# Patient Record
Sex: FEMALE | Race: WHITE | NOT HISPANIC OR LATINO | Employment: OTHER | URBAN - METROPOLITAN AREA
[De-identification: names, ages, dates, MRNs, and addresses within clinical notes are randomized per-mention and may not be internally consistent; named-entity substitution may affect disease eponyms.]

---

## 2017-03-02 RX ORDER — HYDROCHLOROTHIAZIDE 25 MG/1
25 TABLET ORAL DAILY
Qty: 90 TABLET | Refills: 1 | Status: SHIPPED | OUTPATIENT
Start: 2017-03-02

## 2023-05-26 ENCOUNTER — APPOINTMENT (OUTPATIENT)
Dept: GENERAL RADIOLOGY | Facility: HOSPITAL | Age: 88
End: 2023-05-26
Payer: MEDICARE

## 2023-05-26 ENCOUNTER — HOSPITAL ENCOUNTER (EMERGENCY)
Facility: HOSPITAL | Age: 88
Discharge: HOME | End: 2023-05-26
Attending: EMERGENCY MEDICINE
Payer: MEDICARE

## 2023-05-26 VITALS
HEIGHT: 69 IN | WEIGHT: 127 LBS | DIASTOLIC BLOOD PRESSURE: 61 MMHG | TEMPERATURE: 98.9 F | SYSTOLIC BLOOD PRESSURE: 118 MMHG | OXYGEN SATURATION: 95 % | BODY MASS INDEX: 18.81 KG/M2 | HEART RATE: 65 BPM | RESPIRATION RATE: 18 BRPM

## 2023-05-26 DIAGNOSIS — N39.0 URINARY TRACT INFECTION WITHOUT HEMATURIA, SITE UNSPECIFIED: ICD-10-CM
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
## 2023-05-26 DIAGNOSIS — E86.0 MILD DEHYDRATION: ICD-10-CM

## 2023-05-26 LAB
ALBUMIN SERPL-MCNC: 3.3 G/DL (ref 3.5–5.2)
ALBUMIN/GLOB SERPL: 1.1 G/DL
ALP SERPL-CCNC: 54 U/L (ref 39–117)
ALT SERPL W P-5'-P-CCNC: 17 U/L (ref 1–33)
ANION GAP SERPL CALCULATED.3IONS-SCNC: 11 MMOL/L (ref 5–15)
AST SERPL-CCNC: 29 U/L (ref 1–32)
BACTERIA UR QL AUTO: ABNORMAL /HPF
BASOPHILS # BLD AUTO: 0.05 10*3/MM3 (ref 0–0.2)
BASOPHILS NFR BLD AUTO: 0.5 % (ref 0–1.5)
BILIRUB SERPL-MCNC: 0.3 MG/DL (ref 0–1.2)
BILIRUB UR QL STRIP: NEGATIVE
BUN SERPL-MCNC: 23 MG/DL (ref 8–23)
BUN/CREAT SERPL: 15.3 (ref 7–25)
CALCIUM SPEC-SCNC: 8.9 MG/DL (ref 8.6–10.5)
CHLORIDE SERPL-SCNC: 97 MMOL/L (ref 98–107)
CLARITY UR: ABNORMAL
CO2 SERPL-SCNC: 25 MMOL/L (ref 22–29)
COLOR UR: YELLOW
CREAT SERPL-MCNC: 1.5 MG/DL (ref 0.57–1)
DEPRECATED RDW RBC AUTO: 57.8 FL (ref 37–54)
EGFRCR SERPLBLD CKD-EPI 2021: 33.6 ML/MIN/1.73
EOSINOPHIL # BLD AUTO: 0.25 10*3/MM3 (ref 0–0.4)
EOSINOPHIL NFR BLD AUTO: 2.6 % (ref 0.3–6.2)
ERYTHROCYTE [DISTWIDTH] IN BLOOD BY AUTOMATED COUNT: 16.8 % (ref 12.3–15.4)
GLOBULIN UR ELPH-MCNC: 3.1 GM/DL
GLUCOSE SERPL-MCNC: 86 MG/DL (ref 65–99)
GLUCOSE UR STRIP-MCNC: NEGATIVE MG/DL
HCT VFR BLD AUTO: 33.3 % (ref 34–46.6)
HGB BLD-MCNC: 9.8 G/DL (ref 12–15.9)
HGB UR QL STRIP.AUTO: NEGATIVE
HOLD SPECIMEN: NORMAL
HOLD SPECIMEN: NORMAL
HYALINE CASTS UR QL AUTO: ABNORMAL /LPF
IMM GRANULOCYTES # BLD AUTO: 0.06 10*3/MM3 (ref 0–0.05)
IMM GRANULOCYTES NFR BLD AUTO: 0.6 % (ref 0–0.5)
KETONES UR QL STRIP: NEGATIVE
LEUKOCYTE ESTERASE UR QL STRIP.AUTO: ABNORMAL
LYMPHOCYTES # BLD AUTO: 1.17 10*3/MM3 (ref 0.7–3.1)
LYMPHOCYTES NFR BLD AUTO: 12 % (ref 19.6–45.3)
MAGNESIUM SERPL-MCNC: 2.3 MG/DL (ref 1.6–2.4)
MCH RBC QN AUTO: 27.5 PG (ref 26.6–33)
MCHC RBC AUTO-ENTMCNC: 29.4 G/DL (ref 31.5–35.7)
MCV RBC AUTO: 93.3 FL (ref 79–97)
MONOCYTES # BLD AUTO: 0.53 10*3/MM3 (ref 0.1–0.9)
MONOCYTES NFR BLD AUTO: 5.5 % (ref 5–12)
NEUTROPHILS NFR BLD AUTO: 7.66 10*3/MM3 (ref 1.7–7)
NEUTROPHILS NFR BLD AUTO: 78.8 % (ref 42.7–76)
NITRITE UR QL STRIP: NEGATIVE
NRBC BLD AUTO-RTO: 0 /100 WBC (ref 0–0.2)
PH UR STRIP.AUTO: 7 [PH] (ref 5–8)
PLATELET # BLD AUTO: 227 10*3/MM3 (ref 140–450)
PMV BLD AUTO: 10.8 FL (ref 6–12)
POTASSIUM SERPL-SCNC: 4.7 MMOL/L (ref 3.5–5.2)
PROT SERPL-MCNC: 6.4 G/DL (ref 6–8.5)
PROT UR QL STRIP: NEGATIVE
QT INTERVAL: 428 MS
QTC INTERVAL: 458 MS
RBC # BLD AUTO: 3.57 10*6/MM3 (ref 3.77–5.28)
RBC # UR STRIP: ABNORMAL /HPF
REF LAB TEST METHOD: ABNORMAL
SODIUM SERPL-SCNC: 133 MMOL/L (ref 136–145)
SP GR UR STRIP: 1.02 (ref 1–1.03)
SQUAMOUS #/AREA URNS HPF: ABNORMAL /HPF
TROPONIN T SERPL HS-MCNC: 17 NG/L
UROBILINOGEN UR QL STRIP: ABNORMAL
WBC # UR STRIP: ABNORMAL /HPF
WBC NRBC COR # BLD: 9.72 10*3/MM3 (ref 3.4–10.8)
WHOLE BLOOD HOLD COAG: NORMAL
WHOLE BLOOD HOLD SPECIMEN: NORMAL

## 2023-05-26 PROCEDURE — 87077 CULTURE AEROBIC IDENTIFY: CPT | Performed by: PHYSICIAN ASSISTANT

## 2023-05-26 PROCEDURE — 93005 ELECTROCARDIOGRAM TRACING: CPT | Performed by: EMERGENCY MEDICINE

## 2023-05-26 PROCEDURE — 85025 COMPLETE CBC W/AUTO DIFF WBC: CPT | Performed by: EMERGENCY MEDICINE

## 2023-05-26 PROCEDURE — 36415 COLL VENOUS BLD VENIPUNCTURE: CPT

## 2023-05-26 PROCEDURE — 25010000002 CEFTRIAXONE PER 250 MG: Performed by: PHYSICIAN ASSISTANT

## 2023-05-26 PROCEDURE — 81001 URINALYSIS AUTO W/SCOPE: CPT | Performed by: PHYSICIAN ASSISTANT

## 2023-05-26 PROCEDURE — 80053 COMPREHEN METABOLIC PANEL: CPT | Performed by: EMERGENCY MEDICINE

## 2023-05-26 PROCEDURE — 99284 EMERGENCY DEPT VISIT MOD MDM: CPT

## 2023-05-26 PROCEDURE — 87186 SC STD MICRODIL/AGAR DIL: CPT | Performed by: PHYSICIAN ASSISTANT

## 2023-05-26 PROCEDURE — 83735 ASSAY OF MAGNESIUM: CPT | Performed by: EMERGENCY MEDICINE

## 2023-05-26 PROCEDURE — 71045 X-RAY EXAM CHEST 1 VIEW: CPT

## 2023-05-26 PROCEDURE — 96365 THER/PROPH/DIAG IV INF INIT: CPT

## 2023-05-26 PROCEDURE — P9612 CATHETERIZE FOR URINE SPEC: HCPCS

## 2023-05-26 PROCEDURE — 87086 URINE CULTURE/COLONY COUNT: CPT | Performed by: PHYSICIAN ASSISTANT

## 2023-05-26 PROCEDURE — 84484 ASSAY OF TROPONIN QUANT: CPT | Performed by: EMERGENCY MEDICINE

## 2023-05-26 RX ORDER — SODIUM CHLORIDE 0.9 % (FLUSH) 0.9 %
10 SYRINGE (ML) INJECTION AS NEEDED
Status: DISCONTINUED | OUTPATIENT
Start: 2023-05-26 | End: 2023-05-26 | Stop reason: HOSPADM

## 2023-05-26 RX ORDER — CEFDINIR 300 MG/1
300 CAPSULE ORAL DAILY
Qty: 10 CAPSULE | Refills: 0 | Status: SHIPPED | OUTPATIENT
Start: 2023-05-26

## 2023-05-26 RX ADMIN — SODIUM CHLORIDE 1000 ML: 9 INJECTION, SOLUTION INTRAVENOUS at 12:36

## 2023-05-26 NOTE — ED PROVIDER NOTES
Subjective   History of Present Illness  Ms. Hodges is an 87 yr old female with a history of hypertension, depression, dysautonomia, GERD, who presents from [facility] for evaluation after a stumble and fall today.  Patient states that she was using her walker and it got hung on a metal threshold, causing her to fall.  She denies any injury as result of the fall.  She states that she has only been there for about a week.  She has no current complaints and wants to be returned to the facility.  She denies any chest pain or shortness of breath.  No abdominal pain.  No recent nausea, vomiting or diarrhea.  No urinary symptoms.  She is not on any blood thinners.        Review of Systems   Constitutional: Negative for chills and fever.   HENT: Negative for sore throat.    Respiratory: Negative for cough and shortness of breath.    Cardiovascular: Negative for chest pain.   Gastrointestinal: Negative for abdominal pain, diarrhea, nausea and vomiting.   Genitourinary: Negative for dysuria.   Musculoskeletal: Negative for back pain and neck pain.   Skin: Negative for wound.   Neurological: Negative for weakness, light-headedness and headaches.   Hematological: Negative.    Psychiatric/Behavioral: Negative.        Past Medical History:   Diagnosis Date   • Constipation    • Depression    • Dysautonomia orthostatic hypotension syndrome (CMS/HCC)    • Generalized osteoarthritis    • GERD (gastroesophageal reflux disease)     s/p Nissen   • Hypertension    • Insomnia    • Osteoarthrosis, shoulder region    • Skin ulcer of scalp    • Thrombophlebitis of arm    • Tremor    • Vitamin B12 deficiency        Allergies   Allergen Reactions   • Gabapentin    • Sulfa Antibiotics        Past Surgical History:   Procedure Laterality Date   • DILATATION AND CURETTAGE     • HERNIA REPAIR     • HIP SURGERY     • SHOULDER SURGERY      Right       Family History   Problem Relation Age of Onset   • Cancer Mother         Pancreatic    • Stroke Brother    • Anorexia nervosa Daughter        Social History     Socioeconomic History   • Marital status:    Tobacco Use   • Smoking status: Never   Substance and Sexual Activity   • Alcohol use: No     Comment: stopped drinking alcohol   • Drug use: No           Objective   Physical Exam  Constitutional:       General: She is not in acute distress.     Appearance: Normal appearance.   HENT:      Head: Normocephalic and atraumatic.      Right Ear: External ear normal.      Left Ear: External ear normal.      Nose: Nose normal.      Mouth/Throat:      Mouth: Mucous membranes are moist.   Eyes:      General: No scleral icterus.     Conjunctiva/sclera: Conjunctivae normal.      Pupils: Pupils are equal, round, and reactive to light.   Cardiovascular:      Rate and Rhythm: Normal rate and regular rhythm.      Pulses: Normal pulses.      Heart sounds: Normal heart sounds.   Pulmonary:      Effort: Pulmonary effort is normal.      Breath sounds: Normal breath sounds.   Chest:      Chest wall: No tenderness.   Abdominal:      General: Bowel sounds are normal.      Tenderness: There is no abdominal tenderness.   Musculoskeletal:         General: No swelling or tenderness.      Cervical back: Normal range of motion and neck supple. No tenderness.      Right lower leg: No edema.      Left lower leg: No edema.   Skin:     General: Skin is warm and dry.   Neurological:      General: No focal deficit present.      Mental Status: She is alert and oriented to person, place, and time.      Comments: Patient is alert, well-oriented and remarkably sharp for her age.   Psychiatric:         Mood and Affect: Mood normal.         Procedures           ED Course      In summary, 87-year-old female presents for evaluation after a stumble and fall at [facility] this morning.  The patient states that her walker got caught on a metal threshold, resulting in her fall.  She denies any injury secondary to the fall and  states that she is ready to be discharged.  The patient's son is present and states that he would like her checked out to make sure she does not have any other issues.    Basic labs and urinalysis were obtained.    Urinalysis shows too numerous to count white cells with 4+ bacteria.  No epithelial cells seen.  White count is 9.72.  She has some chronic anemia with an H&H of 9.8/33.3.  Creatinine is bumped at 1.50 (normally runs around 1.0).  No concerning electrolyte disorder.  High-sensitivity troponin is 17.    EKG shows a paced rhythm with a rate of 69.    Chest x-ray shows no active disease.    Patient was given a liter of fluids and Rocephin 1 g IV.  She will be discharged back to her facility on cefdinir and encouraged to increase fluids.                                             MDM    Final diagnoses:   Fall on same level from slipping, tripping or stumbling, initial encounter   Urinary tract infection without hematuria, site unspecified   Mild dehydration       ED Disposition  ED Disposition     ED Disposition   Discharge    Condition   Stable    Comment   --             Katarina Oviedo MD  [address] [address]  [phone]      call for follow up    [facility] Emergency Department  [address] [address] [phone]    If symptoms worsen         Medication List      New Prescriptions    cefdinir 300 MG capsule  Commonly known as: OMNICEF  Take 1 capsule by mouth Daily.           Where to Get Your Medications      These medications were sent to [facility] [place]  [address] , [place] [address]    Hours: 7:00 AM-5:30 PM M-F, 8:00 AM-4:30 PM Sat-Sun Phone: [phone]   · cefdinir 300 MG capsule          Patrice Hernandez, PA  05/26/23 5902

## 2023-05-26 NOTE — DISCHARGE INSTRUCTIONS
Rest.  Plenty of fluids.  Omnicef as prescribed.  Follow-up with your PCP for urine recheck.  Return to the emergency department if worse.

## 2023-05-28 LAB — BACTERIA SPEC AEROBE CULT: ABNORMAL

## 2023-06-29 PROBLEM — J18.9 PNEUMONIA DUE TO INFECTIOUS ORGANISM: Status: ACTIVE | Noted: 2023-06-29

## 2023-06-29 PROBLEM — J96.01 ACUTE RESPIRATORY FAILURE WITH HYPOXIA: Status: ACTIVE | Noted: 2023-06-29

## 2023-06-29 PROBLEM — N39.0 UTI (URINARY TRACT INFECTION): Status: ACTIVE | Noted: 2023-06-29

## 2023-07-03 PROBLEM — J96.01 ACUTE RESPIRATORY FAILURE WITH HYPOXIA: Status: RESOLVED | Noted: 2023-06-29 | Resolved: 2023-07-03

## 2023-07-03 PROBLEM — N39.0 UTI (URINARY TRACT INFECTION): Status: RESOLVED | Noted: 2023-06-29 | Resolved: 2023-07-03

## 2023-07-03 PROBLEM — J18.9 PNEUMONIA DUE TO INFECTIOUS ORGANISM: Status: RESOLVED | Noted: 2023-06-29 | Resolved: 2023-07-03

## 2023-08-20 ENCOUNTER — HOSPITAL ENCOUNTER (INPATIENT)
Facility: HOSPITAL | Age: 88
LOS: 5 days | Discharge: HOME | DRG: 177 | End: 2023-08-25
Attending: EMERGENCY MEDICINE | Admitting: INTERNAL MEDICINE
Payer: MEDICARE

## 2023-08-20 ENCOUNTER — APPOINTMENT (OUTPATIENT)
Dept: GENERAL RADIOLOGY | Facility: HOSPITAL | Age: 88
DRG: 177 | End: 2023-08-20
Payer: MEDICARE

## 2023-08-20 DIAGNOSIS — I50.9 CONGESTIVE HEART FAILURE, UNSPECIFIED HF CHRONICITY, UNSPECIFIED HEART FAILURE TYPE: ICD-10-CM

## 2023-08-20 DIAGNOSIS — R13.13 PHARYNGEAL DYSPHAGIA: ICD-10-CM

## 2023-08-20 DIAGNOSIS — J18.9 PNEUMONIA OF LEFT LUNG DUE TO INFECTIOUS ORGANISM, UNSPECIFIED PART OF LUNG: Primary | ICD-10-CM

## 2023-08-20 DIAGNOSIS — J96.01 ACUTE RESPIRATORY FAILURE WITH HYPOXIA: ICD-10-CM

## 2023-08-20 DIAGNOSIS — J41.0 SIMPLE CHRONIC BRONCHITIS: ICD-10-CM

## 2023-08-20 LAB
ALBUMIN SERPL-MCNC: 3.4 G/DL (ref 3.5–5.2)
ALBUMIN/GLOB SERPL: 1.1 G/DL
ALP SERPL-CCNC: 47 U/L (ref 39–117)
ALT SERPL W P-5'-P-CCNC: 14 U/L (ref 1–33)
ANION GAP SERPL CALCULATED.3IONS-SCNC: 8 MMOL/L (ref 5–15)
ARTERIAL PATENCY WRIST A: ABNORMAL
AST SERPL-CCNC: 19 U/L (ref 1–32)
ATMOSPHERIC PRESS: ABNORMAL MM[HG]
B PARAPERT DNA SPEC QL NAA+PROBE: NOT DETECTED
B PERT DNA SPEC QL NAA+PROBE: NOT DETECTED
BACTERIA UR QL AUTO: ABNORMAL /HPF
BASE EXCESS BLDA CALC-SCNC: 0.6 MMOL/L (ref 0–2)
BASOPHILS # BLD AUTO: 0.02 10*3/MM3 (ref 0–0.2)
BASOPHILS NFR BLD AUTO: 0.2 % (ref 0–1.5)
BDY SITE: ABNORMAL
BILIRUB SERPL-MCNC: 0.2 MG/DL (ref 0–1.2)
BILIRUB UR QL STRIP: NEGATIVE
BODY TEMPERATURE: 37 C
BUN SERPL-MCNC: 26 MG/DL (ref 8–23)
BUN/CREAT SERPL: 18.1 (ref 7–25)
C PNEUM DNA NPH QL NAA+NON-PROBE: NOT DETECTED
CALCIUM SPEC-SCNC: 8.5 MG/DL (ref 8.6–10.5)
CHLORIDE SERPL-SCNC: 99 MMOL/L (ref 98–107)
CLARITY UR: ABNORMAL
CO2 BLDA-SCNC: 26.7 MMOL/L (ref 22–33)
CO2 SERPL-SCNC: 25 MMOL/L (ref 22–29)
COHGB MFR BLD: ABNORMAL %
COLOR UR: YELLOW
CREAT SERPL-MCNC: 1.44 MG/DL (ref 0.57–1)
CRP SERPL-MCNC: 0.59 MG/DL (ref 0–0.5)
D DIMER PPP FEU-MCNC: 1.07 MCGFEU/ML (ref 0–0.87)
D-LACTATE SERPL-SCNC: 1.1 MMOL/L (ref 0.5–2)
DEPRECATED RDW RBC AUTO: 56.9 FL (ref 37–54)
EGFRCR SERPLBLD CKD-EPI 2021: 35.3 ML/MIN/1.73
EOSINOPHIL # BLD AUTO: 0.06 10*3/MM3 (ref 0–0.4)
EOSINOPHIL NFR BLD AUTO: 0.5 % (ref 0.3–6.2)
EPAP: 0
ERYTHROCYTE [DISTWIDTH] IN BLOOD BY AUTOMATED COUNT: 17 % (ref 12.3–15.4)
FLUAV SUBTYP SPEC NAA+PROBE: NOT DETECTED
FLUBV RNA ISLT QL NAA+PROBE: NOT DETECTED
GLOBULIN UR ELPH-MCNC: 3 GM/DL
GLUCOSE SERPL-MCNC: 133 MG/DL (ref 65–99)
GLUCOSE UR STRIP-MCNC: NEGATIVE MG/DL
HADV DNA SPEC NAA+PROBE: NOT DETECTED
HCO3 BLDA-SCNC: 25.4 MMOL/L (ref 20–26)
HCOV 229E RNA SPEC QL NAA+PROBE: NOT DETECTED
HCOV HKU1 RNA SPEC QL NAA+PROBE: NOT DETECTED
HCOV NL63 RNA SPEC QL NAA+PROBE: NOT DETECTED
HCOV OC43 RNA SPEC QL NAA+PROBE: NOT DETECTED
HCT VFR BLD AUTO: 32.5 % (ref 34–46.6)
HCT VFR BLD CALC: 29.5 % (ref 38–51)
HGB BLD-MCNC: 9.8 G/DL (ref 12–15.9)
HGB BLDA-MCNC: 9.6 G/DL (ref 14–18)
HGB UR QL STRIP.AUTO: NEGATIVE
HMPV RNA NPH QL NAA+NON-PROBE: NOT DETECTED
HOLD SPECIMEN: NORMAL
HPIV1 RNA ISLT QL NAA+PROBE: NOT DETECTED
HPIV2 RNA SPEC QL NAA+PROBE: NOT DETECTED
HPIV3 RNA NPH QL NAA+PROBE: NOT DETECTED
HPIV4 P GENE NPH QL NAA+PROBE: NOT DETECTED
HYALINE CASTS UR QL AUTO: ABNORMAL /LPF
IMM GRANULOCYTES # BLD AUTO: 0.04 10*3/MM3 (ref 0–0.05)
IMM GRANULOCYTES NFR BLD AUTO: 0.4 % (ref 0–0.5)
INHALED O2 CONCENTRATION: 58 %
IPAP: 0
KETONES UR QL STRIP: NEGATIVE
LEUKOCYTE ESTERASE UR QL STRIP.AUTO: ABNORMAL
LYMPHOCYTES # BLD AUTO: 0.78 10*3/MM3 (ref 0.7–3.1)
LYMPHOCYTES NFR BLD AUTO: 6.9 % (ref 19.6–45.3)
M PNEUMO IGG SER IA-ACNC: NOT DETECTED
MCH RBC QN AUTO: 27.5 PG (ref 26.6–33)
MCHC RBC AUTO-ENTMCNC: 30.2 G/DL (ref 31.5–35.7)
MCV RBC AUTO: 91 FL (ref 79–97)
METHGB BLD QL: 0.6 % (ref 0–1.5)
MODALITY: ABNORMAL
MONOCYTES # BLD AUTO: 0.29 10*3/MM3 (ref 0.1–0.9)
MONOCYTES NFR BLD AUTO: 2.6 % (ref 5–12)
MRSA DNA SPEC QL NAA+PROBE: NEGATIVE
NEUTROPHILS NFR BLD AUTO: 10.11 10*3/MM3 (ref 1.7–7)
NEUTROPHILS NFR BLD AUTO: 89.4 % (ref 42.7–76)
NITRITE UR QL STRIP: POSITIVE
NRBC BLD AUTO-RTO: 0 /100 WBC (ref 0–0.2)
NT-PROBNP SERPL-MCNC: 205.7 PG/ML (ref 0–1800)
OXYHGB MFR BLDV: 88.9 % (ref 94–99)
PAW @ PEAK INSP FLOW SETTING VENT: 0 CMH2O
PCO2 BLDA: 41.1 MM HG (ref 35–45)
PCO2 TEMP ADJ BLD: 41.1 MM HG (ref 35–45)
PH BLDA: 7.4 PH UNITS (ref 7.35–7.45)
PH UR STRIP.AUTO: 7 [PH] (ref 5–8)
PH, TEMP CORRECTED: 7.4 PH UNITS
PLATELET # BLD AUTO: 264 10*3/MM3 (ref 140–450)
PMV BLD AUTO: 9.9 FL (ref 6–12)
PO2 BLDA: 65 MM HG (ref 83–108)
PO2 TEMP ADJ BLD: 65 MM HG (ref 83–108)
POTASSIUM SERPL-SCNC: 4.8 MMOL/L (ref 3.5–5.2)
PROCALCITONIN SERPL-MCNC: 0.04 NG/ML (ref 0–0.25)
PROT SERPL-MCNC: 6.4 G/DL (ref 6–8.5)
PROT UR QL STRIP: ABNORMAL
QT INTERVAL: 426 MS
QTC INTERVAL: 482 MS
RBC # BLD AUTO: 3.57 10*6/MM3 (ref 3.77–5.28)
RBC # UR STRIP: ABNORMAL /HPF
REF LAB TEST METHOD: ABNORMAL
RHINOVIRUS RNA SPEC NAA+PROBE: NOT DETECTED
RSV RNA NPH QL NAA+NON-PROBE: NOT DETECTED
SARS-COV-2 RNA NPH QL NAA+NON-PROBE: NOT DETECTED
SODIUM SERPL-SCNC: 132 MMOL/L (ref 136–145)
SP GR UR STRIP: 1.02 (ref 1–1.03)
SQUAMOUS #/AREA URNS HPF: ABNORMAL /HPF
TOTAL RATE: 0 BREATHS/MINUTE
TROPONIN T SERPL HS-MCNC: 15 NG/L
UROBILINOGEN UR QL STRIP: ABNORMAL
VENTILATOR MODE: ABNORMAL
WBC # UR STRIP: ABNORMAL /HPF
WBC NRBC COR # BLD: 11.3 10*3/MM3 (ref 3.4–10.8)
WHOLE BLOOD HOLD COAG: NORMAL
WHOLE BLOOD HOLD SPECIMEN: NORMAL

## 2023-08-20 PROCEDURE — 36415 COLL VENOUS BLD VENIPUNCTURE: CPT

## 2023-08-20 PROCEDURE — 84484 ASSAY OF TROPONIN QUANT: CPT | Performed by: EMERGENCY MEDICINE

## 2023-08-20 PROCEDURE — 80053 COMPREHEN METABOLIC PANEL: CPT | Performed by: EMERGENCY MEDICINE

## 2023-08-20 PROCEDURE — 81001 URINALYSIS AUTO W/SCOPE: CPT | Performed by: INTERNAL MEDICINE

## 2023-08-20 PROCEDURE — 84145 PROCALCITONIN (PCT): CPT | Performed by: NURSE PRACTITIONER

## 2023-08-20 PROCEDURE — 93005 ELECTROCARDIOGRAM TRACING: CPT | Performed by: EMERGENCY MEDICINE

## 2023-08-20 PROCEDURE — 99223 1ST HOSP IP/OBS HIGH 75: CPT | Performed by: INTERNAL MEDICINE

## 2023-08-20 PROCEDURE — 25010000002 PIPERACILLIN SOD-TAZOBACTAM PER 1 G: Performed by: NURSE PRACTITIONER

## 2023-08-20 PROCEDURE — 87186 SC STD MICRODIL/AGAR DIL: CPT | Performed by: INTERNAL MEDICINE

## 2023-08-20 PROCEDURE — 71045 X-RAY EXAM CHEST 1 VIEW: CPT

## 2023-08-20 PROCEDURE — 83880 ASSAY OF NATRIURETIC PEPTIDE: CPT | Performed by: EMERGENCY MEDICINE

## 2023-08-20 PROCEDURE — 85379 FIBRIN DEGRADATION QUANT: CPT | Performed by: INTERNAL MEDICINE

## 2023-08-20 PROCEDURE — 85025 COMPLETE CBC W/AUTO DIFF WBC: CPT | Performed by: EMERGENCY MEDICINE

## 2023-08-20 PROCEDURE — 87077 CULTURE AEROBIC IDENTIFY: CPT | Performed by: INTERNAL MEDICINE

## 2023-08-20 PROCEDURE — 82784 ASSAY IGA/IGD/IGG/IGM EACH: CPT | Performed by: INTERNAL MEDICINE

## 2023-08-20 PROCEDURE — 87086 URINE CULTURE/COLONY COUNT: CPT | Performed by: INTERNAL MEDICINE

## 2023-08-20 PROCEDURE — 86140 C-REACTIVE PROTEIN: CPT | Performed by: INTERNAL MEDICINE

## 2023-08-20 PROCEDURE — 87088 URINE BACTERIA CULTURE: CPT | Performed by: INTERNAL MEDICINE

## 2023-08-20 PROCEDURE — 87449 NOS EACH ORGANISM AG IA: CPT | Performed by: INTERNAL MEDICINE

## 2023-08-20 PROCEDURE — 99285 EMERGENCY DEPT VISIT HI MDM: CPT

## 2023-08-20 PROCEDURE — 83605 ASSAY OF LACTIC ACID: CPT | Performed by: NURSE PRACTITIONER

## 2023-08-20 PROCEDURE — 82805 BLOOD GASES W/O2 SATURATION: CPT

## 2023-08-20 PROCEDURE — 25010000002 METHYLPREDNISOLONE PER 40 MG: Performed by: INTERNAL MEDICINE

## 2023-08-20 PROCEDURE — 82375 ASSAY CARBOXYHB QUANT: CPT

## 2023-08-20 PROCEDURE — 83050 HGB METHEMOGLOBIN QUAN: CPT

## 2023-08-20 PROCEDURE — 87641 MR-STAPH DNA AMP PROBE: CPT | Performed by: INTERNAL MEDICINE

## 2023-08-20 PROCEDURE — 36600 WITHDRAWAL OF ARTERIAL BLOOD: CPT

## 2023-08-20 PROCEDURE — 0202U NFCT DS 22 TRGT SARS-COV-2: CPT | Performed by: NURSE PRACTITIONER

## 2023-08-20 PROCEDURE — 87040 BLOOD CULTURE FOR BACTERIA: CPT | Performed by: NURSE PRACTITIONER

## 2023-08-20 RX ORDER — CHOLECALCIFEROL (VITAMIN D3) 125 MCG
5 CAPSULE ORAL NIGHTLY PRN
Status: DISCONTINUED | OUTPATIENT
Start: 2023-08-20 | End: 2023-08-25 | Stop reason: HOSPADM

## 2023-08-20 RX ORDER — METHYLPREDNISOLONE SODIUM SUCCINATE 40 MG/ML
40 INJECTION, POWDER, LYOPHILIZED, FOR SOLUTION INTRAMUSCULAR; INTRAVENOUS EVERY 24 HOURS
Status: DISCONTINUED | OUTPATIENT
Start: 2023-08-20 | End: 2023-08-23

## 2023-08-20 RX ORDER — LEVETIRACETAM 500 MG/1
1000 TABLET, EXTENDED RELEASE ORAL NIGHTLY
Status: DISCONTINUED | OUTPATIENT
Start: 2023-08-20 | End: 2023-08-25 | Stop reason: HOSPADM

## 2023-08-20 RX ORDER — SODIUM CHLORIDE 0.9 % (FLUSH) 0.9 %
10 SYRINGE (ML) INJECTION EVERY 12 HOURS SCHEDULED
Status: DISCONTINUED | OUTPATIENT
Start: 2023-08-20 | End: 2023-08-25 | Stop reason: HOSPADM

## 2023-08-20 RX ORDER — METOPROLOL SUCCINATE 50 MG/1
50 TABLET, EXTENDED RELEASE ORAL DAILY
COMMUNITY
End: 2023-08-25 | Stop reason: HOSPADM

## 2023-08-20 RX ORDER — SODIUM CHLORIDE 0.9 % (FLUSH) 0.9 %
10 SYRINGE (ML) INJECTION AS NEEDED
Status: DISCONTINUED | OUTPATIENT
Start: 2023-08-20 | End: 2023-08-25 | Stop reason: HOSPADM

## 2023-08-20 RX ORDER — GUAIFENESIN/DEXTROMETHORPHAN 100-10MG/5
10 SYRUP ORAL EVERY 6 HOURS SCHEDULED
Status: DISCONTINUED | OUTPATIENT
Start: 2023-08-20 | End: 2023-08-25 | Stop reason: HOSPADM

## 2023-08-20 RX ORDER — BUSPIRONE HYDROCHLORIDE 5 MG/1
5 TABLET ORAL EVERY 12 HOURS SCHEDULED
Status: DISCONTINUED | OUTPATIENT
Start: 2023-08-20 | End: 2023-08-25 | Stop reason: HOSPADM

## 2023-08-20 RX ORDER — ATORVASTATIN CALCIUM 40 MG/1
40 TABLET, FILM COATED ORAL NIGHTLY
Status: DISCONTINUED | OUTPATIENT
Start: 2023-08-20 | End: 2023-08-25 | Stop reason: HOSPADM

## 2023-08-20 RX ORDER — POLYETHYLENE GLYCOL 3350 17 G/17G
17 POWDER, FOR SOLUTION ORAL DAILY PRN
Status: DISCONTINUED | OUTPATIENT
Start: 2023-08-20 | End: 2023-08-25 | Stop reason: HOSPADM

## 2023-08-20 RX ORDER — AMIODARONE HYDROCHLORIDE 200 MG/1
200 TABLET ORAL DAILY
Status: DISCONTINUED | OUTPATIENT
Start: 2023-08-21 | End: 2023-08-25 | Stop reason: HOSPADM

## 2023-08-20 RX ORDER — LEVOFLOXACIN 500 MG/1
500 TABLET, FILM COATED ORAL EVERY 24 HOURS
Status: DISCONTINUED | OUTPATIENT
Start: 2023-08-20 | End: 2023-08-25 | Stop reason: HOSPADM

## 2023-08-20 RX ORDER — SODIUM CHLORIDE 9 MG/ML
75 INJECTION, SOLUTION INTRAVENOUS CONTINUOUS
Status: DISCONTINUED | OUTPATIENT
Start: 2023-08-20 | End: 2023-08-21

## 2023-08-20 RX ORDER — MELATONIN
1000 DAILY
Status: DISCONTINUED | OUTPATIENT
Start: 2023-08-21 | End: 2023-08-25 | Stop reason: HOSPADM

## 2023-08-20 RX ORDER — PANTOPRAZOLE SODIUM 40 MG/1
40 TABLET, DELAYED RELEASE ORAL
Status: DISCONTINUED | OUTPATIENT
Start: 2023-08-21 | End: 2023-08-25 | Stop reason: HOSPADM

## 2023-08-20 RX ORDER — FLUTICASONE PROPIONATE 50 MCG
2 SPRAY, SUSPENSION (ML) NASAL DAILY PRN
Status: DISCONTINUED | OUTPATIENT
Start: 2023-08-20 | End: 2023-08-25 | Stop reason: HOSPADM

## 2023-08-20 RX ORDER — SPIRONOLACTONE 25 MG/1
25 TABLET ORAL DAILY
COMMUNITY
End: 2023-08-25 | Stop reason: HOSPADM

## 2023-08-20 RX ORDER — LOSARTAN POTASSIUM 100 MG/1
100 TABLET ORAL DAILY
COMMUNITY
End: 2023-08-25 | Stop reason: HOSPADM

## 2023-08-20 RX ORDER — TRAZODONE HYDROCHLORIDE 100 MG/1
100 TABLET ORAL NIGHTLY
Status: DISCONTINUED | OUTPATIENT
Start: 2023-08-20 | End: 2023-08-25 | Stop reason: HOSPADM

## 2023-08-20 RX ORDER — SODIUM CHLORIDE 9 MG/ML
40 INJECTION, SOLUTION INTRAVENOUS AS NEEDED
Status: DISCONTINUED | OUTPATIENT
Start: 2023-08-20 | End: 2023-08-25 | Stop reason: HOSPADM

## 2023-08-20 RX ORDER — CYANOCOBALAMIN 1000 UG/ML
1000 INJECTION, SOLUTION INTRAMUSCULAR; SUBCUTANEOUS
Status: DISCONTINUED | OUTPATIENT
Start: 2023-08-28 | End: 2023-08-25 | Stop reason: HOSPADM

## 2023-08-20 RX ORDER — BISACODYL 10 MG
10 SUPPOSITORY, RECTAL RECTAL DAILY PRN
Status: DISCONTINUED | OUTPATIENT
Start: 2023-08-20 | End: 2023-08-25 | Stop reason: HOSPADM

## 2023-08-20 RX ORDER — ALBUTEROL SULFATE 2.5 MG/3ML
2.5 SOLUTION RESPIRATORY (INHALATION) EVERY 4 HOURS PRN
Status: DISCONTINUED | OUTPATIENT
Start: 2023-08-20 | End: 2023-08-25 | Stop reason: HOSPADM

## 2023-08-20 RX ORDER — VENLAFAXINE HYDROCHLORIDE 75 MG/1
75 CAPSULE, EXTENDED RELEASE ORAL DAILY
Status: DISCONTINUED | OUTPATIENT
Start: 2023-08-21 | End: 2023-08-25 | Stop reason: HOSPADM

## 2023-08-20 RX ORDER — SENNOSIDES A AND B 8.6 MG/1
2 TABLET, FILM COATED ORAL 2 TIMES DAILY
Status: DISCONTINUED | OUTPATIENT
Start: 2023-08-20 | End: 2023-08-25 | Stop reason: HOSPADM

## 2023-08-20 RX ORDER — ONDANSETRON 2 MG/ML
4 INJECTION INTRAMUSCULAR; INTRAVENOUS EVERY 6 HOURS PRN
Status: DISCONTINUED | OUTPATIENT
Start: 2023-08-20 | End: 2023-08-25 | Stop reason: HOSPADM

## 2023-08-20 RX ORDER — AMOXICILLIN 250 MG
2 CAPSULE ORAL 2 TIMES DAILY
Status: DISCONTINUED | OUTPATIENT
Start: 2023-08-20 | End: 2023-08-25 | Stop reason: HOSPADM

## 2023-08-20 RX ORDER — BISACODYL 5 MG/1
5 TABLET, DELAYED RELEASE ORAL DAILY PRN
Status: DISCONTINUED | OUTPATIENT
Start: 2023-08-20 | End: 2023-08-25 | Stop reason: HOSPADM

## 2023-08-20 RX ORDER — AMLODIPINE BESYLATE 5 MG/1
5 TABLET ORAL DAILY
COMMUNITY
End: 2023-08-25 | Stop reason: HOSPADM

## 2023-08-20 RX ADMIN — ATORVASTATIN CALCIUM 40 MG: 40 TABLET, FILM COATED ORAL at 20:17

## 2023-08-20 RX ADMIN — APIXABAN 2.5 MG: 2.5 TABLET, FILM COATED ORAL at 20:17

## 2023-08-20 RX ADMIN — SENNOSIDES AND DOCUSATE SODIUM 2 TABLET: 50; 8.6 TABLET ORAL at 20:17

## 2023-08-20 RX ADMIN — LEVOFLOXACIN 500 MG: 500 TABLET, FILM COATED ORAL at 17:35

## 2023-08-20 RX ADMIN — METHYLPREDNISOLONE SODIUM SUCCINATE 40 MG: 40 INJECTION, POWDER, FOR SOLUTION INTRAMUSCULAR; INTRAVENOUS at 17:36

## 2023-08-20 RX ADMIN — Medication 10 ML: at 20:18

## 2023-08-20 RX ADMIN — Medication 5 MG: at 20:17

## 2023-08-20 RX ADMIN — SENNOSIDES 2 TABLET: 8.6 TABLET, FILM COATED ORAL at 20:17

## 2023-08-20 RX ADMIN — TRAZODONE HYDROCHLORIDE 100 MG: 100 TABLET ORAL at 20:17

## 2023-08-20 RX ADMIN — LEVETIRACETAM 1000 MG: 500 TABLET, EXTENDED RELEASE ORAL at 21:21

## 2023-08-20 RX ADMIN — BUSPIRONE HYDROCHLORIDE 5 MG: 5 TABLET ORAL at 21:21

## 2023-08-20 RX ADMIN — SODIUM CHLORIDE 75 ML/HR: 9 INJECTION, SOLUTION INTRAVENOUS at 16:31

## 2023-08-20 RX ADMIN — GUAIFENESIN AND DEXTROMETHORPHAN 10 ML: 100; 10 SYRUP ORAL at 17:36

## 2023-08-20 RX ADMIN — PIPERACILLIN SODIUM AND TAZOBACTAM SODIUM 3.38 G: 3; .375 INJECTION, SOLUTION INTRAVENOUS at 13:04

## 2023-08-20 RX ADMIN — DOXYCYCLINE 100 MG: 100 INJECTION, POWDER, LYOPHILIZED, FOR SOLUTION INTRAVENOUS at 13:40

## 2023-08-20 NOTE — H&P
[facility] Medicine Services  HISTORY AND PHYSICAL    Patient Name: Bibiana Hodges  : 1935  MRN: [id number]  Primary Care Physician: Katarina Oviedo MD  Date of admission: 2023      Subjective   Subjective     Chief Complaint:  I could not sleep    HPI:  Bibiana Hodges is a 87 y.o. female with medical history of essential hypertension, autonomic dysfunction, atrial fibrillation on amiodarone and Eliquis, chronic debility and multiple hospitalizations manage in the last 2 months with recurrent pneumonia, most recently from 2023 till 7/10/2023 when she was treated for both community-acquired pneumonia and UTI and was discharged to SNF.  Patient reported to me that she did not get much better since last discharge and has been having intermittent shortness of breath.  She is telling me that over the past 4 nights, she could not sleep because nursing staff were not giving her the sleeping medications and she could not take it anymore without sleeping so she told them to call EMT.  She does report some shortness of breath with minimal activity.  Denies any fever or chills.  Having intermittent cough that is dry.  Denies any chest pain.  She is not using oxygen at her assisted in facility    In ER, she was found to be hypoxic satting 83% on room air, quickly improved to 99% on 2 L nasal cannula.  Blood work-up was remarkable for sodium of 132, creatinine 1.4 CRP of 0.59, D-dimer of 1.0 and white cell count of 11.3.  Chest x-ray with worsening left upper lobe infiltrates.  She received IV Zosyn and Doxy in ER and will be admitted to hospital service for further evaluation      Review of Systems   General : no fevers, chills  CVS: No chest pain, palpitations  Respiratory:++ Dry cough,++ dyspnea  GI: No N/V/D, abd pain  10 point review of systems is negative except for what is mentioned in HPI    Personal History     Past Medical History:   Diagnosis Date    Constipation      Depression     Dysautonomia orthostatic hypotension syndrome     Generalized osteoarthritis     GERD (gastroesophageal reflux disease)     s/p Nissen    Hypertension     Insomnia     Osteoarthrosis, shoulder region     Skin ulcer of scalp     Thrombophlebitis of arm     Tremor     Vitamin B12 deficiency              Past Surgical History:   Procedure Laterality Date    DILATATION AND CURETTAGE      HERNIA REPAIR      HIP SURGERY      SHOULDER SURGERY      Right       Family History: family history includes Anorexia nervosa in her daughter; Cancer in her mother; Stroke in her brother.     Social History:  reports that she has never smoked. She has never used smokeless tobacco. She reports that she does not drink alcohol and does not use drugs.  Social History     Social History Narrative    Not on file       Medications:  Available home medication information reviewed.  (Not in a hospital admission)      Allergies   Allergen Reactions    Gabapentin     Sulfa Antibiotics        Objective   Objective     Vital Signs:   Temp:  [98.1 øF (36.7 øC)] 98.1 øF (36.7 øC)  Heart Rate:  [59-75] 60  Resp:  [18] 18  BP: ()/() 138/117       Physical Exam   General: Chronically ill looking and debilitated  Head: Atraumatic and normocephalic  Eyes: No Icterus. No pallor  Ears:  Ears appear intact with no abnormalities noted  Throat: No oral lesions, no thrush  Neck: Supple, trachea midline  Lungs: Left upper lobe coarse crackles.  Diminished air entry both lung fields.  Scattered wheezing.    Heart:  Normal S1 and S2, no murmur, no gallop, No JVD, no lower extremity swelling  Abdomen:  Soft, no tenderness, no organomegaly, normal bowel sounds, no organomegaly  Extremities: pulses equal bilaterally  Skin: No bleeding, bruising or rash, normal skin turgor and elasticity  Neurologic: Cranial nerves appear intact with no evidence of facial asymmetry, normal motor and sensory functions in all 4 extremities  Psych: Alert and  oriented x 3, normal mood    Result Review:  I have personally reviewed the results from the time of this admission to 8/20/2023 15:09 EDT and agree with these findings:  [x]  Laboratory list / accordion  [x]  Microbiology  [x]  Radiology  []  EKG/Telemetry   []  Cardiology/Vascular   []  Pathology  [x]  Old records    LAB RESULTS:      Lab 08/20/23  1106 08/20/23  1059   WBC  --  11.30*   HEMOGLOBIN  --  9.8*   HEMATOCRIT  --  32.5*   PLATELETS  --  264   NEUTROS ABS  --  10.11*   IMMATURE GRANS (ABS)  --  0.04   LYMPHS ABS  --  0.78   MONOS ABS  --  0.29   EOS ABS  --  0.06   MCV  --  91.0   CRP 0.59*  --    PROCALCITONIN 0.04  --    LACTATE 1.1  --    D DIMER QUANT  --  1.07*         Lab 08/20/23  1106   SODIUM 132*   POTASSIUM 4.8   CHLORIDE 99   CO2 25.0   ANION GAP 8.0   BUN 26*   CREATININE 1.44*   EGFR 35.3*   GLUCOSE 133*   CALCIUM 8.5*         Lab 08/20/23  1106   TOTAL PROTEIN 6.4   ALBUMIN 3.4*   GLOBULIN 3.0   ALT (SGPT) 14   AST (SGOT) 19   BILIRUBIN 0.2   ALK PHOS 47         Lab 08/20/23  1106   PROBNP 205.7   HSTROP T 15*                 Lab 08/20/23  1127   PH, ARTERIAL 7.400   PCO2, ARTERIAL 41.1   PO2 ART 65.0*   FIO2 58   HCO3 ART 25.4   BASE EXCESS ART 0.6     UA          5/26/2023    11:18 6/29/2023    07:45   Urinalysis   Squamous Epithelial Cells, UA None Seen  0-2    Specific Gravity, UA 1.016  1.015    Ketones, UA Negative  Negative    Blood, UA Negative  Trace    Leukocytes, UA Moderate (2+)  Large (3+)    Nitrite, UA Negative  Positive    RBC, UA None Seen  0-2    WBC, UA Too Numerous to Count  Too Numerous to Count    Bacteria, UA 4+  3+        Microbiology Results (last 10 days)       Procedure Component Value - Date/Time    COVID PRE-OP / PRE-PROCEDURE SCREENING ORDER (NO ISOLATION) - Swab, Nasopharynx [[id number]]  (Normal) Collected: 08/20/23 1107    Lab Status: Final result Specimen: Swab from Nasopharynx Updated: 08/20/23 1208    Narrative:      The following orders were created for  panel order COVID PRE-OP / PRE-PROCEDURE SCREENING ORDER (NO ISOLATION) - Swab, Nasopharynx.  Procedure                               Abnormality         Status                     ---------                               -----------         ------                     Respiratory Panel PCR w/...[[id number]]  Normal              Final result                 Please view results for these tests on the individual orders.    Respiratory Panel PCR w/COVID-19(SARS-CoV-2) ERICK/MARTA/ELPIDIO/PAD/COR/MAD/ALMA In-House, NP Swab in UTM/VTM, 3-4 HR TAT - Swab, Nasopharynx [[id number]]  (Normal) Collected: 08/20/23 1107    Lab Status: Final result Specimen: Swab from Nasopharynx Updated: 08/20/23 1208     ADENOVIRUS, PCR Not Detected     Coronavirus 229E Not Detected     Coronavirus HKU1 Not Detected     Coronavirus NL63 Not Detected     Coronavirus OC43 Not Detected     COVID19 Not Detected     Human Metapneumovirus Not Detected     Human Rhinovirus/Enterovirus Not Detected     Influenza A PCR Not Detected     Influenza B PCR Not Detected     Parainfluenza Virus 1 Not Detected     Parainfluenza Virus 2 Not Detected     Parainfluenza Virus 3 Not Detected     Parainfluenza Virus 4 Not Detected     RSV, PCR Not Detected     Bordetella pertussis pcr Not Detected     Bordetella parapertussis PCR Not Detected     Chlamydophila pneumoniae PCR Not Detected     Mycoplasma pneumo by PCR Not Detected    Narrative:      In the setting of a positive respiratory panel with a viral infection PLUS a negative procalcitonin without other underlying concern for bacterial infection, consider observing off antibiotics or discontinuation of antibiotics and continue supportive care. If the respiratory panel is positive for atypical bacterial infection (Bordetella pertussis, Chlamydophila pneumoniae, or Mycoplasma pneumoniae), consider antibiotic de-escalation to target atypical bacterial infection.            XR Chest 1 View    Result Date: 8/20/2023  XR CHEST 1  VW Date of Exam: 8/20/2023 10:18 AM CDT Indication: SOA triage protocol Comparison: 7/5/2023 Findings: Cardiomediastinal silhouette is unremarkable. There is irregular left mid to upper lung airspace disease suggestive of pneumonia. There is moderate interstitial prominence throughout, similar to prior examination. No significant effusion nor pneumothorax. No acute osseous abnormality identified.     Impression: Impression: Left lung airspace disease suggestive of pneumonia. Electronically Signed: Chance Henao MD  8/20/2023 10:31 AM CDT  Workstation ID: [id number]         Assessment & Plan   Assessment & Plan     Active Hospital Problems    Diagnosis  POA    **Pneumonia [J18.9]  Yes     Summary:  Bibiana Hodges is a 87 y.o. female with medical history of essential hypertension, autonomic dysfunction, atrial fibrillation on amiodarone and Eliquis, chronic debility and multiple hospitalizations manage in the last 2 months with recurrent pneumonia, most recently from 6/29/2023 till 7/10/2023 when she was treated for both community-acquired pneumonia and UTI and was discharged to SNF.  She presented to the hospital today with insomnia x4 nights.  Found to be hypoxic    Assessment and plan:  Acute hypoxia  Recurrent community-acquired pneumonia, suspect secondary to atypical organisms  Possible COPD  Not sure what to make of her x-ray finding in ER.  She definitely has accentuated bronchoalveolar markings in the left upper lobe compared to most recent x-ray from 7/5/2023 but not solid enough to make me think she has no pneumonia.  I strongly suspect that she has underlying chronic lung disease, possibly COPD with a component of lung fibrosis based on clinical exam and review of CT scan findings.  She never had PFT or follow-up with pulmonary service  Other than hypoxia, her symptomatology and blood work-up will argue against bacterial pneumonia with negative CRP, negative procalcitonin and mild elevated white cell count  especially with the lack of impressive respiratory symptoms or fever  I will give her the benefit of doubt and continue antibiotic therapy with p.o. Levaquin for now pending pneumonia work-up: Sputum culture, MRSA swab, Legionella antigen, strep antigen etc.  She will need to have formal evaluation by pulmonary service and PFT as outpatient.  Referral placed in epic  Speech evaluation to rule out microaspiration  She would likely need to go back to her assisted facility with home O2  Check immunoglobulin panel given her recurrent pneumonia  DuoNebs, incentive spirometry, Mucinex  Obtain echo with bubble study to rule out intracardiac shunting that might be contributing to her hypoxia  Of note, D-dimer is mildly elevated at 1.0 which is appropriate for her age.  I do not think she has PE particularly she is taking Eliquis at home    A-fib  Continue amiodarone and Eliquis    RACH  Creatinine is slightly elevated at 1.4.  Her baseline is around 1  Gentle IV fluids and monitor kidney function    Essential hypertension  Autonomic dysfunction with recurrent presyncopal episodes  From last discharge, her antiplatelet medications were held  Continue to hold on hypertensive medications particularly with her borderline blood pressure    Dyslipidemia  Continue statins    Chronic debility  PT and OT consultation    Insomnia  Her main presenting complaint  Continue trazodone  Add melatonin at bedtime    DVT prophylaxis: On Eliquis      CODE STATUS:  Full code  Code Status and Medical Interventions:   Ordered at: 08/20/23 1445     Level Of Support Discussed With:    Patient     Code Status (Patient has no pulse and is not breathing):    CPR (Attempt to Resuscitate)     Medical Interventions (Patient has pulse or is breathing):    Full Support       Expected Discharge   Expected Discharge Date: 8/22/2023; Expected Discharge Time:      Sania Vera MD  08/20/23

## 2023-08-20 NOTE — PLAN OF CARE
Goal Outcome Evaluation:  Plan of Care Reviewed With: patient        Progress: improving  Outcome Evaluation: Pt on room air satting low 90s. Paced on monitor. MRSA and urine sent to lab. Pt alert and oriented x4 this evening. No complaints. Pt does have a productive cough. Fluids at 75 ml/hr. VSS. Will continue poc.

## 2023-08-20 NOTE — PROGRESS NOTES
Antimicrobial Stewardship Optimization Recommendation    Discussed with [employer] antibiotic coverage for PNA. Patient has had a recent hospital stay. With patient specific factors would like to treat current Pneumonia with MRSA coverage.    After chart review patient has no documented history of MRSA. Recent MRSA nare in June of 2023 is negative. Will start patient on empiric therapy with Zosyn and Doxycycline for now. Will adjust antibiotics accordingly to culture results.    Thanks  Gareth Martinez, PharmD, BCPS  8/20/2023  12:49 EDT

## 2023-08-20 NOTE — CASE MANAGEMENT/SOCIAL WORK
Discharge Planning Assessment  [facility]     Patient Name: Bibiana Hodges  MRN: [id number]  Today's Date: 8/20/2023    Admit Date: 8/20/2023    Plan: IDP   Discharge Needs Assessment       Row Name 08/20/23 1506       Living Environment    People in Home alone;other (see comments)  [facility]    Current Living Arrangements assisted living facility    Potentially Unsafe Housing Conditions unable to assess    Primary Care Provided by other (see comments)  staff    Provides Primary Care For no one, unable/limited ability to care for self    Family Caregiver if Needed child(alana), adult    Family Caregiver Names Danyelle Keyes--daughter    Quality of Family Relationships unable to assess       Resource/Environmental Concerns    Transportation Concerns none       Transition Planning    Patient/Family Anticipates Transition to home    Transportation Anticipated other (see comments)  may need assistance with transportation       Discharge Needs Assessment    Readmission Within the Last 30 Days unable to assess    Equipment Currently Used at Home wheelchair;wheelchair, motorized;walker, rolling;rollator;nebulizer                   Discharge Plan       Row Name 08/20/23 1512       Plan    Plan IDP    Plan Comments MSW met with pt. at bedside. Pt. reports she resides a [facility] in [facility]. Pt.'s PCP is Katarina Oviedo. Pt.'s insurance is [employer]. Pt. reports that she is dependent at baseline and staff assists with all ADL's/IADL's. Pt. reports she has a walker, rollator, w/c, nebulizer, and electric w/c. Pt. denies O2, and HH currently. Pt. may need assistance with transportation when she is medically ready. Pt. has an advanced directive and ACP documentation on file. CM will continue to follow pt. throughout her stay.    Final Discharge Disposition Code 30 - still a patient                  Continued Care and Services - Admitted Since 8/20/2023    Coordination has not  been started for this encounter.       Selected Continued Care - Prior Encounters Includes continued care and service providers with selected services from prior encounters from 5/22/2023 to 8/20/2023      Discharged on 7/10/2023 Admission date: 6/29/2023 - Discharge disposition: Skilled Nursing Facility (DC - External)      Destination       Service Provider Selected Services Address Phone Fax Patient Preferred    [facility] [address] [id number] [phone] --              Home Medical Care       Service Provider Selected Services Address Phone Fax Patient Preferred    [facility] [address], [address], [place] [address] [phone] --       Internal Comment last updated by Terrie Sanderson, RN 7/3/2023 1205    7/3: I spoke with Nadira/[phone], for PT/OT                                     Expected Discharge Date and Time       Expected Discharge Date Expected Discharge Time    Aug 22, 2023            Demographic Summary       Row Name 08/20/23 1503       General Information    Admission Type inpatient    Arrived From home;other (see comments)  [facility]    Referral Source admission list;emergency department    Reason for Consult discharge planning    Preferred Language English                   Functional Status       Row Name 08/20/23 1504       Functional Status, IADL    Medications assistive equipment and person    Meal Preparation completely dependent    Housekeeping completely dependent    Laundry completely dependent    Shopping completely dependent    IADL Comments [facility] staff assists with all IADL's       Mental Status Summary    Recent Changes in Mental Status/Cognitive Functioning unable to assess       Employment/    Employment Status retired                   Psychosocial    No documentation.                  Abuse/Neglect    No documentation.                   Legal    No documentation.                  Substance Abuse    No documentation.                  Patient Forms    No documentation.                     FRANCHESCA Joseph

## 2023-08-20 NOTE — ED PROVIDER NOTES
Subjective   History of Present Illness  Patient presents to the ER for cough and difficulty breathing for last several days.  Oxygen saturation in the mid 80% range on room air.  Patient denies being on any home oxygen.  She states that [facility].  Admitted to the hospital for pneumonia several months ago.      Review of Systems    Past Medical History:   Diagnosis Date    Constipation     Depression     Dysautonomia orthostatic hypotension syndrome     Generalized osteoarthritis     GERD (gastroesophageal reflux disease)     s/p Nissen    Hypertension     Insomnia     Osteoarthrosis, shoulder region     Skin ulcer of scalp     Thrombophlebitis of arm     Tremor     Vitamin B12 deficiency        Allergies   Allergen Reactions    Gabapentin     Sulfa Antibiotics        Past Surgical History:   Procedure Laterality Date    DILATATION AND CURETTAGE      HERNIA REPAIR      HIP SURGERY      SHOULDER SURGERY      Right       Family History   Problem Relation Age of Onset    Cancer Mother         Pancreatic    Stroke Brother     Anorexia nervosa Daughter        Social History     Socioeconomic History    Marital status:    Tobacco Use    Smoking status: Never    Smokeless tobacco: Never   Vaping Use    Vaping Use: Never used   Substance and Sexual Activity    Alcohol use: No     Comment: stopped drinking alcohol    Drug use: No    Sexual activity: Defer           Objective   Physical Exam  Constitutional:       Appearance: She is well-developed. She is ill-appearing.      Comments: Frail.  Elderly.   HENT:      Head: Normocephalic and atraumatic.      Right Ear: External ear normal.      Left Ear: External ear normal.      Nose: Nose normal.   Eyes:      Conjunctiva/sclera: Conjunctivae normal.      Pupils: Pupils are equal, round, and reactive to light.   Cardiovascular:      Rate and Rhythm: Normal rate and regular rhythm.      Heart sounds: Normal heart sounds.   Pulmonary:      Effort: Pulmonary effort  is normal.      Breath sounds: Decreased breath sounds and wheezing present.   Abdominal:      General: Bowel sounds are normal.      Palpations: Abdomen is soft.   Musculoskeletal:         General: Normal range of motion.      Cervical back: Normal range of motion and neck supple.   Skin:     General: Skin is warm and dry.   Neurological:      Mental Status: She is alert and oriented to person, place, and time.   Psychiatric:         Behavior: Behavior normal.         Thought Content: Thought content normal.         Judgment: Judgment normal.       Procedures           ED Course  ED Course as of 08/20/23 1307   Sun Aug 20, 2023   1106 I discussed her care with nurse practitioner Lauro Escobar.  I have examined Mrs. Hodges and spoken with her.  At the time of my exam saturations were 99% on 4 L.  I have turned her down to 2 L and it looks like she staying around 97 with that.  Her nurse advises me that initially sats were in the 80s on nasal cannula oxygen, I suspect she has cleared some mucus and breathing is better for the time being.  She tells me she feels like she does when she has pneumonia.  She is noted to have rattly wet cough [DT]      ED Course User Index  [DT] Chance Mcrae MD                XR Chest 1 View   Final Result   Impression:   Left lung airspace disease suggestive of pneumonia.         Electronically Signed: Chance Henao MD     8/20/2023 10:31 AM CDT     Workstation ID: [id number]                                     Medical Decision Making  Problems Addressed:  Acute respiratory failure with hypoxia: complicated acute illness or injury  Pneumonia of left lung due to infectious organism, unspecified part of lung: complicated acute illness or injury    Amount and/or Complexity of Data Reviewed  External Data Reviewed: labs, radiology and ECG.  Labs: ordered.  Radiology: ordered.  ECG/medicine tests: ordered.    Risk  Prescription drug management.  Decision regarding hospitalization.        Final  diagnoses:   Pneumonia of left lung due to infectious organism, unspecified part of lung   Acute respiratory failure with hypoxia       ED Disposition  ED Disposition       ED Disposition   Decision to Admit    Condition   --    Comment   --               No follow-up provider specified.       Medication List      No changes were made to your prescriptions during this visit.            Lauro Escobar, APRN  08/20/23 8735

## 2023-08-21 ENCOUNTER — APPOINTMENT (OUTPATIENT)
Dept: GENERAL RADIOLOGY | Facility: HOSPITAL | Age: 88
DRG: 177 | End: 2023-08-21
Payer: MEDICARE

## 2023-08-21 ENCOUNTER — APPOINTMENT (OUTPATIENT)
Dept: CARDIOLOGY | Facility: HOSPITAL | Age: 88
DRG: 177 | End: 2023-08-21
Payer: MEDICARE

## 2023-08-21 LAB
ALBUMIN SERPL-MCNC: 3.4 G/DL (ref 3.5–5.2)
ALBUMIN/GLOB SERPL: 1.6 G/DL
ALP SERPL-CCNC: 43 U/L (ref 39–117)
ALT SERPL W P-5'-P-CCNC: 67 U/L (ref 1–33)
ANION GAP SERPL CALCULATED.3IONS-SCNC: 9 MMOL/L (ref 5–15)
ASCENDING AORTA: 3.7 CM
AST SERPL-CCNC: 67 U/L (ref 1–32)
BASOPHILS # BLD AUTO: 0.02 10*3/MM3 (ref 0–0.2)
BASOPHILS NFR BLD AUTO: 0.1 % (ref 0–1.5)
BH CV ECHO MEAS - AI P1/2T: 356.6 MSEC
BH CV ECHO MEAS - AO MAX PG: 5.8 MMHG
BH CV ECHO MEAS - AO MEAN PG: 3 MMHG
BH CV ECHO MEAS - AO ROOT DIAM: 3 CM
BH CV ECHO MEAS - AO V2 MAX: 120 CM/SEC
BH CV ECHO MEAS - AO V2 VTI: 28.1 CM
BH CV ECHO MEAS - AVA(I,D): 2.29 CM2
BH CV ECHO MEAS - EDV(CUBED): 91.1 ML
BH CV ECHO MEAS - EDV(MOD-SP2): 76.7 ML
BH CV ECHO MEAS - EDV(MOD-SP4): 66.9 ML
BH CV ECHO MEAS - EF(MOD-BP): 47.9 %
BH CV ECHO MEAS - EF(MOD-SP2): 46.5 %
BH CV ECHO MEAS - EF(MOD-SP4): 49.6 %
BH CV ECHO MEAS - ESV(CUBED): 22 ML
BH CV ECHO MEAS - ESV(MOD-SP2): 41 ML
BH CV ECHO MEAS - ESV(MOD-SP4): 33.7 ML
BH CV ECHO MEAS - FS: 37.8 %
BH CV ECHO MEAS - IVS/LVPW: 1 CM
BH CV ECHO MEAS - IVSD: 0.9 CM
BH CV ECHO MEAS - LA DIMENSION: 3.7 CM
BH CV ECHO MEAS - LAT PEAK E' VEL: 8.2 CM/SEC
BH CV ECHO MEAS - LV MASS(C)D: 132.8 GRAMS
BH CV ECHO MEAS - LV MAX PG: 3 MMHG
BH CV ECHO MEAS - LV MEAN PG: 2 MMHG
BH CV ECHO MEAS - LV V1 MAX: 87 CM/SEC
BH CV ECHO MEAS - LV V1 VTI: 20.5 CM
BH CV ECHO MEAS - LVIDD: 4.5 CM
BH CV ECHO MEAS - LVIDS: 2.8 CM
BH CV ECHO MEAS - LVOT AREA: 3.1 CM2
BH CV ECHO MEAS - LVOT DIAM: 2 CM
BH CV ECHO MEAS - LVPWD: 0.9 CM
BH CV ECHO MEAS - MED PEAK E' VEL: 6.6 CM/SEC
BH CV ECHO MEAS - MV A MAX VEL: 76 CM/SEC
BH CV ECHO MEAS - MV DEC SLOPE: 469 CM/SEC2
BH CV ECHO MEAS - MV DEC TIME: 0.23 MSEC
BH CV ECHO MEAS - MV E MAX VEL: 77.6 CM/SEC
BH CV ECHO MEAS - MV E/A: 1.02
BH CV ECHO MEAS - MV MAX PG: 2.8 MMHG
BH CV ECHO MEAS - MV MEAN PG: 2 MMHG
BH CV ECHO MEAS - MV P1/2T: 54.1 MSEC
BH CV ECHO MEAS - MV V2 VTI: 25.7 CM
BH CV ECHO MEAS - MVA(P1/2T): 4.1 CM2
BH CV ECHO MEAS - MVA(VTI): 2.5 CM2
BH CV ECHO MEAS - PA ACC TIME: 0.11 SEC
BH CV ECHO MEAS - PI END-D VEL: 101 CM/SEC
BH CV ECHO MEAS - RAP SYSTOLE: 8 MMHG
BH CV ECHO MEAS - RVSP: 33 MMHG
BH CV ECHO MEAS - SV(LVOT): 64.4 ML
BH CV ECHO MEAS - SV(MOD-SP2): 35.7 ML
BH CV ECHO MEAS - SV(MOD-SP4): 33.2 ML
BH CV ECHO MEAS - TAPSE (>1.6): 2.11 CM
BH CV ECHO MEAS - TR MAX PG: 24.7 MMHG
BH CV ECHO MEAS - TR MAX VEL: 248.3 CM/SEC
BH CV ECHO MEASUREMENTS AVERAGE E/E' RATIO: 10.49
BH CV ECHO SHUNT ASSESSMENT PERFORMED (HIDDEN SCRIPTING): 1
BH CV VAS BP RIGHT ARM: NORMAL MMHG
BH CV XLRA - RV BASE: 3.2 CM
BH CV XLRA - RV LENGTH: 7.5 CM
BH CV XLRA - RV MID: 2.3 CM
BH CV XLRA - TDI S': 10.7 CM/SEC
BILIRUB SERPL-MCNC: 0.3 MG/DL (ref 0–1.2)
BUN SERPL-MCNC: 23 MG/DL (ref 8–23)
BUN/CREAT SERPL: 16 (ref 7–25)
CALCIUM SPEC-SCNC: 8.3 MG/DL (ref 8.6–10.5)
CHLORIDE SERPL-SCNC: 100 MMOL/L (ref 98–107)
CO2 SERPL-SCNC: 23 MMOL/L (ref 22–29)
CREAT SERPL-MCNC: 1.44 MG/DL (ref 0.57–1)
CRP SERPL-MCNC: 10.48 MG/DL (ref 0–0.5)
DEPRECATED RDW RBC AUTO: 55.2 FL (ref 37–54)
EGFRCR SERPLBLD CKD-EPI 2021: 35.3 ML/MIN/1.73
EOSINOPHIL # BLD AUTO: 0 10*3/MM3 (ref 0–0.4)
EOSINOPHIL NFR BLD AUTO: 0 % (ref 0.3–6.2)
ERYTHROCYTE [DISTWIDTH] IN BLOOD BY AUTOMATED COUNT: 17 % (ref 12.3–15.4)
GLOBULIN UR ELPH-MCNC: 2.1 GM/DL
GLUCOSE SERPL-MCNC: 115 MG/DL (ref 65–99)
HCT VFR BLD AUTO: 26.6 % (ref 34–46.6)
HGB BLD-MCNC: 8.3 G/DL (ref 12–15.9)
IGA1 MFR SER: 224 MG/DL (ref 70–400)
IGG1 SER-MCNC: 749 MG/DL (ref 700–1600)
IGM SERPL-MCNC: 51 MG/DL (ref 40–230)
IMM GRANULOCYTES # BLD AUTO: 0.16 10*3/MM3 (ref 0–0.05)
IMM GRANULOCYTES NFR BLD AUTO: 0.8 % (ref 0–0.5)
L PNEUMO1 AG UR QL IA: NEGATIVE
LEFT ATRIUM VOLUME INDEX: 27.2 ML/M2
LYMPHOCYTES # BLD AUTO: 0.9 10*3/MM3 (ref 0.7–3.1)
LYMPHOCYTES NFR BLD AUTO: 4.4 % (ref 19.6–45.3)
MAGNESIUM SERPL-MCNC: 2.1 MG/DL (ref 1.6–2.4)
MCH RBC QN AUTO: 27.6 PG (ref 26.6–33)
MCHC RBC AUTO-ENTMCNC: 31.2 G/DL (ref 31.5–35.7)
MCV RBC AUTO: 88.4 FL (ref 79–97)
MONOCYTES # BLD AUTO: 0.25 10*3/MM3 (ref 0.1–0.9)
MONOCYTES NFR BLD AUTO: 1.2 % (ref 5–12)
NEUTROPHILS NFR BLD AUTO: 19.05 10*3/MM3 (ref 1.7–7)
NEUTROPHILS NFR BLD AUTO: 93.5 % (ref 42.7–76)
NRBC BLD AUTO-RTO: 0 /100 WBC (ref 0–0.2)
PHOSPHATE SERPL-MCNC: 3.9 MG/DL (ref 2.5–4.5)
PLATELET # BLD AUTO: 244 10*3/MM3 (ref 140–450)
PMV BLD AUTO: 10 FL (ref 6–12)
POTASSIUM SERPL-SCNC: 4.7 MMOL/L (ref 3.5–5.2)
PROCALCITONIN SERPL-MCNC: 1.59 NG/ML (ref 0–0.25)
PROT SERPL-MCNC: 5.5 G/DL (ref 6–8.5)
RBC # BLD AUTO: 3.01 10*6/MM3 (ref 3.77–5.28)
S PNEUM AG SPEC QL LA: NEGATIVE
SODIUM SERPL-SCNC: 132 MMOL/L (ref 136–145)
WBC NRBC COR # BLD: 20.38 10*3/MM3 (ref 3.4–10.8)

## 2023-08-21 PROCEDURE — 80053 COMPREHEN METABOLIC PANEL: CPT | Performed by: INTERNAL MEDICINE

## 2023-08-21 PROCEDURE — 93306 TTE W/DOPPLER COMPLETE: CPT | Performed by: INTERNAL MEDICINE

## 2023-08-21 PROCEDURE — 84100 ASSAY OF PHOSPHORUS: CPT | Performed by: INTERNAL MEDICINE

## 2023-08-21 PROCEDURE — 93306 TTE W/DOPPLER COMPLETE: CPT

## 2023-08-21 PROCEDURE — 83735 ASSAY OF MAGNESIUM: CPT | Performed by: INTERNAL MEDICINE

## 2023-08-21 PROCEDURE — 97161 PT EVAL LOW COMPLEX 20 MIN: CPT

## 2023-08-21 PROCEDURE — 97166 OT EVAL MOD COMPLEX 45 MIN: CPT

## 2023-08-21 PROCEDURE — 25010000002 METHYLPREDNISOLONE PER 40 MG: Performed by: INTERNAL MEDICINE

## 2023-08-21 PROCEDURE — 99232 SBSQ HOSP IP/OBS MODERATE 35: CPT | Performed by: INTERNAL MEDICINE

## 2023-08-21 PROCEDURE — 92611 MOTION FLUOROSCOPY/SWALLOW: CPT

## 2023-08-21 PROCEDURE — 74230 X-RAY XM SWLNG FUNCJ C+: CPT

## 2023-08-21 PROCEDURE — 97535 SELF CARE MNGMENT TRAINING: CPT

## 2023-08-21 PROCEDURE — 86140 C-REACTIVE PROTEIN: CPT | Performed by: INTERNAL MEDICINE

## 2023-08-21 PROCEDURE — 84145 PROCALCITONIN (PCT): CPT | Performed by: INTERNAL MEDICINE

## 2023-08-21 PROCEDURE — 85025 COMPLETE CBC W/AUTO DIFF WBC: CPT | Performed by: INTERNAL MEDICINE

## 2023-08-21 RX ADMIN — SENNOSIDES AND DOCUSATE SODIUM 2 TABLET: 50; 8.6 TABLET ORAL at 08:17

## 2023-08-21 RX ADMIN — APIXABAN 2.5 MG: 2.5 TABLET, FILM COATED ORAL at 21:32

## 2023-08-21 RX ADMIN — BUSPIRONE HYDROCHLORIDE 5 MG: 5 TABLET ORAL at 21:32

## 2023-08-21 RX ADMIN — APIXABAN 2.5 MG: 2.5 TABLET, FILM COATED ORAL at 08:17

## 2023-08-21 RX ADMIN — Medication 10 ML: at 08:22

## 2023-08-21 RX ADMIN — BARIUM SULFATE 100 ML: 0.81 POWDER, FOR SUSPENSION ORAL at 12:22

## 2023-08-21 RX ADMIN — PANTOPRAZOLE SODIUM 40 MG: 40 TABLET, DELAYED RELEASE ORAL at 05:42

## 2023-08-21 RX ADMIN — GUAIFENESIN AND DEXTROMETHORPHAN 10 ML: 100; 10 SYRUP ORAL at 00:00

## 2023-08-21 RX ADMIN — VENLAFAXINE HYDROCHLORIDE 75 MG: 75 CAPSULE, EXTENDED RELEASE ORAL at 08:17

## 2023-08-21 RX ADMIN — BUSPIRONE HYDROCHLORIDE 5 MG: 5 TABLET ORAL at 08:19

## 2023-08-21 RX ADMIN — BARIUM SULFATE 50 ML: 400 SUSPENSION ORAL at 12:22

## 2023-08-21 RX ADMIN — GUAIFENESIN AND DEXTROMETHORPHAN 10 ML: 100; 10 SYRUP ORAL at 17:22

## 2023-08-21 RX ADMIN — METHYLPREDNISOLONE SODIUM SUCCINATE 40 MG: 40 INJECTION, POWDER, FOR SOLUTION INTRAMUSCULAR; INTRAVENOUS at 17:22

## 2023-08-21 RX ADMIN — SODIUM CHLORIDE 75 ML/HR: 9 INJECTION, SOLUTION INTRAVENOUS at 05:41

## 2023-08-21 RX ADMIN — LEVOFLOXACIN 500 MG: 500 TABLET, FILM COATED ORAL at 17:22

## 2023-08-21 RX ADMIN — Medication 1000 UNITS: at 08:17

## 2023-08-21 RX ADMIN — SENNOSIDES AND DOCUSATE SODIUM 2 TABLET: 50; 8.6 TABLET ORAL at 21:32

## 2023-08-21 RX ADMIN — BARIUM SULFATE 20 ML: 400 PASTE ORAL at 12:22

## 2023-08-21 RX ADMIN — GUAIFENESIN AND DEXTROMETHORPHAN 10 ML: 100; 10 SYRUP ORAL at 05:42

## 2023-08-21 RX ADMIN — LEVETIRACETAM 1000 MG: 500 TABLET, EXTENDED RELEASE ORAL at 21:32

## 2023-08-21 RX ADMIN — AMIODARONE HYDROCHLORIDE 200 MG: 200 TABLET ORAL at 08:17

## 2023-08-21 RX ADMIN — SENNOSIDES 1 TABLET: 8.6 TABLET, FILM COATED ORAL at 08:17

## 2023-08-21 RX ADMIN — ATORVASTATIN CALCIUM 40 MG: 40 TABLET, FILM COATED ORAL at 21:32

## 2023-08-21 RX ADMIN — GUAIFENESIN AND DEXTROMETHORPHAN 10 ML: 100; 10 SYRUP ORAL at 23:56

## 2023-08-21 RX ADMIN — SENNOSIDES 2 TABLET: 8.6 TABLET, FILM COATED ORAL at 21:32

## 2023-08-21 NOTE — THERAPY EVALUATION
Patient Name: Bibiana Hodges  : 1935    MRN: [id number]                              Today's Date: 2023       Admit Date: 2023    Visit Dx:     ICD-10-CM ICD-9-CM   1. Pneumonia of left lung due to infectious organism, unspecified part of lung  J18.9 486   2. Acute respiratory failure with hypoxia  J96.01 518.81   3. Simple chronic bronchitis  J41.0 491.0     Patient Active Problem List   Diagnosis    Dysautonomia orthostatic hypotension syndrome    Hypertension    Flu vaccine need    Streptococcus pneumoniae vaccination indicated    Pneumonia     Past Medical History:   Diagnosis Date    Constipation     Depression     Dysautonomia orthostatic hypotension syndrome     Generalized osteoarthritis     GERD (gastroesophageal reflux disease)     s/p Nissen    Hypertension     Insomnia     Osteoarthrosis, shoulder region     Skin ulcer of scalp     Thrombophlebitis of arm     Tremor     Vitamin B12 deficiency      Past Surgical History:   Procedure Laterality Date    DILATATION AND CURETTAGE      HERNIA REPAIR      HIP SURGERY      SHOULDER SURGERY      Right      General Information       Row Name 23 0934          Physical Therapy Time and Intention    Document Type evaluation  -NS     Mode of Treatment physical therapy  -NS       Row Name 23 0934          General Information    Patient Profile Reviewed yes  -NS     Prior Level of Function min assist:;all household mobility;gait;transfer;bed mobility;mod assist:;ADL's;dependent:;w/c or scooter  Pt ambulates with assistance and a rollator, pushed in a wheelchair for longer distances.  -NS     Existing Precautions/Restrictions fall;oxygen therapy device and L/min  hx of vertigo  -NS     Barriers to Rehab previous functional deficit  -NS       Row Name 23 0934          Living Environment    People in Home facility resident  [facility]  -NS       Row Name 23 0934          Home Main Entrance    Number of Stairs, Main Entrance none  -NS        Row Name 08/21/23 0934          Stairs Within Home, Primary    Number of Stairs, Within Home, Primary none  -NS       Row Name 08/21/23 0934          Cognition    Orientation Status (Cognition) oriented x 3  -NS       Row Name 08/21/23 0934          Safety Issues, Functional Mobility    Safety Issues Affecting Function (Mobility) safety precaution awareness;safety precautions follow-through/compliance;sequencing abilities;insight into deficits/self-awareness  -NS     Impairments Affecting Function (Mobility) balance;coordination;endurance/activity tolerance;motor planning;strength;shortness of breath;postural/trunk control  -NS               User Key  (r) = Recorded By, (t) = Taken By, (c) = Cosigned By      Initials Name Provider Type    Shelley Gómez PT Physical Therapist                   Mobility       Row Name 08/21/23 0934          Transfers    Comment, (Transfers) Cues for hand placement  -NS       Row Name 08/21/23 0934          Sit-Stand Transfer    Sit-Stand [place] (Transfers) minimum assist (75% patient effort);verbal cues  -NS     Assistive Device (Sit-Stand Transfers) walker, front-wheeled  -NS       Row Name 08/21/23 0934          Gait/Stairs (Locomotion)    [place] Level (Gait) minimum assist (75% patient effort);verbal cues  -NS     Assistive Device (Gait) walker, front-wheeled  -NS     Distance in Feet (Gait) 25  -NS     Deviations/Abnormal Patterns (Gait) perri decreased;gait speed decreased;stride length decreased;base of support, narrow  -NS     Bilateral Gait Deviations forward flexed posture;heel strike decreased  -NS     Comment, (Gait/Stairs) Patient ambulated at slow pace, demonstrated flexed posture and requiring cues for safe placement of RW. Chair follow for safety but pt did not require to sit. Distance limited by weakness, fatigue, and dizziness. BP 99/73 following. RN notified.  -NS               User Key  (r) = Recorded By, (t) = Taken By, (c) = Cosigned By       Initials Name Provider Type    Shelley Gómez PT Physical Therapist                   Obj/Interventions       Row Name 08/21/23 0934          Range of Motion Comprehensive    General Range of Motion bilateral lower extremity ROM WFL  -NS       Row Name 08/21/23 0934          Strength Comprehensive (MMT)    General Manual Muscle Testing (MMT) Assessment lower extremity strength deficits identified  -NS     Comment, General Manual Muscle Testing (MMT) Assessment BLEs: grossly 4/5  -NS       Row Name 08/21/23 0934          Balance    Balance Assessment sitting static balance;sitting dynamic balance;standing static balance;standing dynamic balance  -NS     Static Sitting Balance contact guard  -NS     Dynamic Sitting Balance contact guard  -NS     Position, Sitting Balance unsupported;sitting in chair  -NS     Static Standing Balance contact guard  -NS     Dynamic Standing Balance minimal assist  -NS     Position/Device Used, Standing Balance supported;walker, front-wheeled  -NS     Comment, Balance decreased trunk control during ADLs in chair  -NS       Row Name 08/21/23 0934          Sensory Assessment (Somatosensory)    Sensory Assessment (Somatosensory) LE sensation intact  -NS               User Key  (r) = Recorded By, (t) = Taken By, (c) = Cosigned By      Initials Name Provider Type    Shelley Gómez, GAIL Physical Therapist                   Goals/Plan       Row Name 08/21/23 0934          Bed Mobility Goal 1 (PT)    Activity/Assistive Device (Bed Mobility Goal 1, PT) sit to supine/supine to sit  -NS     [place] Level/Cues Needed (Bed Mobility Goal 1, PT) independent  -NS     Time Frame (Bed Mobility Goal 1, PT) long term goal (LTG);2 weeks  -NS       Row Name 08/21/23 0934          Transfer Goal 1 (PT)    Activity/Assistive Device (Transfer Goal 1, PT) sit-to-stand/stand-to-sit;bed-to-chair/chair-to-bed  -NS     [place] Level/Cues Needed (Transfer Goal 1, PT) standby assist  -NS     Time Frame  (Transfer Goal 1, PT) long term goal (LTG);2 weeks  -NS       [facility] Name 08/21/23 0934          Gait Training Goal 1 (PT)    Activity/Assistive Device (Gait Training Goal 1, PT) gait (walking locomotion);assistive device use  -NS     [place] Level (Gait Training Goal 1, PT) standby assist  -NS     Distance (Gait Training Goal 1, PT) 100  -NS     Time Frame (Gait Training Goal 1, PT) long term goal (LTG);2 weeks  -NS       [facility] Name 08/21/23 0934          Therapy Assessment/Plan (PT)    Planned Therapy Interventions (PT) balance training;bed mobility training;gait training;home exercise program;neuromuscular re-education;patient/family education;strengthening;transfer training  -NS               User Key  (r) = Recorded By, (t) = Taken By, (c) = Cosigned By      Initials Name Provider Type    Shelley Gómez, PT Physical Therapist                   Clinical Impression       Row Name 08/21/23 0934          Pain    Pretreatment Pain Rating 0/10 - no pain  -NS     Posttreatment Pain Rating 0/10 - no pain  -NS       Row Name 08/21/23 0934          Plan of Care Review    Plan of Care Reviewed With patient  -NS     Progress no change  -NS     Outcome Evaluation Patient presents with weakness, decreased trunk control, balance impairments, and decreased endurance affecting functional mobility. Skilled IP PT services warranted to support return to independence. Recommend SNF at d/c.  -NS       [facility] Name 08/21/23 0934          Therapy Assessment/Plan (PT)    Patient/Family Therapy Goals Statement (PT) to get stronger  -NS     Rehab Potential (PT) good, to achieve stated therapy goals  -NS     Criteria for Skilled Interventions Met (PT) yes;meets criteria;skilled treatment is necessary  -NS     Therapy Frequency (PT) daily  -NS       [facility] Name 08/21/23 0934          Vital Signs    Pre Systolic BP Rehab 126  -NS     Pre Treatment Diastolic BP 75  -NS     Post Systolic BP Rehab 99  -NS     Post Treatment Diastolic BP 73  -NS      Pretreatment Heart Rate (beats/min) 62  -NS     Posttreatment Heart Rate (beats/min) 61  -NS     Pre SpO2 (%) 97  -NS     O2 Delivery Pre Treatment nasal cannula  -NS     Post SpO2 (%) 99  -NS     O2 Delivery Post Treatment nasal cannula  -NS     Pre Patient Position Sitting  -NS     Intra Patient Position Standing  -NS     Post Patient Position Sitting  -NS       Row Name 08/21/23 0934          Positioning and Restraints    Pre-Treatment Position sitting in chair/recliner  -NS     Post Treatment Position chair  -NS     In Chair notified nsg;reclined;call light within reach;encouraged to call for assist;exit alarm on;heels elevated;legs elevated;waffle cushion;with nsg  -NS               User Key  (r) = Recorded By, (t) = Taken By, (c) = Cosigned By      Initials Name Provider Type    Shelley Gómez PT Physical Therapist                   Outcome Measures       Row Name 08/21/23 0934          How much help from another person do you currently need...    Turning from your back to your side while in flat bed without using bedrails? 3  -NS     Moving from lying on back to sitting on the side of a flat bed without bedrails? 3  -NS     Moving to and from a bed to a chair (including a wheelchair)? 3  -NS     Standing up from a chair using your arms (e.g., wheelchair, bedside chair)? 3  -NS     Climbing 3-5 steps with a railing? 2  -NS     To walk in hospital room? 3  -NS     AM-PAC 6 Clicks Score (PT) 17  -NS     Highest level of mobility 5 --> Static standing  -NS       Row Name 08/21/23 1057 08/21/23 0934       Functional Assessment    Outcome Measure Options AM-PAC 6 Clicks Daily Activity (OT)  - AM-PAC 6 Clicks Basic Mobility (PT)  -NS              User Key  (r) = Recorded By, (t) = Taken By, (c) = Cosigned By      Initials Name Provider Type    Shelley Gómez PT Physical Therapist    Terrie Irizarry OT Occupational Therapist                                 Physical Therapy Education       Title: PT OT  SLP Therapies (In Progress)       Topic: Physical Therapy (In Progress)       Point: Mobility training (Done)       Learning Progress Summary             Patient Acceptance, E, VU,NR by NS at 8/21/2023 1057                         Point: Home exercise program (Not Started)       Learner Progress:  Not documented in this visit.              Point: Body mechanics (Done)       Learning Progress Summary             Patient Acceptance, E, VU,NR by NS at 8/21/2023 1057                         Point: Precautions (Done)       Learning Progress Summary             Patient Acceptance, E, VU,NR by NS at 8/21/2023 1057                                         User Key       Initials Effective Dates Name Provider Type Discipline    NS 06/16/21 -  Shelley Gutiérrez, PT Physical Therapist PT                  PT Recommendation and Plan  Planned Therapy Interventions (PT): balance training, bed mobility training, gait training, home exercise program, neuromuscular re-education, patient/family education, strengthening, transfer training  Plan of Care Reviewed With: patient  Progress: no change  Outcome Evaluation: Patient presents with weakness, decreased trunk control, balance impairments, and decreased endurance affecting functional mobility. Skilled IP PT services warranted to support return to independence. Recommend SNF at d/c.     Time Calculation:   PT Evaluation Complexity  History, PT Evaluation Complexity: 3 or more personal factors and/or comorbidities  Examination of Body Systems (PT Eval Complexity): total of 4 or more elements  Clinical Presentation (PT Evaluation Complexity): stable  Clinical Decision Making (PT Evaluation Complexity): low complexity  Overall Complexity (PT Evaluation Complexity): low complexity     PT Charges       Row Name 08/21/23 0934             Time Calculation    Start Time 0934  -NS      PT Received On 08/21/23  -NS      PT Goal Re-Cert Due Date 08/31/23  -NS         Untimed Charges    PT  Eval/Re-eval Minutes 47  -NS         Total Minutes    Untimed Charges Total Minutes 47  -NS       Total Minutes 47  -NS                User Key  (r) = Recorded By, (t) = Taken By, (c) = Cosigned By      Initials Name Provider Type    Shelley Gómez, PT Physical Therapist                  Therapy Charges for Today       Code Description Service Date Service Provider Modifiers Qty    [id number] HC PT EVAL LOW COMPLEXITY 4 8/21/2023 Shelley Gutiérrez, PT GP 1            PT G-Codes  Outcome Measure Options: AM-PAC 6 Clicks Daily Activity (OT)  AM-PAC 6 Clicks Score (PT): 17  AM-PAC 6 Clicks Score (OT): 15  PT Discharge Summary  Anticipated Discharge Disposition (PT): skilled nursing facility    Shelley Gutiérrez PT  8/21/2023

## 2023-08-21 NOTE — PLAN OF CARE
Goal Outcome Evaluation:  Plan of Care Reviewed With: patient        Progress: no change  Outcome Evaluation: VSS.  87 year old white female admitted on 8/20/2023 for pneumonia.  Patient has not demonstrated any SOA.  She has an IV in place and patent infusing without issue.  Have administered scheduled medication as indicated.  Patient has rested comfortable.  Will continue to monitor patient throughout the rest of this shift.

## 2023-08-21 NOTE — PROGRESS NOTES
[facility] Medicine Services  PROGRESS NOTE    Patient Name: Bibiana Hodges  : 1935  MRN: [id number]    Date of Admission: 2023  Primary Care Physician: Katarina Oviedo MD    Subjective   Subjective     CC:  Follow-up for pneumonia    HPI:  I have seen and evaluated the patient this morning.  Feeling much better today. Hypoxia proved and currently satting 95% on room air.  Slept well overnight    ROS:  General : no fevers, chills  CVS: No chest pain, palpitations  Respiratory: No cough, dyspnea  GI: No N/V/D, abd pain  10 point review of systems is negative except for what is mentioned in HPI    Objective   Objective     Vital Signs:   Temp:  [96 øF (35.6 øC)-98.1 øF (36.7 øC)] 96.3 øF (35.7 øC)  Heart Rate:  [59-75] 60  Resp:  [16-20] 16  BP: ()/() 103/61  Flow (L/min):  [2] 2     Physical Exam:  General: Chronically ill looking and debilitated  Head: Atraumatic and normocephalic  Eyes: No Icterus. No pallor  Ears:  Ears appear intact with no abnormalities noted  Throat: No oral lesions, no thrush  Neck: Supple, trachea midline  Lungs: Left upper lobe coarse crackles.  Diminished air entry both lung fields.  Scattered wheezing.    Heart:  Normal S1 and S2, no murmur, no gallop, No JVD, no lower extremity swelling  Abdomen:  Soft, no tenderness, no organomegaly, normal bowel sounds, no organomegaly  Extremities: pulses equal bilaterally  Skin: No bleeding, bruising or rash, normal skin turgor and elasticity  Neurologic: Cranial nerves appear intact with no evidence of facial asymmetry, normal motor and sensory functions in all 4 extremities  Psych: Alert and oriented x 3, normal mood    Results Reviewed:  LAB RESULTS:      Lab 23  1106 23  1059   WBC  --  11.30*   HEMOGLOBIN  --  9.8*   HEMATOCRIT  --  32.5*   PLATELETS  --  264   NEUTROS ABS  --  10.11*   IMMATURE GRANS (ABS)  --  0.04   LYMPHS ABS  --  0.78   MONOS ABS  --  0.29   EOS ABS  --  0.06    MCV  --  91.0   CRP 0.59*  --    PROCALCITONIN 0.04  --    LACTATE 1.1  --    D DIMER QUANT  --  1.07*         Lab 08/20/23  1106   SODIUM 132*   POTASSIUM 4.8   CHLORIDE 99   CO2 25.0   ANION GAP 8.0   BUN 26*   CREATININE 1.44*   EGFR 35.3*   GLUCOSE 133*   CALCIUM 8.5*         Lab 08/20/23  1106   TOTAL PROTEIN 6.4   ALBUMIN 3.4*   GLOBULIN 3.0   ALT (SGPT) 14   AST (SGOT) 19   BILIRUBIN 0.2   ALK PHOS 47         Lab 08/20/23  1106   PROBNP 205.7   HSTROP T 15*                 Lab 08/20/23  1127   PH, ARTERIAL 7.400   PCO2, ARTERIAL 41.1   PO2 ART 65.0*   FIO2 58   HCO3 ART 25.4   BASE EXCESS ART 0.6     Brief Urine Lab Results  (Last result in the past 365 days)        Color   Clarity   Blood   Leuk Est   Nitrite   Protein   CREAT   Urine HCG        08/20/23 1814 Yellow   Cloudy   Negative   Large (3+)   Positive   Trace                   Microbiology Results Abnormal       Procedure Component Value - Date/Time    MRSA Screen, PCR (Inpatient) - Swab, Nares [[id number]]  (Normal) Collected: 08/20/23 1632    Lab Status: Final result Specimen: Swab from Nares Updated: 08/20/23 1859     MRSA PCR Negative    Narrative:      The negative predictive value of this diagnostic test is high and should only be used to consider de-escalating anti-MRSA therapy. A positive result may indicate colonization with MRSA and must be correlated clinically.  MRSA Negative    COVID PRE-OP / PRE-PROCEDURE SCREENING ORDER (NO ISOLATION) - Swab, Nasopharynx [[id number]]  (Normal) Collected: 08/20/23 1107    Lab Status: Final result Specimen: Swab from Nasopharynx Updated: 08/20/23 1208    Narrative:      The following orders were created for panel order COVID PRE-OP / PRE-PROCEDURE SCREENING ORDER (NO ISOLATION) - Swab, Nasopharynx.  Procedure                               Abnormality         Status                     ---------                               -----------         ------                     Respiratory Panel PCR  w/...[[id number]]  Normal              Final result                 Please view results for these tests on the individual orders.    Respiratory Panel PCR w/COVID-19(SARS-CoV-2) ERICK/MARTA/ELPIDIO/PAD/COR/MAD/ALMA In-House, NP Swab in UTM/VTM, 3-4 HR TAT - Swab, Nasopharynx [[id number]]  (Normal) Collected: 08/20/23 1107    Lab Status: Final result Specimen: Swab from Nasopharynx Updated: 08/20/23 1208     ADENOVIRUS, PCR Not Detected     Coronavirus 229E Not Detected     Coronavirus HKU1 Not Detected     Coronavirus NL63 Not Detected     Coronavirus OC43 Not Detected     COVID19 Not Detected     Human Metapneumovirus Not Detected     Human Rhinovirus/Enterovirus Not Detected     Influenza A PCR Not Detected     Influenza B PCR Not Detected     Parainfluenza Virus 1 Not Detected     Parainfluenza Virus 2 Not Detected     Parainfluenza Virus 3 Not Detected     Parainfluenza Virus 4 Not Detected     RSV, PCR Not Detected     Bordetella pertussis pcr Not Detected     Bordetella parapertussis PCR Not Detected     Chlamydophila pneumoniae PCR Not Detected     Mycoplasma pneumo by PCR Not Detected    Narrative:      In the setting of a positive respiratory panel with a viral infection PLUS a negative procalcitonin without other underlying concern for bacterial infection, consider observing off antibiotics or discontinuation of antibiotics and continue supportive care. If the respiratory panel is positive for atypical bacterial infection (Bordetella pertussis, Chlamydophila pneumoniae, or Mycoplasma pneumoniae), consider antibiotic de-escalation to target atypical bacterial infection.            XR Chest 1 View    Result Date: 8/20/2023  XR CHEST 1 VW Date of Exam: 8/20/2023 10:18 AM CDT Indication: SOA triage protocol Comparison: 7/5/2023 Findings: Cardiomediastinal silhouette is unremarkable. There is irregular left mid to upper lung airspace disease suggestive of pneumonia. There is moderate interstitial prominence throughout,  similar to prior examination. No significant effusion nor pneumothorax. No acute osseous abnormality identified.     Impression: Impression: Left lung airspace disease suggestive of pneumonia. Electronically Signed: Chance Henao MD  8/20/2023 10:31 AM CDT  Workstation ID: [id number]         Current medications:  Scheduled Meds:amiodarone, 200 mg, Oral, Daily  apixaban, 2.5 mg, Oral, BID  atorvastatin, 40 mg, Oral, Nightly  busPIRone, 5 mg, Oral, Q12H  cholecalciferol, 1,000 Units, Oral, Daily  [START ON 8/28/2023] cyanocobalamin, 1,000 mcg, Intramuscular, Q28 Days  guaiFENesin-dextromethorphan, 10 mL, Oral, Q6H  levETIRAcetam XR, 1,000 mg, Oral, Nightly  levoFLOXacin, 500 mg, Oral, Q24H  methylPREDNISolone sodium succinate, 40 mg, Intravenous, Q24H  pantoprazole, 40 mg, Oral, Q AM  senna, 2 tablet, Oral, BID  senna-docusate sodium, 2 tablet, Oral, BID  sodium chloride, 10 mL, Intravenous, Q12H  traZODone, 100 mg, Oral, Nightly  venlafaxine XR, 75 mg, Oral, Daily      Continuous Infusions:sodium chloride, 75 mL/hr, Last Rate: 75 mL/hr (08/21/23 0541)      PRN Meds:.  albuterol    senna-docusate sodium **AND** polyethylene glycol **AND** bisacodyl **AND** bisacodyl    Calcium Replacement - Follow Nurse / BPA Driven Protocol    fluticasone    Magnesium Standard Dose Replacement - Follow Nurse / BPA Driven Protocol    melatonin    ondansetron    Phosphorus Replacement - Follow Nurse / BPA Driven Protocol    Potassium Replacement - Follow Nurse / BPA Driven Protocol    sodium chloride    sodium chloride    sodium chloride    Assessment & Plan   Assessment & Plan     Active Hospital Problems    Diagnosis  POA    **Pneumonia [J18.9]  Yes      Resolved Hospital Problems   No resolved problems to display.        Brief Hospital Course to date:  Bibiana Hodges is a 87 y.o. female with medical history of essential hypertension, autonomic dysfunction, atrial fibrillation on amiodarone and Eliquis, chronic debility and multiple  hospitalizations manage in the last 2 months with recurrent pneumonia, most recently from 6/29/2023 till 7/10/2023 when she was treated for both community-acquired pneumonia and UTI and was discharged to SNF.  She presented to the hospital today with insomnia x4 nights.  Found to be hypoxic     Assessment and plan:  Acute hypoxia  Recurrent community-acquired pneumonia, suspect secondary to atypical organisms  Possible COPD  Chest x-ray concerning for worsening chronic left upper lobe infiltrates which is concerning for developing pneumonia  Continue Levaquin and follow sputum cultures  Speech eval to rule out aspiration as a precipitating factor of her recurrent pneumonia  Off oxygen supplementation oxygen saturation is 95% room air  She will need to have formal evaluation by pulmonary service and PFT as outpatient.  Referral placed in epic  She would likely need to go back to her assisted facility with home O2  Follow immunoglobulin panel given her recurrent pneumonia  DuoNebs, incentive spirometry, Mucinex  Follow echo with bubble study to rule out intracardiac shunting that might be contributing to her hypoxia  Of note, D-dimer is mildly elevated at 1.0 which is appropriate for her age.  I do not think she has PE particularly she is taking Eliquis at home     A-fib  Continue amiodarone and Eliquis     RACH  Creatinine is slightly elevated at 1.4.  Her baseline is around 1  Gentle IV fluids and monitor kidney function     Essential hypertension  Autonomic dysfunction with recurrent presyncopal episodes  From last discharge, her antiplatelet medications were held  Continue to hold on hypertensive medications particularly with her borderline blood pressure     Dyslipidemia  Continue statins     Chronic debility  PT and OT consultation     Insomnia  Her main presenting complaint  Continue trazodone  Add melatonin at bedtime    Expected Discharge Location and Transportation: Back to assisted in facility  Expected  Discharge   Expected Discharge Date: 8/22/2023; Expected Discharge Time:      DVT prophylaxis:  Medical DVT prophylaxis orders are present.          CODE STATUS:   Code Status and Medical Interventions:   Ordered at: 08/20/23 7361     Level Of Support Discussed With:    Patient     Code Status (Patient has no pulse and is not breathing):    CPR (Attempt to Resuscitate)     Medical Interventions (Patient has pulse or is breathing):    Full Support       Sania Vera MD  08/21/23  Copied text in this note has been reviewed and is accurate as of 08/21/23.

## 2023-08-21 NOTE — PLAN OF CARE
Goal Outcome Evaluation:  Plan of Care Reviewed With: patient        Progress: no change  Outcome Evaluation: Patient presents with weakness, decreased trunk control, balance impairments, and decreased endurance affecting functional mobility. Skilled IP PT services warranted to support return to independence. Recommend SNF at d/c.      Anticipated Discharge Disposition (PT): skilled nursing facility

## 2023-08-21 NOTE — PLAN OF CARE
Goal Outcome Evaluation:  Plan of Care Reviewed With: patient           Outcome Evaluation: Pt presents w/ generalized weakness, fear of falling, decreased functional endurance, balance and postural control deficits limiting her ADL independence. Pt would benefit from continued skilled IPOT services to address current functional deficits. Rec SNF at d/c.      Anticipated Discharge Disposition (OT): skilled nursing facility

## 2023-08-21 NOTE — MBS/VFSS/FEES
Acute Care - Speech Language Pathology   Swallow Initial Evaluation  Alicia  Modified Barium Swallow Study (MBS)     Patient Name: Bibiana Hodges  : 1935  MRN: [id number]  Today's Date: 2023               Admit Date: 2023    Visit Dx:     ICD-10-CM ICD-9-CM   1. Pneumonia of left lung due to infectious organism, unspecified part of lung  J18.9 486   2. Acute respiratory failure with hypoxia  J96.01 518.81   3. Simple chronic bronchitis  J41.0 491.0     Patient Active Problem List   Diagnosis    Dysautonomia orthostatic hypotension syndrome    Hypertension    Flu vaccine need    Streptococcus pneumoniae vaccination indicated    Pneumonia     Past Medical History:   Diagnosis Date    Constipation     Depression     Dysautonomia orthostatic hypotension syndrome     Generalized osteoarthritis     GERD (gastroesophageal reflux disease)     s/p Nissen    Hypertension     Insomnia     Osteoarthrosis, shoulder region     Skin ulcer of scalp     Thrombophlebitis of arm     Tremor     Vitamin B12 deficiency      Past Surgical History:   Procedure Laterality Date    DILATATION AND CURETTAGE      HERNIA REPAIR      HIP SURGERY      SHOULDER SURGERY      Right       SLP Recommendation and Plan  SLP Swallowing Diagnosis: moderate, pharyngeal dysphagia (23 1145)  SLP Diet Recommendation: regular textures, nectar thick liquids, water between meals after oral care, with supervision (23 114)  Recommended Precautions and Strategies: general aspiration precautions (23 114)  SLP Rec. for Method of Medication Administration: meds whole, with thick liquids, with puree (23 114)     Monitor for Signs of Aspiration: notify SLP if any concerns (23 114)     Swallow Criteria for Skilled Therapeutic Interventions Met: demonstrates skilled criteria (23 1145)  Anticipated Discharge Disposition (SLP): home with OP services (23 1145)  Rehab Potential/Prognosis, Swallowing:  re-evaluate goals as necessary (08/21/23 1145)  Therapy Frequency (Swallow): 5 days per week (08/21/23 1145)  Predicted Duration Therapy Intervention (Days): until discharge (08/21/23 1145)  Oral Care Recommendations: Oral Care BID/PRN, Toothbrush (08/21/23 1145)              Oral Care Recommendations: Oral Care BID/PRN, Toothbrush (08/21/23 1145)    Plan of Care Reviewed With: patient      SWALLOW EVALUATION (last 72 hours)       SLP Adult Swallow Evaluation       Row Name 08/21/23 1145       Rehab Evaluation    Document Type evaluation  -SM    Subjective Information no complaints  -SM    Patient Observations alert;cooperative  -SM       General Information    Patient Profile Reviewed yes  -SM    Pertinent History Of Current Problem Recurrent PNAs. MD ?s microaspiration. SLP deferring to SNEHA, RN notified and discussed wit pt.  -SM    Current Method of Nutrition regular textures;thin liquids  -SM    Plans/Goals Discussed with patient;agreed upon  -SM       Pain Scale: Numbers Pre/Post-Treatment    Pretreatment Pain Rating 0/10 - no pain  -SM    Posttreatment Pain Rating 0/10 - no pain  -SM       MBS/VFSS    Utensils Used spoon;cup;straw  -SM    Consistencies Trialed thin liquids;nectar/syrup-thick liquids;pureed;regular textures  -SM       MBS/VFSS Interpretation    Oral Prep Phase WFL  -SM    Oral Transit Phase WFL  -SM    Oral Residue WFL  -SM       Initiation of Pharyngeal Swallow    Initiation of Pharyngeal Swallow bolus in pyriform sinuses  -SM    Pharyngeal Phase impaired pharyngeal phase of swallowing  -SM    Penetration During the Swallow thin liquids;secondary to delayed swallow initiation or mistiming;secondary to reduced laryngeal elevation;secondary to reduced vestibular closure  -SM    Aspiration After the Swallow thin liquids;secondary to residue;in laryngeal vestibule;other (see comments)  -SM    Depth of Penetration other (see comments)  -SM    Response to Penetration No  -SM    Pharyngeal Residue  all consistencies tested;valleculae;pyriform sinuses;secondary to reduced base of tongue retraction;secondary to reduced posterior pharyngeal wall stripping;secondary to reduced laryngeal elevation;secondary to reduced hyolaryngeal excursion  -    Attempted Compensatory Maneuvers other (see comments)  -    Response to Attempted Compensatory Maneuvers did not prevent penetration  -    Pharyngeal Phase, Comment Consistent penetration of thin liquids into the laryngeal vestibule of thin liquids during the swallow. Initially did not aspirate though could not clear any vesibular residue. Over time, gravity slowly brought residual material deeper and falling below VFs (aspiration). Could also not clear subglottic residue when cued. Compensatory strategies were attempted yet all unsuccessful at preventing penetration. Aspiration was silent x when attempted effortful swallow maneuver to prevent aspiration - this actually worsened swallow sequence and airway closure, increasing aspiration (and subsequent cough response though could not clear). GOC/POC discussion with pt following study. Appears hx dysphagia and thickened liquids off and on, which pt reported related to past tracheal surgeries and intubation (details unclear as nothing noted in chart). Despite pt not wishing to return to thickened liquids, she also more so wishes to prevent recurrent PNAs. Willing to trial modified diet to see if improves medical status. Will continue to discus GOC/POC in tx in case pt opts to move to a comfort diet (thin liquids).  -       SLP Evaluation Clinical Impression    SLP Swallowing Diagnosis moderate;pharyngeal dysphagia  -    Functional Impact risk of aspiration/pneumonia  -    Rehab Potential/Prognosis, Swallowing re-evaluate goals as necessary  -    Swallow Criteria for Skilled Therapeutic Interventions Met demonstrates skilled criteria  -       Recommendations    Therapy Frequency (Swallow) 5 days per week  -     Predicted Duration Therapy Intervention (Days) until discharge  -    SLP Diet Recommendation regular textures;nectar thick liquids;water between meals after oral care, with supervision  -    Recommended Precautions and Strategies general aspiration precautions  -    Oral Care Recommendations Oral Care BID/PRN;Toothbrush  -SM    SLP Rec. for Method of Medication Administration meds whole;with thick liquids;with puree  -    Monitor for Signs of Aspiration notify SLP if any concerns  -SM    Anticipated Discharge Disposition (SLP) home with OP services  -              User Key  (r) = Recorded By, (t) = Taken By, (c) = Cosigned By      Initials Name Effective Dates    Skyla Gomez, MS CCC-SLP 02/03/23 -                     EDUCATION  The patient has been educated in the following areas:   Dysphagia (Swallowing Impairment) Oral Care/Hydration Modified Diet Instruction.        SLP GOALS       Row Name 08/21/23 1145       (LTG) Patient will demonstrate progress toward functional swallow for    Diet Texture (Demonstrate progress toward functional swallow) regular textures  -SM    Liquid viscosity (Demonstrate progress toward functional swallow) thin liquids  -SM    [place] (Demonstrate progress towards functional swallow) independently (over 90% accuracy)  -SM    Time Frame (Demonstrate progress toward functional swallow) by discharge  -SM       (STG) Patient will tolerate trials of    Consistencies Trialed (Tolerate trials) regular textures;nectar/ mildly thick liquids;thin liquids  -SM    Desired Outcome (Tolerate trials) with use of compensatory strategies (see comments)  oral care before thin H2O between meals.  -SM    [place] (Tolerate trials) independently (over 90% accuracy)  -SM    Time Frame (Tolerate trials) by discharge  -SM       (STG) Lingual Strengthening Goal 1 (SLP)    Activity (Lingual Strengthening Goal 1, SLP) increase tongue back strength  -SM    Increase Tongue Back  Strength lingual resistance exercises  -SM    [place]/Accuracy (Lingual Strengthening Goal 1, SLP) independently (over 90% accuracy)  -SM    Time Frame (Lingual Strengthening Goal 1, SLP) short term goal (STG)  -SM       (STG) Pharyngeal Strengthening Exercise Goal 1 (SLP)    Activity (Pharyngeal Strengthening Goal 1, SLP) increase timing;increase superior movement of the hyolaryngeal complex;increase anterior movement of the hyolaryngeal complex;increase closure at entrance to airway/closure of airway at glottis;increase squeeze/positive pressure generation  -SM    Increase Timing prepping - 3 second prep or suck swallow or 3-step swallow  -SM    Increase Superior Movement of the Hyolaryngeal Complex Mendelsohn  -SM    Increase Anterior Movement of the Hyolaryngeal Complex EMST;chin tuck against resistance (CTAR)  -SM    Increase Closure at Entrance to Airway/Closure of Airway at Glottis super-supraglottic swallow  -SM    Increase Squeeze/Positive Pressure Generation hard effortful swallow  -SM    [place]/Accuracy (Pharyngeal Strengthening Goal 1, SLP) independently (over 90% accuracy)  -SM    Time Frame (Pharyngeal Strengthening Goal 1, SLP) short term goal (STG)  -SM              User Key  (r) = Recorded By, (t) = Taken By, (c) = Cosigned By      Initials Name Provider Type    Skyla Gomez MS CCC-SLP Speech and Language Pathologist                       Time Calculation:    Time Calculation- SLP       Row Name 08/21/23 1426             Time Calculation- SLP    SLP Start Time 1145  -SM      SLP Received On 08/21/23  -SM         Untimed Charges    17644-[employer] Motion Fluoro Eval Swallow Minutes 74  -SM         Total Minutes    Untimed Charges Total Minutes 74  -SM       Total Minutes 74  -SM                User Key  (r) = Recorded By, (t) = Taken By, (c) = Cosigned By      Initials Name Provider Type    Skyla Gomez MS CCC-SLP Speech and Language Pathologist                    Therapy  Charges for Today       Code Description Service Date Service Provider Modifiers Qty    [id number] HC ST MOTION FLUORO EVAL SWALLOW 5 8/21/2023 Skyla Austin, MS CCC-SLP GN 1                 Skyla Austin, MS CCC-SLP  8/21/2023

## 2023-08-21 NOTE — THERAPY EVALUATION
Patient Name: Bibiana Hodges  : 1935    MRN: [id number]                              Today's Date: 2023       Admit Date: 2023    Visit Dx:     ICD-10-CM ICD-9-CM   1. Pneumonia of left lung due to infectious organism, unspecified part of lung  J18.9 486   2. Acute respiratory failure with hypoxia  J96.01 518.81   3. Simple chronic bronchitis  J41.0 491.0     Patient Active Problem List   Diagnosis    Dysautonomia orthostatic hypotension syndrome    Hypertension    Flu vaccine need    Streptococcus pneumoniae vaccination indicated    Pneumonia     Past Medical History:   Diagnosis Date    Constipation     Depression     Dysautonomia orthostatic hypotension syndrome     Generalized osteoarthritis     GERD (gastroesophageal reflux disease)     s/p Nissen    Hypertension     Insomnia     Osteoarthrosis, shoulder region     Skin ulcer of scalp     Thrombophlebitis of arm     Tremor     Vitamin B12 deficiency      Past Surgical History:   Procedure Laterality Date    DILATATION AND CURETTAGE      HERNIA REPAIR      HIP SURGERY      SHOULDER SURGERY      Right      General Information       Row Name 23 1050          OT Time and Intention    Document Type evaluation  -     Mode of Treatment occupational therapy  -       Row Name 23 1050          General Information    Patient Profile Reviewed yes  -MC     Prior Level of Function min assist:;bed mobility;transfer;all household mobility;mod assist:;ADL's;dependent:;w/c or scooter  Pt uses rollator for shorter distances, w/c for longer distances. Pt w/ extreme fear of bathrooms d/t multiple falls, therefore, she reports she uses a purwick and does not enter bathrooms at baseline.  -     Existing Precautions/Restrictions fall;oxygen therapy device and L/min  hx of vertigo  -     Barriers to Rehab medically complex;previous functional deficit  -       Row Name 23 1058          Living Environment    People in Home facility  resident  [facility]  -       Row Name 08/21/23 1050          Home Main Entrance    Number of Stairs, Main Entrance none  -       Row Name 08/21/23 1050          Stairs Within Home, Primary    Number of Stairs, Within Home, Primary none  -       Row Name 08/21/23 1050          Cognition    Orientation Status (Cognition) oriented x 3  -       Row Name 08/21/23 1050          Safety Issues, Functional Mobility    Safety Issues Affecting Function (Mobility) awareness of need for assistance;insight into deficits/self-awareness;safety precaution awareness;safety precautions follow-through/compliance;sequencing abilities  -     Impairments Affecting Function (Mobility) balance;coordination;endurance/activity tolerance;motor planning;strength;shortness of breath;postural/trunk control  -               User Key  (r) = Recorded By, (t) = Taken By, (c) = Cosigned By      Initials Name Provider Type     Terrie Tenorio OT Occupational Therapist                     Mobility/ADL's       Row Name 08/21/23 1052          Bed Mobility    Bed Mobility supine-sit  -     Supine-Sit [place] (Bed Mobility) contact guard;verbal cues  -     Assistive Device (Bed Mobility) bed rails;head of bed elevated  -       Row Name 08/21/23 1052          Transfers    Transfers sit-stand transfer;stand-sit transfer;bed-chair transfer  -       Row Name 08/21/23 1052          Bed-Chair Transfer    Bed-Chair [place] (Transfers) minimum assist (75% patient effort);verbal cues  -     Assistive Device (Bed-Chair Transfers) walker, front-wheeled  -       Row Name 08/21/23 1052          Sit-Stand Transfer    Sit-Stand [place] (Transfers) minimum assist (75% patient effort);verbal cues  -     Assistive Device (Sit-Stand Transfers) walker, front-wheeled  -       Row Name 08/21/23 1052          Stand-Sit Transfer    Stand-Sit [place] (Transfers) minimum assist (75% patient effort);verbal cues  -     Assistive  Device (Stand-Sit Transfers) walker, front-wheeled  -       Row Name 08/21/23 1052          Functional Mobility    Functional Mobility- Comment Deferred to PT  -[facility] Name 08/21/23 1052          Activities of Daily Living    BADL Assessment/Intervention lower body dressing;upper body dressing;feeding  -[facility] Name 08/21/23 1052          Lower Body Dressing Assessment/Training    [place] Level (Lower Body Dressing) don;socks;moderate assist (50% patient effort);verbal cues  -     Position (Lower Body Dressing) supported sitting  -     Comment, (Lower Body Dressing) Pt w/ noted difficulty and LOB when attempting to don/doff socks sitting [facility]. Pt would benefit from trial of AE next session for increased independence and safety w/ LB ADLs.  -[facility] Name 08/21/23 1052          Upper Body Dressing Assessment/Training    [place] Level (Upper Body Dressing) don;other (see comments);minimum assist (75% patient effort);verbal cues  hospital gown  -     Position (Upper Body Dressing) edge of bed sitting  -[facility] Name 08/21/23 1052          Self-Feeding Assessment/Training    [place] Level (Feeding) scoop food and bring to mouth;set up  -     Position (Self-Feeding) supported sitting  -               User Key  (r) = Recorded By, (t) = Taken By, (c) = Cosigned By      Initials Name Provider Type    Terrie Irizarry OT Occupational Therapist                   Obj/Interventions       [facility] Name 08/21/23 1053          Sensory Assessment (Somatosensory)    Sensory Assessment (Somatosensory) UE sensation intact  -[facility] Name 08/21/23 1053          Vision Assessment/Intervention    Visual Impairment/Limitations WFL  -[facility] Name 08/21/23 1053          Range of Motion Comprehensive    General Range of Motion bilateral upper extremity ROM WFL  -[facility] Name 08/21/23 1053          Strength Comprehensive (MMT)    General Manual Muscle Testing (MMT) Assessment upper  extremity strength deficits identified  -     Comment, General Manual Muscle Testing (MMT) Assessment BUE grossly 4/5  -       Row Name 08/21/23 1053          Balance    Balance Assessment sitting static balance;sitting dynamic balance;sit to stand dynamic balance;standing static balance;standing dynamic balance  -     Static Sitting Balance contact guard  -     Dynamic Sitting Balance moderate assist;verbal cues  -     Position, Sitting Balance unsupported;sitting in chair;sitting edge of bed  -     Sit to Stand Dynamic Balance minimal assist;verbal cues  -     Static Standing Balance contact guard;verbal cues  -     Dynamic Standing Balance minimal assist;verbal cues  -     Position/Device Used, Standing Balance supported;walker, front-wheeled  -     Balance Interventions sitting;sit to stand;occupation based/functional task  -               User Key  (r) = Recorded By, (t) = Taken By, (c) = Cosigned By      Initials Name Provider Type     Terrie Tenorio OT Occupational Therapist                   Goals/Plan       Row Name 08/21/23 1056          Transfer Goal 1 (OT)    Activity/Assistive Device (Transfer Goal 1, OT) bed-to-chair/chair-to-bed;commode, bedside without drop arms;walker, rolling  -     [place] Level/Cues Needed (Transfer Goal 1, OT) standby assist  -     Time Frame (Transfer Goal 1, OT) long term goal (LTG);10 days  -     Progress/Outcome (Transfer Goal 1, OT) goal ongoing  -       Row Name 08/21/23 1056          Dressing Goal 1 (OT)    Activity/Device (Dressing Goal 1, OT) lower body dressing  don/doff socks w/ AAD  -     [place]/Cues Needed (Dressing Goal 1, OT) minimum assist (75% or more patient effort);verbal cues required  -     Time Frame (Dressing Goal 1, OT) long term goal (LTG);10 days  -     Progress/Outcome (Dressing Goal 1, OT) goal ongoing  -       Row Name 08/21/23 1056          Toileting Goal 1 (OT)    Activity/Device (Toileting  Goal 1, OT) adjust/manage clothing;perform perineal hygiene;commode, bedside without drop arms  -     [place] Level/Cues Needed (Toileting Goal 1, OT) minimum assist (75% or more patient effort);verbal cues required  -     Time Frame (Toileting Goal 1, OT) long term goal (LTG);10 days  -     Progress/Outcome (Toileting Goal 1, OT) goal ongoing  -MC       Row Name 08/21/23 1056          Therapy Assessment/Plan (OT)    Planned Therapy Interventions (OT) activity tolerance training;adaptive equipment training;BADL retraining;functional balance retraining;IADL retraining;occupation/activity based interventions;patient/caregiver education/training;ROM/therapeutic exercise;strengthening exercise;transfer/mobility retraining  -               User Key  (r) = Recorded By, (t) = Taken By, (c) = Cosigned By      Initials Name Provider Type     Terrie Tenorio, OT Occupational Therapist                   Clinical Impression       Row Name 08/21/23 1053          Pain Assessment    Pretreatment Pain Rating 0/10 - no pain  -     Posttreatment Pain Rating 0/10 - no pain  -MC       Row Name 08/21/23 1053          Plan of Care Review    Plan of Care Reviewed With patient  -     Outcome Evaluation Pt presents w/ generalized weakness, fear of falling, decreased functional endurance, balance and postural control deficits limiting her ADL independence. Pt would benefit from continued skilled IPOT services to address current functional deficits. Rec SNF at d/c.  -[facility] Name 08/21/23 1051          Therapy Assessment/Plan (OT)    Rehab Potential (OT) good, to achieve stated therapy goals  -     Criteria for Skilled Therapeutic Interventions Met (OT) yes;skilled treatment is necessary  -     Therapy Frequency (OT) daily  -[facility] Name 08/21/23 1058          Therapy Plan Review/Discharge Plan (OT)    Anticipated Discharge Disposition (OT) skilled nursing facility  -[facility] Name 08/21/23 6822           Vital Signs    Pre Systolic BP Rehab 126  -MC     Pre Treatment Diastolic BP 75  -MC     Post Systolic BP Rehab 99  -MC     Post Treatment Diastolic BP 73  -MC     Pre SpO2 (%) 97  -MC     O2 Delivery Pre Treatment room air  -     O2 Delivery Intra Treatment nasal cannula  -MC     Post SpO2 (%) 97  -MC     O2 Delivery Post Treatment nasal cannula  -MC     Pre Patient Position Supine  -     Intra Patient Position Standing  -     Post Patient Position Sitting  -       Row Name 08/21/23 1054          Positioning and Restraints    Pre-Treatment Position in bed  -     Post Treatment Position chair  -     In Chair sitting;with PT  -               User Key  (r) = Recorded By, (t) = Taken By, (c) = Cosigned By      Initials Name Provider Type    Terrie Irizarry OT Occupational Therapist                   Outcome Measures       Row Name 08/21/23 1057          How much help from another is currently needed...    Putting on and taking off regular lower body clothing? 2  -MC     Bathing (including washing, rinsing, and drying) 2  -MC     Toileting (which includes using toilet bed pan or urinal) 2  -MC     Putting on and taking off regular upper body clothing 3  -MC     Taking care of personal grooming (such as brushing teeth) 3  -MC     Eating meals 3  -MC     AM-PAC 6 Clicks Score (OT) 15  -       Row Name 08/21/23 1057          Functional Assessment    Outcome Measure Options AM-PAC 6 Clicks Daily Activity (OT)  -               User Key  (r) = Recorded By, (t) = Taken By, (c) = Cosigned By      Initials Name Provider Type    Terrie Irizarry OT Occupational Therapist                    Occupational Therapy Education       Title: PT OT SLP Therapies (In Progress)       Topic: Occupational Therapy (In Progress)       Point: ADL training (In Progress)       Description:   Instruct learner(s) on proper safety adaptation and remediation techniques during self care or transfers.   Instruct in proper  use of assistive devices.                  Learning Progress Summary             Patient Acceptance, E, NR by  at 8/21/2023 1057                         Point: Home exercise program (Not Started)       Description:   Instruct learner(s) on appropriate technique for monitoring, assisting and/or progressing therapeutic exercises/activities.                  Learner Progress:  Not documented in this visit.              Point: Precautions (In Progress)       Description:   Instruct learner(s) on prescribed precautions during self-care and functional transfers.                  Learning Progress Summary             Patient Acceptance, E, NR by  at 8/21/2023 1057                         Point: Body mechanics (In Progress)       Description:   Instruct learner(s) on proper positioning and spine alignment during self-care, functional mobility activities and/or exercises.                  Learning Progress Summary             Patient Acceptance, E, NR by  at 8/21/2023 1057                                         User Key       Initials Effective Dates Name Provider Type Discipline     10/14/22 -  Terrie Tenorio OT Occupational Therapist OT                  OT Recommendation and Plan  Planned Therapy Interventions (OT): activity tolerance training, adaptive equipment training, BADL retraining, functional balance retraining, IADL retraining, occupation/activity based interventions, patient/caregiver education/training, ROM/therapeutic exercise, strengthening exercise, transfer/mobility retraining  Therapy Frequency (OT): daily  Plan of Care Review  Plan of Care Reviewed With: patient  Outcome Evaluation: Pt presents w/ generalized weakness, fear of falling, decreased functional endurance, balance and postural control deficits limiting her ADL independence. Pt would benefit from continued skilled IPOT services to address current functional deficits. Rec SNF at d/c.     Time Calculation:   Evaluation Complexity  (OT)  Review Occupational Profile/Medical/Therapy History Complexity: expanded/moderate complexity  Assessment, Occupational Performance/Identification of Deficit Complexity: 3-5 performance deficits  Clinical Decision Making Complexity (OT): detailed assessment/moderate complexity  Overall Complexity of Evaluation (OT): moderate complexity     Time Calculation- OT       Row Name 08/21/23 1057             Time Calculation- OT    OT Start Time 0918  -MC      OT Received On 08/21/23  -      OT Goal Re-Cert Due Date 08/31/23  -         Timed Charges    19789 - OT Therapeutic Activity Minutes 6  -MC      09155 - OT Self Care/Mgmt Minutes 8  -MC         Untimed Charges    OT Eval/Re-eval Minutes 31  -MC         Total Minutes    Timed Charges Total Minutes 14  -MC      Untimed Charges Total Minutes 31  -MC       Total Minutes 45  -MC                User Key  (r) = Recorded By, (t) = Taken By, (c) = Cosigned By      Initials Name Provider Type     Terrie Tenorio, OT Occupational Therapist                  Therapy Charges for Today       Code Description Service Date Service Provider Modifiers Qty    [id number]  OT SELF CARE/MGMT/TRAIN EA 15 MIN 8/21/2023 Terrie Tenorio, OT GO 1    [id number]  OT EVAL MOD COMPLEXITY 3 8/21/2023 Terrie Tenorio, OT GO 1                 Terrie Tenorio, OT  8/21/2023

## 2023-08-21 NOTE — PLAN OF CARE
Goal Outcome Evaluation:  Plan of Care Reviewed With: patient              SLP evaluation with MBS completed. Pharyngeal dysphagia with aspiration, mainly as not able to sense or clear thin liquids, when cued to cough or clear throat, that penetrates into the laryngeal vestibule during the swallow (which can often occur on a functional basis). Unfortunately, over time it leads to aspiration as deepens with gravity into airway. No compensations were successful at preventing penetration. Pt reports hx dysphagia (that resolved?) with thickened liquids. Hesitant to return to thickened liquids though her greater goal is to prevent PNAs. For now - regular solids, nectar-thick liquids, ok thin H2O between meals following completion of oral care. Will continue to address dysphagia and revisit ? Modified diet if begins greatly impacting her QOL. Please see note for further details and recommendations.

## 2023-08-21 NOTE — PLAN OF CARE
Goal Outcome Evaluation:  Plan of Care Reviewed With: patient        Progress: improving  Outcome Evaluation: Pt on room air. Paced on monitor. MBS today. Pt up in chair some today. No complaints. VSS. Will continue poc.

## 2023-08-21 NOTE — CASE MANAGEMENT/SOCIAL WORK
Continued Stay Note  [facility]     Patient Name: Bibiana Hodges  MRN: [id number]  Today's Date: 8/21/2023    Admit Date: 8/20/2023    Plan: Back to [facility]   Discharge Plan       Row Name 08/21/23 1701       Plan    Plan Back to [facility]    Patient/Family in Agreement with Plan yes    Plan Comments Spoke with patient at bedside. We discussed PT and OT recommendation about SNF. She is agreeable but wants CM to talk to daughter POA first. CM called POA but no answer. Will call tomorrow. Patient wants referrals to the [facility]. CM will follow.    Final Discharge Disposition Code 03 - skilled nursing facility (SNF)                   Discharge Codes    No documentation.                 Expected Discharge Date and Time       Expected Discharge Date Expected Discharge Time    Aug 22, 2023               Jess Perdue RN

## 2023-08-22 ENCOUNTER — APPOINTMENT (OUTPATIENT)
Dept: GENERAL RADIOLOGY | Facility: HOSPITAL | Age: 88
DRG: 177 | End: 2023-08-22
Payer: MEDICARE

## 2023-08-22 LAB
ALBUMIN SERPL-MCNC: 3.1 G/DL (ref 3.5–5.2)
ALBUMIN/GLOB SERPL: 1.1 G/DL
ALP SERPL-CCNC: 44 U/L (ref 39–117)
ALT SERPL W P-5'-P-CCNC: 114 U/L (ref 1–33)
ANION GAP SERPL CALCULATED.3IONS-SCNC: 10 MMOL/L (ref 5–15)
ANION GAP SERPL CALCULATED.3IONS-SCNC: 12 MMOL/L (ref 5–15)
AST SERPL-CCNC: 88 U/L (ref 1–32)
BASOPHILS # BLD AUTO: 0.01 10*3/MM3 (ref 0–0.2)
BASOPHILS NFR BLD AUTO: 0.1 % (ref 0–1.5)
BILIRUB SERPL-MCNC: 0.2 MG/DL (ref 0–1.2)
BUN SERPL-MCNC: 26 MG/DL (ref 8–23)
BUN SERPL-MCNC: 27 MG/DL (ref 8–23)
BUN/CREAT SERPL: 21.8 (ref 7–25)
BUN/CREAT SERPL: 22.7 (ref 7–25)
CALCIUM SPEC-SCNC: 8.6 MG/DL (ref 8.6–10.5)
CALCIUM SPEC-SCNC: 8.8 MG/DL (ref 8.6–10.5)
CHLORIDE SERPL-SCNC: 102 MMOL/L (ref 98–107)
CHLORIDE SERPL-SCNC: 102 MMOL/L (ref 98–107)
CO2 SERPL-SCNC: 20 MMOL/L (ref 22–29)
CO2 SERPL-SCNC: 22 MMOL/L (ref 22–29)
CREAT SERPL-MCNC: 1.19 MG/DL (ref 0.57–1)
CREAT SERPL-MCNC: 1.19 MG/DL (ref 0.57–1)
CRP SERPL-MCNC: 5.46 MG/DL (ref 0–0.5)
DEPRECATED RDW RBC AUTO: 54.6 FL (ref 37–54)
EGFRCR SERPLBLD CKD-EPI 2021: 44.3 ML/MIN/1.73
EGFRCR SERPLBLD CKD-EPI 2021: 44.3 ML/MIN/1.73
EOSINOPHIL # BLD AUTO: 0 10*3/MM3 (ref 0–0.4)
EOSINOPHIL NFR BLD AUTO: 0 % (ref 0.3–6.2)
ERYTHROCYTE [DISTWIDTH] IN BLOOD BY AUTOMATED COUNT: 17.1 % (ref 12.3–15.4)
GLOBULIN UR ELPH-MCNC: 2.9 GM/DL
GLUCOSE SERPL-MCNC: 101 MG/DL (ref 65–99)
GLUCOSE SERPL-MCNC: 103 MG/DL (ref 65–99)
HCT VFR BLD AUTO: 26.9 % (ref 34–46.6)
HGB BLD-MCNC: 8.5 G/DL (ref 12–15.9)
IMM GRANULOCYTES # BLD AUTO: 0.1 10*3/MM3 (ref 0–0.05)
IMM GRANULOCYTES NFR BLD AUTO: 0.6 % (ref 0–0.5)
LYMPHOCYTES # BLD AUTO: 0.82 10*3/MM3 (ref 0.7–3.1)
LYMPHOCYTES NFR BLD AUTO: 4.7 % (ref 19.6–45.3)
MAGNESIUM SERPL-MCNC: 1.9 MG/DL (ref 1.6–2.4)
MCH RBC QN AUTO: 27.6 PG (ref 26.6–33)
MCHC RBC AUTO-ENTMCNC: 31.6 G/DL (ref 31.5–35.7)
MCV RBC AUTO: 87.3 FL (ref 79–97)
MONOCYTES # BLD AUTO: 0.48 10*3/MM3 (ref 0.1–0.9)
MONOCYTES NFR BLD AUTO: 2.8 % (ref 5–12)
NEUTROPHILS NFR BLD AUTO: 15.99 10*3/MM3 (ref 1.7–7)
NEUTROPHILS NFR BLD AUTO: 91.8 % (ref 42.7–76)
NRBC BLD AUTO-RTO: 0 /100 WBC (ref 0–0.2)
PHOSPHATE SERPL-MCNC: 3.4 MG/DL (ref 2.5–4.5)
PLATELET # BLD AUTO: 241 10*3/MM3 (ref 140–450)
PMV BLD AUTO: 10.2 FL (ref 6–12)
POTASSIUM SERPL-SCNC: 4.7 MMOL/L (ref 3.5–5.2)
POTASSIUM SERPL-SCNC: 4.7 MMOL/L (ref 3.5–5.2)
PROT SERPL-MCNC: 6 G/DL (ref 6–8.5)
RBC # BLD AUTO: 3.08 10*6/MM3 (ref 3.77–5.28)
SODIUM SERPL-SCNC: 134 MMOL/L (ref 136–145)
SODIUM SERPL-SCNC: 134 MMOL/L (ref 136–145)
WBC NRBC COR # BLD: 17.4 10*3/MM3 (ref 3.4–10.8)

## 2023-08-22 PROCEDURE — 83735 ASSAY OF MAGNESIUM: CPT | Performed by: INTERNAL MEDICINE

## 2023-08-22 PROCEDURE — 71045 X-RAY EXAM CHEST 1 VIEW: CPT

## 2023-08-22 PROCEDURE — 92526 ORAL FUNCTION THERAPY: CPT

## 2023-08-22 PROCEDURE — 25010000002 METHYLPREDNISOLONE PER 40 MG: Performed by: INTERNAL MEDICINE

## 2023-08-22 PROCEDURE — 85025 COMPLETE CBC W/AUTO DIFF WBC: CPT | Performed by: INTERNAL MEDICINE

## 2023-08-22 PROCEDURE — 80053 COMPREHEN METABOLIC PANEL: CPT | Performed by: INTERNAL MEDICINE

## 2023-08-22 PROCEDURE — 86140 C-REACTIVE PROTEIN: CPT | Performed by: INTERNAL MEDICINE

## 2023-08-22 PROCEDURE — 84100 ASSAY OF PHOSPHORUS: CPT | Performed by: INTERNAL MEDICINE

## 2023-08-22 RX ORDER — LISINOPRIL 2.5 MG/1
2.5 TABLET ORAL
Status: DISCONTINUED | OUTPATIENT
Start: 2023-08-22 | End: 2023-08-25 | Stop reason: HOSPADM

## 2023-08-22 RX ADMIN — BUSPIRONE HYDROCHLORIDE 5 MG: 5 TABLET ORAL at 09:27

## 2023-08-22 RX ADMIN — BUSPIRONE HYDROCHLORIDE 5 MG: 5 TABLET ORAL at 21:50

## 2023-08-22 RX ADMIN — ATORVASTATIN CALCIUM 40 MG: 40 TABLET, FILM COATED ORAL at 21:47

## 2023-08-22 RX ADMIN — Medication 1000 UNITS: at 09:27

## 2023-08-22 RX ADMIN — SENNOSIDES AND DOCUSATE SODIUM 2 TABLET: 50; 8.6 TABLET ORAL at 09:27

## 2023-08-22 RX ADMIN — PANTOPRAZOLE SODIUM 40 MG: 40 TABLET, DELAYED RELEASE ORAL at 05:14

## 2023-08-22 RX ADMIN — AMIODARONE HYDROCHLORIDE 200 MG: 200 TABLET ORAL at 09:26

## 2023-08-22 RX ADMIN — VENLAFAXINE HYDROCHLORIDE 75 MG: 75 CAPSULE, EXTENDED RELEASE ORAL at 09:27

## 2023-08-22 RX ADMIN — Medication 10 ML: at 09:27

## 2023-08-22 RX ADMIN — LISINOPRIL 2.5 MG: 2.5 TABLET ORAL at 17:15

## 2023-08-22 RX ADMIN — METOPROLOL TARTRATE 25 MG: 25 TABLET, FILM COATED ORAL at 21:47

## 2023-08-22 RX ADMIN — Medication 10 ML: at 21:47

## 2023-08-22 RX ADMIN — SENNOSIDES 2 TABLET: 8.6 TABLET, FILM COATED ORAL at 21:47

## 2023-08-22 RX ADMIN — GUAIFENESIN AND DEXTROMETHORPHAN 10 ML: 100; 10 SYRUP ORAL at 13:16

## 2023-08-22 RX ADMIN — APIXABAN 2.5 MG: 2.5 TABLET, FILM COATED ORAL at 09:27

## 2023-08-22 RX ADMIN — LEVETIRACETAM 1000 MG: 500 TABLET, EXTENDED RELEASE ORAL at 21:50

## 2023-08-22 RX ADMIN — GUAIFENESIN AND DEXTROMETHORPHAN 10 ML: 100; 10 SYRUP ORAL at 06:29

## 2023-08-22 RX ADMIN — SENNOSIDES AND DOCUSATE SODIUM 2 TABLET: 50; 8.6 TABLET ORAL at 21:47

## 2023-08-22 RX ADMIN — APIXABAN 2.5 MG: 2.5 TABLET, FILM COATED ORAL at 21:47

## 2023-08-22 RX ADMIN — METHYLPREDNISOLONE SODIUM SUCCINATE 40 MG: 40 INJECTION, POWDER, FOR SOLUTION INTRAMUSCULAR; INTRAVENOUS at 17:16

## 2023-08-22 RX ADMIN — SENNOSIDES 2 TABLET: 8.6 TABLET, FILM COATED ORAL at 09:27

## 2023-08-22 RX ADMIN — TRAZODONE HYDROCHLORIDE 100 MG: 100 TABLET ORAL at 00:04

## 2023-08-22 RX ADMIN — GUAIFENESIN AND DEXTROMETHORPHAN 10 ML: 100; 10 SYRUP ORAL at 17:16

## 2023-08-22 RX ADMIN — LEVOFLOXACIN 500 MG: 500 TABLET, FILM COATED ORAL at 17:15

## 2023-08-22 NOTE — PROGRESS NOTES
[facility] Medicine Services  PROGRESS NOTE    Patient Name: Bibiana Hodges  : 1935  MRN: [id number]    Date of Admission: 2023  Primary Care Physician: Katarina Oviedo MD    Subjective   Subjective     CC:  Follow-up for pneumonia    HPI:  I have seen and evaluated the patient this morning.  Continues to feel well.  Currently on room air satting 96%.  Still feeling very weak and does not have much energy but able to ambulate with PT with minimal assistance.    ROS:  General : no fevers, chills  CVS: No chest pain, palpitations  Respiratory: No cough, dyspnea  GI: No N/V/D, abd pain  10 point review of systems is negative except for what is mentioned in HPI    Objective   Objective     Vital Signs:   Temp:  [96.3 øF (35.7 øC)-98 øF (36.7 øC)] 97.4 øF (36.3 øC)  Heart Rate:  [59-94] 59  Resp:  [18-20] 18  BP: (118-130)/(66-90) 121/75  Flow (L/min):  [2] 2     Physical Exam:  General: Chronically ill looking and debilitated  Head: Atraumatic and normocephalic  Eyes: No Icterus. No pallor  Ears:  Ears appear intact with no abnormalities noted  Throat: No oral lesions, no thrush  Neck: Supple, trachea midline  Lungs: Left upper lobe coarse crackles.  Diminished air entry both lung fields.  Scattered wheezing.    Heart:  Normal S1 and S2, no murmur, no gallop, No JVD, no lower extremity swelling  Abdomen:  Soft, no tenderness, no organomegaly, normal bowel sounds, no organomegaly  Extremities: pulses equal bilaterally  Skin: No bleeding, bruising or rash, normal skin turgor and elasticity  Neurologic: Cranial nerves appear intact with no evidence of facial asymmetry, normal motor and sensory functions in all 4 extremities  Psych: Alert and oriented x 3, normal mood    Results Reviewed:  LAB RESULTS:      Lab 23  0832 23  0613 23  1106 23  1059   WBC 17.40* 20.38*  --  11.30*   HEMOGLOBIN 8.5* 8.3*  --  9.8*   HEMATOCRIT 26.9* 26.6*  --  32.5*   PLATELETS  241 244  --  264   NEUTROS ABS 15.99* 19.05*  --  10.11*   IMMATURE GRANS (ABS) 0.10* 0.16*  --  0.04   LYMPHS ABS 0.82 0.90  --  0.78   MONOS ABS 0.48 0.25  --  0.29   EOS ABS 0.00 0.00  --  0.06   MCV 87.3 88.4  --  91.0   CRP 5.46* 10.48* 0.59*  --    PROCALCITONIN  --  1.59* 0.04  --    LACTATE  --   --  1.1  --    D DIMER QUANT  --   --   --  1.07*         Lab 08/22/23  0832 08/21/23  0613 08/20/23  1106   SODIUM 134*  134* 132* 132*   POTASSIUM 4.7  4.7 4.7 4.8   CHLORIDE 102  102 100 99   CO2 20.0*  22.0 23.0 25.0   ANION GAP 12.0  10.0 9.0 8.0   BUN 26*  27* 23 26*   CREATININE 1.19*  1.19* 1.44* 1.44*   EGFR 44.3*  44.3* 35.3* 35.3*   GLUCOSE 101*  103* 115* 133*   CALCIUM 8.6  8.8 8.3* 8.5*   MAGNESIUM 1.9 2.1  --    PHOSPHORUS 3.4 3.9  --          Lab 08/22/23  0832 08/21/23  0613 08/20/23  1106   TOTAL PROTEIN 6.0 5.5* 6.4   ALBUMIN 3.1* 3.4* 3.4*   GLOBULIN 2.9 2.1 3.0   ALT (SGPT) 114* 67* 14   AST (SGOT) 88* 67* 19   BILIRUBIN 0.2 0.3 0.2   ALK PHOS 44 43 47         Lab 08/20/23  1106   PROBNP 205.7   HSTROP T 15*                 Lab 08/20/23  1127   PH, ARTERIAL 7.400   PCO2, ARTERIAL 41.1   PO2 ART 65.0*   FIO2 58   HCO3 ART 25.4   BASE EXCESS ART 0.6     Brief Urine Lab Results  (Last result in the past 365 days)        Color   Clarity   Blood   Leuk Est   Nitrite   Protein   CREAT   Urine HCG        08/20/23 1814 Yellow   Cloudy   Negative   Large (3+)   Positive   Trace                   Microbiology Results Abnormal       Procedure Component Value - Date/Time    Blood Culture - Blood, Arm, Left [[id number]]  (Normal) Collected: 08/20/23 1106    Lab Status: Preliminary result Specimen: Blood from Arm, Left Updated: 08/22/23 1131     Blood Culture No growth at 2 days    Blood Culture - Blood, Arm, Right [[id number]]  (Normal) Collected: 08/20/23 1106    Lab Status: Preliminary result Specimen: Blood from Arm, Right Updated: 08/22/23 1131     Blood Culture No growth at 2 days    S. Pneumo  Ag Urine or CSF - Urine, Urine, Clean Catch [[id number]]  (Normal) Collected: 08/20/23 1814    Lab Status: Final result Specimen: Urine, Clean Catch Updated: 08/21/23 0941     Strep Pneumo Ag Negative    Legionella Antigen, Urine - Urine, Urine, Clean Catch [[id number]]  (Normal) Collected: 08/20/23 1814    Lab Status: Final result Specimen: Urine, Clean Catch Updated: 08/21/23 0941     LEGIONELLA ANTIGEN, URINE Negative    MRSA Screen, PCR (Inpatient) - Swab, Nares [[id number]]  (Normal) Collected: 08/20/23 1632    Lab Status: Final result Specimen: Swab from Nares Updated: 08/20/23 1859     MRSA PCR Negative    Narrative:      The negative predictive value of this diagnostic test is high and should only be used to consider de-escalating anti-MRSA therapy. A positive result may indicate colonization with MRSA and must be correlated clinically.  MRSA Negative    COVID PRE-OP / PRE-PROCEDURE SCREENING ORDER (NO ISOLATION) - Swab, Nasopharynx [[id number]]  (Normal) Collected: 08/20/23 1107    Lab Status: Final result Specimen: Swab from Nasopharynx Updated: 08/20/23 1208    Narrative:      The following orders were created for panel order COVID PRE-OP / PRE-PROCEDURE SCREENING ORDER (NO ISOLATION) - Swab, Nasopharynx.  Procedure                               Abnormality         Status                     ---------                               -----------         ------                     Respiratory Panel PCR w/...[[id number]]  Normal              Final result                 Please view results for these tests on the individual orders.    Respiratory Panel PCR w/COVID-19(SARS-CoV-2) ERICK/MARTA/ELPIDIO/PAD/COR/MAD/ALMA In-House, NP Swab in UTM/VTM, 3-4 HR TAT - Swab, Nasopharynx [[id number]]  (Normal) Collected: 08/20/23 1107    Lab Status: Final result Specimen: Swab from Nasopharynx Updated: 08/20/23 1208     ADENOVIRUS, PCR Not Detected     Coronavirus 229E Not Detected     Coronavirus HKU1 Not Detected     Coronavirus NL63  Not Detected     Coronavirus OC43 Not Detected     COVID19 Not Detected     Human Metapneumovirus Not Detected     Human Rhinovirus/Enterovirus Not Detected     Influenza A PCR Not Detected     Influenza B PCR Not Detected     Parainfluenza Virus 1 Not Detected     Parainfluenza Virus 2 Not Detected     Parainfluenza Virus 3 Not Detected     Parainfluenza Virus 4 Not Detected     RSV, PCR Not Detected     Bordetella pertussis pcr Not Detected     Bordetella parapertussis PCR Not Detected     Chlamydophila pneumoniae PCR Not Detected     Mycoplasma pneumo by PCR Not Detected    Narrative:      In the setting of a positive respiratory panel with a viral infection PLUS a negative procalcitonin without other underlying concern for bacterial infection, consider observing off antibiotics or discontinuation of antibiotics and continue supportive care. If the respiratory panel is positive for atypical bacterial infection (Bordetella pertussis, Chlamydophila pneumoniae, or Mycoplasma pneumoniae), consider antibiotic de-escalation to target atypical bacterial infection.            Adult Transthoracic Echo Complete W/ Cont if Necessary Per Protocol    Result Date: 8/21/2023    Left ventricular systolic function is mildly decreased. Calculated left ventricular EF = 47.9% Normal left ventricular cavity size and wall thickness noted. There is left ventricular global hypokinesis noted. Left ventricular diastolic function is consistent with (grade I) impaired relaxation.   : Normal right ventricular cavity size and systolic function noted. Electronic lead present in the ventricle.   Normal left atrial size and volume noted. Saline test results are negative.   There is moderate calcification of the aortic valve. The aortic valve was poorly visualized but appears trileaflet. Moderate aortic valve regurgitation is present. No aortic valve stenosis is present.   Mild mitral annular calcification is present. Mild mitral valve  regurgitation is present. No significant mitral valve stenosis is present.   The tricuspid valve is grossly normal in structure. Mild tricuspid valve regurgitation is present. Estimated right ventricular systolic pressure from tricuspid regurgitation is normal, 33 mmHg. No tricuspid valve stenosis is present.   Mild dilation of the ascending aorta is present. Ascending aorta = 3.7 cm   There is a small (<1cm) pericardial effusion adjacent to the right atrium and right ventricle.     FL Video Swallow With Speech Single Contrast    Result Date: 8/21/2023  FL VIDEO SWALLOW W SPEECH SINGLE-CONTRAST Date of Exam: 8/21/2023 11:50 AM EDT Indication: dysphagia Comparison: None available. Technique:   The speech pathologist administered food and/or liquid mixed with barium to the patient with cine/video imaging.  Imaging assistance was provided to the speech pathologist and an image was saved. Fluoroscopic Time: 1 minute and 36 seconds Number of Images: 15 associated fluoroscopic loops were saved Findings: Please see speech therapy report for full details and recommendations.     Impression: Impression: Fluoroscopy provided for a modified barium swallow. Please see speech therapy report for full details and recommendations. Electronically Signed: Jacob Saldana MD  8/21/2023 4:45 PM EDT  Workstation ID: [id number]    XR Chest 1 View    Result Date: 8/22/2023  XR CHEST 1 VW Date of Exam: 8/22/2023 8:00 AM EDT Indication: f/u Comparison: 8/20/2023. Findings: Left chest wall ICD projects in place. Aeration in the left upper lobe is improved with decreased airspace disease. No new focal opacity is present elsewhere. There is no effusion or pneumothorax.     Impression: Impression: Left chest wall ICD projects in place. Aeration in the left upper lobe is improved with decreased airspace disease. No new focal opacity is present elsewhere. There is no effusion or pneumothorax. Electronically Signed: Jacob Saldana MD  8/22/2023 9:15  BLAINE EDT  Workstation ID: [id number]     Results for orders placed during the hospital encounter of 08/20/23    Adult Transthoracic Echo Complete W/ Cont if Necessary Per Protocol    Interpretation Summary    Left ventricular systolic function is mildly decreased. Calculated left ventricular EF = 47.9% Normal left ventricular cavity size and wall thickness noted. There is left ventricular global hypokinesis noted. Left ventricular diastolic function is consistent with (grade I) impaired relaxation.    : Normal right ventricular cavity size and systolic function noted. Electronic lead present in the ventricle.    Normal left atrial size and volume noted. Saline test results are negative.    There is moderate calcification of the aortic valve. The aortic valve was poorly visualized but appears trileaflet. Moderate aortic valve regurgitation is present. No aortic valve stenosis is present.    Mild mitral annular calcification is present. Mild mitral valve regurgitation is present. No significant mitral valve stenosis is present.    The tricuspid valve is grossly normal in structure. Mild tricuspid valve regurgitation is present. Estimated right ventricular systolic pressure from tricuspid regurgitation is normal, 33 mmHg. No tricuspid valve stenosis is present.    Mild dilation of the ascending aorta is present. Ascending aorta = 3.7 cm    There is a small (<1cm) pericardial effusion adjacent to the right atrium and right ventricle.    Current medications:  Scheduled Meds:amiodarone, 200 mg, Oral, Daily  apixaban, 2.5 mg, Oral, BID  atorvastatin, 40 mg, Oral, Nightly  busPIRone, 5 mg, Oral, Q12H  cholecalciferol, 1,000 Units, Oral, Daily  [START ON 8/28/2023] cyanocobalamin, 1,000 mcg, Intramuscular, Q28 Days  guaiFENesin-dextromethorphan, 10 mL, Oral, Q6H  levETIRAcetam XR, 1,000 mg, Oral, Nightly  levoFLOXacin, 500 mg, Oral, Q24H  lisinopril, 2.5 mg, Oral, Q24H  methylPREDNISolone sodium succinate, 40 mg, Intravenous,  Q24H  metoprolol tartrate, 25 mg, Oral, Q12H  pantoprazole, 40 mg, Oral, Q AM  senna, 2 tablet, Oral, BID  senna-docusate sodium, 2 tablet, Oral, BID  sodium chloride, 10 mL, Intravenous, Q12H  traZODone, 100 mg, Oral, Nightly  venlafaxine XR, 75 mg, Oral, Daily      Continuous Infusions:     PRN Meds:.  albuterol    senna-docusate sodium **AND** polyethylene glycol **AND** bisacodyl **AND** bisacodyl    Calcium Replacement - Follow Nurse / BPA Driven Protocol    fluticasone    Magnesium Low Dose Replacement - Follow Nurse / BPA Driven Protocol    melatonin    ondansetron    Phosphorus Replacement - Follow Nurse / BPA Driven Protocol    Potassium Replacement - Follow Nurse / BPA Driven Protocol    sodium chloride    sodium chloride    sodium chloride    Assessment & Plan   Assessment & Plan     Active Hospital Problems    Diagnosis  POA    **Pneumonia [J18.9]  Yes      Resolved Hospital Problems   No resolved problems to display.        Brief Hospital Course to date:  Bibiana Hodges is a 87 y.o. female with medical history of essential hypertension, autonomic dysfunction, atrial fibrillation on amiodarone and Eliquis, chronic debility and multiple hospitalizations manage in the last 2 months with recurrent pneumonia, most recently from 6/29/2023 till 7/10/2023 when she was treated for both community-acquired pneumonia and UTI and was discharged to SNF.  She presented to the hospital today with insomnia x4 nights.  Found to be hypoxic     Assessment and plan:  Acute hypoxia, improved  Recurrent community-acquired pneumonia  Aspiration pneumonia, POA, improved   Chest x-ray concerning for worsening chronic left upper lobe infiltrates which is concerning for developing pneumonia  Continue Levaquin and follow sputum cultures  Speech evaluated the patient recommended nectar thick liquid   Weaned off oxygen, currently oxygen saturation is 95% room air  She will need to have formal evaluation by pulmonary service and PFT  as outpatient.  Referral placed in epic  She would likely need to go back to her assisted facility with home O2  Follow immunoglobulin panel given her recurrent pneumonia  DuoNebs, incentive spirometry, Mucinex  Echocardiogram with bubble study performed --> negative intracardiac shunting, systolic function slightly depressed at 47%, grade 1 diastolic dysfunction   of note, D-dimer is mildly elevated at 1.0 which is appropriate for her age.  I do not think she has PE particularly she is taking Eliquis at home     A-fib  Continue amiodarone and Eliquis     RACH  Creatinine is slightly elevated at 1.4.  Her baseline is around 1  Gentle IV fluids and monitor kidney function     Essential hypertension  Autonomic dysfunction with recurrent presyncopal episodes  From last discharge, her antihypertensive medications were held  Continue to hold on hypertensive medications particularly with her borderline blood pressure     Systolic and diastolic dysfunction, compensated  Echocardiogram showed systolic function of 47% and grade 1 diastolic dysfunction  Patient appears compensated  Given her history of orthostasis, I will start her on low-dose metoprolol 25 mg twice daily and lisinopril 2.5 mg  Needs follow-up with cardiologist as outpatient    Dyslipidemia  Continue statins     Chronic debility  PT and OT consultation     Insomnia  Her main presenting complaint  Continue trazodone  Add melatonin at bedtime    Expected Discharge Location and Transportation: Back to assisted in facility  Expected Discharge   Expected Discharge Date: 8/23/2023; Expected Discharge Time:      DVT prophylaxis:  Medical DVT prophylaxis orders are present.     AM-PAC 6 Clicks Score (PT): 17 (08/21/23 3907)    CODE STATUS:   Code Status and Medical Interventions:   Ordered at: 08/20/23 3149     Level Of Support Discussed With:    Patient     Code Status (Patient has no pulse and is not breathing):    CPR (Attempt to Resuscitate)     Medical  Interventions (Patient has pulse or is breathing):    Full Support       Sania Vera MD  08/22/23  Copied text in this note has been reviewed and is accurate as of 08/22/23.

## 2023-08-22 NOTE — CASE MANAGEMENT/SOCIAL WORK
Continued Stay Note  [facility]     Patient Name: Bibiana Hodges  MRN: [id number]  Today's Date: 8/22/2023    Admit Date: 8/20/2023    Plan: [facility]   Discharge Plan       Row Name 08/22/23 0931       Plan    Plan [facility]    Patient/Family in Agreement with Plan yes    Plan Comments Spoke with Danyelle HUANG by phone. Danyelle is in agreement with Patient going [place] [facility] [place]. Called Carla at [facility] and made the referral. CM will continue to follow.    Final Discharge Disposition Code 03 - skilled nursing facility (SNF)                   Discharge Codes    No documentation.                 Expected Discharge Date and Time       Expected Discharge Date Expected Discharge Time    Aug 22, 2023               Jose Maradiaga RN

## 2023-08-22 NOTE — PLAN OF CARE
Goal Outcome Evaluation:  Plan of Care Reviewed With: patient        Progress: improving          SLP treatment completed. Will continue to address dysphagia. Please see note for further details and recommendations.

## 2023-08-22 NOTE — PLAN OF CARE
Goal Outcome Evaluation:  Plan of Care Reviewed With: patient        Progress: no change  Outcome Evaluation: Patient on room air while awake, placed on 2L while sleeping. VSS,  A Paced on tele. Patient without complaints this shift.

## 2023-08-22 NOTE — THERAPY TREATMENT NOTE
Acute Care - Speech Language Pathology   Swallow Progress Note [facility]     Patient Name: Bibiana Hodges  : 1935  MRN: [id number]  Today's Date: 2023               Admit Date: 2023    Visit Dx:     ICD-10-CM ICD-9-CM   1. Pneumonia of left lung due to infectious organism, unspecified part of lung  J18.9 486   2. Acute respiratory failure with hypoxia  J96.01 518.81   3. Simple chronic bronchitis  J41.0 491.0     Patient Active Problem List   Diagnosis    Dysautonomia orthostatic hypotension syndrome    Hypertension    Flu vaccine need    Streptococcus pneumoniae vaccination indicated    Pneumonia     Past Medical History:   Diagnosis Date    Constipation     Depression     Dysautonomia orthostatic hypotension syndrome     Generalized osteoarthritis     GERD (gastroesophageal reflux disease)     s/p Nissen    Hypertension     Insomnia     Osteoarthrosis, shoulder region     Skin ulcer of scalp     Thrombophlebitis of arm     Tremor     Vitamin B12 deficiency      Past Surgical History:   Procedure Laterality Date    DILATATION AND CURETTAGE      HERNIA REPAIR      HIP SURGERY      SHOULDER SURGERY      Right       SLP Recommendation and Plan  SLP Swallowing Diagnosis: moderate, pharyngeal dysphagia (23 1400)  SLP Diet Recommendation: regular textures, nectar thick liquids, water between meals after oral care, with supervision (23 1400)  Recommended Precautions and Strategies: general aspiration precautions (23 1400)  SLP Rec. for Method of Medication Administration: meds whole, with thick liquids, with puree (23 1400)     Monitor for Signs of Aspiration: notify SLP if any concerns (23 1400)     Swallow Criteria for Skilled Therapeutic Interventions Met: demonstrates skilled criteria (23 1400)  Anticipated Discharge Disposition (SLP): skilled nursing facility, anticipate therapy at next level of care (23 1400)  Rehab Potential/Prognosis, Swallowing:  re-evaluate goals as necessary (08/22/23 1400)  Therapy Frequency (Swallow): 5 days per week (08/22/23 1400)  Predicted Duration Therapy Intervention (Days): until discharge (08/22/23 1400)  Oral Care Recommendations: Oral Care BID/PRN, Toothbrush (08/22/23 1400)        Daily Summary of Progress (SLP): progress toward functional goals is good (08/22/23 1400)               Treatment Assessment (SLP): continued, improved (08/22/23 1400)     Plan for Continued Treatment (SLP): continue treatment per plan of care (08/22/23 1400)       Oral Care Recommendations: Oral Care BID/PRN, Toothbrush (08/22/23 1400)    Plan of Care Reviewed With: patient  Progress: improving      SWALLOW EVALUATION (last 72 hours)       SLP Adult Swallow Evaluation       Row Name 08/22/23 1400 08/21/23 7433                Rehab Evaluation    Document Type therapy note (daily note)  -AV evaluation  -SM       Subjective Information no complaints  -AV no complaints  -SM       Patient Observations alert;cooperative  -AV alert;cooperative  -SM       Patient/Family/Caregiver Comments/Observations none  -AV --       Patient Effort good  -AV --          General Information    Patient Profile Reviewed -- yes  -SM       Pertinent History Of Current Problem -- Recurrent PNAs. MD ?s microaspiration. SLP deferring to MBS, RN notified and discussed wit pt.  -       Current Method of Nutrition -- regular textures;thin liquids  -SM       Plans/Goals Discussed with -- patient;agreed upon  -SM          Pain    Additional Documentation Pain Scale: FACES Pre/Post-Treatment (Group)  -AV --          Pain Scale: Numbers Pre/Post-Treatment    Pretreatment Pain Rating -- 0/10 - no pain  -SM       Posttreatment Pain Rating -- 0/10 - no pain  -SM          Pain Scale: FACES Pre/Post-Treatment    Pain: FACES Scale, Pretreatment 0-->no hurt  -AV --       Posttreatment Pain Rating 0-->no hurt  -AV --          MBS/VFSS    Utensils Used -- spoon;cup;straw  -SM        Consistencies Trialed -- thin liquids;nectar/syrup-thick liquids;pureed;regular textures  -SM          MBS/VFSS Interpretation    Oral Prep Phase -- WFL  -SM       Oral Transit Phase -- WFL  -SM       Oral Residue -- WFL  -SM          Initiation of Pharyngeal Swallow    Initiation of Pharyngeal Swallow -- bolus in pyriform sinuses  -SM       Pharyngeal Phase -- impaired pharyngeal phase of swallowing  -SM       Penetration During the Swallow -- thin liquids;secondary to delayed swallow initiation or mistiming;secondary to reduced laryngeal elevation;secondary to reduced vestibular closure  -SM       Aspiration After the Swallow -- thin liquids;secondary to residue;in laryngeal vestibule;other (see comments)  -SM       Depth of Penetration -- other (see comments)  -SM       Response to Penetration -- No  -SM       Pharyngeal Residue -- all consistencies tested;valleculae;pyriform sinuses;secondary to reduced base of tongue retraction;secondary to reduced posterior pharyngeal wall stripping;secondary to reduced laryngeal elevation;secondary to reduced hyolaryngeal excursion  -SM       Attempted Compensatory Maneuvers -- other (see comments)  -SM       Response to Attempted Compensatory Maneuvers -- did not prevent penetration  -SM       Pharyngeal Phase, Comment -- Consistent penetration of thin liquids into the laryngeal vestibule of thin liquids during the swallow. Initially did not aspirate though could not clear any vesibular residue. Over time, gravity slowly brought residual material deeper and falling below VFs (aspiration). Could also not clear subglottic residue when cued. Compensatory strategies were attempted yet all unsuccessful at preventing penetration. Aspiration was silent x when attempted effortful swallow maneuver to prevent aspiration - this actually worsened swallow sequence and airway closure, increasing aspiration (and subsequent cough response though could not clear). GOC/POC discussion with pt  following study. Appears hx dysphagia and thickened liquids off and on, which pt reported related to past tracheal surgeries and intubation (details unclear as nothing noted in chart). Despite pt not wishing to return to thickened liquids, she also more so wishes to prevent recurrent PNAs. Willing to trial modified diet to see if improves medical status. Will continue to discus GOC/POC in tx in case pt opts to move to a comfort diet (thin liquids).  -          SLP Evaluation Clinical Impression    SLP Swallowing Diagnosis moderate;pharyngeal dysphagia  -AV moderate;pharyngeal dysphagia  -SM       Functional Impact risk of aspiration/pneumonia  -AV risk of aspiration/pneumonia  -SM       Rehab Potential/Prognosis, Swallowing re-evaluate goals as necessary  -AV re-evaluate goals as necessary  -       Swallow Criteria for Skilled Therapeutic Interventions Met demonstrates skilled criteria  -AV demonstrates skilled criteria  -          SLP Treatment Clinical Impressions    Treatment Assessment (SLP) continued;improved  -AV --       Daily Summary of Progress (SLP) progress toward functional goals is good  -AV --       Plan for Continued Treatment (SLP) continue treatment per plan of care  -AV --       Care Plan Review care plan/treatment goals reviewed  -AV --          Recommendations    Therapy Frequency (Swallow) 5 days per week  -AV 5 days per week  -       Predicted Duration Therapy Intervention (Days) until discharge  -AV until discharge  -       SLP Diet Recommendation regular textures;nectar thick liquids;water between meals after oral care, with supervision  -AV regular textures;nectar thick liquids;water between meals after oral care, with supervision  -       Recommended Precautions and Strategies general aspiration precautions  -AV general aspiration precautions  -SM       Oral Care Recommendations Oral Care BID/PRN;Toothbrush  -AV Oral Care BID/PRN;Toothbrush  -       SLP Rec. for Method of  Medication Administration meds whole;with thick liquids;with puree  -AV meds whole;with thick liquids;with puree  -SM       Monitor for Signs of Aspiration notify SLP if any concerns  -AV notify SLP if any concerns  -SM       Anticipated Discharge Disposition (SLP) skilled nursing facility;anticipate therapy at next level of care  -AV home with OP services  -SM                 User Key  (r) = Recorded By, (t) = Taken By, (c) = Cosigned By      Initials Name Effective Dates    Skyla Gomez, MS CCC-SLP 02/03/23 -     AV Shanelle Gonsales MS CCC-SLP 06/16/21 -                     EDUCATION  The patient has been educated in the following areas:   Dysphagia (Swallowing Impairment) Oral Care/Hydration.        SLP GOALS       Row Name 08/22/23 1400 08/21/23 1145          (LTG) Patient will demonstrate progress toward functional swallow for    Diet Texture (Demonstrate progress toward functional swallow) regular textures  -AV regular textures  -SM     Liquid viscosity (Demonstrate progress toward functional swallow) thin liquids  -AV thin liquids  -SM     Parmer (Demonstrate progress towards functional swallow) independently (over 90% accuracy)  -AV independently (over 90% accuracy)  -SM     Time Frame (Demonstrate progress toward functional swallow) by discharge  -AV by discharge  -SM     Progress/Outcomes (Demonstrate progress toward functional swallow) continuing progress toward goal  -AV --        (STG) Patient will tolerate trials of    Consistencies Trialed (Tolerate trials) regular textures;nectar/ mildly thick liquids;thin liquids  -AV regular textures;nectar/ mildly thick liquids;thin liquids  -SM     Desired Outcome (Tolerate trials) with use of compensatory strategies (see comments)  -AV with use of compensatory strategies (see comments)  oral care before thin H2O between meals.  -SM     Parmer (Tolerate trials) independently (over 90% accuracy)  -AV independently (over 90%  accuracy)  -SM     Time Frame (Tolerate trials) by discharge  -AV by discharge  -SM     Progress/Outcomes (Tolerate trials) continuing progress toward goal  -AV --        (STG) Lingual Strengthening Goal 1 (SLP)    Activity (Lingual Strengthening Goal 1, SLP) increase tongue back strength  -AV increase tongue back strength  -SM     Increase Tongue Back Strength lingual resistance exercises  -AV lingual resistance exercises  -SM     Seymour/Accuracy (Lingual Strengthening Goal 1, SLP) independently (over 90% accuracy)  -AV independently (over 90% accuracy)  -SM     Time Frame (Lingual Strengthening Goal 1, SLP) short term goal (STG)  -AV short term goal (STG)  -SM     Progress/Outcomes (Lingual Strengthening Goal 1, SLP) continuing progress toward goal  -AV --        (STG) Pharyngeal Strengthening Exercise Goal 1 (SLP)    Activity (Pharyngeal Strengthening Goal 1, SLP) increase timing;increase superior movement of the hyolaryngeal complex;increase anterior movement of the hyolaryngeal complex;increase closure at entrance to airway/closure of airway at glottis;increase squeeze/positive pressure generation  -AV increase timing;increase superior movement of the hyolaryngeal complex;increase anterior movement of the hyolaryngeal complex;increase closure at entrance to airway/closure of airway at glottis;increase squeeze/positive pressure generation  -SM     Increase Timing prepping - 3 second prep or suck swallow or 3-step swallow  -AV prepping - 3 second prep or suck swallow or 3-step swallow  -SM     Increase Superior Movement of the Hyolaryngeal Complex Mendelsohn  -AV Mendelsohn  -SM     Increase Anterior Movement of the Hyolaryngeal Complex EMST;chin tuck against resistance (CTAR)  -AV EMST;chin tuck against resistance (CTAR)  -SM     Increase Closure at Entrance to Airway/Closure of Airway at Glottis super-supraglottic swallow  -AV super-supraglottic swallow  -SM     Increase Squeeze/Positive Pressure  Generation hard effortful swallow  -AV hard effortful swallow  -SM     Llano/Accuracy (Pharyngeal Strengthening Goal 1, SLP) independently (over 90% accuracy)  -AV independently (over 90% accuracy)  -SM     Time Frame (Pharyngeal Strengthening Goal 1, SLP) short term goal (STG)  -AV short term goal (STG)  -SM     Progress/Outcomes (Pharyngeal Strengthening Goal 1, SLP) continuing progress toward goal  -AV --               User Key  (r) = Recorded By, (t) = Taken By, (c) = Cosigned By      Initials Name Provider Type    Skyla Gomez MS CCC-SLP Speech and Language Pathologist    Shanelle Solorzano MS CCC-SLP Speech and Language Pathologist                       Time Calculation:    Time Calculation- SLP       Row Name 08/22/23 1442             Time Calculation- SLP    SLP Start Time 1400  -AV      SLP Received On 08/22/23  -AV         Untimed Charges    85916-ND Treatment Swallow Minutes 38  -AV         Total Minutes    Untimed Charges Total Minutes 38  -AV       Total Minutes 38  -AV                User Key  (r) = Recorded By, (t) = Taken By, (c) = Cosigned By      Initials Name Provider Type    Shanelle Solorzano, MS CCC-SLP Speech and Language Pathologist                    Therapy Charges for Today       Code Description Service Date Service Provider Modifiers Qty    [id number] HC ST TREATMENT SWALLOW 3 8/22/2023 Shanelle Gonsales MS CCC-SLP GN 1                 Shanelle Maier MS CCC-KATIE  8/22/2023

## 2023-08-23 ENCOUNTER — APPOINTMENT (OUTPATIENT)
Dept: CT IMAGING | Facility: HOSPITAL | Age: 88
DRG: 177 | End: 2023-08-23
Payer: MEDICARE

## 2023-08-23 LAB
ALBUMIN SERPL-MCNC: 3.3 G/DL (ref 3.5–5.2)
ALBUMIN/GLOB SERPL: 1.6 G/DL
ALP SERPL-CCNC: 40 U/L (ref 39–117)
ALT SERPL W P-5'-P-CCNC: 87 U/L (ref 1–33)
ANION GAP SERPL CALCULATED.3IONS-SCNC: 8 MMOL/L (ref 5–15)
AST SERPL-CCNC: 48 U/L (ref 1–32)
BACTERIA SPEC AEROBE CULT: ABNORMAL
BACTERIA SPEC AEROBE CULT: ABNORMAL
BASOPHILS # BLD AUTO: 0 10*3/MM3 (ref 0–0.2)
BASOPHILS NFR BLD AUTO: 0 % (ref 0–1.5)
BILIRUB SERPL-MCNC: <0.2 MG/DL (ref 0–1.2)
BUN SERPL-MCNC: 27 MG/DL (ref 8–23)
BUN/CREAT SERPL: 20.3 (ref 7–25)
CALCIUM SPEC-SCNC: 8.2 MG/DL (ref 8.6–10.5)
CHLORIDE SERPL-SCNC: 100 MMOL/L (ref 98–107)
CO2 SERPL-SCNC: 24 MMOL/L (ref 22–29)
CREAT SERPL-MCNC: 1.33 MG/DL (ref 0.57–1)
CRP SERPL-MCNC: 3.11 MG/DL (ref 0–0.5)
DEPRECATED RDW RBC AUTO: 53.7 FL (ref 37–54)
EGFRCR SERPLBLD CKD-EPI 2021: 38.8 ML/MIN/1.73
EOSINOPHIL # BLD AUTO: 0 10*3/MM3 (ref 0–0.4)
EOSINOPHIL NFR BLD AUTO: 0 % (ref 0.3–6.2)
ERYTHROCYTE [DISTWIDTH] IN BLOOD BY AUTOMATED COUNT: 17 % (ref 12.3–15.4)
GLOBULIN UR ELPH-MCNC: 2.1 GM/DL
GLUCOSE SERPL-MCNC: 97 MG/DL (ref 65–99)
HCT VFR BLD AUTO: 25.5 % (ref 34–46.6)
HGB BLD-MCNC: 8 G/DL (ref 12–15.9)
IMM GRANULOCYTES # BLD AUTO: 0.08 10*3/MM3 (ref 0–0.05)
IMM GRANULOCYTES NFR BLD AUTO: 0.7 % (ref 0–0.5)
LYMPHOCYTES # BLD AUTO: 0.74 10*3/MM3 (ref 0.7–3.1)
LYMPHOCYTES NFR BLD AUTO: 6.1 % (ref 19.6–45.3)
MAGNESIUM SERPL-MCNC: 2.3 MG/DL (ref 1.6–2.4)
MCH RBC QN AUTO: 27.3 PG (ref 26.6–33)
MCHC RBC AUTO-ENTMCNC: 31.4 G/DL (ref 31.5–35.7)
MCV RBC AUTO: 87 FL (ref 79–97)
MONOCYTES # BLD AUTO: 0.25 10*3/MM3 (ref 0.1–0.9)
MONOCYTES NFR BLD AUTO: 2.1 % (ref 5–12)
NEUTROPHILS NFR BLD AUTO: 11.12 10*3/MM3 (ref 1.7–7)
NEUTROPHILS NFR BLD AUTO: 91.1 % (ref 42.7–76)
NRBC BLD AUTO-RTO: 0 /100 WBC (ref 0–0.2)
PHOSPHATE SERPL-MCNC: 3.1 MG/DL (ref 2.5–4.5)
PLATELET # BLD AUTO: 259 10*3/MM3 (ref 140–450)
PMV BLD AUTO: 10 FL (ref 6–12)
POTASSIUM SERPL-SCNC: 4.5 MMOL/L (ref 3.5–5.2)
PROT SERPL-MCNC: 5.4 G/DL (ref 6–8.5)
RBC # BLD AUTO: 2.93 10*6/MM3 (ref 3.77–5.28)
SODIUM SERPL-SCNC: 132 MMOL/L (ref 136–145)
WBC NRBC COR # BLD: 12.19 10*3/MM3 (ref 3.4–10.8)

## 2023-08-23 PROCEDURE — 83735 ASSAY OF MAGNESIUM: CPT | Performed by: INTERNAL MEDICINE

## 2023-08-23 PROCEDURE — 85025 COMPLETE CBC W/AUTO DIFF WBC: CPT | Performed by: INTERNAL MEDICINE

## 2023-08-23 PROCEDURE — 74176 CT ABD & PELVIS W/O CONTRAST: CPT

## 2023-08-23 PROCEDURE — 84100 ASSAY OF PHOSPHORUS: CPT | Performed by: INTERNAL MEDICINE

## 2023-08-23 PROCEDURE — 25010000002 ONDANSETRON PER 1 MG: Performed by: INTERNAL MEDICINE

## 2023-08-23 PROCEDURE — 86140 C-REACTIVE PROTEIN: CPT | Performed by: INTERNAL MEDICINE

## 2023-08-23 PROCEDURE — 25010000002 PROCHLORPERAZINE 10 MG/2ML SOLUTION: Performed by: INTERNAL MEDICINE

## 2023-08-23 PROCEDURE — 80053 COMPREHEN METABOLIC PANEL: CPT | Performed by: INTERNAL MEDICINE

## 2023-08-23 PROCEDURE — 25010000002 DEXAMETHASONE SODIUM PHOSPHATE 100 MG/10ML SOLUTION: Performed by: INTERNAL MEDICINE

## 2023-08-23 PROCEDURE — 92526 ORAL FUNCTION THERAPY: CPT

## 2023-08-23 RX ORDER — PROCHLORPERAZINE EDISYLATE 5 MG/ML
5 INJECTION INTRAMUSCULAR; INTRAVENOUS ONCE
Status: COMPLETED | OUTPATIENT
Start: 2023-08-23 | End: 2023-08-23

## 2023-08-23 RX ORDER — LACTULOSE 10 G/15ML
30 SOLUTION ORAL ONCE
Status: COMPLETED | OUTPATIENT
Start: 2023-08-23 | End: 2023-08-23

## 2023-08-23 RX ORDER — BISACODYL 5 MG/1
20 TABLET, DELAYED RELEASE ORAL ONCE
Status: COMPLETED | OUTPATIENT
Start: 2023-08-23 | End: 2023-08-23

## 2023-08-23 RX ADMIN — METOPROLOL TARTRATE 25 MG: 25 TABLET, FILM COATED ORAL at 08:36

## 2023-08-23 RX ADMIN — SENNOSIDES AND DOCUSATE SODIUM 2 TABLET: 50; 8.6 TABLET ORAL at 21:10

## 2023-08-23 RX ADMIN — LISINOPRIL 2.5 MG: 2.5 TABLET ORAL at 08:36

## 2023-08-23 RX ADMIN — SENNOSIDES AND DOCUSATE SODIUM 2 TABLET: 50; 8.6 TABLET ORAL at 08:36

## 2023-08-23 RX ADMIN — SENNOSIDES 2 TABLET: 8.6 TABLET, FILM COATED ORAL at 08:36

## 2023-08-23 RX ADMIN — SENNOSIDES 2 TABLET: 8.6 TABLET, FILM COATED ORAL at 21:11

## 2023-08-23 RX ADMIN — BISACODYL 20 MG: 5 TABLET, COATED ORAL at 11:18

## 2023-08-23 RX ADMIN — GUAIFENESIN AND DEXTROMETHORPHAN 10 ML: 100; 10 SYRUP ORAL at 00:10

## 2023-08-23 RX ADMIN — GUAIFENESIN AND DEXTROMETHORPHAN 10 ML: 100; 10 SYRUP ORAL at 23:48

## 2023-08-23 RX ADMIN — TRAZODONE HYDROCHLORIDE 100 MG: 100 TABLET ORAL at 00:10

## 2023-08-23 RX ADMIN — PROCHLORPERAZINE EDISYLATE 5 MG: 5 INJECTION INTRAMUSCULAR; INTRAVENOUS at 15:32

## 2023-08-23 RX ADMIN — TRAZODONE HYDROCHLORIDE 100 MG: 100 TABLET ORAL at 21:11

## 2023-08-23 RX ADMIN — BUSPIRONE HYDROCHLORIDE 5 MG: 5 TABLET ORAL at 08:45

## 2023-08-23 RX ADMIN — BUSPIRONE HYDROCHLORIDE 5 MG: 5 TABLET ORAL at 21:11

## 2023-08-23 RX ADMIN — APIXABAN 2.5 MG: 2.5 TABLET, FILM COATED ORAL at 21:11

## 2023-08-23 RX ADMIN — LEVETIRACETAM 1000 MG: 500 TABLET, EXTENDED RELEASE ORAL at 21:10

## 2023-08-23 RX ADMIN — LEVOFLOXACIN 500 MG: 500 TABLET, FILM COATED ORAL at 17:42

## 2023-08-23 RX ADMIN — AMIODARONE HYDROCHLORIDE 200 MG: 200 TABLET ORAL at 08:36

## 2023-08-23 RX ADMIN — ATORVASTATIN CALCIUM 40 MG: 40 TABLET, FILM COATED ORAL at 21:11

## 2023-08-23 RX ADMIN — DEXAMETHASONE SODIUM PHOSPHATE 10 MG: 10 INJECTION, SOLUTION INTRAMUSCULAR; INTRAVENOUS at 16:37

## 2023-08-23 RX ADMIN — APIXABAN 2.5 MG: 2.5 TABLET, FILM COATED ORAL at 08:36

## 2023-08-23 RX ADMIN — PANTOPRAZOLE SODIUM 40 MG: 40 TABLET, DELAYED RELEASE ORAL at 05:23

## 2023-08-23 RX ADMIN — VENLAFAXINE HYDROCHLORIDE 75 MG: 75 CAPSULE, EXTENDED RELEASE ORAL at 08:36

## 2023-08-23 RX ADMIN — Medication 10 ML: at 08:36

## 2023-08-23 RX ADMIN — LACTULOSE 30 G: 20 SOLUTION ORAL at 11:18

## 2023-08-23 RX ADMIN — Medication 1000 UNITS: at 08:36

## 2023-08-23 RX ADMIN — ONDANSETRON 4 MG: 2 INJECTION INTRAMUSCULAR; INTRAVENOUS at 12:01

## 2023-08-23 NOTE — THERAPY TREATMENT NOTE
Acute Care - Speech Language Pathology   Swallow Treatment Note [facility]     Patient Name: Bibiana Hodges  : 1935  MRN: [id number]  Today's Date: 2023               Admit Date: 2023    Visit Dx:     ICD-10-CM ICD-9-CM   1. Pneumonia of left lung due to infectious organism, unspecified part of lung  J18.9 486   2. Acute respiratory failure with hypoxia  J96.01 518.81   3. Simple chronic bronchitis  J41.0 491.0   4. Pharyngeal dysphagia  R13.13 787.23     Patient Active Problem List   Diagnosis    Dysautonomia orthostatic hypotension syndrome    Hypertension    Flu vaccine need    Streptococcus pneumoniae vaccination indicated    Pneumonia     Past Medical History:   Diagnosis Date    Constipation     Depression     Dysautonomia orthostatic hypotension syndrome     Generalized osteoarthritis     GERD (gastroesophageal reflux disease)     s/p Nissen    Hypertension     Insomnia     Osteoarthrosis, shoulder region     Skin ulcer of scalp     Thrombophlebitis of arm     Tremor     Vitamin B12 deficiency      Past Surgical History:   Procedure Laterality Date    DILATATION AND CURETTAGE      HERNIA REPAIR      HIP SURGERY      SHOULDER SURGERY      Right       SLP Recommendation and Plan     SLP Diet Recommendation: regular textures, nectar thick liquids, water between meals after oral care, with supervision, other (see comments) (extra sauce/gray for meat; no ice in thickened drinks) (23 1100)     SLP Rec. for Method of Medication Administration: meds whole, with puree, as tolerated (23 1100)      Anticipated Discharge Disposition (SLP): skilled nursing facility (23 1100)         Demonstrates Need for Referral to Another Service: clinical nutrition services/dietitian (23 1100)     Daily Summary of Progress (SLP): progress toward functional goals as expected (23 1100)           Treatment Assessment (SLP): continued, pharyngeal dysphagia, suspected (23  1100)  Treatment Assessment Comments (SLP): Limited assessment 2' pt nausea. (08/23/23 1100)               Plan of Care Reviewed With: patient  Progress: no change      SWALLOW EVALUATION (last 72 hours)       SLP Adult Swallow Evaluation       Row Name 08/23/23 1100 08/22/23 1400 08/21/23 1145             Rehab Evaluation    Document Type therapy note (daily note)  -AC therapy note (daily note)  -AV evaluation  -      Subjective Information complains of;nausea/vomiting  notified RN  -AC no complaints  -AV no complaints  -SM      Patient Observations alert;cooperative  -AC alert;cooperative  -AV alert;cooperative  -SM      Patient/Family/Caregiver Comments/Observations No family present.  -AC none  -AV --      Patient Effort -- good  -AV --         General Information    Patient Profile Reviewed -- -- yes  -SM      Pertinent History Of Current Problem -- -- Recurrent PNAs. MD ?s microaspiration. SLP deferring to MBS, RN notified and discussed wit pt.  -SM      Current Method of Nutrition -- -- regular textures;thin liquids  -      Plans/Goals Discussed with -- -- patient;agreed upon  -         Pain    Additional Documentation -- Pain Scale: FACES Pre/Post-Treatment (Group)  -AV --         Pain Scale: Numbers Pre/Post-Treatment    Pretreatment Pain Rating -- -- 0/10 - no pain  -SM      Posttreatment Pain Rating -- -- 0/10 - no pain  -SM         Pain Scale: FACES Pre/Post-Treatment    Pain: FACES Scale, Pretreatment 0-->no hurt  -AC 0-->no hurt  -AV --      Posttreatment Pain Rating 0-->no hurt  -AC 0-->no hurt  -AV --         MBS/VFSS    Utensils Used -- -- spoon;cup;straw  -SM      Consistencies Trialed -- -- thin liquids;nectar/syrup-thick liquids;pureed;regular textures  -SM         MBS/VFSS Interpretation    Oral Prep Phase -- -- WFL  -SM      Oral Transit Phase -- -- WFL  -SM      Oral Residue -- -- WFL  -SM         Initiation of Pharyngeal Swallow    Initiation of Pharyngeal Swallow -- -- bolus in  pyriform sinuses  -SM      Pharyngeal Phase -- -- impaired pharyngeal phase of swallowing  -SM      Penetration During the Swallow -- -- thin liquids;secondary to delayed swallow initiation or mistiming;secondary to reduced laryngeal elevation;secondary to reduced vestibular closure  -SM      Aspiration After the Swallow -- -- thin liquids;secondary to residue;in laryngeal vestibule;other (see comments)  -SM      Depth of Penetration -- -- other (see comments)  -SM      Response to Penetration -- -- No  -SM      Pharyngeal Residue -- -- all consistencies tested;valleculae;pyriform sinuses;secondary to reduced base of tongue retraction;secondary to reduced posterior pharyngeal wall stripping;secondary to reduced laryngeal elevation;secondary to reduced hyolaryngeal excursion  -SM      Attempted Compensatory Maneuvers -- -- other (see comments)  -SM      Response to Attempted Compensatory Maneuvers -- -- did not prevent penetration  -SM      Pharyngeal Phase, Comment -- -- Consistent penetration of thin liquids into the laryngeal vestibule of thin liquids during the swallow. Initially did not aspirate though could not clear any vesibular residue. Over time, gravity slowly brought residual material deeper and falling below VFs (aspiration). Could also not clear subglottic residue when cued. Compensatory strategies were attempted yet all unsuccessful at preventing penetration. Aspiration was silent x when attempted effortful swallow maneuver to prevent aspiration - this actually worsened swallow sequence and airway closure, increasing aspiration (and subsequent cough response though could not clear). GOC/POC discussion with pt following study. Appears hx dysphagia and thickened liquids off and on, which pt reported related to past tracheal surgeries and intubation (details unclear as nothing noted in chart). Despite pt not wishing to return to thickened liquids, she also more so wishes to prevent recurrent PNAs. Willing  to trial modified diet to see if improves medical status. Will continue to discus GOC/POC in tx in case pt opts to move to a comfort diet (thin liquids).  -         SLP Evaluation Clinical Impression    SLP Swallowing Diagnosis -- moderate;pharyngeal dysphagia  -AV moderate;pharyngeal dysphagia  -      Functional Impact -- risk of aspiration/pneumonia  -AV risk of aspiration/pneumonia  -      Rehab Potential/Prognosis, Swallowing -- re-evaluate goals as necessary  -AV re-evaluate goals as necessary  -      Swallow Criteria for Skilled Therapeutic Interventions Met -- demonstrates skilled criteria  -AV demonstrates skilled criteria  -         SLP Treatment Clinical Impressions    Treatment Assessment (SLP) continued;pharyngeal dysphagia;suspected  -AC continued;improved  -AV --      Treatment Assessment Comments (SLP) Limited assessment 2' pt nausea.  -AC -- --      Daily Summary of Progress (SLP) progress toward functional goals as expected  -AC progress toward functional goals is good  -AV --      Plan for Continued Treatment (SLP) -- continue treatment per plan of care  -AV --      Care Plan Review -- care plan/treatment goals reviewed  -AV --         Recommendations    Therapy Frequency (Swallow) -- 5 days per week  -AV 5 days per week  -      Predicted Duration Therapy Intervention (Days) -- until discharge  -AV until discharge  -      SLP Diet Recommendation regular textures;nectar thick liquids;water between meals after oral care, with supervision;other (see comments)  extra sauce/gray for meat; no ice in thickened drinks  -AC regular textures;nectar thick liquids;water between meals after oral care, with supervision  -AV regular textures;nectar thick liquids;water between meals after oral care, with supervision  -      Recommended Precautions and Strategies -- general aspiration precautions  -AV general aspiration precautions  -      Oral Care Recommendations -- Oral Care BID/PRN;Toothbrush   -AV Oral Care BID/PRN;Toothbrush  -SM      SLP Rec. for Method of Medication Administration meds whole;with puree;as tolerated  -AC meds whole;with thick liquids;with puree  -AV meds whole;with thick liquids;with puree  -SM      Monitor for Signs of Aspiration -- notify SLP if any concerns  -AV notify SLP if any concerns  -SM      Anticipated Discharge Disposition (SLP) skilled nursing facility  -AC skilled nursing facility;anticipate therapy at next level of care  -AV home with OP services  -SM      Demonstrates Need for Referral to Another Service clinical nutrition services/dietitian  - -- --      Equipment Issued to Patient EMST  150  - -- --                User Key  (r) = Recorded By, (t) = Taken By, (c) = Cosigned By      Initials Name Effective Dates     Skyla Austin, MS CCC-SLP 02/03/23 -     AC Desirae Murray, MS CCC-SLP 02/03/23 -     AV Shanelle Gonsales MS CCC-SLP 06/16/21 -                     EDUCATION  The patient has been educated in the following areas:   Dysphagia (Swallowing Impairment) Modified Diet Instruction.        SLP GOALS       Row Name 08/23/23 1100 08/22/23 1400 08/21/23 1145       (LTG) Patient will demonstrate progress toward functional swallow for    Diet Texture (Demonstrate progress toward functional swallow) regular textures  -AC regular textures  -AV regular textures  -SM    Liquid viscosity (Demonstrate progress toward functional swallow) thin liquids  -AC thin liquids  -AV thin liquids  -SM    [place] (Demonstrate progress towards functional swallow) independently (over 90% accuracy)  -AC independently (over 90% accuracy)  -AV independently (over 90% accuracy)  -SM    Time Frame (Demonstrate progress toward functional swallow) by discharge  -AC by discharge  -AV by discharge  -SM    Progress/Outcomes (Demonstrate progress toward functional swallow) continuing progress toward goal  -AC continuing progress toward goal  -AV --    Comment (Demonstrate  progress toward functional swallow) Noted ice in nectar-thick drink in room. Provided education re: thickened liquids/restrictions. Pt reported feeling that food on trays have been dry and, therefore, difficult for her to swallow. Requested extra sauce/gravy for trays.  -AC -- --       (STG) Patient will tolerate trials of    Consistencies Trialed (Tolerate trials) regular textures;nectar/ mildly thick liquids;thin liquids  -AC regular textures;nectar/ mildly thick liquids;thin liquids  -AV regular textures;nectar/ mildly thick liquids;thin liquids  -SM    Desired Outcome (Tolerate trials) with use of compensatory strategies (see comments)  oral care before thin H2O between meals  -AC with use of compensatory strategies (see comments)  -AV with use of compensatory strategies (see comments)  oral care before thin H2O between meals.  -SM    [place] (Tolerate trials) independently (over 90% accuracy)  -AC independently (over 90% accuracy)  -AV independently (over 90% accuracy)  -SM    Time Frame (Tolerate trials) by discharge  -AC by discharge  -AV by discharge  -SM    Progress/Outcomes (Tolerate trials) continuing progress toward goal  -AC continuing progress toward goal  -AV --    Comment (Tolerate trials) Able to teach back compensations. Trialed nectar via straw. No overt clinical s/sxs aspiration. Declined all further PO trials 2' nausea.  -AC -- --       (STG) Lingual Strengthening Goal 1 (SLP)    Activity (Lingual Strengthening Goal 1, SLP) -- increase tongue back strength  -AV increase tongue back strength  -SM    Increase Tongue Back Strength lingual resistance exercises  -AC lingual resistance exercises  -AV lingual resistance exercises  -SM    [place]/Accuracy (Lingual Strengthening Goal 1, SLP) independently (over 90% accuracy)  -AC independently (over 90% accuracy)  -AV independently (over 90% accuracy)  -SM    Time Frame (Lingual Strengthening Goal 1, SLP) short term goal (STG)  -AC short term  goal (STG)  -AV short term goal (STG)  -SM    Progress/Outcomes (Lingual Strengthening Goal 1, SLP) goal ongoing  -AC continuing progress toward goal  -AV --    Comment (Lingual Strengthening Goal 1, SLP) Deferred 2' nausea  -AC -- --       (STG) Pharyngeal Strengthening Exercise Goal 1 (SLP)    Activity (Pharyngeal Strengthening Goal 1, SLP) -- increase timing;increase superior movement of the hyolaryngeal complex;increase anterior movement of the hyolaryngeal complex;increase closure at entrance to airway/closure of airway at glottis;increase squeeze/positive pressure generation  -AV increase timing;increase superior movement of the hyolaryngeal complex;increase anterior movement of the hyolaryngeal complex;increase closure at entrance to airway/closure of airway at glottis;increase squeeze/positive pressure generation  -SM    Increase Timing prepping - 3 second prep or suck swallow or 3-step swallow  -AC prepping - 3 second prep or suck swallow or 3-step swallow  -AV prepping - 3 second prep or suck swallow or 3-step swallow  -SM    Increase Superior Movement of the Hyolaryngeal Complex Mendelsohn  -AC Mendelsohn  -AV [place]el[place]hn  -SM    Increase Anterior Movement of the Hyolaryngeal Complex EMST;chin tuck against resistance (CTAR)  -AC EMST;chin tuck against resistance (CTAR)  -AV EMST;chin tuck against resistance (CTAR)  -SM    Increase Closure at Entrance to Airway/Closure of Airway at Glottis super-supraglottic swallow  -AC super-supraglottic swallow  -AV super-supraglottic swallow  -SM    Increase Squeeze/Positive Pressure Generation hard effortful swallow  -AC hard effortful swallow  -AV hard effortful swallow  -SM    [place]/Accuracy (Pharyngeal Strengthening Goal 1, SLP) independently (over 90% accuracy)  -AC independently (over 90% accuracy)  -AV independently (over 90% accuracy)  -SM    Time Frame (Pharyngeal Strengthening Goal 1, SLP) short term goal (STG)  -AC short term goal (STG)  -AV short term  goal (STG)  -    Progress/Outcomes (Pharyngeal Strengthening Goal 1, SLP) goal ongoing  -AC continuing progress toward goal  -AV --    Comment (Pharyngeal Strengthening Goal 1, SLP) Deferred 2' nausea. Provided EMST. Address next session.  -AC -- --              User Key  (r) = Recorded By, (t) = Taken By, (c) = Cosigned By      Initials Name Provider Type     Skyla Austin, MS CCC-SLP Speech and Language Pathologist    Desirae Cancino MS CCC-SLP Speech and Language Pathologist    Shanelle Solorzano MS CCC-SLP Speech and Language Pathologist                       Time Calculation:    Time Calculation- SLP       Row Name 08/23/23 1357             Time Calculation- SLP    SLP Start Time 1100  -AC      SLP Received On 08/23/23  -         Untimed Charges    46128-ZA Treatment Swallow Minutes 39  -AC         Total Minutes    Untimed Charges Total Minutes 39  -AC       Total Minutes 39  -AC                User Key  (r) = Recorded By, (t) = Taken By, (c) = Cosigned By      Initials Name Provider Type    AC Desirae Murray MS CCC-SLP Speech and Language Pathologist                    Therapy Charges for Today       Code Description Service Date Service Provider Modifiers Qty    [id number] HC ST TREATMENT SWALLOW 3 8/23/2023 Desirae Murray MS CCC-SLP GN 1                 Desirae Murray MS CCC-SLP  8/23/2023

## 2023-08-23 NOTE — DISCHARGE PLACEMENT REQUEST
"Bibiana Hodges (87 y.o. Female)       Date of Birth   1935    Social Security Number       Address   [address] [place] [address]    Home Phone   [phone]    MRN   [id number]       P[employer]ism   Mandaen    Marital Status                               Admission Date   23    Admission Type   Emergency    Admitting Provider   Sania Vera MD    Attending Provider   Sania Vera MD    Department, Room/Bed   [facility] 6B, N635/1       Discharge Date       Discharge Disposition       Discharge Destination                                 Attending Provider: Sania Vera MD    Allergies: Gabapentin, Sulfa Antibiotics    Isolation: None   Infection: None   Code Status: CPR    Ht: 170.2 cm (67.01\")   Wt: 59 kg (130 lb 1.1 oz)    Admission Cmt: None   Principal Problem: Pneumonia [J18.9]                   Active Insurance as of 2023       Primary Coverage       Payor Plan Insurance Group Employer/Plan Group    HUMANA MEDICARE REPLACEMENT HUMANA MEDICARE REPLACEMENT [id number]       Payor Plan Address Payor Plan Phone Number Payor Plan Fax Number Effective Dates    [address] [address] [phone]  2023 - None Entered    [facility] [id number]         Subscriber Name Subscriber Birth Date Member ID       BIBIANA HODGES 1935 [id number]                     Emergency Contacts        (Rel.) Home Phone Work Phone Mobile Phone    Danyelle Keyes (POA) (Daughter) [phone] -- [phone]    ELODIA HODGES (Son) -- -- [phone]    HODGESNIA SCHNEIDER (Son) [phone] -- [phone]                 History & Physical        Sania Vera MD at 23 [facility]              [facility] Medicine Services  HISTORY AND PHYSICAL    Patient Name: Bibiana Hodges  : 1935  MRN: [id number]  Primary Care Physician: Katarina Oviedo MD  Date of admission: 2023      Subjective   Subjective     Chief " Complaint:  I could not sleep    HPI:  Bibiana Hodges is a 87 y.o. female with medical history of essential hypertension, autonomic dysfunction, atrial fibrillation on amiodarone and Eliquis, chronic debility and multiple hospitalizations manage in the last 2 months with recurrent pneumonia, most recently from 6/29/2023 till 7/10/2023 when she was treated for both community-acquired pneumonia and UTI and was discharged to SNF.  Patient reported to me that she did not get much better since last discharge and has been having intermittent shortness of breath.  She is telling me that over the past 4 nights, she could not sleep because nursing staff were not giving her the sleeping medications and she could not take it anymore without sleeping so she told them to call EMT.  She does report some shortness of breath with minimal activity.  Denies any fever or chills.  Having intermittent cough that is dry.  Denies any chest pain.  She is not using oxygen at her assisted in facility    In ER, she was found to be hypoxic satting 83% on room air, quickly improved to 99% on 2 L nasal cannula.  Blood work-up was remarkable for sodium of 132, creatinine 1.4 CRP of 0.59, D-dimer of 1.0 and white cell count of 11.3.  Chest x-ray with worsening left upper lobe infiltrates.  She received IV Zosyn and Doxy in ER and will be admitted to hospital service for further evaluation      Review of Systems   General : no fevers, chills  CVS: No chest pain, palpitations  Respiratory:++ Dry cough,++ dyspnea  GI: No N/V/D, abd pain  10 point review of systems is negative except for what is mentioned in HPI    Personal History     Past Medical History:   Diagnosis Date    Constipation     Depression     Dysautonomia orthostatic hypotension syndrome     Generalized osteoarthritis     GERD (gastroesophageal reflux disease)     s/p Nissen    Hypertension     Insomnia     Osteoarthrosis, shoulder region     Skin ulcer of scalp     Thrombophlebitis  of arm     Tremor     Vitamin B12 deficiency              Past Surgical History:   Procedure Laterality Date    DILATATION AND CURETTAGE      HERNIA REPAIR      HIP SURGERY      SHOULDER SURGERY      Right       Family History: family history includes Anorexia nervosa in her daughter; Cancer in her mother; Stroke in her brother.     Social History:  reports that she has never smoked. She has never used smokeless tobacco. She reports that she does not drink alcohol and does not use drugs.  Social History     Social History Narrative    Not on file       Medications:  Available home medication information reviewed.  (Not in a hospital admission)      Allergies   Allergen Reactions    Gabapentin     Sulfa Antibiotics        Objective   Objective     Vital Signs:   Temp:  [98.1 øF (36.7 øC)] 98.1 øF (36.7 øC)  Heart Rate:  [59-75] 60  Resp:  [18] 18  BP: ()/() 138/117       Physical Exam   General: Chronically ill looking and debilitated  Head: Atraumatic and normocephalic  Eyes: No Icterus. No pallor  Ears:  Ears appear intact with no abnormalities noted  Throat: No oral lesions, no thrush  Neck: Supple, trachea midline  Lungs: Left upper lobe coarse crackles.  Diminished air entry both lung fields.  Scattered wheezing.    Heart:  Normal S1 and S2, no murmur, no gallop, No JVD, no lower extremity swelling  Abdomen:  Soft, no tenderness, no organomegaly, normal bowel sounds, no organomegaly  Extremities: pulses equal bilaterally  Skin: No bleeding, bruising or rash, normal skin turgor and elasticity  Neurologic: Cranial nerves appear intact with no evidence of facial asymmetry, normal motor and sensory functions in all 4 extremities  Psych: Alert and oriented x 3, normal mood    Result Review:  I have personally reviewed the results from the time of this admission to 8/20/2023 15:09 EDT and agree with these findings:  [x]  Laboratory list / accordion  [x]  Microbiology  [x]  Radiology  []  EKG/Telemetry   []   Cardiology/Vascular   []  Pathology  [x]  Old records    LAB RESULTS:      Lab 08/20/23  1106 08/20/23  1059   WBC  --  11.30*   HEMOGLOBIN  --  9.8*   HEMATOCRIT  --  32.5*   PLATELETS  --  264   NEUTROS ABS  --  10.11*   IMMATURE GRANS (ABS)  --  0.04   LYMPHS ABS  --  0.78   MONOS ABS  --  0.29   EOS ABS  --  0.06   MCV  --  91.0   CRP 0.59*  --    PROCALCITONIN 0.04  --    LACTATE 1.1  --    D DIMER QUANT  --  1.07*         Lab 08/20/23  1106   SODIUM 132*   POTASSIUM 4.8   CHLORIDE 99   CO2 25.0   ANION GAP 8.0   BUN 26*   CREATININE 1.44*   EGFR 35.3*   GLUCOSE 133*   CALCIUM 8.5*         Lab 08/20/23  1106   TOTAL PROTEIN 6.4   ALBUMIN 3.4*   GLOBULIN 3.0   ALT (SGPT) 14   AST (SGOT) 19   BILIRUBIN 0.2   ALK PHOS 47         Lab 08/20/23  1106   PROBNP 205.7   HSTROP T 15*                 Lab 08/20/23  1127   PH, ARTERIAL 7.400   PCO2, ARTERIAL 41.1   PO2 ART 65.0*   FIO2 58   HCO3 ART 25.4   BASE EXCESS ART 0.6     UA          5/26/2023    11:18 6/29/2023    07:45   Urinalysis   Squamous Epithelial Cells, UA None Seen  0-2    Specific Gravity, UA 1.016  1.015    Ketones, UA Negative  Negative    Blood, UA Negative  Trace    Leukocytes, UA Moderate (2+)  Large (3+)    Nitrite, UA Negative  Positive    RBC, UA None Seen  0-2    WBC, UA Too Numerous to Count  Too Numerous to Count    Bacteria, UA 4+  3+        Microbiology Results (last 10 days)       Procedure Component Value - Date/Time    COVID PRE-OP / PRE-PROCEDURE SCREENING ORDER (NO ISOLATION) - Swab, Nasopharynx [[id number]]  (Normal) Collected: 08/20/23 1107    Lab Status: Final result Specimen: Swab from Nasopharynx Updated: 08/20/23 1208    Narrative:      The following orders were created for panel order COVID PRE-OP / PRE-PROCEDURE SCREENING ORDER (NO ISOLATION) - Swab, Nasopharynx.  Procedure                               Abnormality         Status                     ---------                               -----------         ------                      Respiratory Panel PCR w/...[[id number]]  Normal              Final result                 Please view results for these tests on the individual orders.    Respiratory Panel PCR w/COVID-19(SARS-CoV-2) ERICK/MARTA/ELPIDIO/PAD/COR/MAD/ALMA In-House, NP Swab in UTM/VTM, 3-4 HR TAT - Swab, Nasopharynx [[id number]]  (Normal) Collected: 08/20/23 1107    Lab Status: Final result Specimen: Swab from Nasopharynx Updated: 08/20/23 1208     ADENOVIRUS, PCR Not Detected     Coronavirus 229E Not Detected     Coronavirus HKU1 Not Detected     Coronavirus NL63 Not Detected     Coronavirus OC43 Not Detected     COVID19 Not Detected     Human Metapneumovirus Not Detected     Human Rhinovirus/Enterovirus Not Detected     Influenza A PCR Not Detected     Influenza B PCR Not Detected     Parainfluenza Virus 1 Not Detected     Parainfluenza Virus 2 Not Detected     Parainfluenza Virus 3 Not Detected     Parainfluenza Virus 4 Not Detected     RSV, PCR Not Detected     Bordetella pertussis pcr Not Detected     Bordetella parapertussis PCR Not Detected     Chlamydophila pneumoniae PCR Not Detected     Mycoplasma pneumo by PCR Not Detected    Narrative:      In the setting of a positive respiratory panel with a viral infection PLUS a negative procalcitonin without other underlying concern for bacterial infection, consider observing off antibiotics or discontinuation of antibiotics and continue supportive care. If the respiratory panel is positive for atypical bacterial infection (Bordetella pertussis, Chlamydophila pneumoniae, or Mycoplasma pneumoniae), consider antibiotic de-escalation to target atypical bacterial infection.            XR Chest 1 View    Result Date: 8/20/2023  XR CHEST 1 VW Date of Exam: 8/20/2023 10:18 AM CDT Indication: SOA triage protocol Comparison: 7/5/2023 Findings: Cardiomediastinal silhouette is unremarkable. There is irregular left mid to upper lung airspace disease suggestive of pneumonia. There is moderate  interstitial prominence throughout, similar to prior examination. No significant effusion nor pneumothorax. No acute osseous abnormality identified.     Impression: Impression: Left lung airspace disease suggestive of pneumonia. Electronically Signed: Chance Henao MD  8/20/2023 10:31 AM CDT  Workstation ID: [id number]         Assessment & Plan   Assessment & Plan     Active Hospital Problems    Diagnosis  POA    **Pneumonia [J18.9]  Yes     Summary:  Bibiana Hodges is a 87 y.o. female with medical history of essential hypertension, autonomic dysfunction, atrial fibrillation on amiodarone and Eliquis, chronic debility and multiple hospitalizations manage in the last 2 months with recurrent pneumonia, most recently from 6/29/2023 till 7/10/2023 when she was treated for both community-acquired pneumonia and UTI and was discharged to SNF.  She presented to the hospital today with insomnia x4 nights.  Found to be hypoxic    Assessment and plan:  Acute hypoxia  Recurrent community-acquired pneumonia, suspect secondary to atypical organisms  Possible COPD  Not sure what to make of her x-ray finding in ER.  She definitely has accentuated bronchoalveolar markings in the left upper lobe compared to most recent x-ray from 7/5/2023 but not solid enough to make me think she has no pneumonia.  I strongly suspect that she has underlying chronic lung disease, possibly COPD with a component of lung fibrosis based on clinical exam and review of CT scan findings.  She never had PFT or follow-up with pulmonary service  Other than hypoxia, her symptomatology and blood work-up will argue against bacterial pneumonia with negative CRP, negative procalcitonin and mild elevated white cell count especially with the lack of impressive respiratory symptoms or fever  I will give her the benefit of doubt and continue antibiotic therapy with p.o. Levaquin for now pending pneumonia work-up: Sputum culture, MRSA swab, Legionella antigen, strep  antigen etc.  She will need to have formal evaluation by pulmonary service and PFT as outpatient.  Referral placed in epic  Speech evaluation to rule out microaspiration  She would likely need to go back to her assisted facility with home O2  Check immunoglobulin panel given her recurrent pneumonia  DuoNebs, incentive spirometry, Mucinex  Obtain echo with bubble study to rule out intracardiac shunting that might be contributing to her hypoxia  Of note, D-dimer is mildly elevated at 1.0 which is appropriate for her age.  I do not think she has PE particularly she is taking Eliquis at home    A-fib  Continue amiodarone and Eliquis    RACH  Creatinine is slightly elevated at 1.4.  Her baseline is around 1  Gentle IV fluids and monitor kidney function    Essential hypertension  Autonomic dysfunction with recurrent presyncopal episodes  From last discharge, her antiplatelet medications were held  Continue to hold on hypertensive medications particularly with her borderline blood pressure    Dyslipidemia  Continue statins    Chronic debility  PT and OT consultation    Insomnia  Her main presenting complaint  Continue trazodone  Add melatonin at bedtime    DVT prophylaxis: On Eliquis      CODE STATUS:  Full code  Code Status and Medical Interventions:   Ordered at: 08/20/23 1445     Level Of Support Discussed With:    Patient     Code Status (Patient has no pulse and is not breathing):    CPR (Attempt to Resuscitate)     Medical Interventions (Patient has pulse or is breathing):    Full Support       Expected Discharge   Expected Discharge Date: 8/22/2023; Expected Discharge Time:      Sania Vera MD  08/20/23     Electronically signed by Sania Vera MD at 08/20/23 1517       Physician Progress Notes (last 24 hours)  Notes from 08/22/23 0936 through 08/23/23 0936   No notes of this type exist for this encounter.          Physical Therapy Notes (last 72 hours)        Shelley Gutiérrez, PT at 08/21/23 0990   Version 1 of          Goal Outcome Evaluation:  Plan of Care Reviewed With: patient        Progress: no change  Outcome Evaluation: Patient presents with weakness, decreased trunk control, balance impairments, and decreased endurance affecting functional mobility. Skilled IP PT services warranted to support return to independence. Recommend SNF at d/c.      Anticipated Discharge Disposition (PT): skilled nursing facility    Electronically signed by Shelley Gutiérrez, PT at 23 1057       Shelley Gutiérrez, PT at 23 0934  Version 1 of          Patient Name: Bibiana Hodges  : 1935    MRN: [id number]                              Today's Date: 2023       Admit Date: 2023    Visit Dx:     ICD-10-CM ICD-9-CM   1. Pneumonia of left lung due to infectious organism, unspecified part of lung  J18.9 486   2. Acute respiratory failure with hypoxia  J96.01 518.81   3. Simple chronic bronchitis  J41.0 491.0     Patient Active Problem List   Diagnosis    Dysautonomia orthostatic hypotension syndrome    Hypertension    Flu vaccine need    Streptococcus pneumoniae vaccination indicated    Pneumonia     Past Medical History:   Diagnosis Date    Constipation     Depression     Dysautonomia orthostatic hypotension syndrome     Generalized osteoarthritis     GERD (gastroesophageal reflux disease)     s/p Nissen    Hypertension     Insomnia     Osteoarthrosis, shoulder region     Skin ulcer of scalp     Thrombophlebitis of arm     Tremor     Vitamin B12 deficiency      Past Surgical History:   Procedure Laterality Date    DILATATION AND CURETTAGE      HERNIA REPAIR      HIP SURGERY      SHOULDER SURGERY      Right      General Information       Row Name 23 0934          Physical Therapy Time and Intention    Document Type evaluation  -NS     Mode of Treatment physical therapy  -NS       Row Name 23 0934          General Information    Patient Profile Reviewed yes  -NS     Prior Level of Function min  assist:;all household mobility;gait;transfer;bed mobility;mod assist:;ADL's;dependent:;w/c or scooter  Pt ambulates with assistance and a rollator, pushed in a wheelchair for longer distances.  -NS     Existing Precautions/Restrictions fall;oxygen therapy device and L/min  hx of vertigo  -NS     Barriers to Rehab previous functional deficit  -NS       Row Name 08/21/23 0934          Living Environment    People in Home facility resident  MYCHAL  -NS       Row Name 08/21/23 0934          Home Main Entrance    Number of Stairs, Main Entrance none  -NS       Row Name 08/21/23 0934          Stairs Within Home, Primary    Number of Stairs, Within Home, Primary none  -NS       Row Name 08/21/23 0934          Cognition    Orientation Status (Cognition) oriented x 3  -NS       Row Name 08/21/23 0934          Safety Issues, Functional Mobility    Safety Issues Affecting Function (Mobility) safety precaution awareness;safety precautions follow-through/compliance;sequencing abilities;insight into deficits/self-awareness  -NS     Impairments Affecting Function (Mobility) balance;coordination;endurance/activity tolerance;motor planning;strength;shortness of breath;postural/trunk control  -NS               User Key  (r) = Recorded By, (t) = Taken By, (c) = Cosigned By      Initials Name Provider Type    NS Shelley Gutiérrez PT Physical Therapist                   Mobility       Row Name 08/21/23 0934          Transfers    Comment, (Transfers) Cues for hand placement  -NS       Row Name 08/21/23 0934          Sit-Stand Transfer    Sit-Stand [place] (Transfers) minimum assist (75% patient effort);verbal cues  -NS     Assistive Device (Sit-Stand Transfers) walker, front-wheeled  -NS       Row Name 08/21/23 0934          Gait/Stairs (Locomotion)    [place] Level (Gait) minimum assist (75% patient effort);verbal cues  -NS     Assistive Device (Gait) walker, front-wheeled  -NS     Distance in Feet (Gait) 25  -NS      Deviations/Abnormal Patterns (Gait) perri decreased;gait speed decreased;stride length decreased;base of support, narrow  -NS     Bilateral Gait Deviations forward flexed posture;heel strike decreased  -NS     Comment, (Gait/Stairs) Patient ambulated at slow pace, demonstrated flexed posture and requiring cues for safe placement of RW. Chair follow for safety but pt did not require to sit. Distance limited by weakness, fatigue, and dizziness. BP 99/73 following. RN notified.  -NS               User Key  (r) = Recorded By, (t) = Taken By, (c) = Cosigned By      Initials Name Provider Type    Shelley Gómez PT Physical Therapist                   Obj/Interventions       Row Name 08/21/23 0934          Range of Motion Comprehensive    General Range of Motion bilateral lower extremity ROM WFL  -NS       Row Name 08/21/23 0934          Strength Comprehensive (MMT)    General Manual Muscle Testing (MMT) Assessment lower extremity strength deficits identified  -NS     Comment, General Manual Muscle Testing (MMT) Assessment BLEs: grossly 4/5  -NS       Row Name 08/21/23 0934          Balance    Balance Assessment sitting static balance;sitting dynamic balance;standing static balance;standing dynamic balance  -NS     Static Sitting Balance contact guard  -NS     Dynamic Sitting Balance contact guard  -NS     Position, Sitting Balance unsupported;sitting in chair  -NS     Static Standing Balance contact guard  -NS     Dynamic Standing Balance minimal assist  -NS     Position/Device Used, Standing Balance supported;walker, front-wheeled  -NS     Comment, Balance decreased trunk control during ADLs in chair  -NS       Row Name 08/21/23 0934          Sensory Assessment (Somatosensory)    Sensory Assessment (Somatosensory) LE sensation intact  -NS               User Key  (r) = Recorded By, (t) = Taken By, (c) = Cosigned By      Initials Name Provider Type    Shelley Gómez, GAIL Physical Therapist                    Goals/Plan       Row Name 08/21/23 0934          Bed Mobility Goal 1 (PT)    Activity/Assistive Device (Bed Mobility Goal 1, PT) sit to supine/supine to sit  -NS     [place] Level/Cues Needed (Bed Mobility Goal 1, PT) independent  -NS     Time Frame (Bed Mobility Goal 1, PT) long term goal (LTG);2 weeks  -NS       Row Name 08/21/23 0934          Transfer Goal 1 (PT)    Activity/Assistive Device (Transfer Goal 1, PT) sit-to-stand/stand-to-sit;bed-to-chair/chair-to-bed  -NS     [place] Level/Cues Needed (Transfer Goal 1, PT) standby assist  -NS     Time Frame (Transfer Goal 1, PT) long term goal (LTG);2 weeks  -NS       [facility] Name 08/21/23 0934          Gait Training Goal 1 (PT)    Activity/Assistive Device (Gait Training Goal 1, PT) gait (walking locomotion);assistive device use  -NS     [place] Level (Gait Training Goal 1, PT) standby assist  -NS     Distance (Gait Training Goal 1, PT) 100  -NS     Time Frame (Gait Training Goal 1, PT) long term goal (LTG);2 weeks  -NS       [facility] Name 08/21/23 0934          Therapy Assessment/Plan (PT)    Planned Therapy Interventions (PT) balance training;bed mobility training;gait training;home exercise program;neuromuscular re-education;patient/family education;strengthening;transfer training  -NS               User Key  (r) = Recorded By, (t) = Taken By, (c) = Cosigned By      Initials Name Provider Type    Shelley Gómez PT Physical Therapist                   Clinical Impression       Row Name 08/21/23 0934          Pain    Pretreatment Pain Rating 0/10 - no pain  -NS     Posttreatment Pain Rating 0/10 - no pain  -NS       Row Name 08/21/23 0934          Plan of Care Review    Plan of Care Reviewed With patient  -NS     Progress no change  -NS     Outcome Evaluation Patient presents with weakness, decreased trunk control, balance impairments, and decreased endurance affecting functional mobility. Skilled IP PT services warranted to support return to  independence. Recommend SNF at d/c.  -NS       Row Name 08/21/23 0934          Therapy Assessment/Plan (PT)    Patient/Family Therapy Goals Statement (PT) to get stronger  -NS     Rehab Potential (PT) good, to achieve stated therapy goals  -NS     Criteria for Skilled Interventions Met (PT) yes;meets criteria;skilled treatment is necessary  -NS     Therapy Frequency (PT) daily  -NS       Row Name 08/21/23 0934          Vital Signs    Pre Systolic BP Rehab 126  -NS     Pre Treatment Diastolic BP 75  -NS     Post Systolic BP Rehab 99  -NS     Post Treatment Diastolic BP 73  -NS     Pretreatment Heart Rate (beats/min) 62  -NS     Posttreatment Heart Rate (beats/min) 61  -NS     Pre SpO2 (%) 97  -NS     O2 Delivery Pre Treatment nasal cannula  -NS     Post SpO2 (%) 99  -NS     O2 Delivery Post Treatment nasal cannula  -NS     Pre Patient Position Sitting  -NS     Intra Patient Position Standing  -NS     Post Patient Position Sitting  -NS       Row Name 08/21/23 0934          Positioning and Restraints    Pre-Treatment Position sitting in chair/recliner  -NS     Post Treatment Position chair  -NS     In Chair notified nsg;reclined;call light within reach;encouraged to call for assist;exit alarm on;heels elevated;legs elevated;waffle cushion;with nsg  -NS               User Key  (r) = Recorded By, (t) = Taken By, (c) = Cosigned By      Initials Name Provider Type    Shelley Gómez, PT Physical Therapist                   Outcome Measures       Row Name 08/21/23 0934          How much help from another person do you currently need...    Turning from your back to your side while in flat bed without using bedrails? 3  -NS     Moving from lying on back to sitting on the side of a flat bed without bedrails? 3  -NS     Moving to and from a bed to a chair (including a wheelchair)? 3  -NS     Standing up from a chair using your arms (e.g., wheelchair, bedside chair)? 3  -NS     Climbing 3-5 steps with a railing? 2  -NS     To  walk in hospital room? 3  -NS     AM-PAC 6 Clicks Score (PT) 17  -NS     Highest level of mobility 5 --> Static standing  -NS       Row Name 08/21/23 1057 08/21/23 0934       Functional Assessment    Outcome Measure Options AM-PAC 6 Clicks Daily Activity (OT)  - AM-PAC 6 Clicks Basic Mobility (PT)  -NS              User Key  (r) = Recorded By, (t) = Taken By, (c) = Cosigned By      Initials Name Provider Type    NS Shelley Gutiérrez, GAIL Physical Therapist    Terrie Irizarry OT Occupational Therapist                                 Physical Therapy Education       Title: PT OT SLP Therapies (In Progress)       Topic: Physical Therapy (In Progress)       Point: Mobility training (Done)       Learning Progress Summary             Patient Acceptance, E, VU,NR by NS at 8/21/2023 1057                         Point: Home exercise program (Not Started)       Learner Progress:  Not documented in this visit.              Point: Body mechanics (Done)       Learning Progress Summary             Patient Acceptance, E, VU,NR by NS at 8/21/2023 1057                         Point: Precautions (Done)       Learning Progress Summary             Patient Acceptance, E, VU,NR by NS at 8/21/2023 1057                                         User Key       Initials Effective Dates Name Provider Type Discipline    NS 06/16/21 -  Shelley Gutiérrez, PT Physical Therapist PT                  PT Recommendation and Plan  Planned Therapy Interventions (PT): balance training, bed mobility training, gait training, home exercise program, neuromuscular re-education, patient/family education, strengthening, transfer training  Plan of Care Reviewed With: patient  Progress: no change  Outcome Evaluation: Patient presents with weakness, decreased trunk control, balance impairments, and decreased endurance affecting functional mobility. Skilled IP PT services warranted to support return to independence. Recommend SNF at d/c.     Time Calculation:   PT  Evaluation Complexity  History, PT Evaluation Complexity: 3 or more personal factors and/or comorbidities  Examination of Body Systems (PT Eval Complexity): total of 4 or more elements  Clinical Presentation (PT Evaluation Complexity): stable  Clinical Decision Making (PT Evaluation Complexity): low complexity  Overall Complexity (PT Evaluation Complexity): low complexity     PT Charges       Row Name 23 0934             Time Calculation    Start Time 0934  -NS      PT Received On 23  -NS      PT Goal Re-Cert Due Date 23  -NS         Untimed Charges    PT Eval/Re-eval Minutes 47  -NS         Total Minutes    Untimed Charges Total Minutes 47  -NS       Total Minutes 47  -NS                User Key  (r) = Recorded By, (t) = Taken By, (c) = Cosigned By      Initials Name Provider Type    Shelley Gómez PT Physical Therapist                  Therapy Charges for Today       Code Description Service Date Service Provider Modifiers Qty    27966724633  PT EVAL LOW COMPLEXITY 4 2023 Shelley Gutiérrez, PT GP 1            PT G-Codes  Outcome Measure Options: AM-PAC 6 Clicks Daily Activity (OT)  AM-PAC 6 Clicks Score (PT): 17  AM-PAC 6 Clicks Score (OT): 15  PT Discharge Summary  Anticipated Discharge Disposition (PT): skilled nursing facility    Shelley Gutiérrez PT  2023      Electronically signed by Shelley Gutiérrez PT at 23 1058       Occupational Therapy Notes (last 48 hours)  Notes from 23 0936 through 23 0936   No notes exist for this encounter.          Speech Language Pathology Notes (last 48 hours)        Shanelle Gonsales, MS CCC-SLP at 23 1443          Acute Care - Speech Language Pathology   Swallow Progress Note  Alicia     Patient Name: Bibiana Hodges  : 1935  MRN: [id number]  Today's Date: 2023               Admit Date: 2023    Visit Dx:     ICD-10-CM ICD-9-CM   1. Pneumonia of left lung due to infectious organism, unspecified part  of lung  J18.9 486   2. Acute respiratory failure with hypoxia  J96.01 518.81   3. Simple chronic bronchitis  J41.0 491.0     Patient Active Problem List   Diagnosis    Dysautonomia orthostatic hypotension syndrome    Hypertension    Flu vaccine need    Streptococcus pneumoniae vaccination indicated    Pneumonia     Past Medical History:   Diagnosis Date    Constipation     Depression     Dysautonomia orthostatic hypotension syndrome     Generalized osteoarthritis     GERD (gastroesophageal reflux disease)     s/p Nissen    Hypertension     Insomnia     Osteoarthrosis, shoulder region     Skin ulcer of scalp     Thrombophlebitis of arm     Tremor     Vitamin B12 deficiency      Past Surgical History:   Procedure Laterality Date    DILATATION AND CURETTAGE      HERNIA REPAIR      HIP SURGERY      SHOULDER SURGERY      Right       SLP Recommendation and Plan  SLP Swallowing Diagnosis: moderate, pharyngeal dysphagia (08/22/23 1400)  SLP Diet Recommendation: regular textures, nectar thick liquids, water between meals after oral care, with supervision (08/22/23 1400)  Recommended Precautions and Strategies: general aspiration precautions (08/22/23 1400)  SLP Rec. for Method of Medication Administration: meds whole, with thick liquids, with puree (08/22/23 1400)     Monitor for Signs of Aspiration: notify SLP if any concerns (08/22/23 1400)     Swallow Criteria for Skilled Therapeutic Interventions Met: demonstrates skilled criteria (08/22/23 1400)  Anticipated Discharge Disposition (SLP): skilled nursing facility, anticipate therapy at next level of care (08/22/23 1400)  Rehab Potential/Prognosis, Swallowing: re-evaluate goals as necessary (08/22/23 1400)  Therapy Frequency (Swallow): 5 days per week (08/22/23 1400)  Predicted Duration Therapy Intervention (Days): until discharge (08/22/23 1400)  Oral Care Recommendations: Oral Care BID/PRN, Toothbrush (08/22/23 1400)        Daily Summary of Progress (SLP): progress  toward functional goals is good (08/22/23 1400)               Treatment Assessment (SLP): continued, improved (08/22/23 1400)     Plan for Continued Treatment (SLP): continue treatment per plan of care (08/22/23 1400)       Oral Care Recommendations: Oral Care BID/PRN, Toothbrush (08/22/23 1400)    Plan of Care Reviewed With: patient  Progress: improving      SWALLOW EVALUATION (last 72 hours)       SLP Adult Swallow Evaluation       Row Name 08/22/23 1400 08/21/23 114                Rehab Evaluation    Document Type therapy note (daily note)  -AV evaluation  -SM       Subjective Information no complaints  -AV no complaints  -SM       Patient Observations alert;cooperative  -AV alert;cooperative  -SM       Patient/Family/Caregiver Comments/Observations none  -AV --       Patient Effort good  -AV --          General Information    Patient Profile Reviewed -- yes  -SM       Pertinent History Of Current Problem -- Recurrent PNAs. MD ?s microaspiration. SLP deferring to MBS, RN notified and discussed wit pt.  -       Current Method of Nutrition -- regular textures;thin liquids  -       Plans/Goals Discussed with -- patient;agreed upon  -          Pain    Additional Documentation Pain Scale: FACES Pre/Post-Treatment (Group)  -AV --          Pain Scale: Numbers Pre/Post-Treatment    Pretreatment Pain Rating -- 0/10 - no pain  -SM       Posttreatment Pain Rating -- 0/10 - no pain  -SM          Pain Scale: FACES Pre/Post-Treatment    Pain: FACES Scale, Pretreatment 0-->no hurt  -AV --       Posttreatment Pain Rating 0-->no hurt  -AV --          MBS/VFSS    Utensils Used -- spoon;cup;straw  -       Consistencies Trialed -- thin liquids;nectar/syrup-thick liquids;pureed;regular textures  -SM          MBS/VFSS Interpretation    Oral Prep Phase -- WFL  -SM       Oral Transit Phase -- WFL  -SM       Oral Residue -- WFL  -SM          Initiation of Pharyngeal Swallow    Initiation of Pharyngeal Swallow -- bolus in  pyriform sinuses  -SM       Pharyngeal Phase -- impaired pharyngeal phase of swallowing  -SM       Penetration During the Swallow -- thin liquids;secondary to delayed swallow initiation or mistiming;secondary to reduced laryngeal elevation;secondary to reduced vestibular closure  -SM       Aspiration After the Swallow -- thin liquids;secondary to residue;in laryngeal vestibule;other (see comments)  -SM       Depth of Penetration -- other (see comments)  -SM       Response to Penetration -- No  -SM       Pharyngeal Residue -- all consistencies tested;valleculae;pyriform sinuses;secondary to reduced base of tongue retraction;secondary to reduced posterior pharyngeal wall stripping;secondary to reduced laryngeal elevation;secondary to reduced hyolaryngeal excursion  -SM       Attempted Compensatory Maneuvers -- other (see comments)  -SM       Response to Attempted Compensatory Maneuvers -- did not prevent penetration  -SM       Pharyngeal Phase, Comment -- Consistent penetration of thin liquids into the laryngeal vestibule of thin liquids during the swallow. Initially did not aspirate though could not clear any vesibular residue. Over time, gravity slowly brought residual material deeper and falling below VFs (aspiration). Could also not clear subglottic residue when cued. Compensatory strategies were attempted yet all unsuccessful at preventing penetration. Aspiration was silent x when attempted effortful swallow maneuver to prevent aspiration - this actually worsened swallow sequence and airway closure, increasing aspiration (and subsequent cough response though could not clear). GOC/POC discussion with pt following study. Appears hx dysphagia and thickened liquids off and on, which pt reported related to past tracheal surgeries and intubation (details unclear as nothing noted in chart). Despite pt not wishing to return to thickened liquids, she also more so wishes to prevent recurrent PNAs. Willing to trial modified  diet to see if improves medical status. Will continue to discus GOC/POC in tx in case pt opts to move to a comfort diet (thin liquids).  -SM          SLP Evaluation Clinical Impression    SLP Swallowing Diagnosis moderate;pharyngeal dysphagia  -AV moderate;pharyngeal dysphagia  -SM       Functional Impact risk of aspiration/pneumonia  -AV risk of aspiration/pneumonia  -SM       Rehab Potential/Prognosis, Swallowing re-evaluate goals as necessary  -AV re-evaluate goals as necessary  -SM       Swallow Criteria for Skilled Therapeutic Interventions Met demonstrates skilled criteria  -AV demonstrates skilled criteria  -SM          SLP Treatment Clinical Impressions    Treatment Assessment (SLP) continued;improved  -AV --       Daily Summary of Progress (SLP) progress toward functional goals is good  -AV --       Plan for Continued Treatment (SLP) continue treatment per plan of care  -AV --       Care Plan Review care plan/treatment goals reviewed  -AV --          Recommendations    Therapy Frequency (Swallow) 5 days per week  -AV 5 days per week  -SM       Predicted Duration Therapy Intervention (Days) until discharge  -AV until discharge  -       SLP Diet Recommendation regular textures;nectar thick liquids;water between meals after oral care, with supervision  -AV regular textures;nectar thick liquids;water between meals after oral care, with supervision  -       Recommended Precautions and Strategies general aspiration precautions  -AV general aspiration precautions  -SM       Oral Care Recommendations Oral Care BID/PRN;Toothbrush  -AV Oral Care BID/PRN;Toothbrush  -       SLP Rec. for Method of Medication Administration meds whole;with thick liquids;with puree  -AV meds whole;with thick liquids;with puree  -SM       Monitor for Signs of Aspiration notify SLP if any concerns  -AV notify SLP if any concerns  -       Anticipated Discharge Disposition (SLP) skilled nursing facility;anticipate therapy at next  level of care  -AV home with OP services  -SM                 User Key  (r) = Recorded By, (t) = Taken By, (c) = Cosigned By      Initials Name Effective Dates    Skyla Gomez, MS CCC-SLP 02/03/23 -     AV Shanelle Gonsales, MS CCC-SLP 06/16/21 -                     EDUCATION  The patient has been educated in the following areas:   Dysphagia (Swallowing Impairment) Oral Care/Hydration.        SLP GOALS       Row Name 08/22/23 1400 08/21/23 1145          (LTG) Patient will demonstrate progress toward functional swallow for    Diet Texture (Demonstrate progress toward functional swallow) regular textures  -AV regular textures  -SM     Liquid viscosity (Demonstrate progress toward functional swallow) thin liquids  -AV thin liquids  -SM     [place] (Demonstrate progress towards functional swallow) independently (over 90% accuracy)  -AV independently (over 90% accuracy)  -SM     Time Frame (Demonstrate progress toward functional swallow) by discharge  -AV by discharge  -SM     Progress/Outcomes (Demonstrate progress toward functional swallow) continuing progress toward goal  -AV --        (STG) Patient will tolerate trials of    Consistencies Trialed (Tolerate trials) regular textures;nectar/ mildly thick liquids;thin liquids  -AV regular textures;nectar/ mildly thick liquids;thin liquids  -SM     Desired Outcome (Tolerate trials) with use of compensatory strategies (see comments)  -AV with use of compensatory strategies (see comments)  oral care before thin H2O between meals.  -SM     [place] (Tolerate trials) independently (over 90% accuracy)  -AV independently (over 90% accuracy)  -SM     Time Frame (Tolerate trials) by discharge  -AV by discharge  -SM     Progress/Outcomes (Tolerate trials) continuing progress toward goal  -AV --        (STG) Lingual Strengthening Goal 1 (SLP)    Activity (Lingual Strengthening Goal 1, SLP) increase tongue back strength  -AV increase tongue back strength   -SM     Increase Tongue Back Strength lingual resistance exercises  -AV lingual resistance exercises  -SM     [place]/Accuracy (Lingual Strengthening Goal 1, SLP) independently (over 90% accuracy)  -AV independently (over 90% accuracy)  -SM     Time Frame (Lingual Strengthening Goal 1, SLP) short term goal (STG)  -AV short term goal (STG)  -SM     Progress/Outcomes (Lingual Strengthening Goal 1, SLP) continuing progress toward goal  -AV --        (STG) Pharyngeal Strengthening Exercise Goal 1 (SLP)    Activity (Pharyngeal Strengthening Goal 1, SLP) increase timing;increase superior movement of the hyolaryngeal complex;increase anterior movement of the hyolaryngeal complex;increase closure at entrance to airway/closure of airway at glottis;increase squeeze/positive pressure generation  -AV increase timing;increase superior movement of the hyolaryngeal complex;increase anterior movement of the hyolaryngeal complex;increase closure at entrance to airway/closure of airway at glottis;increase squeeze/positive pressure generation  -SM     Increase Timing prepping - 3 second prep or suck swallow or 3-step swallow  -AV prepping - 3 second prep or suck swallow or 3-step swallow  -SM     Increase Superior Movement of the Hyolaryngeal Complex Mendelsohn  -AV Mendelsohn  -SM     Increase Anterior Movement of the Hyolaryngeal Complex EMST;chin tuck against resistance (CTAR)  -AV EMST;chin tuck against resistance (CTAR)  -SM     Increase Closure at Entrance to Airway/Closure of Airway at Glottis super-supraglottic swallow  -AV super-supraglottic swallow  -SM     Increase Squeeze/Positive Pressure Generation hard effortful swallow  -AV hard effortful swallow  -SM     [place]/Accuracy (Pharyngeal Strengthening Goal 1, SLP) independently (over 90% accuracy)  -AV independently (over 90% accuracy)  -SM     Time Frame (Pharyngeal Strengthening Goal 1, SLP) short term goal (STG)  -AV short term goal (STG)  -SM      Progress/Outcomes (Pharyngeal Strengthening Goal 1, SLP) continuing progress toward goal  -AV --               User Key  (r) = Recorded By, (t) = Taken By, (c) = Cosigned By      Initials Name Provider Type    Skyla Gomez MS CCC-SLP Speech and Language Pathologist    Shanelle Solorzano MS CCC-SLP Speech and Language Pathologist                       Time Calculation:    Time Calculation- SLP       Row Name 23 1442             Time Calculation- SLP    SLP Start Time 1400  -AV      SLP Received On 23  -AV         Untimed Charges    30672-KM Treatment Swallow Minutes 38  -AV         Total Minutes    Untimed Charges Total Minutes 38  -AV       Total Minutes 38  -AV                User Key  (r) = Recorded By, (t) = Taken By, (c) = Cosigned By      Initials Name Provider Type    Shanelle Solorzano MS CCC-SLP Speech and Language Pathologist                    Therapy Charges for Today       Code Description Service Date Service Provider Modifiers Qty    44280738024 HC ST TREATMENT SWALLOW 3 2023 Shanelle Gonsales MS CCC-SLP GN 1                 Shanelle Maier MS CCC-SLP  2023    Electronically signed by Shanelle Gonsales MS CCC-SLP at 23 1443       Shanelle Gonsales MS CCC-SLP at 23 1441          Goal Outcome Evaluation:  Plan of Care Reviewed With: patient        Progress: improving          SLP treatment completed. Will continue to address dysphagia. Please see note for further details and recommendations.      Electronically signed by Shanelle Gonsales MS CCC-SLP at 23 1441       Skyla Austin MS CCC-SLP at 23 1427          Acute Care - Speech Language Pathology   Swallow Initial Evaluation  Alicia  Modified Barium Swallow Study (MBS)     Patient Name: Bibiana Hodges  : 1935  MRN: [id number]  Today's Date: 2023               Admit Date: 2023    Visit Dx:      ICD-10-CM ICD-9-CM   1. Pneumonia of left lung due to infectious organism, unspecified part of lung  J18.9 486   2. Acute respiratory failure with hypoxia  J96.01 518.81   3. Simple chronic bronchitis  J41.0 491.0     Patient Active Problem List   Diagnosis    Dysautonomia orthostatic hypotension syndrome    Hypertension    Flu vaccine need    Streptococcus pneumoniae vaccination indicated    Pneumonia     Past Medical History:   Diagnosis Date    Constipation     Depression     Dysautonomia orthostatic hypotension syndrome     Generalized osteoarthritis     GERD (gastroesophageal reflux disease)     s/p Nissen    Hypertension     Insomnia     Osteoarthrosis, shoulder region     Skin ulcer of scalp     Thrombophlebitis of arm     Tremor     Vitamin B12 deficiency      Past Surgical History:   Procedure Laterality Date    DILATATION AND CURETTAGE      HERNIA REPAIR      HIP SURGERY      SHOULDER SURGERY      Right       SLP Recommendation and Plan  SLP Swallowing Diagnosis: moderate, pharyngeal dysphagia (08/21/23 1145)  SLP Diet Recommendation: regular textures, nectar thick liquids, water between meals after oral care, with supervision (08/21/23 1145)  Recommended Precautions and Strategies: general aspiration precautions (08/21/23 1145)  SLP Rec. for Method of Medication Administration: meds whole, with thick liquids, with puree (08/21/23 1145)     Monitor for Signs of Aspiration: notify SLP if any concerns (08/21/23 1145)     Swallow Criteria for Skilled Therapeutic Interventions Met: demonstrates skilled criteria (08/21/23 1145)  Anticipated Discharge Disposition (SLP): home with OP services (08/21/23 1145)  Rehab Potential/Prognosis, Swallowing: re-evaluate goals as necessary (08/21/23 1145)  Therapy Frequency (Swallow): 5 days per week (08/21/23 1145)  Predicted Duration Therapy Intervention (Days): until discharge (08/21/23 1145)  Oral Care Recommendations: Oral Care BID/PRN, Toothbrush (08/21/23 1145)               Oral Care Recommendations: Oral Care BID/PRN, Toothbrush (08/21/23 1148)    Plan of Care Reviewed With: patient      SWALLOW EVALUATION (last 72 hours)       SLP Adult Swallow Evaluation       Row Name 08/21/23 1146       Rehab Evaluation    Document Type evaluation  -SM    Subjective Information no complaints  -SM    Patient Observations alert;cooperative  -SM       General Information    Patient Profile Reviewed yes  -SM    Pertinent History Of Current Problem Recurrent PNAs. MD ?s microaspiration. SLP deferring to MBS, RN notified and discussed wit pt.  -SM    Current Method of Nutrition regular textures;thin liquids  -SM    Plans/Goals Discussed with patient;agreed upon  -SM       Pain Scale: Numbers Pre/Post-Treatment    Pretreatment Pain Rating 0/10 - no pain  -SM    Posttreatment Pain Rating 0/10 - no pain  -SM       MBS/VFSS    Utensils Used spoon;cup;straw  -SM    Consistencies Trialed thin liquids;nectar/syrup-thick liquids;pureed;regular textures  -SM       MBS/VFSS Interpretation    Oral Prep Phase WFL  -SM    Oral Transit Phase WFL  -SM    Oral Residue WFL  -SM       Initiation of Pharyngeal Swallow    Initiation of Pharyngeal Swallow bolus in pyriform sinuses  -SM    Pharyngeal Phase impaired pharyngeal phase of swallowing  -SM    Penetration During the Swallow thin liquids;secondary to delayed swallow initiation or mistiming;secondary to reduced laryngeal elevation;secondary to reduced vestibular closure  -SM    Aspiration After the Swallow thin liquids;secondary to residue;in laryngeal vestibule;other (see comments)  -SM    Depth of Penetration other (see comments)  -SM    Response to Penetration No  -SM    Pharyngeal Residue all consistencies tested;valleculae;pyriform sinuses;secondary to reduced base of tongue retraction;secondary to reduced posterior pharyngeal wall stripping;secondary to reduced laryngeal elevation;secondary to reduced hyolaryngeal excursion  -SM    Attempted  Compensatory Maneuvers other (see comments)  -    Response to Attempted Compensatory Maneuvers did not prevent penetration  -    Pharyngeal Phase, Comment Consistent penetration of thin liquids into the laryngeal vestibule of thin liquids during the swallow. Initially did not aspirate though could not clear any vesibular residue. Over time, gravity slowly brought residual material deeper and falling below VFs (aspiration). Could also not clear subglottic residue when cued. Compensatory strategies were attempted yet all unsuccessful at preventing penetration. Aspiration was silent x when attempted effortful swallow maneuver to prevent aspiration - this actually worsened swallow sequence and airway closure, increasing aspiration (and subsequent cough response though could not clear). GOC/POC discussion with pt following study. Appears hx dysphagia and thickened liquids off and on, which pt reported related to past tracheal surgeries and intubation (details unclear as nothing noted in chart). Despite pt not wishing to return to thickened liquids, she also more so wishes to prevent recurrent PNAs. Willing to trial modified diet to see if improves medical status. Will continue to discus GOC/POC in tx in case pt opts to move to a comfort diet (thin liquids).  -       SLP Evaluation Clinical Impression    SLP Swallowing Diagnosis moderate;pharyngeal dysphagia  -    Functional Impact risk of aspiration/pneumonia  -    Rehab Potential/Prognosis, Swallowing re-evaluate goals as necessary  -    Swallow Criteria for Skilled Therapeutic Interventions Met demonstrates skilled criteria  -       Recommendations    Therapy Frequency (Swallow) 5 days per week  -    Predicted Duration Therapy Intervention (Days) until discharge  -    SLP Diet Recommendation regular textures;nectar thick liquids;water between meals after oral care, with supervision  -    Recommended Precautions and Strategies general aspiration  precautions  -SM    Oral Care Recommendations Oral Care BID/PRN;Toothbrush  -SM    SLP Rec. for Method of Medication Administration meds whole;with thick liquids;with puree  -SM    Monitor for Signs of Aspiration notify SLP if any concerns  -SM    Anticipated Discharge Disposition (SLP) home with OP services  -SM              User Key  (r) = Recorded By, (t) = Taken By, (c) = Cosigned By      Initials Name Effective Dates    Skyla Gomez, MS CCC-SLP 02/03/23 -                     EDUCATION  The patient has been educated in the following areas:   Dysphagia (Swallowing Impairment) Oral Care/Hydration Modified Diet Instruction.        SLP GOALS       Row Name 08/21/23 1145       (LTG) Patient will demonstrate progress toward functional swallow for    Diet Texture (Demonstrate progress toward functional swallow) regular textures  -SM    Liquid viscosity (Demonstrate progress toward functional swallow) thin liquids  -SM    [place] (Demonstrate progress towards functional swallow) independently (over 90% accuracy)  -SM    Time Frame (Demonstrate progress toward functional swallow) by discharge  -SM       (STG) Patient will tolerate trials of    Consistencies Trialed (Tolerate trials) regular textures;nectar/ mildly thick liquids;thin liquids  -SM    Desired Outcome (Tolerate trials) with use of compensatory strategies (see comments)  oral care before thin H2O between meals.  -SM    [place] (Tolerate trials) independently (over 90% accuracy)  -SM    Time Frame (Tolerate trials) by discharge  -SM       (STG) Lingual Strengthening Goal 1 (SLP)    Activity (Lingual Strengthening Goal 1, SLP) increase tongue back strength  -SM    Increase Tongue Back Strength lingual resistance exercises  -SM    [place]/Accuracy (Lingual Strengthening Goal 1, SLP) independently (over 90% accuracy)  -SM    Time Frame (Lingual Strengthening Goal 1, SLP) short term goal (STG)  -SM       (STG) Pharyngeal Strengthening  Exercise Goal 1 (SLP)    Activity (Pharyngeal Strengthening Goal 1, SLP) increase timing;increase superior movement of the hyolaryngeal complex;increase anterior movement of the hyolaryngeal complex;increase closure at entrance to airway/closure of airway at glottis;increase squeeze/positive pressure generation  -SM    Increase Timing prepping - 3 second prep or suck swallow or 3-step swallow  -SM    Increase Superior Movement of the Hyolaryngeal Complex Mendelsohn  -SM    Increase Anterior Movement of the Hyolaryngeal Complex EMST;chin tuck against resistance (CTAR)  -SM    Increase Closure at Entrance to Airway/Closure of Airway at Glottis super-supraglottic swallow  -SM    Increase Squeeze/Positive Pressure Generation hard effortful swallow  -SM    [place]/Accuracy (Pharyngeal Strengthening Goal 1, SLP) independently (over 90% accuracy)  -SM    Time Frame (Pharyngeal Strengthening Goal 1, SLP) short term goal (STG)  -SM              User Key  (r) = Recorded By, (t) = Taken By, (c) = Cosigned By      Initials Name Provider Type    Skyla Gomez MS CCC-SLP Speech and Language Pathologist                       Time Calculation:    Time Calculation- SLP       Row Name 08/21/23 1426             Time Calculation- SLP    SLP Start Time 1145  -SM      SLP Received On 08/21/23  -SM         Untimed Charges    15132-NR Motion Fluoro Eval Swallow Minutes 74  -SM         Total Minutes    Untimed Charges Total Minutes 74  -SM       Total Minutes 74  -SM                User Key  (r) = Recorded By, (t) = Taken By, (c) = Cosigned By      Initials Name Provider Type    Skyla Gomez MS CCC-SLP Speech and Language Pathologist                    Therapy Charges for Today       Code Description Service Date Service Provider Modifiers Qty    [id number]  ST MOTION FLUORO EVAL SWALLOW 5 8/21/2023 Skyla Austin MS CCC-SLP GN 1                 Skyla Austin MS  CCC-SLP  8/21/2023    Electronically signed by Skyla Austin MS CCC-SLP at 08/21/23 1429       Skyla Austin, MS CCC-SLP at 08/21/23 141          Goal Outcome Evaluation:  Plan of Care Reviewed With: patient              SLP evaluation with MBS completed. Pharyngeal dysphagia with aspiration, mainly as not able to sense or clear thin liquids, when cued to cough or clear throat, that penetrates into the laryngeal vestibule during the swallow (which can often occur on a functional basis). Unfortunately, over time it leads to aspiration as deepens with gravity into airway. No compensations were successful at preventing penetration. Pt reports hx dysphagia (that resolved?) with thickened liquids. Hesitant to return to thickened liquids though her greater goal is to prevent PNAs. For now - regular solids, nectar-thick liquids, ok thin H2O between meals following completion of oral care. Will continue to address dysphagia and revisit ? Modified diet if begins greatly impacting her QOL. Please see note for further details and recommendations.           Electronically signed by Skyla Austin, MS CCC-SLP at 08/21/23 1792

## 2023-08-23 NOTE — PLAN OF CARE
Goal Outcome Evaluation:  Plan of Care Reviewed With: patient        Progress: no change  Outcome Evaluation: VSS. Pt remains on RA and bowel regimen administered. Multiple large bowel movements noted and MD notified. Pt experienced extreme nausea during shift despite PRN Zofran. MD notified and Compazine given per orders. Continue POC.

## 2023-08-23 NOTE — PLAN OF CARE
Goal Outcome Evaluation:  Plan of Care Reviewed With: patient        Progress: improving  Outcome Evaluation: VSS. Pt comfortable on room air to 2L throughout shift. A-paced, no pain noted. Continue POC.

## 2023-08-23 NOTE — PLAN OF CARE
Goal Outcome Evaluation:           Progress: no change  Outcome Evaluation: VSS this shift. Remains on RA. No acute events overnight.

## 2023-08-23 NOTE — PLAN OF CARE
Goal Outcome Evaluation:  Plan of Care Reviewed With: patient        Progress: no change          SLP treatment completed. Will continue to address dysphagia in tx. Please see note for further details and recommendations.

## 2023-08-23 NOTE — CASE MANAGEMENT/SOCIAL WORK
Continued Stay Note  [facility]     Patient Name: Bibiana Hodges  MRN: [id number]  Today's Date: 8/23/2023    Admit Date: 8/20/2023    Plan: [facility]   Discharge Plan       Row Name 08/23/23 0923       Plan    Plan [facility]    Patient/Family in Agreement with Plan yes    Plan Comments Patient insurance approval for [facility]  started today. CM sent over email to the pre-crit team at [facility]. CM will follow.    Final Discharge Disposition Code 03 - skilled nursing facility (SNF)                   Discharge Codes    No documentation.                 Expected Discharge Date and Time       Expected Discharge Date Expected Discharge Time    Aug 23, 2023               Jess Perdue RN

## 2023-08-23 NOTE — PROGRESS NOTES
[facility] Medicine Services  PROGRESS NOTE    Patient Name: Bibiana Hodges  : 1935  MRN: [id number]    Date of Admission: 2023  Primary Care Physician: Katarina Oviedo MD    Subjective   Subjective     CC:  Follow-up for pneumonia    HPI:  I have seen and evaluated the patient this morning.  Still feeling weak and tired.  Constipated.  Cough is improving.  No shortness of breath.  Oxygen saturation 95% room air     ROS:  General : no fevers, chills  CVS: No chest pain, palpitations  Respiratory: No cough, dyspnea  GI: No N/V/D, abd pain  10 point review of systems is negative except for what is mentioned in HPI    Objective   Objective     Vital Signs:   Temp:  [97.2 øF (36.2 øC)-97.7 øF (36.5 øC)] 97.2 øF (36.2 øC)  Heart Rate:  [59-64] 59  Resp:  [16-18] 18  BP: (124-139)/(66-76) 125/75     Physical Exam:  General: Chronically ill looking and debilitated  Head: Atraumatic and normocephalic  Eyes: No Icterus. No pallor  Ears:  Ears appear intact with no abnormalities noted  Throat: No oral lesions, no thrush  Neck: Supple, trachea midline  Lungs: Left upper lobe coarse crackles.  Diminished air entry both lung fields.  Scattered wheezing.    Heart:  Normal S1 and S2, no murmur, no gallop, No JVD, no lower extremity swelling  Abdomen:  Soft, no tenderness, no organomegaly, normal bowel sounds, no organomegaly  Extremities: pulses equal bilaterally  Skin: No bleeding, bruising or rash, normal skin turgor and elasticity  Neurologic: Cranial nerves appear intact with no evidence of facial asymmetry, normal motor and sensory functions in all 4 extremities  Psych: Alert and oriented x 3, normal mood    Results Reviewed:  LAB RESULTS:      Lab 23  0520 23  0832 23  0613 23  1106 23  1059   WBC 12.19* 17.40* 20.38*  --  11.30*   HEMOGLOBIN 8.0* 8.5* 8.3*  --  9.8*   HEMATOCRIT 25.5* 26.9* 26.6*  --  32.5*   PLATELETS 259 241 244  --  264   NEUTROS ABS  11.12* 15.99* 19.05*  --  10.11*   IMMATURE GRANS (ABS) 0.08* 0.10* 0.16*  --  0.04   LYMPHS ABS 0.74 0.82 0.90  --  0.78   MONOS ABS 0.25 0.48 0.25  --  0.29   EOS ABS 0.00 0.00 0.00  --  0.06   MCV 87.0 87.3 88.4  --  91.0   CRP 3.11* 5.46* 10.48* 0.59*  --    PROCALCITONIN  --   --  1.59* 0.04  --    LACTATE  --   --   --  1.1  --    D DIMER QUANT  --   --   --   --  1.07*           Lab 08/23/23  0520 08/22/23  0832 08/21/23  0613 08/20/23  1106   SODIUM 132* 134*  134* 132* 132*   POTASSIUM 4.5 4.7  4.7 4.7 4.8   CHLORIDE 100 102  102 100 99   CO2 24.0 20.0*  22.0 23.0 25.0   ANION GAP 8.0 12.0  10.0 9.0 8.0   BUN 27* 26*  27* 23 26*   CREATININE 1.33* 1.19*  1.19* 1.44* 1.44*   EGFR 38.8* 44.3*  44.3* 35.3* 35.3*   GLUCOSE 97 101*  103* 115* 133*   CALCIUM 8.2* 8.6  8.8 8.3* 8.5*   MAGNESIUM 2.3 1.9 2.1  --    PHOSPHORUS 3.1 3.4 3.9  --            Lab 08/23/23  0520 08/22/23  0832 08/21/23  0613 08/20/23  1106   TOTAL PROTEIN 5.4* 6.0 5.5* 6.4   ALBUMIN 3.3* 3.1* 3.4* 3.4*   GLOBULIN 2.1 2.9 2.1 3.0   ALT (SGPT) 87* 114* 67* 14   AST (SGOT) 48* 88* 67* 19   BILIRUBIN <0.2 0.2 0.3 0.2   ALK PHOS 40 44 43 47           Lab 08/20/23  1106   PROBNP 205.7   HSTROP T 15*                   Lab 08/20/23  1127   PH, ARTERIAL 7.400   PCO2, ARTERIAL 41.1   PO2 ART 65.0*   FIO2 58   HCO3 ART 25.4   BASE EXCESS ART 0.6       Brief Urine Lab Results  (Last result in the past 365 days)        Color   Clarity   Blood   Leuk Est   Nitrite   Protein   CREAT   Urine HCG        08/20/23 1814 Yellow   Cloudy   Negative   Large (3+)   Positive   Trace                   Microbiology Results Abnormal       Procedure Component Value - Date/Time    Blood Culture - Blood, Arm, Left [[id number]]  (Normal) Collected: 08/20/23 1106    Lab Status: Preliminary result Specimen: Blood from Arm, Left Updated: 08/23/23 1130     Blood Culture No growth at 3 days    Blood Culture - Blood, Arm, Right [[id number]]  (Normal) Collected: 08/20/23  1106    Lab Status: Preliminary result Specimen: Blood from Arm, Right Updated: 08/23/23 1130     Blood Culture No growth at 3 days    S. Pneumo Ag Urine or CSF - Urine, Urine, Clean Catch [[id number]]  (Normal) Collected: 08/20/23 1814    Lab Status: Final result Specimen: Urine, Clean Catch Updated: 08/21/23 0941     Strep Pneumo Ag Negative    Legionella Antigen, Urine - Urine, Urine, Clean Catch [[id number]]  (Normal) Collected: 08/20/23 1814    Lab Status: Final result Specimen: Urine, Clean Catch Updated: 08/21/23 0941     LEGIONELLA ANTIGEN, URINE Negative    MRSA Screen, PCR (Inpatient) - Swab, Nares [[id number]]  (Normal) Collected: 08/20/23 1632    Lab Status: Final result Specimen: Swab from Nares Updated: 08/20/23 1859     MRSA PCR Negative    Narrative:      The negative predictive value of this diagnostic test is high and should only be used to consider de-escalating anti-MRSA therapy. A positive result may indicate colonization with MRSA and must be correlated clinically.  MRSA Negative    COVID PRE-OP / PRE-PROCEDURE SCREENING ORDER (NO ISOLATION) - Swab, Nasopharynx [[id number]]  (Normal) Collected: 08/20/23 1107    Lab Status: Final result Specimen: Swab from Nasopharynx Updated: 08/20/23 1208    Narrative:      The following orders were created for panel order COVID PRE-OP / PRE-PROCEDURE SCREENING ORDER (NO ISOLATION) - Swab, Nasopharynx.  Procedure                               Abnormality         Status                     ---------                               -----------         ------                     Respiratory Panel PCR w/...[[id number]]  Normal              Final result                 Please view results for these tests on the individual orders.    Respiratory Panel PCR w/COVID-19(SARS-CoV-2) ERICK/MARTA/ELPIDIO/PAD/COR/MAD/ALMA In-House, NP Swab in UTM/VTM, 3-4 HR TAT - Swab, Nasopharynx [[id number]]  (Normal) Collected: 08/20/23 1107    Lab Status: Final result Specimen: Swab from Nasopharynx  Updated: 08/20/23 1208     ADENOVIRUS, PCR Not Detected     Coronavirus 229E Not Detected     Coronavirus HKU1 Not Detected     Coronavirus NL63 Not Detected     Coronavirus OC43 Not Detected     COVID19 Not Detected     Human Metapneumovirus Not Detected     Human Rhinovirus/Enterovirus Not Detected     Influenza A PCR Not Detected     Influenza B PCR Not Detected     Parainfluenza Virus 1 Not Detected     Parainfluenza Virus 2 Not Detected     Parainfluenza Virus 3 Not Detected     Parainfluenza Virus 4 Not Detected     RSV, PCR Not Detected     Bordetella pertussis pcr Not Detected     Bordetella parapertussis PCR Not Detected     Chlamydophila pneumoniae PCR Not Detected     Mycoplasma pneumo by PCR Not Detected    Narrative:      In the setting of a positive respiratory panel with a viral infection PLUS a negative procalcitonin without other underlying concern for bacterial infection, consider observing off antibiotics or discontinuation of antibiotics and continue supportive care. If the respiratory panel is positive for atypical bacterial infection (Bordetella pertussis, Chlamydophila pneumoniae, or Mycoplasma pneumoniae), consider antibiotic de-escalation to target atypical bacterial infection.            CT Abdomen Pelvis Without Contrast    Result Date: 8/23/2023  CT ABDOMEN PELVIS WO CONTRAST Date of Exam: 8/23/2023 8:19 AM CDT Indication: r/o nephrolithioasis. Pain Comparison: None available. Technique: Axial CT images were obtained of the abdomen and pelvis without the administration of contrast. Reconstructed coronal and sagittal images were also obtained. Automated exposure control and iterative construction methods were used. Findings: LUNG BASES: Bibasilar airspace disease with trace effusions. LIVER:  Unremarkable parenchyma without focal lesion. BILIARY/GALLBLADDER:  Unremarkable SPLEEN:  Unremarkable PANCREAS:  Unremarkable ADRENAL: Few benign left adrenal calcifications. KIDNEYS: There is  bilateral renal cortical atrophy with few simple cysts. There is a calcification within the left central kidney, likely vascular. No calculus identified. GASTROINTESTINAL/MESENTERY: There is scattered colonic diverticulosis. No evidence of acute diverticulitis. There is a moderate to large fecal load. Correlate for signs of constipation. AORTA/IVC:  Normal caliber. RETROPERITONEUM/LYMPH NODES:  Unremarkable REPRODUCTIVE:  Unremarkable BLADDER:  Unremarkable OSSEUS STRUCTURES: Likely chronic mild superior plate compression deformity of L5 reducing vertebral body height by 20 to 30%.     Impression: Impression: 1.Moderate to large fecal load. Correlate for constipation. 2.Renal cortical atrophy without definite renal calculus identified. 3.Minimal bibasilar airspace disease, likely atelectasis with trace effusions. 4.Other incidental nonemergent findings as detailed above. Electronically Signed: Chance Henao MD  8/23/2023 8:36 AM CDT  Workstation ID: [id number]    FL Video Swallow With Speech Single Contrast    Result Date: 8/21/2023  FL VIDEO SWALLOW W SPEECH SINGLE-CONTRAST Date of Exam: 8/21/2023 11:50 AM EDT Indication: dysphagia Comparison: None available. Technique:   The speech pathologist administered food and/or liquid mixed with barium to the patient with cine/video imaging.  Imaging assistance was provided to the speech pathologist and an image was saved. Fluoroscopic Time: 1 minute and 36 seconds Number of Images: 15 associated fluoroscopic loops were saved Findings: Please see speech therapy report for full details and recommendations.     Impression: Impression: Fluoroscopy provided for a modified barium swallow. Please see speech therapy report for full details and recommendations. Electronically Signed: Jacob Saldana MD  8/21/2023 4:45 PM EDT  Workstation ID: [id number]    XR Chest 1 View    Result Date: 8/22/2023  XR CHEST 1 VW Date of Exam: 8/22/2023 8:00 AM EDT Indication: f/u Comparison: 8/20/2023.  Findings: Left chest wall ICD projects in place. Aeration in the left upper lobe is improved with decreased airspace disease. No new focal opacity is present elsewhere. There is no effusion or pneumothorax.     Impression: Impression: Left chest wall ICD projects in place. Aeration in the left upper lobe is improved with decreased airspace disease. No new focal opacity is present elsewhere. There is no effusion or pneumothorax. Electronically Signed: Jacob Saldana MD  8/22/2023 9:15 AM EDT  Workstation ID: [id number]     Results for orders placed during the hospital encounter of 08/20/23    Adult Transthoracic Echo Complete W/ Cont if Necessary Per Protocol    Interpretation Summary    Left ventricular systolic function is mildly decreased. Calculated left ventricular EF = 47.9% Normal left ventricular cavity size and wall thickness noted. There is left ventricular global hypokinesis noted. Left ventricular diastolic function is consistent with (grade I) impaired relaxation.    : Normal right ventricular cavity size and systolic function noted. Electronic lead present in the ventricle.    Normal left atrial size and volume noted. Saline test results are negative.    There is moderate calcification of the aortic valve. The aortic valve was poorly visualized but appears trileaflet. Moderate aortic valve regurgitation is present. No aortic valve stenosis is present.    Mild mitral annular calcification is present. Mild mitral valve regurgitation is present. No significant mitral valve stenosis is present.    The tricuspid valve is grossly normal in structure. Mild tricuspid valve regurgitation is present. Estimated right ventricular systolic pressure from tricuspid regurgitation is normal, 33 mmHg. No tricuspid valve stenosis is present.    Mild dilation of the ascending aorta is present. Ascending aorta = 3.7 cm    There is a small (<1cm) pericardial effusion adjacent to the right atrium and right ventricle.    Current  medications:  Scheduled Meds:amiodarone, 200 mg, Oral, Daily  apixaban, 2.5 mg, Oral, BID  atorvastatin, 40 mg, Oral, Nightly  busPIRone, 5 mg, Oral, Q12H  cholecalciferol, 1,000 Units, Oral, Daily  [START ON 8/28/2023] cyanocobalamin, 1,000 mcg, Intramuscular, Q28 Days  guaiFENesin-dextromethorphan, 10 mL, Oral, Q6H  levETIRAcetam XR, 1,000 mg, Oral, Nightly  levoFLOXacin, 500 mg, Oral, Q24H  lisinopril, 2.5 mg, Oral, Q24H  methylPREDNISolone sodium succinate, 40 mg, Intravenous, Q24H  metoprolol tartrate, 25 mg, Oral, Q12H  pantoprazole, 40 mg, Oral, Q AM  senna, 2 tablet, Oral, BID  senna-docusate sodium, 2 tablet, Oral, BID  sodium chloride, 10 mL, Intravenous, Q12H  traZODone, 100 mg, Oral, Nightly  venlafaxine XR, 75 mg, Oral, Daily      Continuous Infusions:     PRN Meds:.  albuterol    senna-docusate sodium **AND** polyethylene glycol **AND** bisacodyl **AND** bisacodyl    Calcium Replacement - Follow Nurse / BPA Driven Protocol    fluticasone    Magnesium Low Dose Replacement - Follow Nurse / BPA Driven Protocol    melatonin    ondansetron    Phosphorus Replacement - Follow Nurse / BPA Driven Protocol    Potassium Replacement - Follow Nurse / BPA Driven Protocol    sodium chloride    sodium chloride    sodium chloride    Assessment & Plan   Assessment & Plan     Active Hospital Problems    Diagnosis  POA    **Pneumonia [J18.9]  Yes      Resolved Hospital Problems   No resolved problems to display.        Brief Hospital Course to date:  Bibiana Hodges is a 87 y.o. female with medical history of essential hypertension, autonomic dysfunction, atrial fibrillation on amiodarone and Eliquis, chronic debility and multiple hospitalizations manage in the last 2 months with recurrent pneumonia, most recently from 6/29/2023 till 7/10/2023 when she was treated for both community-acquired pneumonia and UTI and was discharged to SNF.  She presented to the hospital today with insomnia x4 nights.  Found to be hypoxic      Assessment and plan:  Acute hypoxia, improved  Recurrent community-acquired pneumonia  Aspiration pneumonia, POA, improved   Chest x-ray concerning for worsening chronic left upper lobe infiltrates which is concerning for developing pneumonia  Continue Levaquin.  Sputum cultures negative to date.  Patient improving with current antibiotic regimen, will continue  Speech evaluated the patient recommended nectar thick liquid   Weaned off oxygen, currently oxygen saturation is 95% room air  She will need to have formal evaluation by pulmonary service and PFT as outpatient.  Referral placed in epic  Follow immunoglobulin panel given her recurrent pneumonia  DuoNebs, incentive spirometry, Mucinex  Echocardiogram with bubble study performed --> negative intracardiac shunting, systolic function slightly depressed at 47%, grade 1 diastolic dysfunction   of note, D-dimer is mildly elevated at 1.0 which is appropriate for her age.  I do not think she has PE particularly she is taking Eliquis at home     Acute urinary tract infection with Citrobacter and E. coli, POA  Both bacteria are sensitive to Levaquin, continue for total of 7 to 10 days    Severe constipation  Fecal impaction  CT scan abdomen with no evidence of nephrolithiasis but did show severe constipation with fecal impaction  Bowel regimen and enema    A-fib  Continue amiodarone and Eliquis     RACH  Creatinine is slightly elevated at 1.4.  Her baseline is around 1  Gentle IV fluids and monitor kidney function     Essential hypertension  Autonomic dysfunction with recurrent presyncopal episodes  From last discharge, her antihypertensive medications were held  Continue to hold on hypertensive medications particularly with her borderline blood pressure     Systolic and diastolic dysfunction, compensated  Echocardiogram showed systolic function of 47% and grade 1 diastolic dysfunction  Patient appears compensated  Given her history of orthostasis, I started her on  low-dose metoprolol 25 mg twice daily and lisinopril 2.5 mg  Needs follow-up with cardiologist as outpatient    Dyslipidemia  Continue statins     Chronic debility  PT and OT consultation     Insomnia  Her main presenting complaint  Continue trazodone  Add melatonin at bedtime    Expected Discharge Location and Transportation: Back to assisted in facility  Expected Discharge   Expected Discharge Date: 8/23/2023; Expected Discharge Time:      DVT prophylaxis:  Medical DVT prophylaxis orders are present.     AM-PAC 6 Clicks Score (PT): 17 (08/21/23 0967)    CODE STATUS:   Code Status and Medical Interventions:   Ordered at: 08/20/23 1445     Level Of Support Discussed With:    Patient     Code Status (Patient has no pulse and is not breathing):    CPR (Attempt to Resuscitate)     Medical Interventions (Patient has pulse or is breathing):    Full Support       Sania Vera MD  08/23/23  Copied text in this note has been reviewed and is accurate as of 08/23/23.

## 2023-08-24 PROBLEM — E43 SEVERE MALNUTRITION: Status: ACTIVE | Noted: 2023-08-24

## 2023-08-24 LAB
ALBUMIN SERPL-MCNC: 3.1 G/DL (ref 3.5–5.2)
ALBUMIN/GLOB SERPL: 1.3 G/DL
ALP SERPL-CCNC: 38 U/L (ref 39–117)
ALT SERPL W P-5'-P-CCNC: 60 U/L (ref 1–33)
ANION GAP SERPL CALCULATED.3IONS-SCNC: 10 MMOL/L (ref 5–15)
AST SERPL-CCNC: 27 U/L (ref 1–32)
BASOPHILS # BLD AUTO: 0.02 10*3/MM3 (ref 0–0.2)
BASOPHILS NFR BLD AUTO: 0.1 % (ref 0–1.5)
BILIRUB SERPL-MCNC: 0.2 MG/DL (ref 0–1.2)
BUN SERPL-MCNC: 34 MG/DL (ref 8–23)
BUN/CREAT SERPL: 22.1 (ref 7–25)
CALCIUM SPEC-SCNC: 8.3 MG/DL (ref 8.6–10.5)
CHLORIDE SERPL-SCNC: 102 MMOL/L (ref 98–107)
CO2 SERPL-SCNC: 23 MMOL/L (ref 22–29)
CREAT SERPL-MCNC: 1.54 MG/DL (ref 0.57–1)
DEPRECATED RDW RBC AUTO: 57.1 FL (ref 37–54)
EGFRCR SERPLBLD CKD-EPI 2021: 32.5 ML/MIN/1.73
EOSINOPHIL # BLD AUTO: 0 10*3/MM3 (ref 0–0.4)
EOSINOPHIL NFR BLD AUTO: 0 % (ref 0.3–6.2)
ERYTHROCYTE [DISTWIDTH] IN BLOOD BY AUTOMATED COUNT: 17.2 % (ref 12.3–15.4)
GLOBULIN UR ELPH-MCNC: 2.4 GM/DL
GLUCOSE SERPL-MCNC: 95 MG/DL (ref 65–99)
HCT VFR BLD AUTO: 27.5 % (ref 34–46.6)
HGB BLD-MCNC: 8.4 G/DL (ref 12–15.9)
IMM GRANULOCYTES # BLD AUTO: 0.11 10*3/MM3 (ref 0–0.05)
IMM GRANULOCYTES NFR BLD AUTO: 0.7 % (ref 0–0.5)
LYMPHOCYTES # BLD AUTO: 0.6 10*3/MM3 (ref 0.7–3.1)
LYMPHOCYTES NFR BLD AUTO: 3.5 % (ref 19.6–45.3)
MAGNESIUM SERPL-MCNC: 2.5 MG/DL (ref 1.6–2.4)
MCH RBC QN AUTO: 27.5 PG (ref 26.6–33)
MCHC RBC AUTO-ENTMCNC: 30.5 G/DL (ref 31.5–35.7)
MCV RBC AUTO: 90.2 FL (ref 79–97)
MONOCYTES # BLD AUTO: 0.4 10*3/MM3 (ref 0.1–0.9)
MONOCYTES NFR BLD AUTO: 2.4 % (ref 5–12)
NEUTROPHILS NFR BLD AUTO: 15.78 10*3/MM3 (ref 1.7–7)
NEUTROPHILS NFR BLD AUTO: 93.3 % (ref 42.7–76)
NRBC BLD AUTO-RTO: 0 /100 WBC (ref 0–0.2)
PHOSPHATE SERPL-MCNC: 3.7 MG/DL (ref 2.5–4.5)
PLATELET # BLD AUTO: 257 10*3/MM3 (ref 140–450)
PMV BLD AUTO: 10.4 FL (ref 6–12)
POTASSIUM SERPL-SCNC: 4.4 MMOL/L (ref 3.5–5.2)
PROT SERPL-MCNC: 5.5 G/DL (ref 6–8.5)
RBC # BLD AUTO: 3.05 10*6/MM3 (ref 3.77–5.28)
SODIUM SERPL-SCNC: 135 MMOL/L (ref 136–145)
WBC NRBC COR # BLD: 16.91 10*3/MM3 (ref 3.4–10.8)

## 2023-08-24 PROCEDURE — 25010000002 ONDANSETRON PER 1 MG: Performed by: INTERNAL MEDICINE

## 2023-08-24 PROCEDURE — 97116 GAIT TRAINING THERAPY: CPT

## 2023-08-24 PROCEDURE — 84100 ASSAY OF PHOSPHORUS: CPT | Performed by: INTERNAL MEDICINE

## 2023-08-24 PROCEDURE — 85025 COMPLETE CBC W/AUTO DIFF WBC: CPT | Performed by: INTERNAL MEDICINE

## 2023-08-24 PROCEDURE — 97535 SELF CARE MNGMENT TRAINING: CPT

## 2023-08-24 PROCEDURE — 83735 ASSAY OF MAGNESIUM: CPT | Performed by: INTERNAL MEDICINE

## 2023-08-24 PROCEDURE — 80053 COMPREHEN METABOLIC PANEL: CPT | Performed by: INTERNAL MEDICINE

## 2023-08-24 RX ORDER — SODIUM CHLORIDE 9 MG/ML
75 INJECTION, SOLUTION INTRAVENOUS CONTINUOUS
Status: DISCONTINUED | OUTPATIENT
Start: 2023-08-24 | End: 2023-08-25 | Stop reason: HOSPADM

## 2023-08-24 RX ADMIN — Medication 1000 UNITS: at 08:25

## 2023-08-24 RX ADMIN — GUAIFENESIN AND DEXTROMETHORPHAN 10 ML: 100; 10 SYRUP ORAL at 05:36

## 2023-08-24 RX ADMIN — VENLAFAXINE HYDROCHLORIDE 75 MG: 75 CAPSULE, EXTENDED RELEASE ORAL at 08:25

## 2023-08-24 RX ADMIN — BUSPIRONE HYDROCHLORIDE 5 MG: 5 TABLET ORAL at 08:25

## 2023-08-24 RX ADMIN — LISINOPRIL 2.5 MG: 2.5 TABLET ORAL at 08:25

## 2023-08-24 RX ADMIN — Medication 10 ML: at 21:39

## 2023-08-24 RX ADMIN — LEVETIRACETAM 1000 MG: 500 TABLET, EXTENDED RELEASE ORAL at 21:38

## 2023-08-24 RX ADMIN — ATORVASTATIN CALCIUM 40 MG: 40 TABLET, FILM COATED ORAL at 21:39

## 2023-08-24 RX ADMIN — METOPROLOL TARTRATE 25 MG: 25 TABLET, FILM COATED ORAL at 08:25

## 2023-08-24 RX ADMIN — GUAIFENESIN AND DEXTROMETHORPHAN 10 ML: 100; 10 SYRUP ORAL at 14:24

## 2023-08-24 RX ADMIN — PANTOPRAZOLE SODIUM 40 MG: 40 TABLET, DELAYED RELEASE ORAL at 05:36

## 2023-08-24 RX ADMIN — METOPROLOL TARTRATE 25 MG: 25 TABLET, FILM COATED ORAL at 21:39

## 2023-08-24 RX ADMIN — APIXABAN 2.5 MG: 2.5 TABLET, FILM COATED ORAL at 21:39

## 2023-08-24 RX ADMIN — TRAZODONE HYDROCHLORIDE 100 MG: 100 TABLET ORAL at 23:15

## 2023-08-24 RX ADMIN — BUSPIRONE HYDROCHLORIDE 5 MG: 5 TABLET ORAL at 21:39

## 2023-08-24 RX ADMIN — SODIUM CHLORIDE 500 ML: 9 INJECTION, SOLUTION INTRAVENOUS at 14:12

## 2023-08-24 RX ADMIN — AMIODARONE HYDROCHLORIDE 200 MG: 200 TABLET ORAL at 08:25

## 2023-08-24 RX ADMIN — GUAIFENESIN AND DEXTROMETHORPHAN 10 ML: 100; 10 SYRUP ORAL at 17:50

## 2023-08-24 RX ADMIN — ONDANSETRON 4 MG: 2 INJECTION INTRAMUSCULAR; INTRAVENOUS at 18:43

## 2023-08-24 RX ADMIN — LEVOFLOXACIN 500 MG: 500 TABLET, FILM COATED ORAL at 17:50

## 2023-08-24 RX ADMIN — APIXABAN 2.5 MG: 2.5 TABLET, FILM COATED ORAL at 08:25

## 2023-08-24 NOTE — CASE MANAGEMENT/SOCIAL WORK
Continued Stay Note  [facility]     Patient Name: Bibiana Hodges  MRN: [id number]  Today's Date: 8/24/2023    Admit Date: 8/20/2023    Plan: [facility]    Discharge Plan       Row Name 08/24/23 1634       Plan    Plan [facility]     Patient/Family in Agreement with Plan yes    Plan Comments Patient has been approved to go to [facility] . Son to transport tomorrow. Pharmacy changed to [facility]. CM called son and spoke with patient at bedside. Both are updated on plan. CM will follow.    Final Discharge Disposition Code 03 - skilled nursing facility (SNF)                   Discharge Codes    No documentation.                 Expected Discharge Date and Time       Expected Discharge Date Expected Discharge Time    Aug 23, 2023               Jess Perdue RN

## 2023-08-24 NOTE — PLAN OF CARE
Goal Outcome Evaluation:  Plan of Care Reviewed With: patient        Progress: declining  Outcome Evaluation: Patient continues to be limited by weakness, poor activity tolerance, and dizziness affecting functional mobility. Mobility limited by orthostatic hypotension today, with BP dropping as low as 82/55. Continue to recommend SNF at d/c.      Anticipated Discharge Disposition (PT): skilled nursing facility

## 2023-08-24 NOTE — PLAN OF CARE
Goal Outcome Evaluation:  Plan of Care Reviewed With: patient        Progress: improving  Outcome Evaluation: Pt demonstrated improved functional mobility using the RW this session and improved LB dressing. No AE warranted at this time. Cont POC.      Anticipated Discharge Disposition (OT): skilled nursing facility

## 2023-08-24 NOTE — SIGNIFICANT NOTE
Nurse notified that patient was lowered to floor while up with PT. Patient complaining of back pain, declined pain medication. Provider, charge nurse, director notified.

## 2023-08-24 NOTE — THERAPY TREATMENT NOTE
Patient Name: Bibiana Hodges  : 1935    MRN: [id number]                              Today's Date: 2023       Admit Date: 2023    Visit Dx:     ICD-10-CM ICD-9-CM   1. Pneumonia of left lung due to infectious organism, unspecified part of lung  J18.9 486   2. Acute respiratory failure with hypoxia  J96.01 518.81   3. Simple chronic bronchitis  J41.0 491.0   4. Pharyngeal dysphagia  R13.13 787.23     Patient Active Problem List   Diagnosis    Dysautonomia orthostatic hypotension syndrome    Hypertension    Flu vaccine need    Streptococcus pneumoniae vaccination indicated    Pneumonia     Past Medical History:   Diagnosis Date    Constipation     Depression     Dysautonomia orthostatic hypotension syndrome     Generalized osteoarthritis     GERD (gastroesophageal reflux disease)     s/p Nissen    Hypertension     Insomnia     Osteoarthrosis, shoulder region     Skin ulcer of scalp     Thrombophlebitis of arm     Tremor     Vitamin B12 deficiency      Past Surgical History:   Procedure Laterality Date    DILATATION AND CURETTAGE      HERNIA REPAIR      HIP SURGERY      SHOULDER SURGERY      Right      General Information       Row Name 23 0927          OT Time and Intention    Document Type therapy note (daily note)  -AN     Mode of Treatment occupational therapy  -AN       Row Name 23 0927          General Information    Patient Profile Reviewed yes  -AN     Existing Precautions/Restrictions fall;oxygen therapy device and L/min  -AN     Barriers to Rehab medically complex;previous functional deficit  -AN       Row Name 2327          Cognition    Orientation Status (Cognition) oriented x 3  -AN       Row Name 2327          Safety Issues, Functional Mobility    Safety Issues Affecting Function (Mobility) safety precaution awareness;safety precautions follow-through/compliance  -AN     Impairments Affecting Function (Mobility) balance;coordination;endurance/activity  tolerance;strength;shortness of breath  -AN               User Key  (r) = Recorded By, (t) = Taken By, (c) = Cosigned By      Initials Name Provider Type    AN Talisha Perry OT Occupational Therapist                     Mobility/ADL's       Row Name 08/24/23 0928          Bed Mobility    Bed Mobility supine-sit  -AN     Supine-Sit [place] (Bed Mobility) contact guard  -AN     Bed Mobility, Safety Issues decreased use of arms for pushing/pulling;decreased use of legs for bridging/pushing  -AN     Assistive Device (Bed Mobility) head of bed elevated  -AN     Comment, (Bed Mobility) cues for sequencing of steps  -AN       Row Name 08/24/23 0928          Transfers    Transfers sit-stand transfer;stand-sit transfer  -AN     Comment, (Transfers) cues for hand placement and transfer technique using the RW  -AN       Row Name 08/24/23 0928          Sit-Stand Transfer    Sit-Stand [place] (Transfers) contact guard  -AN     Assistive Device (Sit-Stand Transfers) walker, front-wheeled  -AN       Row Name 08/24/23 0928          Stand-Sit Transfer    Stand-Sit [place] (Transfers) contact guard  -AN     Assistive Device (Stand-Sit Transfers) walker, front-wheeled  -AN       Row Name 08/24/23 0928          Functional Mobility    Functional Mobility- Ind. Level contact guard assist  -AN     Functional Mobility- Device walker, front-wheeled  -AN     Functional Mobility-Distance (Feet) --  <household distance  -AN     Functional Mobility- Comment pt ambulated short distance within the room using a RW with CGA for balance and safety  -AN       Row Name 08/24/23 0928          Activities of Daily Living    BADL Assessment/Intervention lower body dressing;grooming  -AN       Row Name 08/24/23 0928          Lower Body Dressing Assessment/Training    [place] Level (Lower Body Dressing) don;doff;socks;supervision  -AN     Position (Lower Body Dressing) supported sitting  -AN     Comment, (Lower Body Dressing) pt  demonstrated improved LB dressing this session, no AE warranted at this time  -AN       Row Name 08/24/23 0928          Grooming Assessment/Training    [place] Level (Grooming) hair care, combing/brushing;wash face, hands;minimum assist (75% patient effort)    Prolonged hair brushing and washing performed due to excessive knots and tangles. -AN     Position (Grooming) supported sitting  -AN               User Key  (r) = Recorded By, (t) = Taken By, (c) = Cosigned By      Initials Name Provider Type    Talisha Alvarado OT Occupational Therapist                   Obj/Interventions       Row Name 08/24/23 0930          Balance    Balance Assessment sitting static balance;sitting dynamic balance;sit to stand dynamic balance;standing static balance;standing dynamic balance  -AN     Static Sitting Balance standby assist  -AN     Dynamic Sitting Balance contact guard  -AN     Position, Sitting Balance sitting edge of bed  -AN     Sit to Stand Dynamic Balance verbal cues;contact guard  -AN     Static Standing Balance contact guard  -AN     Dynamic Standing Balance contact guard  -AN     Position/Device Used, Standing Balance walker, front-wheeled  -AN     Balance Interventions standing;sit to stand;supported;static;dynamic;minimal challenge;occupation based/functional task  -AN               User Key  (r) = Recorded By, (t) = Taken By, (c) = Cosigned By      Initials Name Provider Type    Talisha Alvarado OT Occupational Therapist                   Goals/Plan    No documentation.                  Clinical Impression       Row Name 08/24/23 0931          Pain Assessment    Pretreatment Pain Rating 0/10 - no pain  -AN     Posttreatment Pain Rating 0/10 - no pain  -AN     Pre/Posttreatment Pain Comment asymptomatic  -AN     Pain Intervention(s) Repositioned;Ambulation/increased activity  -AN       Row Name 08/24/23 0931          Plan of Care Review    Plan of Care Reviewed With patient  -AN     Progress  improving  -AN     Outcome Evaluation Pt demonstrated improved functional mobility using the RW this session and improved LB dressing. No AE warranted at this time. Cont POC.  -AN       Row Name 08/24/23 0931          Therapy Plan Review/Discharge Plan (OT)    Anticipated Discharge Disposition (OT) skilled nursing facility  -AN       Row Name 08/24/23 0931          Vital Signs    O2 Delivery Pre Treatment room air  -AN     O2 Delivery Intra Treatment room air  -AN     O2 Delivery Post Treatment room air  -AN     Pre Patient Position Supine  -AN     Intra Patient Position Standing  -AN     Post Patient Position Sitting  -AN       Row Name 08/24/23 0931          Positioning and Restraints    Pre-Treatment Position in bed  -AN     Post Treatment Position chair  -AN     In Chair notified nsg;reclined;encouraged to call for assist;call light within reach;exit alarm on;waffle cushion;legs elevated  -AN               User Key  (r) = Recorded By, (t) = Taken By, (c) = Cosigned By      Initials Name Provider Type    Talisha Alvarado OT Occupational Therapist                   Outcome Measures       Row Name 08/24/23 0932          How much help from another is currently needed...    Putting on and taking off regular lower body clothing? 3  -AN     Bathing (including washing, rinsing, and drying) 2  -AN     Toileting (which includes using toilet bed pan or urinal) 2  -AN     Putting on and taking off regular upper body clothing 3  -AN     Taking care of personal grooming (such as brushing teeth) 3  -AN     Eating meals 3  -AN     AM-PAC 6 Clicks Score (OT) 16  -AN       [facility] Name 08/24/23 0932          Functional Assessment    Outcome Measure Options AM-PAC 6 Clicks Daily Activity (OT)  -AN               User Key  (r) = Recorded By, (t) = Taken By, (c) = Cosigned By      Initials Name Provider Type    Talisha Alvarado OT Occupational Therapist                    Occupational Therapy Education       Title: PT OT SLP  Therapies (In Progress)       Topic: Occupational Therapy (In Progress)       Point: ADL training (Done)       Description:   Instruct learner(s) on proper safety adaptation and remediation techniques during self care or transfers.   Instruct in proper use of assistive devices.                  Learning Progress Summary             Patient Acceptance, E, VU by  at 8/24/2023 0932    Acceptance, E, NR by  at 8/21/2023 1057                         Point: Home exercise program (Not Started)       Description:   Instruct learner(s) on appropriate technique for monitoring, assisting and/or progressing therapeutic exercises/activities.                  Learner Progress:  Not documented in this visit.              Point: Precautions (Done)       Description:   Instruct learner(s) on prescribed precautions during self-care and functional transfers.                  Learning Progress Summary             Patient Acceptance, E, VU by  at 8/24/2023 0932    Acceptance, E, NR by  at 8/21/2023 1057                         Point: Body mechanics (Done)       Description:   Instruct learner(s) on proper positioning and spine alignment during self-care, functional mobility activities and/or exercises.                  Learning Progress Summary             Patient Acceptance, E, VU by  at 8/24/2023 0932    Acceptance, E, NR by  at 8/21/2023 1057                                         User Key       Initials Effective Dates Name Provider Type Discipline     10/14/22 -  Terrie Tenorio OT Occupational Therapist OT     09/21/21 -  Talisha Perry OT Occupational Therapist OT                  OT Recommendation and Plan     Plan of Care Review  Plan of Care Reviewed With: patient  Progress: improving  Outcome Evaluation: Pt demonstrated improved functional mobility using the RW this session and improved LB dressing. No AE warranted at this time. Cont POC.     Time Calculation:         Time Calculation- OT       Row  Name 08/24/23 0932             Time Calculation- OT    OT Start Time 0815  -AN      OT Received On 08/24/23  -AN         Timed Charges    81685 - OT Self Care/Mgmt Minutes 24  -AN         Total Minutes    Timed Charges Total Minutes 24  -AN       Total Minutes 24  -AN                User Key  (r) = Recorded By, (t) = Taken By, (c) = Cosigned By      Initials Name Provider Type    AN Talisha Perry OT Occupational Therapist                  Therapy Charges for Today       Code Description Service Date Service Provider Modifiers Qty    39926328124 HC OT SELF CARE/MGMT/TRAIN EA 15 MIN 8/24/2023 Talisha Perry OT GO 2                 Talisha Perry OT  8/24/2023

## 2023-08-24 NOTE — CONSULTS
Clinical Nutrition   Nutrition Support Assessment  Reason for Visit: Consult      Patient Name: Bibiana Hodges  YOB: 1935  MRN: [id number]  Date of Encounter: 08/24/23 11:24 EDT  Admission date: 8/20/2023    Comments:  Pt meets criteria for severe malnutrition in the context of chronic illness indicated by severe muscle wasting and subcutaneous fat loss, po intake </=75% X >/=1 month. Pending measured wt to verify any wt changes.    Ordered Boost+ BID; encourage po intake. RD obtained food preferences and added to diet orders.     Nutrition Assessment   Admission Diagnosis:  Pneumonia [J18.9]      Problem List:    Pneumonia        PMH:   She  has a past medical history of Constipation, Depression, Dysautonomia orthostatic hypotension syndrome, Generalized osteoarthritis, GERD (gastroesophageal reflux disease), Hypertension, Insomnia, Osteoarthrosis, shoulder region, Skin ulcer of scalp, Thrombophlebitis of arm, Tremor, and Vitamin B12 deficiency.    PSH:  She  has a past surgical history that includes Hernia repair; Dilation and curettage of uterus; Hip surgery; and Shoulder surgery.    Applicable Nutrition Concerns:   Skin:  Oral:  GI:    Applicable Interval History:     8/23-SLP Diet Recommendation: regular textures, nectar thick liquids, water between meals after oral care, with supervision, (extra sauce/gray for meat; no ice in thickened drinks)     Reported/Observed/Food/Nutrition Related History:     RD rec'd consult for reported poor po intake and preferences for dysphagia. Pt reports diminished appetite x 1 yr-pt states she used to cook meals but over the past year has been living w/dtr who does not cook, pt reports eating mainly frozen meals. Pt reports eating <50% of usual intake. Pt does report moving to SNF ~3 months ago though states she eats cereal for breakfast and 2 ice creams daily 2/2 distaste for food served. RD reviewed preferences w/pt and ordered ONS. NKFA.  "    Anthropometrics     Flowsheet Rows      Flowsheet Row First Filed Value   Admission Height 170.2 cm (67\") Documented at 08/20/2023 1051   Admission Weight 59 kg (130 lb) Documented at 08/20/2023 1051              Height: Height: 170.2 cm (67.01\")  Last Filed Weight: Weight: 59 kg (130 lb 1.1 oz) (08/21/23 1056)  Method: Weight Method: Stated  BMI: BMI (Calculated): 20.4  BMI classification: Normal: 18.5-24.9kg/m2    UBW: 130lbs   Weight change:   requested measured wt as able.    Weight       Weight (kg) Weight (lbs) Weight Method Visit Report   10/21/2016 61.236 kg  135 lb   --    5/26/2023 57.607 kg  127 lb  Stated     6/29/2023 57.607 kg  127 lb  Stated     8/20/2023 58.968 kg  130 lb  Stated     8/21/2023 59 kg  130 lb 1.1 oz        Nutrition Focused Physical Exam     Date:    8/24    Patient meets criteria for malnutrition diagnosis, see MSA note.    Current Nutrition Prescription   PO: Diet: Regular/House Diet; Feeding Assistance - Nursing; Texture: Regular Texture (IDDSI 7); Fluid Consistency: [place] Thick  Oral Nutrition Supplement:   Intake: 35% x 7 meals      Nutrition Diagnosis   Date:  8/24            Updated:    Problem Malnutrition chronic severe   Etiology Energy intake <energy needs 2/2 chronic debility, advanced age   Signs/Symptoms Severe muscle wasting and subcutaneous fat loss, </=75% x >/=1 month   Status:     Date:    8/24   Updated:     Problem Swallowing difficulty   Etiology dysphagia   Signs/Symptoms Regular texture/nectar thick    Status:     Goal:   General: Nutrition to support treatment  PO: Increase intake  EN/PN: N/A    Nutrition Intervention      Follow treatment progress, Care plan reviewed, Interview for preferences, Encourage intake, Supplement provided    Pt likes: yogurt, cottage cheese with peaches, baked potatoes (reg or sweet) w/extra butter and sour cream, grapes, pineapples, ice cream  Pt dislikes: oranges, eggs    Boost+ junito BID  Pt needs extra sauce/gravy " w/meals.    Monitoring/Evaluation:   Per protocol, PO intake, Supplement intake, Swallow function      Judy Hilton RD  Time Spent: 35

## 2023-08-24 NOTE — PROGRESS NOTES
[facility]  PROGRESS NOTE    Patient Name: Bibiana Hodges  : 1935  MRN: [id number]    Date of Admission: 2023  Primary Care Physician: Katarina Oviedo MD    Subjective   Subjective     CC:  Follow-up for pneumonia    HPI:  I have seen and evaluated the patient this morning.  Feeling that she is slowly improving.  Got very weak this morning when she stood up and became orthostatic.  Had large bowel movement yesterday    ROS:  General : no fevers, chills  CVS: No chest pain, palpitations  Respiratory: No cough, dyspnea  GI: No N/V/D, abd pain  10 point review of systems is negative except for what is mentioned in HPI    Objective   Objective     Vital Signs:   Temp:  [96.3 øF (35.7 øC)-97.7 øF (36.5 øC)] 96.8 øF (36 øC)  Heart Rate:  [59-62] 60  Resp:  [16-18] 16  BP: ()/(54-79) 117/63     Physical Exam:  General: Chronically ill looking and debilitated  Head: Atraumatic and normocephalic  Eyes: No Icterus. No pallor  Ears:  Ears appear intact with no abnormalities noted  Throat: No oral lesions, no thrush  Neck: Supple, trachea midline  Lungs: Left upper lobe coarse crackles.  Diminished air entry both lung fields.  Scattered wheezing.    Heart:  Normal S1 and S2, no murmur, no gallop, No JVD, no lower extremity swelling  Abdomen:  Soft, no tenderness, no organomegaly, normal bowel sounds, no organomegaly  Extremities: pulses equal bilaterally  Skin: No bleeding, bruising or rash, normal skin turgor and elasticity  Neurologic: Cranial nerves appear intact with no evidence of facial asymmetry, normal motor and sensory functions in all 4 extremities  Psych: Alert and oriented x 3, normal mood    Results Reviewed:  LAB RESULTS:      Lab 23  0444 23  0520 23  0832 23  0613 23  1106 23  1059   WBC 16.91* 12.19* 17.40* 20.38*  --  11.30*   HEMOGLOBIN 8.4* 8.0* 8.5* 8.3*  --  9.8*   HEMATOCRIT 27.5* 25.5* 26.9* 26.6*  --   32.5*   PLATELETS 257 259 241 244  --  264   NEUTROS ABS 15.78* 11.12* 15.99* 19.05*  --  10.11*   IMMATURE GRANS (ABS) 0.11* 0.08* 0.10* 0.16*  --  0.04   LYMPHS ABS 0.60* 0.74 0.82 0.90  --  0.78   MONOS ABS 0.40 0.25 0.48 0.25  --  0.29   EOS ABS 0.00 0.00 0.00 0.00  --  0.06   MCV 90.2 87.0 87.3 88.4  --  91.0   CRP  --  3.11* 5.46* 10.48* 0.59*  --    PROCALCITONIN  --   --   --  1.59* 0.04  --    LACTATE  --   --   --   --  1.1  --    D DIMER QUANT  --   --   --   --   --  1.07*           Lab 08/23/23  0520 08/22/23  0832 08/21/23  0613 08/20/23  1106   SODIUM 132* 134*  134* 132* 132*   POTASSIUM 4.5 4.7  4.7 4.7 4.8   CHLORIDE 100 102  102 100 99   CO2 24.0 20.0*  22.0 23.0 25.0   ANION GAP 8.0 12.0  10.0 9.0 8.0   BUN 27* 26*  27* 23 26*   CREATININE 1.33* 1.19*  1.19* 1.44* 1.44*   EGFR 38.8* 44.3*  44.3* 35.3* 35.3*   GLUCOSE 97 101*  103* 115* 133*   CALCIUM 8.2* 8.6  8.8 8.3* 8.5*   MAGNESIUM 2.3 1.9 2.1  --    PHOSPHORUS 3.1 3.4 3.9  --            Lab 08/23/23  0520 08/22/23  0832 08/21/23  0613 08/20/23  1106   TOTAL PROTEIN 5.4* 6.0 5.5* 6.4   ALBUMIN 3.3* 3.1* 3.4* 3.4*   GLOBULIN 2.1 2.9 2.1 3.0   ALT (SGPT) 87* 114* 67* 14   AST (SGOT) 48* 88* 67* 19   BILIRUBIN <0.2 0.2 0.3 0.2   ALK PHOS 40 44 43 47           Lab 08/20/23  1106   PROBNP 205.7   HSTROP T 15*                   Lab 08/20/23  1127   PH, ARTERIAL 7.400   PCO2, ARTERIAL 41.1   PO2 ART 65.0*   FIO2 58   HCO3 ART 25.4   BASE EXCESS ART 0.6       Brief Urine Lab Results  (Last result in the past 365 days)        Color   Clarity   Blood   Leuk Est   Nitrite   Protein   CREAT   Urine HCG        08/20/23 1814 Yellow   Cloudy   Negative   Large (3+)   Positive   Trace                   Microbiology Results Abnormal       Procedure Component Value - Date/Time    Blood Culture - Blood, Arm, Left [[id number]]  (Normal) Collected: 08/20/23 1106    Lab Status: Preliminary result Specimen: Blood from Arm, Left Updated: 08/23/23 1130      Blood Culture No growth at 3 days    Blood Culture - Blood, Arm, Right [[id number]]  (Normal) Collected: 08/20/23 1106    Lab Status: Preliminary result Specimen: Blood from Arm, Right Updated: 08/23/23 1130     Blood Culture No growth at 3 days    S. Pneumo Ag Urine or CSF - Urine, Urine, Clean Catch [[id number]]  (Normal) Collected: 08/20/23 1814    Lab Status: Final result Specimen: Urine, Clean Catch Updated: 08/21/23 0941     Strep Pneumo Ag Negative    Legionella Antigen, Urine - Urine, Urine, Clean Catch [[id number]]  (Normal) Collected: 08/20/23 1814    Lab Status: Final result Specimen: Urine, Clean Catch Updated: 08/21/23 0941     LEGIONELLA ANTIGEN, URINE Negative    MRSA Screen, PCR (Inpatient) - Swab, Nares [[id number]]  (Normal) Collected: 08/20/23 1632    Lab Status: Final result Specimen: Swab from Nares Updated: 08/20/23 1859     MRSA PCR Negative    Narrative:      The negative predictive value of this diagnostic test is high and should only be used to consider de-escalating anti-MRSA therapy. A positive result may indicate colonization with MRSA and must be correlated clinically.  MRSA Negative    COVID PRE-OP / PRE-PROCEDURE SCREENING ORDER (NO ISOLATION) - Swab, Nasopharynx [[id number]]  (Normal) Collected: 08/20/23 1107    Lab Status: Final result Specimen: Swab from Nasopharynx Updated: 08/20/23 1208    Narrative:      The following orders were created for panel order COVID PRE-OP / PRE-PROCEDURE SCREENING ORDER (NO ISOLATION) - Swab, Nasopharynx.  Procedure                               Abnormality         Status                     ---------                               -----------         ------                     Respiratory Panel PCR w/...[[id number]]  Normal              Final result                 Please view results for these tests on the individual orders.    Respiratory Panel PCR w/COVID-19(SARS-CoV-2) ERICK/MARTA/ELPIDIO/PAD/COR/MAD/ALMA In-House, NP Swab in UTM/VTM, 3-4 HR TAT - Swab,  Nasopharynx [[id number]]  (Normal) Collected: 08/20/23 1107    Lab Status: Final result Specimen: Swab from Nasopharynx Updated: 08/20/23 1208     ADENOVIRUS, PCR Not Detected     Coronavirus 229E Not Detected     Coronavirus HKU1 Not Detected     Coronavirus NL63 Not Detected     Coronavirus OC43 Not Detected     COVID19 Not Detected     Human Metapneumovirus Not Detected     Human Rhinovirus/Enterovirus Not Detected     Influenza A PCR Not Detected     Influenza B PCR Not Detected     Parainfluenza Virus 1 Not Detected     Parainfluenza Virus 2 Not Detected     Parainfluenza Virus 3 Not Detected     Parainfluenza Virus 4 Not Detected     RSV, PCR Not Detected     Bordetella pertussis pcr Not Detected     Bordetella parapertussis PCR Not Detected     Chlamydophila pneumoniae PCR Not Detected     Mycoplasma pneumo by PCR Not Detected    Narrative:      In the setting of a positive respiratory panel with a viral infection PLUS a negative procalcitonin without other underlying concern for bacterial infection, consider observing off antibiotics or discontinuation of antibiotics and continue supportive care. If the respiratory panel is positive for atypical bacterial infection (Bordetella pertussis, Chlamydophila pneumoniae, or Mycoplasma pneumoniae), consider antibiotic de-escalation to target atypical bacterial infection.            CT Abdomen Pelvis Without Contrast    Result Date: 8/23/2023  CT ABDOMEN PELVIS WO CONTRAST Date of Exam: 8/23/2023 8:19 AM CDT Indication: r/o nephrolithioasis. Pain Comparison: None available. Technique: Axial CT images were obtained of the abdomen and pelvis without the administration of contrast. Reconstructed coronal and sagittal images were also obtained. Automated exposure control and iterative construction methods were used. Findings: LUNG BASES: Bibasilar airspace disease with trace effusions. LIVER:  Unremarkable parenchyma without focal lesion. BILIARY/GALLBLADDER:  Unremarkable  SPLEEN:  Unremarkable PANCREAS:  Unremarkable ADRENAL: Few benign left adrenal calcifications. KIDNEYS: There is bilateral renal cortical atrophy with few simple cysts. There is a calcification within the left central kidney, likely vascular. No calculus identified. GASTROINTESTINAL/MESENTERY: There is scattered colonic diverticulosis. No evidence of acute diverticulitis. There is a moderate to large fecal load. Correlate for signs of constipation. AORTA/IVC:  Normal caliber. RETROPERITONEUM/LYMPH NODES:  Unremarkable REPRODUCTIVE:  Unremarkable BLADDER:  Unremarkable OSSEUS STRUCTURES: Likely chronic mild superior plate compression deformity of L5 reducing vertebral body height by 20 to 30%.     Impression: Impression: 1.Moderate to large fecal load. Correlate for constipation. 2.Renal cortical atrophy without definite renal calculus identified. 3.Minimal bibasilar airspace disease, likely atelectasis with trace effusions. 4.Other incidental nonemergent findings as detailed above. Electronically Signed: Chance Henao MD  8/23/2023 8:36 AM CDT  Workstation ID: [id number]    XR Chest 1 View    Result Date: 8/22/2023  XR CHEST 1 VW Date of Exam: 8/22/2023 8:00 AM EDT Indication: f/u Comparison: 8/20/2023. Findings: Left chest wall ICD projects in place. Aeration in the left upper lobe is improved with decreased airspace disease. No new focal opacity is present elsewhere. There is no effusion or pneumothorax.     Impression: Impression: Left chest wall ICD projects in place. Aeration in the left upper lobe is improved with decreased airspace disease. No new focal opacity is present elsewhere. There is no effusion or pneumothorax. Electronically Signed: Jacob Saldana MD  8/22/2023 9:15 AM EDT  Workstation ID: [id number]     Results for orders placed during the hospital encounter of 08/20/23    Adult Transthoracic Echo Complete W/ Cont if Necessary Per Protocol    Interpretation Summary    Left ventricular systolic function  is mildly decreased. Calculated left ventricular EF = 47.9% Normal left ventricular cavity size and wall thickness noted. There is left ventricular global hypokinesis noted. Left ventricular diastolic function is consistent with (grade I) impaired relaxation.    : Normal right ventricular cavity size and systolic function noted. Electronic lead present in the ventricle.    Normal left atrial size and volume noted. Saline test results are negative.    There is moderate calcification of the aortic valve. The aortic valve was poorly visualized but appears trileaflet. Moderate aortic valve regurgitation is present. No aortic valve stenosis is present.    Mild mitral annular calcification is present. Mild mitral valve regurgitation is present. No significant mitral valve stenosis is present.    The tricuspid valve is grossly normal in structure. Mild tricuspid valve regurgitation is present. Estimated right ventricular systolic pressure from tricuspid regurgitation is normal, 33 mmHg. No tricuspid valve stenosis is present.    Mild dilation of the ascending aorta is present. Ascending aorta = 3.7 cm    There is a small (<1cm) pericardial effusion adjacent to the right atrium and right ventricle.    Current medications:  Scheduled Meds:amiodarone, 200 mg, Oral, Daily  apixaban, 2.5 mg, Oral, BID  atorvastatin, 40 mg, Oral, Nightly  busPIRone, 5 mg, Oral, Q12H  cholecalciferol, 1,000 Units, Oral, Daily  [START ON 8/28/2023] cyanocobalamin, 1,000 mcg, Intramuscular, Q28 Days  guaiFENesin-dextromethorphan, 10 mL, Oral, Q6H  levETIRAcetam XR, 1,000 mg, Oral, Nightly  levoFLOXacin, 500 mg, Oral, Q24H  lisinopril, 2.5 mg, Oral, Q24H  metoprolol tartrate, 25 mg, Oral, Q12H  pantoprazole, 40 mg, Oral, Q AM  senna, 2 tablet, Oral, BID  senna-docusate sodium, 2 tablet, Oral, BID  sodium chloride, 10 mL, Intravenous, Q12H  traZODone, 100 mg, Oral, Nightly  venlafaxine XR, 75 mg, Oral, Daily      Continuous Infusions:     PRN Meds:.   albuterol    senna-docusate sodium **AND** polyethylene glycol **AND** bisacodyl **AND** bisacodyl    Calcium Replacement - Follow Nurse / BPA Driven Protocol    fluticasone    Magnesium Low Dose Replacement - Follow Nurse / BPA Driven Protocol    melatonin    ondansetron    Phosphorus Replacement - Follow Nurse / BPA Driven Protocol    Potassium Replacement - Follow Nurse / BPA Driven Protocol    sodium chloride    sodium chloride    sodium chloride    Assessment & Plan   Assessment & Plan     Active Hospital Problems    Diagnosis  POA    **Pneumonia [J18.9]  Yes      Resolved Hospital Problems   No resolved problems to display.        Brief Hospital Course to date:  Bibiana Hodges is a 87 y.o. female with medical history of essential hypertension, autonomic dysfunction, atrial fibrillation on amiodarone and Eliquis, chronic debility and multiple hospitalizations manage in the last 2 months with recurrent pneumonia, most recently from 6/29/2023 till 7/10/2023 when she was treated for both community-acquired pneumonia and UTI and was discharged to SNF.  She presented to the hospital today with insomnia x4 nights.  Found to be hypoxic     Assessment and plan:  Acute hypoxia, improved  Recurrent community-acquired pneumonia  Aspiration pneumonia, POA, improved   Chest x-ray concerning for worsening chronic left upper lobe infiltrates which is concerning for developing pneumonia  Continue Levaquin.  Sputum cultures negative to date.  Patient improving with current antibiotic regimen, will continue  Speech evaluated the patient recommended nectar thick liquid   Weaned off oxygen, currently oxygen saturation is 95% room air  She will need to have formal evaluation by pulmonary service and PFT as outpatient.  Referral placed in epic  Follow immunoglobulin panel given her recurrent pneumonia  DuoNebs, incentive spirometry, Mucinex  Echocardiogram with bubble study performed --> negative intracardiac shunting, systolic  function slightly depressed at 47%, grade 1 diastolic dysfunction   of note, D-dimer is mildly elevated at 1.0 which is appropriate for her age.  I do not think she has PE particularly she is taking Eliquis at home     Acute urinary tract infection with Citrobacter and E. coli, POA  Both bacteria are sensitive to Levaquin, continue for total of 7 to 10 days    Severe constipation with fecal impaction, resolved  CT scan abdomen with no evidence of nephrolithiasis but did show severe constipation with fecal impaction  Resolved after aggressive bowel regimen    A-fib  Continue amiodarone and Eliquis     RACH  Creatinine is slightly elevated at 1.4.  Her baseline is around 1  Gentle IV fluids and monitor kidney function     Essential hypertension  Autonomic dysfunction with recurrent presyncopal episodes  From last discharge, her antihypertensive medications were held     Systolic and diastolic dysfunction, compensated  Echocardiogram showed systolic function of 47% and grade 1 diastolic dysfunction  Patient appears compensated  Given her history of orthostasis, I started her on low-dose metoprolol 25 mg twice daily and lisinopril 2.5 mg  Needs follow-up with cardiologist as outpatient    Dyslipidemia  Continue statins     Chronic debility  PT and OT consultation     Insomnia  Her main presenting complaint  Continue trazodone  Add melatonin at bedtime    Expected Discharge Location and Transportation: Back to assisted in facility  Expected Discharge   Expected Discharge Date: 8/23/2023; Expected Discharge Time:      DVT prophylaxis:  Medical DVT prophylaxis orders are present.     AM-PAC 6 Clicks Score (PT): 17 (08/21/23 3574)    CODE STATUS:   Code Status and Medical Interventions:   Ordered at: 08/20/23 1132     Level Of Support Discussed With:    Patient     Code Status (Patient has no pulse and is not breathing):    CPR (Attempt to Resuscitate)     Medical Interventions (Patient has pulse or is breathing):    Full  Support       Sania Vera MD  08/24/23  Copied text in this note has been reviewed and is accurate as of 08/24/23.

## 2023-08-24 NOTE — PROGRESS NOTES
Malnutrition Severity Assessment    Patient Name:  Bibiana Hodges  YOB: 1935  MRN: [id number]  Admit Date:  8/20/2023    Patient meets criteria for : Severe Malnutrition    Comments:  Pt meets criteria for severe malnutrition in the context of chronic illness indicated by severe muscle wasting and subcutaneous fat loss, po intake </=75% X >/=1 month. Pending measured wt to verify any wt changes.    Malnutrition Severity Assessment  Malnutrition Type: Chronic Disease - Related Malnutrition  Malnutrition Type (last 8 hours)       Malnutrition Severity Assessment       Row Name 08/24/23 1231       Malnutrition Severity Assessment    Malnutrition Type Chronic Disease - Related Malnutrition      Row Name 08/24/23 1231       Insufficient Energy Intake     Insufficient Energy Intake Findings Severe    Insufficient Energy Intake  <75% of est. energy requirement for > or equal to 1 month      Row Name 08/24/23 1231       Muscle Loss    Loss of Muscle Mass Findings Severe    [place] Region Severe - deep hollowing/scooping, lack of muscle to touch, facial bones well defined    Clavicle Bone Region Severe - protruding prominent bone    Acromion Bone Region Severe - squared shoulders, bones, and acromion process protrusion prominent    Scapular Bone Region Severe - prominent bones, depressions easily visible between ribs, scapula, spine, shoulders    Dorsal Hand Region Severe - prominent depression    Patellar Region Severe - prominent bone, square looking, very little muscle definition    Anterior Thigh Region Severe - line/depression along thigh, obviously thin    Posterior Calf Region Severe - thin with very little definition/firmness      Row Name 08/24/23 1231       Fat Loss    Subcutaneous Fat Loss Findings Severe    Orbital Region  Severe - pronounced hollowness/depression, dark circles, loose saggy skin    Upper Arm Region Severe - mostly skin, very little space between folds, fingers touch      Row  Name 08/24/23 1231       Criteria Met (Must meet criteria for severity in at least 2 of these categories: M Wasting, Fat Loss, Fluid, Secondary Signs, Wt. Status, Intake)    Patient meets criteria for  Severe Malnutrition                    Electronically signed by:  Judy Hilton RD  08/24/23 12:35 EDT

## 2023-08-24 NOTE — THERAPY TREATMENT NOTE
Patient Name: Bibiana Hodges  : 1935    MRN: [id number]                              Today's Date: 2023       Admit Date: 2023    Visit Dx:     ICD-10-CM ICD-9-CM   1. Pneumonia of left lung due to infectious organism, unspecified part of lung  J18.9 486   2. Acute respiratory failure with hypoxia  J96.01 518.81   3. Simple chronic bronchitis  J41.0 491.0   4. Pharyngeal dysphagia  R13.13 787.23     Patient Active Problem List   Diagnosis    Dysautonomia orthostatic hypotension syndrome    Hypertension    Flu vaccine need    Streptococcus pneumoniae vaccination indicated    Pneumonia     Past Medical History:   Diagnosis Date    Constipation     Depression     Dysautonomia orthostatic hypotension syndrome     Generalized osteoarthritis     GERD (gastroesophageal reflux disease)     s/p Nissen    Hypertension     Insomnia     Osteoarthrosis, shoulder region     Skin ulcer of scalp     Thrombophlebitis of arm     Tremor     Vitamin B12 deficiency      Past Surgical History:   Procedure Laterality Date    DILATATION AND CURETTAGE      HERNIA REPAIR      HIP SURGERY      SHOULDER SURGERY      Right      General Information       Row Name 23 1026          Physical Therapy Time and Intention    Document Type therapy note (daily note)  -NS     Mode of Treatment physical therapy  -NS       Row Name 23 1026          General Information    Patient Profile Reviewed yes  -NS     Existing Precautions/Restrictions fall;orthostatic hypotension;other (see comments)  hx of vertigo; monitor BP  -NS       Row Name 23 1026          Cognition    Orientation Status (Cognition) oriented to;person;place  -NS       Row Name 23 1026          Safety Issues, Functional Mobility    Safety Issues Affecting Function (Mobility) insight into deficits/self-awareness;judgment;problem-solving;safety precaution awareness;safety precautions follow-through/compliance;sequencing abilities  -NS     Impairments  Affecting Function (Mobility) balance;coordination;endurance/activity tolerance;cognition;motor planning;strength;postural/trunk control  -NS               User Key  (r) = Recorded By, (t) = Taken By, (c) = Cosigned By      Initials Name Provider Type    Shelley Gómez, GAIL Physical Therapist                   Mobility       Row Name 08/24/23 1026          Bed Mobility    Bed Mobility sit-supine  -NS     Supine-Sit [place] (Bed Mobility) minimum assist (75% patient effort);verbal cues  -NS     Comment, (Bed Mobility) Assist at BLEs  -NS       Row Name 08/24/23 1026          Transfers    Comment, (Transfers) Cues for hand placement.  -NS       Row Name 08/24/23 1026          Bed-Chair Transfer    Bed-Chair [place] (Transfers) minimum assist (75% patient effort);2 person assist  -NS     Assistive Device (Bed-Chair Transfers) other (see comments)  BUE support  -NS       Row Name 08/24/23 1026          Sit-Stand Transfer    Sit-Stand [place] (Transfers) contact guard;verbal cues  -NS     Assistive Device (Sit-Stand Transfers) walker, front-wheeled  -NS       Row Name 08/24/23 1026          Gait/Stairs (Locomotion)    [place] Level (Gait) minimum assist (75% patient effort);verbal cues  -NS     Assistive Device (Gait) walker, front-wheeled  -NS     Distance in Feet (Gait) 10  -NS     Deviations/Abnormal Patterns (Gait) perri decreased;gait speed decreased;stride length decreased;base of support, narrow  -NS     Bilateral Gait Deviations forward flexed posture;heel strike decreased  -NS     Comment, (Gait/Stairs) Patient ambulated at slow pace, demonstrating flexed posture. After appprox. 10ft, pt pushed RW forwards and had an onset of dizziness and required lowering to a seated position. BP taken once returned to chair- 82/55. RN immediately notified. Deferred further ambulation, returned to bed.  -NS               User Key  (r) = Recorded By, (t) = Taken By, (c) = Cosigned By      Initials Name  Provider Type    Shelley Gómez PT Physical Therapist                   Obj/Interventions       Row Name 08/24/23 1026          Balance    Balance Assessment sitting static balance;sitting dynamic balance;standing static balance;standing dynamic balance  -NS     Static Sitting Balance standby assist  -NS     Dynamic Sitting Balance standby assist  -NS     Position, Sitting Balance unsupported;sitting in chair  -NS     Static Standing Balance contact guard  -NS     Dynamic Standing Balance minimal assist  -NS     Position/Device Used, Standing Balance supported;walker, front-wheeled  -NS               User Key  (r) = Recorded By, (t) = Taken By, (c) = Cosigned By      Initials Name Provider Type    Shelley Gómez PT Physical Therapist                   Goals/Plan    No documentation.                  Clinical Impression       Row Name 08/24/23 1026          Pain    Pretreatment Pain Rating 5/10  -NS     Posttreatment Pain Rating 5/10  -NS     Pain Location - Side/Orientation Bilateral  -NS     Pain Location lower  -NS     Pain Location - back  -NS     Pain Intervention(s) Repositioned;Ambulation/increased activity  -NS       Row Name 08/24/23 1026          Plan of Care Review    Plan of Care Reviewed With patient  -NS     Progress declining  -NS     Outcome Evaluation Patient continues to be limited by weakness, poor activity tolerance, and dizziness affecting functional mobility. Mobility limited by orthostatic hypotension today, with BP dropping as low as 82/55. Continue to recommend SNF at d/c.  -NS       Row Name 08/24/23 1026          Vital Signs    Pre Systolic BP Rehab 117  -NS     Pre Treatment Diastolic BP 68  -NS     Intra Systolic BP Rehab 82  -NS     Intra Treatment Diastolic BP 55  -NS     Post Systolic BP Rehab 91  -NS     Post Treatment Diastolic BP 52  -NS     Pretreatment Heart Rate (beats/min) 66  -NS     Intratreatment Heart Rate (beats/min) 81  -NS     Posttreatment Heart Rate (beats/min) 63   -NS     Intra SpO2 (%) 95  -NS     O2 Delivery Intra Treatment room air  -NS     Post SpO2 (%) 97  -NS     O2 Delivery Post Treatment room air  -NS     Pre Patient Position Sitting  -NS     Intra Patient Position Standing  -NS     Post Patient Position Supine  -NS       Row Name 08/24/23 1026          Positioning and Restraints    Pre-Treatment Position sitting in chair/recliner  -NS     Post Treatment Position bed  -NS     In Bed notified nsg;supine;call light within reach;encouraged to call for assist;exit alarm on;side rails up x3;with nsg  -NS               User Key  (r) = Recorded By, (t) = Taken By, (c) = Cosigned By      Initials Name Provider Type    Shelley Gómez, PT Physical Therapist                   Outcome Measures       Row Name 08/24/23 1026 08/24/23 0824       How much help from another person do you currently need...    Turning from your back to your side while in flat bed without using bedrails? 3  -NS 3  -LC    Moving from lying on back to sitting on the side of a flat bed without bedrails? 3  -NS 3  -LC    Moving to and from a bed to a chair (including a wheelchair)? 3  -NS 3  -LC    Standing up from a chair using your arms (e.g., wheelchair, bedside chair)? 3  -NS 3  -LC    Climbing 3-5 steps with a railing? 2  -NS 2  -LC    To walk in hospital room? 3  -NS 2  -LC    AM-PAC 6 Clicks Score (PT) 17  -NS 16  -LC    Highest level of mobility 5 --> Static standing  -NS 5 --> Static standing  -LC      Row Name 08/24/23 1026 08/24/23 0932       Functional Assessment    Outcome Measure Options AM-PAC 6 Clicks Basic Mobility (PT)  -NS AM-PAC 6 Clicks Daily Activity (OT)  -AN              User Key  (r) = Recorded By, (t) = Taken By, (c) = Cosigned By      Initials Name Provider Type    Marge Lynn, RN Registered Nurse    Shelley Gómez, PT Physical Therapist    Talisha Alvarado OT Occupational Therapist                                 Physical Therapy Education       Title: PT OT SLP  Therapies (In Progress)       Topic: Physical Therapy (In Progress)       Point: Mobility training (In Progress)       Learning Progress Summary             Patient Acceptance, E, NR by NS at 8/24/2023 1116    Acceptance, E, VU,NR by NS at 8/21/2023 1057                         Point: Home exercise program (In Progress)       Learning Progress Summary             Patient Acceptance, E, NR by NS at 8/24/2023 1116                         Point: Body mechanics (In Progress)       Learning Progress Summary             Patient Acceptance, E, NR by NS at 8/24/2023 1116    Acceptance, E, VU,NR by NS at 8/21/2023 1057                         Point: Precautions (In Progress)       Learning Progress Summary             Patient Acceptance, E, NR by NS at 8/24/2023 1116    Acceptance, E, VU,NR by NS at 8/21/2023 1057                                         User Key       Initials Effective Dates Name Provider Type Discipline    NS 06/16/21 -  Shelley Gutiérrez, PT Physical Therapist PT                  PT Recommendation and Plan  Planned Therapy Interventions (PT): balance training, bed mobility training, gait training, home exercise program, neuromuscular re-education, patient/family education, strengthening, transfer training  Plan of Care Reviewed With: patient  Progress: declining  Outcome Evaluation: Patient continues to be limited by weakness, poor activity tolerance, and dizziness affecting functional mobility. Mobility limited by orthostatic hypotension today, with BP dropping as low as 82/55. Continue to recommend SNF at d/c.     Time Calculation:   PT Evaluation Complexity  History, PT Evaluation Complexity: 3 or more personal factors and/or comorbidities  Examination of Body Systems (PT Eval Complexity): total of 4 or more elements  Clinical Presentation (PT Evaluation Complexity): stable  Clinical Decision Making (PT Evaluation Complexity): low complexity  Overall Complexity (PT Evaluation Complexity): low  complexity     PT Charges       Row Name 08/24/23 1026             Time Calculation    Start Time 1026  -NS      PT Received On 08/24/23  -NS      PT Goal Re-Cert Due Date 08/31/23  -NS         Timed Charges    36591 - Gait Training Minutes  24  -NS         Total Minutes    Timed Charges Total Minutes 24  -NS       Total Minutes 24  -NS                User Key  (r) = Recorded By, (t) = Taken By, (c) = Cosigned By      Initials Name Provider Type    NS Shelley Gutiérrez, PT Physical Therapist                  Therapy Charges for Today       Code Description Service Date Service Provider Modifiers Qty    [id number] HC GAIT TRAINING EA 15 MIN 8/24/2023 Shelley Gutiérrez, PT GP 2            PT G-Codes  Outcome Measure Options: AM-PAC 6 Clicks Basic Mobility (PT)  AM-PAC 6 Clicks Score (PT): 17  AM-PAC 6 Clicks Score (OT): 16  PT Discharge Summary  Anticipated Discharge Disposition (PT): skilled nursing facility    Shelley Gutiérrez PT  8/24/2023

## 2023-08-24 NOTE — PLAN OF CARE
Goal Outcome Evaluation:                      Patient resting well during the night. Call light within reach. Bed alarm on

## 2023-08-25 ENCOUNTER — READMISSION MANAGEMENT (OUTPATIENT)
Dept: CALL CENTER | Facility: HOSPITAL | Age: 88
End: 2023-08-25
Payer: MEDICARE

## 2023-08-25 VITALS
HEART RATE: 60 BPM | SYSTOLIC BLOOD PRESSURE: 129 MMHG | DIASTOLIC BLOOD PRESSURE: 75 MMHG | TEMPERATURE: 97.4 F | OXYGEN SATURATION: 93 % | WEIGHT: 130.07 LBS | BODY MASS INDEX: 20.42 KG/M2 | RESPIRATION RATE: 16 BRPM | HEIGHT: 67 IN

## 2023-08-25 LAB
ANION GAP SERPL CALCULATED.3IONS-SCNC: 10 MMOL/L (ref 5–15)
BACTERIA SPEC AEROBE CULT: NORMAL
BACTERIA SPEC AEROBE CULT: NORMAL
BASOPHILS # BLD AUTO: 0.01 10*3/MM3 (ref 0–0.2)
BASOPHILS NFR BLD AUTO: 0.1 % (ref 0–1.5)
BUN SERPL-MCNC: 26 MG/DL (ref 8–23)
BUN/CREAT SERPL: 21.3 (ref 7–25)
CALCIUM SPEC-SCNC: 7.8 MG/DL (ref 8.6–10.5)
CHLORIDE SERPL-SCNC: 105 MMOL/L (ref 98–107)
CO2 SERPL-SCNC: 22 MMOL/L (ref 22–29)
CREAT SERPL-MCNC: 1.22 MG/DL (ref 0.57–1)
DEPRECATED RDW RBC AUTO: 58.2 FL (ref 37–54)
EGFRCR SERPLBLD CKD-EPI 2021: 43 ML/MIN/1.73
EOSINOPHIL # BLD AUTO: 0.12 10*3/MM3 (ref 0–0.4)
EOSINOPHIL NFR BLD AUTO: 1.6 % (ref 0.3–6.2)
ERYTHROCYTE [DISTWIDTH] IN BLOOD BY AUTOMATED COUNT: 17 % (ref 12.3–15.4)
GLUCOSE SERPL-MCNC: 68 MG/DL (ref 65–99)
HCT VFR BLD AUTO: 27.6 % (ref 34–46.6)
HGB BLD-MCNC: 8.1 G/DL (ref 12–15.9)
IMM GRANULOCYTES # BLD AUTO: 0.05 10*3/MM3 (ref 0–0.05)
IMM GRANULOCYTES NFR BLD AUTO: 0.7 % (ref 0–0.5)
LYMPHOCYTES # BLD AUTO: 1.57 10*3/MM3 (ref 0.7–3.1)
LYMPHOCYTES NFR BLD AUTO: 20.4 % (ref 19.6–45.3)
MAGNESIUM SERPL-MCNC: 1.8 MG/DL (ref 1.6–2.4)
MCH RBC QN AUTO: 27 PG (ref 26.6–33)
MCHC RBC AUTO-ENTMCNC: 29.3 G/DL (ref 31.5–35.7)
MCV RBC AUTO: 92 FL (ref 79–97)
MONOCYTES # BLD AUTO: 0.47 10*3/MM3 (ref 0.1–0.9)
MONOCYTES NFR BLD AUTO: 6.1 % (ref 5–12)
NEUTROPHILS NFR BLD AUTO: 5.47 10*3/MM3 (ref 1.7–7)
NEUTROPHILS NFR BLD AUTO: 71.1 % (ref 42.7–76)
NRBC BLD AUTO-RTO: 0 /100 WBC (ref 0–0.2)
PLATELET # BLD AUTO: 209 10*3/MM3 (ref 140–450)
PMV BLD AUTO: 9.9 FL (ref 6–12)
POTASSIUM SERPL-SCNC: 4.3 MMOL/L (ref 3.5–5.2)
RBC # BLD AUTO: 3 10*6/MM3 (ref 3.77–5.28)
SODIUM SERPL-SCNC: 137 MMOL/L (ref 136–145)
WBC NRBC COR # BLD: 7.69 10*3/MM3 (ref 3.4–10.8)

## 2023-08-25 PROCEDURE — 85025 COMPLETE CBC W/AUTO DIFF WBC: CPT | Performed by: INTERNAL MEDICINE

## 2023-08-25 PROCEDURE — 80048 BASIC METABOLIC PNL TOTAL CA: CPT | Performed by: INTERNAL MEDICINE

## 2023-08-25 PROCEDURE — 83735 ASSAY OF MAGNESIUM: CPT | Performed by: INTERNAL MEDICINE

## 2023-08-25 RX ORDER — CHOLECALCIFEROL (VITAMIN D3) 125 MCG
5 CAPSULE ORAL NIGHTLY
Start: 2023-08-25

## 2023-08-25 RX ORDER — GUAIFENESIN/DEXTROMETHORPHAN 100-10MG/5
10 SYRUP ORAL EVERY 6 HOURS SCHEDULED
Start: 2023-08-25

## 2023-08-25 RX ORDER — LEVOFLOXACIN 500 MG/1
500 TABLET, FILM COATED ORAL EVERY 24 HOURS
Qty: 3 TABLET | Refills: 0
Start: 2023-08-25 | End: 2023-08-28

## 2023-08-25 RX ADMIN — BUSPIRONE HYDROCHLORIDE 5 MG: 5 TABLET ORAL at 09:40

## 2023-08-25 RX ADMIN — METOPROLOL TARTRATE 25 MG: 25 TABLET, FILM COATED ORAL at 09:40

## 2023-08-25 RX ADMIN — AMIODARONE HYDROCHLORIDE 200 MG: 200 TABLET ORAL at 09:40

## 2023-08-25 RX ADMIN — Medication 10 ML: at 09:47

## 2023-08-25 RX ADMIN — SENNOSIDES AND DOCUSATE SODIUM 2 TABLET: 50; 8.6 TABLET ORAL at 09:40

## 2023-08-25 RX ADMIN — GUAIFENESIN AND DEXTROMETHORPHAN 10 ML: 100; 10 SYRUP ORAL at 09:43

## 2023-08-25 RX ADMIN — PANTOPRAZOLE SODIUM 40 MG: 40 TABLET, DELAYED RELEASE ORAL at 09:40

## 2023-08-25 RX ADMIN — Medication 1000 UNITS: at 09:41

## 2023-08-25 RX ADMIN — APIXABAN 2.5 MG: 2.5 TABLET, FILM COATED ORAL at 09:40

## 2023-08-25 RX ADMIN — VENLAFAXINE HYDROCHLORIDE 75 MG: 75 CAPSULE, EXTENDED RELEASE ORAL at 09:40

## 2023-08-25 RX ADMIN — LISINOPRIL 2.5 MG: 2.5 TABLET ORAL at 09:40

## 2023-08-25 RX ADMIN — SODIUM CHLORIDE 75 ML/HR: 9 INJECTION, SOLUTION INTRAVENOUS at 02:34

## 2023-08-25 NOTE — PLAN OF CARE
Goal Outcome Evaluation:              Outcome Evaluation: Patient on room air. Patient up in chair today, worked with therapy. Patient with symptomatic orthostatic hypotension. Normal saline infusing at 75ml/hr, 500ml bolus infused.  Skin interventions in place (waffle mattress, heel boots, z guard), patient turned. Patient encouraged PO intake, reeducated about fall precautions, call light use. Anticipate discharge to [facility] for rehab when possible.

## 2023-08-25 NOTE — DISCHARGE SUMMARY
[facility] Medicine Services  DISCHARGE SUMMARY    Patient Name: Bibiana Hodges  : 1935  MRN: [id number]    Date of Admission: 2023 10:45 AM  Date of Discharge: 2023  Primary Care Physician: Katarina Oviedo MD    Consults       No orders found from 2023 to 2023.            Hospital Course     Presenting Problem:     Active Hospital Problems    Diagnosis  POA    **Pneumonia [J18.9]  Yes    Severe malnutrition [E43]  Yes      Resolved Hospital Problems   No resolved problems to display.          Hospital Course:  Bibiana Hodges is a 87 y.o. female with medical history of essential hypertension, autonomic dysfunction, atrial fibrillation on amiodarone and Eliquis, chronic debility and multiple hospitalizations manage in the last 2 months with recurrent pneumonia, most recently from 2023 till 7/10/2023 when she was treated for both community-acquired pneumonia and UTI and was discharged to SNF.  She presented to the hospital today with insomnia x4 nights.  Found to be hypoxic with Left PNA, likely aspiration per speech evaluation as she was aspirating and thin liquid.  Also found to have E. coli and Citrobacter urinary tract infection.  Treated with p.o. Levaquin and improved.  Discharged to acute rehab     Acute hypoxia, improved  Recurrent community-acquired pneumonia  Aspiration pneumonia, POA, improved   Chest x-ray concerning for worsening chronic left upper lobe infiltrates which is concerning for pneumonia  Speech evaluated the patient recommended nectar thick liquid  Improved with p.o. Levaquin 500 mg daily.  Has 3 days left in her course to conclude total course of treatment of 7 days  Of oxygen currently satting 95% room air  She will need to have formal evaluation by pulmonary service and PFT as outpatient.  Referral placed in epic  Continue as needed DuoNebs, incentive spirometry, Mucinex  Echocardiogram with bubble study performed --> negative  intracardiac shunting, systolic function slightly depressed at 47%, grade 1 diastolic dysfunction   of note, D-dimer is mildly elevated at 1.0 which is appropriate for her age.  I do not think she has PE particularly she is taking Eliquis at home     Acute urinary tract infection with Citrobacter and E. coli, POA  Both bacteria are sensitive to Levaquin, continue for total of 7 days    Severe constipation with fecal impaction, resolved  CT scan abdomen with no evidence of nephrolithiasis but did show severe constipation with fecal impaction  Resolved after aggressive bowel regimen     A-fib  Continue amiodarone and Eliquis  Continue low-dose metoprolol     RACH  Creatinine baseline is 1.  Was 1.4 on admission.  Improved with IV fluids.  Patient able to tolerate oral intake currently     Essential hypertension, diet controlled  Autonomic dysfunction with recurrent presyncopal episodes  From last discharge, her antihypertensive medications were held  Currently on low-dose metoprolol only for A-fib and for her systolic dysfunction     Systolic and diastolic dysfunction, compensated  Echocardiogram showed systolic function of 47% and grade 1 diastolic dysfunction  Patient appears compensated  Given her history of orthostasis, I started her on low-dose metoprolol 12.5 milligram twice daily  Needs follow-up with cardiologist as outpatient.  Referral placed in [facility]     Dyslipidemia  Continue statins     Chronic debility  PT and OT consultation     Insomnia  Her main presenting complaint  Continue trazodone  Continue melatonin at bedtime         Discharge Follow Up Recommendations for outpatient labs/diagnostics:  PCP in 1 facility physician 1 day  Referred to pulmonary and cardiology service after discharge    Day of Discharge     HPI:   I have seen and evaluated the patient this morning.  Sitting comfortably in bed.  Feeling much better today.  No abdominal pain, nausea or vomiting.  No chest pain.  Oxygen saturation 96%  room air    Review of Systems  General : no fevers, chills  CVS: No chest pain, palpitations  Respiratory: No cough, dyspnea  GI: No N/V/D, abd pain  10 point review of systems is negative except for what is mentioned in HPI    Vital Signs:   Temp:  [96.4 øF (35.8 øC)-98.7 øF (37.1 øC)] 97.4 øF (36.3 øC)  Heart Rate:  [59-60] 60  Resp:  [16-18] 16  BP: (113-136)/(65-75) 129/75      Physical Exam:  General: Chronically ill looking, frail looking and debilitated  Head: Atraumatic and normocephalic  Eyes: No Icterus. No pallor  Ears:  Ears appear intact with no abnormalities noted  Throat: No oral lesions, no thrush  Neck: Supple, trachea midline  Lungs: Left upper lobe coarse crackles.  Diminished air entry both lung fields.  Scattered wheezing.    Heart:  Normal S1 and S2, no murmur, no gallop, No JVD, no lower extremity swelling  Abdomen:  Soft, no tenderness, no organomegaly, normal bowel sounds, no organomegaly  Extremities: pulses equal bilaterally  Skin: No bleeding, bruising or rash, normal skin turgor and elasticity  Neurologic: Cranial nerves appear intact with no evidence of facial asymmetry, normal motor and sensory functions in all 4 extremities  Psych: Alert and oriented x 3, normal mood    Pertinent  and/or Most Recent Results     LAB RESULTS:      Lab 08/25/23  0351 08/24/23  0444 08/23/23  0520 08/22/23  0832 08/21/23  0613 08/20/23  1106 08/20/23  1059   WBC 7.69 16.91* 12.19* 17.40* 20.38*  --  11.30*   HEMOGLOBIN 8.1* 8.4* 8.0* 8.5* 8.3*  --  9.8*   HEMATOCRIT 27.6* 27.5* 25.5* 26.9* 26.6*  --  32.5*   PLATELETS 209 257 259 241 244  --  264   NEUTROS ABS 5.47 15.78* 11.12* 15.99* 19.05*  --  10.11*   IMMATURE GRANS (ABS) 0.05 0.11* 0.08* 0.10* 0.16*  --  0.04   LYMPHS ABS 1.57 0.60* 0.74 0.82 0.90  --  0.78   MONOS ABS 0.47 0.40 0.25 0.48 0.25  --  0.29   EOS ABS 0.12 0.00 0.00 0.00 0.00  --  0.06   MCV 92.0 90.2 87.0 87.3 88.4  --  91.0   CRP  --   --  3.11* 5.46* 10.48* 0.59*  --    PROCALCITONIN   --   --   --   --  1.59* 0.04  --    LACTATE  --   --   --   --   --  1.1  --    D DIMER QUANT  --   --   --   --   --   --  1.07*         Lab 08/25/23  0351 08/24/23 0444 08/23/23  0520 08/22/23  0832 08/21/23  0613   SODIUM 137 135* 132* 134*  134* 132*   POTASSIUM 4.3 4.4 4.5 4.7  4.7 4.7   CHLORIDE 105 102 100 102  102 100   CO2 22.0 23.0 24.0 20.0*  22.0 23.0   ANION GAP 10.0 10.0 8.0 12.0  10.0 9.0   BUN 26* 34* 27* 26*  27* 23   CREATININE 1.22* 1.54* 1.33* 1.19*  1.19* 1.44*   EGFR 43.0* 32.5* 38.8* 44.3*  44.3* 35.3*   GLUCOSE 68 95 97 101*  103* 115*   CALCIUM 7.8* 8.3* 8.2* 8.6  8.8 8.3*   MAGNESIUM 1.8 2.5* 2.3 1.9 2.1   PHOSPHORUS  --  3.7 3.1 3.4 3.9         Lab 08/24/23 0444 08/23/23  0520 08/22/23  0832 08/21/23  0613 08/20/23  1106   TOTAL PROTEIN 5.5* 5.4* 6.0 5.5* 6.4   ALBUMIN 3.1* 3.3* 3.1* 3.4* 3.4*   GLOBULIN 2.4 2.1 2.9 2.1 3.0   ALT (SGPT) 60* 87* 114* 67* 14   AST (SGOT) 27 48* 88* 67* 19   BILIRUBIN 0.2 <0.2 0.2 0.3 0.2   ALK PHOS 38* 40 44 43 47         Lab 08/20/23  1106   PROBNP 205.7   HSTROP T 15*                 Lab 08/20/23  1127   PH, ARTERIAL 7.400   PCO2, ARTERIAL 41.1   PO2 ART 65.0*   FIO2 58   HCO3 ART 25.4   BASE EXCESS ART 0.6     Brief Urine Lab Results  (Last result in the past 365 days)        Color   Clarity   Blood   Leuk Est   Nitrite   Protein   CREAT   Urine HCG        08/20/23 1814 Yellow   Cloudy   Negative   Large (3+)   Positive   Trace                 Microbiology Results (last 10 days)       Procedure Component Value - Date/Time    S. Pneumo Ag Urine or CSF - Urine, Urine, Clean Catch [[id number]]  (Normal) Collected: 08/20/23 1814    Lab Status: Final result Specimen: Urine, Clean Catch Updated: 08/21/23 0941     Strep Pneumo Ag Negative    Legionella Antigen, Urine - Urine, Urine, Clean Catch [[id number]]  (Normal) Collected: 08/20/23 1814    Lab Status: Final result Specimen: Urine, Clean Catch Updated: 08/21/23 0941     LEGIONELLA ANTIGEN, URINE  Negative    Urine Culture - Urine, Urine, Clean Catch [[id number]]  (Abnormal)  (Susceptibility) Collected: 08/20/23 1814    Lab Status: Final result Specimen: Urine, Clean Catch Updated: 08/23/23 0543     Urine Culture >100,000 CFU/mL Escherichia coli      >100,000 CFU/mL Citrobacter freundii    Narrative:      Colonization of the urinary tract without infection is common. Treatment is discouraged unless the patient is symptomatic, pregnant, or undergoing an invasive urologic procedure.    Susceptibility        Escherichia coli      BECK      Ampicillin Susceptible      Ampicillin + Sulbactam Susceptible      Cefazolin Susceptible      Cefepime Susceptible      Ceftazidime Susceptible      Ceftriaxone Susceptible      Gentamicin Susceptible      Levofloxacin Susceptible      Nitrofurantoin Susceptible      Piperacillin + Tazobactam Susceptible      Trimethoprim + Sulfamethoxazole Susceptible                       Susceptibility        Citrobacter freundii      BECK      Cefazolin Resistant      Cefepime Susceptible      Ceftazidime Susceptible      Ceftriaxone Susceptible      Gentamicin Susceptible      Levofloxacin Susceptible      Nitrofurantoin Susceptible      Piperacillin + Tazobactam Susceptible      Trimethoprim + Sulfamethoxazole Susceptible                           MRSA Screen, PCR (Inpatient) - Swab, Nares [[id number]]  (Normal) Collected: 08/20/23 1632    Lab Status: Final result Specimen: Swab from Nares Updated: 08/20/23 1859     MRSA PCR Negative    Narrative:      The negative predictive value of this diagnostic test is high and should only be used to consider de-escalating anti-MRSA therapy. A positive result may indicate colonization with MRSA and must be correlated clinically.  MRSA Negative    COVID PRE-OP / PRE-PROCEDURE SCREENING ORDER (NO ISOLATION) - Swab, Nasopharynx [[id number]]  (Normal) Collected: 08/20/23 1107    Lab Status: Final result Specimen: Swab from Nasopharynx Updated: 08/20/23  1208    Narrative:      The following orders were created for panel order COVID PRE-OP / PRE-PROCEDURE SCREENING ORDER (NO ISOLATION) - Swab, Nasopharynx.  Procedure                               Abnormality         Status                     ---------                               -----------         ------                     Respiratory Panel PCR w/...[[id number]]  Normal              Final result                 Please view results for these tests on the individual orders.    Respiratory Panel PCR w/COVID-19(SARS-CoV-2) [facility]IO/[facility] In-House, NP Swab in UTM/VTM, 3-4 HR TAT - Swab, Nasopharynx [[id number]]  (Normal) Collected: 08/20/23 1107    Lab Status: Final result Specimen: Swab from Nasopharynx Updated: 08/20/23 1208     ADENOVIRUS, PCR Not Detected     Coronavirus 229E Not Detected     Coronavirus HKU1 Not Detected     Coronavirus NL63 Not Detected     Coronavirus OC43 Not Detected     COVID19 Not Detected     Human Metapneumovirus Not Detected     Human Rhinovirus/Enterovirus Not Detected     Influenza A PCR Not Detected     Influenza B PCR Not Detected     Parainfluenza Virus 1 Not Detected     Parainfluenza Virus 2 Not Detected     Parainfluenza Virus 3 Not Detected     Parainfluenza Virus 4 Not Detected     RSV, PCR Not Detected     Bordetella pertussis pcr Not Detected     Bordetella parapertussis PCR Not Detected     Chlamydophila pneumoniae PCR Not Detected     Mycoplasma pneumo by PCR Not Detected    Narrative:      In the setting of a positive respiratory panel with a viral infection PLUS a negative procalcitonin without other underlying concern for bacterial infection, consider observing off antibiotics or discontinuation of antibiotics and continue supportive care. If the respiratory panel is positive for atypical bacterial infection (Bordetella pertussis, Chlamydophila pneumoniae, or Mycoplasma pneumoniae), consider antibiotic de-escalation to target atypical bacterial infection.     Blood Culture - Blood, Arm, Left [[id number]]  (Normal) Collected: 08/20/23 1106    Lab Status: Preliminary result Specimen: Blood from Arm, Left Updated: 08/24/23 1130     Blood Culture No growth at 4 days    Blood Culture - Blood, Arm, Right [[id number]]  (Normal) Collected: 08/20/23 1106    Lab Status: Preliminary result Specimen: Blood from Arm, Right Updated: 08/24/23 1130     Blood Culture No growth at 4 days            Adult Transthoracic Echo Complete W/ Cont if Necessary Per Protocol    Result Date: 8/21/2023    Left ventricular systolic function is mildly decreased. Calculated left ventricular EF = 47.9% Normal left ventricular cavity size and wall thickness noted. There is left ventricular global hypokinesis noted. Left ventricular diastolic function is consistent with (grade I) impaired relaxation.   : Normal right ventricular cavity size and systolic function noted. Electronic lead present in the ventricle.   Normal left atrial size and volume noted. Saline test results are negative.   There is moderate calcification of the aortic valve. The aortic valve was poorly visualized but appears trileaflet. Moderate aortic valve regurgitation is present. No aortic valve stenosis is present.   Mild mitral annular calcification is present. Mild mitral valve regurgitation is present. No significant mitral valve stenosis is present.   The tricuspid valve is grossly normal in structure. Mild tricuspid valve regurgitation is present. Estimated right ventricular systolic pressure from tricuspid regurgitation is normal, 33 mmHg. No tricuspid valve stenosis is present.   Mild dilation of the ascending aorta is present. Ascending aorta = 3.7 cm   There is a small (<1cm) pericardial effusion adjacent to the right atrium and right ventricle.     CT Abdomen Pelvis Without Contrast    Result Date: 8/23/2023  CT ABDOMEN PELVIS WO CONTRAST Date of Exam: 8/23/2023 8:19 AM CDT Indication: r/o nephrolithioasis. Pain Comparison:  None available. Technique: Axial CT images were obtained of the abdomen and pelvis without the administration of contrast. Reconstructed coronal and sagittal images were also obtained. Automated exposure control and iterative construction methods were used. Findings: LUNG BASES: Bibasilar airspace disease with trace effusions. LIVER:  Unremarkable parenchyma without focal lesion. BILIARY/GALLBLADDER:  Unremarkable SPLEEN:  Unremarkable PANCREAS:  Unremarkable ADRENAL: Few benign left adrenal calcifications. KIDNEYS: There is bilateral renal cortical atrophy with few simple cysts. There is a calcification within the left central kidney, likely vascular. No calculus identified. GASTROINTESTINAL/MESENTERY: There is scattered colonic diverticulosis. No evidence of acute diverticulitis. There is a moderate to large fecal load. Correlate for signs of constipation. AORTA/IVC:  Normal caliber. RETROPERITONEUM/LYMPH NODES:  Unremarkable REPRODUCTIVE:  Unremarkable BLADDER:  Unremarkable OSSEUS STRUCTURES: Likely chronic mild superior plate compression deformity of L5 reducing vertebral body height by 20 to 30%.     Impression: 1.Moderate to large fecal load. Correlate for constipation. 2.Renal cortical atrophy without definite renal calculus identified. 3.Minimal bibasilar airspace disease, likely atelectasis with trace effusions. 4.Other incidental nonemergent findings as detailed above. Electronically Signed: Chance Henao MD  8/23/2023 8:36 AM CDT  Workstation ID: [id number]    FL Video Swallow With Speech Single Contrast    Result Date: 8/21/2023  FL VIDEO SWALLOW W SPEECH SINGLE-CONTRAST Date of Exam: 8/21/2023 11:50 AM EDT Indication: dysphagia Comparison: None available. Technique:   The speech pathologist administered food and/or liquid mixed with barium to the patient with cine/video imaging.  Imaging assistance was provided to the speech pathologist and an image was saved. Fluoroscopic Time: 1 minute and 36 seconds  Number of Images: 15 associated fluoroscopic loops were saved Findings: Please see speech therapy report for full details and recommendations.     Impression: Fluoroscopy provided for a modified barium swallow. Please see speech therapy report for full details and recommendations. Electronically Signed: Jacob Saldana MD  8/21/2023 4:45 PM EDT  Workstation ID: [id number]    XR Chest 1 View    Result Date: 8/22/2023  XR CHEST 1 VW Date of Exam: 8/22/2023 8:00 AM EDT Indication: f/u Comparison: 8/20/2023. Findings: Left chest wall ICD projects in place. Aeration in the left upper lobe is improved with decreased airspace disease. No new focal opacity is present elsewhere. There is no effusion or pneumothorax.     Impression: Left chest wall ICD projects in place. Aeration in the left upper lobe is improved with decreased airspace disease. No new focal opacity is present elsewhere. There is no effusion or pneumothorax. Electronically Signed: Jacob Saldana MD  8/22/2023 9:15 AM EDT  Workstation ID: [id number]    XR Chest 1 View    Result Date: 8/20/2023  XR CHEST 1 VW Date of Exam: 8/20/2023 10:18 AM CDT Indication: SOA triage protocol Comparison: 7/5/2023 Findings: Cardiomediastinal silhouette is unremarkable. There is irregular left mid to upper lung airspace disease suggestive of pneumonia. There is moderate interstitial prominence throughout, similar to prior examination. No significant effusion nor pneumothorax. No acute osseous abnormality identified.     Impression: Left lung airspace disease suggestive of pneumonia. Electronically Signed: Chance Henao MD  8/20/2023 10:31 AM CDT  Workstation ID: [id number]             Results for orders placed during the hospital encounter of 08/20/23    Adult Transthoracic Echo Complete W/ Cont if Necessary Per Protocol    Interpretation Summary    Left ventricular systolic function is mildly decreased. Calculated left ventricular EF = 47.9% Normal left ventricular cavity size and  wall thickness noted. There is left ventricular global hypokinesis noted. Left ventricular diastolic function is consistent with (grade I) impaired relaxation.    : Normal right ventricular cavity size and systolic function noted. Electronic lead present in the ventricle.    Normal left atrial size and volume noted. Saline test results are negative.    There is moderate calcification of the aortic valve. The aortic valve was poorly visualized but appears trileaflet. Moderate aortic valve regurgitation is present. No aortic valve stenosis is present.    Mild mitral annular calcification is present. Mild mitral valve regurgitation is present. No significant mitral valve stenosis is present.    The tricuspid valve is grossly normal in structure. Mild tricuspid valve regurgitation is present. Estimated right ventricular systolic pressure from tricuspid regurgitation is normal, 33 mmHg. No tricuspid valve stenosis is present.    Mild dilation of the ascending aorta is present. Ascending aorta = 3.7 cm    There is a small (<1cm) pericardial effusion adjacent to the right atrium and right ventricle.      Plan for Follow-up of Pending Labs/Results:   Pending Labs       Order Current Status    Blood Culture - Blood, Arm, Left Preliminary result    Blood Culture - Blood, Arm, Right Preliminary result          Discharge Details        Discharge Medications        New Medications        Instructions Start Date   guaiFENesin-dextromethorphan 100-10 MG/5ML syrup  Commonly known as: ROBITUSSIN DM   10 mL, Oral, Every 6 Hours Scheduled      levoFLOXacin 500 MG tablet  Commonly known as: LEVAQUIN   500 mg, Oral, Every 24 Hours      melatonin 5 MG tablet tablet   5 mg, Oral, Nightly      metoprolol tartrate 25 MG tablet  Commonly known as: LOPRESSOR   12.5 mg, Oral, 2 Times Daily             Changes to Medications        Instructions Start Date   polyethylene glycol 17 GM/SCOOP powder  Commonly known as: MIRALAX  What changed: See  the new instructions.   MIX 17 GM IN 8 OUNCES OF LIQUID AND DRINK 1 TO 2 TIMES DAILY FOR CONSTIPATION.             Continue These Medications        Instructions Start Date   albuterol (2.5 MG/3ML) 0.083% nebulizer solution  Commonly known as: PROVENTIL   2.5 mg, Nebulization, Every 4 Hours PRN      amiodarone 200 MG tablet  Commonly known as: PACERONE   200 mg, Oral, Daily      apixaban 2.5 MG tablet tablet  Commonly known as: ELIQUIS   2.5 mg, Oral, 2 Times Daily      atorvastatin 40 MG tablet  Commonly known as: LIPITOR   40 mg, Oral, Nightly      busPIRone 5 MG tablet  Commonly known as: BUSPAR   5 mg, Oral, 2 Times Daily PRN      cholecalciferol 25 MCG (1000 UT) tablet  Commonly known as: VITAMIN D3   1,000 Units, Oral, Daily      cyanocobalamin 1000 MCG/ML injection   1,000 mcg, Intramuscular, Every 28 Days      fluticasone 50 MCG/ACT nasal spray  Commonly known as: FLONASE   2 sprays, Nasal, Daily PRN      levETIRAcetam  MG 24 hr tablet  Commonly known as: KEPPRA XR   1,000 mg, Oral, Nightly      omeprazole 40 MG capsule  Commonly known as: priLOSEC   40 mg, Oral, Daily      senna 8.6 MG tablet  Commonly known as: SENOKOT   2 tablets, Oral, 2 Times Daily      traZODone 100 MG tablet  Commonly known as: DESYREL   100 mg, Oral, Nightly      venlafaxine XR 75 MG 24 hr capsule  Commonly known as: EFFEXOR-XR   75 mg, Oral, Daily             Stop These Medications      amLODIPine 5 MG tablet  Commonly known as: NORVASC     losartan 100 MG tablet  Commonly known as: COZAAR     metoprolol succinate XL 50 MG 24 hr tablet  Commonly known as: TOPROL-XL     spironolactone 25 MG tablet  Commonly known as: ALDACTONE              Allergies   Allergen Reactions    Gabapentin     Sulfa Antibiotics          Discharge Disposition:  Home or Self Care    Diet:  Hospital:  Diet Order   Procedures    Diet: Regular/House Diet; Feeding Assistance - Nursing; Texture: Regular Texture (IDDSI 7); Fluid Consistency: Nectar Thick        Activity:  As tolerated    Restrictions or Other Recommendations:  None        CODE STATUS:    Code Status and Medical Interventions:   Ordered at: 08/20/23 1445     Level Of Support Discussed With:    Patient     Code Status (Patient has no pulse and is not breathing):    CPR (Attempt to Resuscitate)     Medical Interventions (Patient has pulse or is breathing):    Full Support       Future Appointments   Date Time Provider Department Center   9/28/2023  2:15 PM RAD TECH [facility]   9/28/2023  2:30 PM [facility], PFT LAB 3 [facility]   9/28/2023  3:00 PM Aquilino Rajan,  [facility]         Sania Vera MD  08/25/23      Time Spent on Discharge:  I spent  36 minutes on this discharge activity which included: face-to-face encounter with the patient, reviewing the data in the system, coordination of the care with the nursing staff as well as consultants, documentation, and entering orders.

## 2023-08-25 NOTE — CASE MANAGEMENT/SOCIAL WORK
Case Management Discharge Note      Final Note: Plan to transfer to [facility] at  today, 8-25 and insurance approved. Son to provide transportation. Call report to [phone] and fax discharge summary to [phone]         Selected Continued Care - Admitted Since 8/20/2023       Destination Coordination complete.      Service Provider Selected Services Address Phone Fax Patient Preferred    [facility] [address] [place], [place] [id number] [phone] --              Durable Medical Equipment    No services have been selected for the patient.                Dialysis/Infusion    No services have been selected for the patient.                Home Medical Care    No services have been selected for the patient.                Therapy    No services have been selected for the patient.                Community Resources    No services have been selected for the patient.                Community & DME    No services have been selected for the patient.                    Selected Continued Care - Prior Encounters Includes continued care and service providers with selected services from prior encounters from 5/22/2023 to 8/25/2023      Discharged on 7/10/2023 Admission date: 6/29/2023 - Discharge disposition: Skilled Nursing Facility (DC - External)      Destination       Service Provider Selected Services Address Phone Fax Patient Preferred    [facility] [address] [place], [place] [address] [phone] --              Home Medical Care       Service Provider Selected Services Address Phone Fax Patient Preferred    [facility] [address] [place] [address], [address] [id number] [phone] --       Internal Comment last updated by Terrie Sanderson RN 7/3/2023 1205    7/3: I spoke with Nadira/[phone], for PT/OT                                          Final Discharge Disposition  Code: 03 - skilled nursing facility (SNF)

## 2023-08-25 NOTE — PLAN OF CARE
Goal Outcome Evaluation:  Plan of Care Reviewed With: patient        Progress: no change  Outcome Evaluation: Patient restless early in shift. Patient states upset about activity in hallway the previous evenings. Stating there where children in the hallway going back and forth. Patient oriented x3. Reassured that staff and visitors are in hallway. 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 Patient turned in bed. Schedule sleeping medication given per patient request and patient rested for several hours. Vitals stable.

## 2023-08-25 NOTE — OUTREACH NOTE
Prep Survey      Flowsheet Row Responses   [facility] facility patient discharged from? [facility]   Is LACE score < 7 ? No   Eligibility Not Eligible   What are the reasons patient is not eligible? [facility]  [[facility] ]   Does the patient have one of the following disease processes/diagnoses(primary or secondary)? Pneumonia   Prep survey completed? Yes            Lisa HANSON - Registered Nurse

## 2023-11-01 ENCOUNTER — TRANSCRIBE ORDERS (OUTPATIENT)
Dept: ADMINISTRATIVE | Facility: HOSPITAL | Age: 88
End: 2023-11-01
Payer: MEDICARE

## 2023-11-01 DIAGNOSIS — R13.10 DYSPHAGIA, UNSPECIFIED TYPE: Primary | ICD-10-CM

## 2023-11-13 ENCOUNTER — HOSPITAL ENCOUNTER (OUTPATIENT)
Dept: GENERAL RADIOLOGY | Facility: HOSPITAL | Age: 88
Discharge: HOME | End: 2023-11-13
Admitting: FAMILY MEDICINE
Payer: MEDICARE

## 2023-11-13 DIAGNOSIS — R13.13 PHARYNGEAL DYSPHAGIA: ICD-10-CM

## 2023-11-13 PROCEDURE — 92611 MOTION FLUOROSCOPY/SWALLOW: CPT

## 2023-11-13 PROCEDURE — 74230 X-RAY XM SWLNG FUNCJ C+: CPT

## 2023-11-13 NOTE — MBS/VFSS/FEES
Outpatient Speech Language Pathology   Adult Swallow Initial Evaluation  Modified Barium Swallow Study (MBS)  [facility]     Patient Name: Bibiana Hodges  : 1935  MRN: [id number]  Today's Date: 2023         Visit Date: 2023   Patient Active Problem List   Diagnosis    Dysautonomia orthostatic hypotension syndrome    Hypertension    Flu vaccine need    Streptococcus pneumoniae vaccination indicated    Pneumonia    Severe malnutrition        Past Medical History:   Diagnosis Date    Constipation     Depression     Dysautonomia orthostatic hypotension syndrome     Generalized osteoarthritis     GERD (gastroesophageal reflux disease)     s/p Nissen    Hypertension     Insomnia     Osteoarthrosis, shoulder region     Skin ulcer of scalp     Thrombophlebitis of arm     Tremor     Vitamin B12 deficiency         Past Surgical History:   Procedure Laterality Date    DILATATION AND CURETTAGE      HERNIA REPAIR      HIP SURGERY      SHOULDER SURGERY      Right         Visit Dx:     ICD-10-CM ICD-9-CM   1. Pharyngeal dysphagia  R13.13 787.23                SLP Adult Swallow Evaluation       Row Name 23 1445       General Information    Pertinent History Of Current Problem Pt with hx recurrent PNA. MBS 2023 revealed penetration during the swallow with thin liquids. Pt unable to clear laryngeal vestibule residue and eventual silent aspriation after the swallow. Pt has been receiving SLP services for dysphagia at her assisted living facility. Pt reports poor intake related to dislike of thickened liquids. MBS today for ? safe advancement of diet.  -SM    Current Method of Nutrition regular textures;nectar/syrup-thick liquids  -SM    Plans/Goals Discussed with patient and family;agreed upon  -SM       MBS/VFSS    Utensils Used spoon;cup;straw  -SM    Consistencies Trialed thin liquids;nectar/syrup-thick liquids;honey-thick liquids;pureed;regular textures  -SM       MBS/VFSS Interpretation    Oral  Phase WFL  -SM    Initiation of Pharyngeal Swallow bolus in pyriform sinuses  -SM    Pharyngeal Phase impaired pharyngeal phase of swallowing  -SM    Penetration During the Swallow thin liquids;nectar-thick liquids;secondary to delayed swallow initiation or mistiming;secondary to reduced laryngeal elevation;secondary to reduced vestibular closure  -SM    Aspiration After the Swallow thin liquids;nectar-thick liquids;secondary to residue;in laryngeal vestibule  -SM    Depth of Penetration deep  -SM    Response to Penetration No  -SM    No spontaneous response to penetration and non-effective laryngeal clearance with cue (see comments)  -SM    Response to Aspiration No  -SM    No spontaneous response to aspiration and non-effective subglottic clearance with cue (see comments)  -SM    Rosenbek's Scale thin:;nectar:;8--->level 8  -SM    Pharyngeal Residue all consistencies tested;diffuse within pharynx;secondary to reduced base of tongue retraction;secondary to reduced posterior pharyngeal wall stripping;secondary to reduced laryngeal elevation;secondary to reduced hyolaryngeal excursion;other (see comments)  greater with solids in valleculae  -SM    Response to Residue partial residue clearance;cleared residue with spontaneous subsequent swallow;cleared residue with liquid wash;other (see comments)  amount of residue did not build up as progressed  -SM    Attempted Compensatory Maneuvers bolus size;bolus presentation style;chin tuck;effortful (hard swallow);breath hold;throat clear after swallow  -SM    Response to Attempted Compensatory Maneuvers did not prevent penetration;did not prevent aspiration  -SM    Pharyngeal Phase, Comment No improvement. Swallow mildly worse compared to previous MBS. Now unsafe with nectar-thick liquids. Pt not directly aspirating but cannot clear material penetrated into the laryngeal vesituble. Over time, drops deeper and just below vocal folds. Cannot clear any despite seemingly strong  "cough/throat clear response when cued. As pt with poor intake, GOC/POC discussion. Pt wishes for safest recommendations. Wants to see her great grandchildren being born and prevent PNA (\"I would not survive another PNA\"). Given not drinking thickened liquids, do highly suggest initiating water between meals. The benefits appear to outweight the risks associated with dehydration. All discussed with pt and pt's son.  -       SLP Evaluation Clinical Impression    SLP Swallowing Diagnosis moderate;pharyngeal dysphagia  -    Functional Impact risk of aspiration/pneumonia;risk of malnutrition;risk of dehydration  -    Rehab Potential/Prognosis, Swallowing re-evaluate goals as necessary  -    Swallow Criteria for Skilled Therapeutic Interventions Met demonstrates skilled criteria  -       Recommendations    SLP Diet Recommendation regular textures;honey thick liquids;water between meals after oral care, with supervision;other (see comments)  avoid/modify mixed consistencies  -    Recommended Precautions and Strategies upright posture during/after eating;small bites of food and sips of liquid  -    Oral Care Recommendations Toothbrush;Other (see comments)  oral care after every meal before intake of thin H2O between meals  -    SLP Rec. for Method of Medication Administration meds whole;with puree  -    Anticipated Discharge Disposition (SLP) assisted living facility (long term);anticipate therapy at next level of care;other (see comments)  continue SLP services for dysphagia  -              User Key  (r) = Recorded By, (t) = Taken By, (c) = Cosigned By      Initials Name Provider Type    Skyla Gomez MS CCC-SLP Speech and Language Pathologist                           Time Calculation:   SLP Start Time: 1445  Untimed Charges  86702-BD Motion Fluoro Eval Swallow Minutes: 72  Total Minutes  Untimed Charges Total Minutes: 72   Total Minutes: 72    Therapy Charges for Today       Code Description " Service Date Service Provider Modifiers Qty    [id number] HC ST MOTION FLUORO EVAL SWALLOW 5 11/13/2023 Skyla Austin, MS CCC-SLP GN 1                     Skyla Austin, MS CCC-SLP  11/13/2023

## 2023-11-14 ENCOUNTER — TRANSCRIBE ORDERS (OUTPATIENT)
Dept: ADMINISTRATIVE | Facility: HOSPITAL | Age: 88
End: 2023-11-14
Payer: MEDICARE

## 2023-11-14 DIAGNOSIS — R13.10 DYSPHAGIA, UNSPECIFIED TYPE: Primary | ICD-10-CM

## 2023-11-29 ENCOUNTER — APPOINTMENT (OUTPATIENT)
Dept: GENERAL RADIOLOGY | Facility: HOSPITAL | Age: 88
End: 2023-11-29
Payer: MEDICARE

## 2023-11-29 ENCOUNTER — HOSPITAL ENCOUNTER (EMERGENCY)
Facility: HOSPITAL | Age: 88
Discharge: HOME | End: 2023-11-29
Attending: EMERGENCY MEDICINE | Admitting: EMERGENCY MEDICINE
Payer: MEDICARE

## 2023-11-29 VITALS
WEIGHT: 125 LBS | TEMPERATURE: 98.6 F | SYSTOLIC BLOOD PRESSURE: 126 MMHG | BODY MASS INDEX: 21.34 KG/M2 | RESPIRATION RATE: 16 BRPM | HEIGHT: 64 IN | HEART RATE: 60 BPM | OXYGEN SATURATION: 96 % | DIASTOLIC BLOOD PRESSURE: 87 MMHG

## 2023-11-29 DIAGNOSIS — J06.9 UPPER RESPIRATORY TRACT INFECTION, UNSPECIFIED TYPE: Primary | ICD-10-CM

## 2023-11-29 DIAGNOSIS — R05.9 COUGH IN ADULT: ICD-10-CM

## 2023-11-29 LAB
ALBUMIN SERPL-MCNC: 3.4 G/DL (ref 3.5–5.2)
ALBUMIN/GLOB SERPL: 1 G/DL
ALP SERPL-CCNC: 71 U/L (ref 39–117)
ALT SERPL W P-5'-P-CCNC: 177 U/L (ref 1–33)
ANION GAP SERPL CALCULATED.3IONS-SCNC: 13 MMOL/L (ref 5–15)
ARTERIAL PATENCY WRIST A: ABNORMAL
AST SERPL-CCNC: 178 U/L (ref 1–32)
ATMOSPHERIC PRESS: ABNORMAL MM[HG]
BASE EXCESS BLDA CALC-SCNC: 5.3 MMOL/L (ref 0–2)
BASOPHILS # BLD AUTO: 0.03 10*3/MM3 (ref 0–0.2)
BASOPHILS NFR BLD AUTO: 0.3 % (ref 0–1.5)
BDY SITE: ABNORMAL
BILIRUB SERPL-MCNC: 0.4 MG/DL (ref 0–1.2)
BODY TEMPERATURE: 37
BUN SERPL-MCNC: 20 MG/DL (ref 8–23)
BUN/CREAT SERPL: 16.5 (ref 7–25)
CALCIUM SPEC-SCNC: 9.3 MG/DL (ref 8.6–10.5)
CHLORIDE SERPL-SCNC: 98 MMOL/L (ref 98–107)
CO2 BLDA-SCNC: 31.1 MMOL/L (ref 22–33)
CO2 SERPL-SCNC: 24 MMOL/L (ref 22–29)
COHGB MFR BLD: 1 % (ref 0–2)
CREAT SERPL-MCNC: 1.21 MG/DL (ref 0.57–1)
DEPRECATED RDW RBC AUTO: 53.6 FL (ref 37–54)
EGFRCR SERPLBLD CKD-EPI 2021: 43.2 ML/MIN/1.73
EOSINOPHIL # BLD AUTO: 0.11 10*3/MM3 (ref 0–0.4)
EOSINOPHIL NFR BLD AUTO: 1.1 % (ref 0.3–6.2)
ERYTHROCYTE [DISTWIDTH] IN BLOOD BY AUTOMATED COUNT: 14.5 % (ref 12.3–15.4)
FLUAV RNA RESP QL NAA+PROBE: NOT DETECTED
FLUBV RNA RESP QL NAA+PROBE: NOT DETECTED
GLOBULIN UR ELPH-MCNC: 3.4 GM/DL
GLUCOSE SERPL-MCNC: 128 MG/DL (ref 65–99)
HCO3 BLDA-SCNC: 29.8 MMOL/L (ref 20–26)
HCT VFR BLD AUTO: 42.2 % (ref 34–46.6)
HCT VFR BLD CALC: 40.3 % (ref 38–51)
HGB BLD-MCNC: 13 G/DL (ref 12–15.9)
HGB BLDA-MCNC: 13.1 G/DL (ref 14–18)
HOLD SPECIMEN: NORMAL
IMM GRANULOCYTES # BLD AUTO: 0.04 10*3/MM3 (ref 0–0.05)
IMM GRANULOCYTES NFR BLD AUTO: 0.4 % (ref 0–0.5)
INHALED O2 CONCENTRATION: 26 %
LYMPHOCYTES # BLD AUTO: 1.02 10*3/MM3 (ref 0.7–3.1)
LYMPHOCYTES NFR BLD AUTO: 10.3 % (ref 19.6–45.3)
MCH RBC QN AUTO: 31.3 PG (ref 26.6–33)
MCHC RBC AUTO-ENTMCNC: 30.8 G/DL (ref 31.5–35.7)
MCV RBC AUTO: 101.4 FL (ref 79–97)
METHGB BLD QL: 0.2 % (ref 0–1.5)
MODALITY: ABNORMAL
MONOCYTES # BLD AUTO: 0.45 10*3/MM3 (ref 0.1–0.9)
MONOCYTES NFR BLD AUTO: 4.5 % (ref 5–12)
NEUTROPHILS NFR BLD AUTO: 8.29 10*3/MM3 (ref 1.7–7)
NEUTROPHILS NFR BLD AUTO: 83.4 % (ref 42.7–76)
NRBC BLD AUTO-RTO: 0 /100 WBC (ref 0–0.2)
NT-PROBNP SERPL-MCNC: 884.6 PG/ML (ref 0–1800)
OXYHGB MFR BLDV: 93.5 % (ref 94–99)
PCO2 BLDA: 42.1 MM HG (ref 35–45)
PCO2 TEMP ADJ BLD: 42.1 MM HG (ref 35–45)
PH BLDA: 7.46 PH UNITS (ref 7.35–7.45)
PH, TEMP CORRECTED: 7.46 PH UNITS
PLATELET # BLD AUTO: 164 10*3/MM3 (ref 140–450)
PMV BLD AUTO: 10.8 FL (ref 6–12)
PO2 BLDA: 71 MM HG (ref 83–108)
PO2 TEMP ADJ BLD: 71 MM HG (ref 83–108)
POTASSIUM SERPL-SCNC: 3.8 MMOL/L (ref 3.5–5.2)
PROT SERPL-MCNC: 6.8 G/DL (ref 6–8.5)
QT INTERVAL: 426 MS
QTC INTERVAL: 428 MS
RBC # BLD AUTO: 4.16 10*6/MM3 (ref 3.77–5.28)
SARS-COV-2 RNA RESP QL NAA+PROBE: NOT DETECTED
SODIUM SERPL-SCNC: 135 MMOL/L (ref 136–145)
TROPONIN T SERPL HS-MCNC: 15 NG/L
VENTILATOR MODE: ABNORMAL
WBC NRBC COR # BLD AUTO: 9.94 10*3/MM3 (ref 3.4–10.8)
WHOLE BLOOD HOLD SPECIMEN: NORMAL

## 2023-11-29 PROCEDURE — 84484 ASSAY OF TROPONIN QUANT: CPT | Performed by: INTERNAL MEDICINE

## 2023-11-29 PROCEDURE — 93010 ELECTROCARDIOGRAM REPORT: CPT | Performed by: INTERNAL MEDICINE

## 2023-11-29 PROCEDURE — 82805 BLOOD GASES W/O2 SATURATION: CPT

## 2023-11-29 PROCEDURE — 85025 COMPLETE CBC W/AUTO DIFF WBC: CPT | Performed by: INTERNAL MEDICINE

## 2023-11-29 PROCEDURE — 36600 WITHDRAWAL OF ARTERIAL BLOOD: CPT

## 2023-11-29 PROCEDURE — 94799 UNLISTED PULMONARY SVC/PX: CPT

## 2023-11-29 PROCEDURE — 82375 ASSAY CARBOXYHB QUANT: CPT

## 2023-11-29 PROCEDURE — 87636 SARSCOV2 & INF A&B AMP PRB: CPT | Performed by: PHYSICIAN ASSISTANT

## 2023-11-29 PROCEDURE — 71045 X-RAY EXAM CHEST 1 VIEW: CPT

## 2023-11-29 PROCEDURE — 93005 ELECTROCARDIOGRAM TRACING: CPT | Performed by: INTERNAL MEDICINE

## 2023-11-29 PROCEDURE — 83880 ASSAY OF NATRIURETIC PEPTIDE: CPT | Performed by: INTERNAL MEDICINE

## 2023-11-29 PROCEDURE — 80053 COMPREHEN METABOLIC PANEL: CPT | Performed by: INTERNAL MEDICINE

## 2023-11-29 PROCEDURE — 83050 HGB METHEMOGLOBIN QUAN: CPT

## 2023-11-29 PROCEDURE — 99284 EMERGENCY DEPT VISIT MOD MDM: CPT

## 2023-11-29 PROCEDURE — 94640 AIRWAY INHALATION TREATMENT: CPT

## 2023-11-29 RX ORDER — SODIUM CHLORIDE 0.9 % (FLUSH) 0.9 %
10 SYRINGE (ML) INJECTION AS NEEDED
Status: DISCONTINUED | OUTPATIENT
Start: 2023-11-29 | End: 2023-11-29 | Stop reason: HOSPADM

## 2023-11-29 RX ORDER — IPRATROPIUM BROMIDE AND ALBUTEROL SULFATE 2.5; .5 MG/3ML; MG/3ML
3 SOLUTION RESPIRATORY (INHALATION)
Status: DISCONTINUED | OUTPATIENT
Start: 2023-11-29 | End: 2023-11-29 | Stop reason: HOSPADM

## 2023-11-29 RX ADMIN — IPRATROPIUM BROMIDE AND ALBUTEROL SULFATE 3 ML: 2.5; .5 SOLUTION RESPIRATORY (INHALATION) at 13:52

## 2023-11-29 NOTE — CASE MANAGEMENT/SOCIAL WORK
Continued Stay Note  [facility]     Patient Name: Bibiana Hodges  MRN: [id number]  Today's Date: 11/29/2023    Admit Date: 11/29/2023    Plan: Transportation   Discharge Plan       Row Name 11/29/23 1654       Plan    Plan Transportation    Plan Comments I was approached by CHRISTIAN Gutierrez to arrange transportation for Ms Hodges to return to the [facility].  Ms Hodges states that we can not contact her family as they are busy.  I called and left a voicemail for [facility].  I have not heard back within the past 30 minutes.  I called [employer] and spoke with Yoselin.  Yoselin states that they can transport Ms Hodges at 1730.  Transportation has been set up with [employer].  I have let CHRISTIAN Gutierrez know.    Final Discharge Disposition Code 01 - home or self-care                   Discharge Codes    No documentation.                       Kimi Loera RN

## 2023-11-29 NOTE — ED PROVIDER NOTES
Subjective  History of Present Illness:    Chief Complaint: Shortness of breath  History of Present Illness: 88-year-old female presents with shortness of breath, she was transported here from [facility], she states that there has been a virus going around the facility, she states she has been feeling bad for 2 days.  She is complaining of shortness of breath, headache, cough, fatigue.  Significant past medical history for previous pneumonia, hypertension, GERD.  Onset: Gradual onset  Duration: 2 days  Exacerbating / Alleviating factors: Symptoms getting worse over the last 2 days, she has been using her oxygen more she reports  Associated symptoms: Weakness      Nurses Notes reviewed and agree, including vitals, allergies, social history and prior medical history.     REVIEW OF SYSTEMS: All systems reviewed and not pertinent unless noted.    Review of Systems   Constitutional:  Positive for fatigue.   Respiratory:  Positive for cough and shortness of breath.    Neurological:  Positive for weakness.   All other systems reviewed and are negative.      Past Medical History:   Diagnosis Date    Constipation     Depression     Dysautonomia orthostatic hypotension syndrome     Generalized osteoarthritis     GERD (gastroesophageal reflux disease)     s/p Nissen    Hypertension     Insomnia     Osteoarthrosis, shoulder region     Skin ulcer of scalp     Thrombophlebitis of arm     Tremor     Vitamin B12 deficiency        Allergies:    Gabapentin and Sulfa antibiotics      Past Surgical History:   Procedure Laterality Date    DILATATION AND CURETTAGE      HERNIA REPAIR      HIP SURGERY      SHOULDER SURGERY      Right         Social History     Socioeconomic History    Marital status:    Tobacco Use    Smoking status: Never    Smokeless tobacco: Never   Vaping Use    Vaping Use: Never used   Substance and Sexual Activity    Alcohol use: No     Comment: stopped drinking alcohol    Drug use: No    Sexual activity: Defer  "        Family History   Problem Relation Age of Onset    Cancer Mother         Pancreatic    Stroke Brother     Anorexia nervosa Daughter        Objective  Physical Exam:  /87   Pulse 60   Temp 98.6 °F (37 °C)   Resp 16   Ht 162.6 cm (64\")   Wt 56.7 kg (125 lb)   SpO2 96%   BMI 21.46 kg/m²      Physical Exam  Vitals and nursing note reviewed.   Constitutional:       Appearance: She is well-developed.   Cardiovascular:      Rate and Rhythm: Normal rate and regular rhythm.   Pulmonary:      Effort: Pulmonary effort is normal.      Breath sounds: Wheezing present.   Abdominal:      General: Bowel sounds are normal.      Palpations: Abdomen is soft.   Musculoskeletal:         General: Normal range of motion.      Cervical back: Normal range of motion and neck supple.   Skin:     General: Skin is warm and dry.   Neurological:      Mental Status: She is alert and oriented to person, place, and time.      Deep Tendon Reflexes: Reflexes are normal and symmetric.           Procedures    ED Course:         Lab Results (last 24 hours)       Procedure Component Value Units Date/Time    Blood Gas, Arterial With Co-Ox [[id number]]  (Abnormal) Collected: 11/29/23 1350    Specimen: Arterial Blood Updated: 11/29/23 1357     Site Right Brachial     Brendan's Test N/A     pH, Arterial 7.458 pH units      Comment: 83 Value above reference range        pCO2, Arterial 42.1 mm Hg      pO2, Arterial 71.0 mm Hg      Comment: 84 Value below reference range        HCO3, Arterial 29.8 mmol/L      Base Excess, Arterial 5.3 mmol/L      Hemoglobin, Blood Gas 13.1 g/dL      Hematocrit, Blood Gas 40.3 %      Oxyhemoglobin 93.5 %      Comment: 84 Value below reference range        Methemoglobin 0.20 %      Carboxyhemoglobin 1.0 %      CO2 Content 31.1 mmol/L      Temperature 37.0     Barometric Pressure for Blood Gas --     Comment: N/A        Modality Nasal Cannula     FIO2 26 %      Ventilator Mode --     Comment: Meter: " [id number]     :  [id number]        pH, Temp Corrected 7.458 pH Units      pCO2, Temperature Corrected 42.1 mm Hg      pO2, Temperature Corrected 71.0 mm Hg     CBC & Differential [[id number]]  (Abnormal) Collected: 11/29/23 1358    Specimen: Blood Updated: 11/29/23 1429    Narrative:      The following orders were created for panel order CBC & Differential.  Procedure                               Abnormality         Status                     ---------                               -----------         ------                     CBC Auto Differential[[id number]]        Abnormal            Final result                 Please view results for these tests on the individual orders.    Comprehensive Metabolic Panel [[id number]]  (Abnormal) Collected: 11/29/23 1358    Specimen: Blood Updated: 11/29/23 1436     Glucose 128 mg/dL      BUN 20 mg/dL      Creatinine 1.21 mg/dL      Sodium 135 mmol/L      Potassium 3.8 mmol/L      Comment: Slight hemolysis detected by analyzer. Result may be falsely elevated.        Chloride 98 mmol/L      CO2 24.0 mmol/L      Calcium 9.3 mg/dL      Total Protein 6.8 g/dL      Albumin 3.4 g/dL      ALT (SGPT) 177 U/L      AST (SGOT) 178 U/L      Alkaline Phosphatase 71 U/L      Total Bilirubin 0.4 mg/dL      Globulin 3.4 gm/dL      Comment: Calculated Result        A/G Ratio 1.0 g/dL      BUN/Creatinine Ratio 16.5     Anion Gap 13.0 mmol/L      eGFR 43.2 mL/min/1.73     Narrative:      GFR Normal >60  Chronic Kidney Disease <60  Kidney Failure <15    The GFR formula is only valid for adults with stable renal function between ages 18 and 70.    BNP [[id number]]  (Normal) Collected: 11/29/23 1358    Specimen: Blood Updated: 11/29/23 1436     proBNP 884.6 pg/mL     Narrative:      This assay is used as an aid in the diagnosis of individuals suspected of having heart failure. It can be used as an aid in the diagnosis of acute decompensated heart failure (ADHF) in patients presenting with  signs and symptoms of ADHF to the emergency department (ED). In addition, NT-proBNP of <300 pg/mL indicates ADHF is not likely.    Age Range Result Interpretation  NT-proBNP Concentration (pg/mL:      <50             Positive            >450                   Gray                 300-450                    Negative             <300    50-75           Positive            >900                  Gray                300-900                  Negative            <300      >75             Positive            >1800                  Gray                300-1800                  Negative            <300    Single High Sensitivity Troponin T [[id number]]  (Abnormal) Collected: 11/29/23 1358    Specimen: Blood Updated: 11/29/23 1436     HS Troponin T 15 ng/L     Narrative:      High Sensitive Troponin T Reference Range:  <14.0 ng/L- Negative Female for AMI  <22.0 ng/L- Negative Male for AMI  >=14 - Abnormal Female indicating possible myocardial injury.  >=22 - Abnormal Male indicating possible myocardial injury.   Clinicians would have to utilize clinical acumen, EKG, Troponin, and serial changes to determine if it is an Acute Myocardial Infarction or myocardial injury due to an underlying chronic condition.         CBC Auto Differential [[id number]]  (Abnormal) Collected: 11/29/23 1358    Specimen: Blood Updated: 11/29/23 1429     WBC 9.94 10*3/mm3      RBC 4.16 10*6/mm3      Hemoglobin 13.0 g/dL      Hematocrit 42.2 %      .4 fL      MCH 31.3 pg      MCHC 30.8 g/dL      RDW 14.5 %      RDW-SD 53.6 fl      MPV 10.8 fL      Platelets 164 10*3/mm3      Neutrophil % 83.4 %      Lymphocyte % 10.3 %      Monocyte % 4.5 %      Eosinophil % 1.1 %      Basophil % 0.3 %      Immature Grans % 0.4 %      Neutrophils, Absolute 8.29 10*3/mm3      Lymphocytes, Absolute 1.02 10*3/mm3      Monocytes, Absolute 0.45 10*3/mm3      Eosinophils, Absolute 0.11 10*3/mm3      Basophils, Absolute 0.03 10*3/mm3      Immature Grans, Absolute 0.04  10*3/mm3      nRBC 0.0 /100 WBC     COVID PRE-OP / PRE-PROCEDURE SCREENING ORDER (NO ISOLATION) - Swab, Nasopharynx [[id number]]  (Normal) Collected: 11/29/23 1541    Specimen: Swab from Nasopharynx Updated: 11/29/23 1612    Narrative:      The following orders were created for panel order COVID PRE-OP / PRE-PROCEDURE SCREENING ORDER (NO ISOLATION) - Swab, Nasopharynx.  Procedure                               Abnormality         Status                     ---------                               -----------         ------                     COVID-19 and FLU A/B PCR...[[id number]]  Normal              Final result                 Please view results for these tests on the individual orders.    COVID-19 and FLU A/B PCR, 1 HR TAT - Swab, Nasopharynx [[id number]]  (Normal) Collected: 11/29/23 1541    Specimen: Swab from Nasopharynx Updated: 11/29/23 1612     COVID19 Not Detected     Influenza A PCR Not Detected     Influenza B PCR Not Detected    Narrative:      Fact sheet for providers: https://www.fda.gov/media/627190/download    Fact sheet for patients: https://www.fda.gov/media/672459/download    Test performed by PCR.             XR Chest 1 View    Result Date: 11/29/2023  XR CHEST 1 VW Date of Exam: 11/29/2023 1:42 PM EST Indication: SOA triage protocol Comparison: Chest radiograph 8/22/2023. Findings: Dual-lead left chest pacemaker. Cardiomediastinal silhouette is unchanged. No new focal consolidation. Continued decrease in the left upper lobe airspace opacity. Similar chronic opacities in the right apex and bilateral lower lobes. No pleural effusion or pneumothorax. Osseous structures are unchanged, including bilateral reverse shoulder arthroplasties.     Impression: Impression: Decreased left upper lobe consolidation and similar chronic changes otherwise, without evidence of new/acute cardiopulmonary disease. Electronically Signed: Mhaesh Mcbride MD  11/29/2023 1:49 PM EST  Workstation ID: [id number]        Medical  Decision Making  Patient Presentation ED 8-year-old female presents with reports of shortness of breath from a nursing home facility.  Vital signs stable, on oxygen at the nursing home as needed, vital signs stable    DDX URI, COVID, pneumonia, bronchitis    Data Review/ Non ED Records /Analysis/Ordering unique tests. Review of previous visits, prior labs, prior imaging, available notes from prior evaluations or visits with specialists, medication list, allergies, past medical history, past surgical history CBC, CMP, troponin, COVID, BNP        Independent Review Studies Toprol labs and discussed with the patient at the bedside    Intervention/Re-evaluation on intervention she remained stable, no acute distress    Independent Clinician no consultation    Risk Stratification tools/clinical decision rules patient has previously lung disease, she is dependent on oxygen intermittently she reports, had a recent pneumonia, x-ray reports show improvement of the opacity.  COVID-negative, labs unremarkable, patient was stable to discharge back to nursing home    Shared Decision Making discussed this with the patient and she agreed with the plan    Disposition patient stable for discharge    Problems Addressed:  Cough in adult: acute illness or injury  Upper respiratory tract infection, unspecified type: acute illness or injury    Risk  Prescription drug management.          Final diagnoses:   Upper respiratory tract infection, unspecified type   Cough in adult          Xavi Sadler Jr., PA-C  11/29/23 1010

## 2024-04-20 ENCOUNTER — HOSPITAL ENCOUNTER (EMERGENCY)
Facility: HOSPITAL | Age: 89
Discharge: HOME | End: 2024-04-20
Attending: EMERGENCY MEDICINE
Payer: MEDICARE

## 2024-04-20 ENCOUNTER — APPOINTMENT (OUTPATIENT)
Dept: GENERAL RADIOLOGY | Facility: HOSPITAL | Age: 89
End: 2024-04-20
Payer: MEDICARE

## 2024-04-20 ENCOUNTER — APPOINTMENT (OUTPATIENT)
Dept: CT IMAGING | Facility: HOSPITAL | Age: 89
End: 2024-04-20
Payer: MEDICARE

## 2024-04-20 VITALS
WEIGHT: 145 LBS | SYSTOLIC BLOOD PRESSURE: 149 MMHG | OXYGEN SATURATION: 95 % | RESPIRATION RATE: 18 BRPM | DIASTOLIC BLOOD PRESSURE: 85 MMHG | BODY MASS INDEX: 21.98 KG/M2 | HEART RATE: 60 BPM | HEIGHT: 68 IN | TEMPERATURE: 98 F

## 2024-04-20 DIAGNOSIS — N30.00 ACUTE CYSTITIS WITHOUT HEMATURIA: Primary | ICD-10-CM

## 2024-04-20 DIAGNOSIS — I10 HYPERTENSION, UNSPECIFIED TYPE: ICD-10-CM

## 2024-04-20 DIAGNOSIS — Z79.01 ANTICOAGULATED: ICD-10-CM

## 2024-04-20 DIAGNOSIS — R60.0 EDEMA OF LEFT UPPER ARM: ICD-10-CM

## 2024-04-20 DIAGNOSIS — Z86.79 HISTORY OF ATRIAL FIBRILLATION: ICD-10-CM

## 2024-04-20 LAB
ALBUMIN SERPL-MCNC: 3.5 G/DL (ref 3.5–5.2)
ALBUMIN/GLOB SERPL: 1.1 G/DL
ALP SERPL-CCNC: 77 U/L (ref 39–117)
ALT SERPL W P-5'-P-CCNC: 24 U/L (ref 1–33)
ANION GAP SERPL CALCULATED.3IONS-SCNC: 7 MMOL/L (ref 5–15)
AST SERPL-CCNC: 37 U/L (ref 1–32)
BACTERIA UR QL AUTO: ABNORMAL /HPF
BASOPHILS # BLD AUTO: 0.04 10*3/MM3 (ref 0–0.2)
BASOPHILS NFR BLD AUTO: 0.7 % (ref 0–1.5)
BILIRUB SERPL-MCNC: 0.3 MG/DL (ref 0–1.2)
BILIRUB UR QL STRIP: NEGATIVE
BUN SERPL-MCNC: 20 MG/DL (ref 8–23)
BUN/CREAT SERPL: 17.1 (ref 7–25)
CALCIUM SPEC-SCNC: 8.9 MG/DL (ref 8.6–10.5)
CHLORIDE SERPL-SCNC: 98 MMOL/L (ref 98–107)
CLARITY UR: CLEAR
CO2 SERPL-SCNC: 32 MMOL/L (ref 22–29)
COLOR UR: YELLOW
CREAT SERPL-MCNC: 1.17 MG/DL (ref 0.57–1)
DEPRECATED RDW RBC AUTO: 47.5 FL (ref 37–54)
EGFRCR SERPLBLD CKD-EPI 2021: 45 ML/MIN/1.73
EOSINOPHIL # BLD AUTO: 0.19 10*3/MM3 (ref 0–0.4)
EOSINOPHIL NFR BLD AUTO: 3.5 % (ref 0.3–6.2)
ERYTHROCYTE [DISTWIDTH] IN BLOOD BY AUTOMATED COUNT: 12.7 % (ref 12.3–15.4)
GLOBULIN UR ELPH-MCNC: 3.2 GM/DL
GLUCOSE SERPL-MCNC: 76 MG/DL (ref 65–99)
GLUCOSE UR STRIP-MCNC: NEGATIVE MG/DL
HCT VFR BLD AUTO: 43.8 % (ref 34–46.6)
HGB BLD-MCNC: 14 G/DL (ref 12–15.9)
HGB UR QL STRIP.AUTO: NEGATIVE
HOLD SPECIMEN: NORMAL
HYALINE CASTS UR QL AUTO: ABNORMAL /LPF
IMM GRANULOCYTES # BLD AUTO: 0.01 10*3/MM3 (ref 0–0.05)
IMM GRANULOCYTES NFR BLD AUTO: 0.2 % (ref 0–0.5)
KETONES UR QL STRIP: NEGATIVE
LEUKOCYTE ESTERASE UR QL STRIP.AUTO: ABNORMAL
LYMPHOCYTES # BLD AUTO: 1.64 10*3/MM3 (ref 0.7–3.1)
LYMPHOCYTES NFR BLD AUTO: 30.3 % (ref 19.6–45.3)
MCH RBC QN AUTO: 32.9 PG (ref 26.6–33)
MCHC RBC AUTO-ENTMCNC: 32 G/DL (ref 31.5–35.7)
MCV RBC AUTO: 102.8 FL (ref 79–97)
MONOCYTES # BLD AUTO: 0.47 10*3/MM3 (ref 0.1–0.9)
MONOCYTES NFR BLD AUTO: 8.7 % (ref 5–12)
NEUTROPHILS NFR BLD AUTO: 3.07 10*3/MM3 (ref 1.7–7)
NEUTROPHILS NFR BLD AUTO: 56.6 % (ref 42.7–76)
NITRITE UR QL STRIP: NEGATIVE
NRBC BLD AUTO-RTO: 0 /100 WBC (ref 0–0.2)
NT-PROBNP SERPL-MCNC: 972 PG/ML (ref 0–1800)
PH UR STRIP.AUTO: 7.5 [PH] (ref 5–8)
PLATELET # BLD AUTO: 166 10*3/MM3 (ref 140–450)
PMV BLD AUTO: 11.4 FL (ref 6–12)
POTASSIUM SERPL-SCNC: 4.7 MMOL/L (ref 3.5–5.2)
PROT SERPL-MCNC: 6.7 G/DL (ref 6–8.5)
PROT UR QL STRIP: ABNORMAL
QT INTERVAL: 458 MS
QTC INTERVAL: 472 MS
RBC # BLD AUTO: 4.26 10*6/MM3 (ref 3.77–5.28)
RBC # UR STRIP: ABNORMAL /HPF
REF LAB TEST METHOD: ABNORMAL
SODIUM SERPL-SCNC: 137 MMOL/L (ref 136–145)
SP GR UR STRIP: 1.01 (ref 1–1.03)
SQUAMOUS #/AREA URNS HPF: ABNORMAL /HPF
TROPONIN T SERPL HS-MCNC: 21 NG/L
UROBILINOGEN UR QL STRIP: ABNORMAL
WBC # UR STRIP: ABNORMAL /HPF
WBC NRBC COR # BLD AUTO: 5.42 10*3/MM3 (ref 3.4–10.8)
WHOLE BLOOD HOLD COAG: NORMAL
WHOLE BLOOD HOLD SPECIMEN: NORMAL

## 2024-04-20 PROCEDURE — 71045 X-RAY EXAM CHEST 1 VIEW: CPT

## 2024-04-20 PROCEDURE — 81001 URINALYSIS AUTO W/SCOPE: CPT | Performed by: EMERGENCY MEDICINE

## 2024-04-20 PROCEDURE — 83880 ASSAY OF NATRIURETIC PEPTIDE: CPT | Performed by: PHYSICIAN ASSISTANT

## 2024-04-20 PROCEDURE — 87077 CULTURE AEROBIC IDENTIFY: CPT | Performed by: PHYSICIAN ASSISTANT

## 2024-04-20 PROCEDURE — 84484 ASSAY OF TROPONIN QUANT: CPT | Performed by: PHYSICIAN ASSISTANT

## 2024-04-20 PROCEDURE — 87086 URINE CULTURE/COLONY COUNT: CPT | Performed by: PHYSICIAN ASSISTANT

## 2024-04-20 PROCEDURE — 25510000001 IOPAMIDOL PER 1 ML: Performed by: EMERGENCY MEDICINE

## 2024-04-20 PROCEDURE — 80053 COMPREHEN METABOLIC PANEL: CPT | Performed by: PHYSICIAN ASSISTANT

## 2024-04-20 PROCEDURE — 99285 EMERGENCY DEPT VISIT HI MDM: CPT

## 2024-04-20 PROCEDURE — 71275 CT ANGIOGRAPHY CHEST: CPT

## 2024-04-20 PROCEDURE — 93005 ELECTROCARDIOGRAM TRACING: CPT | Performed by: PHYSICIAN ASSISTANT

## 2024-04-20 PROCEDURE — 85025 COMPLETE CBC W/AUTO DIFF WBC: CPT | Performed by: PHYSICIAN ASSISTANT

## 2024-04-20 PROCEDURE — P9612 CATHETERIZE FOR URINE SPEC: HCPCS

## 2024-04-20 PROCEDURE — 87186 SC STD MICRODIL/AGAR DIL: CPT | Performed by: PHYSICIAN ASSISTANT

## 2024-04-20 RX ORDER — CEFUROXIME AXETIL 500 MG/1
500 TABLET ORAL 2 TIMES DAILY
Qty: 20 TABLET | Refills: 0 | Status: SHIPPED | OUTPATIENT
Start: 2024-04-20 | End: 2024-04-30

## 2024-04-20 RX ORDER — SODIUM CHLORIDE 0.9 % (FLUSH) 0.9 %
10 SYRINGE (ML) INJECTION AS NEEDED
Status: DISCONTINUED | OUTPATIENT
Start: 2024-04-20 | End: 2024-04-20 | Stop reason: HOSPADM

## 2024-04-20 RX ORDER — CEFUROXIME AXETIL 250 MG/1
500 TABLET ORAL ONCE
Status: COMPLETED | OUTPATIENT
Start: 2024-04-20 | End: 2024-04-20

## 2024-04-20 RX ADMIN — CEFUROXIME AXETIL 500 MG: 250 TABLET, FILM COATED ORAL at 20:49

## 2024-04-20 RX ADMIN — IOPAMIDOL 85 ML: 755 INJECTION, SOLUTION INTRAVENOUS at 19:37

## 2024-04-20 NOTE — ED PROVIDER NOTES
Subjective   History of Present Illness  Pt is a 87 yo female presenting to ED by EMS from [place] with complaints of elevated blood pressure. PMHx significant for HTN, Afib on Eliquis, Tremors, Depression, Dysautonomia, Thrombophlebitis, Insomnia and Arthritis. Pt complains of left arm swelling that extends to left breast for the past several days. She denies arm pain or breast pain. She denies redness to skin. She denies injury. Denies prior breast surgery, radiation or lympathic damage. Staff at nursing home noticed her BP was elevated at 204/64. ON arrival to ED /93. She denies headache, dizziness, CP, SOB, cough, N/V/D or abdominal pain. She explains she  has been having edema to her feet for months. She denies leg pain. Eliezer hx of DVT or PE. She was started on Lisinopril 2 days ago.     History provided by:  Patient, medical records, nursing home, EMS personnel and relative (Son)      Review of Systems   Constitutional:  Negative for chills and fever.   HENT:  Negative for congestion.    Respiratory:  Negative for cough and shortness of breath.    Cardiovascular:  Positive for leg swelling. Negative for chest pain.   Gastrointestinal:  Negative for abdominal pain, diarrhea, nausea and vomiting.   Genitourinary:  Negative for dysuria.   Musculoskeletal:  Negative for arthralgias, back pain and neck pain.        Swelling to left arm     Skin:  Negative for color change and wound.   Neurological:  Negative for dizziness, speech difficulty, weakness, numbness and headaches.   Psychiatric/Behavioral:  Negative for confusion.        Past Medical History:   Diagnosis Date    Constipation     Depression     Dysautonomia orthostatic hypotension syndrome     Generalized osteoarthritis     GERD (gastroesophageal reflux disease)     s/p Nissen    Hypertension     Insomnia     Osteoarthrosis, shoulder region     Skin ulcer of scalp     Thrombophlebitis of arm     Tremor     Vitamin B12 deficiency        Allergies    Allergen Reactions    Gabapentin     Sulfa Antibiotics        Past Surgical History:   Procedure Laterality Date    DILATATION AND CURETTAGE      HERNIA REPAIR      HIP SURGERY      SHOULDER SURGERY      Right       Family History   Problem Relation Age of Onset    Cancer Mother         Pancreatic    Stroke Brother     Anorexia nervosa Daughter        Social History     Socioeconomic History    Marital status:    Tobacco Use    Smoking status: Never    Smokeless tobacco: Never   Vaping Use    Vaping status: Never Used   Substance and Sexual Activity    Alcohol use: No     Comment: stopped drinking alcohol    Drug use: No    Sexual activity: Defer           Objective   Physical Exam  Vitals and nursing note reviewed.   Constitutional:       General: She is not in acute distress.  HENT:      Head: Atraumatic.      Mouth/Throat:      Mouth: Mucous membranes are moist.   Eyes:      Extraocular Movements: Extraocular movements intact.      Conjunctiva/sclera: Conjunctivae normal.      Pupils: Pupils are equal, round, and reactive to light.   Cardiovascular:      Rate and Rhythm: Normal rate.      Comments: Diffuse edema to feet but no ankle or calf pitting edema  Pulmonary:      Effort: Pulmonary effort is normal. No respiratory distress.   Chest:      Chest wall: Swelling present. No mass or tenderness.      Comments: No redness to breast  Abdominal:      Palpations: Abdomen is soft.      Tenderness: There is no abdominal tenderness.   Musculoskeletal:         General: Normal range of motion.      Cervical back: Normal range of motion and neck supple.      Comments: Left arm with diffuse edema but no erythema, tenderness or difficulty with movement.    Skin:     General: Skin is warm.   Neurological:      General: No focal deficit present.      Mental Status: She is alert and oriented to person, place, and time.      Sensory: No sensory deficit.      Motor: No weakness.   Psychiatric:         Mood and Affect:  Mood normal.         Procedures           ED Course      Recent Results (from the past 24 hour(s))   ECG 12 Lead Dyspnea    Collection Time: 04/20/24  5:51 PM   Result Value Ref Range    QT Interval 458 ms    QTC Interval 472 ms   Comprehensive Metabolic Panel    Collection Time: 04/20/24  6:37 PM    Specimen: Blood   Result Value Ref Range    Glucose 76 65 - 99 mg/dL    BUN 20 8 - 23 mg/dL    Creatinine 1.17 (H) 0.57 - 1.00 mg/dL    Sodium 137 136 - 145 mmol/L    Potassium 4.7 3.5 - 5.2 mmol/L    Chloride 98 98 - 107 mmol/L    CO2 32.0 (H) 22.0 - 29.0 mmol/L    Calcium 8.9 8.6 - 10.5 mg/dL    Total Protein 6.7 6.0 - 8.5 g/dL    Albumin 3.5 3.5 - 5.2 g/dL    ALT (SGPT) 24 1 - 33 U/L    AST (SGOT) 37 (H) 1 - 32 U/L    Alkaline Phosphatase 77 39 - 117 U/L    Total Bilirubin 0.3 0.0 - 1.2 mg/dL    Globulin 3.2 gm/dL    A/G Ratio 1.1 g/dL    BUN/Creatinine Ratio 17.1 7.0 - 25.0    Anion Gap 7.0 5.0 - 15.0 mmol/L    eGFR 45.0 (L) >60.0 mL/min/1.73   BNP    Collection Time: 04/20/24  6:37 PM    Specimen: Blood   Result Value Ref Range    proBNP 972.0 0.0 - 1,800.0 pg/mL   Single High Sensitivity Troponin T    Collection Time: 04/20/24  6:37 PM    Specimen: Blood   Result Value Ref Range    HS Troponin T 21 (H) <14 ng/L   CBC Auto Differential    Collection Time: 04/20/24  6:37 PM    Specimen: Blood   Result Value Ref Range    WBC 5.42 3.40 - 10.80 10*3/mm3    RBC 4.26 3.77 - 5.28 10*6/mm3    Hemoglobin 14.0 12.0 - 15.9 g/dL    Hematocrit 43.8 34.0 - 46.6 %    .8 (H) 79.0 - 97.0 fL    MCH 32.9 26.6 - 33.0 pg    MCHC 32.0 31.5 - 35.7 g/dL    RDW 12.7 12.3 - 15.4 %    RDW-SD 47.5 37.0 - 54.0 fl    MPV 11.4 6.0 - 12.0 fL    Platelets 166 140 - 450 10*3/mm3    Neutrophil % 56.6 42.7 - 76.0 %    Lymphocyte % 30.3 19.6 - 45.3 %    Monocyte % 8.7 5.0 - 12.0 %    Eosinophil % 3.5 0.3 - 6.2 %    Basophil % 0.7 0.0 - 1.5 %    Immature Grans % 0.2 0.0 - 0.5 %    Neutrophils, Absolute 3.07 1.70 - 7.00 10*3/mm3    Lymphocytes,  Absolute 1.64 0.70 - 3.10 10*3/mm3    Monocytes, Absolute 0.47 0.10 - 0.90 10*3/mm3    Eosinophils, Absolute 0.19 0.00 - 0.40 10*3/mm3    Basophils, Absolute 0.04 0.00 - 0.20 10*3/mm3    Immature Grans, Absolute 0.01 0.00 - 0.05 10*3/mm3    nRBC 0.0 0.0 - 0.2 /100 WBC   Green Top (Gel)    Collection Time: 04/20/24  6:37 PM   Result Value Ref Range    Extra Tube Hold for add-ons.    Lavender Top    Collection Time: 04/20/24  6:37 PM   Result Value Ref Range    Extra Tube hold for add-on    Gold Top - SST    Collection Time: 04/20/24  6:37 PM   Result Value Ref Range    Extra Tube Hold for add-ons.    Light Blue Top    Collection Time: 04/20/24  6:37 PM   Result Value Ref Range    Extra Tube Hold for add-ons.    Urinalysis With Microscopic If Indicated (No Culture) - Straight Cath    Collection Time: 04/20/24  6:51 PM    Specimen: Straight Cath; Urine   Result Value Ref Range    Color, UA Yellow Yellow, Straw    Appearance, UA Clear Clear    pH, UA 7.5 5.0 - 8.0    Specific Gravity, UA 1.013 1.001 - 1.030    Glucose, UA Negative Negative    Ketones, UA Negative Negative    Bilirubin, UA Negative Negative    Blood, UA Negative Negative    Protein, UA 30 mg/dL (1+) (A) Negative    Leuk Esterase, UA Small (1+) (A) Negative    Nitrite, UA Negative Negative    Urobilinogen, UA 0.2 E.U./dL 0.2 - 1.0 E.U./dL   Urinalysis, Microscopic Only - Straight Cath    Collection Time: 04/20/24  6:51 PM    Specimen: Straight Cath; Urine   Result Value Ref Range    RBC, UA 0-2 None Seen, 0-2 /HPF    WBC, UA Too Numerous to Count (A) None Seen, 0-2 /HPF    Bacteria, UA 4+ (A) None Seen, Trace /HPF    Squamous Epithelial Cells, UA None Seen None Seen, 0-2 /HPF    Hyaline Casts, UA 0-6 0 - 6 /LPF    Methodology Automated Microscopy      Note: In addition to lab results from this visit, the labs listed above may include labs taken at another facility or during a different encounter within the last 24 hours. Please correlate lab times with ED  "admission and discharge times for further clarification of the services performed during this visit.    CT Angiogram Chest   Final Result   No evidence of aortic dissection. No pulmonary embolus. Small left effusion with adjacent atelectasis. No evidence of vascular compromise to left arm with normal arterial flow.         Electronically Signed: Amilcar Cardoza MD     4/20/2024 7:46 PM EDT     Workstation ID: [id number]      XR Chest 1 View   Final Result   Impression: Small left effusion..         Electronically Signed: Amilcar Cardoza MD     4/20/2024 6:07 PM EDT     Workstation ID: [id number]        Vitals:    04/20/24 1737 04/20/24 1851 04/20/24 1915 04/20/24 2000   BP: (!) 138/134 117/93  149/85   BP Location: Right arm      Patient Position: Lying      Pulse: 77 68 59 60   Resp: 18      Temp: 98 °F (36.7 °C)      TempSrc: Axillary      SpO2: 96% 95% 95% 95%   Weight: 65.8 kg (145 lb)      Height: 172.7 cm (68\")        Medications   sodium chloride 0.9 % flush 10 mL (has no administration in time range)   cefuroxime (CEFTIN) tablet 500 mg (has no administration in time range)   iopamidol (ISOVUE-370) 76 % injection 100 mL (85 mL Intravenous Given 4/20/24 1937)     ECG/EMG Results (last 24 hours)       Procedure Component Value Units Date/Time    ECG 12 Lead Dyspnea [[id number]] Collected: 04/20/24 1751     Updated: 04/20/24 1751     QT Interval 458 ms      QTC Interval 472 ms     Narrative:      Test Reason : Dyspnea  Blood Pressure :   */*   mmHG  Vent. Rate :  64 BPM     Atrial Rate :  64 BPM     P-R Int : 276 ms          QRS Dur : 114 ms      QT Int : 458 ms       P-R-T Axes : -21 -46  36 degrees     QTc Int : 472 ms    Atrial-paced rhythm with prolonged AV conduction  Left axis deviation  Anteroseptal infarct (cited on or before 26-MAY-2023)  Abnormal ECG  When compared with ECG of 29-NOV-2023 14:03,  Previous ECG has undetermined rhythm, needs review  Questionable change in initial forces of Anterior " leads  Nonspecific T wave abnormality no longer evident in Lateral leads    Referred By: EDMD           Confirmed By:           ECG 12 Lead Dyspnea   Preliminary Result   Test Reason : Dyspnea   Blood Pressure :   */*   mmHG   Vent. Rate :  64 BPM     Atrial Rate :  64 BPM      P-R Int : 276 ms          QRS Dur : 114 ms       QT Int : 458 ms       P-R-T Axes : -21 -46  36 degrees      QTc Int : 472 ms      Atrial-paced rhythm with prolonged AV conduction   Left axis deviation   Anteroseptal infarct (cited on or before 26-MAY-2023)   Abnormal ECG   When compared with ECG of 29-NOV-2023 14:03,   Previous ECG has undetermined rhythm, needs review   Questionable change in initial forces of Anterior leads   Nonspecific T wave abnormality no longer evident in Lateral leads      Referred By: EDMD           Confirmed By:                                                  Medical Decision Making  Pt is a 87 yo female presenting to ED with complaints of HTN and edema to left arm and breast. Labs in ED notable for WBC 5.4, Cr 1.17, Glucose 76, K 4.7, Na 137, HS Trop 21,  and UA with leukocytes, + 4 bacteria and TNTC WBC. EKG paced. CTA chest with findings of small left pleural effusion and no PE, aortic dissection or evidence of vascular compromise. BP stable in ED. Discussed results and tx plan. Will dc home with Ceftin for UTI and first dose given in ED. Throughout ED course patient leaning to the left. Discussed trying to rotate leaning to the right instead. BP meds have recently been adjusted at the nursing home. She is eager to go back and discussed outpatient f/u with PCP for further evaluation. She is chronically anticoagulated on Eliquis for her Afib. She will return to ED if new / worse sx.     Discussed patient with Dr. Hoffmann who is agreeable with ED course and tx plan.     DDx  ACS, NSTEMI, CHF, DVT, PE, Chest pathology, Breast cancer, CVA, HTN emergency, UTI, RACH    Problems Addressed:  Acute cystitis without  hematuria: acute illness or injury  Anticoagulated: chronic illness or injury  Edema of left upper arm: acute illness or injury  History of atrial fibrillation: chronic illness or injury  Hypertension, unspecified type: chronic illness or injury    Amount and/or Complexity of Data Reviewed  Independent Historian:      Details: son  External Data Reviewed: notes.     Details: Reviewed previous non ED visits including prior labs, imaging, available notes, medications, allergies and surgical hx.     Labs: ordered. Decision-making details documented in ED Course.  Radiology: ordered. Decision-making details documented in ED Course.  ECG/medicine tests: ordered. Decision-making details documented in ED Course.    Risk  Prescription drug management.        Final diagnoses:   Acute cystitis without hematuria   Hypertension, unspecified type   Edema of left upper arm   Anticoagulated   History of atrial fibrillation       ED Disposition  ED Disposition       ED Disposition   Discharge    Condition   Stable    Comment   --               Nivia Madrigal MD  [facility] [place]  [facility] [address] [phone]    Schedule an appointment as soon as possible for a visit       [facility] EMERGENCY DEPARTMENT  [address] [place] [id number] [phone]    If symptoms worsen         Medication List        New Prescriptions      cefuroxime 500 MG tablet  Commonly known as: CEFTIN  Take 1 tablet by mouth 2 (Two) Times a Day for 10 days.            Changed      polyethylene glycol 17 GM/SCOOP powder  Commonly known as: MIRALAX  MIX 17 GM IN 8 OUNCES OF LIQUID AND DRINK 1 TO 2 TIMES DAILY FOR CONSTIPATION.  What changed: See the new instructions.               Where to Get Your Medications        These medications were sent to [facility]  [address] [place] [address] [id number], [place] [address]      Hours: Monday to Friday 7 AM to 5:30 PM, Saturday & Sunday 8 AM to 4:30  PM Phone: [phone]   cefuroxime 500 MG tablet            Dacia Silva PA  04/20/24 2046

## 2024-04-22 LAB — BACTERIA SPEC AEROBE CULT: ABNORMAL

## 2024-05-02 LAB
QT INTERVAL: 458 MS
QTC INTERVAL: 472 MS

## 2024-05-06 ENCOUNTER — APPOINTMENT (OUTPATIENT)
Dept: GENERAL RADIOLOGY | Facility: HOSPITAL | Age: 89
End: 2024-05-06
Payer: MEDICARE

## 2024-05-06 ENCOUNTER — HOSPITAL ENCOUNTER (EMERGENCY)
Facility: HOSPITAL | Age: 89
Discharge: HOME | End: 2024-05-07
Attending: EMERGENCY MEDICINE
Payer: MEDICARE

## 2024-05-06 DIAGNOSIS — R03.0 ELEVATED BLOOD PRESSURE READING: Primary | ICD-10-CM

## 2024-05-06 LAB
ALBUMIN SERPL-MCNC: 3.2 G/DL (ref 3.5–5.2)
ALBUMIN/GLOB SERPL: 1.2 G/DL
ALP SERPL-CCNC: 73 U/L (ref 39–117)
ALT SERPL W P-5'-P-CCNC: 24 U/L (ref 1–33)
ANION GAP SERPL CALCULATED.3IONS-SCNC: 8 MMOL/L (ref 5–15)
AST SERPL-CCNC: 30 U/L (ref 1–32)
BASOPHILS # BLD AUTO: 0.02 10*3/MM3 (ref 0–0.2)
BASOPHILS NFR BLD AUTO: 0.4 % (ref 0–1.5)
BILIRUB SERPL-MCNC: 0.2 MG/DL (ref 0–1.2)
BILIRUB UR QL STRIP: NEGATIVE
BUN SERPL-MCNC: 21 MG/DL (ref 8–23)
BUN/CREAT SERPL: 18.1 (ref 7–25)
CALCIUM SPEC-SCNC: 9 MG/DL (ref 8.6–10.5)
CHLORIDE SERPL-SCNC: 101 MMOL/L (ref 98–107)
CLARITY UR: CLEAR
CO2 SERPL-SCNC: 29 MMOL/L (ref 22–29)
COLOR UR: YELLOW
CREAT SERPL-MCNC: 1.16 MG/DL (ref 0.57–1)
DEPRECATED RDW RBC AUTO: 45.5 FL (ref 37–54)
EGFRCR SERPLBLD CKD-EPI 2021: 45.4 ML/MIN/1.73
EOSINOPHIL # BLD AUTO: 0.17 10*3/MM3 (ref 0–0.4)
EOSINOPHIL NFR BLD AUTO: 3.3 % (ref 0.3–6.2)
ERYTHROCYTE [DISTWIDTH] IN BLOOD BY AUTOMATED COUNT: 12.1 % (ref 12.3–15.4)
GLOBULIN UR ELPH-MCNC: 2.6 GM/DL
GLUCOSE SERPL-MCNC: 79 MG/DL (ref 65–99)
GLUCOSE UR STRIP-MCNC: NEGATIVE MG/DL
HCT VFR BLD AUTO: 40.4 % (ref 34–46.6)
HGB BLD-MCNC: 13.2 G/DL (ref 12–15.9)
HGB UR QL STRIP.AUTO: NEGATIVE
IMM GRANULOCYTES # BLD AUTO: 0.01 10*3/MM3 (ref 0–0.05)
IMM GRANULOCYTES NFR BLD AUTO: 0.2 % (ref 0–0.5)
KETONES UR QL STRIP: NEGATIVE
LEUKOCYTE ESTERASE UR QL STRIP.AUTO: NEGATIVE
LYMPHOCYTES # BLD AUTO: 1.36 10*3/MM3 (ref 0.7–3.1)
LYMPHOCYTES NFR BLD AUTO: 26.5 % (ref 19.6–45.3)
MCH RBC QN AUTO: 32.8 PG (ref 26.6–33)
MCHC RBC AUTO-ENTMCNC: 32.7 G/DL (ref 31.5–35.7)
MCV RBC AUTO: 100.2 FL (ref 79–97)
MONOCYTES # BLD AUTO: 0.45 10*3/MM3 (ref 0.1–0.9)
MONOCYTES NFR BLD AUTO: 8.8 % (ref 5–12)
NEUTROPHILS NFR BLD AUTO: 3.13 10*3/MM3 (ref 1.7–7)
NEUTROPHILS NFR BLD AUTO: 60.8 % (ref 42.7–76)
NITRITE UR QL STRIP: NEGATIVE
NRBC BLD AUTO-RTO: 0 /100 WBC (ref 0–0.2)
NT-PROBNP SERPL-MCNC: 874.5 PG/ML (ref 0–1800)
PH UR STRIP.AUTO: 7.5 [PH] (ref 5–8)
PLATELET # BLD AUTO: 161 10*3/MM3 (ref 140–450)
PMV BLD AUTO: 10.8 FL (ref 6–12)
POTASSIUM SERPL-SCNC: 4.5 MMOL/L (ref 3.5–5.2)
PROT SERPL-MCNC: 5.8 G/DL (ref 6–8.5)
PROT UR QL STRIP: ABNORMAL
RBC # BLD AUTO: 4.03 10*6/MM3 (ref 3.77–5.28)
SODIUM SERPL-SCNC: 138 MMOL/L (ref 136–145)
SP GR UR STRIP: 1.01 (ref 1–1.03)
TROPONIN T SERPL HS-MCNC: 16 NG/L
UROBILINOGEN UR QL STRIP: ABNORMAL
WBC NRBC COR # BLD AUTO: 5.14 10*3/MM3 (ref 3.4–10.8)

## 2024-05-06 PROCEDURE — 84484 ASSAY OF TROPONIN QUANT: CPT

## 2024-05-06 PROCEDURE — 80053 COMPREHEN METABOLIC PANEL: CPT

## 2024-05-06 PROCEDURE — 36415 COLL VENOUS BLD VENIPUNCTURE: CPT

## 2024-05-06 PROCEDURE — 93005 ELECTROCARDIOGRAM TRACING: CPT | Performed by: EMERGENCY MEDICINE

## 2024-05-06 PROCEDURE — 85025 COMPLETE CBC W/AUTO DIFF WBC: CPT

## 2024-05-06 PROCEDURE — 71045 X-RAY EXAM CHEST 1 VIEW: CPT

## 2024-05-06 PROCEDURE — 93005 ELECTROCARDIOGRAM TRACING: CPT

## 2024-05-06 PROCEDURE — 81003 URINALYSIS AUTO W/O SCOPE: CPT

## 2024-05-06 PROCEDURE — 99284 EMERGENCY DEPT VISIT MOD MDM: CPT

## 2024-05-06 PROCEDURE — 83880 ASSAY OF NATRIURETIC PEPTIDE: CPT

## 2024-05-06 RX ORDER — SODIUM CHLORIDE 0.9 % (FLUSH) 0.9 %
10 SYRINGE (ML) INJECTION AS NEEDED
Status: DISCONTINUED | OUTPATIENT
Start: 2024-05-06 | End: 2024-05-07 | Stop reason: HOSPADM

## 2024-05-07 ENCOUNTER — APPOINTMENT (OUTPATIENT)
Facility: HOSPITAL | Age: 89
End: 2024-05-07
Payer: MEDICARE

## 2024-05-07 VITALS
HEIGHT: 64 IN | SYSTOLIC BLOOD PRESSURE: 195 MMHG | DIASTOLIC BLOOD PRESSURE: 85 MMHG | HEART RATE: 60 BPM | WEIGHT: 139.99 LBS | TEMPERATURE: 98.1 F | BODY MASS INDEX: 23.9 KG/M2 | OXYGEN SATURATION: 96 % | RESPIRATION RATE: 18 BRPM

## 2024-05-07 NOTE — DISCHARGE INSTRUCTIONS
Please have patient follow-up with her primary care provider, and considering increasing her dose of lisinopril in a stepwise fashion according to recommendations.  Also, blood pressures taken on the wrist are typically not as accurate, and adjusting the method may be helpful to accurately trend her readings.

## 2024-05-07 NOTE — ED PROVIDER NOTES
Subjective   History of Present Illness patient is an 88-year-old female who appears alert, oriented to surroundings and situation, reports she was in her usual state of health but the nursing home facility where she lives checked her blood pressure today using a wrist cuff multiple instances reporting she had a systolic in the 200s, sent her to the ED via EMS for evaluation.  However on arrival as well as and route her systolic was in the 170s, with a measured at rest in the ED as low as 120 systolic.  Patient reports she is taking blood pressure medicines as prescribed, reports she is in her usual state of health with the exception of recent acute on chronic left upper extremity swelling, lower extremity swelling is was evaluated in the last visit.  Denies chest pain or dyspnea.    Review of Systems   Constitutional: Negative.    HENT: Negative.     Respiratory: Negative.     Cardiovascular:  Positive for leg swelling.   Gastrointestinal: Negative.    Genitourinary: Negative.    Musculoskeletal: Negative.    Skin: Negative.    Neurological: Negative.        Past Medical History:   Diagnosis Date    Constipation     Depression     Dysautonomia orthostatic hypotension syndrome     Generalized osteoarthritis     GERD (gastroesophageal reflux disease)     s/p Nissen    Hypertension     Insomnia     Osteoarthrosis, shoulder region     Skin ulcer of scalp     Thrombophlebitis of arm     Tremor     Vitamin B12 deficiency        Allergies   Allergen Reactions    Gabapentin     Sulfa Antibiotics        Past Surgical History:   Procedure Laterality Date    DILATATION AND CURETTAGE      HERNIA REPAIR      HIP SURGERY      SHOULDER SURGERY      Right       Family History   Problem Relation Age of Onset    Cancer Mother         Pancreatic    Stroke Brother     Anorexia nervosa Daughter        Social History     Socioeconomic History    Marital status:    Tobacco Use    Smoking status: Never    Smokeless tobacco: Never    Vaping Use    Vaping status: Never Used   Substance and Sexual Activity    Alcohol use: No     Comment: stopped drinking alcohol    Drug use: No    Sexual activity: Defer           Objective   Physical Exam  Constitutional:       Appearance: Normal appearance. She is not ill-appearing.   HENT:      Head: Normocephalic and atraumatic.   Eyes:      Extraocular Movements: Extraocular movements intact.      Pupils: Pupils are equal, round, and reactive to light.   Cardiovascular:      Rate and Rhythm: Normal rate.   Pulmonary:      Effort: Pulmonary effort is normal.   Abdominal:      General: Abdomen is flat. Bowel sounds are normal.      Palpations: Abdomen is soft.   Musculoskeletal:         General: Normal range of motion.      Cervical back: Normal range of motion.      Right lower le+ Pitting Edema present.      Left lower le+ Pitting Edema present.      Right ankle: Swelling present.      Left ankle: Swelling present.      Right foot: Swelling present.      Left foot: Swelling and bunion present.   Skin:     General: Skin is warm and dry.      Capillary Refill: Capillary refill takes less than 2 seconds.      Coloration: Skin is pale.   Neurological:      General: No focal deficit present.      Mental Status: She is alert and oriented to person, place, and time. Mental status is at baseline.         Procedures           ED Course  ED Course as of 24 2251   Mon May 06, 2024   1953 Initial evaluation the patient ED room 12, accompanied by her son. [JH]   2143 Small left pleural effusion consistent if not slightly improved over previous imaging study.  Serum troponin not markedly elevated in the setting of chronic kidney disease.  BNP also within normal limits for age. [JH]   8686 Follow-up evaluation the patient, explanation of her blood pressures as they have measured across the board in the 160s, 170s, 130s and 124 at this time.  There is no clear indication for hospitalization, and the  recommendation to continue her home blood pressure medicine or potentially increase the dose in conjunction with her primary care provider.  She is pleased and would like to go home to her assisted living facility. [JH]      ED Course User Index  [JH] Tyler Rangel APRN                                             Medical Decision Making  Given the patient's reported symptoms or lack thereof, reported vital signs obtained prehospital in comparison to current cardiac telemetry monitoring, differential diagnosis includes accelerated hypertension, cannot exclude acute kidney injury, urinary tract infection, acute coronary syndrome, cardiopulmonary pathology including acute coronary syndrome.  Patient will have serum screening labs, urinalysis via straight catheterization, plain film imaging chest, twelve-lead ECG.  Results to be communicated of this workup and disposition considered.  Patient and son agreeable with this plan.    Amount and/or Complexity of Data Reviewed  Labs: ordered.  Radiology: ordered.  ECG/medicine tests: ordered.    Risk  Prescription drug management.        Final diagnoses:   Elevated blood pressure reading       ED Disposition  ED Disposition       ED Disposition   Discharge    Condition   Stable    Comment   --               Nivia Madrigal MD  [address] [place] [address] [address] [place] [address] [phone]    Go to            Medication List        Changed      polyethylene glycol 17 GM/SCOOP powder  Commonly known as: MIRALAX  MIX 17 GM IN 8 OUNCES OF LIQUID AND DRINK 1 TO 2 TIMES DAILY FOR CONSTIPATION.  What changed: See the new instructions.                 Tyler Rangel APRN  05/06/24 4222

## 2024-05-09 LAB
QT INTERVAL: 468 MS
QTC INTERVAL: 471 MS

## 2024-07-08 ENCOUNTER — ANCILLARY PROCEDURE (OUTPATIENT)
Dept: SPEECH THERAPY | Facility: HOSPITAL | Age: 89
End: 2024-07-08
Payer: MEDICARE

## 2024-07-08 ENCOUNTER — APPOINTMENT (OUTPATIENT)
Dept: GENERAL RADIOLOGY | Facility: HOSPITAL | Age: 89
End: 2024-07-08
Payer: MEDICARE

## 2024-07-08 ENCOUNTER — HOSPITAL ENCOUNTER (INPATIENT)
Facility: HOSPITAL | Age: 89
LOS: 9 days | Discharge: HOME | End: 2024-07-17
Attending: EMERGENCY MEDICINE | Admitting: PEDIATRICS
Payer: MEDICARE

## 2024-07-08 DIAGNOSIS — E87.1 HYPONATREMIA: Primary | ICD-10-CM

## 2024-07-08 DIAGNOSIS — R13.12 OROPHARYNGEAL DYSPHAGIA: ICD-10-CM

## 2024-07-08 DIAGNOSIS — J96.01 ACUTE RESPIRATORY FAILURE WITH HYPOXIA: ICD-10-CM

## 2024-07-08 DIAGNOSIS — G90.9 AUTONOMIC DYSFUNCTION: ICD-10-CM

## 2024-07-08 DIAGNOSIS — I95.1 DYSAUTONOMIA ORTHOSTATIC HYPOTENSION SYNDROME: ICD-10-CM

## 2024-07-08 DIAGNOSIS — J18.9 PNEUMONIA OF BOTH LUNGS DUE TO INFECTIOUS ORGANISM, UNSPECIFIED PART OF LUNG: ICD-10-CM

## 2024-07-08 PROBLEM — I48.91 ATRIAL FIBRILLATION: Status: ACTIVE | Noted: 2024-07-08

## 2024-07-08 PROBLEM — A41.9 SEPSIS DUE TO PNEUMONIA: Status: ACTIVE | Noted: 2023-08-20

## 2024-07-08 LAB
ALBUMIN SERPL-MCNC: 3.3 G/DL (ref 3.5–5.2)
ALBUMIN/GLOB SERPL: 1.2 G/DL
ALP SERPL-CCNC: 73 U/L (ref 39–117)
ALT SERPL W P-5'-P-CCNC: 41 U/L (ref 1–33)
ANION GAP SERPL CALCULATED.3IONS-SCNC: 14 MMOL/L (ref 5–15)
ARTERIAL PATENCY WRIST A: ABNORMAL
AST SERPL-CCNC: 46 U/L (ref 1–32)
ATMOSPHERIC PRESS: ABNORMAL MM[HG]
B PARAPERT DNA SPEC QL NAA+PROBE: NOT DETECTED
B PERT DNA SPEC QL NAA+PROBE: NOT DETECTED
BASE EXCESS BLDA CALC-SCNC: -2.2 MMOL/L (ref 0–2)
BASOPHILS # BLD AUTO: 0.04 10*3/MM3 (ref 0–0.2)
BASOPHILS NFR BLD AUTO: 0.3 % (ref 0–1.5)
BDY SITE: ABNORMAL
BILIRUB SERPL-MCNC: 0.3 MG/DL (ref 0–1.2)
BODY TEMPERATURE: 37
BUN SERPL-MCNC: 20 MG/DL (ref 8–23)
BUN/CREAT SERPL: 15.9 (ref 7–25)
C PNEUM DNA NPH QL NAA+NON-PROBE: NOT DETECTED
CALCIUM SPEC-SCNC: 8.8 MG/DL (ref 8.6–10.5)
CHLORIDE SERPL-SCNC: 93 MMOL/L (ref 98–107)
CO2 BLDA-SCNC: 22.5 MMOL/L (ref 22–33)
CO2 SERPL-SCNC: 21 MMOL/L (ref 22–29)
COHGB MFR BLD: ABNORMAL %
CREAT SERPL-MCNC: 1.26 MG/DL (ref 0.57–1)
D-LACTATE SERPL-SCNC: 2.7 MMOL/L (ref 0.5–2)
DEPRECATED RDW RBC AUTO: 48.1 FL (ref 37–54)
EGFRCR SERPLBLD CKD-EPI 2021: 41.1 ML/MIN/1.73
EOSINOPHIL # BLD AUTO: 0.18 10*3/MM3 (ref 0–0.4)
EOSINOPHIL NFR BLD AUTO: 1.2 % (ref 0.3–6.2)
EPAP: 0
ERYTHROCYTE [DISTWIDTH] IN BLOOD BY AUTOMATED COUNT: 12.8 % (ref 12.3–15.4)
FLUAV SUBTYP SPEC NAA+PROBE: NOT DETECTED
FLUBV RNA ISLT QL NAA+PROBE: NOT DETECTED
GLOBULIN UR ELPH-MCNC: 2.8 GM/DL
GLUCOSE SERPL-MCNC: 89 MG/DL (ref 65–99)
HADV DNA SPEC NAA+PROBE: NOT DETECTED
HCO3 BLDA-SCNC: 21.5 MMOL/L (ref 20–26)
HCOV 229E RNA SPEC QL NAA+PROBE: NOT DETECTED
HCOV HKU1 RNA SPEC QL NAA+PROBE: NOT DETECTED
HCOV NL63 RNA SPEC QL NAA+PROBE: NOT DETECTED
HCOV OC43 RNA SPEC QL NAA+PROBE: NOT DETECTED
HCT VFR BLD AUTO: 45.9 % (ref 34–46.6)
HCT VFR BLD CALC: 45.3 % (ref 38–51)
HGB BLD-MCNC: 15.1 G/DL (ref 12–15.9)
HGB BLDA-MCNC: 14.8 G/DL (ref 14–18)
HMPV RNA NPH QL NAA+NON-PROBE: NOT DETECTED
HOLD SPECIMEN: NORMAL
HPIV1 RNA ISLT QL NAA+PROBE: NOT DETECTED
HPIV2 RNA SPEC QL NAA+PROBE: NOT DETECTED
HPIV3 RNA NPH QL NAA+PROBE: NOT DETECTED
HPIV4 P GENE NPH QL NAA+PROBE: NOT DETECTED
IMM GRANULOCYTES # BLD AUTO: 0.05 10*3/MM3 (ref 0–0.05)
IMM GRANULOCYTES NFR BLD AUTO: 0.3 % (ref 0–0.5)
INHALED O2 CONCENTRATION: 44 %
IPAP: 0
LYMPHOCYTES # BLD AUTO: 3.49 10*3/MM3 (ref 0.7–3.1)
LYMPHOCYTES NFR BLD AUTO: 22.5 % (ref 19.6–45.3)
M PNEUMO IGG SER IA-ACNC: NOT DETECTED
MCH RBC QN AUTO: 33.2 PG (ref 26.6–33)
MCHC RBC AUTO-ENTMCNC: 32.9 G/DL (ref 31.5–35.7)
MCV RBC AUTO: 100.9 FL (ref 79–97)
METHGB BLD QL: 0.4 % (ref 0–1.5)
MODALITY: ABNORMAL
MONOCYTES # BLD AUTO: 0.4 10*3/MM3 (ref 0.1–0.9)
MONOCYTES NFR BLD AUTO: 2.6 % (ref 5–12)
MRSA DNA SPEC QL NAA+PROBE: NEGATIVE
NEUTROPHILS NFR BLD AUTO: 11.34 10*3/MM3 (ref 1.7–7)
NEUTROPHILS NFR BLD AUTO: 73.1 % (ref 42.7–76)
NRBC BLD AUTO-RTO: 0 /100 WBC (ref 0–0.2)
NT-PROBNP SERPL-MCNC: 651.7 PG/ML (ref 0–1800)
OXYHGB MFR BLDV: 83.2 % (ref 94–99)
PAW @ PEAK INSP FLOW SETTING VENT: 0 CMH2O
PCO2 BLDA: 33.2 MM HG (ref 35–45)
PCO2 TEMP ADJ BLD: 33.2 MM HG (ref 35–45)
PH BLDA: 7.42 PH UNITS (ref 7.35–7.45)
PH, TEMP CORRECTED: 7.42 PH UNITS
PLATELET # BLD AUTO: 250 10*3/MM3 (ref 140–450)
PMV BLD AUTO: 10.5 FL (ref 6–12)
PO2 BLDA: 51.2 MM HG (ref 83–108)
PO2 TEMP ADJ BLD: 51.2 MM HG (ref 83–108)
POTASSIUM SERPL-SCNC: 4.1 MMOL/L (ref 3.5–5.2)
PROCALCITONIN SERPL-MCNC: 0.09 NG/ML (ref 0–0.25)
PROT SERPL-MCNC: 6.1 G/DL (ref 6–8.5)
RBC # BLD AUTO: 4.55 10*6/MM3 (ref 3.77–5.28)
RHINOVIRUS RNA SPEC NAA+PROBE: NOT DETECTED
RSV RNA NPH QL NAA+NON-PROBE: NOT DETECTED
SARS-COV-2 RNA NPH QL NAA+NON-PROBE: NOT DETECTED
SODIUM SERPL-SCNC: 128 MMOL/L (ref 136–145)
TOTAL RATE: 0 BREATHS/MINUTE
TROPONIN T SERPL HS-MCNC: 18 NG/L
VENTILATOR MODE: ABNORMAL
WBC NRBC COR # BLD AUTO: 15.5 10*3/MM3 (ref 3.4–10.8)
WHOLE BLOOD HOLD COAG: NORMAL
WHOLE BLOOD HOLD SPECIMEN: NORMAL

## 2024-07-08 PROCEDURE — 94761 N-INVAS EAR/PLS OXIMETRY MLT: CPT

## 2024-07-08 PROCEDURE — 87641 MR-STAPH DNA AMP PROBE: CPT | Performed by: INTERNAL MEDICINE

## 2024-07-08 PROCEDURE — 92612 ENDOSCOPY SWALLOW (FEES) VID: CPT | Performed by: SPEECH-LANGUAGE PATHOLOGIST

## 2024-07-08 PROCEDURE — 94799 UNLISTED PULMONARY SVC/PX: CPT

## 2024-07-08 PROCEDURE — 94640 AIRWAY INHALATION TREATMENT: CPT

## 2024-07-08 PROCEDURE — 83880 ASSAY OF NATRIURETIC PEPTIDE: CPT | Performed by: EMERGENCY MEDICINE

## 2024-07-08 PROCEDURE — 99285 EMERGENCY DEPT VISIT HI MDM: CPT

## 2024-07-08 PROCEDURE — 84145 PROCALCITONIN (PCT): CPT | Performed by: INTERNAL MEDICINE

## 2024-07-08 PROCEDURE — 82375 ASSAY CARBOXYHB QUANT: CPT

## 2024-07-08 PROCEDURE — 99223 1ST HOSP IP/OBS HIGH 75: CPT | Performed by: INTERNAL MEDICINE

## 2024-07-08 PROCEDURE — 87040 BLOOD CULTURE FOR BACTERIA: CPT | Performed by: EMERGENCY MEDICINE

## 2024-07-08 PROCEDURE — 85025 COMPLETE CBC W/AUTO DIFF WBC: CPT | Performed by: EMERGENCY MEDICINE

## 2024-07-08 PROCEDURE — 82805 BLOOD GASES W/O2 SATURATION: CPT

## 2024-07-08 PROCEDURE — 36600 WITHDRAWAL OF ARTERIAL BLOOD: CPT

## 2024-07-08 PROCEDURE — 87449 NOS EACH ORGANISM AG IA: CPT | Performed by: INTERNAL MEDICINE

## 2024-07-08 PROCEDURE — 36415 COLL VENOUS BLD VENIPUNCTURE: CPT

## 2024-07-08 PROCEDURE — 84484 ASSAY OF TROPONIN QUANT: CPT | Performed by: EMERGENCY MEDICINE

## 2024-07-08 PROCEDURE — 83050 HGB METHEMOGLOBIN QUAN: CPT

## 2024-07-08 PROCEDURE — 0202U NFCT DS 22 TRGT SARS-COV-2: CPT | Performed by: EMERGENCY MEDICINE

## 2024-07-08 PROCEDURE — 83605 ASSAY OF LACTIC ACID: CPT | Performed by: EMERGENCY MEDICINE

## 2024-07-08 PROCEDURE — 25010000002 CEFTRIAXONE PER 250 MG: Performed by: EMERGENCY MEDICINE

## 2024-07-08 PROCEDURE — 92610 EVALUATE SWALLOWING FUNCTION: CPT | Performed by: SPEECH-LANGUAGE PATHOLOGIST

## 2024-07-08 PROCEDURE — 93005 ELECTROCARDIOGRAM TRACING: CPT | Performed by: EMERGENCY MEDICINE

## 2024-07-08 PROCEDURE — 25010000002 METOCLOPRAMIDE PER 10 MG: Performed by: EMERGENCY MEDICINE

## 2024-07-08 PROCEDURE — 71045 X-RAY EXAM CHEST 1 VIEW: CPT

## 2024-07-08 PROCEDURE — 80053 COMPREHEN METABOLIC PANEL: CPT | Performed by: EMERGENCY MEDICINE

## 2024-07-08 PROCEDURE — 25810000003 SODIUM CHLORIDE 0.9 % SOLUTION: Performed by: EMERGENCY MEDICINE

## 2024-07-08 RX ORDER — METOCLOPRAMIDE HYDROCHLORIDE 5 MG/ML
6.25 INJECTION INTRAMUSCULAR; INTRAVENOUS ONCE
Status: COMPLETED | OUTPATIENT
Start: 2024-07-08 | End: 2024-07-08

## 2024-07-08 RX ORDER — ATORVASTATIN CALCIUM 40 MG/1
40 TABLET, FILM COATED ORAL NIGHTLY
Status: DISCONTINUED | OUTPATIENT
Start: 2024-07-08 | End: 2024-07-17 | Stop reason: HOSPADM

## 2024-07-08 RX ORDER — TRAZODONE HYDROCHLORIDE 100 MG/1
100 TABLET ORAL NIGHTLY
Status: DISCONTINUED | OUTPATIENT
Start: 2024-07-08 | End: 2024-07-17 | Stop reason: HOSPADM

## 2024-07-08 RX ORDER — SODIUM CHLORIDE 0.9 % (FLUSH) 0.9 %
10 SYRINGE (ML) INJECTION AS NEEDED
Status: DISCONTINUED | OUTPATIENT
Start: 2024-07-08 | End: 2024-07-17 | Stop reason: HOSPADM

## 2024-07-08 RX ORDER — ROSUVASTATIN CALCIUM 10 MG/1
10 TABLET, COATED ORAL NIGHTLY
COMMUNITY

## 2024-07-08 RX ORDER — FERROUS SULFATE 325(65) MG
325 TABLET ORAL
COMMUNITY

## 2024-07-08 RX ORDER — BISACODYL 5 MG/1
5 TABLET, DELAYED RELEASE ORAL DAILY PRN
Status: DISCONTINUED | OUTPATIENT
Start: 2024-07-08 | End: 2024-07-17 | Stop reason: HOSPADM

## 2024-07-08 RX ORDER — TRAMADOL HYDROCHLORIDE 50 MG/1
50 TABLET ORAL EVERY 12 HOURS PRN
Status: DISPENSED | OUTPATIENT
Start: 2024-07-08 | End: 2024-07-13

## 2024-07-08 RX ORDER — BISACODYL 10 MG
10 SUPPOSITORY, RECTAL RECTAL DAILY PRN
Status: DISCONTINUED | OUTPATIENT
Start: 2024-07-08 | End: 2024-07-17 | Stop reason: HOSPADM

## 2024-07-08 RX ORDER — LEVETIRACETAM 500 MG/1
250 TABLET ORAL 2 TIMES DAILY
Status: DISCONTINUED | OUTPATIENT
Start: 2024-07-08 | End: 2024-07-17 | Stop reason: HOSPADM

## 2024-07-08 RX ORDER — IPRATROPIUM BROMIDE AND ALBUTEROL SULFATE 2.5; .5 MG/3ML; MG/3ML
3 SOLUTION RESPIRATORY (INHALATION)
Status: DISCONTINUED | OUTPATIENT
Start: 2024-07-08 | End: 2024-07-13

## 2024-07-08 RX ORDER — ONDANSETRON 4 MG/1
4 TABLET, ORALLY DISINTEGRATING ORAL EVERY 6 HOURS PRN
COMMUNITY

## 2024-07-08 RX ORDER — CHOLECALCIFEROL (VITAMIN D3) 25 MCG
1000 TABLET ORAL DAILY
COMMUNITY
End: 2024-07-17 | Stop reason: HOSPADM

## 2024-07-08 RX ORDER — MECLIZINE HYDROCHLORIDE 25 MG/1
25 TABLET ORAL EVERY 8 HOURS PRN
COMMUNITY

## 2024-07-08 RX ORDER — AMIODARONE HYDROCHLORIDE 200 MG/1
200 TABLET ORAL DAILY
Status: DISCONTINUED | OUTPATIENT
Start: 2024-07-08 | End: 2024-07-17 | Stop reason: HOSPADM

## 2024-07-08 RX ORDER — SODIUM CHLORIDE 9 MG/ML
40 INJECTION, SOLUTION INTRAVENOUS AS NEEDED
Status: DISCONTINUED | OUTPATIENT
Start: 2024-07-08 | End: 2024-07-17 | Stop reason: HOSPADM

## 2024-07-08 RX ORDER — LISINOPRIL 20 MG/1
20 TABLET ORAL DAILY
COMMUNITY
End: 2024-07-17 | Stop reason: HOSPADM

## 2024-07-08 RX ORDER — SODIUM CHLORIDE 0.9 % (FLUSH) 0.9 %
10 SYRINGE (ML) INJECTION EVERY 12 HOURS SCHEDULED
Status: DISCONTINUED | OUTPATIENT
Start: 2024-07-08 | End: 2024-07-17 | Stop reason: HOSPADM

## 2024-07-08 RX ORDER — POLYETHYLENE GLYCOL 3350 17 G/17G
17 POWDER, FOR SOLUTION ORAL DAILY PRN
Status: DISCONTINUED | OUTPATIENT
Start: 2024-07-08 | End: 2024-07-17 | Stop reason: HOSPADM

## 2024-07-08 RX ORDER — PANTOPRAZOLE SODIUM 40 MG/1
40 TABLET, DELAYED RELEASE ORAL DAILY
COMMUNITY

## 2024-07-08 RX ORDER — VENLAFAXINE HYDROCHLORIDE 75 MG/1
75 CAPSULE, EXTENDED RELEASE ORAL DAILY
Status: DISCONTINUED | OUTPATIENT
Start: 2024-07-08 | End: 2024-07-17 | Stop reason: HOSPADM

## 2024-07-08 RX ORDER — ACETAMINOPHEN 325 MG/1
650 TABLET ORAL EVERY 8 HOURS PRN
COMMUNITY

## 2024-07-08 RX ORDER — ONDANSETRON 2 MG/ML
4 INJECTION INTRAMUSCULAR; INTRAVENOUS EVERY 6 HOURS PRN
Status: DISCONTINUED | OUTPATIENT
Start: 2024-07-08 | End: 2024-07-15

## 2024-07-08 RX ORDER — MULTIVIT-MIN/IRON/FOLIC ACID/K 18-600-40
1 CAPSULE ORAL DAILY
COMMUNITY

## 2024-07-08 RX ORDER — DOCUSATE SODIUM 100 MG/1
100 CAPSULE, LIQUID FILLED ORAL DAILY
COMMUNITY

## 2024-07-08 RX ORDER — AMOXICILLIN 250 MG
2 CAPSULE ORAL 2 TIMES DAILY PRN
Status: DISCONTINUED | OUTPATIENT
Start: 2024-07-08 | End: 2024-07-17 | Stop reason: HOSPADM

## 2024-07-08 RX ORDER — BISACODYL 10 MG
10 SUPPOSITORY, RECTAL RECTAL
COMMUNITY

## 2024-07-08 RX ORDER — LEVOTHYROXINE SODIUM 0.1 MG/1
100 TABLET ORAL DAILY
COMMUNITY

## 2024-07-08 RX ADMIN — SODIUM CHLORIDE 1000 MG: 900 INJECTION INTRAVENOUS at 08:53

## 2024-07-08 RX ADMIN — SODIUM CHLORIDE 1000 ML: 9 INJECTION, SOLUTION INTRAVENOUS at 08:52

## 2024-07-08 RX ADMIN — DOXYCYCLINE 100 MG: 100 INJECTION, POWDER, LYOPHILIZED, FOR SOLUTION INTRAVENOUS at 20:12

## 2024-07-08 RX ADMIN — LEVETIRACETAM 250 MG: 500 TABLET, FILM COATED ORAL at 20:12

## 2024-07-08 RX ADMIN — IPRATROPIUM BROMIDE AND ALBUTEROL SULFATE 3 ML: 2.5; .5 SOLUTION RESPIRATORY (INHALATION) at 15:46

## 2024-07-08 RX ADMIN — TRAZODONE HYDROCHLORIDE 100 MG: 100 TABLET ORAL at 20:12

## 2024-07-08 RX ADMIN — IPRATROPIUM BROMIDE AND ALBUTEROL SULFATE 3 ML: 2.5; .5 SOLUTION RESPIRATORY (INHALATION) at 11:44

## 2024-07-08 RX ADMIN — IPRATROPIUM BROMIDE AND ALBUTEROL SULFATE 3 ML: 2.5; .5 SOLUTION RESPIRATORY (INHALATION) at 20:56

## 2024-07-08 RX ADMIN — ATORVASTATIN CALCIUM 40 MG: 40 TABLET, FILM COATED ORAL at 20:12

## 2024-07-08 RX ADMIN — DOXYCYCLINE 100 MG: 100 INJECTION, POWDER, LYOPHILIZED, FOR SOLUTION INTRAVENOUS at 13:17

## 2024-07-08 RX ADMIN — APIXABAN 2.5 MG: 2.5 TABLET, FILM COATED ORAL at 20:12

## 2024-07-08 RX ADMIN — Medication 12.5 MG: at 20:11

## 2024-07-08 RX ADMIN — Medication 10 ML: at 20:13

## 2024-07-08 RX ADMIN — Medication 10 ML: at 13:18

## 2024-07-08 RX ADMIN — METOCLOPRAMIDE 6.5 MG: 5 INJECTION, SOLUTION INTRAMUSCULAR; INTRAVENOUS at 07:36

## 2024-07-08 NOTE — PLAN OF CARE
Goal Outcome Evaluation:  Plan of Care Reviewed With: patient        Progress: no change  Outcome Evaluation: Palliative RN saw pt. along with Dr. HAYDEN Frazier  at 1057.  New palliative consult for assistance with GOC per Dr. Hammond.  Pt. lying in bed on HFNC.  She endorsed right arm pain from recent fall at [facility].  She stated it hurts but not too bad as she has been able to sleep through the pain.  She stated that coughing has interfered with sleep more than the pain.  Speech in room to perform swallow eval.  Patient's biggest complaint at time of visit was thirst.  Dr. Frazier addressed code status.  She is currently a full code and she wishes to remain a full code for time being.  Palliative care to continue to follow for support and ongoing GOC.    Problem: Palliative Care  Goal: Enhanced Quality of Life  Intervention: Promote Advance Care Planning  Flowsheets (Taken 7/8/2024 1531)  Life Transition/Adjustment:   palliative care initiated   palliative care discussed        1300 Palliative IDT meeting: TULIO Fuentes RN, CHPN; TAINA Talamantes, APRN; HAYDEN Frazier MD.; KOLTON Chew RN, CHPN; HAYDEN Cee, HERNANDEZ, [facility]; MELLO Cardona MDiv, [facility]; MELLO Kim RN, CHPN; ISAAC Muñiz RN, CHPN      After hours, weekends and holidays, contact Palliative Provider by calling [phone].

## 2024-07-08 NOTE — PLAN OF CARE
Goal Outcome Evaluation:  Plan of Care Reviewed With: patient        Progress: no change  Outcome Evaluation: VSS. Pt remains on HFNC 50L and 70%. Pt remains A. Paced on the monitor. No pain noted.

## 2024-07-08 NOTE — ED NOTES
Bibiana Hodges    Nursing Report ED to Floor:  Mental status: A&O x4  Ambulatory status: x2/lift   Oxygen Therapy:  high flow  Cardiac Rhythm: A Paced  Admitted from: ed  Safety Concerns:  fall risk  Social Issues: na  ED Room #:  11    ED Nurse Phone Extension - [phone] or may call [phone].      HPI:   Chief Complaint   Patient presents with    Shortness of Breath       Past Medical History:  Past Medical History:   Diagnosis Date    Constipation     Depression     Dysautonomia orthostatic hypotension syndrome     Generalized osteoarthritis     GERD (gastroesophageal reflux disease)     s/p Nissen    Hypertension     Insomnia     Osteoarthrosis, shoulder region     Skin ulcer of scalp     Thrombophlebitis of arm     Tremor     Vitamin B12 deficiency         Past Surgical History:  Past Surgical History:   Procedure Laterality Date    DILATATION AND CURETTAGE      HERNIA REPAIR      HIP SURGERY      SHOULDER SURGERY      Right        Admitting Doctor:   Destiny Hammond MD    Consulting Provider(s):  Consults       No orders found from 6/9/2024 to 7/9/2024.             Admitting Diagnosis:   The primary encounter diagnosis was Hyponatremia. Diagnoses of Pneumonia of both lungs due to infectious organism, unspecified part of lung and Acute respiratory failure with hypoxia were also pertinent to this visit.    Most Recent Vitals:   Vitals:    07/08/24 0730 07/08/24 0800 07/08/24 0831 07/08/24 0833   BP:  110/96     BP Location:       Patient Position:       Pulse: 77 84 84 82   Resp: 22      Temp:       TempSrc:       SpO2: 92% 97% 100% 99%   Weight:       Height:           Active LDAs/IV Access:   Lines, Drains & Airways       Active LDAs       Name Placement date Placement time Site Days    Peripheral IV 05/06/24 2056 Posterior;Right Forearm 05/06/24 2056  Forearm  62    Peripheral IV 07/08/24 0710 Anterior;Distal;Right Forearm 07/08/24  0710  Forearm  less than 1    Peripheral IV 07/08/24 0736 Right Antecubital  07/08/24  0736  Antecubital  less than 1                    Labs (abnormal labs have a star):   Labs Reviewed   COMPREHENSIVE METABOLIC PANEL - Abnormal; Notable for the following components:       Result Value    Creatinine 1.26 (*)     Sodium 128 (*)     Chloride 93 (*)     CO2 21.0 (*)     Albumin 3.3 (*)     ALT (SGPT) 41 (*)     AST (SGOT) 46 (*)     eGFR 41.1 (*)     All other components within normal limits    Narrative:     GFR Normal >60  Chronic Kidney Disease <60  Kidney Failure <15    The GFR formula is only valid for adults with stable renal function between ages 18 and 70.   CBC WITH AUTO DIFFERENTIAL - Abnormal; Notable for the following components:    WBC 15.50 (*)     .9 (*)     MCH 33.2 (*)     Monocyte % 2.6 (*)     Neutrophils, Absolute 11.34 (*)     Lymphocytes, Absolute 3.49 (*)     All other components within normal limits   TROPONIN - Abnormal; Notable for the following components:    HS Troponin T 18 (*)     All other components within normal limits    Narrative:     High Sensitive Troponin T Reference Range:  <14.0 ng/L- Negative Female for AMI  <22.0 ng/L- Negative Male for AMI  >=14 - Abnormal Female indicating possible myocardial injury.  >=22 - Abnormal Male indicating possible myocardial injury.   Clinicians would have to utilize clinical acumen, EKG, Troponin, and serial changes to determine if it is an Acute Myocardial Infarction or myocardial injury due to an underlying chronic condition.        LACTIC ACID, PLASMA - Abnormal; Notable for the following components:    Lactate 2.7 (*)     All other components within normal limits   BLOOD GAS, ARTERIAL W/CO-OXIMETRY - Abnormal; Notable for the following components:    pCO2, Arterial 33.2 (*)     pO2, Arterial 51.2 (*)     Base Excess, Arterial -2.2 (*)     Oxyhemoglobin 83.2 (*)     pCO2, Temperature Corrected 33.2 (*)     pO2, Temperature Corrected 51.2 (*)     All other components within normal limits   RESPIRATORY PANEL PCR  W/ COVID-19 (SARS-COV-2), NP SWAB IN UTM/VTP, 2 HR TAT - Normal    Narrative:     In the setting of a positive respiratory panel with a viral infection PLUS a negative procalcitonin without other underlying concern for bacterial infection, consider observing off antibiotics or discontinuation of antibiotics and continue supportive care. If the respiratory panel is positive for atypical bacterial infection (Bordetella pertussis, Chlamydophila pneumoniae, or Mycoplasma pneumoniae), consider antibiotic de-escalation to target atypical bacterial infection.   BNP (IN-HOUSE) - Normal    Narrative:     This assay is used as an aid in the diagnosis of individuals suspected of having heart failure. It can be used as an aid in the diagnosis of acute decompensated heart failure (ADHF) in patients presenting with signs and symptoms of ADHF to the emergency department (ED). In addition, NT-proBNP of <300 pg/mL indicates ADHF is not likely.    Age Range Result Interpretation  NT-proBNP Concentration (pg/mL:      <50             Positive            >450                   Gray                 300-450                    Negative             <300    50-75           Positive            >900                  Gray                300-900                  Negative            <300      >75             Positive            >1800                  Gray                300-1800                  Negative            <300   BLOOD CULTURE   BLOOD CULTURE   RAINBOW DRAW    Narrative:     The following orders were created for panel order [place] Draw.  Procedure                               Abnormality         Status                     ---------                               -----------         ------                     Green Top (Gel)[[id number]]                                  Final result               Lavender Top[[id number]]                                     Final result               Gold Top - [facility][[id number]]                                    Final result               [place] Top[[id number]]                                         Final result               Light Blue Top[[id number]]                                   Final result                 Please view results for these tests on the individual orders.   BLOOD GAS, ARTERIAL   HIGH SENSITIVITIY TROPONIN T 2HR   LACTIC ACID, REFLEX   CBC AND DIFFERENTIAL    Narrative:     The following orders were created for panel order CBC & Differential.  Procedure                               Abnormality         Status                     ---------                               -----------         ------                     CBC Auto Differential[[id number]]        Abnormal            Final result                 Please view results for these tests on the individual orders.   GREEN TOP   LAVENDER TOP   GOLD TOP - SST   GRAY TOP   LIGHT BLUE TOP       Meds Given in ED:   Medications   sodium chloride 0.9 % flush 10 mL (has no administration in time range)   doxycycline (VIBRAMYCIN) 100 mg in sodium chloride 0.9 % 100 mL MBP (has no administration in time range)   cefTRIAXone (ROCEPHIN) 1,000 mg in sodium chloride 0.9 % 100 mL MBP (1,000 mg Intravenous New Bag 7/8/24 0853)   sodium chloride 0.9 % bolus 1,000 mL (1,000 mL Intravenous New Bag 7/8/24 0852)   metoclopramide (REGLAN) injection 6.5 mg (6.5 mg Intravenous Given 7/8/24 0736)           Last NIH score:                                                          Dysphagia screening results:        [place] Coma Scale:  No data recorded     CIWA:        Restraint Type:            Isolation Status:  No active isolations

## 2024-07-08 NOTE — H&P
[facility] Medicine Services  HISTORY AND PHYSICAL    Patient Name: Bibiana Hodges  : 1935  MRN: [id number]  Primary Care Physician: Nivia Madrigal MD  Date of admission: 2024      Subjective   Subjective     Chief Complaint:SOA and cough    HPI:  Bibiana Hodges is a 88 y.o. female 88-year-old female with history of atrial fibrillation, on amiodarone and Eliquis, hypertension, autonomic dysfunction, chronic debility, prior hospitalizations for recurrent pneumonia with concerns for possible aspiration.  She presents from nursing home with 1 month of progressive worsening shortness of breath and cough, she describes the cough as productive of yellow sputum, denies any fevers or chills, no nausea no vomiting.  On arrival here ABG with pO2 of 50, chest x-ray concerning for bilateral pneumonia.  Patient was started on broad-spectrum antibiotics, placed on HFNC and hospital medicine asked to admit.  At the time my evaluation she says she feels slightly better, does endorse lack of sleep, she is also concerned that her left breast is slightly larger than her right.    Personal History     Past Medical History:   Diagnosis Date    Constipation     Depression     Dysautonomia orthostatic hypotension syndrome     Generalized osteoarthritis     GERD (gastroesophageal reflux disease)     s/p Nissen    Hypertension     Insomnia     Osteoarthrosis, shoulder region     Skin ulcer of scalp     Thrombophlebitis of arm     Tremor     Vitamin B12 deficiency            Past Surgical History:   Procedure Laterality Date    DILATATION AND CURETTAGE      HERNIA REPAIR      HIP SURGERY      SHOULDER SURGERY      Right       Family History: family history includes Anorexia nervosa in her daughter; Cancer in her mother; Stroke in her brother.     Social History:  reports that she has never smoked. She has never used smokeless tobacco. She reports that she does not drink alcohol and does not  use drugs.  Social History     Social History Narrative    Not on file       Medications:  Available home medication information reviewed.  albuterol, amiodarone, apixaban, atorvastatin, busPIRone, cholecalciferol, cyanocobalamin, fluticasone, guaiFENesin-dextromethorphan, levETIRAcetam XR, melatonin, metoprolol tartrate, omeprazole, polyethylene glycol, senna, traZODone, and venlafaxine XR    Allergies   Allergen Reactions    Gabapentin     Sulfa Antibiotics        Objective   Objective     Vital Signs:   Temp:  [97 °F (36.1 °C)] 97 °F (36.1 °C)  Heart Rate:  [] 77  Resp:  [22-24] 22  BP: (110-115)/(71-96) 110/96  Flow (L/min):  [50] 50       Physical Exam   Constitutional: Chronically ill-appearing elderly female.  Eyes: PERRLA, sclerae anicteric, no conjunctival injection  HENT: NCAT, mucous membranes dry  Neck: Supple, no thyromegaly, no lymphadenopathy, trachea midline  Respiratory: Moderate labored respirations, diffuse coarse breath sounds, rhonchi, no wheezes on HFNC  Cardiovascular: RRR, no murmurs, rubs, or gallops, palpable pedal pulses bilaterally  Gastrointestinal: Positive bowel sounds, soft, nontender, nondistended  Musculoskeletal: No bilateral ankle edema, no clubbing or cyanosis to extremities  Psychiatric: Appropriate affect, cooperative  Neurologic: Oriented x 3, tremulous, strength symmetric in all extremities, Cranial Nerves grossly intact to confrontation, speech clear  Skin: No rashes, left breast with some leathery feeling on the lateral side    Result Review:  I have personally reviewed the results from the time of this admission to 7/8/2024 09:23 EDT and agree with these findings:  [x]  Laboratory list / accordion  []  Microbiology  [x]  Radiology  []  EKG/Telemetry   []  Cardiology/Vascular   []  Pathology  []  Old records  []  Other:  Most notable findings include:       LAB RESULTS:      Lab 07/08/24  0728   WBC 15.50*   HEMOGLOBIN 15.1   HEMATOCRIT 45.9   PLATELETS 250   NEUTROS  ABS 11.34*   IMMATURE GRANS (ABS) 0.05   LYMPHS ABS 3.49*   MONOS ABS 0.40   EOS ABS 0.18   .9*   LACTATE 2.7*         Lab 07/08/24 0728   SODIUM 128*   POTASSIUM 4.1   CHLORIDE 93*   CO2 21.0*   ANION GAP 14.0   BUN 20   CREATININE 1.26*   EGFR 41.1*   GLUCOSE 89   CALCIUM 8.8         Lab 07/08/24 0728   TOTAL PROTEIN 6.1   ALBUMIN 3.3*   GLOBULIN 2.8   ALT (SGPT) 41*   AST (SGOT) 46*   BILIRUBIN 0.3   ALK PHOS 73         Lab 07/08/24 0728   PROBNP 651.7   HSTROP T 18*                 Lab 07/08/24 0718   PH, ARTERIAL 7.419   PCO2, ARTERIAL 33.2*   PO2 ART 51.2*   FIO2 44   HCO3 ART 21.5   BASE EXCESS ART -2.2*     UA          8/20/2023    18:14 4/20/2024    18:51 5/6/2024    21:54   Urinalysis   Squamous Epithelial Cells, UA 0-2  None Seen     Specific [place], UA 1.018  1.013  1.014    Ketones, UA Negative  Negative  Negative    Blood, UA Negative  Negative  Negative    Leukocytes, UA Large (3+)  Small (1+)  Negative    Nitrite, UA Positive  Negative  Negative    RBC, UA 0-2  0-2     WBC, UA Too Numerous to Count  Too Numerous to Count     Bacteria, UA 2+  4+         Microbiology Results (last 10 days)       Procedure Component Value - Date/Time    Respiratory Panel PCR w/COVID-19(SARS-CoV-2) ERICK/MARTA/ELPIDIO/PAD/COR/ALMA In-House, NP Swab in UTM/VTM, 2 HR TAT - Swab, Nasopharynx [[id number]]  (Normal) Collected: 07/08/24 0730    Lab Status: Final result Specimen: Swab from Nasopharynx Updated: 07/08/24 0840     ADENOVIRUS, PCR Not Detected     Coronavirus 229E Not Detected     Coronavirus HKU1 Not Detected     Coronavirus NL63 Not Detected     Coronavirus OC43 Not Detected     COVID19 Not Detected     Human Metapneumovirus Not Detected     Human Rhinovirus/Enterovirus Not Detected     Influenza A PCR Not Detected     Influenza B PCR Not Detected     Parainfluenza Virus 1 Not Detected     Parainfluenza Virus 2 Not Detected     Parainfluenza Virus 3 Not Detected     Parainfluenza Virus 4 Not Detected     RSV,  PCR Not Detected     Bordetella pertussis pcr Not Detected     Bordetella parapertussis PCR Not Detected     Chlamydophila pneumoniae PCR Not Detected     Mycoplasma pneumo by PCR Not Detected    Narrative:      In the setting of a positive respiratory panel with a viral infection PLUS a negative procalcitonin without other underlying concern for bacterial infection, consider observing off antibiotics or discontinuation of antibiotics and continue supportive care. If the respiratory panel is positive for atypical bacterial infection (Bordetella pertussis, Chlamydophila pneumoniae, or Mycoplasma pneumoniae), consider antibiotic de-escalation to target atypical bacterial infection.            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 Heart shadow and pulmonary vasculature appear normal in size. There is new multifocal airspace disease in the left upper and lower lung, and milder but increased interstitial opacity in the right mid and lower lung consistent with pneumonia. Disease is densest in the left upper lung. No effusion or pneumothorax is seen.     Impression: Impression: Bilateral pneumonia. Electronically Signed: Deon Vera MD  7/8/2024 8:16 AM EDT  Workstation ID: [id number]     Results for orders placed during the hospital encounter of 08/20/23    Adult Transthoracic Echo Complete W/ Cont if Necessary Per Protocol    Interpretation Summary    Left ventricular systolic function is mildly decreased. Calculated left ventricular EF = 47.9% Normal left ventricular cavity size and wall thickness noted. There is left ventricular global hypokinesis noted. Left ventricular diastolic function is consistent with (grade I) impaired relaxation.    : Normal right ventricular cavity size and systolic function noted. Electronic lead  present in the ventricle.    Normal left atrial size and volume noted. Saline test results are negative.    There is moderate calcification of the aortic valve. The aortic valve was poorly visualized but appears trileaflet. Moderate aortic valve regurgitation is present. No aortic valve stenosis is present.    Mild mitral annular calcification is present. Mild mitral valve regurgitation is present. No significant mitral valve stenosis is present.    The tricuspid valve is grossly normal in structure. Mild tricuspid valve regurgitation is present. Estimated right ventricular systolic pressure from tricuspid regurgitation is normal, 33 mmHg. No tricuspid valve stenosis is present.    Mild dilation of the ascending aorta is present. Ascending aorta = 3.7 cm    There is a small (<1cm) pericardial effusion adjacent to the right atrium and right ventricle.      Assessment & Plan   Assessment & Plan       Acute respiratory failure with hypoxia    Dysautonomia orthostatic hypotension syndrome    Hypertension    Sepsis due to pneumonia    Severe malnutrition    Atrial fibrillation    88-year-old female with history of atrial fibrillation, on amiodarone and Eliquis, hypertension, autonomic dysfunction, chronic debility, prior hospitalizations for recurrent pneumonia with concerns for possible aspiration.  She presents from nursing home with 2 weeks of progressive worsening shortness of breath and cough.  Found to have acute respiratory failure with hypoxia secondary to bilateral pneumonia.    Acute respiratory failure with hypoxia secondary to bilateral pneumonia  Sepsis(tachycardia, leukocytosis, lactic acidosis, and pneumonia)  -Patient with pO2 of 51, chest x-ray with bilateral pneumonia, previous history of aspiration pneumonia  -Follow-up blood cultures, MRSA PCR, Legionella and strep pneumo antigens respiratory PCR is negative  -Continue broad-spectrum antibiotics, currently on Rocephin and doxycycline  -She is on HFNC  50 L and 80%, continue to wean as able, of note she is full code  -Continue aggressive pulmonary toilet, Kwame  -SLP to evaluate    Atrial fibrillation  -Continue home metoprolol, amiodarone and Eliquis    Hyperlipidemia  -Continue statin    Hyponatremia  -Not clinically significant  -Continue to closely monitor    Depression  -Continue Effexor and BuSpar    Hypertension  Autonomic dysfunction with recurrent presyncopal episodes  -BP currently low, hold home anti-hypertensives    Tremors  -Continue Keppra    Chronic debility  -Currently resides at assisted living facility  -Need GOC discussions, we will have palliative consulted, currently she is full code, she states that she has 2 great-grandchildren that she has not seen and wants to live to see them.  -PT/OT to evaluate    Left breast swelling  -Patient with some lethargy feeling on the lateral left breast, nontender  -Mentions she is supposed to have follow-up as outpatient    VTE Prophylaxis:  Pharmacologic VTE prophylaxis orders are signed & held.      CODE STATUS:    There are no questions and answers to display.     Expected Discharge   Expected Discharge Date: 7/10/2024; Expected Discharge Time:      Destiny Hammond MD  07/08/24

## 2024-07-08 NOTE — Clinical Note
Level of Care: Telemetry [5]   Diagnosis: Acute respiratory failure with hypoxia [026538]   Admitting Physician: ELIZABETH RENDON [[id number]]   Attending Physician: ELIZABETH RENDON [[id number]]   Bed Request Comments: tele, 4G/H or 6A/B

## 2024-07-08 NOTE — ED PROVIDER NOTES
Subjective   History of Present Illness  Mrs Hodegs presents via EMS from her assisted living facility with shortness of breath.  She tells me she has felt short of breath for several weeks.  She acknowledges a cough.       Review of Systems    Past Medical History:   Diagnosis Date    Constipation     Depression     Dysautonomia orthostatic hypotension syndrome     Generalized osteoarthritis     GERD (gastroesophageal reflux disease)     s/p Nissen    Hypertension     Insomnia     Osteoarthrosis, shoulder region     Skin ulcer of scalp     Thrombophlebitis of arm     Tremor     Vitamin B12 deficiency        Allergies   Allergen Reactions    Gabapentin     Sulfa Antibiotics        Past Surgical History:   Procedure Laterality Date    DILATATION AND CURETTAGE      HERNIA REPAIR      HIP SURGERY      SHOULDER SURGERY      Right       Family History   Problem Relation Age of Onset    Cancer Mother         Pancreatic    Stroke Brother     Anorexia nervosa Daughter        Social History     Socioeconomic History    Marital status:    Tobacco Use    Smoking status: Never    Smokeless tobacco: Never   Vaping Use    Vaping status: Never Used   Substance and Sexual Activity    Alcohol use: No     Comment: stopped drinking alcohol    Drug use: No    Sexual activity: Defer           Objective   Physical Exam  Vitals and nursing note reviewed.   Constitutional:       General: She is not in acute distress.     Appearance: Normal appearance.      Comments: She appears generally weak, she is not able to provide very detailed recent history.  She tells me she is just tired of having to struggle to breathe.   HENT:      Head: Normocephalic and atraumatic.      Nose: Nose normal. No congestion or rhinorrhea.   Eyes:      General: No scleral icterus.     Conjunctiva/sclera: Conjunctivae normal.   Neck:      Comments: No JVD   Cardiovascular:      Rate and Rhythm: Normal rate and regular rhythm.      Heart sounds: No murmur  heard.     No friction rub.   Pulmonary:      Effort: Pulmonary effort is normal.      Breath sounds: Normal breath sounds. No wheezing or rales.      Comments: Frequent wet cough  Abdominal:      General: Bowel sounds are normal.      Palpations: Abdomen is soft.      Tenderness: There is no abdominal tenderness. There is no guarding or rebound.   Musculoskeletal:         General: No tenderness.      Cervical back: Normal range of motion and neck supple.      Right lower leg: No edema.      Left lower leg: No edema.   Skin:     General: Skin is warm and dry.      Coloration: Skin is not pale.      Findings: No erythema.   Neurological:      General: No focal deficit present.      Mental Status: She is alert.      Motor: Weakness present.      Coordination: Coordination normal.   Psychiatric:         Mood and Affect: Mood normal.         Behavior: Behavior normal.         Thought Content: Thought content normal.         Procedures           ED Course  ED Course as of 07/08/24 1446   Mon Jul 08, 2024   0714 We have maintained her on 6 L nasal cannula.  She has a tremor and saturation readings are anywhere from the 60s up to 93%.  She is not cyanotic but does complain of shortness of breath.  Will continue at 6 L at this time and obtain ABG.  Respiratory therapy is at bedside [DT]   0717 I saw her on arrival and interviewed paramedics.  I have reviewed her records.  I have ordered respiratory panel, blood cultures, and labs.  Have reviewed her EKG which does not show evidence of acute ischemia or infarct. [DT]   0720 I reviewed her ABG with the respiratory therapist.  pCO2 is low.  pO2 is also low yielding 83% saturation.  Will initiate high flow nasal cannula oxygen therapy at this time. [DT]   0834 Saturations 100% on high flow nasal cannula oxygen.  She tells me she feels better.  She asks for her sleeping pill and asks that I turn out the lights.  Have asked respiratory therapy team to turn down her oxygen  delivery.  I have reviewed chest x-ray images as well as the report.  This shows bilateral patchy infiltrate.  Will initiate antibiotics. [DT]   0841 I have gradually titrated her oxygen down.  Currently she is on 60% oxygen delivery through high flow nasal cannula and saturations are staying at 97%. [DT]      ED Course User Index  [DT] Chance Mcrae MD                                             Medical Decision Making  Please see course notes.  I ordered and interpreted multiple labs and chest x-ray.  Gave IV medications.  Worked with respiratory therapy and titrated her oxygen delivery through high flow nasal cannula.  I titrated the oxygen delivery downward after reviewing ABG    Problems Addressed:  Acute respiratory failure with hypoxia: complicated acute illness or injury that poses a threat to life or bodily functions  Hyponatremia: complicated acute illness or injury  Pneumonia of both lungs due to infectious organism, unspecified part of lung: complicated acute illness or injury that poses a threat to life or bodily functions    Amount and/or Complexity of Data Reviewed  Labs: ordered. Decision-making details documented in ED Course.  Radiology: ordered. Decision-making details documented in ED Course.  ECG/medicine tests: ordered.    Risk  Prescription drug management.  Decision regarding hospitalization.        Final diagnoses:   Hyponatremia   Pneumonia of both lungs due to infectious organism, unspecified part of lung   Acute respiratory failure with hypoxia       ED Disposition  ED Disposition       ED Disposition   Decision to Admit    Condition   --    Comment   Level of Care: Telemetry [5]   Diagnosis: Acute respiratory failure with hypoxia [772252]   Admitting Physician: ELIZABETH RENDON [[id number]]   Attending Physician: ELIZABETH RENDON [[id number]]   Bed Request Comments: tele, 4G/H or 6A/B   Certification: I Certify That Inpatient Hospital Services Are Medically Necessary For Greater Than 2  Midnights                 No follow-up provider specified.       Medication List      No changes were made to your prescriptions during this visit.            Chance Mcrae MD  07/08/24 4601

## 2024-07-08 NOTE — PLAN OF CARE
Goal Outcome Evaluation:  Plan of Care Reviewed With: patient        Progress:  (initial eval)       FEES completed, safest at this time is soft chopped w/ honey thick liquids, no straws, no mixed consistencies.  Anticipated Discharge Disposition (SLP): skilled nursing facility          SLP Swallowing Diagnosis: mod-severe, pharyngeal dysphagia, mild-moderate, oral dysphagia (07/08/24 5436)

## 2024-07-08 NOTE — CONSULTS
Palliative Care Initial Consult   Attending Physician: Destiny Hammond MD  Referring Provider: Destiny Hammond      Reason for Referral:  assistance with clarification of goals of care    Code Status:   There are no questions and answers to display.      Advanced Directives: Advance Directive Status: Patient has advance directive, copy in chart   Family/Support: son     Goals of Care:    Goals of Care/Treatment Preferences:    TBD       HPI:   89 yo female with hx afib, htn, autonomic dysfcn with orthostasis and recurrent pnas presed for increasing SOA for up to a month.  P)2 50 in ED.  Admitted for abx and eval.  Also reported L breast abnl and to have f/u as outpt.  Reports has had multi pnas and been on a vent before when she had covid.  She would be okay with a vent again, reporting she has many greatgrandchildren to live for. She wants all to support her through illness.  This is consistent with ACP documents on file.      ROS:   SOA improved.  Reports thirsty and wanting water. Has some achy pain in her right shoulder that she relates to a fall but not enough to interfere with sleep or most activities.        Past Medical History:   Diagnosis Date    Constipation     Depression     Dysautonomia orthostatic hypotension syndrome     Generalized osteoarthritis     GERD (gastroesophageal reflux disease)     s/p Nissen    Hypertension     Insomnia     Osteoarthrosis, shoulder region     Skin ulcer of scalp     Thrombophlebitis of arm     Tremor     Vitamin B12 deficiency      Past Surgical History:   Procedure Laterality Date    DILATATION AND CURETTAGE      HERNIA REPAIR      HIP SURGERY      SHOULDER SURGERY      Right     Social History     Socioeconomic History    Marital status:    Tobacco Use    Smoking status: Never    Smokeless tobacco: Never   Vaping Use    Vaping status: Never Used   Substance and Sexual Activity    Alcohol use: No     Comment: stopped drinking alcohol    Drug use: No    Sexual  activity: Defer     Family History   Problem Relation Age of Onset    Cancer Mother         Pancreatic    Stroke Brother     Anorexia nervosa Daughter        Allergies   Allergen Reactions    Gabapentin     Sulfa Antibiotics          Current Facility-Administered Medications:     amiodarone (PACERONE) tablet 200 mg, 200 mg, Oral, Daily, Destiny Hammond MD    apixaban (ELIQUIS) tablet 2.5 mg, 2.5 mg, Oral, BID, Destiny Hammond MD    atorvastatin (LIPITOR) tablet 40 mg, 40 mg, Oral, Nightly, Destiny Hammond MD    sennosides-docusate (PERICOLACE) 8.6-50 MG per tablet 2 tablet, 2 tablet, Oral, BID PRN **AND** polyethylene glycol (MIRALAX) packet 17 g, 17 g, Oral, Daily PRN **AND** bisacodyl (DULCOLAX) EC tablet 5 mg, 5 mg, Oral, Daily PRN **AND** bisacodyl (DULCOLAX) suppository 10 mg, 10 mg, Rectal, Daily PRN, Destiny Hammond MD    Calcium Replacement - Follow Nurse / BPA Driven Protocol, , Does not apply, Manish DOWD Wycliffe, MD    [START ON 7/9/2024] cefTRIAXone (ROCEPHIN) 1,000 mg in sodium chloride 0.9 % 100 mL MBP, 1,000 mg, Intravenous, Q24H, Destiny Hammond MD    doxycycline (VIBRAMYCIN) 100 mg in sodium chloride 0.9 % 100 mL MBP, 100 mg, Intravenous, Once, Chance Mcrae MD    doxycycline (VIBRAMYCIN) 100 mg in sodium chloride 0.9 % 100 mL MBP, 100 mg, Intravenous, Q12H, Destiny Hammond MD    ipratropium-albuterol (DUO-NEB) nebulizer solution 3 mL, 3 mL, Nebulization, 4x Daily - RT, Destiny Hammond MD    levETIRAcetam (KEPPRA) tablet 250 mg, 250 mg, Oral, BID, Destiny Hammond MD    Magnesium Standard Dose Replacement - Follow Nurse / BPA Driven Protocol, , Does not apply, PRN, Destiny Hammond MD    metoprolol tartrate (LOPRESSOR) half tablet 12.5 mg, 12.5 mg, Oral, BID, Destiny Hammond MD    ondansetron (ZOFRAN) injection 4 mg, 4 mg, Intravenous, Q6H PRN, Destiny Hammond MD    Phosphorus Replacement - Follow Nurse / BPA Driven Protocol, , Does not apply, PRNManish Wycliffe, MD    Potassium Replacement -  "Follow Nurse / BPA Driven Protocol, , Does not apply, PRN, Destiny Hammond MD    sodium chloride 0.9 % flush 10 mL, 10 mL, Intravenous, PRN, Chance Mcrae MD    sodium chloride 0.9 % flush 10 mL, 10 mL, Intravenous, Q12H, Destiny Hammond MD    sodium chloride 0.9 % flush 10 mL, 10 mL, Intravenous, PRN, Destiny Hammond MD    sodium chloride 0.9 % infusion 40 mL, 40 mL, Intravenous, PRN, Destiny Hammond MD    traMADol (ULTRAM) tablet 50 mg, 50 mg, Oral, Q12H PRN, Destiny Hammond MD    traZODone (DESYREL) tablet 100 mg, 100 mg, Oral, Nightly, Destiny Hammond MD    venlafaxine XR (EFFEXOR-XR) 24 hr capsule 75 mg, 75 mg, Oral, Daily, Destiny Hammond MD     Palliative Performance Scale Score:      /96   Pulse 77   Temp 97 °F (36.1 °C) (Axillary)   Resp 22   Ht 170.2 cm (67\")   Wt 59 kg (130 lb)   SpO2 97%   BMI 20.36 kg/m²     Intake/Output Summary (Last 24 hours) at 7/8/2024 1135  Last data filed at 7/8/2024 0929  Gross per 24 hour   Intake 100 ml   Output --   Net 100 ml       Physical Exam:    General Appearance:    Alert, cooperative, NAD   HEENT:    NC/AT, EOMI, anicteric, MMM, face relaxed   Neck:   supple, trachea midline, no JVD   Lungs:     CTA bilat, diminished in bases; respirations regular, even     and unlabored    Heart:    RRR, normal S1 and S2, no M/R/G   Abdomen:     Normal bowel sounds, soft, nontender, nondistended   G/U:   Deferred   MSK/Extremities:   No clubbing , cyanosis or edema, No wasting   Pulses:   Pulses palpable and equal bilaterally   Skin:   Warm, dry, no mottling   Neurologic:   A/Ox3, cooperative, moves extremities x 4, no tremor, nl     tone   Psych:   Calm, appropriate         Labs:   Results from last 7 days   Lab Units 07/08/24  0728   WBC 10*3/mm3 15.50*   HEMOGLOBIN g/dL 15.1   HEMATOCRIT % 45.9   PLATELETS 10*3/mm3 250     Results from last 7 days   Lab Units 07/08/24  0728   SODIUM mmol/L 128*   POTASSIUM mmol/L 4.1   CHLORIDE mmol/L 93*   CO2 mmol/L 21.0*   BUN " mg/dL 20   CREATININE mg/dL 1.26*   CALCIUM mg/dL 8.8   BILIRUBIN mg/dL 0.3   ALK PHOS U/L 73   ALT (SGPT) U/L 41*   AST (SGOT) U/L 46*   GLUCOSE mg/dL 89     Imaging Results (Last 72 Hours)       Procedure Component Value Units Date/Time    XR Chest 1 View [[id number]] Collected: 07/08/24 0814     Updated: 07/08/24 0819    Narrative:      XR CHEST 1 VW    Date of Exam: 7/8/2024 8:00 AM EDT    Indication: SOA triage protocol    Comparison: 5/6/2024    Findings:  Left-sided dual-lead pacemaker and bilateral shoulder prostheses are again noted. Heart shadow and pulmonary vasculature appear normal in size. There is new multifocal airspace disease in the left upper and lower lung, and milder but increased   interstitial opacity in the right mid and lower lung consistent with pneumonia. Disease is densest in the left upper lung. No effusion or pneumothorax is seen.      Impression:      Impression:  Bilateral pneumonia.      Electronically Signed: Deon Vera MD    7/8/2024 8:16 AM EDT    Workstation ID: [id number]                Diagnostics:   No valid procedures specified.    A:     Acute respiratory failure with hypoxia    Dysautonomia orthostatic hypotension syndrome    Hypertension    Sepsis due to pneumonia    Severe malnutrition    Atrial fibrillation         Impression:   B PNA  Hyponatremia   Htn  Sepsis  Afib  Prolonged Qtc 516  Leukocytosis  Dysphagia -  ?worsening    Symptoms:  Dyspnea - improved  Debility       P:    Await results of swallow eval for further discussion with pt/family.  Defaulat status of not putting in durrent code status is full code and is consitent with pt stated wishes and her ACP documents.  Thank you for this consult and allowing us to participate in patient's plan of care. Palliative Care Team will continue to follow patient.       Dione Frazier MD, 7/8/2024, 11:35 EDT

## 2024-07-09 LAB
ANION GAP SERPL CALCULATED.3IONS-SCNC: 12 MMOL/L (ref 5–15)
BASOPHILS # BLD AUTO: 0.04 10*3/MM3 (ref 0–0.2)
BASOPHILS NFR BLD AUTO: 0.2 % (ref 0–1.5)
BUN SERPL-MCNC: 27 MG/DL (ref 8–23)
BUN/CREAT SERPL: 20.8 (ref 7–25)
CALCIUM SPEC-SCNC: 8.7 MG/DL (ref 8.6–10.5)
CHLORIDE SERPL-SCNC: 101 MMOL/L (ref 98–107)
CO2 SERPL-SCNC: 21 MMOL/L (ref 22–29)
CREAT SERPL-MCNC: 1.3 MG/DL (ref 0.57–1)
DEPRECATED RDW RBC AUTO: 51.2 FL (ref 37–54)
EGFRCR SERPLBLD CKD-EPI 2021: 39.6 ML/MIN/1.73
EOSINOPHIL # BLD AUTO: 0.05 10*3/MM3 (ref 0–0.4)
EOSINOPHIL NFR BLD AUTO: 0.3 % (ref 0.3–6.2)
ERYTHROCYTE [DISTWIDTH] IN BLOOD BY AUTOMATED COUNT: 13.5 % (ref 12.3–15.4)
GLUCOSE SERPL-MCNC: 63 MG/DL (ref 65–99)
HCT VFR BLD AUTO: 36.1 % (ref 34–46.6)
HGB BLD-MCNC: 11.7 G/DL (ref 12–15.9)
IMM GRANULOCYTES # BLD AUTO: 0.09 10*3/MM3 (ref 0–0.05)
IMM GRANULOCYTES NFR BLD AUTO: 0.5 % (ref 0–0.5)
L PNEUMO1 AG UR QL IA: NEGATIVE
LYMPHOCYTES # BLD AUTO: 1.18 10*3/MM3 (ref 0.7–3.1)
LYMPHOCYTES NFR BLD AUTO: 7.1 % (ref 19.6–45.3)
MCH RBC QN AUTO: 33.2 PG (ref 26.6–33)
MCHC RBC AUTO-ENTMCNC: 32.4 G/DL (ref 31.5–35.7)
MCV RBC AUTO: 102.6 FL (ref 79–97)
MONOCYTES # BLD AUTO: 0.4 10*3/MM3 (ref 0.1–0.9)
MONOCYTES NFR BLD AUTO: 2.4 % (ref 5–12)
NEUTROPHILS NFR BLD AUTO: 14.86 10*3/MM3 (ref 1.7–7)
NEUTROPHILS NFR BLD AUTO: 89.5 % (ref 42.7–76)
NRBC BLD AUTO-RTO: 0 /100 WBC (ref 0–0.2)
PLATELET # BLD AUTO: 159 10*3/MM3 (ref 140–450)
PMV BLD AUTO: 10.8 FL (ref 6–12)
POTASSIUM SERPL-SCNC: 4.9 MMOL/L (ref 3.5–5.2)
QT INTERVAL: 450 MS
QTC INTERVAL: 516 MS
RBC # BLD AUTO: 3.52 10*6/MM3 (ref 3.77–5.28)
S PNEUM AG SPEC QL LA: POSITIVE
SODIUM SERPL-SCNC: 134 MMOL/L (ref 136–145)
WBC NRBC COR # BLD AUTO: 16.62 10*3/MM3 (ref 3.4–10.8)

## 2024-07-09 PROCEDURE — 25010000002 CEFTRIAXONE PER 250 MG: Performed by: INTERNAL MEDICINE

## 2024-07-09 PROCEDURE — 94799 UNLISTED PULMONARY SVC/PX: CPT

## 2024-07-09 PROCEDURE — 92526 ORAL FUNCTION THERAPY: CPT

## 2024-07-09 PROCEDURE — 97162 PT EVAL MOD COMPLEX 30 MIN: CPT

## 2024-07-09 PROCEDURE — 97530 THERAPEUTIC ACTIVITIES: CPT

## 2024-07-09 PROCEDURE — 25010000002 METRONIDAZOLE 500 MG/100ML SOLUTION: Performed by: INTERNAL MEDICINE

## 2024-07-09 PROCEDURE — 80048 BASIC METABOLIC PNL TOTAL CA: CPT | Performed by: INTERNAL MEDICINE

## 2024-07-09 PROCEDURE — 94664 DEMO&/EVAL PT USE INHALER: CPT

## 2024-07-09 PROCEDURE — 85025 COMPLETE CBC W/AUTO DIFF WBC: CPT | Performed by: INTERNAL MEDICINE

## 2024-07-09 PROCEDURE — 99232 SBSQ HOSP IP/OBS MODERATE 35: CPT | Performed by: INTERNAL MEDICINE

## 2024-07-09 RX ORDER — METRONIDAZOLE 500 MG/100ML
500 INJECTION, SOLUTION INTRAVENOUS EVERY 8 HOURS
Qty: 1500 ML | Refills: 0 | Status: DISCONTINUED | OUTPATIENT
Start: 2024-07-09 | End: 2024-07-11

## 2024-07-09 RX ADMIN — METRONIDAZOLE 500 MG: 500 INJECTION, SOLUTION INTRAVENOUS at 21:53

## 2024-07-09 RX ADMIN — Medication 12.5 MG: at 09:05

## 2024-07-09 RX ADMIN — AMIODARONE HYDROCHLORIDE 200 MG: 200 TABLET ORAL at 09:05

## 2024-07-09 RX ADMIN — IPRATROPIUM BROMIDE AND ALBUTEROL SULFATE 3 ML: 2.5; .5 SOLUTION RESPIRATORY (INHALATION) at 10:06

## 2024-07-09 RX ADMIN — SODIUM CHLORIDE 1000 MG: 900 INJECTION INTRAVENOUS at 09:05

## 2024-07-09 RX ADMIN — Medication 12.5 MG: at 21:52

## 2024-07-09 RX ADMIN — IPRATROPIUM BROMIDE AND ALBUTEROL SULFATE 3 ML: 2.5; .5 SOLUTION RESPIRATORY (INHALATION) at 20:00

## 2024-07-09 RX ADMIN — Medication 10 ML: at 09:06

## 2024-07-09 RX ADMIN — APIXABAN 2.5 MG: 2.5 TABLET, FILM COATED ORAL at 21:52

## 2024-07-09 RX ADMIN — VENLAFAXINE HYDROCHLORIDE 75 MG: 75 CAPSULE, EXTENDED RELEASE ORAL at 09:05

## 2024-07-09 RX ADMIN — LEVETIRACETAM 250 MG: 500 TABLET, FILM COATED ORAL at 09:05

## 2024-07-09 RX ADMIN — IPRATROPIUM BROMIDE AND ALBUTEROL SULFATE 3 ML: 2.5; .5 SOLUTION RESPIRATORY (INHALATION) at 13:23

## 2024-07-09 RX ADMIN — TRAZODONE HYDROCHLORIDE 100 MG: 100 TABLET ORAL at 21:52

## 2024-07-09 RX ADMIN — Medication 10 ML: at 21:51

## 2024-07-09 RX ADMIN — IPRATROPIUM BROMIDE AND ALBUTEROL SULFATE 3 ML: 2.5; .5 SOLUTION RESPIRATORY (INHALATION) at 15:53

## 2024-07-09 RX ADMIN — DOXYCYCLINE 100 MG: 100 INJECTION, POWDER, LYOPHILIZED, FOR SOLUTION INTRAVENOUS at 09:05

## 2024-07-09 RX ADMIN — ATORVASTATIN CALCIUM 40 MG: 40 TABLET, FILM COATED ORAL at 21:52

## 2024-07-09 RX ADMIN — APIXABAN 2.5 MG: 2.5 TABLET, FILM COATED ORAL at 09:05

## 2024-07-09 RX ADMIN — TRAMADOL HYDROCHLORIDE 50 MG: 50 TABLET ORAL at 16:19

## 2024-07-09 RX ADMIN — DOXYCYCLINE 100 MG: 100 INJECTION, POWDER, LYOPHILIZED, FOR SOLUTION INTRAVENOUS at 21:53

## 2024-07-09 RX ADMIN — LEVETIRACETAM 250 MG: 500 TABLET, FILM COATED ORAL at 21:51

## 2024-07-09 NOTE — PLAN OF CARE
Goal Outcome Evaluation:  Plan of Care Reviewed With: patient        Progress: improving  Outcome Evaluation: Hi flow nasal cannula weaned to 45L/55%. VSS, no complaints of pain. A-paced. Will continue with plan of care.

## 2024-07-09 NOTE — PROGRESS NOTES
[facility] Medicine Services  PROGRESS NOTE    Patient Name: Bibiana Hodges  : 1935  MRN: [id number]    Date of Admission: 2024  Primary Care Physician: Nivia Madrigal MD    Subjective   Subjective     CC:  Follow-up for pneumonia    HPI:  Patient seen and examined today. Feeling slightly better. Still with SOB and productive cough. She said she is careful about her food and drinks and try not aspirate but sometimes she does. Lots of artifacts on the monitor  that mimics A fib because of tremors but paced rhythm on monitor. Awaiting speech eval.      Objective   Objective     Vital Signs:   Temp:  [96.9 °F (36.1 °C)-98.8 °F (37.1 °C)] 98.5 °F (36.9 °C)  Heart Rate:  [60-67] 60  Resp:  [14-22] 18  BP: (107-155)/(52-97) 120/84  Flow (L/min):  [6-50] 6     Physical Exam:  General: chronically ill and frail looking, not in distress, conversant and cooperative  Head: Atraumatic and normocephalic  Eyes: No Icterus. No pallor  Ears:  Ears appear intact with no abnormalities noted  Throat: No oral lesions, no thrush  Neck: Supple, trachea midline  Lungs: Clear to auscultation bilaterally, equal air entry, bilat basal coarse crackles  Heart:  Normal S1 and S2, no murmur, no gallop, No JVD, no lower extremity swelling  Abdomen:  Soft, no tenderness, no organomegaly, normal bowel sounds, no organomegaly  Extremities: pulses equal bilaterally  Skin: No bleeding, bruising or rash, normal skin turgor and elasticity  Neurologic: Cranial nerves appear intact with no evidence of facial asymmetry, normal motor and sensory functions in all 4 extremities  Psych: Alert and oriented x 3, normal mood    Results Reviewed:  LAB RESULTS:      Lab 24  0420 24  0728   WBC 16.62* 15.50*   HEMOGLOBIN 11.7* 15.1   HEMATOCRIT 36.1 45.9   PLATELETS 159 250   NEUTROS ABS 14.86* 11.34*   IMMATURE GRANS (ABS) 0.09* 0.05   LYMPHS ABS 1.18 3.49*   MONOS ABS 0.40 0.40   EOS ABS 0.05 0.18   MCV  102.6* 100.9*   PROCALCITONIN  --  0.09   LACTATE  --  2.7*         Lab 07/09/24  0420 07/08/24 0728   SODIUM 134* 128*   POTASSIUM 4.9 4.1   CHLORIDE 101 93*   CO2 21.0* 21.0*   ANION GAP 12.0 14.0   BUN 27* 20   CREATININE 1.30* 1.26*   EGFR 39.6* 41.1*   GLUCOSE 63* 89   CALCIUM 8.7 8.8         Lab 07/08/24 0728   TOTAL PROTEIN 6.1   ALBUMIN 3.3*   GLOBULIN 2.8   ALT (SGPT) 41*   AST (SGOT) 46*   BILIRUBIN 0.3   ALK PHOS 73         Lab 07/08/24 0728   PROBNP 651.7   HSTROP T 18*                 Lab 07/08/24 0718   PH, ARTERIAL 7.419   PCO2, ARTERIAL 33.2*   PO2 ART 51.2*   FIO2 44   HCO3 ART 21.5   BASE EXCESS ART -2.2*     Brief Urine Lab Results  (Last result in the past 365 days)        Color   Clarity   Blood   Leuk Est   Nitrite   Protein   CREAT   Urine HCG        05/06/24 2154 Yellow   Clear   Negative   Negative   Negative   Trace                   Microbiology Results Abnormal       Procedure Component Value - Date/Time    Blood Culture - Blood, Wrist, Left [[id number]]  (Normal) Collected: 07/08/24 0729    Lab Status: Preliminary result Specimen: Blood from Wrist, Left Updated: 07/09/24 0801     Blood Culture No growth at 24 hours    Narrative:      Less than seven (7) mL's of blood was collected.  Insufficient quantity may yield false negative results.    Blood Culture - Blood, Arm, Right [[id number]]  (Normal) Collected: 07/08/24 0728    Lab Status: Preliminary result Specimen: Blood from Arm, Right Updated: 07/09/24 0801     Blood Culture No growth at 24 hours    Legionella Antigen, Urine - Urine, Urine, Clean Catch [[id number]]  (Normal) Collected: 07/08/24 1925    Lab Status: Final result Specimen: Urine, Clean Catch Updated: 07/09/24 0109     LEGIONELLA ANTIGEN, URINE Negative    MRSA Screen, PCR (Inpatient) - Swab, Nares [[id number]]  (Normal) Collected: 07/08/24 1248    Lab Status: Final result Specimen: Swab from Nares Updated: 07/08/24 1446     MRSA PCR Negative    Narrative:      The negative  predictive value of this diagnostic test is high and should only be used to consider de-escalating anti-MRSA therapy. A positive result may indicate colonization with MRSA and must be correlated clinically.  MRSA Negative    Respiratory Panel PCR w/COVID-19(SARS-CoV-2) ERICK/MARTA/ELPIDIO/PAD/COR/ALMA In-House, NP Swab in UTM/VTM, 2 HR TAT - Swab, Nasopharynx [[id number]]  (Normal) Collected: 07/08/24 0730    Lab Status: Final result Specimen: Swab from Nasopharynx Updated: 07/08/24 0840     ADENOVIRUS, PCR Not Detected     Coronavirus 229E Not Detected     Coronavirus HKU1 Not Detected     Coronavirus NL63 Not Detected     Coronavirus OC43 Not Detected     COVID19 Not Detected     Human Metapneumovirus Not Detected     Human Rhinovirus/Enterovirus Not Detected     Influenza A PCR Not Detected     Influenza B PCR Not Detected     Parainfluenza Virus 1 Not Detected     Parainfluenza Virus 2 Not Detected     Parainfluenza Virus 3 Not Detected     Parainfluenza Virus 4 Not Detected     RSV, PCR Not Detected     Bordetella pertussis pcr Not Detected     Bordetella parapertussis PCR Not Detected     Chlamydophila pneumoniae PCR Not Detected     Mycoplasma pneumo by PCR Not Detected    Narrative:      In the setting of a positive respiratory panel with a viral infection PLUS a negative procalcitonin without other underlying concern for bacterial infection, consider observing off antibiotics or discontinuation of antibiotics and continue supportive care. If the respiratory panel is positive for atypical bacterial infection (Bordetella pertussis, Chlamydophila pneumoniae, or Mycoplasma pneumoniae), consider antibiotic de-escalation to target atypical bacterial infection.            SLP FEES - Fiberoptic Endo Eval Swallow    Result Date: 7/8/2024  This procedure was auto-finalized with no dictation required.    XR Chest 1 View    Result Date: 7/8/2024  XR CHEST 1 VW Date of Exam: 7/8/2024 8:00 AM EDT Indication: SOA triage protocol  Comparison: 5/6/2024 Findings: Left-sided dual-lead pacemaker and bilateral shoulder prostheses are again noted. Heart shadow and pulmonary vasculature appear normal in size. There is new multifocal airspace disease in the left upper and lower lung, and milder but increased interstitial opacity in the right mid and lower lung consistent with pneumonia. Disease is densest in the left upper lung. No effusion or pneumothorax is seen.     Impression: Impression: Bilateral pneumonia. Electronically Signed: Deon Vera MD  7/8/2024 8:16 AM EDT  Workstation ID: [id number]     Results for orders placed during the hospital encounter of 08/20/23    Adult Transthoracic Echo Complete W/ Cont if Necessary Per Protocol    Interpretation Summary    Left ventricular systolic function is mildly decreased. Calculated left ventricular EF = 47.9% Normal left ventricular cavity size and wall thickness noted. There is left ventricular global hypokinesis noted. Left ventricular diastolic function is consistent with (grade I) impaired relaxation.    : Normal right ventricular cavity size and systolic function noted. Electronic lead present in the ventricle.    Normal left atrial size and volume noted. Saline test results are negative.    There is moderate calcification of the aortic valve. The aortic valve was poorly visualized but appears trileaflet. Moderate aortic valve regurgitation is present. No aortic valve stenosis is present.    Mild mitral annular calcification is present. Mild mitral valve regurgitation is present. No significant mitral valve stenosis is present.    The tricuspid valve is grossly normal in structure. Mild tricuspid valve regurgitation is present. Estimated right ventricular systolic pressure from tricuspid regurgitation is normal, 33 mmHg. No tricuspid valve stenosis is present.    Mild dilation of the ascending aorta is present. Ascending aorta = 3.7 cm    There is a small (<1cm) pericardial effusion adjacent to  the right atrium and right ventricle.      Current medications:  Scheduled Meds:amiodarone, 200 mg, Oral, Daily  apixaban, 2.5 mg, Oral, BID  atorvastatin, 40 mg, Oral, Nightly  cefTRIAXone, 1,000 mg, Intravenous, Q24H  doxycycline, 100 mg, Intravenous, Q12H  ipratropium-albuterol, 3 mL, Nebulization, 4x Daily - RT  levETIRAcetam, 250 mg, Oral, BID  metoprolol tartrate, 12.5 mg, Oral, BID  sodium chloride, 10 mL, Intravenous, Q12H  traZODone, 100 mg, Oral, Nightly  venlafaxine XR, 75 mg, Oral, Daily      Continuous Infusions:   PRN Meds:.  senna-docusate sodium **AND** polyethylene glycol **AND** bisacodyl **AND** bisacodyl    Calcium Replacement - Follow Nurse / BPA Driven Protocol    Magnesium Standard Dose Replacement - Follow Nurse / BPA Driven Protocol    ondansetron    Phosphorus Replacement - Follow Nurse / BPA Driven Protocol    Potassium Replacement - Follow Nurse / BPA Driven Protocol    sodium chloride    sodium chloride    sodium chloride    traMADol    Assessment & Plan   Assessment & Plan     Active Hospital Problems    Diagnosis  POA    **Acute respiratory failure with hypoxia [J96.01]  Yes    Atrial fibrillation [I48.91]  Yes    Severe malnutrition [E43]  Yes    Sepsis due to pneumonia [J18.9, A41.9]  Yes    Hypertension [I10]  Yes    Dysautonomia orthostatic hypotension syndrome [I95.1]  Yes      Resolved Hospital Problems   No resolved problems to display.        Brief Hospital Course to date:  Bibiana Hodges is a 88 y.o. female 88-year-old female with history of atrial fibrillation, on amiodarone and Eliquis, hypertension, autonomic dysfunction, chronic debility, prior hospitalizations for recurrent pneumonia with concerns for possible aspiration.  She presents from nursing home with 1 month of progressive worsening shortness of breath and cough secondary to bilateral pneumonia    Acute respiratory failure with hypoxia secondary to bilateral pneumonia  Sepsis secondary to pneumonia  Patient  presented with hypoxic respiratory failure requiring high flow nasal cannula.  Currently on 50 L and FiO2 of 80%  Chest x-ray with bilateral pneumonia continue   Rocephin and doxycycline  Follow sputum culture, MRSA swab, Legionella antigen etc.  Mucolytic's, DuoNebs, incentive spirometry as able  Pulmonary toilet  Given her previous history of aspiration pneumonia, will consult speech therapy team     Atrial fibrillation  Continue home metoprolol, amiodarone   Continue Eliquis     Hyperlipidemia  Continue statin     Depression  Continue Effexor and BuSpar     Hypertension  Autonomic dysfunction with recurrent presyncopal episodes  BP currently low, hold home anti-hypertensives    Chronic debility  Currently resides at assisted living facility  PT and OT    Goals of care  Palliative care team consulted for goals of care discussion     Left breast swelling  Patient is aware and has outpatient follow-up        Expected Discharge Location and Transportation: [facility]  Expected Discharge   Expected Discharge Date: 7/11/2024; Expected Discharge Time:      VTE Prophylaxis:  Pharmacologic VTE prophylaxis orders are present.         AM-PAC 6 Clicks Score (PT): 18 (07/09/24 0930)    CODE STATUS:   Code Status and Medical Interventions:   Ordered at: 07/09/24 1340     Level Of Support Discussed With:    Patient     Code Status (Patient has no pulse and is not breathing):    CPR (Attempt to Resuscitate)     Medical Interventions (Patient has pulse or is breathing):    Full Support       Sania Vera MD  07/09/24

## 2024-07-09 NOTE — PLAN OF CARE
Goal Outcome Evaluation:  Plan of Care Reviewed With: patient        Progress: no change  Outcome Evaluation: VSS. Pt remains down to 4L NC with no signs of SOA. Pt remains A. Paced on the monitor. Pt complained of pain once with prn meds given.

## 2024-07-09 NOTE — CASE MANAGEMENT/SOCIAL WORK
Discharge Planning Assessment  [facility]     Patient Name: Bibiana Hodges  MRN: [id number]  Today's Date: 7/9/2024    Admit Date: 7/8/2024    [facility] SNF   Discharge Needs Assessment       Row Name 07/09/24 1633       Living Environment    People in Home facility resident    Unique Family Situation [facility]    Current Living Arrangements assisted living facility    Potentially Unsafe Housing Conditions none    Family Caregiver if Needed none       Resource/Environmental Concerns    Resource/Environmental Concerns none       Transition Planning    Patient/Family Anticipates Transition to home    Patient/Family Anticipated Services at Transition none    Transportation Anticipated health plan transportation       Discharge Needs Assessment    Equipment Currently Used at Home wheelchair;walker, guanako;rollator;bp cuff;pulse ox;grab bar;bath bench;shower chair;cane, straight    Equipment Needed After Discharge none                   Discharge Plan       Row Name 07/09/24 1634       Plan    Plan [facility] SNF    Patient/Family in Agreement with Plan yes    Plan Comments Spoke with patient at bedside. She lives at the [facility] in [facility]. She uses a walker, wheelchair and scooter for mobility. She not current with home health services. PCP is Nivia Madrigal. Insurance is [employer].. Patient discharge goal is back to [facility]. She is open to rehab if needed. CM will follow for any discharge needs.    Final Discharge Disposition Code 01 - home or self-care                  Continued Care and Services - Admitted Since 7/8/2024    No active coordination exists for this encounter.       Expected Discharge Date and Time       Expected Discharge Date Expected Discharge Time    Jul 10, 2024            Demographic Summary       Row Name 07/09/24 1633       General Information    Admission Type  inpatient    Preferred Language English                   Functional Status       Row Name 07/09/24 1633       Functional Status    Usual Activity Tolerance moderate    Current Activity Tolerance moderate       Functional Status, IADL    Medications assistive person    Meal Preparation assistive equipment and person    Housekeeping assistive equipment and person    Laundry assistive equipment and person    Shopping assistive equipment and person                   Psychosocial    No documentation.                  Abuse/Neglect    No documentation.                  Legal    No documentation.                  Substance Abuse    No documentation.                  Patient Forms    No documentation.                     Jess Perdue RN

## 2024-07-09 NOTE — PLAN OF CARE
Goal Outcome Evaluation:  Plan of Care Reviewed With: patient        Progress: improving         Anticipated Discharge Disposition (SLP): skilled nursing facility          SLP Swallowing Diagnosis: mod-severe, pharyngeal dysphagia, mild-moderate, oral dysphagia (07/09/24 1130)  Treatment Assessment (SLP): continued (07/09/24 1130)     Plan for Continued Treatment (SLP): continue treatment per plan of care (07/09/24 1130)

## 2024-07-09 NOTE — PLAN OF CARE
Goal Outcome Evaluation:  Plan of Care Reviewed With: patient        Progress: no change  Outcome Evaluation: Palliative RN saw pt at 1110.  Pt. was sitting up in bedside chair with head covered on 6LNC.  She denied pain and nausea.  She stated she was feeling tired today.  Pt. passed bedside swallow eval.  She wants to go back to bed ASAP.  Encouraged pt to sit up until after lunch. She told Dr. HAYDEN Frazier she wishes to remain full code.  Palliative care to follow for support and ongoing [facility].       0930 Palliative IDT meeting: TULIO Fuentes RN, PN; TAINA Talamantes, APRN; HAYDEN Frazier MD.; KOLTON Chew RN, ANGEL LUIS; HAYDEN Cee, [facility], [facility]; MELLO Cardona MDiv, [facility]; YAYA Craig, CHRISTIAN; TY Allred, JOSE JW    After hours, contact Palliative by calling [phone].

## 2024-07-09 NOTE — THERAPY EVALUATION
Patient Name: Bibiana Hodges  : 1935    MRN: [id number]                              Today's Date: 2024       Admit Date: 2024    Visit Dx:     ICD-10-CM ICD-9-CM   1. Hyponatremia  E87.1 276.1   2. Pneumonia of both lungs due to infectious organism, unspecified part of lung  J18.9 483.8   3. Acute respiratory failure with hypoxia  J96.01 518.81   4. Oropharyngeal dysphagia  R13.12 787.22     Patient Active Problem List   Diagnosis    Dysautonomia orthostatic hypotension syndrome    Hypertension    Flu vaccine need    Streptococcus pneumoniae vaccination indicated    Sepsis due to pneumonia    Severe malnutrition    Acute respiratory failure with hypoxia    Atrial fibrillation    Autonomic dysfunction     Past Medical History:   Diagnosis Date    Constipation     Depression     Dysautonomia orthostatic hypotension syndrome     Generalized osteoarthritis     GERD (gastroesophageal reflux disease)     s/p Nissen    Hypertension     Insomnia     Osteoarthrosis, shoulder region     Skin ulcer of scalp     Thrombophlebitis of arm     Tremor     Vitamin B12 deficiency      Past Surgical History:   Procedure Laterality Date    DILATATION AND CURETTAGE      HERNIA REPAIR      HIP SURGERY      SHOULDER SURGERY      Right      General Information       Row Name 24 0930          Physical Therapy Time and Intention    Document Type evaluation  -NS     Mode of Treatment individual therapy;physical therapy  -NS       Row Name 24 0930          General Information    Patient Profile Reviewed yes  -NS     Prior Level of Function independent:;all household mobility;gait;transfer;bed mobility;min assist:;ADL's  pt states that she uses a rollator at baseline, gets assist with bathing and dressing prn. Pt states she does not typically receive the help that she needs. Also reports falls.  -NS     Existing Precautions/Restrictions fall;oxygen therapy device and L/min  HFNC, mild tremors  -NS     Barriers to  Rehab medically complex;previous functional deficit  -NS       Row Name 07/09/24 0930          Living Environment    People in Home facility resident  -NS       Row Name 07/09/24 0930          Home Main Entrance    Number of Stairs, Main Entrance none  -NS       [facility] Name 07/09/24 0930          Stairs Within Home, Primary    Number of Stairs, Within Home, Primary none  -NS       [facility] Name 07/09/24 0930          Cognition    Orientation Status (Cognition) oriented x 4  -NS       [facility] Name 07/09/24 0930          Safety Issues, Functional Mobility    Safety Issues Affecting Function (Mobility) safety precaution awareness;safety precautions follow-through/compliance  -NS     Impairments Affecting Function (Mobility) balance;coordination;endurance/activity tolerance;strength;motor planning;shortness of breath  -NS               User Key  (r) = Recorded By, (t) = Taken By, (c) = Cosigned By      Initials Name Provider Type    Shelley Gómez PT Physical Therapist                   Mobility       Row Name 07/09/24 0930          Bed Mobility    Bed Mobility supine-sit  -NS     Supine-Sit [place] (Bed Mobility) minimum assist (75% patient effort)  -NS     Assistive Device (Bed Mobility) bed rails  -NS     Comment, (Bed Mobility) No reports of dizziness upon sitting up. HOB flat.  -NS       [facility] Name 07/09/24 0930          Transfers    Comment, (Transfers) Decreased standing tolerance noted.  -NS       [facility] Name 07/09/24 0930          Bed-Chair Transfer    Bed-Chair [place] (Transfers) contact guard;verbal cues  -NS     Assistive Device (Bed-Chair Transfers) walker, front-wheeled  -NS       [facility] Name 07/09/24 0930          Sit-Stand Transfer    Sit-Stand [place] (Transfers) contact guard;verbal cues  -NS     Assistive Device (Sit-Stand Transfers) walker, front-wheeled  -NS       [facility] Name 07/09/24 0930          Gait/Stairs (Locomotion)    [place] Level (Gait) unable to assess  -NS     Comment, (Gait/Stairs)  Unable to assess due to poor standing tolerance and short HFNC tubing.  -NS               User Key  (r) = Recorded By, (t) = Taken By, (c) = Cosigned By      Initials Name Provider Type    Shelley Gómez PT Physical Therapist                   Obj/Interventions       Row Name 07/09/24 0930          Range of Motion Comprehensive    General Range of Motion lower extremity range of motion deficits identified  -NS     Comment, General Range of Motion decreased full ankle DF  -NS       Row Name 07/09/24 0930          Strength Comprehensive (MMT)    General Manual Muscle Testing (MMT) Assessment lower extremity strength deficits identified  -NS     Comment, General Manual Muscle Testing (MMT) Assessment B ankle DF: 4/5, B knee ext: 4+/5, B hip flexion: 4/5  -NS       Row Name 07/09/24 0930          Balance    Balance Assessment sitting static balance;sitting dynamic balance;standing static balance;standing dynamic balance  -NS     Static Sitting Balance standby assist  -NS     Dynamic Sitting Balance standby assist  -NS     Position, Sitting Balance unsupported;sitting edge of bed  -NS     Static Standing Balance contact guard  -NS     Dynamic Standing Balance contact guard  -NS     Position/Device Used, Standing Balance supported;walker, front-wheeled  -NS     Balance Interventions weight shifting activity  -NS     Comment, Balance Pre-gait training with lateral weight shifting in standing  -NS       Row Name 07/09/24 0930          Sensory Assessment (Somatosensory)    Sensory Assessment (Somatosensory) LE sensation intact  -NS               User Key  (r) = Recorded By, (t) = Taken By, (c) = Cosigned By      Initials Name Provider Type    Shelley Gómez PT Physical Therapist                   Goals/Plan       Row Name 07/09/24 0930          Bed Mobility Goal 1 (PT)    Activity/Assistive Device (Bed Mobility Goal 1, PT) supine to sit  -NS     [place] Level/Cues Needed (Bed Mobility Goal 1, PT) independent  -NS      Time Frame (Bed Mobility Goal 1, PT) short term goal (STG);1 week  -NS       Row Name 07/09/24 0930          Transfer Goal 1 (PT)    Activity/Assistive Device (Transfer Goal 1, PT) sit-to-stand/stand-to-sit;bed-to-chair/chair-to-bed  -NS     [place] Level/Cues Needed (Transfer Goal 1, PT) modified independence  -NS     Time Frame (Transfer Goal 1, PT) long term goal (LTG);10 days  -NS       Row Name 07/09/24 0930          Gait Training Goal 1 (PT)    Activity/Assistive Device (Gait Training Goal 1, PT) gait (walking locomotion);assistive device use  -NS     [place] Level (Gait Training Goal 1, PT) standby assist  -NS     Distance (Gait Training Goal 1, PT) 50  -NS     Time Frame (Gait Training Goal 1, PT) long term goal (LTG);10 days  -NS       Row Name 07/09/24 0930          Therapy Assessment/Plan (PT)    Planned Therapy Interventions (PT) balance training;bed mobility training;gait training;home exercise program;neuromuscular re-education;strengthening;patient/family education;transfer training  -NS               User Key  (r) = Recorded By, (t) = Taken By, (c) = Cosigned By      Initials Name Provider Type    Shelley Gómez, PT Physical Therapist                   Clinical Impression       Row Name 07/09/24 0930          Pain    Pretreatment Pain Rating 0/10 - no pain  -NS     Posttreatment Pain Rating 0/10 - no pain  -NS       [facility] Name 07/09/24 0930          Plan of Care Review    Plan of Care Reviewed With patient  -NS     Progress no change  -NS     Outcome Evaluation Patient presents with weakness, decreased endurance, balance impairments affecting independence with mobility. Pt also reports falls at home related to light headedness. Skilled IP PT services warranted to address deficits and support return to baseline. Recommend SNF at d/c.  -NS       Row Name 07/09/24 0930          Therapy Assessment/Plan (PT)    Patient/Family Therapy Goals Statement (PT) to get stronger and have less falls   -NS     Rehab Potential (PT) good, to achieve stated therapy goals  -NS     Criteria for Skilled Interventions Met (PT) yes;meets criteria;skilled treatment is necessary  -NS     Therapy Frequency (PT) daily  -NS     Predicted Duration of Therapy Intervention (PT) 10 days  -NS       Row Name 07/09/24 0930          Vital Signs    Pre Systolic BP Rehab 137  supine  -NS     Pre Treatment Diastolic BP 97  -NS     Intra Systolic BP Rehab 131  sitting  -NS     Intra Treatment Diastolic BP 73  -NS     Pretreatment Heart Rate (beats/min) 60  -NS     Posttreatment Heart Rate (beats/min) 64  -NS     Pre SpO2 (%) 94  -NS     O2 Delivery Pre Treatment hi-flow  -NS     Intra SpO2 (%) 93  -NS     O2 Delivery Intra Treatment hi-flow  -NS     Post SpO2 (%) 98  -NS     O2 Delivery Post Treatment hi-flow  -NS     Pre Patient Position Supine  -NS     Intra Patient Position Standing  -NS     Post Patient Position Sitting  -NS       Row Name 07/09/24 0930          Positioning and Restraints    Pre-Treatment Position in bed  -NS     Post Treatment Position chair  -NS     In Chair notified nsg;reclined;call light within reach;encouraged to call for assist;exit alarm on;waffle cushion;legs elevated;heels elevated  -NS               User Key  (r) = Recorded By, (t) = Taken By, (c) = Cosigned By      Initials Name Provider Type    Shelley Gómez PT Physical Therapist                   Outcome Measures       Row Name 07/09/24 0930          How much help from another person do you currently need...    Turning from your back to your side while in flat bed without using bedrails? 4  -NS     Moving from lying on back to sitting on the side of a flat bed without bedrails? 3  -NS     Moving to and from a bed to a chair (including a wheelchair)? 3  -NS     Standing up from a chair using your arms (e.g., wheelchair, bedside chair)? 3  -NS     Climbing 3-5 steps with a railing? 2  -NS     To walk in hospital room? 3  -NS     AM-PAC 6 Clicks Score  (PT) 18  -NS     Highest Level of Mobility Goal 6 --> Walk 10 steps or more  -NS       Row Name 07/09/24 0930          Functional Assessment    Outcome Measure Options AM-PAC 6 Clicks Basic Mobility (PT)  -NS               User Key  (r) = Recorded By, (t) = Taken By, (c) = Cosigned By      Initials Name Provider Type    Shelley Gómez PT Physical Therapist                                 Physical Therapy Education       Title: PT OT SLP Therapies (In Progress)       Topic: Physical Therapy (In Progress)       Point: Mobility training (Done)       Learning Progress Summary             Patient Acceptance, E, VU,NR by NS at 7/9/2024 1319                         Point: Home exercise program (Not Started)       Learner Progress:  Not documented in this visit.              Point: Body mechanics (Done)       Learning Progress Summary             Patient Acceptance, E, VU,NR by NS at 7/9/2024 1319                         Point: Precautions (Done)       Learning Progress Summary             Patient Acceptance, E, VU,NR by NS at 7/9/2024 1319                                         User Key       Initials Effective Dates Name Provider Type Discipline    NS 06/16/21 -  Shelley Gutiérrez PT Physical Therapist PT                  PT Recommendation and Plan  Planned Therapy Interventions (PT): balance training, bed mobility training, gait training, home exercise program, neuromuscular re-education, strengthening, patient/family education, transfer training  Plan of Care Reviewed With: patient  Progress: no change  Outcome Evaluation: Patient presents with weakness, decreased endurance, balance impairments affecting independence with mobility. Pt also reports falls at home related to light headedness. Skilled IP PT services warranted to address deficits and support return to baseline. Recommend SNF at d/c.     Time Calculation:   PT Evaluation Complexity  History, PT Evaluation Complexity: 3 or more personal factors and/or  comorbidities  Examination of Body Systems (PT Eval Complexity): total of 4 or more elements  Clinical Presentation (PT Evaluation Complexity): evolving  Clinical Decision Making (PT Evaluation Complexity): moderate complexity  Overall Complexity (PT Evaluation Complexity): moderate complexity     PT Charges       Row Name 07/09/24 0930             Time Calculation    Start Time 0930  -NS      PT Received On 07/09/24  -NS      PT Goal Re-Cert Due Date 07/19/24  -NS         Timed Charges    56996 - PT Therapeutic Activity Minutes 8  -NS         Untimed Charges    PT Eval/Re-eval Minutes 47  -NS         Total Minutes    Timed Charges Total Minutes 8  -NS      Untimed Charges Total Minutes 47  -NS       Total Minutes 55  -NS                User Key  (r) = Recorded By, (t) = Taken By, (c) = Cosigned By      Initials Name Provider Type    Shelley Gómez, PT Physical Therapist                  Therapy Charges for Today       Code Description Service Date Service Provider Modifiers Qty    72715240864 HC PT THERAPEUTIC ACT EA 15 MIN 7/9/2024 Shelley Gutiérrez, PT GP 1    15488161947 HC PT EVAL MOD COMPLEXITY 4 7/9/2024 Shelley Gutiérrez, PT GP 1            PT G-Codes  Outcome Measure Options: AM-PAC 6 Clicks Basic Mobility (PT)  AM-PAC 6 Clicks Score (PT): 18  PT Discharge Summary  Anticipated Discharge Disposition (PT): skilled nursing facility    Shelley Gutiérrez PT  7/9/2024

## 2024-07-09 NOTE — PLAN OF CARE
Goal Outcome Evaluation:  Plan of Care Reviewed With: patient        Progress: no change  Outcome Evaluation: Patient presents with weakness, decreased endurance, balance impairments affecting independence with mobility. Pt also reports falls at home related to light headedness. Skilled IP PT services warranted to address deficits and support return to baseline. Recommend SNF at d/c.      Anticipated Discharge Disposition (PT): skilled nursing facility

## 2024-07-09 NOTE — THERAPY TREATMENT NOTE
Acute Care - Speech Language Pathology   Swallow Progress Note [facility]     Patient Name: Bibiana Hodges  : 1935  MRN: [id number]  Today's Date: 2024               Admit Date: 2024    Visit Dx:     ICD-10-CM ICD-9-CM   1. Hyponatremia  E87.1 276.1   2. Pneumonia of both lungs due to infectious organism, unspecified part of lung  J18.9 483.8   3. Acute respiratory failure with hypoxia  J96.01 518.81   4. Oropharyngeal dysphagia  R13.12 787.22     Patient Active Problem List   Diagnosis    Dysautonomia orthostatic hypotension syndrome    Hypertension    Flu vaccine need    Streptococcus pneumoniae vaccination indicated    Sepsis due to pneumonia    Severe malnutrition    Acute respiratory failure with hypoxia    Atrial fibrillation    Autonomic dysfunction     Past Medical History:   Diagnosis Date    Constipation     Depression     Dysautonomia orthostatic hypotension syndrome     Generalized osteoarthritis     GERD (gastroesophageal reflux disease)     s/p Nissen    Hypertension     Insomnia     Osteoarthrosis, shoulder region     Skin ulcer of scalp     Thrombophlebitis of arm     Tremor     Vitamin B12 deficiency      Past Surgical History:   Procedure Laterality Date    DILATATION AND CURETTAGE      HERNIA REPAIR      HIP SURGERY      SHOULDER SURGERY      Right       SLP Recommendation and Plan  SLP Swallowing Diagnosis: mod-severe, pharyngeal dysphagia, mild-moderate, oral dysphagia (24 1130)  SLP Diet Recommendation: soft to chew textures, chopped, no mixed consistencies, honey thick liquids (24 1130)  Recommended Precautions and Strategies: upright posture during/after eating, small bites of food and sips of liquid, general aspiration precautions, no straw, fatigue precautions, other (see comments) (24 113)  SLP Rec. for Method of Medication Administration: meds whole, meds crushed, with puree, as tolerated (24 1130)     Monitor for Signs of Aspiration: yes,  notify SLP if any concerns (07/09/24 1130)  Recommended Diagnostics: reassess via FEES (07/09/24 1130)  Swallow Criteria for Skilled Therapeutic Interventions Met: demonstrates skilled criteria (07/09/24 1130)  Anticipated Discharge Disposition (SLP): skilled nursing facility (07/09/24 1130)  Rehab Potential/Prognosis, Swallowing: adequate, monitor progress closely (07/09/24 1130)  Therapy Frequency (Swallow): 5 days per week (07/09/24 1130)  Predicted Duration Therapy Intervention (Days): 2 weeks (07/09/24 1130)  Oral Care Recommendations: Oral Care BID/PRN, Suction toothbrush (07/09/24 1130)        Daily Summary of Progress (SLP): progress toward functional goals is good (07/09/24 1130)               Treatment Assessment (SLP): continued (07/09/24 1130)     Plan for Continued Treatment (SLP): continue treatment per plan of care (07/09/24 1130)         Plan of Care Reviewed With: patient  Progress: improving      SWALLOW EVALUATION (Last 72 Hours)       SLP Adult Swallow Evaluation       Row Name 07/09/24 1130 07/08/24 1325 07/08/24 1045             Rehab Evaluation    Document Type therapy note (daily note)  -AV evaluation  -CJ evaluation  -CJ      Subjective Information no complaints  -AV no complaints  -CJ no complaints  -CJ      Patient Observations alert;cooperative  -AV alert;cooperative  -CJ alert;cooperative  -CJ      Patient/Family/Caregiver Comments/Observations none  -AV no family present  -CJ no family present; palliative team present  -CJ      Patient Effort good  -AV good  -CJ good  -CJ      Symptoms Noted During/After Treatment -- none  -CJ fatigue  -CJ         General Information    Patient Profile Reviewed -- yes  -CJ yes  -CJ      Pertinent History Of Current Problem -- see am eval; referred for FEES  -CJ Pt adm w/ resp failure w/ hypoxia 2/2 PNA; sig h/o dysautonomia w/ hypotension, malnutrition, afib, prev dysphagia w/ MBS on 11/13/23 w/ recs for regular diet w/ honey thick liquids (water  between meals).  -CJ      Current Method of Nutrition -- NPO  -CJ NPO  -CJ      Precautions/Limitations, Vision -- WFL;for purposes of eval  -CJ WFL;for purposes of eval  -CJ      Precautions/Limitations, Hearing -- WFL;for purposes of eval  -CJ WFL;for purposes of eval  -CJ      Prior Level of Function-Communication -- WFL  -CJ WFL  -CJ      Prior Level of Function-Swallowing -- honey thick liquids  -CJ honey thick liquids  -CJ      Plans/Goals Discussed with -- patient;agreed upon  -CJ patient;agreed upon  -      Barriers to Rehab -- medically complex;previous functional deficit  -CJ medically complex;previous functional deficit  -CJ      Patient's Goals for Discharge -- return to PO diet  -CJ return to PO diet  -CJ         Pain    Additional Documentation Pain Scale: FACES Pre/Post-Treatment (Group)  -AV Pain Scale: FACES Pre/Post-Treatment (Group)  -CJ Pain Scale: FACES Pre/Post-Treatment (Group)  -CJ         Pain Scale: FACES Pre/Post-Treatment    Pain: FACES Scale, Pretreatment 0-->no hurt  -AV 0-->no hurt  -CJ 0-->no hurt  -CJ      Posttreatment Pain Rating 0-->no hurt  -AV 0-->no hurt  -CJ 0-->no hurt  -CJ         Oral Motor Structure and Function    Dentition Assessment -- -- natural, present and adequate  -      Mucosal Quality -- -- dry;sticky  -         Oral Musculature and Cranial Nerve Assessment    Oral Motor General Assessment -- -- generalized oral motor weakness  -         General Eating/Swallowing Observations    Respiratory Support Currently in Use -- -- high-flow nasal cannula  -      Eating/Swallowing Skills -- -- fed by SLP  -      Positioning During Eating -- -- upright in bed  -      Utensils Used -- -- spoon;cup;straw  -      Consistencies Trialed -- -- ice chips;thin liquids;honey-thick liquids  -         Clinical Swallow Eval    Pharyngeal Phase -- -- suspected pharyngeal impairment  -      Clinical Swallow Evaluation Summary -- -- Pt has sig h/o dysphagia w/ most  "recent study noted for honey thick liquids. Pt reports poor po intake w/modified diet and therefore pt decided against continued thickened liquids. Now adm w/ BL PNA and on HFNC. Pt reports \"still wanting to stay alive for her great grandkids\". Overt s/s of aspiration intermittently w/ all po intake and felt to be at high risk given h/o dysphagia and increased O2 requirements. Safest at this time would be NPO w/ instrumental FEES to further assess/guide POC. Pt is agreeable to FEES this pm. Continue NPO w/ 3-4 ice chips  -CJ         Pharyngeal Phase Concerns    Pharyngeal Phase Concerns -- -- multiple swallows;throat clear  -CJ      Multiple Swallows -- -- all consistencies  -CJ      Throat Clear -- -- all consistencies  -CJ         Fiberoptic Endoscopic Evaluation of Swallowing (FEES)    Risks/Benefits Reviewed -- risks/benefits explained;patient;agreed to eval  -CJ --      Nasal Entry -- right:  -CJ --      Scope serial number/identification -- 338  -CJ --      Special Considerations -- HFNC  -CJ --         Anatomy and Physiology    Anatomic Considerations -- erythema  -CJ --      Comment -- diffuse erythema; pooled secretions  -CJ --      Velopharyngeal -- WFL  -CJ --      Base of Tongue -- symmetrical  -CJ --      Epiglottis -- WFL  -CJ --      Laryngeal Function Breathing -- symmetrical  -CJ --      Laryngeal Function Phonation -- symmetrical  -CJ --      Laryngeal Function to Breath Hold -- TVF contact;incomplete closure  -CJ --      Secretion Rating Scale (Slim et al. 1996) -- 3- secretions inside the laryngeal vestibule/aspiration, not cleared  -CJ --      Secretion Description -- thin;clear  -CJ --      Ice Chips -- elicited swallow;partially cleared secretions;aspirated;response to aspiration  -CJ --      Spontaneous Swallow -- frequency reduced;did not clear secretions  -CJ --      Sensory -- reduced sensation  -CJ --      Utensils Used -- Spoon;Cup;Straw  -CJ --      Consistencies Trialed -- thin " liquids;nectar-thick liquids;honey-thick liquids;pudding/puree;regular textures;spoon;cup;straw  -CJ --         FEES Interpretation    Oral Phase -- prolonged manipulation;prespill of liquids into pharynx  -CJ --         Initiation of Pharyngeal Swallow    Initiation of Pharyngeal Swallow -- bolus in pyriform sinuses  -CJ --      Pharyngeal Phase -- impaired pharyngeal phase of swallowing  -CJ --      Penetration Before the Swallow -- thin liquids;nectar-thick liquids;secondary to delayed swallow initiation or mistiming;secondary to reduced back of tongue control  -CJ --      Aspiration During the Swallow -- thin liquids;nectar-thick liquids;secondary to reduced vestibular closure;secondary to reduced laryngeal elevation;secondary to delayed swallow initiation or mistiming  -CJ --      Aspiration After the Swallow -- nectar-thick liquids;thin liquids;secondary to residue;in pyriform sinuses;in laryngeal vestibule;in valleculae  -CJ --      Depth of Penetration -- deep  -CJ --      Response to Penetration -- No  -CJ --      No spontaneous response to penetration and -- non-effective laryngeal clearance with cue (see comments)  -CJ --      Response to Aspiration -- Yes  -CJ --      Responsed to aspiration with -- inconsistent;delayed;cough;non-effective  x1 w/ large amount of thins  -CJ --      Rosenbek's Scale -- thin:;nectar:;8-->Level 8  -CJ --      Residue -- thin liquids;nectar-thick liquids;pyriform sinuses;base of tongue;valleculae;laryngeal vestibule;secondary to reduced posterior pharyngeal wall stripping;secondary to reduced laryngeal elevation;secondary to reduced hyolaryngeal excursion;other (see comments)  worse on L side  -CJ --      Response to Residue -- partial residue clearance;with cued swallow  -CJ --      Attempted Compensatory Maneuvers -- bolus size;bolus presentation style;additional subsequent swallow;multiple swallows;throat clear after swallow;head turn to left;chin tuck  -CJ --       "Response to Attempted Compensatory Maneuvers -- did not prevent aspiration  -CJ --      Successful Compensatory Maneuver Competency -- patient able to;demonstrate compensations;with cues  -CJ --      FEES Summary -- Moderate-severe pharyngeal dysphagia. Penetration w/ thin/nectar before the swallow w/ subsequent silent aspiration during the swallow. Pt did sense large amount of aspiration w/ thin liquids x1 w/ delayed cough response elicited. Increased amount of residue on L side, unable to reduce w/ multiple compensatory strategies. Pt began to fatigue and noted w/ increased aspiration of nectar thick liquids. Silent aspiration w/ honey via straw. Pt given multiple repeat presentations of honey thick liquids via spoon/cup w/ no penetration/aspiration. Had lengthy d/w pt regarding results of this evaluation, risks of aspiration PNA and continued respiratory difficulties if she wants to pursue different GOC. She verbalized agreement to try modified diet again as she \"doesn't want to be sick and give up right now\". MD also made aware of results. Will initiate modified po diet of soft chopped w/ honey thick liquids, no straws, no mixed consistencies and f/u for continued dysphagia tx. Suspect some degree of possible chronic baseline dysphagia as pt has been on thickened liquids since last year.  -CJ --         Swallowing Quality of Life Assessment    Education and counseling provided -- Signs of aspiration;Silent aspiration;Risks of aspiration;Safest diet options;Comfort diet options;Oral care recommendations and rationale;Aspiration precautions  -CJ --         SLP Evaluation Clinical Impression    SLP Swallowing Diagnosis mod-severe;pharyngeal dysphagia;mild-moderate;oral dysphagia  -AV mod-severe;pharyngeal dysphagia;mild-moderate;oral dysphagia  -CJ suspected pharyngeal dysphagia  -CJ      Functional Impact risk of aspiration/pneumonia;risk of dehydration;risk of malnutrition  -AV risk of aspiration/pneumonia;risk " of dehydration;risk of malnutrition  -CJ risk of aspiration/pneumonia;risk of malnutrition;risk of dehydration  -CJ      Rehab Potential/Prognosis, Swallowing adequate, monitor progress closely  -AV adequate, monitor progress closely  -CJ adequate, monitor progress closely  -CJ      Swallow Criteria for Skilled Therapeutic Interventions Met demonstrates skilled criteria  -AV demonstrates skilled criteria  -CJ demonstrates skilled criteria  -CJ         SLP Treatment Clinical Impressions    Treatment Assessment (SLP) continued  -AV -- --      Daily Summary of Progress (SLP) progress toward functional goals is good  -AV -- --      Barriers to Overall Progress (SLP) Fatigue  -AV -- --      Plan for Continued Treatment (SLP) continue treatment per plan of care  -AV -- --      Care Plan Review care plan/treatment goals reviewed  -AV -- --         Recommendations    Therapy Frequency (Swallow) 5 days per week  -AV 5 days per week  -CJ --      Predicted Duration Therapy Intervention (Days) 2 weeks  -AV 2 weeks  -CJ --      SLP Diet Recommendation soft to chew textures;chopped;no mixed consistencies;honey thick liquids  -AV soft to chew textures;chopped;no mixed consistencies;honey thick liquids  no straws  -CJ NPO  until FEES; okay for 3-4 ice chips  -      Recommended Diagnostics reassess via FEES  -AV -- reassess via FEES  -CJ      Recommended Precautions and Strategies upright posture during/after eating;small bites of food and sips of liquid;general aspiration precautions;no straw;fatigue precautions;other (see comments)  -AV upright posture during/after eating;small bites of food and sips of liquid;general aspiration precautions;no straw;fatigue precautions;other (see comments)  - general aspiration precautions  -      Oral Care Recommendations Oral Care BID/PRN;Suction toothbrush  -AV Oral Care BID/PRN;Suction toothbrush  -CJ Oral Care BID/PRN;Suction toothbrush  -CJ      SLP Rec. for Method of Medication  Administration meds whole;meds crushed;with puree;as tolerated  -AV meds whole;meds crushed;with puree;as tolerated  -CJ meds via alternate route  -CJ      Monitor for Signs of Aspiration yes;notify SLP if any concerns  -AV yes;notify SLP if any concerns  -CJ yes;notify SLP if any concerns  -CJ      Anticipated Discharge Disposition (SLP) skilled nursing facility  -AV skilled nursing facility  -CJ skilled nursing facility  -CJ                User Key  (r) = Recorded By, (t) = Taken By, (c) = Cosigned By      Initials Name Effective Dates    AV Shanelle Gonsales, MS CCC-SLP 06/16/21 -     Denise Gloria MS CCC-SLP 06/03/24 -                     EDUCATION  The patient has been educated in the following areas:   Dysphagia (Swallowing Impairment) Oral Care/Hydration.        SLP GOALS       Row Name 07/09/24 1130 07/08/24 1325          (LTG) Patient will demonstrate functional swallow for    Diet Texture (Demonstrate functional swallow) soft to chew (chopped) textures  -AV soft to chew (chopped) textures  -CJ     Liquid viscosity (Demonstrate functional swallow) nectar/ mildly thick liquids  -AV nectar/ mildly thick liquids  -CJ     [place] (Demonstrate functional swallow) with minimal cues (75-90% accuracy)  -AV with minimal cues (75-90% accuracy)  -CJ     Time Frame (Demonstrate functional swallow) by discharge  -AV by discharge  -CJ     Progress/Outcomes (Demonstrate functional swallow) continuing progress toward goal  -AV new goal  -CJ        (STG) Patient will tolerate trials of    Consistencies Trialed (Tolerate trials) soft to chew (chopped) textures;honey/ moderately thick liquids  -AV soft to chew (chopped) textures;honey/ moderately thick liquids  -CJ     Desired Outcome (Tolerate trials) without signs/symptoms of aspiration;without signs of distress;with adequate oral prep/transit/clearance  -AV without signs/symptoms of aspiration;without signs of distress;with adequate oral  prep/transit/clearance  -CJ     [place] (Tolerate trials) with minimal cues (75-90% accuracy)  -AV with minimal cues (75-90% accuracy)  -CJ     Time Frame (Tolerate trials) 1 week  -AV 1 week  -CJ     Progress/Outcomes (Tolerate trials) continuing progress toward goal  -AV new goal  -CJ        (STG) Pharyngeal Strengthening Exercise Goal 1 (SLP)    Activity (Pharyngeal Strengthening Goal 1, SLP) increase timing;increase superior movement of the hyolaryngeal complex;increase anterior movement of the hyolaryngeal complex;increase closure at entrance to airway/closure of airway at glottis;increase squeeze/positive pressure generation  -AV increase timing;increase superior movement of the hyolaryngeal complex;increase anterior movement of the hyolaryngeal complex;increase closure at entrance to airway/closure of airway at glottis;increase squeeze/positive pressure generation  -CJ     Increase Timing prepping - 3 second prep or suck swallow or 3-step swallow  -AV prepping - 3 second prep or suck swallow or 3-step swallow  -CJ     Increase Superior Movement of the Hyolaryngeal Complex effortful pitch glide (falsetto + pharyngeal squeeze)  -AV effortful pitch glide (falsetto + pharyngeal squeeze)  -CJ     Increase Anterior Movement of the Hyolaryngeal Complex chin tuck against resistance (CTAR)  -AV chin tuck against resistance (CTAR)  -CJ     Increase Closure at Entrance to Airway/Closure of Airway at Glottis supraglottic swallow  -AV supraglottic swallow  -CJ     Increase Squeeze/Positive Pressure Generation hard effortful swallow  -AV hard effortful swallow  -CJ     [place]/Accuracy (Pharyngeal Strengthening Goal 1, SLP) with moderate cues (50-74% accuracy)  -AV with moderate cues (50-74% accuracy)  -CJ     Time Frame (Pharyngeal Strengthening Goal 1, SLP) 1 week  -AV 1 week  -CJ     Progress/Outcomes (Pharyngeal Strengthening Goal 1, SLP) continuing progress toward goal  -AV new goal  -CJ               User  Key  (r) = Recorded By, (t) = Taken By, (c) = Cosigned By      Initials Name Provider Type    AV Shanelle Gonsales, MS CCC-SLP Speech and Language Pathologist    Denise Gloria MS CCC-SLP Speech and Language Pathologist                         Time Calculation:    Time Calculation- SLP       Row Name 07/09/24 1456             Time Calculation- SLP    SLP Start Time 1130  -AV      SLP Received On 07/09/24  -AV         Untimed Charges    42892-[facility] Treatment Swallow Minutes 38  -AV         Total Minutes    Untimed Charges Total Minutes 38  -AV       Total Minutes 38  -AV                User Key  (r) = Recorded By, (t) = Taken By, (c) = Cosigned By      Initials Name Provider Type    AV Shanelle Gonsales, MS CCC-SLP Speech and Language Pathologist                    Therapy Charges for Today       Code Description Service Date Service Provider Modifiers Qty    [id number]  ST TREATMENT SWALLOW 3 7/9/2024 Shanelle Gonsales MS CCC-SLP GN 1                 Shanelle Maier MS CCC-SLP  7/9/2024

## 2024-07-09 NOTE — CONSULTS
Visited patient per palliative spiritual care consult.  No family present at time of visit.  Patient engaging in conversation, shared that she is Religion and would like communion while in the hospital.  I will arrange that.  Asked if she would like a rosary while here (she left hers at home)and she said yes.  Will bring a rosary.

## 2024-07-10 LAB
ALBUMIN SERPL-MCNC: 2.8 G/DL (ref 3.5–5.2)
ALBUMIN/GLOB SERPL: 1.1 G/DL
ALP SERPL-CCNC: 63 U/L (ref 39–117)
ALT SERPL W P-5'-P-CCNC: 33 U/L (ref 1–33)
ANION GAP SERPL CALCULATED.3IONS-SCNC: 7 MMOL/L (ref 5–15)
AST SERPL-CCNC: 31 U/L (ref 1–32)
BASOPHILS # BLD AUTO: 0.02 10*3/MM3 (ref 0–0.2)
BASOPHILS NFR BLD AUTO: 0.2 % (ref 0–1.5)
BILIRUB SERPL-MCNC: 0.4 MG/DL (ref 0–1.2)
BUN SERPL-MCNC: 21 MG/DL (ref 8–23)
BUN/CREAT SERPL: 18.1 (ref 7–25)
CALCIUM SPEC-SCNC: 8.1 MG/DL (ref 8.6–10.5)
CHLORIDE SERPL-SCNC: 100 MMOL/L (ref 98–107)
CO2 SERPL-SCNC: 27 MMOL/L (ref 22–29)
CREAT SERPL-MCNC: 1.16 MG/DL (ref 0.57–1)
DEPRECATED RDW RBC AUTO: 49.8 FL (ref 37–54)
EGFRCR SERPLBLD CKD-EPI 2021: 45.4 ML/MIN/1.73
EOSINOPHIL # BLD AUTO: 0.07 10*3/MM3 (ref 0–0.4)
EOSINOPHIL NFR BLD AUTO: 0.6 % (ref 0.3–6.2)
ERYTHROCYTE [DISTWIDTH] IN BLOOD BY AUTOMATED COUNT: 13.2 % (ref 12.3–15.4)
GLOBULIN UR ELPH-MCNC: 2.5 GM/DL
GLUCOSE SERPL-MCNC: 106 MG/DL (ref 65–99)
HCT VFR BLD AUTO: 33.9 % (ref 34–46.6)
HGB BLD-MCNC: 11 G/DL (ref 12–15.9)
IMM GRANULOCYTES # BLD AUTO: 0.06 10*3/MM3 (ref 0–0.05)
IMM GRANULOCYTES NFR BLD AUTO: 0.5 % (ref 0–0.5)
LYMPHOCYTES # BLD AUTO: 0.56 10*3/MM3 (ref 0.7–3.1)
LYMPHOCYTES NFR BLD AUTO: 4.7 % (ref 19.6–45.3)
MAGNESIUM SERPL-MCNC: 1.6 MG/DL (ref 1.6–2.4)
MCH RBC QN AUTO: 33 PG (ref 26.6–33)
MCHC RBC AUTO-ENTMCNC: 32.4 G/DL (ref 31.5–35.7)
MCV RBC AUTO: 101.8 FL (ref 79–97)
MONOCYTES # BLD AUTO: 0.37 10*3/MM3 (ref 0.1–0.9)
MONOCYTES NFR BLD AUTO: 3.1 % (ref 5–12)
NEUTROPHILS NFR BLD AUTO: 10.88 10*3/MM3 (ref 1.7–7)
NEUTROPHILS NFR BLD AUTO: 90.9 % (ref 42.7–76)
NRBC BLD AUTO-RTO: 0 /100 WBC (ref 0–0.2)
PHOSPHATE SERPL-MCNC: 3.2 MG/DL (ref 2.5–4.5)
PLATELET # BLD AUTO: 151 10*3/MM3 (ref 140–450)
PMV BLD AUTO: 10.5 FL (ref 6–12)
POTASSIUM SERPL-SCNC: 3.8 MMOL/L (ref 3.5–5.2)
PROT SERPL-MCNC: 5.3 G/DL (ref 6–8.5)
RBC # BLD AUTO: 3.33 10*6/MM3 (ref 3.77–5.28)
SODIUM SERPL-SCNC: 134 MMOL/L (ref 136–145)
WBC NRBC COR # BLD AUTO: 11.96 10*3/MM3 (ref 3.4–10.8)

## 2024-07-10 PROCEDURE — 84100 ASSAY OF PHOSPHORUS: CPT | Performed by: INTERNAL MEDICINE

## 2024-07-10 PROCEDURE — 92526 ORAL FUNCTION THERAPY: CPT

## 2024-07-10 PROCEDURE — 97110 THERAPEUTIC EXERCISES: CPT

## 2024-07-10 PROCEDURE — 94799 UNLISTED PULMONARY SVC/PX: CPT

## 2024-07-10 PROCEDURE — 85025 COMPLETE CBC W/AUTO DIFF WBC: CPT | Performed by: INTERNAL MEDICINE

## 2024-07-10 PROCEDURE — 94664 DEMO&/EVAL PT USE INHALER: CPT

## 2024-07-10 PROCEDURE — 83735 ASSAY OF MAGNESIUM: CPT | Performed by: INTERNAL MEDICINE

## 2024-07-10 PROCEDURE — 99232 SBSQ HOSP IP/OBS MODERATE 35: CPT | Performed by: INTERNAL MEDICINE

## 2024-07-10 PROCEDURE — 97530 THERAPEUTIC ACTIVITIES: CPT

## 2024-07-10 PROCEDURE — 25010000002 CEFTRIAXONE PER 250 MG: Performed by: INTERNAL MEDICINE

## 2024-07-10 PROCEDURE — 80053 COMPREHEN METABOLIC PANEL: CPT | Performed by: INTERNAL MEDICINE

## 2024-07-10 PROCEDURE — 25010000002 METRONIDAZOLE 500 MG/100ML SOLUTION: Performed by: INTERNAL MEDICINE

## 2024-07-10 RX ADMIN — IPRATROPIUM BROMIDE AND ALBUTEROL SULFATE 3 ML: 2.5; .5 SOLUTION RESPIRATORY (INHALATION) at 16:09

## 2024-07-10 RX ADMIN — METRONIDAZOLE 500 MG: 500 INJECTION, SOLUTION INTRAVENOUS at 05:57

## 2024-07-10 RX ADMIN — Medication 10 ML: at 09:02

## 2024-07-10 RX ADMIN — Medication 12.5 MG: at 21:21

## 2024-07-10 RX ADMIN — AMIODARONE HYDROCHLORIDE 200 MG: 200 TABLET ORAL at 09:02

## 2024-07-10 RX ADMIN — TRAZODONE HYDROCHLORIDE 100 MG: 100 TABLET ORAL at 21:20

## 2024-07-10 RX ADMIN — Medication 12.5 MG: at 09:02

## 2024-07-10 RX ADMIN — APIXABAN 2.5 MG: 2.5 TABLET, FILM COATED ORAL at 21:21

## 2024-07-10 RX ADMIN — LEVETIRACETAM 250 MG: 500 TABLET, FILM COATED ORAL at 09:01

## 2024-07-10 RX ADMIN — METRONIDAZOLE 500 MG: 500 INJECTION, SOLUTION INTRAVENOUS at 21:25

## 2024-07-10 RX ADMIN — TRAMADOL HYDROCHLORIDE 50 MG: 50 TABLET ORAL at 02:56

## 2024-07-10 RX ADMIN — ATORVASTATIN CALCIUM 40 MG: 40 TABLET, FILM COATED ORAL at 21:20

## 2024-07-10 RX ADMIN — IPRATROPIUM BROMIDE AND ALBUTEROL SULFATE 3 ML: 2.5; .5 SOLUTION RESPIRATORY (INHALATION) at 08:10

## 2024-07-10 RX ADMIN — VENLAFAXINE HYDROCHLORIDE 75 MG: 75 CAPSULE, EXTENDED RELEASE ORAL at 09:01

## 2024-07-10 RX ADMIN — BISACODYL 5 MG: 5 TABLET, COATED ORAL at 15:10

## 2024-07-10 RX ADMIN — IPRATROPIUM BROMIDE AND ALBUTEROL SULFATE 3 ML: 2.5; .5 SOLUTION RESPIRATORY (INHALATION) at 20:10

## 2024-07-10 RX ADMIN — DOXYCYCLINE 100 MG: 100 INJECTION, POWDER, LYOPHILIZED, FOR SOLUTION INTRAVENOUS at 22:55

## 2024-07-10 RX ADMIN — LEVETIRACETAM 250 MG: 500 TABLET, FILM COATED ORAL at 21:20

## 2024-07-10 RX ADMIN — APIXABAN 2.5 MG: 2.5 TABLET, FILM COATED ORAL at 09:02

## 2024-07-10 RX ADMIN — METRONIDAZOLE 500 MG: 500 INJECTION, SOLUTION INTRAVENOUS at 15:00

## 2024-07-10 RX ADMIN — DOXYCYCLINE 100 MG: 100 INJECTION, POWDER, LYOPHILIZED, FOR SOLUTION INTRAVENOUS at 09:00

## 2024-07-10 RX ADMIN — SODIUM CHLORIDE 2000 MG: 900 INJECTION INTRAVENOUS at 13:16

## 2024-07-10 NOTE — PLAN OF CARE
Goal Outcome Evaluation:  Plan of Care Reviewed With: patient        Progress: improving  Outcome Evaluation: Patient was able to progress ambulation today, ambulating a short distance to the chair with RW and Ricki DONG. Pt sat impulsively, stating that she her LEs tend to give out at home. Recommend chair follow as pt progresses ambulation. Continue per PT POC. Recommend SNF at d/c.      Anticipated Discharge Disposition (PT): skilled nursing facility

## 2024-07-10 NOTE — PROGRESS NOTES
[facility] Medicine Services  PROGRESS NOTE    Patient Name: Bibiana Hodges  : 1935  MRN: [id number]    Date of Admission: 2024  Primary Care Physician: Nivia Madrigal MD    Subjective   Subjective     CC:  Follow-up for pneumonia    HPI:  She is feeling OK today.  She mentions a hard lump in her left breast that has been there for a few months and she has been unable to get to her mammogram appointment due to hospitalization.      Objective   Objective     Vital Signs:   Temp:  [96 °F (35.6 °C)-99.6 °F (37.6 °C)] 96 °F (35.6 °C)  Heart Rate:  [60-71] 60  Resp:  [16-18] 18  BP: (113-159)/() 159/77  Flow (L/min):  [3-4] 3     Physical Exam:  Constitutional: No acute distress, awake, alert, sitting up in chair, chronically ill appearing  HENT: NCAT, mucous membranes moist  Respiratory: Respiratory effort normal   Cardiovascular: RRR  Breast: Firm mass palpated under left areola   Psychiatric: Appropriate affect, cooperative  Neurologic: Speech clear  Skin: No rashes on exposed surfaces    Results Reviewed:  LAB RESULTS:      Lab 07/10/24  1100 07/09/24  0420 07/08/24  0728   WBC 11.96* 16.62* 15.50*   HEMOGLOBIN 11.0* 11.7* 15.1   HEMATOCRIT 33.9* 36.1 45.9   PLATELETS 151 159 250   NEUTROS ABS 10.88* 14.86* 11.34*   IMMATURE GRANS (ABS) 0.06* 0.09* 0.05   LYMPHS ABS 0.56* 1.18 3.49*   MONOS ABS 0.37 0.40 0.40   EOS ABS 0.07 0.05 0.18   .8* 102.6* 100.9*   PROCALCITONIN  --   --  0.09   LACTATE  --   --  2.7*         Lab 07/10/24  1100 24  04224  0728   SODIUM 134* 134* 128*   POTASSIUM 3.8 4.9 4.1   CHLORIDE 100 101 93*   CO2 27.0 21.0* 21.0*   ANION GAP 7.0 12.0 14.0   BUN 21 27* 20   CREATININE 1.16* 1.30* 1.26*   EGFR 45.4* 39.6* 41.1*   GLUCOSE 106* 63* 89   CALCIUM 8.1* 8.7 8.8   MAGNESIUM 1.6  --   --    PHOSPHORUS 3.2  --   --          Lab 07/10/24  1100 24  0728   TOTAL PROTEIN 5.3* 6.1   ALBUMIN 2.8* 3.3*   GLOBULIN 2.5 2.8    ALT (SGPT) 33 41*   AST (SGOT) 31 46*   BILIRUBIN 0.4 0.3   ALK PHOS 63 73         Lab 07/08/24 0728   PROBNP 651.7   HSTROP T 18*                 Lab 07/08/24  0718   PH, ARTERIAL 7.419   PCO2, ARTERIAL 33.2*   PO2 ART 51.2*   FIO2 44   HCO3 ART 21.5   BASE EXCESS ART -2.2*     Brief Urine Lab Results  (Last result in the past 365 days)        Color   Clarity   Blood   Leuk Est   Nitrite   Protein   CREAT   Urine HCG        05/06/24 2154 Yellow   Clear   Negative   Negative   Negative   Trace                   Microbiology Results Abnormal       Procedure Component Value - Date/Time    Blood Culture - Blood, Wrist, Left [[id number]]  (Normal) Collected: 07/08/24 0729    Lab Status: Preliminary result Specimen: Blood from Wrist, Left Updated: 07/10/24 0801     Blood Culture No growth at 2 days    Narrative:      Less than seven (7) mL's of blood was collected.  Insufficient quantity may yield false negative results.    Blood Culture - Blood, Arm, Right [[id number]]  (Normal) Collected: 07/08/24 0728    Lab Status: Preliminary result Specimen: Blood from Arm, Right Updated: 07/10/24 0801     Blood Culture No growth at 2 days    Legionella Antigen, Urine - Urine, Urine, Clean Catch [[id number]]  (Normal) Collected: 07/08/24 1925    Lab Status: Final result Specimen: Urine, Clean Catch Updated: 07/09/24 0109     LEGIONELLA ANTIGEN, URINE Negative    MRSA Screen, PCR (Inpatient) - Swab, Nares [[id number]]  (Normal) Collected: 07/08/24 1248    Lab Status: Final result Specimen: Swab from Nares Updated: 07/08/24 1446     MRSA PCR Negative    Narrative:      The negative predictive value of this diagnostic test is high and should only be used to consider de-escalating anti-MRSA therapy. A positive result may indicate colonization with MRSA and must be correlated clinically.  MRSA Negative    Respiratory Panel PCR w/COVID-19(SARS-CoV-2) ERICK/MARTA/ELPIDIO/PAD/COR/ALMA In-House, NP Swab in UTM/VTM, 2 HR TAT - Swab, Nasopharynx  [[id number]]  (Normal) Collected: 07/08/24 0730    Lab Status: Final result Specimen: Swab from Nasopharynx Updated: 07/08/24 0840     ADENOVIRUS, PCR Not Detected     Coronavirus 229E Not Detected     Coronavirus HKU1 Not Detected     Coronavirus NL63 Not Detected     Coronavirus OC43 Not Detected     COVID19 Not Detected     Human Metapneumovirus Not Detected     Human Rhinovirus/Enterovirus Not Detected     Influenza A PCR Not Detected     Influenza B PCR Not Detected     Parainfluenza Virus 1 Not Detected     Parainfluenza Virus 2 Not Detected     Parainfluenza Virus 3 Not Detected     Parainfluenza Virus 4 Not Detected     RSV, PCR Not Detected     Bordetella pertussis pcr Not Detected     Bordetella parapertussis PCR Not Detected     Chlamydophila pneumoniae PCR Not Detected     Mycoplasma pneumo by PCR Not Detected    Narrative:      In the setting of a positive respiratory panel with a viral infection PLUS a negative procalcitonin without other underlying concern for bacterial infection, consider observing off antibiotics or discontinuation of antibiotics and continue supportive care. If the respiratory panel is positive for atypical bacterial infection (Bordetella pertussis, Chlamydophila pneumoniae, or Mycoplasma pneumoniae), consider antibiotic de-escalation to target atypical bacterial infection.            No radiology results from the last 24 hrs    Results for orders placed during the hospital encounter of 08/20/23    Adult Transthoracic Echo Complete W/ Cont if Necessary Per Protocol    Interpretation Summary    Left ventricular systolic function is mildly decreased. Calculated left ventricular EF = 47.9% Normal left ventricular cavity size and wall thickness noted. There is left ventricular global hypokinesis noted. Left ventricular diastolic function is consistent with (grade I) impaired relaxation.    : Normal right ventricular cavity size and systolic function noted. Electronic lead present in the  ventricle.    Normal left atrial size and volume noted. Saline test results are negative.    There is moderate calcification of the aortic valve. The aortic valve was poorly visualized but appears trileaflet. Moderate aortic valve regurgitation is present. No aortic valve stenosis is present.    Mild mitral annular calcification is present. Mild mitral valve regurgitation is present. No significant mitral valve stenosis is present.    The tricuspid valve is grossly normal in structure. Mild tricuspid valve regurgitation is present. Estimated right ventricular systolic pressure from tricuspid regurgitation is normal, 33 mmHg. No tricuspid valve stenosis is present.    Mild dilation of the ascending aorta is present. Ascending aorta = 3.7 cm    There is a small (<1cm) pericardial effusion adjacent to the right atrium and right ventricle.      Current medications:  Scheduled Meds:amiodarone, 200 mg, Oral, Daily  apixaban, 2.5 mg, Oral, BID  atorvastatin, 40 mg, Oral, Nightly  cefTRIAXone, 2,000 mg, Intravenous, Q24H  doxycycline, 100 mg, Intravenous, Q12H  ipratropium-albuterol, 3 mL, Nebulization, 4x Daily - RT  levETIRAcetam, 250 mg, Oral, BID  metoprolol tartrate, 12.5 mg, Oral, BID  metroNIDAZOLE, 500 mg, Intravenous, Q8H  sodium chloride, 10 mL, Intravenous, Q12H  traZODone, 100 mg, Oral, Nightly  venlafaxine XR, 75 mg, Oral, Daily      Continuous Infusions:   PRN Meds:.  senna-docusate sodium **AND** polyethylene glycol **AND** bisacodyl **AND** bisacodyl    Calcium Replacement - Follow Nurse / BPA Driven Protocol    Magnesium Standard Dose Replacement - Follow Nurse / BPA Driven Protocol    ondansetron    Phosphorus Replacement - Follow Nurse / BPA Driven Protocol    Potassium Replacement - Follow Nurse / BPA Driven Protocol    sodium chloride    sodium chloride    sodium chloride    traMADol    Assessment & Plan   Assessment & Plan     Active Hospital Problems    Diagnosis  POA    **Acute respiratory failure  with hypoxia [J96.01]  Yes    Atrial fibrillation [I48.91]  Yes    Severe malnutrition [E43]  Yes    Sepsis due to pneumonia [J18.9, A41.9]  Yes    Hypertension [I10]  Yes    Dysautonomia orthostatic hypotension syndrome [I95.1]  Yes      Resolved Hospital Problems   No resolved problems to display.        Brief Hospital Course to date:  Bibiana Hodges is a 88 y.o. female 88-year-old female with history of atrial fibrillation, on amiodarone and Eliquis, hypertension, autonomic dysfunction, chronic debility, prior hospitalizations for recurrent pneumonia with concerns for possible aspiration.  She presented from nursing home with 1 month of progressive worsening shortness of breath and cough secondary to bilateral pneumonia.    This patient's problems and plans were partially entered by my partner and updated as appropriate by me 07/10/24.     Acute respiratory failure with hypoxia secondary to bilateral pneumonia  Sepsis secondary to pneumonia  Patient presented with hypoxic respiratory failure requiring high flow nasal cannula.  Currently on 50 L and FiO2 of 80%  Chest x-ray with bilateral pneumonia  Rocephin and doxycycline  Strep pneumo antigen positive  SLP following given her previous history of aspiration pneumonia, on modified diet     Atrial fibrillation  Continue home metoprolol, amiodarone   Continue Eliquis     Hyperlipidemia  Continue statin     Depression  Continue Effexor and BuSpar     Hypertension  Autonomic dysfunction with recurrent presyncopal episodes  BP currently low, hold home anti-hypertensives    Chronic debility  Currently resides at assisted living facility  PT and OT    Goals of care  Palliative care team consulted for goals of care discussion     Left breast swelling  Patient is aware and has outpatient follow-up.  We are not able to do breast ultrasound for masses (only suspected abscesses) inpatient or mammograms so she will need to keep follow up.        Expected Discharge Location  and Transportation: care home  Expected Discharge   Expected Discharge Date: 7/12/2024; Expected Discharge Time:      VTE Prophylaxis:  Pharmacologic VTE prophylaxis orders are present.         AM-PAC 6 Clicks Score (PT): 17 (07/10/24 1440)    CODE STATUS:   Code Status and Medical Interventions:   Ordered at: 07/09/24 1340     Level Of Support Discussed With:    Patient     Code Status (Patient has no pulse and is not breathing):    CPR (Attempt to Resuscitate)     Medical Interventions (Patient has pulse or is breathing):    Full Support       Dolores Odell MD  07/10/24

## 2024-07-10 NOTE — THERAPY TREATMENT NOTE
Acute Care - Speech Language Pathology   Swallow Treatment Note [facility]     Patient Name: Bibiana Hodges  : 1935  MRN: [id number]  Today's Date: 7/10/2024               Admit Date: 2024    Visit Dx:     ICD-10-CM ICD-9-CM   1. Hyponatremia  E87.1 276.1   2. Pneumonia of both lungs due to infectious organism, unspecified part of lung  J18.9 483.8   3. Acute respiratory failure with hypoxia  J96.01 518.81   4. Oropharyngeal dysphagia  R13.12 787.22     Patient Active Problem List   Diagnosis    Dysautonomia orthostatic hypotension syndrome    Hypertension    Flu vaccine need    Streptococcus pneumoniae vaccination indicated    Sepsis due to pneumonia    Severe malnutrition    Acute respiratory failure with hypoxia    Atrial fibrillation    Autonomic dysfunction     Past Medical History:   Diagnosis Date    Constipation     Depression     Dysautonomia orthostatic hypotension syndrome     Generalized osteoarthritis     GERD (gastroesophageal reflux disease)     s/p Nissen    Hypertension     Insomnia     Osteoarthrosis, shoulder region     Skin ulcer of scalp     Thrombophlebitis of arm     Tremor     Vitamin B12 deficiency      Past Surgical History:   Procedure Laterality Date    DILATATION AND CURETTAGE      HERNIA REPAIR      HIP SURGERY      SHOULDER SURGERY      Right       SLP Recommendation and Plan     SLP Diet Recommendation: soft to chew textures, chopped, no mixed consistencies, honey thick liquids (07/10/24 101)  Recommended Precautions and Strategies: upright posture during/after eating, small bites of food and sips of liquid, general aspiration precautions, no straw, fatigue precautions, other (see comments) (07/10/24 1015)  SLP Rec. for Method of Medication Administration: meds whole, meds crushed, with puree, as tolerated (07/10/24 101)     Monitor for Signs of Aspiration: yes, notify SLP if any concerns (07/10/24 101)        Anticipated Discharge Disposition (SLP): skilled  nursing facility (07/10/24 1015)     Therapy Frequency (Swallow): 5 days per week (07/10/24 1015)  Predicted Duration Therapy Intervention (Days): 2 weeks (07/10/24 1015)  Oral Care Recommendations: Oral Care BID/PRN, Suction toothbrush (07/10/24 1015)        Daily Summary of Progress (SLP): progress toward functional goals is good (07/10/24 1015)               Treatment Assessment (SLP): continued, pharyngeal dysphagia (07/10/24 1015)     Plan for Continued Treatment (SLP): continue treatment per plan of care (07/10/24 1015)                SWALLOW EVALUATION (Last 72 Hours)       SLP Adult Swallow Evaluation       Row Name 07/10/24 1015 07/09/24 1130 07/08/24 1325 07/08/24 1045          Rehab Evaluation    Document Type therapy note (daily note)  -GA therapy note (daily note)  -AV evaluation  -CJ evaluation  -CJ     Subjective Information no complaints  -GA no complaints  -AV no complaints  -CJ no complaints  -CJ     Patient Observations alert;cooperative;agree to therapy  -GA alert;cooperative  -AV alert;cooperative  -CJ alert;cooperative  -CJ     Patient/Family/Caregiver Comments/Observations none  -GA none  -AV no family present  -CJ no family present; palliative team present  -CJ     Patient Effort good  -GA good  -AV good  -CJ good  -CJ     Symptoms Noted During/After Treatment none  -GA -- none  -CJ fatigue  -CJ        General Information    Patient Profile Reviewed yes  -GA -- yes  -CJ yes  -CJ     Pertinent History Of Current Problem see initial note  -GA -- see am eval; referred for FEES  -CJ Pt adm w/ resp failure w/ hypoxia 2/2 PNA; sig h/o dysautonomia w/ hypotension, malnutrition, afib, prev dysphagia w/ MBS on 11/13/23 w/ recs for regular diet w/ honey thick liquids (water between meals).  -CJ     Current Method of Nutrition honey-thick liquids;soft to chew textures;chopped  -GA -- NPO  -CJ NPO  -CJ     Precautions/Limitations, Vision WFL;for purposes of eval  -GA -- WFL;for purposes of eval  -CJ  WFL;for purposes of eval  -CJ     Precautions/Limitations, Hearing WFL;for purposes of eval  -GA -- WFL;for purposes of eval  -CJ WFL;for purposes of eval  -CJ     Prior Level of Function-Communication WFL  -GA -- WFL  -CJ WFL  -CJ     Prior Level of Function-Swallowing honey thick liquids;other (see comments)  eating reg/thin and not compliant with previous recommendations  -GA -- honey thick liquids  -CJ honey thick liquids  -CJ     Plans/Goals Discussed with patient;agreed upon  -GA -- patient;agreed upon  -CJ patient;agreed upon  -CJ     Barriers to Rehab medically complex;previous functional deficit  -GA -- medically complex;previous functional deficit  -CJ medically complex;previous functional deficit  -CJ     Patient's Goals for Discharge patient did not state  -GA -- return to PO diet  -CJ return to PO diet  -CJ        Pain    Additional Documentation Pain Scale: FACES Pre/Post-Treatment (Group)  -GA Pain Scale: FACES Pre/Post-Treatment (Group)  -AV Pain Scale: FACES Pre/Post-Treatment (Group)  -CJ Pain Scale: FACES Pre/Post-Treatment (Group)  -CJ        Pain Scale: FACES Pre/Post-Treatment    Pain: FACES Scale, Pretreatment 0-->no hurt  -GA 0-->no hurt  -AV 0-->no hurt  -CJ 0-->no hurt  -CJ     Posttreatment Pain Rating 0-->no hurt  -GA 0-->no hurt  -AV 0-->no hurt  -CJ 0-->no hurt  -CJ        Oral Motor Structure and Function    Dentition Assessment -- -- -- natural, present and adequate  -     Mucosal Quality -- -- -- dry;sticky  -        Oral Musculature and Cranial Nerve Assessment    Oral Motor General Assessment -- -- -- generalized oral motor weakness  -        General Eating/Swallowing Observations    Respiratory Support Currently in Use -- -- -- high-flow nasal cannula  -     Eating/Swallowing Skills -- -- -- fed by SLP  -CJ     Positioning During Eating -- -- -- upright in bed  -CJ     Utensils Used -- -- -- spoon;cup;straw  -CJ     Consistencies Trialed -- -- -- ice chips;thin  "liquids;honey-thick liquids  -        Clinical Swallow Eval    Pharyngeal Phase -- -- -- suspected pharyngeal impairment  -     Clinical Swallow Evaluation Summary -- -- -- Pt has sig h/o dysphagia w/ most recent study noted for honey thick liquids. Pt reports poor po intake w/modified diet and therefore pt decided against continued thickened liquids. Now adm w/ BL PNA and on HFNC. Pt reports \"still wanting to stay alive for her great grandkids\". Overt s/s of aspiration intermittently w/ all po intake and felt to be at high risk given h/o dysphagia and increased O2 requirements. Safest at this time would be NPO w/ instrumental FEES to further assess/guide POC. Pt is agreeable to FEES this pm. Continue NPO w/ 3-4 ice chips  -        Pharyngeal Phase Concerns    Pharyngeal Phase Concerns -- -- -- multiple swallows;throat clear  -     Multiple Swallows -- -- -- all consistencies  -CJ     Throat Clear -- -- -- all consistencies  -CJ        Fiberoptic Endoscopic Evaluation of Swallowing (FEES)    Risks/Benefits Reviewed -- -- risks/benefits explained;patient;agreed to eval  -CJ --     Nasal Entry -- -- right:  -CJ --     Scope serial number/identification -- -- 338  -CJ --     Special Considerations -- -- HFNC  -CJ --        Anatomy and Physiology    Anatomic Considerations -- -- erythema  -CJ --     Comment -- -- diffuse erythema; pooled secretions  -CJ --     Velopharyngeal -- -- WFL  -CJ --     Base of Tongue -- -- symmetrical  -CJ --     Epiglottis -- -- WFL  -CJ --     Laryngeal Function Breathing -- -- symmetrical  -CJ --     Laryngeal Function Phonation -- -- symmetrical  -CJ --     Laryngeal Function to Breath Hold -- -- TVF contact;incomplete closure  -CJ --     Secretion Rating Scale (Slim et al. 1996) -- -- 3- secretions inside the laryngeal vestibule/aspiration, not cleared  -CJ --     Secretion Description -- -- thin;clear  -CJ --     Ice Chips -- -- elicited swallow;partially cleared " secretions;aspirated;response to aspiration  -CJ --     Spontaneous Swallow -- -- frequency reduced;did not clear secretions  -CJ --     Sensory -- -- reduced sensation  -CJ --     Utensils Used -- -- Spoon;Cup;Straw  -CJ --     Consistencies Trialed -- -- thin liquids;nectar-thick liquids;honey-thick liquids;pudding/puree;regular textures;spoon;cup;straw  -CJ --        FEES Interpretation    Oral Phase -- -- prolonged manipulation;prespill of liquids into pharynx  -CJ --        Initiation of Pharyngeal Swallow    Initiation of Pharyngeal Swallow -- -- bolus in pyriform sinuses  -CJ --     Pharyngeal Phase -- -- impaired pharyngeal phase of swallowing  -CJ --     Penetration Before the Swallow -- -- thin liquids;nectar-thick liquids;secondary to delayed swallow initiation or mistiming;secondary to reduced back of tongue control  -CJ --     Aspiration During the Swallow -- -- thin liquids;nectar-thick liquids;secondary to reduced vestibular closure;secondary to reduced laryngeal elevation;secondary to delayed swallow initiation or mistiming  -CJ --     Aspiration After the Swallow -- -- nectar-thick liquids;thin liquids;secondary to residue;in pyriform sinuses;in laryngeal vestibule;in valleculae  -CJ --     Depth of Penetration -- -- deep  -CJ --     Response to Penetration -- -- No  -CJ --     No spontaneous response to penetration and -- -- non-effective laryngeal clearance with cue (see comments)  -CJ --     Response to Aspiration -- -- Yes  -CJ --     Responsed to aspiration with -- -- inconsistent;delayed;cough;non-effective  x1 w/ large amount of thins  -CJ --     Rosenbek's Scale -- -- thin:;nectar:;8-->Level 8  -CJ --     Residue -- -- thin liquids;nectar-thick liquids;pyriform sinuses;base of tongue;valleculae;laryngeal vestibule;secondary to reduced posterior pharyngeal wall stripping;secondary to reduced laryngeal elevation;secondary to reduced hyolaryngeal excursion;other (see comments)  worse on L side   "-CJ --     Response to Residue -- -- partial residue clearance;with cued swallow  -CJ --     Attempted Compensatory Maneuvers -- -- bolus size;bolus presentation style;additional subsequent swallow;multiple swallows;throat clear after swallow;head turn to left;chin tuck  -CJ --     Response to Attempted Compensatory Maneuvers -- -- did not prevent aspiration  -CJ --     Successful Compensatory Maneuver Competency -- -- patient able to;demonstrate compensations;with cues  -CJ --     FEES Summary -- -- Moderate-severe pharyngeal dysphagia. Penetration w/ thin/nectar before the swallow w/ subsequent silent aspiration during the swallow. Pt did sense large amount of aspiration w/ thin liquids x1 w/ delayed cough response elicited. Increased amount of residue on L side, unable to reduce w/ multiple compensatory strategies. Pt began to fatigue and noted w/ increased aspiration of nectar thick liquids. Silent aspiration w/ honey via straw. Pt given multiple repeat presentations of honey thick liquids via spoon/cup w/ no penetration/aspiration. Had lengthy d/w pt regarding results of this evaluation, risks of aspiration PNA and continued respiratory difficulties if she wants to pursue different GOC. She verbalized agreement to try modified diet again as she \"doesn't want to be sick and give up right now\". MD also made aware of results. Will initiate modified po diet of soft chopped w/ honey thick liquids, no straws, no mixed consistencies and f/u for continued dysphagia tx. Suspect some degree of possible chronic baseline dysphagia as pt has been on thickened liquids since last year.  -CJ --        Swallowing Quality of Life Assessment    Education and counseling provided -- -- Signs of aspiration;Silent aspiration;Risks of aspiration;Safest diet options;Comfort diet options;Oral care recommendations and rationale;Aspiration precautions  -CJ --        SLP Evaluation Clinical Impression    SLP Swallowing Diagnosis -- " mod-severe;pharyngeal dysphagia;mild-moderate;oral dysphagia  -AV mod-severe;pharyngeal dysphagia;mild-moderate;oral dysphagia  -CJ suspected pharyngeal dysphagia  -CJ     Functional Impact -- risk of aspiration/pneumonia;risk of dehydration;risk of malnutrition  -AV risk of aspiration/pneumonia;risk of dehydration;risk of malnutrition  -CJ risk of aspiration/pneumonia;risk of malnutrition;risk of dehydration  -CJ     Rehab Potential/Prognosis, Swallowing -- adequate, monitor progress closely  -AV adequate, monitor progress closely  -CJ adequate, monitor progress closely  -CJ     Swallow Criteria for Skilled Therapeutic Interventions Met -- demonstrates skilled criteria  -AV demonstrates skilled criteria  -CJ demonstrates skilled criteria  -CJ        SLP Treatment Clinical Impressions    Treatment Assessment (SLP) continued;pharyngeal dysphagia  -GA continued  -AV -- --     Daily Summary of Progress (SLP) progress toward functional goals is good  -GA progress toward functional goals is good  -AV -- --     Barriers to Overall Progress (SLP) Fatigue  -GA Fatigue  -AV -- --     Plan for Continued Treatment (SLP) continue treatment per plan of care  -GA continue treatment per plan of care  -AV -- --     Care Plan Review care plan/treatment goals reviewed;patient/other agree to care plan  -GA care plan/treatment goals reviewed  -AV -- --        Recommendations    Therapy Frequency (Swallow) 5 days per week  -GA 5 days per week  -AV 5 days per week  -CJ --     Predicted Duration Therapy Intervention (Days) 2 weeks  -GA 2 weeks  -AV 2 weeks  -CJ --     SLP Diet Recommendation soft to chew textures;chopped;no mixed consistencies;honey thick liquids  -GA soft to chew textures;chopped;no mixed consistencies;honey thick liquids  -AV soft to chew textures;chopped;no mixed consistencies;honey thick liquids  no straws  -CJ NPO  until FEES; okay for 3-4 ice chips  -CJ     Recommended Diagnostics -- reassess via FEES  -AV --  reassess via FEES  -     Recommended Precautions and Strategies upright posture during/after eating;small bites of food and sips of liquid;general aspiration precautions;no straw;fatigue precautions;other (see comments)  -GA upright posture during/after eating;small bites of food and sips of liquid;general aspiration precautions;no straw;fatigue precautions;other (see comments)  -AV upright posture during/after eating;small bites of food and sips of liquid;general aspiration precautions;no straw;fatigue precautions;other (see comments)  - general aspiration precautions  -     Oral Care Recommendations Oral Care BID/PRN;Suction toothbrush  -GA Oral Care BID/PRN;Suction toothbrush  -AV Oral Care BID/PRN;Suction toothbrush  -CJ Oral Care BID/PRN;Suction toothbrush  -CJ     SLP Rec. for Method of Medication Administration meds whole;meds crushed;with puree;as tolerated  -GA meds whole;meds crushed;with puree;as tolerated  -AV meds whole;meds crushed;with puree;as tolerated  - meds via alternate route  -     Monitor for Signs of Aspiration yes;notify SLP if any concerns  -GA yes;notify SLP if any concerns  -AV yes;notify SLP if any concerns  -CJ yes;notify SLP if any concerns  -CJ     Anticipated Discharge Disposition (SLP) skilled nursing facility  -GA skilled nursing facility  -AV skilled nursing facility  - skilled nursing facility  -               User Key  (r) = Recorded By, (t) = Taken By, (c) = Cosigned By      Initials Name Effective Dates    AV Shanelle Gonsales, MS CCC-SLP 06/16/21 -     Denise Gloria MS CCC-SLP 06/03/24 -     Tiesha Tipton MS CCC-SLP 09/14/23 -                     EDUCATION  The patient has been educated in the following areas:   Dysphagia (Swallowing Impairment) Modified Diet Instruction.        SLP GOALS       Row Name 07/10/24 1015 07/09/24 1130 07/08/24 1325       (LTG) Patient will demonstrate functional swallow for    Diet Texture (Demonstrate  functional swallow) soft to chew (chopped) textures  -GA soft to chew (chopped) textures  -AV soft to chew (chopped) textures  -CJ    Liquid viscosity (Demonstrate functional swallow) nectar/ mildly thick liquids  -GA nectar/ mildly thick liquids  -AV nectar/ mildly thick liquids  -CJ    [place] (Demonstrate functional swallow) with minimal cues (75-90% accuracy)  -GA with minimal cues (75-90% accuracy)  -AV with minimal cues (75-90% accuracy)  -CJ    Time Frame (Demonstrate functional swallow) by discharge  -GA by discharge  -AV by discharge  -CJ    Progress/Outcomes (Demonstrate functional swallow) goal ongoing  -GA continuing progress toward goal  -AV new goal  -CJ       (STG) Patient will tolerate trials of    Consistencies Trialed (Tolerate trials) soft to chew (chopped) textures;honey/ moderately thick liquids  -GA soft to chew (chopped) textures;honey/ moderately thick liquids  -AV soft to chew (chopped) textures;honey/ moderately thick liquids  -CJ    Desired Outcome (Tolerate trials) without signs/symptoms of aspiration;without signs of distress;with adequate oral prep/transit/clearance  -GA without signs/symptoms of aspiration;without signs of distress;with adequate oral prep/transit/clearance  -AV without signs/symptoms of aspiration;without signs of distress;with adequate oral prep/transit/clearance  -CJ    [place] (Tolerate trials) with minimal cues (75-90% accuracy)  -GA with minimal cues (75-90% accuracy)  -AV with minimal cues (75-90% accuracy)  -CJ    Time Frame (Tolerate trials) 1 week  -GA 1 week  -AV 1 week  -CJ    Progress/Outcomes (Tolerate trials) continuing progress toward goal  -GA continuing progress toward goal  -AV new goal  -CJ    Comment (Tolerate trials) Patient with 1x cough following honey liquids via cup. No other s/sx. Cough could be d/t respiratory status d/t coughing outside PO trials.  -GA -- --       (STG) Pharyngeal Strengthening Exercise Goal 1 (SLP)    Activity  (Pharyngeal Strengthening Goal 1, SLP) increase timing;increase superior movement of the hyolaryngeal complex;increase anterior movement of the hyolaryngeal complex;increase closure at entrance to airway/closure of airway at glottis;increase squeeze/positive pressure generation  -GA increase timing;increase superior movement of the hyolaryngeal complex;increase anterior movement of the hyolaryngeal complex;increase closure at entrance to airway/closure of airway at glottis;increase squeeze/positive pressure generation  -AV increase timing;increase superior movement of the hyolaryngeal complex;increase anterior movement of the hyolaryngeal complex;increase closure at entrance to airway/closure of airway at glottis;increase squeeze/positive pressure generation  -CJ    Increase Timing prepping - 3 second prep or suck swallow or 3-step swallow  -GA prepping - 3 second prep or suck swallow or 3-step swallow  -AV prepping - 3 second prep or suck swallow or 3-step swallow  -CJ    Increase Superior Movement of the Hyolaryngeal Complex effortful pitch glide (falsetto + pharyngeal squeeze)  -GA effortful pitch glide (falsetto + pharyngeal squeeze)  -AV effortful pitch glide (falsetto + pharyngeal squeeze)  -CJ    Increase Anterior Movement of the Hyolaryngeal Complex chin tuck against resistance (CTAR)  -GA chin tuck against resistance (CTAR)  -AV chin tuck against resistance (CTAR)  -CJ    Increase Closure at Entrance to Airway/Closure of Airway at Glottis supraglottic swallow  -GA supraglottic swallow  -AV supraglottic swallow  -CJ    Increase Squeeze/Positive Pressure Generation hard effortful swallow  -GA hard effortful swallow  -AV hard effortful swallow  -CJ    [place]/Accuracy (Pharyngeal Strengthening Goal 1, SLP) with moderate cues (50-74% accuracy)  -GA with moderate cues (50-74% accuracy)  -AV with moderate cues (50-74% accuracy)  -CJ    Time Frame (Pharyngeal Strengthening Goal 1, SLP) 1 week  -GA 1 week  -AV  1 week  -CJ    Progress/Outcomes (Pharyngeal Strengthening Goal 1, SLP) continuing progress toward goal  -GA continuing progress toward goal  -AV new goal  -CJ    Comment (Pharyngeal Strengthening Goal 1, SLP) Completed all exercises and provided handout  -GA -- --              User Key  (r) = Recorded By, (t) = Taken By, (c) = Cosigned By      Initials Name Provider Type    AV Shanelle Gonsales, MS CCC-SLP Speech and Language Pathologist    Denise Gloria, MS CCC-SLP Speech and Language Pathologist    Tiesha Tipton MS CCC-SLP Speech and Language Pathologist                         Time Calculation:    Time Calculation- SLP       Row Name 07/10/24 1154             Time Calculation- SLP    SLP Start Time 1015  -GA      SLP Received On 07/10/24  -GA         Untimed Charges    72595-SS Treatment Swallow Minutes 45  -GA         Total Minutes    Untimed Charges Total Minutes 45  -GA       Total Minutes 45  -GA                User Key  (r) = Recorded By, (t) = Taken By, (c) = Cosigned By      Initials Name Provider Type    Tiesha Tipton MS CCC-SLP Speech and Language Pathologist                    Therapy Charges for Today       Code Description Service Date Service Provider Modifiers Qty    [id number]  ST TREATMENT SWALLOW 3 7/10/2024 Tiesha Zimmerman MS CCC-SLP GN 1                 Tiesha Zimmerman MS CCC-KATIE  7/10/2024

## 2024-07-10 NOTE — PLAN OF CARE
Goal Outcome Evaluation:  SLP treatment completed. Will continue to address swallow function, diet tolerance, and re-assess as appropriate. Please see note for further details and recommendations.        Anticipated Discharge Disposition (SLP): skilled nursing facility             Treatment Assessment (SLP): continued, pharyngeal dysphagia (07/10/24 1015)     Plan for Continued Treatment (SLP): continue treatment per plan of care (07/10/24 1015)

## 2024-07-10 NOTE — PLAN OF CARE
"Goal Outcome Evaluation:  Plan of Care Reviewed With: patient        Progress: no change  Outcome Evaluation: Palliative RN saw pt at 1216.  Pt. was sitting up in bed on 3LNC.  She proudly proclaimed she was able to take communion today.  \"The  was just darling\", she stated.  \"He was such a neat, sachi man\".  Pt. denied pain and nausea.  She stated her only pain came from having the veins of a NICU baby.  She had to get  stuck earlier today which was very painful for her.  Pt. stated she lived at [facility] but was unable to continue living there due to the expense.  She mused about her life with her  who was an OB.  She was his nurse.  She talked of the days when they rode donkeys through the woods to the homes of the poor in [facility] and delivered babies.  She told how their \"fee\" was often paid in chickens and vegetables.  \"They were sachi people\", she related.  Pt. told how she had 7 children and 3 had passed.  \"It's hard but you have to move on\", she stated.  Will need to address further GOC with pt. tomorrow as her current code status is full code.  Palliative care to follow along for support and ongoing GOC.      0930 Palliative IDT meeting: TULIO Fuentes RN, ANGEL LUIS; TAINA Talamantes, APRN; HAYDEN Frazier MD.; KOLTON Chew RN, CHANGEL LUIS; HAYDEN Cee, [facility], [facility]; MELLO Cardona MDiv, [facility]; YAYA Craig RN; ISAAC Muñiz RN, CHPN    After hours, contact Palliative by calling [phone].                                  " Goal Outcome Evaluation: Ongoing, progressing    Plan of Care Reviewed With: patient    Overall Patient Progress: no change    Outcome Evaluation: Rounded with M.D. who is recommending this pt stay another day to investigate source of fevers. Ordered a chest and abdominal X ray and consulted with infectious disease. Pain managed with dilaudid. Pt is alert and oriented and able to make needs known. Continue to monitor.

## 2024-07-10 NOTE — THERAPY TREATMENT NOTE
Patient Name: Bibiana Hodges  : 1935    MRN: [id number]                              Today's Date: 7/10/2024       Admit Date: 2024    Visit Dx:     ICD-10-CM ICD-9-CM   1. Hyponatremia  E87.1 276.1   2. Pneumonia of both lungs due to infectious organism, unspecified part of lung  J18.9 483.8   3. Acute respiratory failure with hypoxia  J96.01 518.81   4. Oropharyngeal dysphagia  R13.12 787.22     Patient Active Problem List   Diagnosis    Dysautonomia orthostatic hypotension syndrome    Hypertension    Flu vaccine need    Streptococcus pneumoniae vaccination indicated    Sepsis due to pneumonia    Severe malnutrition    Acute respiratory failure with hypoxia    Atrial fibrillation    Autonomic dysfunction     Past Medical History:   Diagnosis Date    Constipation     Depression     Dysautonomia orthostatic hypotension syndrome     Generalized osteoarthritis     GERD (gastroesophageal reflux disease)     s/p Nissen    Hypertension     Insomnia     Osteoarthrosis, shoulder region     Skin ulcer of scalp     Thrombophlebitis of arm     Tremor     Vitamin B12 deficiency      Past Surgical History:   Procedure Laterality Date    DILATATION AND CURETTAGE      HERNIA REPAIR      HIP SURGERY      SHOULDER SURGERY      Right      General Information       Row Name 07/10/24 1440          Physical Therapy Time and Intention    Document Type therapy note (daily note)  -NS     Mode of Treatment individual therapy;physical therapy  -NS       Row Name 07/10/24 1440          General Information    Patient Profile Reviewed yes  -NS     Existing Precautions/Restrictions fall;oxygen therapy device and L/min  tremors  -NS       Row Name 07/10/24 1440          Cognition    Orientation Status (Cognition) oriented x 3  -NS       Row Name 07/10/24 1440          Safety Issues, Functional Mobility    Safety Issues Affecting Function (Mobility) insight into deficits/self-awareness;judgment;problem-solving;safety precaution  awareness;safety precautions follow-through/compliance;sequencing abilities  -NS     Impairments Affecting Function (Mobility) balance;coordination;endurance/activity tolerance;motor planning;strength;shortness of breath;postural/trunk control  -NS               User Key  (r) = Recorded By, (t) = Taken By, (c) = Cosigned By      Initials Name Provider Type    Shelley Gómez, PT Physical Therapist                   Mobility       Row Name 07/10/24 1440          Bed Mobility    Bed Mobility supine-sit  -NS     Supine-Sit [place] (Bed Mobility) standby assist  -NS     Assistive Device (Bed Mobility) bed rails;head of bed elevated  -NS     Comment, (Bed Mobility) increased time and effort  -NS       Row Name 07/10/24 1440          Transfers    Comment, (Transfers) Cues for hand placement. Pt sits quickly with impulsively, requiring cues for safety.  -NS       Row Name 07/10/24 1440          Sit-Stand Transfer    Sit-Stand [place] (Transfers) contact guard;verbal cues  -NS     Assistive Device (Sit-Stand Transfers) walker, front-wheeled  -NS       Row Name 07/10/24 1440          Gait/Stairs (Locomotion)    [place] Level (Gait) minimum assist (75% patient effort);verbal cues  -NS     Assistive Device (Gait) walker, front-wheeled  -NS     Distance in Feet (Gait) 4  -NS     Deviations/Abnormal Patterns (Gait) perri decreased;festinating/shuffling;gait speed decreased;stride length decreased;base of support, narrow  -NS     Bilateral Gait Deviations forward flexed posture;heel strike decreased  -NS     Comment, (Gait/Stairs) Patient ambulated with short shuffled steps and narrow SENDY, needing cues for safety with RW and upright posture. Pt sat impulsively. Recommend chair follow if able to ambulate further next session. O2 sensory with difficulty reading for several minutes due to tremors, appeared to sat 85% on 3.5L O2, recovering to >90% with rest.  -NS               User Key  (r) = Recorded By, (t) =  Taken By, (c) = Cosigned By      Initials Name Provider Type    Shelley Gómez PT Physical Therapist                   Obj/Interventions       Row Name 07/10/24 1440          Motor Skills    Therapeutic Exercise hip;knee;ankle  -NS       Row Name 07/10/24 1440          Hip (Therapeutic Exercise)    Hip (Therapeutic Exercise) strengthening exercise  -NS     Hip Strengthening (Therapeutic Exercise) bilateral;heel slides;10 repetitions  -NS       Row Name 07/10/24 1440          Knee (Therapeutic Exercise)    Knee (Therapeutic Exercise) strengthening exercise  -NS     Knee Strengthening (Therapeutic Exercise) bilateral;SLR (straight leg raise);10 repetitions  -NS       Row Name 07/10/24 1440          Ankle (Therapeutic Exercise)    Ankle (Therapeutic Exercise) AROM (active range of motion)  -NS     Ankle AROM (Therapeutic Exercise) bilateral;dorsiflexion;plantarflexion;10 repetitions  -NS       Row Name 07/10/24 1440          Balance    Balance Assessment sitting static balance;sitting dynamic balance;standing static balance;standing dynamic balance  -NS     Static Sitting Balance standby assist  -NS     Dynamic Sitting Balance standby assist  -NS     Position, Sitting Balance unsupported;sitting edge of bed  -NS     Static Standing Balance contact guard  -NS     Dynamic Standing Balance minimal assist  -NS     Position/Device Used, Standing Balance supported;walker, front-wheeled  -NS               User Key  (r) = Recorded By, (t) = Taken By, (c) = Cosigned By      Initials Name Provider Type    Shelley Gómez PT Physical Therapist                   Goals/Plan    No documentation.                  Clinical Impression       Row Name 07/10/24 1440          Pain    Pretreatment Pain Rating 0/10 - no pain  -NS     Posttreatment Pain Rating 0/10 - no pain  -NS       Row Name 07/10/24 1440          Plan of Care Review    Plan of Care Reviewed With patient  -NS     Progress improving  -NS     Outcome Evaluation Patient  was able to progress ambulation today, ambulating a short distance to the chair with RW and Ricki DONG. Pt sat impulsively, stating that she her LEs tend to give out at home. Recommend chair follow as pt progresses ambulation. Continue per PT POC. Recommend SNF at d/c.  -NS       Row Name 07/10/24 1440          Vital Signs    Pre Systolic BP Rehab 158  -NS     Pre Treatment Diastolic BP 83  -NS     Pretreatment Heart Rate (beats/min) 60  -NS     Posttreatment Heart Rate (beats/min) 61  -NS     Pre SpO2 (%) 96  -NS     O2 Delivery Pre Treatment nasal cannula  -NS     Intra SpO2 (%) 85  -NS     O2 Delivery Intra Treatment nasal cannula  -NS     Post SpO2 (%) 96  -NS     O2 Delivery Post Treatment nasal cannula  -NS     Pre Patient Position Supine  -NS     Intra Patient Position Standing  -NS     Post Patient Position Sitting  -NS       Row Name 07/10/24 1440          Positioning and Restraints    Pre-Treatment Position in bed  -NS     Post Treatment Position chair  -NS     In Chair notified nsg;reclined;call light within reach;encouraged to call for assist;exit alarm on;with nsg;waffle cushion;legs elevated;heels elevated  -NS               User Key  (r) = Recorded By, (t) = Taken By, (c) = Cosigned By      Initials Name Provider Type    Shelley Gómez, PT Physical Therapist                   Outcome Measures       Row Name 07/10/24 1440 07/10/24 0855       How much help from another person do you currently need...    Turning from your back to your side while in flat bed without using bedrails? 4  -NS 4  -CM    Moving from lying on back to sitting on the side of a flat bed without bedrails? 3  -NS 3  -CM    Moving to and from a bed to a chair (including a wheelchair)? 3  -NS 3  -CM    Standing up from a chair using your arms (e.g., wheelchair, bedside chair)? 3  -NS 3  -CM    Climbing 3-5 steps with a railing? 2  -NS 2  -CM    To walk in hospital room? 2  -NS 3  -CM    AM-PAC 6 Clicks Score (PT) 17  -NS 18  -CM     Highest Level of Mobility Goal 5 --> Static standing  -NS 6 --> Walk 10 steps or more  -CM      Row Name 07/10/24 1440          Functional Assessment    Outcome Measure Options AM-PAC 6 Clicks Basic Mobility (PT)  -NS               User Key  (r) = Recorded By, (t) = Taken By, (c) = Cosigned By      Initials Name Provider Type    Shelley Gómez, PT Physical Therapist    Kendra Devries RN Registered Nurse                                 Physical Therapy Education       Title: PT OT SLP Therapies (In Progress)       Topic: Physical Therapy (Done)       Point: Mobility training (Done)       Learning Progress Summary             Patient Acceptance, E, VU,NR by NS at 7/10/2024 1604    Comment: safety with mobility, benefits/need for rehab    Acceptance, E, VU,NR by NS at 7/9/2024 1319                         Point: Home exercise program (Done)       Learning Progress Summary             Patient Acceptance, E, VU,NR by NS at 7/10/2024 1604    Comment: safety with mobility, benefits/need for rehab                         Point: Body mechanics (Done)       Learning Progress Summary             Patient Acceptance, E, VU,NR by NS at 7/10/2024 1604    Comment: safety with mobility, benefits/need for rehab    Acceptance, E, VU,NR by NS at 7/9/2024 1319                         Point: Precautions (Done)       Learning Progress Summary             Patient Acceptance, E, VU,NR by NS at 7/10/2024 1604    Comment: safety with mobility, benefits/need for rehab    Acceptance, E, VU,NR by NS at 7/9/2024 1319                                         User Key       Initials Effective Dates Name Provider Type Discipline    NS 06/16/21 -  Shelley Gutiérrez, PT Physical Therapist PT                  PT Recommendation and Plan  Planned Therapy Interventions (PT): balance training, bed mobility training, gait training, home exercise program, neuromuscular re-education, strengthening, patient/family education, transfer training  Plan of Care  Reviewed With: patient  Progress: improving  Outcome Evaluation: Patient was able to progress ambulation today, ambulating a short distance to the chair with RW and Min A. Pt sat impulsively, stating that she her LEs tend to give out at home. Recommend chair follow as pt progresses ambulation. Continue per PT POC. Recommend SNF at d/c.     Time Calculation:   PT Evaluation Complexity  History, PT Evaluation Complexity: 3 or more personal factors and/or comorbidities  Examination of Body Systems (PT Eval Complexity): total of 4 or more elements  Clinical Presentation (PT Evaluation Complexity): evolving  Clinical Decision Making (PT Evaluation Complexity): moderate complexity  Overall Complexity (PT Evaluation Complexity): moderate complexity     PT Charges       Row Name 07/10/24 1440             Time Calculation    Start Time 1440  -NS      PT Received On 07/10/24  -NS      PT Goal Re-Cert Due Date 07/19/24  -NS         Timed Charges    44809 - PT Therapeutic Exercise Minutes 10  -NS      01576 - PT Therapeutic Activity Minutes 20  -NS         Total Minutes    Timed Charges Total Minutes 30  -NS       Total Minutes 30  -NS                User Key  (r) = Recorded By, (t) = Taken By, (c) = Cosigned By      Initials Name Provider Type    Shelley Gómez, PT Physical Therapist                  Therapy Charges for Today       Code Description Service Date Service Provider Modifiers Qty    62780958078 HC PT THERAPEUTIC ACT EA 15 MIN 7/9/2024 Shelley Gutiérrez, PT GP 1    67813571260 HC PT EVAL MOD COMPLEXITY 4 7/9/2024 Shelley Gutiérrez, PT GP 1    43291785313 HC PT THER PROC EA 15 MIN 7/10/2024 Shelley Gutiérrez, PT GP 1    80196208555 HC PT THERAPEUTIC ACT EA 15 MIN 7/10/2024 Shelley Gutiérrez, PT GP 1            PT G-Codes  Outcome Measure Options: AM-PAC 6 Clicks Basic Mobility (PT)  AM-PAC 6 Clicks Score (PT): 17  PT Discharge Summary  Anticipated Discharge Disposition (PT): skilled nursing facility    Shelley Gutiérrez  PT  7/10/2024

## 2024-07-11 PROCEDURE — 94664 DEMO&/EVAL PT USE INHALER: CPT

## 2024-07-11 PROCEDURE — 94799 UNLISTED PULMONARY SVC/PX: CPT

## 2024-07-11 PROCEDURE — 99232 SBSQ HOSP IP/OBS MODERATE 35: CPT | Performed by: STUDENT IN AN ORGANIZED HEALTH CARE EDUCATION/TRAINING PROGRAM

## 2024-07-11 PROCEDURE — 92526 ORAL FUNCTION THERAPY: CPT | Performed by: SPEECH-LANGUAGE PATHOLOGIST

## 2024-07-11 PROCEDURE — 94761 N-INVAS EAR/PLS OXIMETRY MLT: CPT

## 2024-07-11 PROCEDURE — 25010000002 METRONIDAZOLE 500 MG/100ML SOLUTION: Performed by: INTERNAL MEDICINE

## 2024-07-11 PROCEDURE — 25010000002 CEFTRIAXONE PER 250 MG: Performed by: INTERNAL MEDICINE

## 2024-07-11 RX ORDER — CASTOR OIL AND BALSAM, PERU 788; 87 MG/G; MG/G
1 OINTMENT TOPICAL EVERY 12 HOURS SCHEDULED
Status: DISCONTINUED | OUTPATIENT
Start: 2024-07-11 | End: 2024-07-17 | Stop reason: HOSPADM

## 2024-07-11 RX ORDER — L.ACID,PARA/B.BIFIDUM/S.THERM 8B CELL
1 CAPSULE ORAL DAILY
Status: DISCONTINUED | OUTPATIENT
Start: 2024-07-11 | End: 2024-07-17 | Stop reason: HOSPADM

## 2024-07-11 RX ORDER — METRONIDAZOLE 500 MG/1
500 TABLET ORAL EVERY 8 HOURS SCHEDULED
Status: DISCONTINUED | OUTPATIENT
Start: 2024-07-11 | End: 2024-07-11

## 2024-07-11 RX ORDER — DOXYCYCLINE 100 MG/1
100 CAPSULE ORAL EVERY 12 HOURS SCHEDULED
Status: COMPLETED | OUTPATIENT
Start: 2024-07-11 | End: 2024-07-12

## 2024-07-11 RX ADMIN — IPRATROPIUM BROMIDE AND ALBUTEROL SULFATE 3 ML: 2.5; .5 SOLUTION RESPIRATORY (INHALATION) at 19:10

## 2024-07-11 RX ADMIN — ATORVASTATIN CALCIUM 40 MG: 40 TABLET, FILM COATED ORAL at 21:03

## 2024-07-11 RX ADMIN — Medication 12.5 MG: at 21:03

## 2024-07-11 RX ADMIN — APIXABAN 2.5 MG: 2.5 TABLET, FILM COATED ORAL at 21:03

## 2024-07-11 RX ADMIN — Medication 1 APPLICATION: at 21:03

## 2024-07-11 RX ADMIN — VENLAFAXINE HYDROCHLORIDE 75 MG: 75 CAPSULE, EXTENDED RELEASE ORAL at 09:09

## 2024-07-11 RX ADMIN — DOXYCYCLINE 100 MG: 100 INJECTION, POWDER, LYOPHILIZED, FOR SOLUTION INTRAVENOUS at 09:10

## 2024-07-11 RX ADMIN — Medication 1 CAPSULE: at 15:33

## 2024-07-11 RX ADMIN — LEVETIRACETAM 250 MG: 500 TABLET, FILM COATED ORAL at 21:03

## 2024-07-11 RX ADMIN — Medication 1 APPLICATION: at 15:33

## 2024-07-11 RX ADMIN — IPRATROPIUM BROMIDE AND ALBUTEROL SULFATE 3 ML: 2.5; .5 SOLUTION RESPIRATORY (INHALATION) at 15:52

## 2024-07-11 RX ADMIN — LEVETIRACETAM 250 MG: 500 TABLET, FILM COATED ORAL at 09:09

## 2024-07-11 RX ADMIN — DOXYCYCLINE 100 MG: 100 CAPSULE ORAL at 21:03

## 2024-07-11 RX ADMIN — IPRATROPIUM BROMIDE AND ALBUTEROL SULFATE 3 ML: 2.5; .5 SOLUTION RESPIRATORY (INHALATION) at 12:42

## 2024-07-11 RX ADMIN — SODIUM CHLORIDE 2000 MG: 900 INJECTION INTRAVENOUS at 09:09

## 2024-07-11 RX ADMIN — TRAZODONE HYDROCHLORIDE 100 MG: 100 TABLET ORAL at 21:03

## 2024-07-11 RX ADMIN — Medication 12.5 MG: at 09:09

## 2024-07-11 RX ADMIN — Medication 10 ML: at 21:03

## 2024-07-11 RX ADMIN — METRONIDAZOLE 500 MG: 500 INJECTION, SOLUTION INTRAVENOUS at 05:45

## 2024-07-11 RX ADMIN — Medication 10 ML: at 09:10

## 2024-07-11 RX ADMIN — AMIODARONE HYDROCHLORIDE 200 MG: 200 TABLET ORAL at 09:09

## 2024-07-11 RX ADMIN — IPRATROPIUM BROMIDE AND ALBUTEROL SULFATE 3 ML: 2.5; .5 SOLUTION RESPIRATORY (INHALATION) at 07:38

## 2024-07-11 RX ADMIN — APIXABAN 2.5 MG: 2.5 TABLET, FILM COATED ORAL at 09:09

## 2024-07-11 NOTE — PROGRESS NOTES
Pt further discussed goals with team.  Requested no CPR but still wants vent if needed.  Will adjust.

## 2024-07-11 NOTE — NURSING NOTE
WOC consulted for pressure injury to coccyx, POA.    Patient states that she has had the wound on her sacral/coccyx for a while now. The area in questions does appear to be an old, healing pressure injury versus bruise. Will order venelex BID. Keep Allevyn dressing on sacrum/coccyx for duration of hospital stay.        Apply and maintain Allevyn dressing on patient heels. Keep patient turned and offloaded. HIGH risk for pressure injury development.    Will order ARMIDA bed from [facility].    Sign off.    Zafar Chowdary RN, BSN, CCRN, CWOCN  Wound, Ostomy and Continence (WOC) Department  [facility]

## 2024-07-11 NOTE — PLAN OF CARE
"Goal Outcome Evaluation:  Plan of Care Reviewed With: patient        Progress: no change  Outcome Evaluation: Palliative RN saw pt at 1100. MEriberto Cee LCSW entered room during visit as well.  Pt. was asleep on 5LNC but roused to gentle touch.  She denied pain, nausea and shortness of breath.  She did demonstrate mild increased wob at rest.  Pt. was hypertensive on monitor during visit.   Asked pt if her heart stopped would she want to die a natural death or would she want CPR.  She indicated she would be content to die a natural death.  She did however state that she had been on MV before and would not mind if she required intubation again as she \"did beautifully with it\".  When asked if she would like to designate a HCS she stated she would not like to do that because she would like \"for her children to all get along\".  Pt. expressed gratitude for the excellent care she is receiving in the hospital.  Palliative care to continue to follow along for support, POC and ongoing GOC.    0930 Palliative IDT meeting: TULIO Fuentes RN, CHPN; TAINA Talamantes APRN; HAYDEN Frazier MD.; MELLO Kim RN, CHANGEL LUIS; HAYDEN Cee, ANSLEY, [facility]; MELLO Cardona MDiv, [facility]; ISAAC Muñiz RN, CHPN    After hours, contact Palliative by calling [phone].                               "

## 2024-07-11 NOTE — PLAN OF CARE
Goal Outcome Evaluation:  Plan of Care Reviewed With: (P) patient        Progress: (P) improving  Outcome Evaluation: (P) Patient bumped up to 5L nasal cannula while sleeping. A paced on tele. VSS. No complaints of pain. Patient rested comfortably throughout the night. Continue plan of care.

## 2024-07-11 NOTE — PLAN OF CARE
Goal Outcome Evaluation:  Plan of Care Reviewed With: patient        Progress: no change         Anticipated Discharge Disposition (SLP): skilled nursing facility             Treatment Assessment (SLP): continued, toleration of diet, pharyngeal dysphagia (07/11/24 0955)  Treatment Assessment Comments (SLP): Pt participated in tx this am. No overt s/s of aspiration w/ honey, improved respiratory status. Good effort w/ tx exercises. Will continue to f/u and address dysphagia (07/11/24 0955)  Plan for Continued Treatment (SLP): continue treatment per plan of care (07/11/24 0955)

## 2024-07-11 NOTE — PLAN OF CARE
"  Problem: Palliative Care  Goal: Enhanced Quality of Life  Intervention: Promote Advance Care Planning  Recent Flowsheet Documentation  Taken 7/11/2024 1047 by Shreya Cee \"Delmi\" ANSLEY GILMORE  Life Transition/Adjustment: decision-making facilitated  Intervention: Optimize Psychosocial Wellbeing  Recent Flowsheet Documentation  Taken 7/11/2024 1047 by Shreya Cee \"Delmi\" DEIRDRE, LCSW  Supportive Measures:   active listening utilized   decision-making supported   positive reinforcement provided   counseling provided   verbalization of feelings encouraged  Visit with patient and Palliative nurse TULIO Fuentes RN.  Supportive visit at bedside.  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  In reviewing patient's advance care planning it is noted that patient would not want life prolonging treatment if it was deemed a burden.  Patient acknowledges that if her heart stopped she would agree this is her natural end and would not wish to have CPR - she states other medical professionals have discussed the burden of chest compressions given her fragile state.  In assessing risks-benefits-burdens, she states she would like full treatment up to that point, including mechanical ventilation.  She recounts prior intubation and recovery from that illness.  Palliative Care continues to follow for support and assist with plan of care.     "

## 2024-07-11 NOTE — THERAPY TREATMENT NOTE
Acute Care - Speech Language Pathology   Swallow Treatment Note [facility]     Patient Name: Bibiana Hodges  : 1935  MRN: [id number]  Today's Date: 2024               Admit Date: 2024    Visit Dx:     ICD-10-CM ICD-9-CM   1. Hyponatremia  E87.1 276.1   2. Pneumonia of both lungs due to infectious organism, unspecified part of lung  J18.9 483.8   3. Acute respiratory failure with hypoxia  J96.01 518.81   4. Oropharyngeal dysphagia  R13.12 787.22     Patient Active Problem List   Diagnosis    Dysautonomia orthostatic hypotension syndrome    Hypertension    Flu vaccine need    Streptococcus pneumoniae vaccination indicated    Sepsis due to pneumonia    Severe malnutrition    Acute respiratory failure with hypoxia    Atrial fibrillation    Autonomic dysfunction     Past Medical History:   Diagnosis Date    Constipation     Depression     Dysautonomia orthostatic hypotension syndrome     Generalized osteoarthritis     GERD (gastroesophageal reflux disease)     s/p Nissen    Hypertension     Insomnia     Osteoarthrosis, shoulder region     Skin ulcer of scalp     Thrombophlebitis of arm     Tremor     Vitamin B12 deficiency      Past Surgical History:   Procedure Laterality Date    DILATATION AND CURETTAGE      HERNIA REPAIR      HIP SURGERY      SHOULDER SURGERY      Right       SLP Recommendation and Plan     SLP Diet Recommendation: soft to chew textures, chopped, no mixed consistencies, honey thick liquids (24)  Recommended Precautions and Strategies: upright posture during/after eating, small bites of food and sips of liquid, general aspiration precautions, no straw, fatigue precautions, other (see comments) (24)  SLP Rec. for Method of Medication Administration: meds whole, meds crushed, with puree, as tolerated (24)     Monitor for Signs of Aspiration: yes, notify SLP if any concerns (24)        Anticipated Discharge Disposition (SLP): skilled  nursing facility (07/11/24 0955)     Therapy Frequency (Swallow): 5 days per week (07/11/24 0955)  Predicted Duration Therapy Intervention (Days): 2 weeks (07/11/24 0955)  Oral Care Recommendations: Oral Care BID/PRN, Suction toothbrush (07/11/24 0955)        Daily Summary of Progress (SLP): progress toward functional goals as expected (07/11/24 0955)               Treatment Assessment (SLP): continued, toleration of diet, pharyngeal dysphagia (07/11/24 0955)  Treatment Assessment Comments (SLP): Pt participated in tx this am. No overt s/s of aspiration w/ honey, improved respiratory status. Good effort w/ tx exercises. Will continue to f/u and address dysphagia (07/11/24 0955)  Plan for Continued Treatment (SLP): continue treatment per plan of care (07/11/24 0955)         Plan of Care Reviewed With: patient  Progress: no change      SWALLOW EVALUATION (Last 72 Hours)       SLP Adult Swallow Evaluation       Row Name 07/11/24 0955 07/10/24 1015 07/09/24 1130             Rehab Evaluation    Document Type therapy note (daily note)  -CJ therapy note (daily note)  -GA therapy note (daily note)  -AV      Subjective Information no complaints  -CJ no complaints  -GA no complaints  -AV      Patient Observations alert;cooperative;agree to therapy  -CJ alert;cooperative;agree to therapy  -GA alert;cooperative  -AV      Patient/Family/Caregiver Comments/Observations no family present  -CJ none  -GA none  -AV      Patient Effort good  -CJ good  -GA good  -AV      Symptoms Noted During/After Treatment none  -CJ none  -GA --         General Information    Patient Profile Reviewed -- yes  -GA --      Pertinent History Of Current Problem -- see initial note  -GA --      Current Method of Nutrition -- honey-thick liquids;soft to chew textures;chopped  -GA --      Precautions/Limitations, Vision -- WFL;for purposes of eval  -GA --      Precautions/Limitations, Hearing -- WFL;for purposes of eval  -GA --      Prior Level of  Function-Communication -- WFL  -GA --      Prior Level of Function-Swallowing -- honey thick liquids;other (see comments)  eating reg/thin and not compliant with previous recommendations  -GA --      Plans/Goals Discussed with -- patient;agreed upon  -GA --      Barriers to Rehab -- medically complex;previous functional deficit  -GA --      Patient's Goals for Discharge -- patient did not state  -GA --         Pain    Additional Documentation Pain Scale: FACES Pre/Post-Treatment (Group)  -CJ Pain Scale: FACES Pre/Post-Treatment (Group)  -GA Pain Scale: FACES Pre/Post-Treatment (Group)  -AV         Pain Scale: FACES Pre/Post-Treatment    Pain: FACES Scale, Pretreatment 0-->no hurt  -CJ 0-->no hurt  -GA 0-->no hurt  -AV      Posttreatment Pain Rating 0-->no hurt  -CJ 0-->no hurt  -GA 0-->no hurt  -AV         SLP Evaluation Clinical Impression    SLP Swallowing Diagnosis -- -- mod-severe;pharyngeal dysphagia;mild-moderate;oral dysphagia  -AV      Functional Impact -- -- risk of aspiration/pneumonia;risk of dehydration;risk of malnutrition  -AV      Rehab Potential/Prognosis, Swallowing -- -- adequate, monitor progress closely  -AV      Swallow Criteria for Skilled Therapeutic Interventions Met -- -- demonstrates skilled criteria  -AV         SLP Treatment Clinical Impressions    Treatment Assessment (SLP) continued;toleration of diet;pharyngeal dysphagia  -CJ continued;pharyngeal dysphagia  -GA continued  -AV      Treatment Assessment Comments (SLP) Pt participated in tx this am. No overt s/s of aspiration w/ honey, improved respiratory status. Good effort w/ tx exercises. Will continue to f/u and address dysphagia  -CJ -- --      Daily Summary of Progress (SLP) progress toward functional goals as expected  -CJ progress toward functional goals is good  -GA progress toward functional goals is good  -AV      Barriers to Overall Progress (SLP) -- Fatigue  -GA Fatigue  -AV      Plan for Continued Treatment (SLP) continue  treatment per plan of care  -CJ continue treatment per plan of care  -GA continue treatment per plan of care  -AV      Care Plan Review care plan/treatment goals reviewed  -CJ care plan/treatment goals reviewed;patient/other agree to care plan  -GA care plan/treatment goals reviewed  -AV         Recommendations    Therapy Frequency (Swallow) 5 days per week  -CJ 5 days per week  -GA 5 days per week  -AV      Predicted Duration Therapy Intervention (Days) 2 weeks  -CJ 2 weeks  -GA 2 weeks  -AV      SLP Diet Recommendation soft to chew textures;chopped;no mixed consistencies;honey thick liquids  -CJ soft to chew textures;chopped;no mixed consistencies;honey thick liquids  -GA soft to chew textures;chopped;no mixed consistencies;honey thick liquids  -AV      Recommended Diagnostics -- -- reassess via FEES  -AV      Recommended Precautions and Strategies upright posture during/after eating;small bites of food and sips of liquid;general aspiration precautions;no straw;fatigue precautions;other (see comments)  -CJ upright posture during/after eating;small bites of food and sips of liquid;general aspiration precautions;no straw;fatigue precautions;other (see comments)  -GA upright posture during/after eating;small bites of food and sips of liquid;general aspiration precautions;no straw;fatigue precautions;other (see comments)  -AV      Oral Care Recommendations Oral Care BID/PRN;Suction toothbrush  -CJ Oral Care BID/PRN;Suction toothbrush  -GA Oral Care BID/PRN;Suction toothbrush  -AV      SLP Rec. for Method of Medication Administration meds whole;meds crushed;with puree;as tolerated  -CJ meds whole;meds crushed;with puree;as tolerated  -GA meds whole;meds crushed;with puree;as tolerated  -AV      Monitor for Signs of Aspiration yes;notify SLP if any concerns  -CJ yes;notify SLP if any concerns  -GA yes;notify SLP if any concerns  -AV      Anticipated Discharge Disposition (SLP) skilled nursing facility  - skilled  nursing facility  -GA skilled nursing facility  -AV                User Key  (r) = Recorded By, (t) = Taken By, (c) = Cosigned By      Initials Name Effective Dates    AV Shanelle Gonsales, MS CCC-SLP 06/16/21 -     CJ Denise Brown, MS CCC-SLP 06/03/24 -     GA Erasmo Tiesha, MS CCC-SLP 09/14/23 -                     EDUCATION  The patient has been educated in the following areas:   Dysphagia (Swallowing Impairment) Oral Care/Hydration.        SLP GOALS       Row Name 07/11/24 0955 07/10/24 1015 07/09/24 1130       (LTG) Patient will demonstrate functional swallow for    Diet Texture (Demonstrate functional swallow) soft to chew (chopped) textures  -CJ soft to chew (chopped) textures  -GA soft to chew (chopped) textures  -AV    Liquid viscosity (Demonstrate functional swallow) nectar/ mildly thick liquids  -CJ nectar/ mildly thick liquids  -GA nectar/ mildly thick liquids  -AV    [place] (Demonstrate functional swallow) with minimal cues (75-90% accuracy)  -CJ with minimal cues (75-90% accuracy)  -GA with minimal cues (75-90% accuracy)  -AV    Time Frame (Demonstrate functional swallow) by discharge  -CJ by discharge  -GA by discharge  -AV    Progress/Outcomes (Demonstrate functional swallow) continuing progress toward goal  -CJ goal ongoing  -GA continuing progress toward goal  -AV       (STG) Patient will tolerate trials of    Consistencies Trialed (Tolerate trials) soft to chew (chopped) textures;honey/ moderately thick liquids  -CJ soft to chew (chopped) textures;honey/ moderately thick liquids  -GA soft to chew (chopped) textures;honey/ moderately thick liquids  -AV    Desired Outcome (Tolerate trials) without signs/symptoms of aspiration;without signs of distress;with adequate oral prep/transit/clearance  -CJ without signs/symptoms of aspiration;without signs of distress;with adequate oral prep/transit/clearance  -GA without signs/symptoms of aspiration;without signs of distress;with  adequate oral prep/transit/clearance  -AV    [place] (Tolerate trials) with minimal cues (75-90% accuracy)  -CJ with minimal cues (75-90% accuracy)  -GA with minimal cues (75-90% accuracy)  -AV    Time Frame (Tolerate trials) 1 week  -CJ 1 week  -GA 1 week  -AV    Progress/Outcomes (Tolerate trials) continuing progress toward goal  -CJ continuing progress toward goal  -GA continuing progress toward goal  -AV    Comment (Tolerate trials) no s/s  -CJ Patient with 1x cough following honey liquids via cup. No other s/sx. Cough could be d/t respiratory status d/t coughing outside PO trials.  -GA --       (STG) Pharyngeal Strengthening Exercise Goal 1 (SLP)    Activity (Pharyngeal Strengthening Goal 1, SLP) increase timing;increase superior movement of the hyolaryngeal complex;increase anterior movement of the hyolaryngeal complex;increase closure at entrance to airway/closure of airway at glottis;increase squeeze/positive pressure generation  -CJ increase timing;increase superior movement of the hyolaryngeal complex;increase anterior movement of the hyolaryngeal complex;increase closure at entrance to airway/closure of airway at glottis;increase squeeze/positive pressure generation  -GA increase timing;increase superior movement of the hyolaryngeal complex;increase anterior movement of the hyolaryngeal complex;increase closure at entrance to airway/closure of airway at glottis;increase squeeze/positive pressure generation  -AV    Increase Timing prepping - 3 second prep or suck swallow or 3-step swallow  -CJ prepping - 3 second prep or suck swallow or 3-step swallow  -GA prepping - 3 second prep or suck swallow or 3-step swallow  -AV    Increase Superior Movement of the Hyolaryngeal Complex effortful pitch glide (falsetto + pharyngeal squeeze)  -CJ effortful pitch glide (falsetto + pharyngeal squeeze)  -GA effortful pitch glide (falsetto + pharyngeal squeeze)  -AV    Increase Anterior Movement of the Hyolaryngeal  Complex chin tuck against resistance (CTAR)  -CJ chin tuck against resistance (CTAR)  -GA chin tuck against resistance (CTAR)  -AV    Increase Closure at Entrance to Airway/Closure of Airway at Glottis supraglottic swallow  -CJ supraglottic swallow  -GA supraglottic swallow  -AV    Increase Squeeze/Positive Pressure Generation hard effortful swallow  -CJ hard effortful swallow  -GA hard effortful swallow  -AV    [place]/Accuracy (Pharyngeal Strengthening Goal 1, SLP) with moderate cues (50-74% accuracy)  -CJ with moderate cues (50-74% accuracy)  -GA with moderate cues (50-74% accuracy)  -AV    Time Frame (Pharyngeal Strengthening Goal 1, SLP) 1 week  -CJ 1 week  -GA 1 week  -AV    Progress/Outcomes (Pharyngeal Strengthening Goal 1, SLP) continuing progress toward goal  -CJ continuing progress toward goal  -GA continuing progress toward goal  -AV    Comment (Pharyngeal Strengthening Goal 1, SLP) reviewed and completed all exercises w/ good effort  -CJ Completed all exercises and provided handout  -GA --              User Key  (r) = Recorded By, (t) = Taken By, (c) = Cosigned By      Initials Name Provider Type    AV Shanelle Gonsales, MS CCC-SLP Speech and Language Pathologist    Denise Gloria, MS CCC-SLP Speech and Language Pathologist    Tiesha Tipton, MS CCC-SLP Speech and Language Pathologist                         Time Calculation:    Time Calculation- SLP       Row Name 07/11/24 1334             Time Calculation- SLP    SLP Start Time 0955  -CJ      SLP Received On 07/11/24  -         Untimed Charges    85393-TH Treatment Swallow Minutes 39  -CJ         Total Minutes    Untimed Charges Total Minutes 39  -CJ       Total Minutes 39  -CJ                User Key  (r) = Recorded By, (t) = Taken By, (c) = Cosigned By      Initials Name Provider Type    Denise Gloria, MS CCC-SLP Speech and Language Pathologist                    Therapy Charges for Today       Code Description Service  Date Service Provider Modifiers Qty    [id number]  ST TREATMENT SWALLOW 3 7/11/2024 Denise Brown, MS CCC-SLP GN 1                 Denise Brown, MS OSMANY-SLP  7/11/2024

## 2024-07-11 NOTE — PROGRESS NOTES
[facility] Medicine Services  PROGRESS NOTE    Patient Name: Bibiana Hodges  : 1935  MRN: [id number]    Date of Admission: 2024  Primary Care Physician: Nivia Madrigal MD    Subjective   Subjective     CC:  Follow-up for pneumonia    HPI:  Decreased to 5L NC. Still with cough. No GI symptoms. Eating lunch.     Objective   Objective     Vital Signs:   Temp:  [96 °F (35.6 °C)-98.2 °F (36.8 °C)] 97.9 °F (36.6 °C)  Heart Rate:  [60-72] 60  Resp:  [16-18] 16  BP: (114-168)/() 135/80  Flow (L/min):  [3-5] 5     Physical Exam:  Constitutional: No acute distress, awake, alert  HENT: NCAT, mucous membranes moist  Respiratory: Coarse throughout, respiratory effort normal   Cardiovascular: RRR, no murmurs, rubs, or gallops  Gastrointestinal: Positive bowel sounds, soft, nontender, nondistended  Musculoskeletal: No bilateral ankle edema  Psychiatric: Appropriate affect, cooperative  Neurologic: Alert, oriented, symmetric facies, HERNÁNDEZ, speech clear, +tremors  Skin: No rashes      Results Reviewed:  LAB RESULTS:      Lab 07/10/24  1100 07/09/24  04224  0728   WBC 11.96* 16.62* 15.50*   HEMOGLOBIN 11.0* 11.7* 15.1   HEMATOCRIT 33.9* 36.1 45.9   PLATELETS 151 159 250   NEUTROS ABS 10.88* 14.86* 11.34*   IMMATURE GRANS (ABS) 0.06* 0.09* 0.05   LYMPHS ABS 0.56* 1.18 3.49*   MONOS ABS 0.37 0.40 0.40   EOS ABS 0.07 0.05 0.18   .8* 102.6* 100.9*   PROCALCITONIN  --   --  0.09   LACTATE  --   --  2.7*         Lab 07/10/24  1100 07/09/24  04224  0728   SODIUM 134* 134* 128*   POTASSIUM 3.8 4.9 4.1   CHLORIDE 100 101 93*   CO2 27.0 21.0* 21.0*   ANION GAP 7.0 12.0 14.0   BUN 21 27* 20   CREATININE 1.16* 1.30* 1.26*   EGFR 45.4* 39.6* 41.1*   GLUCOSE 106* 63* 89   CALCIUM 8.1* 8.7 8.8   MAGNESIUM 1.6  --   --    PHOSPHORUS 3.2  --   --          Lab 07/10/24  1100 24  0728   TOTAL PROTEIN 5.3* 6.1   ALBUMIN 2.8* 3.3*   GLOBULIN 2.5 2.8   ALT (SGPT) 33 41*   AST  (SGOT) 31 46*   BILIRUBIN 0.4 0.3   ALK PHOS 63 73         Lab 07/08/24 0728   PROBNP 651.7   HSTROP T 18*                 Lab 07/08/24  0718   PH, ARTERIAL 7.419   PCO2, ARTERIAL 33.2*   PO2 ART 51.2*   FIO2 44   HCO3 ART 21.5   BASE EXCESS ART -2.2*     Brief Urine Lab Results  (Last result in the past 365 days)        Color   Clarity   Blood   Leuk Est   Nitrite   Protein   CREAT   Urine HCG        05/06/24 2154 Yellow   Clear   Negative   Negative   Negative   Trace                   Microbiology Results Abnormal       Procedure Component Value - Date/Time    Blood Culture - Blood, Wrist, Left [[id number]]  (Normal) Collected: 07/08/24 0729    Lab Status: Preliminary result Specimen: Blood from Wrist, Left Updated: 07/11/24 0801     Blood Culture No growth at 3 days    Narrative:      Less than seven (7) mL's of blood was collected.  Insufficient quantity may yield false negative results.    Blood Culture - Blood, Arm, Right [[id number]]  (Normal) Collected: 07/08/24 0728    Lab Status: Preliminary result Specimen: Blood from Arm, Right Updated: 07/11/24 0801     Blood Culture No growth at 3 days    Legionella Antigen, Urine - Urine, Urine, Clean Catch [[id number]]  (Normal) Collected: 07/08/24 1925    Lab Status: Final result Specimen: Urine, Clean Catch Updated: 07/09/24 0109     LEGIONELLA ANTIGEN, URINE Negative    MRSA Screen, PCR (Inpatient) - Swab, Nares [[id number]]  (Normal) Collected: 07/08/24 1248    Lab Status: Final result Specimen: Swab from Nares Updated: 07/08/24 1446     MRSA PCR Negative    Narrative:      The negative predictive value of this diagnostic test is high and should only be used to consider de-escalating anti-MRSA therapy. A positive result may indicate colonization with MRSA and must be correlated clinically.  MRSA Negative    Respiratory Panel PCR w/COVID-19(SARS-CoV-2) ERICK/MARTA/ELPIDIO/PAD/COR/ALMA In-House, NP Swab in UTM/VTM, 2 HR TAT - Swab, Nasopharynx [[id number]]  (Normal) Collected:  07/08/24 0730    Lab Status: Final result Specimen: Swab from Nasopharynx Updated: 07/08/24 0840     ADENOVIRUS, PCR Not Detected     Coronavirus 229E Not Detected     Coronavirus HKU1 Not Detected     Coronavirus NL63 Not Detected     Coronavirus OC43 Not Detected     COVID19 Not Detected     Human Metapneumovirus Not Detected     Human Rhinovirus/Enterovirus Not Detected     Influenza A PCR Not Detected     Influenza B PCR Not Detected     Parainfluenza Virus 1 Not Detected     Parainfluenza Virus 2 Not Detected     Parainfluenza Virus 3 Not Detected     Parainfluenza Virus 4 Not Detected     RSV, PCR Not Detected     Bordetella pertussis pcr Not Detected     Bordetella parapertussis PCR Not Detected     Chlamydophila pneumoniae PCR Not Detected     Mycoplasma pneumo by PCR Not Detected    Narrative:      In the setting of a positive respiratory panel with a viral infection PLUS a negative procalcitonin without other underlying concern for bacterial infection, consider observing off antibiotics or discontinuation of antibiotics and continue supportive care. If the respiratory panel is positive for atypical bacterial infection (Bordetella pertussis, Chlamydophila pneumoniae, or Mycoplasma pneumoniae), consider antibiotic de-escalation to target atypical bacterial infection.            No radiology results from the last 24 hrs    Results for orders placed during the hospital encounter of 08/20/23    Adult Transthoracic Echo Complete W/ Cont if Necessary Per Protocol    Interpretation Summary    Left ventricular systolic function is mildly decreased. Calculated left ventricular EF = 47.9% Normal left ventricular cavity size and wall thickness noted. There is left ventricular global hypokinesis noted. Left ventricular diastolic function is consistent with (grade I) impaired relaxation.    : Normal right ventricular cavity size and systolic function noted. Electronic lead present in the ventricle.    Normal left atrial  size and volume noted. Saline test results are negative.    There is moderate calcification of the aortic valve. The aortic valve was poorly visualized but appears trileaflet. Moderate aortic valve regurgitation is present. No aortic valve stenosis is present.    Mild mitral annular calcification is present. Mild mitral valve regurgitation is present. No significant mitral valve stenosis is present.    The tricuspid valve is grossly normal in structure. Mild tricuspid valve regurgitation is present. Estimated right ventricular systolic pressure from tricuspid regurgitation is normal, 33 mmHg. No tricuspid valve stenosis is present.    Mild dilation of the ascending aorta is present. Ascending aorta = 3.7 cm    There is a small (<1cm) pericardial effusion adjacent to the right atrium and right ventricle.      Current medications:  Scheduled Meds:amiodarone, 200 mg, Oral, Daily  apixaban, 2.5 mg, Oral, BID  atorvastatin, 40 mg, Oral, Nightly  castor oil-balsam peru, 1 Application, Topical, Q12H  cefTRIAXone, 2,000 mg, Intravenous, Q24H  doxycycline, 100 mg, Oral, Q12H  ipratropium-albuterol, 3 mL, Nebulization, 4x Daily - RT  levETIRAcetam, 250 mg, Oral, BID  metoprolol tartrate, 12.5 mg, Oral, BID  metroNIDAZOLE, 500 mg, Oral, Q8H  sodium chloride, 10 mL, Intravenous, Q12H  traZODone, 100 mg, Oral, Nightly  venlafaxine XR, 75 mg, Oral, Daily      Continuous Infusions:   PRN Meds:.  senna-docusate sodium **AND** polyethylene glycol **AND** bisacodyl **AND** bisacodyl    Calcium Replacement - Follow Nurse / BPA Driven Protocol    Magnesium Standard Dose Replacement - Follow Nurse / BPA Driven Protocol    ondansetron    Phosphorus Replacement - Follow Nurse / BPA Driven Protocol    Potassium Replacement - Follow Nurse / BPA Driven Protocol    sodium chloride    sodium chloride    sodium chloride    traMADol    Assessment & Plan   Assessment & Plan     Active Hospital Problems    Diagnosis  POA    **Acute respiratory  failure with hypoxia [J96.01]  Yes    Atrial fibrillation [I48.91]  Yes    Severe malnutrition [E43]  Yes    Sepsis due to pneumonia [J18.9, A41.9]  Yes    Hypertension [I10]  Yes    Dysautonomia orthostatic hypotension syndrome [I95.1]  Yes      Resolved Hospital Problems   No resolved problems to display.        Brief Hospital Course to date:  Bibiana Hodges is a 88 y.o. female 88-year-old female with history of atrial fibrillation, on amiodarone and Eliquis, hypertension, autonomic dysfunction, chronic debility, prior hospitalizations for recurrent pneumonia with concerns for possible aspiration.  She presented from nursing home with 1 month of progressive worsening shortness of breath and cough secondary to bilateral pneumonia.    This patient's problems and plans were partially entered by my partner and updated as appropriate by me 07/11/24.     Acute respiratory failure with hypoxia secondary to bilateral strep pneumo pneumonia  Sepsis secondary to pneumonia  Patient presented with hypoxic respiratory failure requiring high flow nasal cannula.  Continue weaning oxygen  Chest x-ray with bilateral pneumonia  Rocephin and doxycycline; Stop Flagyl  Probiotic  Strep pneumo antigen positive  SLP following given her previous history of aspiration pneumonia, on modified diet     Atrial fibrillation - Continue home metoprolol, amiodarone, Eliquis  HLD - Continue statin  Depression - Continue Effexor and BuSpar  Seizure disorder-continue Keppra, seizure precautions     Hypertension  Autonomic dysfunction with recurrent presyncopal episodes  BP currently low, hold other home anti-hypertensives    Chronic debility  Currently resides at assisted living facility  PT and OT    Goals of care  Palliative care team consulted for goals of care discussion     Left breast swelling  Patient is aware and has outpatient follow-up.  We are not able to do breast ultrasound for masses (only suspected abscesses) inpatient or mammograms  so she will need to keep follow up.    Expected Discharge Location and Transportation: [facility]  Expected Discharge   Expected Discharge Date: 7/12/2024; Expected Discharge Time:      VTE Prophylaxis:  Pharmacologic VTE prophylaxis orders are present.         AM-PAC 6 Clicks Score (PT): 17 (07/11/24 0861)    CODE STATUS:   Code Status and Medical Interventions:   Ordered at: 07/09/24 1340     Level Of Support Discussed With:    Patient     Code Status (Patient has no pulse and is not breathing):    CPR (Attempt to Resuscitate)     Medical Interventions (Patient has pulse or is breathing):    Full Support       Vanna Dykes MD  07/11/24

## 2024-07-12 PROCEDURE — 97530 THERAPEUTIC ACTIVITIES: CPT

## 2024-07-12 PROCEDURE — 99232 SBSQ HOSP IP/OBS MODERATE 35: CPT | Performed by: STUDENT IN AN ORGANIZED HEALTH CARE EDUCATION/TRAINING PROGRAM

## 2024-07-12 PROCEDURE — 94799 UNLISTED PULMONARY SVC/PX: CPT

## 2024-07-12 PROCEDURE — 92526 ORAL FUNCTION THERAPY: CPT

## 2024-07-12 PROCEDURE — 25010000002 ONDANSETRON PER 1 MG: Performed by: INTERNAL MEDICINE

## 2024-07-12 PROCEDURE — 25010000002 CEFTRIAXONE PER 250 MG: Performed by: INTERNAL MEDICINE

## 2024-07-12 PROCEDURE — 94664 DEMO&/EVAL PT USE INHALER: CPT

## 2024-07-12 RX ORDER — LISINOPRIL 5 MG/1
5 TABLET ORAL
Status: DISCONTINUED | OUTPATIENT
Start: 2024-07-12 | End: 2024-07-13

## 2024-07-12 RX ORDER — HYDROCODONE POLISTIREX AND CHLORPHENIRAMINE POLISTIREX 10; 8 MG/5ML; MG/5ML
5 SUSPENSION, EXTENDED RELEASE ORAL EVERY 12 HOURS PRN
Status: DISCONTINUED | OUTPATIENT
Start: 2024-07-12 | End: 2024-07-15

## 2024-07-12 RX ADMIN — TRAZODONE HYDROCHLORIDE 100 MG: 100 TABLET ORAL at 20:30

## 2024-07-12 RX ADMIN — APIXABAN 2.5 MG: 2.5 TABLET, FILM COATED ORAL at 10:14

## 2024-07-12 RX ADMIN — Medication 12.5 MG: at 20:30

## 2024-07-12 RX ADMIN — ATORVASTATIN CALCIUM 40 MG: 40 TABLET, FILM COATED ORAL at 20:30

## 2024-07-12 RX ADMIN — IPRATROPIUM BROMIDE AND ALBUTEROL SULFATE 3 ML: 2.5; .5 SOLUTION RESPIRATORY (INHALATION) at 07:49

## 2024-07-12 RX ADMIN — LEVETIRACETAM 250 MG: 500 TABLET, FILM COATED ORAL at 10:14

## 2024-07-12 RX ADMIN — SODIUM CHLORIDE 2000 MG: 900 INJECTION INTRAVENOUS at 08:40

## 2024-07-12 RX ADMIN — Medication 1 APPLICATION: at 08:37

## 2024-07-12 RX ADMIN — VENLAFAXINE HYDROCHLORIDE 75 MG: 75 CAPSULE, EXTENDED RELEASE ORAL at 10:13

## 2024-07-12 RX ADMIN — DOXYCYCLINE 100 MG: 100 CAPSULE ORAL at 10:13

## 2024-07-12 RX ADMIN — Medication 1 APPLICATION: at 21:00

## 2024-07-12 RX ADMIN — APIXABAN 2.5 MG: 2.5 TABLET, FILM COATED ORAL at 20:30

## 2024-07-12 RX ADMIN — IPRATROPIUM BROMIDE AND ALBUTEROL SULFATE 3 ML: 2.5; .5 SOLUTION RESPIRATORY (INHALATION) at 16:58

## 2024-07-12 RX ADMIN — TRAMADOL HYDROCHLORIDE 50 MG: 50 TABLET ORAL at 09:40

## 2024-07-12 RX ADMIN — IPRATROPIUM BROMIDE AND ALBUTEROL SULFATE 3 ML: 2.5; .5 SOLUTION RESPIRATORY (INHALATION) at 12:19

## 2024-07-12 RX ADMIN — LEVETIRACETAM 250 MG: 500 TABLET, FILM COATED ORAL at 20:30

## 2024-07-12 RX ADMIN — DOXYCYCLINE 100 MG: 100 CAPSULE ORAL at 20:30

## 2024-07-12 RX ADMIN — Medication 10 ML: at 08:45

## 2024-07-12 RX ADMIN — AMIODARONE HYDROCHLORIDE 200 MG: 200 TABLET ORAL at 10:13

## 2024-07-12 RX ADMIN — LISINOPRIL 5 MG: 5 TABLET ORAL at 16:00

## 2024-07-12 RX ADMIN — Medication 10 ML: at 20:30

## 2024-07-12 RX ADMIN — Medication 12.5 MG: at 10:13

## 2024-07-12 RX ADMIN — Medication 1 CAPSULE: at 10:13

## 2024-07-12 RX ADMIN — ONDANSETRON 4 MG: 2 INJECTION INTRAMUSCULAR; INTRAVENOUS at 09:40

## 2024-07-12 NOTE — CASE MANAGEMENT/SOCIAL WORK
Continued Stay Note  [facility]     Patient Name: Bibiana Hodges  MRN: [id number]  Today's Date: 7/12/2024    Admit Date: 7/8/2024    Plan: SNF vs back to assisted living at [facility]   Discharge Plan       Row Name 07/12/24 1435       Plan    Plan SNF vs back to assisted living at [facility]    Patient/Family in Agreement with Plan yes    Plan Comments CM faxed PT/speech notes and med list as requested to Dara (nurse) with [facility] at [facility]) [phone]. Per Dara ([phone] opt 2), she will review to see if patient needs an evaluation to return and will call FRANCESCA back at [phone] or [phone]. CM attempted to call Dara back to follow-up and had to leave a message. CM will continue to follow.    Final Discharge Disposition Code 03 - skilled nursing facility (SNF)                   Discharge Codes    No documentation.                 Expected Discharge Date and Time       Expected Discharge Date Expected Discharge Time    Jul 12, 2024               Aure Baker, RN

## 2024-07-12 NOTE — PROGRESS NOTES
[facility] Medicine Services  PROGRESS NOTE    Patient Name: Bibiana Hodges  : 1935  MRN: [id number]    Date of Admission: 2024  Primary Care Physician: Nivia Madrigal MD    Subjective   Subjective     CC:  Follow-up for pneumonia    HPI:  At time of evaluation, patient's nasal cannula was not on her and she was satting 92% on room air.  No shortness of breath.  Has a cough.  No chest pain.  No GI symptoms.    Objective   Objective     Vital Signs:   Temp:  [96.5 °F (35.8 °C)-97.9 °F (36.6 °C)] 97.5 °F (36.4 °C)  Heart Rate:  [60-76] 60  Resp:  [16-18] 18  BP: (128-165)/(68-96) 155/68  Flow (L/min):  [3-5] 5     Physical Exam:  Constitutional: No acute distress, awake, alert  HENT: NCAT, mucous membranes moist  Respiratory: Coarse throughout, respiratory effort normal   Cardiovascular: RRR, no murmurs, rubs, or gallops  Gastrointestinal: Positive bowel sounds, soft, nontender, nondistended  Musculoskeletal: No bilateral ankle edema  Psychiatric: Appropriate affect, cooperative  Neurologic: Alert, oriented, symmetric facies, HERNÁNDEZ, speech clear, +tremors  Skin: No rashes on exposed skin    Results Reviewed:  LAB RESULTS:      Lab 07/10/24  1100 07/09/24  0420 07/08/24  0728   WBC 11.96* 16.62* 15.50*   HEMOGLOBIN 11.0* 11.7* 15.1   HEMATOCRIT 33.9* 36.1 45.9   PLATELETS 151 159 250   NEUTROS ABS 10.88* 14.86* 11.34*   IMMATURE GRANS (ABS) 0.06* 0.09* 0.05   LYMPHS ABS 0.56* 1.18 3.49*   MONOS ABS 0.37 0.40 0.40   EOS ABS 0.07 0.05 0.18   .8* 102.6* 100.9*   PROCALCITONIN  --   --  0.09   LACTATE  --   --  2.7*         Lab 07/10/24  1100 24  04224  0728   SODIUM 134* 134* 128*   POTASSIUM 3.8 4.9 4.1   CHLORIDE 100 101 93*   CO2 27.0 21.0* 21.0*   ANION GAP 7.0 12.0 14.0   BUN 21 27* 20   CREATININE 1.16* 1.30* 1.26*   EGFR 45.4* 39.6* 41.1*   GLUCOSE 106* 63* 89   CALCIUM 8.1* 8.7 8.8   MAGNESIUM 1.6  --   --    PHOSPHORUS 3.2  --   --          Lab  07/10/24  1100 07/08/24  0728   TOTAL PROTEIN 5.3* 6.1   ALBUMIN 2.8* 3.3*   GLOBULIN 2.5 2.8   ALT (SGPT) 33 41*   AST (SGOT) 31 46*   BILIRUBIN 0.4 0.3   ALK PHOS 63 73         Lab 07/08/24 0728   PROBNP 651.7   HSTROP T 18*                 Lab 07/08/24  0718   PH, ARTERIAL 7.419   PCO2, ARTERIAL 33.2*   PO2 ART 51.2*   FIO2 44   HCO3 ART 21.5   BASE EXCESS ART -2.2*     Brief Urine Lab Results  (Last result in the past 365 days)        Color   Clarity   Blood   Leuk Est   Nitrite   Protein   CREAT   Urine HCG        05/06/24 2154 Yellow   Clear   Negative   Negative   Negative   Trace                   Microbiology Results Abnormal       Procedure Component Value - Date/Time    Blood Culture - Blood, Wrist, Left [[id number]]  (Normal) Collected: 07/08/24 0729    Lab Status: Preliminary result Specimen: Blood from Wrist, Left Updated: 07/12/24 0801     Blood Culture No growth at 4 days    Narrative:      Less than seven (7) mL's of blood was collected.  Insufficient quantity may yield false negative results.    Blood Culture - Blood, Arm, Right [[id number]]  (Normal) Collected: 07/08/24 0728    Lab Status: Preliminary result Specimen: Blood from Arm, Right Updated: 07/12/24 0801     Blood Culture No growth at 4 days    Legionella Antigen, Urine - Urine, Urine, Clean Catch [[id number]]  (Normal) Collected: 07/08/24 1925    Lab Status: Final result Specimen: Urine, Clean Catch Updated: 07/09/24 0109     LEGIONELLA ANTIGEN, URINE Negative    MRSA Screen, PCR (Inpatient) - Swab, Nares [[id number]]  (Normal) Collected: 07/08/24 1248    Lab Status: Final result Specimen: Swab from Nares Updated: 07/08/24 1446     MRSA PCR Negative    Narrative:      The negative predictive value of this diagnostic test is high and should only be used to consider de-escalating anti-MRSA therapy. A positive result may indicate colonization with MRSA and must be correlated clinically.  MRSA Negative    Respiratory Panel PCR  w/COVID-19(SARS-CoV-2) ERICK/M[facility]A/ELPIDIO/PAD/[facility]/ALMA In-House, NP Swab in UTM/VTM, 2 HR TAT - Swab, Nasopharynx [[id number]]  (Normal) Collected: 07/08/24 0730    Lab Status: Final result Specimen: Swab from Nasopharynx Updated: 07/08/24 0840     ADENOVIRUS, PCR Not Detected     Coronavirus 229E Not Detected     Coronavirus HKU1 Not Detected     Coronavirus NL63 Not Detected     Coronavirus OC43 Not Detected     COVID19 Not Detected     Human Metapneumovirus Not Detected     Human Rhinovirus/Enterovirus Not Detected     Influenza A PCR Not Detected     Influenza B PCR Not Detected     Parainfluenza Virus 1 Not Detected     Parainfluenza Virus 2 Not Detected     Parainfluenza Virus 3 Not Detected     Parainfluenza Virus 4 Not Detected     RSV, PCR Not Detected     Bordetella pertussis pcr Not Detected     Bordetella parapertussis PCR Not Detected     Chlamydophila pneumoniae PCR Not Detected     Mycoplasma pneumo by PCR Not Detected    Narrative:      In the setting of a positive respiratory panel with a viral infection PLUS a negative procalcitonin without other underlying concern for bacterial infection, consider observing off antibiotics or discontinuation of antibiotics and continue supportive care. If the respiratory panel is positive for atypical bacterial infection (Bordetella pertussis, Chlamydophila pneumoniae, or Mycoplasma pneumoniae), consider antibiotic de-escalation to target atypical bacterial infection.            No radiology results from the last 24 hrs    Results for orders placed during the hospital encounter of 08/20/23    Adult Transthoracic Echo Complete W/ Cont if Necessary Per Protocol    Interpretation Summary    Left ventricular systolic function is mildly decreased. Calculated left ventricular EF = 47.9% Normal left ventricular cavity size and wall thickness noted. There is left ventricular global hypokinesis noted. Left ventricular diastolic function is consistent with (grade I) impaired  relaxation.    : Normal right ventricular cavity size and systolic function noted. Electronic lead present in the ventricle.    Normal left atrial size and volume noted. Saline test results are negative.    There is moderate calcification of the aortic valve. The aortic valve was poorly visualized but appears trileaflet. Moderate aortic valve regurgitation is present. No aortic valve stenosis is present.    Mild mitral annular calcification is present. Mild mitral valve regurgitation is present. No significant mitral valve stenosis is present.    The tricuspid valve is grossly normal in structure. Mild tricuspid valve regurgitation is present. Estimated right ventricular systolic pressure from tricuspid regurgitation is normal, 33 mmHg. No tricuspid valve stenosis is present.    Mild dilation of the ascending aorta is present. Ascending aorta = 3.7 cm    There is a small (<1cm) pericardial effusion adjacent to the right atrium and right ventricle.      Current medications:  Scheduled Meds:amiodarone, 200 mg, Oral, Daily  apixaban, 2.5 mg, Oral, BID  atorvastatin, 40 mg, Oral, Nightly  castor oil-balsam peru, 1 Application, Topical, Q12H  doxycycline, 100 mg, Oral, Q12H  ipratropium-albuterol, 3 mL, Nebulization, 4x Daily - RT  lactobacillus acidophilus, 1 capsule, Oral, Daily  levETIRAcetam, 250 mg, Oral, BID  metoprolol tartrate, 12.5 mg, Oral, BID  sodium chloride, 10 mL, Intravenous, Q12H  traZODone, 100 mg, Oral, Nightly  venlafaxine XR, 75 mg, Oral, Daily      Continuous Infusions:   PRN Meds:.  senna-docusate sodium **AND** polyethylene glycol **AND** bisacodyl **AND** bisacodyl    Calcium Replacement - Follow Nurse / BPA Driven Protocol    Magnesium Standard Dose Replacement - Follow Nurse / BPA Driven Protocol    ondansetron    Phosphorus Replacement - Follow Nurse / BPA Driven Protocol    Potassium Replacement - Follow Nurse / BPA Driven Protocol    sodium chloride    sodium chloride    sodium chloride     traMADol    Assessment & Plan   Assessment & Plan     Active Hospital Problems    Diagnosis  POA    **Acute respiratory failure with hypoxia [J96.01]  Yes    Atrial fibrillation [I48.91]  Yes    Severe malnutrition [E43]  Yes    Sepsis due to pneumonia [J18.9, A41.9]  Yes    Hypertension [I10]  Yes    Dysautonomia orthostatic hypotension syndrome [I95.1]  Yes      Resolved Hospital Problems   No resolved problems to display.        Brief Hospital Course to date:  Bibiana Hodges is a 88 y.o. female 88-year-old female with history of atrial fibrillation, on amiodarone and Eliquis, hypertension, autonomic dysfunction, chronic debility, prior hospitalizations for recurrent pneumonia with concerns for possible aspiration.  She presented from nursing home with 1 month of progressive worsening shortness of breath and cough secondary to bilateral pneumonia.    This patient's problems and plans were partially entered by my partner and updated as appropriate by me 07/12/24.     Acute respiratory failure with hypoxia secondary to bilateral strep pneumo pneumonia  Sepsis secondary to pneumonia  Patient presented with hypoxic respiratory failure requiring high flow nasal cannula.  Continue weaning oxygen; was on room air briefly this morning, continue to wean  Chest x-ray with bilateral pneumonia  Rocephin and doxycycline; Stopped Flagyl  Probiotic  Strep pneumo antigen positive  SLP following given her previous history of aspiration pneumonia, on modified diet     Atrial fibrillation - Continue home metoprolol, amiodarone, Eliquis  HLD - Continue statin  Depression - Continue Effexor and BuSpar  Seizure disorder-continue Keppra, seizure precautions     Hypertension  Autonomic dysfunction with recurrent presyncopal episodes  BP now improving, resumed lower dose lisinopril on 7/12    Chronic debility  Currently resides at assisted living facility  PT and OT    Goals of care  Palliative care team consulted for goals of care  discussion     Left breast swelling  Patient is aware and has outpatient follow-up.  We are not able to do breast ultrasound for masses (only suspected abscesses) inpatient or mammograms so she will need to keep follow up.    Expected Discharge Location and Transportation: Facility; medically ready and awaiting placement  Expected Discharge   Expected Discharge Date: 7/12/2024; Expected Discharge Time:      VTE Prophylaxis:  Pharmacologic VTE prophylaxis orders are present.         AM-PAC 6 Clicks Score (PT): 16 (07/12/24 0840)    CODE STATUS:   Code Status and Medical Interventions:   Ordered at: 07/11/24 1510     Level Of Support Discussed With:    Patient     Code Status (Patient has no pulse and is not breathing):    No CPR (Do Not Attempt to Resuscitate)     Medical Interventions (Patient has pulse or is breathing):    Full Support       Vanna Dykes MD  07/12/24

## 2024-07-12 NOTE — PLAN OF CARE
"Goal Outcome Evaluation:  Plan of Care Reviewed With: patient        Progress: no change  Outcome Evaluation: Palliative RN and Dr. WILDER Mitchell saw pt at 1107.  Pt. had just got back to chair after walking about 50ft. with PT.  While pt. stated it felt good to get up she stated she was \"tired\".  She endorsed mild pleuritic pain from cough that she stated she has nightly. Dr. Spencer added cough medication to help manage.  Palliative care to continue to follow along for support, POC and ongoing [facility].         0930 Palliative IDT meeting: TULIO Fuentes RN, CHPN; TAINA Talamantes, APRN; WILDER Spencer MD.; MELLO Kim RN, CHPN; HAYDEN Cee, [facility], [facility]; MELLO Cardona MDiv, [facility]    After hours, contact Palliative by calling [phone].                            "

## 2024-07-12 NOTE — PLAN OF CARE
Goal Outcome Evaluation:              Outcome Evaluation: VSS, 3L NC when awake, 5L NC when sleeping. Patient with pleuritic pain after coughing this morning, reeducated about pain medications, patient declined pain medication. Repeated occurrence of pleuritic pain when daughter present, patient agreeable to pain medication. Patient up with PT to chair. Skin interventions in place (incontinence pads, low air loss mattress, venelex), patient turned. Patient upset with dysphagia orders, nurse attempted to reeducate, SLP came to provide additional education. Patient agreeable to ice chips per orders, requires reeducation and repetition about dysphagia safety. PO intake encouraged, no bowel movement this shift.

## 2024-07-12 NOTE — DISCHARGE PLACEMENT REQUEST
"Bibiana Hodges (88 y.o. Female)     Aure Bledsoeer RN  [phone]      Date of Birth   1935    Social Security Number       Address   [address] [id number]    Home Phone   [phone]    MRN   [id number]       [employer]    Marital Status                               Admission Date   7/8/24    Admission Type   Emergency    Admitting Provider   Vanna Dykes MD    Attending Provider   Vanna Dykes MD    Department, Room/Bed   [facility] 6B, N646/1       Discharge Date       Discharge Disposition       Discharge Destination                                 Attending Provider: Vanna Dykes MD    Allergies: Gabapentin, Sulfa Antibiotics    Isolation: None   Infection: None   Code Status: No CPR    Ht: 170.2 cm (67\")   Wt: 59 kg (130 lb)    Admission Cmt: None   Principal Problem: Acute respiratory failure with hypoxia [J96.01]                   Active Insurance as of 7/8/2024       Primary Coverage       Payor Plan Insurance Group Employer/Plan Group    HUMANA MEDICARE REPLACEMENT HUMANA MED ADV HMO [id number]       Payor Plan Address Payor Plan Phone Number Payor Plan Fax Number Effective Dates    [address] [address] [phone]  5/1/2023 - None Entered    [facility] [id number]         Subscriber Name Subscriber Birth Date Member ID       BIBIANA HODGES 1935 [id number]                     Emergency Contacts        (Rel.) Home Phone Work Phone Mobile Phone    Danyelle Keyes (POA) (Daughter) [phone] -- [phone]    HODGES,ELODIA (Son) -- -- [phone]    HODGESNIA SCHNEIDER (Son) [phone] -- [phone]              Current Facility-Administered Medications   Medication Dose Route Frequency Provider Last Rate Last Admin    amiodarone (PACERONE) tablet 200 mg  200 mg Oral Daily Destiny Hammond MD   200 mg at 07/12/24 1013    apixaban (ELIQUIS) tablet 2.5 mg  2.5 mg Oral BID Destiny Hammond MD   2.5 mg at 07/12/24 1014    " atorvastatin (LIPITOR) tablet 40 mg  40 mg Oral Nightly Destiny Hammond MD   40 mg at 07/11/24 2103    sennosides-docusate (PERICOLACE) 8.6-50 MG per tablet 2 tablet  2 tablet Oral BID PRN Destiny Hammond MD        And    polyethylene glycol (MIRALAX) packet 17 g  17 g Oral Daily PRN Destiny Hammond MD        And    bisacodyl (DULCOLAX) EC tablet 5 mg  5 mg Oral Daily PRN Destiny Hammond MD   5 mg at 07/10/24 1510    And    bisacodyl (DULCOLAX) suppository 10 mg  10 mg Rectal Daily PRN Destiny Hammond MD        Calcium Replacement - Follow Nurse / BPA Driven Protocol   Does not apply PRN Destiny Hammond MD        castor oil-balsam peru (VENELEX) ointment 1 Application  1 Application Topical Q12H Vanna Dykes MD   1 Application at 07/12/24 0837    doxycycline (MONODOX) capsule 100 mg  100 mg Oral Q12H Gurpreet Starks PharmD   100 mg at 07/12/24 1013    ipratropium-albuterol (DUO-NEB) nebulizer solution 3 mL  3 mL Nebulization 4x Daily - RT Destiny Hammond MD   3 mL at 07/12/24 1219    lactobacillus acidophilus (RISAQUAD) capsule 1 capsule  1 capsule Oral Daily Vanna Dykes MD   1 capsule at 07/12/24 1013    levETIRAcetam (KEPPRA) tablet 250 mg  250 mg Oral BID Destniy Hammond MD   250 mg at 07/12/24 1014    lisinopril (PRINIVIL,ZESTRIL) tablet 5 mg  5 mg Oral Q24H Vanna Dykes MD        Magnesium Standard Dose Replacement - Follow Nurse / BPA Driven Protocol   Does not apply PRN Destiny Hammond MD        metoprolol tartrate (LOPRESSOR) half tablet 12.5 mg  12.5 mg Oral BID Destiny Hammond MD   12.5 mg at 07/12/24 1013    ondansetron (ZOFRAN) injection 4 mg  4 mg Intravenous Q6H PRN Destiny Hammond MD   4 mg at 07/12/24 0940    Phosphorus Replacement - Follow Nurse / BPA Driven Protocol   Does not apply PRN Destiny Hammond MD        Potassium Replacement - Follow Nurse / BPA Driven Protocol   Does not apply PRN Destiny Hammond MD        sodium chloride 0.9 % flush 10 mL  10 mL Intravenous PRN Chance Mcrae MD         sodium chloride 0.9 % flush 10 mL  10 mL Intravenous Q12H Destiny Hammond MD   10 mL at 24 0845    sodium chloride 0.9 % flush 10 mL  10 mL Intravenous PRN Destiny Hammond MD        sodium chloride 0.9 % infusion 40 mL  40 mL Intravenous PRN Destiny Hammond MD        traMADol (ULTRAM) tablet 50 mg  50 mg Oral Q12H PRN Destiny Hammond MD   50 mg at 24 0940    traZODone (DESYREL) tablet 100 mg  100 mg Oral Nightly Destiny Hammond MD   100 mg at 24 2103    venlafaxine XR (EFFEXOR-XR) 24 hr capsule 75 mg  75 mg Oral Daily Destiny Hammond MD   75 mg at 24 1013        Physical Therapy Notes (most recent note)        Corry Nam, PT at 24 1028  Version 1 of 1         Patient Name: Bibiana Hodges  : 1935    MRN: [id number]                              Today's Date: 2024       Admit Date: 2024    Visit Dx:     ICD-10-CM ICD-9-CM   1. Hyponatremia  E87.1 276.1   2. Pneumonia of both lungs due to infectious organism, unspecified part of lung  J18.9 483.8   3. Acute respiratory failure with hypoxia  J96.01 518.81   4. Oropharyngeal dysphagia  R13.12 787.22     Patient Active Problem List   Diagnosis    Dysautonomia orthostatic hypotension syndrome    Hypertension    Flu vaccine need    Streptococcus pneumoniae vaccination indicated    Sepsis due to pneumonia    Severe malnutrition    Acute respiratory failure with hypoxia    Atrial fibrillation    Autonomic dysfunction     Past Medical History:   Diagnosis Date    Constipation     Depression     Dysautonomia orthostatic hypotension syndrome     Generalized osteoarthritis     GERD (gastroesophageal reflux disease)     s/p Nissen    Hypertension     Insomnia     Osteoarthrosis, shoulder region     Skin ulcer of scalp     Thrombophlebitis of arm     Tremor     Vitamin B12 deficiency      Past Surgical History:   Procedure Laterality Date    DILATATION AND CURETTAGE      HERNIA REPAIR      HIP SURGERY      SHOULDER SURGERY       Right      General Information       Row Name 07/12/24 1315          Physical Therapy Time and Intention    Document Type therapy note (daily note)  -HELENE     Mode of Treatment physical therapy  -KE       Row Name 07/12/24 1315          General Information    Patient Profile Reviewed yes  -KE     Existing Precautions/Restrictions fall;oxygen therapy device and L/min  -KE     Barriers to Rehab medically complex;previous functional deficit  -KE       Row Name 07/12/24 1315          Cognition    Orientation Status (Cognition) oriented x 3  -KE       Row Name 07/12/24 1315          Safety Issues, Functional Mobility    Safety Issues Affecting Function (Mobility) awareness of need for assistance;insight into deficits/self-awareness;safety precautions follow-through/compliance;safety precaution awareness;sequencing abilities  -KE     Impairments Affecting Function (Mobility) balance;coordination;endurance/activity tolerance;motor planning;strength;shortness of breath;postural/trunk control  -KE               User Key  (r) = Recorded By, (t) = Taken By, (c) = Cosigned By      Initials Name Provider Type    Corry Gooden, GAIL Physical Therapist                   Mobility       Row Name 07/12/24 1316          Bed Mobility    Bed Mobility supine-sit  -KE     Supine-Sit [place] (Bed Mobility) minimum assist (75% patient effort);1 person to manage equipment  -HELENE     Assistive Device (Bed Mobility) bed rails;head of bed elevated  -HELENE     Comment, (Bed Mobility) increased time and effort,  -KE       Row Name 07/12/24 1316          Bed-Chair Transfer    Bed-Chair [place] (Transfers) minimum assist (75% patient effort);2 person assist  -HELENE     Assistive Device (Bed-Chair Transfers) other (see comments)  B UE support  -       Row Name 07/12/24 1316          Sit-Stand Transfer    Sit-Stand [place] (Transfers) minimum assist (75% patient effort);1 person assist  -KE     Assistive Device (Sit-Stand Transfers)  walker, front-wheeled  -KE     Comment, (Sit-Stand Transfer) VCs for improving HP  -KE       Row Name 07/12/24 1316          Gait/Stairs (Locomotion)    [place] Level (Gait) minimum assist (75% patient effort);verbal cues;1 person assist;1 person to manage equipment  -KE     Assistive Device (Gait) walker, front-wheeled  -KE     Distance in Feet (Gait) 20  +30  -KE     Deviations/Abnormal Patterns (Gait) perri decreased;festinating/shuffling;gait speed decreased;stride length decreased;base of support, narrow;bilateral deviations  -KE     Bilateral Gait Deviations forward flexed posture;heel strike decreased  -KE     Comment, (Gait/Stairs) Pt amb 20'+30' w/ FWW, MinAx1+chair follow for safety. Pt demo short step length, narrow SENDY, fwd flexed posture, and slow gait speed. VCs for improving FWW managment. Further mobility limited by fatigue.  -HELENE               User Key  (r) = Recorded By, (t) = Taken By, (c) = Cosigned By      Initials Name Provider Type    Corry Gooden PT Physical Therapist                   Obj/Interventions       Row Name 07/12/24 1322          Balance    Balance Assessment sitting static balance;sitting dynamic balance;sit to stand dynamic balance;standing static balance;standing dynamic balance  -KE     Static Sitting Balance standby assist  -KE     Dynamic Sitting Balance standby assist  -KE     Position, Sitting Balance sitting edge of bed  -KE     Sit to Stand Dynamic Balance minimal assist  -KE     Static Standing Balance contact guard  -KE     Dynamic Standing Balance minimal assist  -KE     Position/Device Used, Standing Balance supported;walker, front-wheeled  -KE     Balance Interventions sitting;standing;sit to stand;supported;static;dynamic  -HELENE               User Key  (r) = Recorded By, (t) = Taken By, (c) = Cosigned By      Initials Name Provider Type    Corry Gooden PT Physical Therapist                   Goals/Plan    No documentation.                   Clinical Impression       Row Name 07/12/24 1323          Pain    Pretreatment Pain Rating 0/10 - no pain  -KE     Posttreatment Pain Rating 0/10 - no pain  -KE     Pain Intervention(s) Ambulation/increased activity;Repositioned  -KE       Row Name 07/12/24 1323          Plan of Care Review    Plan of Care Reviewed With patient  -KE     Progress improving  -     Outcome Evaluation Pt progressing to ambulating 20'+30' w/ FWW, MinAx1+close chair follow for safety. Pt presents below baseline with generalized weakness, balance deficits, and decreased activity tolerance warranting skilled IP PT services. Continue progressing current PT POC as tolerated.  -KE       Row Name 07/12/24 1323          Vital Signs    O2 Delivery Pre Treatment nasal cannula  -KE     O2 Delivery Intra Treatment nasal cannula  -KE     Post SpO2 (%) 97  -KE     O2 Delivery Post Treatment nasal cannula  -KE     Pre Patient Position Supine  -KE     Intra Patient Position Standing  -KE     Post Patient Position Sitting  -KE       Row Name 07/12/24 1323          Positioning and Restraints    Pre-Treatment Position in bed  -KE     Post Treatment Position chair  -KE     In Chair reclined;call light within reach;encouraged to call for assist;exit alarm on;with family/caregiver;waffle cushion;notified [facility]               User Key  (r) = Recorded By, (t) = Taken By, (c) = Cosigned By      Initials Name Provider Type    Corry Gooden, PT Physical Therapist                   Outcome Measures       Row Name 07/12/24 1326 07/12/24 0840       How much help from another person do you currently need...    Turning from your back to your side while in flat bed without using bedrails? 3  -KE 3  -LC    Moving from lying on back to sitting on the side of a flat bed without bedrails? 3  -KE 3  -LC    Moving to and from a bed to a chair (including a wheelchair)? 3  -KE 3  -LC    Standing up from a chair using your arms (e.g., wheelchair, bedside chair)? 3  -KE  3  -LC    Climbing 3-5 steps with a railing? 2  -KE 2  -LC    To walk in hospital room? 3  -KE 2  -LC    AM-PAC 6 Clicks Score (PT) 17  -KE 16  -LC    Highest Level of Mobility Goal 5 --> Static standing  -KE 5 --> Static standing  -      Row Name 07/12/24 1326          Functional Assessment    Outcome Measure Options AM-PAC 6 Clicks Basic Mobility (PT)  -KE               User Key  (r) = Recorded By, (t) = Taken By, (c) = Cosigned By      Initials Name Provider Type    Marge Lynn, RN Registered Nurse    Corry Gooden, GIAL Physical Therapist                                 Physical Therapy Education       Title: PT OT SLP Therapies (In Progress)       Topic: Physical Therapy (In Progress)       Point: Mobility training (In Progress)       Learning Progress Summary             Patient Acceptance, E, NR by HELENE at 7/12/2024 1028    Acceptance, E, VU,NR by NS at 7/10/2024 1604    Comment: safety with mobility, benefits/need for rehab    Acceptance, E, VU,NR by NS at 7/9/2024 1319                         Point: Home exercise program (Done)       Learning Progress Summary             Patient Acceptance, E, VU,NR by NS at 7/10/2024 1604    Comment: safety with mobility, benefits/need for rehab                         Point: Body mechanics (In Progress)       Learning Progress Summary             Patient Acceptance, E, NR by HELENE at 7/12/2024 1028    Acceptance, E, VU,NR by NS at 7/10/2024 1604    Comment: safety with mobility, benefits/need for rehab    Acceptance, E, VU,NR by NS at 7/9/2024 1319                         Point: Precautions (In Progress)       Learning Progress Summary             Patient Acceptance, E, NR by HELENE at 7/12/2024 1028    Acceptance, E, VU,NR by NS at 7/10/2024 1604    Comment: safety with mobility, benefits/need for rehab    Acceptance, E, VU,NR by NS at 7/9/2024 1319                                         User Key       Initials Effective Dates Name Provider Type [facility]    NS  21 -  Shelley Gutiérrez, PT Physical Therapist PT    HELENE 23 -  Corry Nam PT Physical Therapist PT                  PT Recommendation and Plan     Plan of Care Reviewed With: patient  Progress: improving  Outcome Evaluation: Pt progressing to ambulating 20'+30' w/ FWW, MinAx1+close chair follow for safety. Pt presents below baseline with generalized weakness, balance deficits, and decreased activity tolerance warranting skilled IP PT services. Continue progressing current PT POC as tolerated.     Time Calculation:         PT Charges       Row Name 24 1328             Time Calculation    Start Time 1028  -KE      PT Received On 24  -KE         Timed Charges    96121 - PT Therapeutic Activity Minutes 34  -KE         Total Minutes    Timed Charges Total Minutes 34  -KE       Total Minutes 34  -KE                User Key  (r) = Recorded By, (t) = Taken By, (c) = Cosigned By      Initials Name Provider Type    Corry Gooden PT Physical Therapist                  Therapy Charges for Today       Code Description Service Date Service Provider Modifiers Qty    02669705274 HC PT THERAPEUTIC ACT EA 15 MIN 2024 Corry Nam, PT GP 2    83065258329 HC PT THER SUPP EA 15 MIN 2024 Corry Nam, PT GP 2            PT G-Codes  Outcome Measure Options: AM-PAC 6 Clicks Basic Mobility (PT)  AM-PAC 6 Clicks Score (PT): 17  PT Discharge Summary  Anticipated Discharge Disposition (PT): skilled nursing facility    Corry Nam PT  2024      Electronically signed by Corry Nam PT at 24 1328       Occupational Therapy Notes (most recent note)    No notes exist for this encounter.          Speech Language Pathology Notes (most recent note)        Denise Brown MS CCC-SLP at 24 1335          Acute Care - Speech Language Pathology   Swallow Treatment Note KATHERYN Vargas     Patient Name: Bibiana Hodges  : 1935  MRN: [id number]  Today's Date: 2024                Admit Date: 7/8/2024    Visit Dx:     ICD-10-CM ICD-9-CM   1. Hyponatremia  E87.1 276.1   2. Pneumonia of both lungs due to infectious organism, unspecified part of lung  J18.9 483.8   3. Acute respiratory failure with hypoxia  J96.01 518.81   4. Oropharyngeal dysphagia  R13.12 787.22     Patient Active Problem List   Diagnosis    Dysautonomia orthostatic hypotension syndrome    Hypertension    Flu vaccine need    Streptococcus pneumoniae vaccination indicated    Sepsis due to pneumonia    Severe malnutrition    Acute respiratory failure with hypoxia    Atrial fibrillation    Autonomic dysfunction     Past Medical History:   Diagnosis Date    Constipation     Depression     Dysautonomia orthostatic hypotension syndrome     Generalized osteoarthritis     GERD (gastroesophageal reflux disease)     s/p Nissen    Hypertension     Insomnia     Osteoarthrosis, shoulder region     Skin ulcer of scalp     Thrombophlebitis of arm     Tremor     Vitamin B12 deficiency      Past Surgical History:   Procedure Laterality Date    DILATATION AND CURETTAGE      HERNIA REPAIR      HIP SURGERY      SHOULDER SURGERY      Right       SLP Recommendation and Plan     SLP Diet Recommendation: soft to chew textures, chopped, no mixed consistencies, honey thick liquids (07/11/24 0955)  Recommended Precautions and Strategies: upright posture during/after eating, small bites of food and sips of liquid, general aspiration precautions, no straw, fatigue precautions, other (see comments) (07/11/24 0955)  SLP Rec. for Method of Medication Administration: meds whole, meds crushed, with puree, as tolerated (07/11/24 0955)     Monitor for Signs of Aspiration: yes, notify SLP if any concerns (07/11/24 0955)        Anticipated Discharge Disposition (SLP): skilled nursing facility (07/11/24 0955)     Therapy Frequency (Swallow): 5 days per week (07/11/24 0955)  Predicted Duration Therapy Intervention (Days): 2 weeks (07/11/24 0955)  Oral  Care Recommendations: Oral Care BID/PRN, Suction toothbrush (07/11/24 0955)        Daily Summary of Progress (SLP): progress toward functional goals as expected (07/11/24 0955)               Treatment Assessment (SLP): continued, toleration of diet, pharyngeal dysphagia (07/11/24 0955)  Treatment Assessment Comments (SLP): Pt participated in tx this am. No overt s/s of aspiration w/ honey, improved respiratory status. Good effort w/ tx exercises. Will continue to f/u and address dysphagia (07/11/24 0955)  Plan for Continued Treatment (SLP): continue treatment per plan of care (07/11/24 0955)         Plan of Care Reviewed With: patient  Progress: no change      SWALLOW EVALUATION (Last 72 Hours)       SLP Adult Swallow Evaluation       Row Name 07/11/24 0955 07/10/24 1015 07/09/24 1130             Rehab Evaluation    Document Type therapy note (daily note)  -CJ therapy note (daily note)  -GA therapy note (daily note)  -AV      Subjective Information no complaints  -CJ no complaints  -GA no complaints  -AV      Patient Observations alert;cooperative;agree to therapy  -CJ alert;cooperative;agree to therapy  -GA alert;cooperative  -AV      Patient/Family/Caregiver Comments/Observations no family present  -CJ none  -GA none  -AV      Patient Effort good  -CJ good  -GA good  -AV      Symptoms Noted During/After Treatment none  -CJ none  -GA --         General Information    Patient Profile Reviewed -- yes  -GA --      Pertinent History Of Current Problem -- see initial note  -GA --      Current Method of Nutrition -- honey-thick liquids;soft to chew textures;chopped  -GA --      Precautions/Limitations, Vision -- WFL;for purposes of eval  -GA --      Precautions/Limitations, Hearing -- WFL;for purposes of eval  -GA --      Prior Level of Function-Communication -- WFL  -GA --      Prior Level of Function-Swallowing -- honey thick liquids;other (see comments)  eating reg/thin and not compliant with previous recommendations   -GA --      Plans/Goals Discussed with -- patient;agreed upon  -GA --      Barriers to Rehab -- medically complex;previous functional deficit  -GA --      Patient's Goals for Discharge -- patient did not state  -GA --         Pain    Additional Documentation Pain Scale: FACES Pre/Post-Treatment (Group)  -CJ Pain Scale: FACES Pre/Post-Treatment (Group)  -GA Pain Scale: FACES Pre/Post-Treatment (Group)  -AV         Pain Scale: FACES Pre/Post-Treatment    Pain: FACES Scale, Pretreatment 0-->no hurt  -CJ 0-->no hurt  -GA 0-->no hurt  -AV      Posttreatment Pain Rating 0-->no hurt  -CJ 0-->no hurt  -GA 0-->no hurt  -AV         SLP Evaluation Clinical Impression    SLP Swallowing Diagnosis -- -- mod-severe;pharyngeal dysphagia;mild-moderate;oral dysphagia  -AV      Functional Impact -- -- risk of aspiration/pneumonia;risk of dehydration;risk of malnutrition  -AV      Rehab Potential/Prognosis, Swallowing -- -- adequate, monitor progress closely  -AV      Swallow Criteria for Skilled Therapeutic Interventions Met -- -- demonstrates skilled criteria  -AV         SLP Treatment Clinical Impressions    Treatment Assessment (SLP) continued;toleration of diet;pharyngeal dysphagia  - continued;pharyngeal dysphagia  -GA continued  -AV      Treatment Assessment Comments (SLP) Pt participated in tx this am. No overt s/s of aspiration w/ honey, improved respiratory status. Good effort w/ tx exercises. Will continue to f/u and address dysphagia  - -- --      Daily Summary of Progress (SLP) progress toward functional goals as expected  - progress toward functional goals is good  -GA progress toward functional goals is good  -      Barriers to Overall Progress (SLP) -- Fatigue  -GA Fatigue  -AV      Plan for Continued Treatment (SLP) continue treatment per plan of care  - continue treatment per plan of care  -GA continue treatment per plan of care  -AV      Care Plan Review care plan/treatment goals reviewed  - care  plan/treatment goals reviewed;patient/other agree to care plan  -GA care plan/treatment goals reviewed  -AV         Recommendations    Therapy Frequency (Swallow) 5 days per week  -CJ 5 days per week  -GA 5 days per week  -AV      Predicted Duration Therapy Intervention (Days) 2 weeks  - 2 weeks  -GA 2 weeks  -AV      SLP Diet Recommendation soft to chew textures;chopped;no mixed consistencies;honey thick liquids  - soft to chew textures;chopped;no mixed consistencies;honey thick liquids  -GA soft to chew textures;chopped;no mixed consistencies;honey thick liquids  -AV      Recommended Diagnostics -- -- reassess via FEES  -AV      Recommended Precautions and Strategies upright posture during/after eating;small bites of food and sips of liquid;general aspiration precautions;no straw;fatigue precautions;other (see comments)  - upright posture during/after eating;small bites of food and sips of liquid;general aspiration precautions;no straw;fatigue precautions;other (see comments)  -GA upright posture during/after eating;small bites of food and sips of liquid;general aspiration precautions;no straw;fatigue precautions;other (see comments)  -AV      Oral Care Recommendations Oral Care BID/PRN;Suction toothbrush  - Oral Care BID/PRN;Suction toothbrush  -GA Oral Care BID/PRN;Suction toothbrush  -AV      SLP Rec. for Method of Medication Administration meds whole;meds crushed;with puree;as tolerated  - meds whole;meds crushed;with puree;as tolerated  -GA meds whole;meds crushed;with puree;as tolerated  -AV      Monitor for Signs of Aspiration yes;notify SLP if any concerns  - yes;notify SLP if any concerns  -GA yes;notify SLP if any concerns  -AV      Anticipated Discharge Disposition (SLP) skilled nursing facility  - skilled nursing facility  -GA skilled nursing facility  -                User Key  (r) = Recorded By, (t) = Taken By, (c) = Cosigned By      Initials Name Effective Dates    JAMIE Souza  Shanelle Maier, MS CCC-SLP 06/16/21 -     CJ Denise Brown, MS CCC-SLP 06/03/24 -     GA Tiesha Zimmerman, MS CCC-SLP 09/14/23 -                     EDUCATION  The patient has been educated in the following areas:   Dysphagia (Swallowing Impairment) Oral Care/Hydration.        SLP GOALS       Row Name 07/11/24 0955 07/10/24 1015 07/09/24 1130       (LTG) Patient will demonstrate functional swallow for    Diet Texture (Demonstrate functional swallow) soft to chew (chopped) textures  -CJ soft to chew (chopped) textures  -GA soft to chew (chopped) textures  -AV    Liquid viscosity (Demonstrate functional swallow) nectar/ mildly thick liquids  -CJ nectar/ mildly thick liquids  -GA nectar/ mildly thick liquids  -AV    [place] (Demonstrate functional swallow) with minimal cues (75-90% accuracy)  -CJ with minimal cues (75-90% accuracy)  -GA with minimal cues (75-90% accuracy)  -AV    Time Frame (Demonstrate functional swallow) by discharge  -CJ by discharge  -GA by discharge  -AV    Progress/Outcomes (Demonstrate functional swallow) continuing progress toward goal  -CJ goal ongoing  -GA continuing progress toward goal  -AV       (STG) Patient will tolerate trials of    Consistencies Trialed (Tolerate trials) soft to chew (chopped) textures;honey/ moderately thick liquids  -CJ soft to chew (chopped) textures;honey/ moderately thick liquids  -GA soft to chew (chopped) textures;honey/ moderately thick liquids  -AV    Desired Outcome (Tolerate trials) without signs/symptoms of aspiration;without signs of distress;with adequate oral prep/transit/clearance  -CJ without signs/symptoms of aspiration;without signs of distress;with adequate oral prep/transit/clearance  -GA without signs/symptoms of aspiration;without signs of distress;with adequate oral prep/transit/clearance  -AV    [place] (Tolerate trials) with minimal cues (75-90% accuracy)  -CJ with minimal cues (75-90% accuracy)  -GA with minimal cues (75-90%  accuracy)  -AV    Time Frame (Tolerate trials) 1 week  -CJ 1 week  -GA 1 week  -AV    Progress/Outcomes (Tolerate trials) continuing progress toward goal  -CJ continuing progress toward goal  -GA continuing progress toward goal  -AV    Comment (Tolerate trials) no s/s  -CJ Patient with 1x cough following honey liquids via cup. No other s/sx. Cough could be d/t respiratory status d/t coughing outside PO trials.  -GA --       (STG) Pharyngeal Strengthening Exercise Goal 1 (SLP)    Activity (Pharyngeal Strengthening Goal 1, SLP) increase timing;increase superior movement of the hyolaryngeal complex;increase anterior movement of the hyolaryngeal complex;increase closure at entrance to airway/closure of airway at glottis;increase squeeze/positive pressure generation  -CJ increase timing;increase superior movement of the hyolaryngeal complex;increase anterior movement of the hyolaryngeal complex;increase closure at entrance to airway/closure of airway at glottis;increase squeeze/positive pressure generation  -GA increase timing;increase superior movement of the hyolaryngeal complex;increase anterior movement of the hyolaryngeal complex;increase closure at entrance to airway/closure of airway at glottis;increase squeeze/positive pressure generation  -AV    Increase Timing prepping - 3 second prep or suck swallow or 3-step swallow  -CJ prepping - 3 second prep or suck swallow or 3-step swallow  -GA prepping - 3 second prep or suck swallow or 3-step swallow  -AV    Increase Superior Movement of the Hyolaryngeal Complex effortful pitch glide (falsetto + pharyngeal squeeze)  -CJ effortful pitch glide (falsetto + pharyngeal squeeze)  -GA effortful pitch glide (falsetto + pharyngeal squeeze)  -AV    Increase Anterior Movement of the Hyolaryngeal Complex chin tuck against resistance (CTAR)  -CJ chin tuck against resistance (CTAR)  -GA chin tuck against resistance (CTAR)  -AV    Increase Closure at Entrance to Airway/Closure of  Airway at Glottis supraglottic swallow  -CJ supraglottic swallow  -GA supraglottic swallow  -AV    Increase Squeeze/Positive Pressure Generation hard effortful swallow  -CJ hard effortful swallow  -GA hard effortful swallow  -AV    [place]/Accuracy (Pharyngeal Strengthening Goal 1, SLP) with moderate cues (50-74% accuracy)  -CJ with moderate cues (50-74% accuracy)  -GA with moderate cues (50-74% accuracy)  -AV    Time Frame (Pharyngeal Strengthening Goal 1, SLP) 1 week  -CJ 1 week  -GA 1 week  -AV    Progress/Outcomes (Pharyngeal Strengthening Goal 1, SLP) continuing progress toward goal  -CJ continuing progress toward goal  -GA continuing progress toward goal  -AV    Comment (Pharyngeal Strengthening Goal 1, SLP) reviewed and completed all exercises w/ good effort  -CJ Completed all exercises and provided handout  -GA --              User Key  (r) = Recorded By, (t) = Taken By, (c) = Cosigned By      Initials Name Provider Type    AV Shanelle Gonsales MS CCC-SLP Speech and Language Pathologist    Denise Gloria MS CCC-SLP Speech and Language Pathologist    Tiesha Tipton MS CCC-SLP Speech and Language Pathologist                         Time Calculation:    Time Calculation- SLP       Row Name 07/11/24 1334             Time Calculation- SLP    SLP Start Time 0955  -      SLP Received On 07/11/24  -         Untimed Charges    69110-CA Treatment Swallow Minutes 39  -CJ         Total Minutes    Untimed Charges Total Minutes 39  -CJ       Total Minutes 39  -CJ                User Key  (r) = Recorded By, (t) = Taken By, (c) = Cosigned By      Initials Name Provider Type     Denise Brown MS CCC-SLP Speech and Language Pathologist                    Therapy Charges for Today       Code Description Service Date Service Provider Modifiers Qty    [id number]  ST TREATMENT SWALLOW 3 7/11/2024 Denise Brown, MS CCC-SLP GN 1                 Denise Brown MS  CCC-SLP  7/11/2024    Electronically signed by Denise Brown, MS CCC-SLP at 07/11/24 7775

## 2024-07-12 NOTE — PLAN OF CARE
Goal Outcome Evaluation:  Plan of Care Reviewed With: patient        Progress: improving  Outcome Evaluation: Pt progressing to ambulating 20'+30' w/ FWW, MinAx1+close chair follow for safety. Pt presents below baseline with generalized weakness, balance deficits, and decreased activity tolerance warranting skilled IP PT services. Continue progressing current PT POC as tolerated.      Anticipated Discharge Disposition (PT): skilled nursing facility

## 2024-07-12 NOTE — PLAN OF CARE
Goal Outcome Evaluation:                     Anticipated Discharge Disposition (SLP): [facility] nursing facility          SLP Swallowing Diagnosis: mod-severe, pharyngeal dysphagia, mild-moderate, oral dysphagia, suspect acute-on-chronic (07/12/24 1500)  Treatment Assessment (SLP): continued, toleration of diet, pharyngeal dysphagia (07/12/24 1500)  Treatment Assessment Comments (SLP): Patient dislikes honey thick liquids and reported her intake is decreased, however, she does not want to get pneumonia again and after a lengthy discussion and education regarding comfort diet v. safest, risk v. benefit, and rationale for diet recommendations, patient is agreeable to continue with dysphagia tx and current diet. She completed dysphagia exercises with good effort. Dietary consult recommended to speak with patient regarding some food preferences within her current diet restrictions that she may better tolerate. SLP to continue to follow to address dysphagia. (07/12/24 1500)  Plan for Continued Treatment (SLP): continue treatment per plan of care (07/12/24 1500)

## 2024-07-12 NOTE — PROGRESS NOTES
"Palliative Care Daily Progress Note     S: Patient is making some progress, participated in physical therapy and was able to walk with some assistance down the hallway.  She has a bothersome cough and I will order some cough syrup for her.  Today, she made it very clear that she did not want to talk about death after discussing her CODE STATUS yesterday and changing it to DNR/DNI.      O:   Palliative Performance Scale Score: 40%   /75   Pulse 60   Temp 97.5 °F (36.4 °C) (Axillary)   Resp 16   Ht 170.2 cm (67\")   Wt 59 kg (130 lb)   SpO2 96%   BMI 20.36 kg/m²     Intake/Output Summary (Last 24 hours) at 7/12/2024 1425  Last data filed at 7/12/2024 0530  Gross per 24 hour   Intake 2100 ml   Output 225 ml   Net 1875 ml       PE:  General Appearance:    Chronically ill-appearing, alert, cooperative, NAD, thin and frail   HEENT:    NC/AT, without obvious abnormality, EOMI, anicteric    Neck:   supple, trachea midline, no JVD   Lungs:   Poor airflow with decreased breath sounds at bases    Heart:    RRR, normal S1 and S2, no M/R/G   Abdomen:     Soft, NT, ND, NABS    Extremities:   Moves all extremities, no edema   Pulses:   Pulses palpable    Skin:   Warm, dry   Neurologic:   A/Ox3, cooperative, speech and mentation are intact   Psych:   Calm, appropriate         Meds: Reviewed    Labs:   Results from last 7 days   Lab Units 07/10/24  1100   WBC 10*3/mm3 11.96*   HEMOGLOBIN g/dL 11.0*   HEMATOCRIT % 33.9*   PLATELETS 10*3/mm3 151     Results from last 7 days   Lab Units 07/10/24  1100   SODIUM mmol/L 134*   POTASSIUM mmol/L 3.8   CHLORIDE mmol/L 100   CO2 mmol/L 27.0   BUN mg/dL 21   CREATININE mg/dL 1.16*   GLUCOSE mg/dL 106*   CALCIUM mg/dL 8.1*     Results from last 7 days   Lab Units 07/10/24  1100   SODIUM mmol/L 134*   POTASSIUM mmol/L 3.8   CHLORIDE mmol/L 100   CO2 mmol/L 27.0   BUN mg/dL 21   CREATININE mg/dL 1.16*   CALCIUM mg/dL 8.1*   BILIRUBIN mg/dL 0.4   ALK PHOS U/L 63   ALT (SGPT) U/L 33 "   AST (SGOT) U/L 31   GLUCOSE mg/dL 106*     Imaging Results (Last 72 Hours)       ** No results found for the last 72 hours. **              Diagnostics: Reviewed    A: Patient is showing some improvement, is participating in physical therapy, and is anxious to get back home.  He continues with bothersome cough.    P: Will add a cough syrup for her.  We will continue to monitor and provide support, symptom management, and discharge planning.      We will continue to follow along. Please do not hesitate to contact us regarding further sx mgmt or GOC needs, including after hours or on weekends via our on call provider at [phone].     Bhupinder Spencer MD    7/12/2024

## 2024-07-12 NOTE — THERAPY TREATMENT NOTE
Acute Care - Speech Language Pathology   Swallow Treatment Note [facility]     Patient Name: Bibiana Hodges  : 1935  MRN: [id number]  Today's Date: 2024               Admit Date: 2024    Visit Dx:     ICD-10-CM ICD-9-CM   1. Hyponatremia  E87.1 276.1   2. Pneumonia of both lungs due to infectious organism, unspecified part of lung  J18.9 483.8   3. Acute respiratory failure with hypoxia  J96.01 518.81   4. Oropharyngeal dysphagia  R13.12 787.22     Patient Active Problem List   Diagnosis    Dysautonomia orthostatic hypotension syndrome    Hypertension    Flu vaccine need    Streptococcus pneumoniae vaccination indicated    Sepsis due to pneumonia    Severe malnutrition    Acute respiratory failure with hypoxia    Atrial fibrillation    Autonomic dysfunction     Past Medical History:   Diagnosis Date    Constipation     Depression     Dysautonomia orthostatic hypotension syndrome     Generalized osteoarthritis     GERD (gastroesophageal reflux disease)     s/p Nissen    Hypertension     Insomnia     Osteoarthrosis, shoulder region     Skin ulcer of scalp     Thrombophlebitis of arm     Tremor     Vitamin B12 deficiency      Past Surgical History:   Procedure Laterality Date    DILATATION AND CURETTAGE      HERNIA REPAIR      HIP SURGERY      SHOULDER SURGERY      Right       SLP Recommendation and Plan  SLP Swallowing Diagnosis: mod-severe, pharyngeal dysphagia, mild-moderate, oral dysphagia, suspect acute-on-chronic (24)  SLP Diet Recommendation: soft to chew textures, chopped, no mixed consistencies, honey thick liquids (24)  Recommended Precautions and Strategies: upright posture during/after eating, small bites of food and sips of liquid, general aspiration precautions, no straw, fatigue precautions, other (see comments) (24)  SLP Rec. for Method of Medication Administration: meds whole, meds crushed, with puree, as tolerated (24)     Monitor for  Signs of Aspiration: yes, notify SLP if any concerns (07/12/24 1500)     Swallow Criteria for Skilled Therapeutic Interventions Met: demonstrates skilled criteria (07/12/24 1500)  Anticipated Discharge Disposition (SLP): skilled nursing facility (07/12/24 1500)  Rehab Potential/Prognosis, Swallowing: adequate, monitor progress closely (07/12/24 1500)  Therapy Frequency (Swallow): 5 days per week (07/12/24 1500)  Predicted Duration Therapy Intervention (Days): 2 weeks (07/12/24 1500)  Oral Care Recommendations: Oral Care BID/PRN, Suction toothbrush (07/12/24 1500)        Daily Summary of Progress (SLP): progress toward functional goals as expected (07/12/24 1500)               Treatment Assessment (SLP): continued, toleration of diet, pharyngeal dysphagia (07/12/24 1500)  Treatment Assessment Comments (SLP): Patient dislikes honey thick liquids and reported her intake is decreased, however, she does not want to get pneumonia again and after a lengthy discussion and education regarding comfort diet v. safest, risk v. benefit, and rationale for diet recommendations, patient is agreeable to continue with dysphagia tx and current diet. She completed dysphagia exercises with good effort. Dietary consult recommended to speak with patient regarding some food preferences within her current diet restrictions that she may better tolerate. SLP to continue to follow to address dysphagia. (07/12/24 1500)  Plan for Continued Treatment (SLP): continue treatment per plan of care (07/12/24 1500)                SWALLOW EVALUATION (Last 72 Hours)       SLP Adult Swallow Evaluation       Row Name 07/12/24 1500 07/11/24 0955 07/10/24 1015             Rehab Evaluation    Document Type therapy note (daily note)  -CH therapy note (daily note)  -CJ therapy note (daily note)  -GA      Subjective Information no complaints  -CH no complaints  -CJ no complaints  -GA      Patient Observations alert;cooperative;agree to therapy  -CH  alert;cooperative;agree to therapy  - alert;cooperative;agree to therapy  -GA      Patient/Family/Caregiver Comments/Observations none present  - no family present  - none  -GA      Patient Effort good  -CH good  -CJ good  -GA      Symptoms Noted During/After Treatment none  - none  - none  -GA         General Information    Patient Profile Reviewed yes  - -- yes  -GA      Pertinent History Of Current Problem -- -- see initial note  -GA      Current Method of Nutrition honey-thick liquids;soft to chew textures;chopped  - -- honey-thick liquids;soft to chew textures;chopped  -GA      Precautions/Limitations, Vision -- -- WFL;for purposes of eval  -GA      Precautions/Limitations, Hearing -- -- WFL;for purposes of eval  -GA      Prior Level of Function-Communication -- -- WFL  -GA      Prior Level of Function-Swallowing -- -- honey thick liquids;other (see comments)  eating reg/thin and not compliant with previous recommendations  -GA      Plans/Goals Discussed with -- -- patient;agreed upon  -GA      Barriers to Rehab -- -- medically complex;previous functional deficit  -GA      Patient's Goals for Discharge -- -- patient did not state  -GA         Pain    Additional Documentation Pain Scale: FACES Pre/Post-Treatment (Group)  - Pain Scale: FACES Pre/Post-Treatment (Group)  - Pain Scale: FACES Pre/Post-Treatment (Group)  -GA         Pain Scale: FACES Pre/Post-Treatment    Pain: FACES Scale, Pretreatment 0-->no hurt  -CH 0-->no hurt  -CJ 0-->no hurt  -GA      Posttreatment Pain Rating 0-->no hurt  -CH 0-->no hurt  -CJ 0-->no hurt  -GA         SLP Evaluation Clinical Impression    SLP Swallowing Diagnosis mod-severe;pharyngeal dysphagia;mild-moderate;oral dysphagia;suspect acute-on-chronic  - -- --      Functional Impact risk of aspiration/pneumonia;risk of dehydration;risk of malnutrition  - -- --      Rehab Potential/Prognosis, Swallowing adequate, monitor progress closely  - -- --      Swallow  Criteria for Skilled Therapeutic Interventions Met demonstrates skilled criteria  - -- --         SLP Treatment Clinical Impressions    Treatment Assessment (SLP) continued;toleration of diet;pharyngeal dysphagia  - continued;toleration of diet;pharyngeal dysphagia  - continued;pharyngeal dysphagia  -GA      Treatment Assessment Comments (SLP) Patient dislikes honey thick liquids and reported her intake is decreased, however, she does not want to get pneumonia again and after a lengthy discussion and education regarding comfort diet v. safest, risk v. benefit, and rationale for diet recommendations, patient is agreeable to continue with dysphagia tx and current diet. She completed dysphagia exercises with good effort. Dietary consult recommended to speak with patient regarding some food preferences within her current diet restrictions that she may better tolerate. SLP to continue to follow to address dysphagia.  - Pt participated in tx this am. No overt s/s of aspiration w/ honey, improved respiratory status. Good effort w/ tx exercises. Will continue to f/u and address dysphagia  -CJ --      Daily Summary of Progress (SLP) progress toward functional goals as expected  - progress toward functional goals as expected  - progress toward functional goals is good  -GA      Barriers to Overall Progress (SLP) Fatigue  - -- Fatigue  -GA      Plan for Continued Treatment (SLP) continue treatment per plan of care  - continue treatment per plan of care  -CJ continue treatment per plan of care  -GA      Care Plan Review care plan/treatment goals reviewed;evaluation/treatment results reviewed;risks/benefits reviewed;current/potential barriers reviewed;patient/other agree to care plan  - care plan/treatment goals reviewed  -CJ care plan/treatment goals reviewed;patient/other agree to care plan  -GA         Recommendations    Therapy Frequency (Swallow) 5 days per week  -CH 5 days per week  -CJ 5 days per week   -GA      Predicted Duration Therapy Intervention (Days) 2 weeks  - 2 weeks  - 2 weeks  -GA      SLP Diet Recommendation soft to chew textures;chopped;no mixed consistencies;honey thick liquids  - soft to chew textures;chopped;no mixed consistencies;honey thick liquids  - soft to chew textures;chopped;no mixed consistencies;honey thick liquids  -GA      Recommended Precautions and Strategies upright posture during/after eating;small bites of food and sips of liquid;general aspiration precautions;no straw;fatigue precautions;other (see comments)  - upright posture during/after eating;small bites of food and sips of liquid;general aspiration precautions;no straw;fatigue precautions;other (see comments)  - upright posture during/after eating;small bites of food and sips of liquid;general aspiration precautions;no straw;fatigue precautions;other (see comments)  -GA      Oral Care Recommendations Oral Care BID/PRN;Suction toothbrush  - Oral Care BID/PRN;Suction toothbrush  - Oral Care BID/PRN;Suction toothbrush  -GA      SLP Rec. for Method of Medication Administration meds whole;meds crushed;with puree;as tolerated  - meds whole;meds crushed;with puree;as tolerated  - meds whole;meds crushed;with puree;as tolerated  -GA      Monitor for Signs of Aspiration yes;notify SLP if any concerns  - yes;notify SLP if any concerns  - yes;notify SLP if any concerns  -GA      Anticipated Discharge Disposition (SLP) skilled nursing facility  - skilled nursing facility  - skilled nursing facility  -GA                User Key  (r) = Recorded By, (t) = Taken By, (c) = Cosigned By      Initials Name Effective Dates     Denise Brown, MS CCC-SLP 06/03/24 -      Maria De Jesus Matthews, MS CCC-SLP 06/16/21 -     GA Tiesha Zimmerman, MS CCC-SLP 09/14/23 -                     EDUCATION  The patient has been educated in the following areas:   Home Exercise Program (HEP) Dysphagia (Swallowing Impairment) Oral  Care/Hydration Modified Diet Instruction.        SLP GOALS       Row Name 07/12/24 1500 07/11/24 0955 07/10/24 1015       (LTG) Patient will demonstrate functional swallow for    Diet Texture (Demonstrate functional swallow) soft to chew (chopped) textures  -CH soft to chew (chopped) textures  -CJ soft to chew (chopped) textures  -GA    Liquid viscosity (Demonstrate functional swallow) nectar/ mildly thick liquids  -CH nectar/ mildly thick liquids  -CJ nectar/ mildly thick liquids  -GA    [place] (Demonstrate functional swallow) with minimal cues (75-90% accuracy)  -CH with minimal cues (75-90% accuracy)  -CJ with minimal cues (75-90% accuracy)  -GA    Time Frame (Demonstrate functional swallow) by discharge  -CH by discharge  -CJ by discharge  -GA    Progress/Outcomes (Demonstrate functional swallow) continuing progress toward goal  -CH continuing progress toward goal  -CJ goal ongoing  -GA       (STG) Patient will tolerate trials of    Consistencies Trialed (Tolerate trials) soft to chew (chopped) textures;honey/ moderately thick liquids  -CH soft to chew (chopped) textures;honey/ moderately thick liquids  -CJ soft to chew (chopped) textures;honey/ moderately thick liquids  -GA    Desired Outcome (Tolerate trials) without signs/symptoms of aspiration;without signs of distress;with adequate oral prep/transit/clearance  -CH without signs/symptoms of aspiration;without signs of distress;with adequate oral prep/transit/clearance  -CJ without signs/symptoms of aspiration;without signs of distress;with adequate oral prep/transit/clearance  -GA    [place] (Tolerate trials) with minimal cues (75-90% accuracy)  -CH with minimal cues (75-90% accuracy)  -CJ with minimal cues (75-90% accuracy)  -GA    Time Frame (Tolerate trials) 1 week  -CH 1 week  -CJ 1 week  -GA    Progress/Outcomes (Tolerate trials) continuing progress toward goal  -CH continuing progress toward goal  -CJ continuing progress toward goal  -GA     Comment (Tolerate trials) no s/s  -CH no s/s  -CJ Patient with 1x cough following honey liquids via cup. No other s/sx. Cough could be d/t respiratory status d/t coughing outside PO trials.  -GA       (STG) Pharyngeal Strengthening Exercise Goal 1 (SLP)    Activity (Pharyngeal Strengthening Goal 1, SLP) increase timing;increase superior movement of the hyolaryngeal complex;increase anterior movement of the hyolaryngeal complex;increase closure at entrance to airway/closure of airway at glottis;increase squeeze/positive pressure generation  -CH increase timing;increase superior movement of the hyolaryngeal complex;increase anterior movement of the hyolaryngeal complex;increase closure at entrance to airway/closure of airway at glottis;increase squeeze/positive pressure generation  -CJ increase timing;increase superior movement of the hyolaryngeal complex;increase anterior movement of the hyolaryngeal complex;increase closure at entrance to airway/closure of airway at glottis;increase squeeze/positive pressure generation  -GA    Increase Timing prepping - 3 second prep or suck swallow or 3-step swallow  -CH prepping - 3 second prep or suck swallow or 3-step swallow  -CJ prepping - 3 second prep or suck swallow or 3-step swallow  -GA    Increase Superior Movement of the Hyolaryngeal Complex effortful pitch glide (falsetto + pharyngeal squeeze)  -CH effortful pitch glide (falsetto + pharyngeal squeeze)  -CJ effortful pitch glide (falsetto + pharyngeal squeeze)  -GA    Increase Anterior Movement of the Hyolaryngeal Complex chin tuck against resistance (CTAR)  -CH chin tuck against resistance (CTAR)  -CJ chin tuck against resistance (CTAR)  -GA    Increase Closure at Entrance to Airway/Closure of Airway at Glottis supraglottic swallow  -CH supraglottic swallow  -CJ supraglottic swallow  -GA    Increase Squeeze/Positive Pressure Generation hard effortful swallow  -CH hard effortful swallow  -CJ hard effortful swallow  -GA     Slope/Accuracy (Pharyngeal Strengthening Goal 1, SLP) with moderate cues (50-74% accuracy)  -CH with moderate cues (50-74% accuracy)  -CJ with moderate cues (50-74% accuracy)  -GA    Time Frame (Pharyngeal Strengthening Goal 1, SLP) 1 week  -CH 1 week  -CJ 1 week  -GA    Progress/Outcomes (Pharyngeal Strengthening Goal 1, SLP) continuing progress toward goal  -CH continuing progress toward goal  -CJ continuing progress toward goal  -GA    Comment (Pharyngeal Strengthening Goal 1, SLP) reviewed and completed all exercises w/ good effort  -CH reviewed and completed all exercises w/ good effort  -CJ Completed all exercises and provided handout  -GA              User Key  (r) = Recorded By, (t) = Taken By, (c) = Cosigned By      Initials Name Provider Type    Denise Gloria MS CCC-SLP Speech and Language Pathologist    Maria De Jesus Mcmanus MS CCC-SLP Speech and Language Pathologist    Tiesha Tipton MS CCC-SLP Speech and Language Pathologist                         Time Calculation:    Time Calculation- SLP       Row Name 07/12/24 1616             Time Calculation- SLP    SLP Start Time 1500  -CH      SLP Received On 07/12/24  -CH         Untimed Charges    08396-IV Treatment Swallow Minutes 55  -CH         Total Minutes    Untimed Charges Total Minutes 55  -CH       Total Minutes 55  -CH                User Key  (r) = Recorded By, (t) = Taken By, (c) = Cosigned By      Initials Name Provider Type    Maria De Jesus Mcmanus MS CCC-SLP Speech and Language Pathologist                    Therapy Charges for Today       Code Description Service Date Service Provider Modifiers Qty    [id number]  ST TREATMENT SWALLOW 4 7/12/2024 Maria De Jesus Matthews MS CCC-SLP GN 1                 Maria De Jesus Matthews MS CCC-KATIE  7/12/2024

## 2024-07-12 NOTE — THERAPY TREATMENT NOTE
Patient Name: Bibiana Hodges  : 1935    MRN: [id number]                              Today's Date: 2024       Admit Date: 2024    Visit Dx:     ICD-10-CM ICD-9-CM   1. Hyponatremia  E87.1 276.1   2. Pneumonia of both lungs due to infectious organism, unspecified part of lung  J18.9 483.8   3. Acute respiratory failure with hypoxia  J96.01 518.81   4. Oropharyngeal dysphagia  R13.12 787.22     Patient Active Problem List   Diagnosis    Dysautonomia orthostatic hypotension syndrome    Hypertension    Flu vaccine need    Streptococcus pneumoniae vaccination indicated    Sepsis due to pneumonia    Severe malnutrition    Acute respiratory failure with hypoxia    Atrial fibrillation    Autonomic dysfunction     Past Medical History:   Diagnosis Date    Constipation     Depression     Dysautonomia orthostatic hypotension syndrome     Generalized osteoarthritis     GERD (gastroesophageal reflux disease)     s/p Nissen    Hypertension     Insomnia     Osteoarthrosis, shoulder region     Skin ulcer of scalp     Thrombophlebitis of arm     Tremor     Vitamin B12 deficiency      Past Surgical History:   Procedure Laterality Date    DILATATION AND CURETTAGE      HERNIA REPAIR      HIP SURGERY      SHOULDER SURGERY      Right      General Information       Row Name 24 1315          Physical Therapy Time and Intention    Document Type therapy note (daily note)  -KE     Mode of Treatment physical therapy  -KE       Row Name 24 1315          General Information    Patient Profile Reviewed yes  -KE     Existing Precautions/Restrictions fall;oxygen therapy device and L/min  -KE     Barriers to Rehab medically complex;previous functional deficit  -KE       Row Name 24 1315          Cognition    Orientation Status (Cognition) oriented x 3  -KE       Row Name 24 1315          Safety Issues, Functional Mobility    Safety Issues Affecting Function (Mobility) awareness of need for  assistance;insight into deficits/self-awareness;safety precautions follow-through/compliance;safety precaution awareness;sequencing abilities  -KE     Impairments Affecting Function (Mobility) balance;coordination;endurance/activity tolerance;motor planning;strength;shortness of breath;postural/trunk control  -KE               User Key  (r) = Recorded By, (t) = Taken By, (c) = Cosigned By      Initials Name Provider Type    Corry Gooden, PT Physical Therapist                   Mobility       Row Name 07/12/24 1316          Bed Mobility    Bed Mobility supine-sit  -KE     Supine-Sit [place] (Bed Mobility) minimum assist (75% patient effort);1 person to manage equipment  -KE     Assistive Device (Bed Mobility) bed rails;head of bed elevated  -KE     Comment, (Bed Mobility) increased time and effort,  -KE       Row Name 07/12/24 1316          Bed-Chair Transfer    Bed-Chair [place] (Transfers) minimum assist (75% patient effort);2 person assist  -KE     Assistive Device (Bed-Chair Transfers) other (see comments)  B UE support  -KE       Row Name 07/12/24 1316          Sit-Stand Transfer    Sit-Stand [place] (Transfers) minimum assist (75% patient effort);1 person assist  -KE     Assistive Device (Sit-Stand Transfers) walker, front-wheeled  -KE     Comment, (Sit-Stand Transfer) VCs for improving HP  -KE       Row Name 07/12/24 1316          Gait/Stairs (Locomotion)    [place] Level (Gait) minimum assist (75% patient effort);verbal cues;1 person assist;1 person to manage equipment  -KE     Assistive Device (Gait) walker, front-wheeled  -KE     Distance in Feet (Gait) 20  +30  -KE     Deviations/Abnormal Patterns (Gait) perri decreased;festinating/shuffling;gait speed decreased;stride length decreased;base of support, narrow;bilateral deviations  -KE     Bilateral Gait Deviations forward flexed posture;heel strike decreased  -KE     Comment, (Gait/Stairs) Pt amb 20'+30' w/ FWW, MinAx1+chair  follow for safety. Pt demo short step length, narrow SENDY, fwd flexed posture, and slow gait speed. VCs for improving FWW managment. Further mobility limited by fatigue.  -HELENE               User Key  (r) = Recorded By, (t) = Taken By, (c) = Cosigned By      Initials Name Provider Type    Corry Gooden PT Physical Therapist                   Obj/Interventions       Row Name 07/12/24 1322          Balance    Balance Assessment sitting static balance;sitting dynamic balance;sit to stand dynamic balance;standing static balance;standing dynamic balance  -HELENE     Static Sitting Balance standby assist  -HELENE     Dynamic Sitting Balance standby assist  -KE     Position, Sitting Balance sitting edge of bed  -HELENE     Sit to Stand Dynamic Balance minimal assist  -HELENE     Static Standing Balance contact guard  -HELENE     Dynamic Standing Balance minimal assist  -HELENE     Position/Device Used, Standing Balance supported;walker, front-wheeled  -KE     Balance Interventions sitting;standing;sit to stand;supported;static;dynamic  -HELENE               User Key  (r) = Recorded By, (t) = Taken By, (c) = Cosigned By      Initials Name Provider Type    Corry Gooden PT Physical Therapist                   Goals/Plan    No documentation.                  Clinical Impression       Row Name 07/12/24 1323          Pain    Pretreatment Pain Rating 0/10 - no pain  -KE     Posttreatment Pain Rating 0/10 - no pain  -KE     Pain Intervention(s) Ambulation/increased activity;Repositioned  -HELENE       Row Name 07/12/24 1323          Plan of Care Review    Plan of Care Reviewed With patient  -KE     Progress improving  -KE     Outcome Evaluation Pt progressing to ambulating 20'+30' w/ FWW, MinAx1+close chair follow for safety. Pt presents below baseline with generalized weakness, balance deficits, and decreased activity tolerance warranting skilled IP PT services. Continue progressing current PT POC as tolerated.  -HELENE       Row Name 07/12/24 1329           Vital Signs    O2 Delivery Pre Treatment nasal cannula  -KE     O2 Delivery Intra Treatment nasal cannula  -KE     Post SpO2 (%) 97  -KE     O2 Delivery Post Treatment nasal cannula  -KE     Pre Patient Position Supine  -KE     Intra Patient Position Standing  -KE     Post Patient Position Sitting  -KE       Row Name 07/12/24 1323          Positioning and Restraints    Pre-Treatment Position in bed  -KE     Post Treatment Position chair  -KE     In Chair reclined;call light within reach;encouraged to call for assist;exit alarm on;with family/caregiver;waffle cushion;notified nsg  -KE               User Key  (r) = Recorded By, (t) = Taken By, (c) = Cosigned By      Initials Name Provider Type    Corry Gooden, GAIL Physical Therapist                   Outcome Measures       Row Name 07/12/24 1326 07/12/24 0840       How much help from another person do you currently need...    Turning from your back to your side while in flat bed without using bedrails? 3  -KE 3  -LC    Moving from lying on back to sitting on the side of a flat bed without bedrails? 3  -KE 3  -LC    Moving to and from a bed to a chair (including a wheelchair)? 3  -KE 3  -LC    Standing up from a chair using your arms (e.g., wheelchair, bedside chair)? 3  -KE 3  -LC    Climbing 3-5 steps with a railing? 2  -KE 2  -LC    To walk in hospital room? 3  -KE 2  -LC    AM-PAC 6 Clicks Score (PT) 17  -KE 16  -LC    Highest Level of Mobility Goal 5 --> Static standing  -KE 5 --> Static standing  -LC      Row Name 07/12/24 1326          Functional Assessment    Outcome Measure Options AM-PAC 6 Clicks Basic Mobility (PT)  -KE               User Key  (r) = Recorded By, (t) = Taken By, (c) = Cosigned By      Initials Name Provider Type    Marge Lynn, RN Registered Nurse    Corry Gooden, GAIL Physical Therapist                                 Physical Therapy Education       Title: PT OT SLP Therapies (In Progress)       Topic: Physical Therapy  (In Progress)       Point: Mobility training (In Progress)       Learning Progress Summary             Patient Acceptance, E, NR by KE at 7/12/2024 1028    Acceptance, E, VU,NR by NS at 7/10/2024 1604    Comment: safety with mobility, benefits/need for rehab    Acceptance, E, VU,NR by NS at 7/9/2024 1319                         Point: Home exercise program (Done)       Learning Progress Summary             Patient Acceptance, E, VU,NR by NS at 7/10/2024 1604    Comment: safety with mobility, benefits/need for rehab                         Point: Body mechanics (In Progress)       Learning Progress Summary             Patient Acceptance, E, NR by KE at 7/12/2024 1028    Acceptance, E, VU,NR by NS at 7/10/2024 1604    Comment: safety with mobility, benefits/need for rehab    Acceptance, E, VU,NR by NS at 7/9/2024 1319                         Point: Precautions (In Progress)       Learning Progress Summary             Patient Acceptance, E, NR by KE at 7/12/2024 1028    Acceptance, E, VU,NR by NS at 7/10/2024 1604    Comment: safety with mobility, benefits/need for rehab    Acceptance, E, VU,NR by NS at 7/9/2024 1319                                         User Key       Initials Effective Dates Name Provider Type Discipline    NS 06/16/21 -  Shelley Gutiérrez, PT Physical Therapist PT     11/16/23 -  Corry Nam, GAIL Physical Therapist PT                  PT Recommendation and Plan     Plan of Care Reviewed With: patient  Progress: improving  Outcome Evaluation: Pt progressing to ambulating 20'+30' w/ FWW, MinAx1+close chair follow for safety. Pt presents below baseline with generalized weakness, balance deficits, and decreased activity tolerance warranting skilled IP PT services. Continue progressing current PT POC as tolerated.     Time Calculation:         PT Charges       Row Name 07/12/24 1328             Time Calculation    Start Time 1028  -KE      PT Received On 07/12/24  -KE         Timed Charges    80704 -  PT Therapeutic Activity Minutes 34  -KE         Total Minutes    Timed Charges Total Minutes 34  -KE       Total Minutes 34  -KE                User Key  (r) = Recorded By, (t) = Taken By, (c) = Cosigned By      Initials Name Provider Type    Corry Gooden PT Physical Therapist                  Therapy Charges for Today       Code Description Service Date Service Provider Modifiers Qty    25612487850  PT THERAPEUTIC ACT EA 15 MIN 7/12/2024 Corry Nam, PT GP 2    [id number] HC PT THER SUPP EA 15 MIN 7/12/2024 Corry Nam, PT GP 2            PT G-Codes  Outcome Measure Options: AM-PAC 6 Clicks Basic Mobility (PT)  AM-PAC 6 Clicks Score (PT): 17  PT Discharge Summary  Anticipated Discharge Disposition (PT): skilled nursing facility    Corry Nam PT  7/12/2024

## 2024-07-13 LAB
BACTERIA SPEC AEROBE CULT: NORMAL
BACTERIA SPEC AEROBE CULT: NORMAL

## 2024-07-13 PROCEDURE — 94664 DEMO&/EVAL PT USE INHALER: CPT

## 2024-07-13 PROCEDURE — 94799 UNLISTED PULMONARY SVC/PX: CPT

## 2024-07-13 PROCEDURE — 99232 SBSQ HOSP IP/OBS MODERATE 35: CPT | Performed by: STUDENT IN AN ORGANIZED HEALTH CARE EDUCATION/TRAINING PROGRAM

## 2024-07-13 RX ORDER — LISINOPRIL 5 MG/1
5 TABLET ORAL ONCE
Status: COMPLETED | OUTPATIENT
Start: 2024-07-13 | End: 2024-07-13

## 2024-07-13 RX ORDER — IPRATROPIUM BROMIDE AND ALBUTEROL SULFATE 2.5; .5 MG/3ML; MG/3ML
3 SOLUTION RESPIRATORY (INHALATION) EVERY 4 HOURS PRN
Status: DISCONTINUED | OUTPATIENT
Start: 2024-07-13 | End: 2024-07-17 | Stop reason: HOSPADM

## 2024-07-13 RX ORDER — LISINOPRIL 10 MG/1
10 TABLET ORAL
Status: DISCONTINUED | OUTPATIENT
Start: 2024-07-14 | End: 2024-07-17 | Stop reason: HOSPADM

## 2024-07-13 RX ADMIN — VENLAFAXINE HYDROCHLORIDE 75 MG: 75 CAPSULE, EXTENDED RELEASE ORAL at 10:17

## 2024-07-13 RX ADMIN — LEVETIRACETAM 250 MG: 500 TABLET, FILM COATED ORAL at 21:11

## 2024-07-13 RX ADMIN — LEVETIRACETAM 250 MG: 500 TABLET, FILM COATED ORAL at 10:17

## 2024-07-13 RX ADMIN — AMIODARONE HYDROCHLORIDE 200 MG: 200 TABLET ORAL at 10:17

## 2024-07-13 RX ADMIN — Medication 1 CAPSULE: at 10:17

## 2024-07-13 RX ADMIN — APIXABAN 2.5 MG: 2.5 TABLET, FILM COATED ORAL at 21:11

## 2024-07-13 RX ADMIN — IPRATROPIUM BROMIDE AND ALBUTEROL SULFATE 3 ML: 2.5; .5 SOLUTION RESPIRATORY (INHALATION) at 07:14

## 2024-07-13 RX ADMIN — TRAZODONE HYDROCHLORIDE 100 MG: 100 TABLET ORAL at 21:11

## 2024-07-13 RX ADMIN — Medication 1 APPLICATION: at 21:12

## 2024-07-13 RX ADMIN — LISINOPRIL 5 MG: 5 TABLET ORAL at 16:48

## 2024-07-13 RX ADMIN — IPRATROPIUM BROMIDE AND ALBUTEROL SULFATE 3 ML: 2.5; .5 SOLUTION RESPIRATORY (INHALATION) at 12:34

## 2024-07-13 RX ADMIN — Medication 12.5 MG: at 21:11

## 2024-07-13 RX ADMIN — APIXABAN 2.5 MG: 2.5 TABLET, FILM COATED ORAL at 10:17

## 2024-07-13 RX ADMIN — Medication 10 ML: at 21:10

## 2024-07-13 RX ADMIN — Medication 12.5 MG: at 10:17

## 2024-07-13 RX ADMIN — ATORVASTATIN CALCIUM 40 MG: 40 TABLET, FILM COATED ORAL at 21:11

## 2024-07-13 RX ADMIN — Medication 10 ML: at 10:17

## 2024-07-13 RX ADMIN — Medication 1 APPLICATION: at 10:27

## 2024-07-13 RX ADMIN — LISINOPRIL 5 MG: 5 TABLET ORAL at 10:17

## 2024-07-13 NOTE — PLAN OF CARE
Goal Outcome Evaluation:  Plan of Care Reviewed With: patient        Progress: improving  Outcome Evaluation: VSS. Pt weaning off of oxygen well. Pt had complaints of dizziness when turning too fast. BP was high and MD aware. Increased dose of Lisinopril given. Continue POC.

## 2024-07-13 NOTE — PROGRESS NOTES
[facility] Medicine Services  PROGRESS NOTE    Patient Name: Bibiana Hodges  : 1935  MRN: [id number]    Date of Admission: 2024  Primary Care Physician: Nivia Madrigal MD    Subjective   Subjective     CC:  Follow-up for pneumonia    HPI:  Patient reports feeling tired.  Reports decreased coughing.  No chest pain.  Had a bowel movement.  Denied other concerns.    Objective   Objective     Vital Signs:   Temp:  [96.5 °F (35.8 °C)-97.9 °F (36.6 °C)] 96.5 °F (35.8 °C)  Heart Rate:  [60-78] 60  Resp:  [16-18] 18  BP: (119-159)/(63-90) 125/63  Flow (L/min):  [3-4] 3     Physical Exam:  Constitutional: No acute distress, awake, alert, laying in bed  HENT: NCAT, mucous membranes moist  Respiratory: Coarse throughout, respiratory effort normal   Cardiovascular: RRR, no murmurs, rubs, or gallops  Gastrointestinal: Positive bowel sounds, soft, nontender, nondistended  Musculoskeletal: No bilateral ankle edema  Psychiatric: Appropriate affect, cooperative  Neurologic: Alert, oriented, symmetric facies, HERNÁNDEZ, speech clear, +tremors  Skin: No rashes on exposed skin    Results Reviewed:  LAB RESULTS:      Lab 07/10/24  1100 07/09/24  0420 07/08/24  0728   WBC 11.96* 16.62* 15.50*   HEMOGLOBIN 11.0* 11.7* 15.1   HEMATOCRIT 33.9* 36.1 45.9   PLATELETS 151 159 250   NEUTROS ABS 10.88* 14.86* 11.34*   IMMATURE GRANS (ABS) 0.06* 0.09* 0.05   LYMPHS ABS 0.56* 1.18 3.49*   MONOS ABS 0.37 0.40 0.40   EOS ABS 0.07 0.05 0.18   .8* 102.6* 100.9*   PROCALCITONIN  --   --  0.09   LACTATE  --   --  2.7*         Lab 07/10/24  1100 07/09/24  04224  0728   SODIUM 134* 134* 128*   POTASSIUM 3.8 4.9 4.1   CHLORIDE 100 101 93*   CO2 27.0 21.0* 21.0*   ANION GAP 7.0 12.0 14.0   BUN 21 27* 20   CREATININE 1.16* 1.30* 1.26*   EGFR 45.4* 39.6* 41.1*   GLUCOSE 106* 63* 89   CALCIUM 8.1* 8.7 8.8   MAGNESIUM 1.6  --   --    PHOSPHORUS 3.2  --   --          Lab 07/10/24  1100 24  0728    TOTAL PROTEIN 5.3* 6.1   ALBUMIN 2.8* 3.3*   GLOBULIN 2.5 2.8   ALT (SGPT) 33 41*   AST (SGOT) 31 46*   BILIRUBIN 0.4 0.3   ALK PHOS 63 73         Lab 07/08/24 0728   PROBNP 651.7   HSTROP T 18*                 Lab 07/08/24  0718   PH, ARTERIAL 7.419   PCO2, ARTERIAL 33.2*   PO2 ART 51.2*   FIO2 44   HCO3 ART 21.5   BASE EXCESS ART -2.2*     Brief Urine Lab Results  (Last result in the past 365 days)        Color   Clarity   Blood   Leuk Est   Nitrite   Protein   CREAT   Urine HCG        05/06/24 2154 Yellow   Clear   Negative   Negative   Negative   Trace                   Microbiology Results Abnormal       Procedure Component Value - Date/Time    Blood Culture - Blood, Arm, Right [[id number]]  (Normal) Collected: 07/08/24 0728    Lab Status: Final result Specimen: Blood from Arm, Right Updated: 07/13/24 0800     Blood Culture No growth at 5 days    Blood Culture - Blood, Wrist, Left [[id number]]  (Normal) Collected: 07/08/24 0729    Lab Status: Final result Specimen: Blood from Wrist, Left Updated: 07/13/24 0800     Blood Culture No growth at 5 days    Narrative:      Less than seven (7) mL's of blood was collected.  Insufficient quantity may yield false negative results.    Legionella Antigen, Urine - Urine, Urine, Clean Catch [[id number]]  (Normal) Collected: 07/08/24 1925    Lab Status: Final result Specimen: Urine, Clean Catch Updated: 07/09/24 0109     LEGIONELLA ANTIGEN, URINE Negative    MRSA Screen, PCR (Inpatient) - Swab, Nares [[id number]]  (Normal) Collected: 07/08/24 1248    Lab Status: Final result Specimen: Swab from Nares Updated: 07/08/24 1446     MRSA PCR Negative    Narrative:      The negative predictive value of this diagnostic test is high and should only be used to consider de-escalating anti-MRSA therapy. A positive result may indicate colonization with MRSA and must be correlated clinically.  MRSA Negative    Respiratory Panel PCR w/COVID-19(SARS-CoV-2) ERICK/MARTA/ELPIDIO/PAD/COR/ALMA In-House, NP  Swab in UTM/VTM, 2 HR TAT - Swab, Nasopharynx [[id number]]  (Normal) Collected: 07/08/24 0730    Lab Status: Final result Specimen: Swab from Nasopharynx Updated: 07/08/24 0840     ADENOVIRUS, PCR Not Detected     Coronavirus 229E Not Detected     Coronavirus HKU1 Not Detected     Coronavirus NL63 Not Detected     Coronavirus OC43 Not Detected     COVID19 Not Detected     Human Metapneumovirus Not Detected     Human Rhinovirus/Enterovirus Not Detected     Influenza A PCR Not Detected     Influenza B PCR Not Detected     Parainfluenza Virus 1 Not Detected     Parainfluenza Virus 2 Not Detected     Parainfluenza Virus 3 Not Detected     Parainfluenza Virus 4 Not Detected     RSV, PCR Not Detected     Bordetella pertussis pcr Not Detected     Bordetella parapertussis PCR Not Detected     Chlamydophila pneumoniae PCR Not Detected     Mycoplasma pneumo by PCR Not Detected    Narrative:      In the setting of a positive respiratory panel with a viral infection PLUS a negative procalcitonin without other underlying concern for bacterial infection, consider observing off antibiotics or discontinuation of antibiotics and continue supportive care. If the respiratory panel is positive for atypical bacterial infection (Bordetella pertussis, Chlamydophila pneumoniae, or Mycoplasma pneumoniae), consider antibiotic de-escalation to target atypical bacterial infection.            No radiology results from the last 24 hrs    Results for orders placed during the hospital encounter of 08/20/23    Adult Transthoracic Echo Complete W/ Cont if Necessary Per Protocol    Interpretation Summary    Left ventricular systolic function is mildly decreased. Calculated left ventricular EF = 47.9% Normal left ventricular cavity size and wall thickness noted. There is left ventricular global hypokinesis noted. Left ventricular diastolic function is consistent with (grade I) impaired relaxation.    : Normal right ventricular cavity size and systolic  function noted. Electronic lead present in the ventricle.    Normal left atrial size and volume noted. Saline test results are negative.    There is moderate calcification of the aortic valve. The aortic valve was poorly visualized but appears trileaflet. Moderate aortic valve regurgitation is present. No aortic valve stenosis is present.    Mild mitral annular calcification is present. Mild mitral valve regurgitation is present. No significant mitral valve stenosis is present.    The tricuspid valve is grossly normal in structure. Mild tricuspid valve regurgitation is present. Estimated right ventricular systolic pressure from tricuspid regurgitation is normal, 33 mmHg. No tricuspid valve stenosis is present.    Mild dilation of the ascending aorta is present. Ascending aorta = 3.7 cm    There is a small (<1cm) pericardial effusion adjacent to the right atrium and right ventricle.      Current medications:  Scheduled Meds:amiodarone, 200 mg, Oral, Daily  apixaban, 2.5 mg, Oral, BID  atorvastatin, 40 mg, Oral, Nightly  castor oil-balsam peru, 1 Application, Topical, Q12H  ipratropium-albuterol, 3 mL, Nebulization, 4x Daily - RT  lactobacillus acidophilus, 1 capsule, Oral, Daily  levETIRAcetam, 250 mg, Oral, BID  lisinopril, 5 mg, Oral, Q24H  metoprolol tartrate, 12.5 mg, Oral, BID  sodium chloride, 10 mL, Intravenous, Q12H  traZODone, 100 mg, Oral, Nightly  venlafaxine XR, 75 mg, Oral, Daily      Continuous Infusions:   PRN Meds:.  senna-docusate sodium **AND** polyethylene glycol **AND** bisacodyl **AND** bisacodyl    Calcium Replacement - Follow Nurse / BPA Driven Protocol    Hydrocod Jarrod-Chlorphe Jarrod ER    Magnesium Standard Dose Replacement - Follow Nurse / BPA Driven Protocol    ondansetron    Phosphorus Replacement - Follow Nurse / BPA Driven Protocol    Potassium Replacement - Follow Nurse / BPA Driven Protocol    sodium chloride    sodium chloride    sodium chloride    Assessment & Plan   Assessment & Plan      Active Hospital Problems    Diagnosis  POA    **Acute respiratory failure with hypoxia [J96.01]  Yes    Atrial fibrillation [I48.91]  Yes    Severe malnutrition [E43]  Yes    Sepsis due to pneumonia [J18.9, A41.9]  Yes    Hypertension [I10]  Yes    Dysautonomia orthostatic hypotension syndrome [I95.1]  Yes      Resolved Hospital Problems   No resolved problems to display.        Brief Hospital Course to date:  Bibiana Hodges is a 88 y.o. female 88-year-old female with history of atrial fibrillation, on amiodarone and Eliquis, hypertension, autonomic dysfunction, chronic debility, prior hospitalizations for recurrent pneumonia with concerns for possible aspiration.  She presented from nursing home with 1 month of progressive worsening shortness of breath and cough secondary to bilateral pneumonia.    This patient's problems and plans were partially entered by my partner and updated as appropriate by me 07/13/24.     Acute respiratory failure with hypoxia secondary to bilateral strep pneumo pneumonia  Sepsis secondary to pneumonia  Patient presented with hypoxic respiratory failure requiring high flow nasal cannula on admission, now weaned to nasal cannula  Chest x-ray with bilateral pneumonia  This post course of Rocephin, doxycycline  Probiotic  Strep pneumo antigen positive  SLP following given her previous history of aspiration pneumonia, on modified diet     Atrial fibrillation - Continue home metoprolol, amiodarone, Eliquis  HLD - Continue statin  Depression - Continue Effexor and BuSpar  Seizure disorder-continue Keppra, seizure precautions     Hypertension  Autonomic dysfunction with recurrent presyncopal episodes  BP now improving, resumed lower dose lisinopril on 7/12    Chronic debility  Currently resides at assisted living facility  PT and OT    Goals of care  Palliative care team consulted for goals of care discussion     Left breast swelling  Patient is aware and has outpatient follow-up.  We are  not able to do breast ultrasound for masses (only suspected abscesses) inpatient or mammograms so she will need to keep follow up.    Expected Discharge Location and Transportation: Facility; medically ready and awaiting placement  Expected Discharge   Expected Discharge Date: 7/15/2024; Expected Discharge Time:      VTE Prophylaxis:  Pharmacologic VTE prophylaxis orders are present.         AM-PAC 6 Clicks Score (PT): 17 (07/12/24 2000)    CODE STATUS:   Code Status and Medical Interventions:   Ordered at: 07/11/24 1510     Level Of Support Discussed With:    Patient     Code Status (Patient has no pulse and is not breathing):    No CPR (Do Not Attempt to Resuscitate)     Medical Interventions (Patient has pulse or is breathing):    Full Support       Vanna Dykes MD  07/13/24

## 2024-07-13 NOTE — PLAN OF CARE
Goal Outcome Evaluation:  Plan of Care Reviewed With: patient        Progress: no change  Outcome Evaluation: Remains on 3 liters nasal cannula, slept well through the night, VSS, NON-tele, awaiting transfer to , no complaints of pain.Will continue with plan of care.

## 2024-07-14 PROCEDURE — 97166 OT EVAL MOD COMPLEX 45 MIN: CPT

## 2024-07-14 PROCEDURE — 99232 SBSQ HOSP IP/OBS MODERATE 35: CPT | Performed by: STUDENT IN AN ORGANIZED HEALTH CARE EDUCATION/TRAINING PROGRAM

## 2024-07-14 RX ORDER — AMLODIPINE BESYLATE 5 MG/1
5 TABLET ORAL
Status: DISCONTINUED | OUTPATIENT
Start: 2024-07-14 | End: 2024-07-17 | Stop reason: HOSPADM

## 2024-07-14 RX ADMIN — Medication 1 CAPSULE: at 08:26

## 2024-07-14 RX ADMIN — APIXABAN 2.5 MG: 2.5 TABLET, FILM COATED ORAL at 08:28

## 2024-07-14 RX ADMIN — AMLODIPINE BESYLATE 5 MG: 5 TABLET ORAL at 10:57

## 2024-07-14 RX ADMIN — AMIODARONE HYDROCHLORIDE 200 MG: 200 TABLET ORAL at 08:28

## 2024-07-14 RX ADMIN — Medication 10 ML: at 08:28

## 2024-07-14 RX ADMIN — Medication 10 ML: at 20:20

## 2024-07-14 RX ADMIN — LISINOPRIL 10 MG: 10 TABLET ORAL at 08:28

## 2024-07-14 RX ADMIN — LEVETIRACETAM 250 MG: 500 TABLET, FILM COATED ORAL at 08:27

## 2024-07-14 RX ADMIN — VENLAFAXINE HYDROCHLORIDE 75 MG: 75 CAPSULE, EXTENDED RELEASE ORAL at 08:26

## 2024-07-14 RX ADMIN — Medication 12.5 MG: at 20:13

## 2024-07-14 RX ADMIN — LEVETIRACETAM 250 MG: 500 TABLET, FILM COATED ORAL at 20:13

## 2024-07-14 RX ADMIN — SENNOSIDES AND DOCUSATE SODIUM 2 TABLET: 50; 8.6 TABLET ORAL at 20:12

## 2024-07-14 RX ADMIN — ATORVASTATIN CALCIUM 40 MG: 40 TABLET, FILM COATED ORAL at 20:12

## 2024-07-14 RX ADMIN — Medication 1 APPLICATION: at 08:28

## 2024-07-14 RX ADMIN — Medication 1 APPLICATION: at 20:20

## 2024-07-14 RX ADMIN — APIXABAN 2.5 MG: 2.5 TABLET, FILM COATED ORAL at 20:12

## 2024-07-14 RX ADMIN — Medication 12.5 MG: at 08:27

## 2024-07-14 NOTE — PLAN OF CARE
Goal Outcome Evaluation:      Nontele. RA. No compaints of pain. Skin care provided, facilitated turning. Spec bed in place. No further complaints at this time.

## 2024-07-14 NOTE — PROGRESS NOTES
[facility] Medicine Services  PROGRESS NOTE    Patient Name: Bibiana Hodges  : 1935  MRN: [id number]    Date of Admission: 2024  Primary Care Physician: Nivia Madrigal MD    Subjective   Subjective     CC:  Follow-up for pneumonia    HPI:  Had coughing and then nausea.  Refused labs.  Had a bowel movement yesterday.  Denied other concerns.  Nasal cannula not on the patient and satting 92% on room air.    Objective   Objective     Vital Signs:   Temp:  [96.1 °F (35.6 °C)-98.8 °F (37.1 °C)] 98.1 °F (36.7 °C)  Heart Rate:  [59-60] 60  Resp:  [18] 18  BP: (117-166)/(65-89) 151/65  Flow (L/min):  [2-3] 2     Physical Exam:  Constitutional: No acute distress, awake, alert  HENT: NCAT, mucous membranes moist  Respiratory: Coarse throughout, respiratory effort normal   Cardiovascular: RRR, no murmurs, rubs, or gallops  Gastrointestinal: Positive bowel sounds, soft, nontender, nondistended  Musculoskeletal: No bilateral ankle edema  Psychiatric: Appropriate affect, cooperative  Neurologic: Alert, oriented, symmetric facies, HERNÁNDEZ, speech clear, +tremors  Skin: No rashes on exposed skin    Results Reviewed:  LAB RESULTS:      Lab 07/10/24  1100 07/09/24  0420 07/08/24  0728   WBC 11.96* 16.62* 15.50*   HEMOGLOBIN 11.0* 11.7* 15.1   HEMATOCRIT 33.9* 36.1 45.9   PLATELETS 151 159 250   NEUTROS ABS 10.88* 14.86* 11.34*   IMMATURE GRANS (ABS) 0.06* 0.09* 0.05   LYMPHS ABS 0.56* 1.18 3.49*   MONOS ABS 0.37 0.40 0.40   EOS ABS 0.07 0.05 0.18   .8* 102.6* 100.9*   PROCALCITONIN  --   --  0.09   LACTATE  --   --  2.7*         Lab 07/10/24  1100 07/09/24  04224  0728   SODIUM 134* 134* 128*   POTASSIUM 3.8 4.9 4.1   CHLORIDE 100 101 93*   CO2 27.0 21.0* 21.0*   ANION GAP 7.0 12.0 14.0   BUN 21 27* 20   CREATININE 1.16* 1.30* 1.26*   EGFR 45.4* 39.6* 41.1*   GLUCOSE 106* 63* 89   CALCIUM 8.1* 8.7 8.8   MAGNESIUM 1.6  --   --    PHOSPHORUS 3.2  --   --          Lab  07/10/24  1100 07/08/24 0728   TOTAL PROTEIN 5.3* 6.1   ALBUMIN 2.8* 3.3*   GLOBULIN 2.5 2.8   ALT (SGPT) 33 41*   AST (SGOT) 31 46*   BILIRUBIN 0.4 0.3   ALK PHOS 63 73         Lab 07/08/24 0728   PROBNP 651.7   HSTROP T 18*                 Lab 07/08/24 0718   PH, ARTERIAL 7.419   PCO2, ARTERIAL 33.2*   PO2 ART 51.2*   FIO2 44   HCO3 ART 21.5   BASE EXCESS ART -2.2*     Brief Urine Lab Results  (Last result in the past 365 days)        Color   Clarity   Blood   Leuk Est   Nitrite   Protein   CREAT   Urine HCG        05/06/24 2154 Yellow   Clear   Negative   Negative   Negative   Trace                   Microbiology Results Abnormal       Procedure Component Value - Date/Time    Blood Culture - Blood, Arm, Right [[id number]]  (Normal) Collected: 07/08/24 0728    Lab Status: Final result Specimen: Blood from Arm, Right Updated: 07/13/24 0800     Blood Culture No growth at 5 days    Blood Culture - Blood, Wrist, Left [[id number]]  (Normal) Collected: 07/08/24 0729    Lab Status: Final result Specimen: Blood from Wrist, Left Updated: 07/13/24 0800     Blood Culture No growth at 5 days    Narrative:      Less than seven (7) mL's of blood was collected.  Insufficient quantity may yield false negative results.    Legionella Antigen, Urine - Urine, Urine, Clean Catch [[id number]]  (Normal) Collected: 07/08/24 1925    Lab Status: Final result Specimen: Urine, Clean Catch Updated: 07/09/24 0109     LEGIONELLA ANTIGEN, URINE Negative    MRSA Screen, PCR (Inpatient) - Swab, Nares [[id number]]  (Normal) Collected: 07/08/24 1248    Lab Status: Final result Specimen: Swab from Nares Updated: 07/08/24 1446     MRSA PCR Negative    Narrative:      The negative predictive value of this diagnostic test is high and should only be used to consider de-escalating anti-MRSA therapy. A positive result may indicate colonization with MRSA and must be correlated clinically.  MRSA Negative    Respiratory Panel PCR w/COVID-19(SARS-CoV-2)  ERICK/MARTA/ELPIDIO/PAD/COR/ALMA In-House, NP Swab in UTM/VTM, 2 HR TAT - Swab, Nasopharynx [[id number]]  (Normal) Collected: 07/08/24 0730    Lab Status: Final result Specimen: Swab from Nasopharynx Updated: 07/08/24 0840     ADENOVIRUS, PCR Not Detected     Coronavirus 229E Not Detected     Coronavirus HKU1 Not Detected     Coronavirus NL63 Not Detected     Coronavirus OC43 Not Detected     COVID19 Not Detected     Human Metapneumovirus Not Detected     Human Rhinovirus/Enterovirus Not Detected     Influenza A PCR Not Detected     Influenza B PCR Not Detected     Parainfluenza Virus 1 Not Detected     Parainfluenza Virus 2 Not Detected     Parainfluenza Virus 3 Not Detected     Parainfluenza Virus 4 Not Detected     RSV, PCR Not Detected     Bordetella pertussis pcr Not Detected     Bordetella parapertussis PCR Not Detected     Chlamydophila pneumoniae PCR Not Detected     Mycoplasma pneumo by PCR Not Detected    Narrative:      In the setting of a positive respiratory panel with a viral infection PLUS a negative procalcitonin without other underlying concern for bacterial infection, consider observing off antibiotics or discontinuation of antibiotics and continue supportive care. If the respiratory panel is positive for atypical bacterial infection (Bordetella pertussis, Chlamydophila pneumoniae, or Mycoplasma pneumoniae), consider antibiotic de-escalation to target atypical bacterial infection.            No radiology results from the last 24 hrs    Results for orders placed during the hospital encounter of 08/20/23    Adult Transthoracic Echo Complete W/ Cont if Necessary Per Protocol    Interpretation Summary    Left ventricular systolic function is mildly decreased. Calculated left ventricular EF = 47.9% Normal left ventricular cavity size and wall thickness noted. There is left ventricular global hypokinesis noted. Left ventricular diastolic function is consistent with (grade I) impaired relaxation.    : Normal right  ventricular cavity size and systolic function noted. Electronic lead present in the ventricle.    Normal left atrial size and volume noted. Saline test results are negative.    There is moderate calcification of the aortic valve. The aortic valve was poorly visualized but appears trileaflet. Moderate aortic valve regurgitation is present. No aortic valve stenosis is present.    Mild mitral annular calcification is present. Mild mitral valve regurgitation is present. No significant mitral valve stenosis is present.    The tricuspid valve is grossly normal in structure. Mild tricuspid valve regurgitation is present. Estimated right ventricular systolic pressure from tricuspid regurgitation is normal, 33 mmHg. No tricuspid valve stenosis is present.    Mild dilation of the ascending aorta is present. Ascending aorta = 3.7 cm    There is a small (<1cm) pericardial effusion adjacent to the right atrium and right ventricle.      Current medications:  Scheduled Meds:amiodarone, 200 mg, Oral, Daily  apixaban, 2.5 mg, Oral, BID  atorvastatin, 40 mg, Oral, Nightly  castor oil-balsam peru, 1 Application, Topical, Q12H  lactobacillus acidophilus, 1 capsule, Oral, Daily  levETIRAcetam, 250 mg, Oral, BID  lisinopril, 10 mg, Oral, Q24H  metoprolol tartrate, 12.5 mg, Oral, BID  sodium chloride, 10 mL, Intravenous, Q12H  traZODone, 100 mg, Oral, Nightly  venlafaxine XR, 75 mg, Oral, Daily      Continuous Infusions:   PRN Meds:.  senna-docusate sodium **AND** polyethylene glycol **AND** bisacodyl **AND** bisacodyl    Calcium Replacement - Follow Nurse / BPA Driven Protocol    Hydrocod Jarrod-Chlorphe Jarrod ER    ipratropium-albuterol    Magnesium Standard Dose Replacement - Follow Nurse / BPA Driven Protocol    ondansetron    Phosphorus Replacement - Follow Nurse / BPA Driven Protocol    Potassium Replacement - Follow Nurse / BPA Driven Protocol    sodium chloride    sodium chloride    sodium chloride    Assessment & Plan   Assessment &  Plan     Active Hospital Problems    Diagnosis  POA    **Acute respiratory failure with hypoxia [J96.01]  Yes    Atrial fibrillation [I48.91]  Yes    Severe malnutrition [E43]  Yes    Sepsis due to pneumonia [J18.9, A41.9]  Yes    Hypertension [I10]  Yes    Dysautonomia orthostatic hypotension syndrome [I95.1]  Yes      Resolved Hospital Problems   No resolved problems to display.        Brief Hospital Course to date:  Bibiana Hodges is a 88 y.o. female 88-year-old female with history of atrial fibrillation, on amiodarone and Eliquis, hypertension, autonomic dysfunction, chronic debility, prior hospitalizations for recurrent pneumonia with concerns for possible aspiration.  She presented from nursing home with 1 month of progressive worsening shortness of breath and cough secondary to bilateral pneumonia.    This patient's problems and plans were partially entered by my partner and updated as appropriate by me 07/14/24.     Acute respiratory failure with hypoxia secondary to bilateral strep pneumo pneumonia  Sepsis secondary to pneumonia  Patient presented with hypoxic respiratory failure requiring high flow nasal cannula on admission, now weaned to nasal cannula  Chest x-ray with bilateral pneumonia; Strep pneumo antigen positive  Status post course of Rocephin, doxycycline  Probiotic  SLP following given her previous history of aspiration pneumonia, on modified diet     Atrial fibrillation - Continue home metoprolol, amiodarone, Eliquis  HLD - Continue statin  Depression - Continue Effexor and BuSpar  Seizure disorder-continue Keppra, seizure precautions     Hypertension  Autonomic dysfunction with recurrent presyncopal episodes  Lisinopril, amlodipine     Chronic debility  Currently resides at assisted living facility  PT and OT    Goals of care  Palliative care team consulted for goals of care discussion     Left breast swelling  Patient is aware and has outpatient follow-up.  We are not able to do breast  ultrasound for masses (only suspected abscesses) inpatient or mammograms so she will need to keep follow up.    Expected Discharge Location and Transportation: Facility; medically ready and awaiting placement  Expected Discharge   Expected Discharge Date: 7/15/2024; Expected Discharge Time:      VTE Prophylaxis:  Pharmacologic VTE prophylaxis orders are present.         AM-PAC 6 Clicks Score (PT): 17 (07/13/24 2058)    CODE STATUS:   Code Status and Medical Interventions:   Ordered at: 07/11/24 1510     Level Of Support Discussed With:    Patient     Code Status (Patient has no pulse and is not breathing):    No CPR (Do Not Attempt to Resuscitate)     Medical Interventions (Patient has pulse or is breathing):    Full Support       Vanna Dykes MD  07/14/24

## 2024-07-14 NOTE — PLAN OF CARE
Goal Outcome Evaluation:  Plan of Care Reviewed With: patient           Outcome Evaluation: OT initial eval and expanded chart review completed. Pt presents with multiple comorbidities and decreased strength, balance, activity tolerance and decreased vision limiting independence with ADL's and mobility from baseline status. Recommend continued skilled OT services and transfer to SNF at d/c.      Anticipated Discharge Disposition (OT): skilled nursing facility

## 2024-07-14 NOTE — CONSULTS
Malnutrition Severity Assessment    Patient Name:  Bibiana Hodges  YOB: 1935  MRN: [id number]  Admit Date:  7/8/2024    Patient meets criteria for : Severe Malnutrition    Comments:  Pt meets criteria for severe malnutrition in the context of acute illness, indicated by severe muscle wasting and subcutaneous fat loss, po intake </=50% EEN x >/=5 days. Suspect acute exacerbation of chronic malnutrition.     Malnutrition Severity Assessment  Malnutrition Type: Acute Disease or Injury - Related Malnutrition  Malnutrition Type (Last 8 Hours)       Malnutrition Severity Assessment       Row Name 07/14/24 1256       Malnutrition Severity Assessment    Malnutrition Type Acute Disease or Injury - Related Malnutrition      Row Name 07/14/24 1256       Insufficient Energy Intake     Insufficient Energy Intake Findings Severe    Insufficient Energy Intake  < or equal to 50% of est. energy requirement for > or equal to 5d)      Row Name 07/14/24 1256       Muscle Loss    Loss of Muscle Mass Findings Severe    [place] Region Severe - deep hollowing/scooping, lack of muscle to touch, facial bones well defined    Clavicle Bone Region Severe - protruding prominent bone    Acromion Bone Region Severe - squared shoulders, bones, and acromion process protrusion prominent    Scapular Bone Region Severe - prominent bones, depressions easily visible between ribs, scapula, spine, shoulders    Dorsal Hand Region Severe - prominent depression    Patellar Region Severe - prominent bone, square looking, very little muscle definition    Anterior Thigh Region Severe - line/depression along thigh, obviously thin    Posterior Calf Region Severe - thin with very little definition/firmness      Row Name 07/14/24 1256       Fat Loss    Subcutaneous Fat Loss Findings Severe    Orbital Region  Severe - pronounced hollowness/depression, dark circles, loose saggy skin    Upper Arm Region Severe - mostly skin, very little space between  folds, fingers touch      Row Name 07/14/24 1256       Criteria Met (Must meet criteria for severity in at least 2 of these categories: M Wasting, Fat Loss, Fluid, Secondary Signs, Wt. Status, Intake)    Patient meets criteria for  Severe Malnutrition                    Electronically signed by:  Judy Hilton MS,RD,LD  07/14/24 12:57 EDT

## 2024-07-14 NOTE — THERAPY EVALUATION
Patient Name: Bibiana Hodges  : 1935    MRN: [id number]                              Today's Date: 2024       Admit Date: 2024    Visit Dx:     ICD-10-CM ICD-9-CM   1. Hyponatremia  E87.1 276.1   2. Pneumonia of both lungs due to infectious organism, unspecified part of lung  J18.9 483.8   3. Acute respiratory failure with hypoxia  J96.01 518.81   4. Oropharyngeal dysphagia  R13.12 787.22     Patient Active Problem List   Diagnosis    Dysautonomia orthostatic hypotension syndrome    Hypertension    Flu vaccine need    Streptococcus pneumoniae vaccination indicated    Sepsis due to pneumonia    Severe malnutrition    Acute respiratory failure with hypoxia    Atrial fibrillation    Autonomic dysfunction     Past Medical History:   Diagnosis Date    Constipation     Depression     Dysautonomia orthostatic hypotension syndrome     Generalized osteoarthritis     GERD (gastroesophageal reflux disease)     s/p Nissen    Hypertension     Insomnia     Osteoarthrosis, shoulder region     Skin ulcer of scalp     Thrombophlebitis of arm     Tremor     Vitamin B12 deficiency      Past Surgical History:   Procedure Laterality Date    DILATATION AND CURETTAGE      HERNIA REPAIR      HIP SURGERY      SHOULDER SURGERY      Right      General Information       Row Name 24 1443          OT Time and Intention    Document Type evaluation  -JR     Mode of Treatment occupational therapy  -JR       Row Name 24 1443          General Information    Patient Profile Reviewed yes  -JR     Prior Level of Function independent:;all household mobility;gait;transfer;min assist:;ADL's  Pt reports she uses a rollator at baseline, gets assist with ADL's, reports difficulty with feeding at baseline due to tremors  -JR     Existing Precautions/Restrictions fall;oxygen therapy device and L/min  Decreased vision  -JR     Barriers to Rehab medically complex;previous functional deficit  -JR       Row Name 24 1443           Living Environment    People in Home facility resident  -       Row Name 07/14/24 1443          Home Main Entrance    Number of Stairs, Main Entrance none  -       Row Name 07/14/24 1443          Stairs Within Home, Primary    Number of Stairs, Within Home, Primary none  -[facility] Name 07/14/24 1443          Cognition    Orientation Status (Cognition) oriented x 3  -       Row Name 07/14/24 1443          Safety Issues, Functional Mobility    Safety Issues Affecting Function (Mobility) awareness of need for assistance;insight into deficits/self-awareness;safety precaution awareness;safety precautions follow-through/compliance;sequencing abilities  -     Impairments Affecting Function (Mobility) balance;coordination;endurance/activity tolerance;motor control;motor planning;shortness of breath;strength;postural/trunk control  -               User Key  (r) = Recorded By, (t) = Taken By, (c) = Cosigned By      Initials Name Provider Type     Khushbu Fernandes OT Occupational Therapist                     Mobility/ADL's       [facility] Name 07/14/24 1445          Bed Mobility    Bed Mobility supine-sit  -     Supine-Sit Union (Bed Mobility) minimum assist (75% patient effort);verbal cues  -     Bed Mobility, Safety Issues decreased use of arms for pushing/pulling;decreased use of legs for bridging/pushing  -     Assistive Device (Bed Mobility) bed rails;head of bed elevated  -     Comment, (Bed Mobility) PT required increased time and verbal cues for supine to sit.  -[facility] Name 07/14/24 1445          Bed-Chair Transfer    Bed-Chair Union (Transfers) minimum assist (75% patient effort);verbal cues  -     Assistive Device (Bed-Chair Transfers) other (see comments)  UE support and pt reached for chair  -[facility] Name 07/14/24 1445          Activities of Daily Living    BADL Assessment/Intervention upper body dressing  -[facility] Name 07/14/24 1445          Upper Body  Dressing Assessment/Training    Crane Level (Upper Body Dressing) don;front opening garment;maximum assist (25% patient effort);verbal cues  -JR     Position (Upper Body Dressing) edge of bed sitting  -JR               User Key  (r) = Recorded By, (t) = Taken By, (c) = Cosigned By      Initials Name Provider Type    Khushbu Kulkarni OT Occupational Therapist                   Obj/Interventions       [facility] Name 07/14/24 1446          Sensory Assessment (Somatosensory)    Sensory Assessment (Somatosensory) UE sensation intact  -[facility] Name 07/14/24 1446          Vision Assessment/Intervention    Vision Assessment Comment PT reports decreased vision in B eye  -JR       [facility] Name 07/14/24 1446          Range of Motion Comprehensive    General Range of Motion bilateral upper extremity ROM WFL  -[facility] Name 07/14/24 1446          Strength Comprehensive (MMT)    Comment, General Manual Muscle Testing (MMT) Assessment B UE functionally 4/5  -[facility] Name 07/14/24 1446          Motor Skills    Motor Skills neuro-muscular function  -     Neuromuscular Function bilateral;upper extremity;tremor, intention;tremor, resting  -JR       Row Name 07/14/24 1446          Balance    Balance Assessment sitting static balance;standing dynamic balance  -JR     Static Sitting Balance standby assist  -JR     Dynamic Standing Balance minimal assist  -JR     Position/Device Used, Standing Balance supported  -               User Key  (r) = Recorded By, (t) = Taken By, (c) = Cosigned By      Initials Name Provider Type    Khushbu Kulkarni OT Occupational Therapist                   Goals/Plan       [facility] Name 07/14/24 1449          Bed Mobility Goal 1 (OT)    Activity/Assistive Device (Bed Mobility Goal 1, OT) bed mobility activities, all  -JR     Crane Level/Cues Needed (Bed Mobility Goal 1, OT) supervision required;verbal cues required  -     Time Frame (Bed Mobility Goal 1, OT) short term goal (STG);5  days  -JR     Progress/Outcomes (Bed Mobility Goal 1, OT) new goal  -       Row Name 07/14/24 1449          Transfer Goal 1 (OT)    Activity/Assistive Device (Transfer Goal 1, OT) --  -JR     Mays Level/Cues Needed (Transfer Goal 1, OT) --  -JR     Time Frame (Transfer Goal 1, OT) --  -JR     Progress/Outcome (Transfer Goal 1, OT) --  -       Row Name 07/14/24 1449          Toileting Goal 1 (OT)    Activity/Device (Toileting Goal 1, OT) toileting skills, all  -JR     Mays Level/Cues Needed (Toileting Goal 1, OT) supervision required;verbal cues required  -JR     Time Frame (Toileting Goal 1, OT) long term goal (LTG);by discharge  -JR     Progress/Outcome (Toileting Goal 1, OT) new goal  -       Row Name 07/14/24 1441          Therapy Assessment/Plan (OT)    Planned Therapy Interventions (OT) activity tolerance training;adaptive equipment training;BADL retraining;functional balance retraining;occupation/activity based interventions;ROM/therapeutic exercise;strengthening exercise;transfer/mobility retraining;patient/caregiver education/training  -               User Key  (r) = Recorded By, (t) = Taken By, (c) = Cosigned By      Initials Name Provider Type    Khushbu Kulkarni, OT Occupational Therapist                   Clinical Impression       Row Name 07/14/24 1449          Pain Assessment    Pretreatment Pain Rating 0/10 - no pain  -JR     Posttreatment Pain Rating 0/10 - no pain  -JR     Additional Documentation Pain Scale: Word Pre/Post-Treatment (Group)  -[facility] Name 07/14/24 3985          Plan of Care Review    Plan of Care Reviewed With patient  -JR     Outcome Evaluation OT initial eval and expanded chart review completed. Pt presents with multiple comorbidities and decreased strength, balance, activity tolerance and decreased vision limiting independence with ADL's and mobility from baseline status. Recommend continued skilled OT services and transfer to SNF at d/c.  -JR        Row Name 07/14/24 1447          Therapy Assessment/Plan (OT)    Patient/Family Therapy Goal Statement (OT) go home  -JR     Rehab Potential (OT) good, to achieve stated therapy goals  -JR     Criteria for Skilled Therapeutic Interventions Met (OT) yes;meets criteria;skilled treatment is necessary  -JR     Therapy Frequency (OT) daily  -JR     Predicted Duration of Therapy Intervention (OT) 10 days  -JR       Row Name 07/14/24 1447          Therapy Plan Review/Discharge Plan (OT)    Anticipated Discharge Disposition (OT) skilled nursing facility  -       Row Name 07/14/24 1447          Vital Signs    Pre Systolic BP Rehab 150  -JR     Pre Treatment Diastolic BP 97  -JR     Post Systolic BP Rehab 152  -JR     Post Treatment Diastolic BP 76  -JR     Pre SpO2 (%) 93  -JR     O2 Delivery Pre Treatment room air  -JR     Post SpO2 (%) 90  -JR     O2 Delivery Post Treatment room air  -JR     Pre Patient Position Supine  -JR     Intra Patient Position Standing  -JR     Post Patient Position Sitting  -JR       Row Name 07/14/24 1447          Positioning and Restraints    Pre-Treatment Position in bed  -JR     Post Treatment Position chair  -JR     In Chair notified nsg;reclined;call light within reach;encouraged to call for assist;exit alarm on;waffle cushion  -               User Key  (r) = Recorded By, (t) = Taken By, (c) = Cosigned By      Initials Name Provider Type    JR Khushbu Fernandes, OT Occupational Therapist                   Outcome Measures       Row Name 07/14/24 6439          How much help from another is currently needed...    Putting on and taking off regular lower body clothing? 1  -JR     Bathing (including washing, rinsing, and drying) 2  -JR     Toileting (which includes using toilet bed pan or urinal) 2  -JR     Putting on and taking off regular upper body clothing 2  -JR     Taking care of personal grooming (such as brushing teeth) 3  -JR     Eating meals 3  -JR     AM-PAC 6 Clicks Score (OT)  13  -       Row Name 07/14/24 5580          Functional Assessment    Outcome Measure Options AM-PAC 6 Clicks Daily Activity (OT)  -               User Key  (r) = Recorded By, (t) = Taken By, (c) = Cosigned By      Initials Name Provider Type     Khushbu Fernandes OT Occupational Therapist                    Occupational Therapy Education       Title: PT OT SLP Therapies (In Progress)       Topic: Occupational Therapy (In Progress)       Point: ADL training (In Progress)       Description:   Instruct learner(s) on proper safety adaptation and remediation techniques during self care or transfers.   Instruct in proper use of assistive devices.                  Learning Progress Summary             Patient Acceptance, E, NR by  at 7/14/2024 1130    Comment: role of therapy                         Point: Home exercise program (Not Started)       Description:   Instruct learner(s) on appropriate technique for monitoring, assisting and/or progressing therapeutic exercises/activities.                  Learner Progress:  Not documented in this visit.              Point: Precautions (In Progress)       Description:   Instruct learner(s) on prescribed precautions during self-care and functional transfers.                  Learning Progress Summary             Patient Acceptance, E, NR by  at 7/14/2024 1130    Comment: role of therapy                         Point: Body mechanics (Not Started)       Description:   Instruct learner(s) on proper positioning and spine alignment during self-care, functional mobility activities and/or exercises.                  Learner Progress:  Not documented in this visit.                              User Key       Initials Effective Dates Name Provider Type [facility] 02/03/23 -  Khushbu Fernandes OT Occupational Therapist OT                  OT Recommendation and Plan  Planned Therapy Interventions (OT): activity tolerance training, adaptive equipment training, BADL  retraining, functional balance retraining, occupation/activity based interventions, ROM/therapeutic exercise, strengthening exercise, transfer/mobility retraining, patient/caregiver education/training  Therapy Frequency (OT): daily  Plan of Care Review  Plan of Care Reviewed With: patient  Outcome Evaluation: OT initial eval and expanded chart review completed. Pt presents with multiple comorbidities and decreased strength, balance, activity tolerance and decreased vision limiting independence with ADL's and mobility from baseline status. Recommend continued skilled OT services and transfer to SNF at d/c.     Time Calculation:   Evaluation Complexity (OT)  Review Occupational Profile/Medical/Therapy History Complexity: expanded/moderate complexity  Assessment, Occupational Performance/Identification of Deficit Complexity: 3-5 performance deficits  Clinical Decision Making Complexity (OT): detailed assessment/moderate complexity  Overall Complexity of Evaluation (OT): moderate complexity     Time Calculation- OT       Row Name 07/14/24 1451             Time Calculation- OT    OT Start Time 1130  -JR      OT Received On 07/14/24  -JR      OT Goal Re-Cert Due Date 07/24/24  -JR         Untimed Charges    OT Eval/Re-eval Minutes 46  -JR         Total Minutes    Untimed Charges Total Minutes 46  -JR       Total Minutes 46  -JR                User Key  (r) = Recorded By, (t) = Taken By, (c) = Cosigned By      Initials Name Provider Type    JR Khushbu Fernandes OT Occupational Therapist                  Therapy Charges for Today       Code Description Service Date Service Provider Modifiers Qty    [id number]  OT EVAL MOD COMPLEXITY 4 7/14/2024 Khushbu Fernandes OT GO 1                 Khushbu Fernandes, OT  7/14/2024

## 2024-07-14 NOTE — PLAN OF CARE
Goal Outcome Evaluation:  Plan of Care Reviewed With: patient        Progress: improving  Outcome Evaluation: VSS. Pt remains on RA. BP is well-controlled with new medications. No pain. A&Ox4. Continue POC.

## 2024-07-14 NOTE — CONSULTS
"          Clinical Nutrition Assessment     Patient Name: Bibiana Hodges  YOB: 1935  MRN: [id number]  Date of Encounter: 07/14/24 12:00 EDT  Admission date: 7/8/2024  Reason for Visit: Consult    Assessment   Nutrition Assessment   Admission Diagnosis:  Acute respiratory failure with hypoxia [J96.01]    Problem List:    Acute respiratory failure with hypoxia    Dysautonomia orthostatic hypotension syndrome    Hypertension    Sepsis due to pneumonia    Severe malnutrition    Atrial fibrillation      PMH:   She  has a past medical history of Constipation, Depression, Dysautonomia orthostatic hypotension syndrome, Generalized osteoarthritis, GERD (gastroesophageal reflux disease), Hypertension, Insomnia, Osteoarthrosis, shoulder region, Skin ulcer of scalp, Thrombophlebitis of arm, Tremor, and Vitamin B12 deficiency.    PSH:  She  has a past surgical history that includes Hernia repair; Dilation and curettage of uterus; Hip surgery; and Shoulder surgery.    Applicable Nutrition History:   7/12- [facility] Diet Recommendation: soft to chew textures, chopped, no mixed consistencies, honey thick liquids     Anthropometrics     Height: Height: 170.2 cm (67\")  Last Filed Weight: Weight: 59 kg (130 lb) (07/08/24 0712)  Method:    BMI: BMI (Calculated): 20.4    UBW:  130? Based on NFPE would suspect pt weighs less  Weight change:  limited reliable wts available   Weight       Weight (kg) Weight (lbs) Weight Method Visit Report   6/29/2023 57.607 kg  127 lb  Stated     8/20/2023 58.968 kg  130 lb  Stated     8/21/2023 59 kg  130 lb 1.1 oz      11/29/2023 56.7 kg  125 lb      4/20/2024 65.772 kg  145 lb      5/6/2024 63.504 kg  140 lb  Bed scale     5/7/2024 63.5 kg  139 lb 15.9 oz  Stated     7/8/2024 58.968 kg  130 lb          Nutrition Focused Physical Exam    Date: 7/14    Patient meets criteria for malnutrition diagnosis, see MSA note.     Subjective   Reported/Observed/Food/Nutrition Related History:     Pt " w/questions regarding diet textures and what is allowed w/current diet order. Pt states she does not want to get PNA but also wants to enjoy eating. Several items pt is requesting are not compliant w/current diet-reviewed options w/her but little acceptance. Pt is willing to try Magic Cup-would prefer Boost chocolate if available (RN states would thicken at bedside). Pt states she lives at [facility] and does not like the food there, usually has family bringing her options. Since admit, intake has been minimal and pt also notes she doesn't get hungry. Discussed w/SLP, plan is for evaluation tomorrow.     Current Nutrition Prescription   PO: Diet: Cardiac; Healthy Heart (2-3 Na+); No Mixed Consistencies, No Straw, Feeding Assistance - Nursing; Texture: Soft to Chew (NDD 3); Soft to Chew: Chopped Meat; Fluid Consistency: Honey Thick  Oral Nutrition Supplement:   Intake:  0/75/0% x 3 meals documented since admit    Assessment & Plan   Nutrition Diagnosis   Date:  7/14            Updated:    Problem Malnutrition  severe acute   Etiology Energy in <energy out 2/2 advanced age, lack of hunger, taste preferences   Signs/Symptoms Severe muscle wasting and subcutaneous fat loss, po intake<50% EEN x >/=5 days   Status: New    Date:   7/14  Updated:     Problem Inadequate oral intake   Etiology dysphagia   Signs/Symptoms Pt report, SLP report   Status: New    Goal / Objectives:   Nutrition to support treatment and Increase intake      Nutrition Intervention      Follow treatment progress, Care plan reviewed, Interview for preferences, Encourage intake, Supplement provided    Pt meets criteria for severe malnutrition in the context of acute illness, indicated by severe muscle wasting and subcutaneous fat loss, po intake </=50% EEN x >/=5 days. Suspect acute exacerbation of chronic malnutrition.   Obtained pt preferences, however, very limited by current diet textures  Will trial Magic Cup-pt does not enjoy vanilla and would prefer  chocolate if/when available  If chocolate Boost in stock, will send to nsg station-discussed thickening at bedside and RN in agreement.     Monitoring/Evaluation:   Per protocol, PO intake, Supplement intake, Weight, Swallow function    Judy Hilton MS,RD,LD  Time Spent:30

## 2024-07-15 PROCEDURE — 93005 ELECTROCARDIOGRAM TRACING: CPT | Performed by: STUDENT IN AN ORGANIZED HEALTH CARE EDUCATION/TRAINING PROGRAM

## 2024-07-15 PROCEDURE — 92526 ORAL FUNCTION THERAPY: CPT

## 2024-07-15 PROCEDURE — 99232 SBSQ HOSP IP/OBS MODERATE 35: CPT | Performed by: STUDENT IN AN ORGANIZED HEALTH CARE EDUCATION/TRAINING PROGRAM

## 2024-07-15 PROCEDURE — 97530 THERAPEUTIC ACTIVITIES: CPT

## 2024-07-15 PROCEDURE — 63710000001 ONDANSETRON ODT 4 MG TABLET DISPERSIBLE: Performed by: PHYSICIAN ASSISTANT

## 2024-07-15 RX ORDER — ACETAMINOPHEN 325 MG/1
650 TABLET ORAL EVERY 6 HOURS PRN
Status: DISCONTINUED | OUTPATIENT
Start: 2024-07-15 | End: 2024-07-17 | Stop reason: HOSPADM

## 2024-07-15 RX ORDER — ONDANSETRON 4 MG/1
4 TABLET, ORALLY DISINTEGRATING ORAL EVERY 6 HOURS PRN
Status: DISCONTINUED | OUTPATIENT
Start: 2024-07-15 | End: 2024-07-17 | Stop reason: HOSPADM

## 2024-07-15 RX ORDER — HYDRALAZINE HYDROCHLORIDE 50 MG/1
50 TABLET, FILM COATED ORAL EVERY 8 HOURS SCHEDULED
Status: DISCONTINUED | OUTPATIENT
Start: 2024-07-15 | End: 2024-07-16

## 2024-07-15 RX ORDER — BENZONATATE 100 MG/1
100 CAPSULE ORAL 3 TIMES DAILY PRN
Status: DISCONTINUED | OUTPATIENT
Start: 2024-07-15 | End: 2024-07-17 | Stop reason: HOSPADM

## 2024-07-15 RX ORDER — MINERAL OIL 100 G/100G
1 OIL RECTAL ONCE
Status: DISCONTINUED | OUTPATIENT
Start: 2024-07-15 | End: 2024-07-16

## 2024-07-15 RX ADMIN — TRAZODONE HYDROCHLORIDE 100 MG: 100 TABLET ORAL at 22:51

## 2024-07-15 RX ADMIN — AMIODARONE HYDROCHLORIDE 200 MG: 200 TABLET ORAL at 09:21

## 2024-07-15 RX ADMIN — ATORVASTATIN CALCIUM 40 MG: 40 TABLET, FILM COATED ORAL at 20:52

## 2024-07-15 RX ADMIN — HYDRALAZINE HYDROCHLORIDE 50 MG: 50 TABLET ORAL at 15:25

## 2024-07-15 RX ADMIN — APIXABAN 2.5 MG: 2.5 TABLET, FILM COATED ORAL at 09:22

## 2024-07-15 RX ADMIN — Medication 1 APPLICATION: at 22:58

## 2024-07-15 RX ADMIN — Medication 10 ML: at 09:24

## 2024-07-15 RX ADMIN — ACETAMINOPHEN 650 MG: 325 TABLET ORAL at 22:51

## 2024-07-15 RX ADMIN — Medication 1 APPLICATION: at 09:23

## 2024-07-15 RX ADMIN — Medication 12.5 MG: at 09:21

## 2024-07-15 RX ADMIN — ONDANSETRON 4 MG: 4 TABLET, ORALLY DISINTEGRATING ORAL at 22:51

## 2024-07-15 RX ADMIN — APIXABAN 2.5 MG: 2.5 TABLET, FILM COATED ORAL at 20:52

## 2024-07-15 RX ADMIN — AMLODIPINE BESYLATE 5 MG: 5 TABLET ORAL at 09:21

## 2024-07-15 RX ADMIN — LISINOPRIL 10 MG: 10 TABLET ORAL at 09:22

## 2024-07-15 RX ADMIN — LEVETIRACETAM 250 MG: 500 TABLET, FILM COATED ORAL at 20:52

## 2024-07-15 RX ADMIN — Medication 1 CAPSULE: at 09:21

## 2024-07-15 RX ADMIN — TRAZODONE HYDROCHLORIDE 100 MG: 100 TABLET ORAL at 00:23

## 2024-07-15 RX ADMIN — Medication 12.5 MG: at 20:56

## 2024-07-15 RX ADMIN — LEVETIRACETAM 250 MG: 500 TABLET, FILM COATED ORAL at 09:20

## 2024-07-15 RX ADMIN — HYDRALAZINE HYDROCHLORIDE 50 MG: 50 TABLET ORAL at 22:51

## 2024-07-15 RX ADMIN — VENLAFAXINE HYDROCHLORIDE 75 MG: 75 CAPSULE, EXTENDED RELEASE ORAL at 09:21

## 2024-07-15 NOTE — PLAN OF CARE
"Goal Outcome Evaluation:  Plan of Care Reviewed With: patient        Progress: no change  Outcome Evaluation: Palliative RN saw pt at 1141.  Pt. was sitting up in bedside chair stating she just felt \"weak and tired from being in the bed for 8 days\".  \"I need to do physical therapy if I can tolerate it\", she stated.  She stated her children all have \"differing opinions\" and she just wishes her  was here to confer with.  She reiterated she did not want CPR without prompting.  Ensured pt. that her chart reflects this.  Told pt. since goals are established and symptoms are managed that palliative care would go ahead and sign off.  She said that sounded like a very good idea to her.  Palliative care to go ahead and sign off.                     0930 Palliative IDT meeting: TULIO Fuentes RN, CHPN; TAINA Talamantes, APRN; HAYDEN Frazier MD.; MELLO Kim RN, CHANGEL LUIS; HAYDEN Cee, [facility], [facility]; MELLO Cardona MDiv, [facility]; ISAAC Muñiz RN, CHPN; YAYA Jacobson MD; KOLTON Chew RN, CHPN    After hours, contact Palliative by calling [phone].                "

## 2024-07-15 NOTE — THERAPY TREATMENT NOTE
Acute Care - Speech Language Pathology   Swallow Treatment Note [facility]     Patient Name: Bibiana Hodges  : 1935  MRN: [id number]  Today's Date: 7/15/2024               Admit Date: 2024    Visit Dx:     ICD-10-CM ICD-9-CM   1. Hyponatremia  E87.1 276.1   2. Pneumonia of both lungs due to infectious organism, unspecified part of lung  J18.9 483.8   3. Acute respiratory failure with hypoxia  J96.01 518.81   4. Oropharyngeal dysphagia  R13.12 787.22     Patient Active Problem List   Diagnosis    Dysautonomia orthostatic hypotension syndrome    Hypertension    Flu vaccine need    Streptococcus pneumoniae vaccination indicated    Sepsis due to pneumonia    Severe malnutrition    Acute respiratory failure with hypoxia    Atrial fibrillation    Autonomic dysfunction     Past Medical History:   Diagnosis Date    Constipation     Depression     Dysautonomia orthostatic hypotension syndrome     Generalized osteoarthritis     GERD (gastroesophageal reflux disease)     s/p Nissen    Hypertension     Insomnia     Osteoarthrosis, shoulder region     Skin ulcer of scalp     Thrombophlebitis of arm     Tremor     Vitamin B12 deficiency      Past Surgical History:   Procedure Laterality Date    DILATATION AND CURETTAGE      HERNIA REPAIR      HIP SURGERY      SHOULDER SURGERY      Right       SLP Recommendation and Plan                                               Daily Summary of Progress (SLP): progress toward functional goals as expected (07/15/24 0840)               Treatment Assessment (SLP): continued, suspected, pharyngeal dysphagia, aspiration (07/15/24 0840)  Treatment Assessment Comments (SLP): Pt greatly disliking thickened liquids though also does not wish for further PNAs. Do not suspect swallow function will ever improve to point where can safely tolerate thin liquids. Will schedule for MBS today to compare to last study and help aif GOC/POC discussions. (07/15/24 0840)  Plan for Continued  Treatment (SLP): continue treatment per plan of care (07/15/24 0840)         Plan of Care Reviewed With: patient  Progress: no change      SWALLOW EVALUATION (Last 72 Hours)       SLP Adult Swallow Evaluation       Row Name 07/15/24 0840 07/12/24 1500                Rehab Evaluation    Document Type therapy note (daily note)  - therapy note (daily note)  -       Subjective Information complains of  wanting to go back to sleep  - no complaints  -       Patient Observations alert  - alert;cooperative;agree to therapy  -       Patient/Family/Caregiver Comments/Observations -- none present  -       Patient Effort adequate  - good  -       Symptoms Noted During/After Treatment -- none  -          General Information    Patient Profile Reviewed -- yes  -       Current Method of Nutrition -- honey-thick liquids;soft to chew textures;chopped  -          Pain    Additional Documentation -- Pain Scale: FACES Pre/Post-Treatment (Group)  -          Pain Scale: Numbers Pre/Post-Treatment    Pretreatment Pain Rating 0/10 - no pain  - --       Posttreatment Pain Rating 0/10 - no pain  - --          Pain Scale: FACES Pre/Post-Treatment    Pain: FACES Scale, Pretreatment -- 0-->no hurt  -       Posttreatment Pain Rating -- 0-->no hurt  -          SLP Evaluation Clinical Impression    SLP Swallowing Diagnosis -- mod-severe;pharyngeal dysphagia;mild-moderate;oral dysphagia;suspect acute-on-chronic  -       Functional Impact -- risk of aspiration/pneumonia;risk of dehydration;risk of malnutrition  -       Rehab Potential/Prognosis, Swallowing -- adequate, monitor progress closely  -       Swallow Criteria for Skilled Therapeutic Interventions Met -- demonstrates skilled criteria  -          SLP Treatment Clinical Impressions    Treatment Assessment (SLP) continued;suspected;pharyngeal dysphagia;aspiration  - continued;toleration of diet;pharyngeal dysphagia  -       Treatment Assessment  Comments (SLP) Pt greatly disliking thickened liquids though also does not wish for further PNAs. Do not suspect swallow function will ever improve to point where can safely tolerate thin liquids. Will schedule for MBS today to compare to last study and help aif GOC/POC discussions.  - Patient dislikes honey thick liquids and reported her intake is decreased, however, she does not want to get pneumonia again and after a lengthy discussion and education regarding comfort diet v. safest, risk v. benefit, and rationale for diet recommendations, patient is agreeable to continue with dysphagia tx and current diet. She completed dysphagia exercises with good effort. Dietary consult recommended to speak with patient regarding some food preferences within her current diet restrictions that she may better tolerate. SLP to continue to follow to address dysphagia.  -       Daily Summary of Progress (SLP) progress toward functional goals as expected  - progress toward functional goals as expected  -       Barriers to Overall Progress (SLP) Fatigue;Baseline deficits  - Fatigue  -       Plan for Continued Treatment (SLP) continue treatment per plan of care  - continue treatment per plan of care  -       Care Plan Review evaluation/treatment results reviewed;care plan/treatment goals reviewed;risks/benefits reviewed;current/potential barriers reviewed;patient/other agree to care plan  - care plan/treatment goals reviewed;evaluation/treatment results reviewed;risks/benefits reviewed;current/potential barriers reviewed;patient/other agree to care plan  -          Recommendations    Therapy Frequency (Swallow) -- 5 days per week  -       Predicted Duration Therapy Intervention (Days) -- 2 weeks  -       SLP Diet Recommendation -- soft to chew textures;chopped;no mixed consistencies;honey thick liquids  -       Recommended Precautions and Strategies -- upright posture during/after eating;small bites of food  and sips of liquid;general aspiration precautions;no straw;fatigue precautions;other (see comments)  -       Oral Care Recommendations -- Oral Care BID/PRN;Suction toothbrush  -       SLP Rec. for Method of Medication Administration -- meds whole;meds crushed;with puree;as tolerated  -       Monitor for Signs of Aspiration -- yes;notify SLP if any concerns  -       Anticipated Discharge Disposition (SLP) -- skilled nursing facility  -                 User Key  (r) = Recorded By, (t) = Taken By, (c) = Cosigned By      Initials Name Effective Dates    Skyla Gomez, MS CCC-SLP 02/03/23 -     CH Maria De Jesus Matthews MS CCC-SLP 06/16/21 -                     EDUCATION  The patient has been educated in the following areas:   Dysphagia (Swallowing Impairment) Modified Diet Instruction.        SLP GOALS       Row Name 07/15/24 0840 07/12/24 1500          (LTG) Patient will demonstrate functional swallow for    Diet Texture (Demonstrate functional swallow) soft to chew (chopped) textures  -SM soft to chew (chopped) textures  -     Liquid viscosity (Demonstrate functional swallow) nectar/ mildly thick liquids  -SM nectar/ mildly thick liquids  -     [place] (Demonstrate functional swallow) with minimal cues (75-90% accuracy)  -SM with minimal cues (75-90% accuracy)  -     Time Frame (Demonstrate functional swallow) by discharge  - by discharge  -     Progress/Outcomes (Demonstrate functional swallow) continuing progress toward goal  -SM continuing progress toward goal  -        (STG) Patient will tolerate trials of    Consistencies Trialed (Tolerate trials) soft to chew (chopped) textures;honey/ moderately thick liquids  -SM soft to chew (chopped) textures;honey/ moderately thick liquids  -     Desired Outcome (Tolerate trials) without signs/symptoms of aspiration;without signs of distress;with adequate oral prep/transit/clearance  -SM without signs/symptoms of aspiration;without signs of  distress;with adequate oral prep/transit/clearance  -CH     [place] (Tolerate trials) with minimal cues (75-90% accuracy)  -SM with minimal cues (75-90% accuracy)  -CH     Time Frame (Tolerate trials) 1 week  -SM 1 week  -CH     Progress/Outcomes (Tolerate trials) continuing progress toward goal  -SM continuing progress toward goal  -CH     Comment (Tolerate trials) Pt did not wish for meal tray yet, only wanting to get back to sleep. Agreeable for pills (RN present) with honey-thick liquids with encouragement and few bites of banana and applesauce 2' pt not wishing to feel sick to her stomach.  -SM no s/s  -CH        (STG) Pharyngeal Strengthening Exercise Goal 1 (SLP)    Activity (Pharyngeal Strengthening Goal 1, SLP) increase timing;increase superior movement of the hyolaryngeal complex;increase anterior movement of the hyolaryngeal complex;increase closure at entrance to airway/closure of airway at glottis;increase squeeze/positive pressure generation  -SM increase timing;increase superior movement of the hyolaryngeal complex;increase anterior movement of the hyolaryngeal complex;increase closure at entrance to airway/closure of airway at glottis;increase squeeze/positive pressure generation  -CH     Increase Timing prepping - 3 second prep or suck swallow or 3-step swallow  -SM prepping - 3 second prep or suck swallow or 3-step swallow  -CH     Increase Superior Movement of the Hyolaryngeal Complex effortful pitch glide (falsetto + pharyngeal squeeze)  -SM effortful pitch glide (falsetto + pharyngeal squeeze)  -CH     Increase Anterior Movement of the Hyolaryngeal Complex chin tuck against resistance (CTAR)  -SM chin tuck against resistance (CTAR)  -CH     Increase Closure at Entrance to Airway/Closure of Airway at Glottis supraglottic swallow  -SM supraglottic swallow  -CH     Increase Squeeze/Positive Pressure Generation hard effortful swallow  -SM hard effortful swallow  -CH     [place]/Accuracy  (Pharyngeal Strengthening Goal 1, SLP) with moderate cues (50-74% accuracy)  -SM with moderate cues (50-74% accuracy)  -CH     Time Frame (Pharyngeal Strengthening Goal 1, SLP) 1 week  -SM 1 week  -CH     Progress/Outcomes (Pharyngeal Strengthening Goal 1, SLP) goal ongoing  -SM continuing progress toward goal  -CH     Comment (Pharyngeal Strengthening Goal 1, SLP) -- reviewed and completed all exercises w/ good effort  -CH               User Key  (r) = Recorded By, (t) = Taken By, (c) = Cosigned By      Initials Name Provider Type    Skyla Gomez MS CCC-SLP Speech and Language Pathologist    Maria De Jesus Mcmanus MS CCC-SLP Speech and Language Pathologist                         Time Calculation:    Time Calculation- SLP       Row Name 07/15/24 0946             Time Calculation- SLP    SLP Start Time 0840  -SM      SLP Received On 07/15/24  -SM         Untimed Charges    96488-PC Treatment Swallow Minutes 40  -SM         Total Minutes    Untimed Charges Total Minutes 40  -SM       Total Minutes 40  -SM                User Key  (r) = Recorded By, (t) = Taken By, (c) = Cosigned By      Initials Name Provider Type    Skyla Gomez MS CCC-SLP Speech and Language Pathologist                    Therapy Charges for Today       Code Description Service Date Service Provider Modifiers Qty    09606716836 HC ST TREATMENT SWALLOW 3 7/15/2024 Skyla Austin MS CCC-SLP GN 1                 Skyla Austin MS CCC-KATIE  7/15/2024

## 2024-07-15 NOTE — THERAPY TREATMENT NOTE
Patient Name: Bibiana Hodges  : 1935    MRN: [id number]                              Today's Date: 7/15/2024       Admit Date: 2024    Visit Dx:     ICD-10-CM ICD-9-CM   1. Hyponatremia  E87.1 276.1   2. Pneumonia of both lungs due to infectious organism, unspecified part of lung  J18.9 483.8   3. Acute respiratory failure with hypoxia  J96.01 518.81   4. Oropharyngeal dysphagia  R13.12 787.22     Patient Active Problem List   Diagnosis    Dysautonomia orthostatic hypotension syndrome    Hypertension    Flu vaccine need    Streptococcus pneumoniae vaccination indicated    Sepsis due to pneumonia    Severe malnutrition    Acute respiratory failure with hypoxia    Atrial fibrillation    Autonomic dysfunction     Past Medical History:   Diagnosis Date    Constipation     Depression     Dysautonomia orthostatic hypotension syndrome     Generalized osteoarthritis     GERD (gastroesophageal reflux disease)     s/p Nissen    Hypertension     Insomnia     Osteoarthrosis, shoulder region     Skin ulcer of scalp     Thrombophlebitis of arm     Tremor     Vitamin B12 deficiency      Past Surgical History:   Procedure Laterality Date    DILATATION AND CURETTAGE      HERNIA REPAIR      HIP SURGERY      SHOULDER SURGERY      Right      General Information       Row Name 07/15/24 1140          Physical Therapy Time and Intention    Document Type therapy note (daily note)  -ML     Mode of Treatment physical therapy  -ML       Row Name 07/15/24 1140          General Information    Patient Profile Reviewed yes  -ML     Existing Precautions/Restrictions fall;oxygen therapy device and L/min  Decreased vision  -ML     Barriers to Rehab medically complex;previous functional deficit  -ML       Row Name 07/15/24 1140          Cognition    Orientation Status (Cognition) oriented x 3  -ML       Row Name 07/15/24 1140          Safety Issues, Functional Mobility    Safety Issues Affecting Function (Mobility) awareness of need  for assistance;insight into deficits/self-awareness;positioning of assistive device;safety precaution awareness;safety precautions follow-through/compliance;sequencing abilities  -ML     Impairments Affecting Function (Mobility) balance;coordination;endurance/activity tolerance;shortness of breath;strength;postural/trunk control  -ML               User Key  (r) = Recorded By, (t) = Taken By, (c) = Cosigned By      Initials Name Provider Type     Mahnaz Parker Physical Therapist                   Mobility       Row Name 07/15/24 1142          Bed Mobility    Bed Mobility supine-sit  -ML     Supine-Sit [place] (Bed Mobility) standby assist  -ML     Assistive Device (Bed Mobility) bed rails;head of bed elevated  -ML       Row Name 07/15/24 1142          Sit-Stand Transfer    Sit-Stand [place] (Transfers) minimum assist (75% patient effort);1 person assist  -ML     Assistive Device (Sit-Stand Transfers) walker, front-wheeled  -ML     Comment, (Sit-Stand Transfer) 3 total sit to stand transfers throughout session  -ML       Row Name 07/15/24 1142          Gait/Stairs (Locomotion)    [place] Level (Gait) verbal cues;contact guard  -ML     Assistive Device (Gait) walker, front-wheeled  -ML     Distance in Feet (Gait) 10  + 18  -ML     Deviations/Abnormal Patterns (Gait) perri decreased;festinating/shuffling;gait speed decreased;stride length decreased;base of support, narrow;bilateral deviations  -ML     Bilateral Gait Deviations forward flexed posture;heel strike decreased  -ML     Comment, (Gait/Stairs) patient demonstrates forward flexed posture and narrow base of support, verbal cues to step into RW and to increase safety awareness  -ML               User Key  (r) = Recorded By, (t) = Taken By, (c) = Cosigned By      Initials Name Provider Type    Mahnaz Butler Physical Therapist                   Obj/Interventions       Row Name 07/15/24 1147          Balance    Balance Assessment sitting static  balance;sitting dynamic balance;sit to stand dynamic balance;standing static balance;standing dynamic balance  -ML     Static Sitting Balance standby assist  -ML     Dynamic Sitting Balance contact guard  -ML     Position, Sitting Balance unsupported;sitting edge of bed;other (see comments)  sitting on toilet  -ML     Sit to Stand Dynamic Balance verbal cues;minimal assist;1-person assist  -ML     Static Standing Balance contact guard  -ML     Dynamic Standing Balance contact guard;verbal cues  -ML     Position/Device Used, Standing Balance supported;walker, front-wheeled  -ML     Balance Interventions sitting;standing;sit to stand;supported;occupation based/functional task  -ML               User Key  (r) = Recorded By, (t) = Taken By, (c) = Cosigned By      Initials Name Provider Type    ML Mahnaz Parker Physical Therapist                   Goals/Plan    No documentation.                  Clinical Impression       Row Name 07/15/24 1149          Pain    Pain Intervention(s) Repositioned;Ambulation/increased activity  -ML     Additional Documentation Pain Scale: FACES Pre/Post-Treatment (Group)  -ML       Row Name 07/15/24 1149          Pain Scale: FACES Pre/Post-Treatment    Pain: FACES Scale, Pretreatment 2-->hurts little bit  -ML     Posttreatment Pain Rating 2-->hurts little bit  -ML     Pain Location generalized  -ML     Pain Location - chest  -ML     Pre/Posttreatment Pain Comment patient reports chest discomfort from coughing  -ML       Row Name 07/15/24 1140          Plan of Care Review    Plan of Care Reviewed With patient  -ML     Progress improving  -ML     Outcome Evaluation Patient required less assistance during ambulation, however, limited in distance by decreased strength and activity tolerance deficits. Patient continues to present below baseline for mobility and would continue to benefit from skilled PT to address strength, balance and activity tolerance deficits. Continue current PT POC.  -ML        Row Name 07/15/24 1149          Vital Signs    Pre SpO2 (%) 98  -ML     O2 Delivery Pre Treatment nasal cannula  -ML     Post SpO2 (%) 96  -ML     O2 Delivery Post Treatment nasal cannula  -ML     Pre Patient Position Supine  -ML     Intra Patient Position Standing  -ML     Post Patient Position Sitting  -ML       Row Name 07/15/24 1149          Positioning and Restraints    Pre-Treatment Position in bed  -ML     Post Treatment Position chair  -ML     In Chair notified nsg;reclined;call light within reach;encouraged to call for assist;exit alarm on;waffle cushion;on mechanical lift sling;legs elevated;heels elevated;pillow between legs  -ML               User Key  (r) = Recorded By, (t) = Taken By, (c) = Cosigned By      Initials Name Provider Type    Mahnaz Butler Physical Therapist                   Outcome Measures       Row Name 07/15/24 1152          How much help from another person do you currently need...    Turning from your back to your side while in flat bed without using bedrails? 3  -ML     Moving from lying on back to sitting on the side of a flat bed without bedrails? 3  -ML     Moving to and from a bed to a chair (including a wheelchair)? 3  -ML     Standing up from a chair using your arms (e.g., wheelchair, bedside chair)? 3  -ML     Climbing 3-5 steps with a railing? 2  -ML     To walk in hospital room? 3  -ML     AM-PAC 6 Clicks Score (PT) 17  -ML     Highest Level of Mobility Goal 5 --> Static standing  -ML       Row Name 07/15/24 1152          Functional Assessment    Outcome Measure Options AM-PAC 6 Clicks Basic Mobility (PT)  -ML               User Key  (r) = Recorded By, (t) = Taken By, (c) = Cosigned By      Initials Name Provider Type    Mahnaz Butler Physical Therapist                                 Physical Therapy Education       Title: PT OT SLP Therapies (In Progress)       Topic: Physical Therapy (Done)       Point: Mobility training (Done)       Learning Progress Summary              Patient Acceptance, E, VU,NR by ML at 7/15/2024 1152    Acceptance, E, NR by KE at 7/12/2024 1028    Acceptance, E, VU,NR by NS at 7/10/2024 1604    Comment: safety with mobility, benefits/need for rehab    Acceptance, E, VU,NR by NS at 7/9/2024 1319                         Point: Home exercise program (Done)       Learning Progress Summary             Patient Acceptance, E, VU,NR by NS at 7/10/2024 1604    Comment: safety with mobility, benefits/need for rehab                         Point: Body mechanics (Done)       Learning Progress Summary             Patient Acceptance, E, VU,NR by ML at 7/15/2024 1152    Acceptance, E, NR by KE at 7/12/2024 1028    Acceptance, E, VU,NR by NS at 7/10/2024 1604    Comment: safety with mobility, benefits/need for rehab    Acceptance, E, VU,NR by NS at 7/9/2024 1319                         Point: Precautions (Done)       Learning Progress Summary             Patient Acceptance, E, VU,NR by ML at 7/15/2024 1152    Acceptance, E, NR by KE at 7/12/2024 1028    Acceptance, E, VU,NR by NS at 7/10/2024 1604    Comment: safety with mobility, benefits/need for rehab    Acceptance, E, VU,NR by NS at 7/9/2024 1319                                         User Key       Initials Effective Dates Name Provider Type Discipline    NS 06/16/21 -  Shelley Gutiérrez, PT Physical Therapist PT     04/22/21 -  Mahnaz Parker Physical Therapist PT     11/16/23 -  Corry Nam PT Physical Therapist PT                  PT Recommendation and Plan     Plan of Care Reviewed With: patient  Progress: improving  Outcome Evaluation: Patient required less assistance during ambulation, however, limited in distance by decreased strength and activity tolerance deficits. Patient continues to present below baseline for mobility and would continue to benefit from skilled PT to address strength, balance and activity tolerance deficits. Continue current PT POC.     Time Calculation:         PT Charges        Row Name 07/15/24 1153             Time Calculation    Start Time 1058  -ML      PT Received On 07/15/24  -ML         Timed Charges    85556 - PT Therapeutic Activity Minutes 32  -ML         Total Minutes    Timed Charges Total Minutes 32  -ML       Total Minutes 32  -ML                User Key  (r) = Recorded By, (t) = Taken By, (c) = Cosigned By      Initials Name Provider Type    Mahnaz Butler Physical Therapist                  Therapy Charges for Today       Code Description Service Date Service Provider Modifiers Qty    83528172984 HC PT THERAPEUTIC ACT EA 15 MIN 7/15/2024 Mahnaz Parker GP 2            PT G-Codes  Outcome Measure Options: AM-PAC 6 Clicks Basic Mobility (PT)  AM-PAC 6 Clicks Score (PT): 17  AM-PAC 6 Clicks Score (OT): 13  PT Discharge Summary  Anticipated Discharge Disposition (PT): skilled nursing facility    Mahnaz Parker  7/15/2024

## 2024-07-15 NOTE — CASE MANAGEMENT/SOCIAL WORK
Continued Stay Note  [facility]     Patient Name: Bibiana Hodges  MRN: [id number]  Today's Date: 7/15/2024    Admit Date: 7/8/2024    Plan: SNF   Discharge Plan       Row Name 07/15/24 9059       Plan    Plan SNF    Patient/Family in Agreement with Plan yes    Plan Comments Spoke with son, Edmond Hodges, by phone. We discussed SNF, option. Referrals made [facility], [facility], and [facility] and [facility]. Son will call me back to let me know which one he decided on. Patient will need insurance approval. CM will follow.    Final Discharge Disposition Code 03 - skilled nursing facility (SNF)      Row Name 07/15/24 4236       Plan    Plan SNF                   Discharge Codes    No documentation.                 Expected Discharge Date and Time       Expected Discharge Date Expected Discharge Time    Jul 15, 2024               Jess Perdue RN

## 2024-07-15 NOTE — PROGRESS NOTES
[facility] Medicine Services  PROGRESS NOTE    Patient Name: Bibiana Hodges  : 1935  MRN: [id number]    Date of Admission: 2024  Primary Care Physician: Nivia Madrigal MD    Subjective   Subjective     CC:  Follow-up for pneumonia    HPI:  She reported the coughing kept her up last night.  Had some chest discomfort with coughing only.  No nausea or vomiting.  No bowel movement.  Blood pressure elevated this morning.    Objective   Objective     Vital Signs:   Temp:  [96.6 °F (35.9 °C)-97.9 °F (36.6 °C)] 97.9 °F (36.6 °C)  Heart Rate:  [60-67] 67  Resp:  [18] 18  BP: (147-185)/() 175/142  Flow (L/min):  [2] 2     Physical Exam:  Constitutional: No acute distress, awake, alert, laying in bed  HENT: NCAT, mucous membranes moist  Respiratory: Coarse throughout, respiratory effort normal   Cardiovascular: RRR, no murmurs, rubs, or gallops  Gastrointestinal: Positive bowel sounds, soft, nontender, nondistended  Musculoskeletal: No bilateral ankle edema  Psychiatric: Appropriate affect, cooperative  Neurologic: Alert, oriented, symmetric facies, HERNÁNDEZ, speech clear, +tremors  Skin: No rashes on exposed skin    Results Reviewed:  LAB RESULTS:      Lab 07/10/24  1100 24  0420   WBC 11.96* 16.62*   HEMOGLOBIN 11.0* 11.7*   HEMATOCRIT 33.9* 36.1   PLATELETS 151 159   NEUTROS ABS 10.88* 14.86*   IMMATURE GRANS (ABS) 0.06* 0.09*   LYMPHS ABS 0.56* 1.18   MONOS ABS 0.37 0.40   EOS ABS 0.07 0.05   .8* 102.6*         Lab 07/10/24  1100 24  0420   SODIUM 134* 134*   POTASSIUM 3.8 4.9   CHLORIDE 100 101   CO2 27.0 21.0*   ANION GAP 7.0 12.0   BUN 21 27*   CREATININE 1.16* 1.30*   EGFR 45.4* 39.6*   GLUCOSE 106* 63*   CALCIUM 8.1* 8.7   MAGNESIUM 1.6  --    PHOSPHORUS 3.2  --          Lab 07/10/24  1100   TOTAL PROTEIN 5.3*   ALBUMIN 2.8*   GLOBULIN 2.5   ALT (SGPT) 33   AST (SGOT) 31   BILIRUBIN 0.4   ALK PHOS 63                         Brief Urine Lab Results   (Last result in the past 365 days)        Color   Clarity   Blood   Leuk Est   Nitrite   Protein   CREAT   Urine HCG        05/06/24 2154 Yellow   Clear   Negative   Negative   Negative   Trace                   Microbiology Results Abnormal       Procedure Component Value - Date/Time    Blood Culture - Blood, Arm, Right [[id number]]  (Normal) Collected: 07/08/24 0728    Lab Status: Final result Specimen: Blood from Arm, Right Updated: 07/13/24 0800     Blood Culture No growth at 5 days    Blood Culture - Blood, Wrist, Left [[id number]]  (Normal) Collected: 07/08/24 0729    Lab Status: Final result Specimen: Blood from Wrist, Left Updated: 07/13/24 0800     Blood Culture No growth at 5 days    Narrative:      Less than seven (7) mL's of blood was collected.  Insufficient quantity may yield false negative results.    Legionella Antigen, Urine - Urine, Urine, Clean Catch [[id number]]  (Normal) Collected: 07/08/24 1925    Lab Status: Final result Specimen: Urine, Clean Catch Updated: 07/09/24 0109     LEGIONELLA ANTIGEN, URINE Negative    MRSA Screen, PCR (Inpatient) - Swab, Nares [[id number]]  (Normal) Collected: 07/08/24 1248    Lab Status: Final result Specimen: Swab from Nares Updated: 07/08/24 1446     MRSA PCR Negative    Narrative:      The negative predictive value of this diagnostic test is high and should only be used to consider de-escalating anti-MRSA therapy. A positive result may indicate colonization with MRSA and must be correlated clinically.  MRSA Negative    Respiratory Panel PCR w/COVID-19(SARS-CoV-2) ERICK/MARTA/ELPIDIO/PAD/COR/ALMA In-House, NP Swab in UTM/VTM, 2 HR TAT - Swab, Nasopharynx [[id number]]  (Normal) Collected: 07/08/24 0730    Lab Status: Final result Specimen: Swab from Nasopharynx Updated: 07/08/24 0840     ADENOVIRUS, PCR Not Detected     Coronavirus 229E Not Detected     Coronavirus HKU1 Not Detected     Coronavirus NL63 Not Detected     Coronavirus OC43 Not Detected     COVID19 Not Detected      Human Metapneumovirus Not Detected     Human Rhinovirus/Enterovirus Not Detected     Influenza A PCR Not Detected     Influenza B PCR Not Detected     Parainfluenza Virus 1 Not Detected     Parainfluenza Virus 2 Not Detected     Parainfluenza Virus 3 Not Detected     Parainfluenza Virus 4 Not Detected     RSV, PCR Not Detected     Bordetella pertussis pcr Not Detected     Bordetella parapertussis PCR Not Detected     Chlamydophila pneumoniae PCR Not Detected     Mycoplasma pneumo by PCR Not Detected    Narrative:      In the setting of a positive respiratory panel with a viral infection PLUS a negative procalcitonin without other underlying concern for bacterial infection, consider observing off antibiotics or discontinuation of antibiotics and continue supportive care. If the respiratory panel is positive for atypical bacterial infection (Bordetella pertussis, Chlamydophila pneumoniae, or Mycoplasma pneumoniae), consider antibiotic de-escalation to target atypical bacterial infection.            No radiology results from the last 24 hrs    Results for orders placed during the hospital encounter of 08/20/23    Adult Transthoracic Echo Complete W/ Cont if Necessary Per Protocol    Interpretation Summary    Left ventricular systolic function is mildly decreased. Calculated left ventricular EF = 47.9% Normal left ventricular cavity size and wall thickness noted. There is left ventricular global hypokinesis noted. Left ventricular diastolic function is consistent with (grade I) impaired relaxation.    : Normal right ventricular cavity size and systolic function noted. Electronic lead present in the ventricle.    Normal left atrial size and volume noted. Saline test results are negative.    There is moderate calcification of the aortic valve. The aortic valve was poorly visualized but appears trileaflet. Moderate aortic valve regurgitation is present. No aortic valve stenosis is present.    Mild mitral annular  calcification is present. Mild mitral valve regurgitation is present. No significant mitral valve stenosis is present.    The tricuspid valve is grossly normal in structure. Mild tricuspid valve regurgitation is present. Estimated right ventricular systolic pressure from tricuspid regurgitation is normal, 33 mmHg. No tricuspid valve stenosis is present.    Mild dilation of the ascending aorta is present. Ascending aorta = 3.7 cm    There is a small (<1cm) pericardial effusion adjacent to the right atrium and right ventricle.      Current medications:  Scheduled Meds:amiodarone, 200 mg, Oral, Daily  amLODIPine, 5 mg, Oral, Q24H  apixaban, 2.5 mg, Oral, BID  atorvastatin, 40 mg, Oral, Nightly  castor oil-balsam peru, 1 Application, Topical, Q12H  hydrALAZINE, 50 mg, Oral, Q8H  lactobacillus acidophilus, 1 capsule, Oral, Daily  levETIRAcetam, 250 mg, Oral, BID  lisinopril, 10 mg, Oral, Q24H  metoprolol tartrate, 12.5 mg, Oral, BID  mineral oil, 1 enema, Rectal, Once  sodium chloride, 10 mL, Intravenous, Q12H  traZODone, 100 mg, Oral, Nightly  venlafaxine XR, 75 mg, Oral, Daily      Continuous Infusions:   PRN Meds:.  benzonatate    senna-docusate sodium **AND** polyethylene glycol **AND** bisacodyl **AND** bisacodyl    Calcium Replacement - Follow Nurse / BPA Driven Protocol    ipratropium-albuterol    Magnesium Standard Dose Replacement - Follow Nurse / BPA Driven Protocol    ondansetron    Phosphorus Replacement - Follow Nurse / BPA Driven Protocol    Potassium Replacement - Follow Nurse / BPA Driven Protocol    sodium chloride    sodium chloride    sodium chloride    Assessment & Plan   Assessment & Plan     Active Hospital Problems    Diagnosis  POA    **Acute respiratory failure with hypoxia [J96.01]  Yes    Atrial fibrillation [I48.91]  Yes    Severe malnutrition [E43]  Yes    Sepsis due to pneumonia [J18.9, A41.9]  Yes    Hypertension [I10]  Yes    Dysautonomia orthostatic hypotension syndrome [I95.1]  Yes       Resolved Hospital Problems   No resolved problems to display.        Brief Hospital Course to date:  Bibiana Hodges is a 88 y.o. female 88-year-old female with history of atrial fibrillation, on amiodarone and Eliquis, hypertension, autonomic dysfunction, chronic debility, prior hospitalizations for recurrent pneumonia with concerns for possible aspiration.  She presented from nursing home with 1 month of progressive worsening shortness of breath and cough secondary to bilateral pneumonia.    This patient's problems and plans were partially entered by my partner and updated as appropriate by me 07/15/24.     Acute respiratory failure with hypoxia secondary to bilateral strep pneumo pneumonia  Sepsis secondary to pneumonia  Patient presented with hypoxic respiratory failure requiring high flow nasal cannula on admission, now weaned to nasal cannula  Chest x-ray with bilateral pneumonia; Strep pneumo antigen positive  Status post course of Rocephin, doxycycline  Probiotic  SLP following given her previous history of aspiration pneumonia, on modified diet, repeat swallow evaluation pending     Atrial fibrillation - Continue home metoprolol, amiodarone, Eliquis  HLD - Continue statin  Depression - Continue Effexor and BuSpar  Seizure disorder-continue Keppra, seizure precautions     Hypertension  Autonomic dysfunction with recurrent presyncopal episodes  Lisinopril, amlodipine, hydralazine    Chronic debility  Currently resides at assisted living facility  PT and OT    Goals of care  Palliative care team consulted for goals of care discussion     Left breast swelling  Patient is aware and has outpatient follow-up.  We are not able to do breast ultrasound for masses (only suspected abscesses) inpatient or mammograms so she will need to keep follow up.    Expected Discharge Location and Transportation: Facility; medically ready and awaiting placement  Expected Discharge   Expected Discharge Date: 7/15/2024; Expected  Discharge Time:      VTE Prophylaxis:  Pharmacologic VTE prophylaxis orders are present.         AM-PAC 6 Clicks Score (PT): 15 (07/14/24 2000)    CODE STATUS:   Code Status and Medical Interventions:   Ordered at: 07/11/24 1510     Level Of Support Discussed With:    Patient     Code Status (Patient has no pulse and is not breathing):    No CPR (Do Not Attempt to Resuscitate)     Medical Interventions (Patient has pulse or is breathing):    Full Support       Vanna Dykes MD  07/15/24

## 2024-07-15 NOTE — PLAN OF CARE
Goal Outcome Evaluation:  Plan of Care Reviewed With: patient        Progress: improving  Outcome Evaluation: Patient required less assistance during ambulation, however, limited in distance by decreased strength and activity tolerance deficits. Patient continues to present below baseline for mobility and would continue to benefit from skilled PT to address strength, balance and activity tolerance deficits. Continue current PT POC.      Anticipated Discharge Disposition (PT): skilled nursing facility

## 2024-07-15 NOTE — PLAN OF CARE
Goal Outcome Evaluation:  Plan of Care Reviewed With: patient           Outcome Evaluation: Pt on 2LNC.  Up with walker to bathroom. On honey thick liquids. Non tele. IV dc'd per MD order. Waiting for placement. VSS

## 2024-07-15 NOTE — DISCHARGE PLACEMENT REQUEST
"Bibiana Hodges (88 y.o. Female)       Date of Birth   1935    Social Security Number       Address   [address] [id number]    Home Phone   [phone]    MRN   [id number]       L[employer]   Fa[employer]    Marital Status                               Admission Date   24    Admission Type   Emergency    Admitting Provider   Vanna Dykes MD    Attending Provider   Vanna Dykes MD    Department, Room/Bed   [facility] 6B, N646/1       Discharge Date       Discharge Disposition       Discharge Destination                                 Attending Provider: Vanna Dykes MD    Allergies: Gabapentin, Sulfa Antibiotics    Isolation: None   Infection: None   Code Status: No CPR    Ht: 170.2 cm (67\")   Wt: 59 kg (130 lb)    Admission Cmt: None   Principal Problem: Acute respiratory failure with hypoxia [J96.01]                   Active Insurance as of 2024       Primary Coverage       Payor Plan Insurance Group Employer/Plan Group    HUMANA MEDICARE REPLACEMENT HUMANA MED ADV HMO [id number]       Payor Plan Address Payor Plan Phone Number Payor Plan Fax Number Effective Dates    [address] [address] [phone]  2023 - None Entered    [facility] [id number]         Subscriber Name Subscriber Birth Date Member ID       BIBIANA HODGES 1935 [id number]                     Emergency Contacts        (Rel.) Home Phone Work Phone Mobile Phone    Danyelle Keyes (POA) (Daughter) [phone] -- [phone]    HODGESELODIA SCHNEIDER (Son) -- -- [phone]    HODGESNIA (Son) [phone] -- [phone]                 History & Physical        Destiny Hammond MD at [address] [place] [address] Medicine Services  HISTORY AND PHYSICAL    Patient Name: Bibiana Hodges  : 1935  MRN: [id number]  Primary Care Physician: Nivia Madrigal MD  Date of admission: 2024      Subjective  Subjective     Chief Complaint:SOA and " cough    HPI:  Bibiana Hodges is a 88 y.o. female 88-year-old female with history of atrial fibrillation, on amiodarone and Eliquis, hypertension, autonomic dysfunction, chronic debility, prior hospitalizations for recurrent pneumonia with concerns for possible aspiration.  She presents from nursing home with 1 month of progressive worsening shortness of breath and cough, she describes the cough as productive of yellow sputum, denies any fevers or chills, no nausea no vomiting.  On arrival here [facility] with pO2 of 50, chest x-ray concerning for bilateral pneumonia.  Patient was started on broad-spectrum antibiotics, placed on HFNC and hospital medicine asked to admit.  At the time my evaluation she says she feels slightly better, does endorse lack of sleep, she is also concerned that her left breast is slightly larger than her right.    Personal History     Past Medical History:   Diagnosis Date    Constipation     Depression     Dysautonomia orthostatic hypotension syndrome     Generalized osteoarthritis     GERD (gastroesophageal reflux disease)     s/p Nissen    Hypertension     Insomnia     Osteoarthrosis, shoulder region     Skin ulcer of scalp     Thrombophlebitis of arm     Tremor     Vitamin B12 deficiency            Past Surgical History:   Procedure Laterality Date    DILATATION AND CURETTAGE      HERNIA REPAIR      HIP SURGERY      SHOULDER SURGERY      Right       Family History: family history includes Anorexia nervosa in her daughter; Cancer in her mother; Stroke in her brother.     Social History:  reports that she has never smoked. She has never used smokeless tobacco. She reports that she does not drink alcohol and does not use drugs.  Social History     Social History Narrative    Not on file       Medications:  Available home medication information reviewed.  albuterol, amiodarone, apixaban, atorvastatin, busPIRone, cholecalciferol, cyanocobalamin, fluticasone, guaiFENesin-dextromethorphan,  levETIRAcetam XR, melatonin, metoprolol tartrate, omeprazole, polyethylene glycol, senna, traZODone, and venlafaxine XR    Allergies   Allergen Reactions    Gabapentin     Sulfa Antibiotics        Objective  Objective     Vital Signs:   Temp:  [97 °F (36.1 °C)] 97 °F (36.1 °C)  Heart Rate:  [] 77  Resp:  [22-24] 22  BP: (110-115)/(71-96) 110/96  Flow (L/min):  [50] 50       Physical Exam   Constitutional: Chronically ill-appearing elderly female.  Eyes: PERRLA, sclerae anicteric, no conjunctival injection  HENT: NCAT, mucous membranes dry  Neck: Supple, no thyromegaly, no lymphadenopathy, trachea midline  Respiratory: Moderate labored respirations, diffuse coarse breath sounds, rhonchi, no wheezes on HFNC  Cardiovascular: RRR, no murmurs, rubs, or gallops, palpable pedal pulses bilaterally  Gastrointestinal: Positive bowel sounds, soft, nontender, nondistended  Musculoskeletal: No bilateral ankle edema, no clubbing or cyanosis to extremities  Psychiatric: Appropriate affect, cooperative  Neurologic: Oriented x 3, tremulous, strength symmetric in all extremities, Cranial Nerves grossly intact to confrontation, speech clear  Skin: No rashes, left breast with some leathery feeling on the lateral side    Result Review:  I have personally reviewed the results from the time of this admission to 7/8/2024 09:23 EDT and agree with these findings:  [x]  Laboratory list / accordion  []  Microbiology  [x]  Radiology  []  EKG/Telemetry   []  Cardiology/Vascular   []  Pathology  []  Old records  []  Other:  Most notable findings include:       LAB RESULTS:      Lab 07/08/24  0728   WBC 15.50*   HEMOGLOBIN 15.1   HEMATOCRIT 45.9   PLATELETS 250   NEUTROS ABS 11.34*   IMMATURE GRANS (ABS) 0.05   LYMPHS ABS 3.49*   MONOS ABS 0.40   EOS ABS 0.18   .9*   LACTATE 2.7*         Lab 07/08/24  0728   SODIUM 128*   POTASSIUM 4.1   CHLORIDE 93*   CO2 21.0*   ANION GAP 14.0   BUN 20   CREATININE 1.26*   EGFR 41.1*   GLUCOSE 89    CALCIUM 8.8         Lab 07/08/24  0728   TOTAL PROTEIN 6.1   ALBUMIN 3.3*   GLOBULIN 2.8   ALT (SGPT) 41*   AST (SGOT) 46*   BILIRUBIN 0.3   ALK PHOS 73         Lab 07/08/24  0728   PROBNP 651.7   HSTROP T 18*                 Lab 07/08/24  0718   PH, ARTERIAL 7.419   PCO2, ARTERIAL 33.2*   PO2 ART 51.2*   FIO2 44   HCO3 ART 21.5   BASE EXCESS ART -2.2*     UA          8/20/2023    18:14 4/20/2024    18:51 5/6/2024    21:54   Urinalysis   Squamous Epithelial Cells, UA 0-2  None Seen     Specific [place], UA 1.018  1.013  1.014    Ketones, UA Negative  Negative  Negative    Blood, UA Negative  Negative  Negative    Leukocytes, UA Large (3+)  Small (1+)  Negative    Nitrite, UA Positive  Negative  Negative    RBC, UA 0-2  0-2     WBC, UA Too Numerous to Count  Too Numerous to Count     Bacteria, UA 2+  4+         Microbiology Results (last 10 days)       Procedure Component Value - Date/Time    Respiratory Panel PCR w/COVID-19(SARS-CoV-2) ERICK/MARTA/ELPIDIO/PAD/COR/ALMA In-House, NP Swab in UTM/VTM, 2 HR TAT - Swab, Nasopharynx [[id number]]  (Normal) Collected: 07/08/24 0730    Lab Status: Final result Specimen: Swab from Nasopharynx Updated: 07/08/24 0840     ADENOVIRUS, PCR Not Detected     Coronavirus 229E Not Detected     Coronavirus HKU1 Not Detected     Coronavirus NL63 Not Detected     Coronavirus OC43 Not Detected     COVID19 Not Detected     Human Metapneumovirus Not Detected     Human Rhinovirus/Enterovirus Not Detected     Influenza A PCR Not Detected     Influenza B PCR Not Detected     Parainfluenza Virus 1 Not Detected     Parainfluenza Virus 2 Not Detected     Parainfluenza Virus 3 Not Detected     Parainfluenza Virus 4 Not Detected     RSV, PCR Not Detected     Bordetella pertussis pcr Not Detected     Bordetella parapertussis PCR Not Detected     Chlamydophila pneumoniae PCR Not Detected     Mycoplasma pneumo by PCR Not Detected    Narrative:      In the setting of a positive respiratory panel with a viral  infection PLUS a negative procalcitonin without other underlying concern for bacterial infection, consider observing off antibiotics or discontinuation of antibiotics and continue supportive care. If the respiratory panel is positive for atypical bacterial infection (Bordetella pertussis, Chlamydophila pneumoniae, or Mycoplasma pneumoniae), consider antibiotic de-escalation to target atypical bacterial infection.            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 Heart shadow and pulmonary vasculature appear normal in size. There is new multifocal airspace disease in the left upper and lower lung, and milder but increased interstitial opacity in the right mid and lower lung consistent with pneumonia. Disease is densest in the left upper lung. No effusion or pneumothorax is seen.     Impression: Impression: Bilateral pneumonia. Electronically Signed: Deon Vera MD  7/8/2024 8:16 AM EDT  Workstation ID: [id number]     Results for orders placed during the hospital encounter of 08/20/23    Adult Transthoracic Echo Complete W/ Cont if Necessary Per Protocol    Interpretation Summary    Left ventricular systolic function is mildly decreased. Calculated left ventricular EF = 47.9% Normal left ventricular cavity size and wall thickness noted. There is left ventricular global hypokinesis noted. Left ventricular diastolic function is consistent with (grade I) impaired relaxation.    : Normal right ventricular cavity size and systolic function noted. Electronic lead present in the ventricle.    Normal left atrial size and volume noted. Saline test results are negative.    There is moderate calcification of the aortic valve. The aortic valve was poorly visualized but appears trileaflet. Moderate aortic valve regurgitation is present. No  aortic valve stenosis is present.    Mild mitral annular calcification is present. Mild mitral valve regurgitation is present. No significant mitral valve stenosis is present.    The tricuspid valve is grossly normal in structure. Mild tricuspid valve regurgitation is present. Estimated right ventricular systolic pressure from tricuspid regurgitation is normal, 33 mmHg. No tricuspid valve stenosis is present.    Mild dilation of the ascending aorta is present. Ascending aorta = 3.7 cm    There is a small (<1cm) pericardial effusion adjacent to the right atrium and right ventricle.      Assessment & Plan  Assessment & Plan       Acute respiratory failure with hypoxia    Dysautonomia orthostatic hypotension syndrome    Hypertension    Sepsis due to pneumonia    Severe malnutrition    Atrial fibrillation    88-year-old female with history of atrial fibrillation, on amiodarone and Eliquis, hypertension, autonomic dysfunction, chronic debility, prior hospitalizations for recurrent pneumonia with concerns for possible aspiration.  She presents from nursing home with 2 weeks of progressive worsening shortness of breath and cough.  Found to have acute respiratory failure with hypoxia secondary to bilateral pneumonia.    Acute respiratory failure with hypoxia secondary to bilateral pneumonia  Sepsis(tachycardia, leukocytosis, lactic acidosis, and pneumonia)  -Patient with pO2 of 51, chest x-ray with bilateral pneumonia, previous history of aspiration pneumonia  -Follow-up blood cultures, MRSA PCR, Legionella and strep pneumo antigens respiratory PCR is negative  -Continue broad-spectrum antibiotics, currently on Rocephin and doxycycline  -She is on HFNC 50 L and 80%, continue to wean as able, of note she is full code  -Continue aggressive pulmonary toilet, Kwame  -SLP to evaluate    Atrial fibrillation  -Continue home metoprolol, amiodarone and Eliquis    Hyperlipidemia  -Continue statin    Hyponatremia  -Not clinically  significant  -Continue to closely monitor    Depression  -Continue Effexor and BuSpar    Hypertension  Autonomic dysfunction with recurrent presyncopal episodes  -BP currently low, hold home anti-hypertensives    Tremors  -Continue Keppra    Chronic debility  -Currently resides at assisted living facility  -Need [facility] discussions, we will have palliative consulted, currently she is full code, she states that she has 2 great-grandchildren that she has not seen and wants to live to see them.  -PT/OT to evaluate    Left breast swelling  -Patient with some lethargy feeling on the lateral left breast, nontender  -Mentions she is supposed to have follow-up as outpatient    VTE Prophylaxis:  Pharmacologic VTE prophylaxis orders are signed & held.      CODE STATUS:    There are no questions and answers to display.     Expected Discharge   Expected Discharge Date: 7/10/2024; Expected Discharge Time:      Destiny Hammond MD  07/08/24     Electronically signed by Destiny Hammond MD at 07/08/24 0923       Prior to Admission Medications       Prescriptions Last Dose Informant Patient Reported? Taking?    acetaminophen (TYLENOL) 325 MG tablet Past Week  Yes Yes    Take 2 tablets by mouth Every 8 (Eight) Hours As Needed for Mild Pain.    albuterol (PROVENTIL) (2.5 MG/3ML) 0.083% nebulizer solution Past Week  Yes Yes    Take 2.5 mg by nebulization Every 4 (Four) Hours As Needed for Wheezing or Shortness of Air.    amiodarone (PACERONE) 200 MG tablet 7/7/2024  Yes Yes    Take 1 tablet by mouth Daily.    apixaban (ELIQUIS) 2.5 MG tablet tablet 7/7/2024  Yes Yes    Take 1 tablet by mouth 2 (Two) Times a Day.    Ascorbic Acid (Vitamin C) 500 MG capsule 7/7/2024  Yes Yes    Take 1 capsule by mouth Daily.    bisacodyl (DULCOLAX) 10 MG suppository Past Week  Yes Yes    Insert 1 suppository into the rectum Every 7 (Seven) Days. As needed    Cholecalciferol 25 MCG (1000 UT) tablet 7/7/2024  Yes Yes    Take 1 tablet by mouth Daily.    docusate  sodium (COLACE) 100 MG capsule 7/7/2024  Yes Yes    Take 1 capsule by mouth Daily.    ferrous sulfate 325 (65 FE) MG tablet 7/7/2024  Yes Yes    Take 1 tablet by mouth Daily With Breakfast.    fluticasone (FLONASE) 50 MCG/ACT nasal spray Past Week  Yes Yes    2 sprays into the nostril(s) as directed by provider Daily As Needed for Allergies or Rhinitis.    levETIRAcetam XR (KEPPRA XR) 500 MG 24 hr tablet 7/7/2024  Yes Yes    Take 1 tablet by mouth Daily.    levothyroxine (SYNTHROID, LEVOTHROID) 100 MCG tablet 7/7/2024  Yes Yes    Take 1 tablet by mouth Daily.    lisinopril (PRINIVIL,ZESTRIL) 20 MG tablet 7/7/2024  Yes Yes    Take 1 tablet by mouth Daily.    meclizine (ANTIVERT) 25 MG tablet Past Week  Yes Yes    Take 1 tablet by mouth Every 8 (Eight) Hours As Needed for Dizziness.    melatonin 5 MG tablet tablet 7/7/2024  No Yes    Take 1 tablet by mouth Every Night.    ondansetron ODT (ZOFRAN-ODT) 4 MG disintegrating tablet Past Week  Yes Yes    Place 1 tablet on the tongue Every 6 (Six) Hours As Needed for Nausea or Vomiting.    pantoprazole (PROTONIX) 40 MG EC tablet 7/7/2024  Yes Yes    Take 1 tablet by mouth Daily.    polyethylene glycol (MIRALAX) powder Past Week  No Yes    MIX 17 GM IN 8 OUNCES OF LIQUID AND DRINK 1 TO 2 TIMES DAILY FOR CONSTIPATION.    Patient taking differently:  Take 17 g by mouth Daily As Needed.    rosuvastatin (CRESTOR) 10 MG tablet 7/7/2024  Yes Yes    Take 1 tablet by mouth Every Night.    senna (SENOKOT) 8.6 MG tablet Past Week  Yes Yes    Take 2 tablets by mouth Daily As Needed for Constipation.    traZODone (DESYREL) 100 MG tablet 7/7/2024  Yes Yes    Take 1 tablet by mouth Every Night.    venlafaxine XR (EFFEXOR-XR) 75 MG 24 hr capsule 7/7/2024  Yes Yes    Take 1 capsule by mouth Daily.    atorvastatin (LIPITOR) 40 MG tablet Not Taking  Yes No    Take 1 tablet by mouth Every Night.    Patient not taking:  Reported on 7/8/2024    busPIRone (BUSPAR) 5 MG tablet Not Taking  Yes No     Take 1 tablet by mouth 2 (Two) Times a Day As Needed.    Patient not taking:  Reported on 2024    cholecalciferol (VITAMIN D3) 25 MCG (1000 UT) tablet Not Taking  Yes No    Take 1 tablet by mouth Daily.    Patient not taking:  Reported on 2024    cyanocobalamin 1000 MCG/ML injection Not Taking  Yes No    Inject 1 mL into the appropriate muscle as directed by prescriber Every 28 (Twenty-Eight) Days.    Patient not taking:  Reported on 2024    guaiFENesin-dextromethorphan (ROBITUSSIN DM) 100-10 MG/5ML syrup Not Taking  No No    Take 10 mL by mouth Every 6 (Six) Hours.    Patient not taking:  Reported on 2024    metoprolol tartrate (LOPRESSOR) 25 MG tablet Not Taking  No No    Take 0.5 tablets by mouth 2 (Two) Times a Day.    Patient not taking:  Reported on 2024    omeprazole (PriLOSEC) 40 MG capsule Not Taking  Yes No    Take 1 capsule by mouth Daily.    Patient not taking:  Reported on 2024             Physician Progress Notes (most recent note)        Vanna Dykes MD at [address] [place] [address] Medicine Services  PROGRESS NOTE    Patient Name: Bibiana Hodges  : 1935  MRN: [id number]    Date of Admission: 2024  Primary Care Physician: Nivia Madrigal MD    Subjective   Subjective     CC:  Follow-up for pneumonia    HPI:  Had coughing and then nausea.  Refused labs.  Had a bowel movement yesterday.  Denied other concerns.  Nasal cannula not on the patient and satting 92% on room air.    Objective   Objective     Vital Signs:   Temp:  [96.1 °F (35.6 °C)-98.8 °F (37.1 °C)] 98.1 °F (36.7 °C)  Heart Rate:  [59-60] 60  Resp:  [18] 18  BP: (117-166)/(65-89) 151/65  Flow (L/min):  [2-3] 2     Physical Exam:  Constitutional: No acute distress, awake, alert  HENT: NCAT, mucous membranes moist  Respiratory: Coarse throughout, respiratory effort normal   Cardiovascular: RRR, no murmurs, rubs, or gallops  Gastrointestinal: Positive bowel sounds,  soft, nontender, nondistended  Musculoskeletal: No bilateral ankle edema  Psychiatric: Appropriate affect, cooperative  Neurologic: Alert, oriented, symmetric facies, HERNÁNDEZ, speech clear, +tremors  Skin: No rashes on exposed skin    Results Reviewed:  LAB RESULTS:      Lab 07/10/24  1100 07/09/24  0420 07/08/24  0728   WBC 11.96* 16.62* 15.50*   HEMOGLOBIN 11.0* 11.7* 15.1   HEMATOCRIT 33.9* 36.1 45.9   PLATELETS 151 159 250   NEUTROS ABS 10.88* 14.86* 11.34*   IMMATURE GRANS (ABS) 0.06* 0.09* 0.05   LYMPHS ABS 0.56* 1.18 3.49*   MONOS ABS 0.37 0.40 0.40   EOS ABS 0.07 0.05 0.18   .8* 102.6* 100.9*   PROCALCITONIN  --   --  0.09   LACTATE  --   --  2.7*         Lab 07/10/24  1100 07/09/24  0420 07/08/24  0728   SODIUM 134* 134* 128*   POTASSIUM 3.8 4.9 4.1   CHLORIDE 100 101 93*   CO2 27.0 21.0* 21.0*   ANION GAP 7.0 12.0 14.0   BUN 21 27* 20   CREATININE 1.16* 1.30* 1.26*   EGFR 45.4* 39.6* 41.1*   GLUCOSE 106* 63* 89   CALCIUM 8.1* 8.7 8.8   MAGNESIUM 1.6  --   --    PHOSPHORUS 3.2  --   --          Lab 07/10/24  1100 07/08/24  0728   TOTAL PROTEIN 5.3* 6.1   ALBUMIN 2.8* 3.3*   GLOBULIN 2.5 2.8   ALT (SGPT) 33 41*   AST (SGOT) 31 46*   BILIRUBIN 0.4 0.3   ALK PHOS 63 73         Lab 07/08/24 0728   PROBNP 651.7   HSTROP T 18*                 Lab 07/08/24 0718   PH, ARTERIAL 7.419   PCO2, ARTERIAL 33.2*   PO2 ART 51.2*   FIO2 44   HCO3 ART 21.5   BASE EXCESS ART -2.2*     Brief Urine Lab Results  (Last result in the past 365 days)        Color   Clarity   Blood   Leuk Est   Nitrite   Protein   CREAT   Urine HCG        05/06/24 2154 Yellow   Clear   Negative   Negative   Negative   Trace                   Microbiology Results Abnormal       Procedure Component Value - Date/Time    Blood Culture - Blood, Arm, Right [[id number]]  (Normal) Collected: 07/08/24 0728    Lab Status: Final result Specimen: Blood from Arm, Right Updated: 07/13/24 0800     Blood Culture No growth at 5 days    Blood Culture - Blood,  Wrist, Left [[id number]]  (Normal) Collected: 07/08/24 0729    Lab Status: Final result Specimen: Blood from Wrist, Left Updated: 07/13/24 0800     Blood Culture No growth at 5 days    Narrative:      Less than seven (7) mL's of blood was collected.  Insufficient quantity may yield false negative results.    Legionella Antigen, Urine - Urine, Urine, Clean Catch [[id number]]  (Normal) Collected: 07/08/24 1925    Lab Status: Final result Specimen: Urine, Clean Catch Updated: 07/09/24 0109     LEGIONELLA ANTIGEN, URINE Negative    MRSA Screen, PCR (Inpatient) - Swab, Nares [[id number]]  (Normal) Collected: 07/08/24 1248    Lab Status: Final result Specimen: Swab from Nares Updated: 07/08/24 1446     MRSA PCR Negative    Narrative:      The negative predictive value of this diagnostic test is high and should only be used to consider de-escalating anti-MRSA therapy. A positive result may indicate colonization with MRSA and must be correlated clinically.  MRSA Negative    Respiratory Panel PCR w/COVID-19(SARS-CoV-2) ERICK/MARTA/ELPIDIO/PAD/COR/ALMA In-House, NP Swab in UTM/VTM, 2 HR TAT - Swab, Nasopharynx [[id number]]  (Normal) Collected: 07/08/24 0730    Lab Status: Final result Specimen: Swab from Nasopharynx Updated: 07/08/24 0840     ADENOVIRUS, PCR Not Detected     Coronavirus 229E Not Detected     Coronavirus HKU1 Not Detected     Coronavirus NL63 Not Detected     Coronavirus OC43 Not Detected     COVID19 Not Detected     Human Metapneumovirus Not Detected     Human Rhinovirus/Enterovirus Not Detected     Influenza A PCR Not Detected     Influenza B PCR Not Detected     Parainfluenza Virus 1 Not Detected     Parainfluenza Virus 2 Not Detected     Parainfluenza Virus 3 Not Detected     Parainfluenza Virus 4 Not Detected     RSV, PCR Not Detected     Bordetella pertussis pcr Not Detected     Bordetella parapertussis PCR Not Detected     Chlamydophila pneumoniae PCR Not Detected     Mycoplasma pneumo by PCR Not Detected     Narrative:      In the setting of a positive respiratory panel with a viral infection PLUS a negative procalcitonin without other underlying concern for bacterial infection, consider observing off antibiotics or discontinuation of antibiotics and continue supportive care. If the respiratory panel is positive for atypical bacterial infection (Bordetella pertussis, Chlamydophila pneumoniae, or Mycoplasma pneumoniae), consider antibiotic de-escalation to target atypical bacterial infection.            No radiology results from the last 24 hrs    Results for orders placed during the hospital encounter of 08/20/23    Adult Transthoracic Echo Complete W/ Cont if Necessary Per Protocol    Interpretation Summary    Left ventricular systolic function is mildly decreased. Calculated left ventricular EF = 47.9% Normal left ventricular cavity size and wall thickness noted. There is left ventricular global hypokinesis noted. Left ventricular diastolic function is consistent with (grade I) impaired relaxation.    : Normal right ventricular cavity size and systolic function noted. Electronic lead present in the ventricle.    Normal left atrial size and volume noted. Saline test results are negative.    There is moderate calcification of the aortic valve. The aortic valve was poorly visualized but appears trileaflet. Moderate aortic valve regurgitation is present. No aortic valve stenosis is present.    Mild mitral annular calcification is present. Mild mitral valve regurgitation is present. No significant mitral valve stenosis is present.    The tricuspid valve is grossly normal in structure. Mild tricuspid valve regurgitation is present. Estimated right ventricular systolic pressure from tricuspid regurgitation is normal, 33 mmHg. No tricuspid valve stenosis is present.    Mild dilation of the ascending aorta is present. Ascending aorta = 3.7 cm    There is a small (<1cm) pericardial effusion adjacent to the right atrium and  right ventricle.      Current medications:  Scheduled Meds:amiodarone, 200 mg, Oral, Daily  apixaban, 2.5 mg, Oral, BID  atorvastatin, 40 mg, Oral, Nightly  castor oil-balsam peru, 1 Application, Topical, Q12H  lactobacillus acidophilus, 1 capsule, Oral, Daily  levETIRAcetam, 250 mg, Oral, BID  lisinopril, 10 mg, Oral, Q24H  metoprolol tartrate, 12.5 mg, Oral, BID  sodium chloride, 10 mL, Intravenous, Q12H  traZODone, 100 mg, Oral, Nightly  venlafaxine XR, 75 mg, Oral, Daily      Continuous Infusions:   PRN Meds:.  senna-docusate sodium **AND** polyethylene glycol **AND** bisacodyl **AND** bisacodyl    Calcium Replacement - Follow Nurse / BPA Driven Protocol    Hydrocod Jarrod-Chlorphe Jarrod ER    ipratropium-albuterol    Magnesium Standard Dose Replacement - Follow Nurse / BPA Driven Protocol    ondansetron    Phosphorus Replacement - Follow Nurse / BPA Driven Protocol    Potassium Replacement - Follow Nurse / BPA Driven Protocol    sodium chloride    sodium chloride    sodium chloride    Assessment & Plan   Assessment & Plan     Active Hospital Problems    Diagnosis  POA    **Acute respiratory failure with hypoxia [J96.01]  Yes    Atrial fibrillation [I48.91]  Yes    Severe malnutrition [E43]  Yes    Sepsis due to pneumonia [J18.9, A41.9]  Yes    Hypertension [I10]  Yes    Dysautonomia orthostatic hypotension syndrome [I95.1]  Yes      Resolved Hospital Problems   No resolved problems to display.        Brief Hospital Course to date:  Bibiana Hodges is a 88 y.o. female 88-year-old female with history of atrial fibrillation, on amiodarone and Eliquis, hypertension, autonomic dysfunction, chronic debility, prior hospitalizations for recurrent pneumonia with concerns for possible aspiration.  She presented from nursing home with 1 month of progressive worsening shortness of breath and cough secondary to bilateral pneumonia.    This patient's problems and plans were partially entered by my partner and updated as  appropriate by me 07/14/24.     Acute respiratory failure with hypoxia secondary to bilateral strep pneumo pneumonia  Sepsis secondary to pneumonia  Patient presented with hypoxic respiratory failure requiring high flow nasal cannula on admission, now weaned to nasal cannula  Chest x-ray with bilateral pneumonia; Strep pneumo antigen positive  Status post course of Rocephin, doxycycline  Probiotic  SLP following given her previous history of aspiration pneumonia, on modified diet     Atrial fibrillation - Continue home metoprolol, amiodarone, Eliquis  HLD - Continue statin  Depression - Continue Effexor and BuSpar  Seizure disorder-continue Keppra, seizure precautions     Hypertension  Autonomic dysfunction with recurrent presyncopal episodes  Lisinopril, amlodipine     Chronic debility  Currently resides at assisted living facility  PT and OT    Goals of care  Palliative care team consulted for goals of care discussion     Left breast swelling  Patient is aware and has outpatient follow-up.  We are not able to do breast ultrasound for masses (only suspected abscesses) inpatient or mammograms so she will need to keep follow up.    Expected Discharge Location and Transportation: Facility; medically ready and awaiting placement  Expected Discharge   Expected Discharge Date: 7/15/2024; Expected Discharge Time:      VTE Prophylaxis:  Pharmacologic VTE prophylaxis orders are present.         AM-PAC 6 Clicks Score (PT): 17 (07/13/24 2058)    CODE STATUS:   Code Status and Medical Interventions:   Ordered at: 07/11/24 1510     Level Of Support Discussed With:    Patient     Code Status (Patient has no pulse and is not breathing):    No CPR (Do Not Attempt to Resuscitate)     Medical Interventions (Patient has pulse or is breathing):    Full Support       Vanna Dykes MD  07/14/24        Electronically signed by Vanna Dykes MD at 07/14/24 0934          Physical Therapy Notes (most recent note)        Corry Nam, PT  at 24 1028  Version 1 of 1         Patient Name: Bibiana Hodges  : 1935    MRN: [id number]                              Today's Date: 2024       Admit Date: 2024    Visit Dx:     ICD-10-CM ICD-9-CM   1. Hyponatremia  E87.1 276.1   2. Pneumonia of both lungs due to infectious organism, unspecified part of lung  J18.9 483.8   3. Acute respiratory failure with hypoxia  J96.01 518.81   4. Oropharyngeal dysphagia  R13.12 787.22     Patient Active Problem List   Diagnosis    Dysautonomia orthostatic hypotension syndrome    Hypertension    Flu vaccine need    Streptococcus pneumoniae vaccination indicated    Sepsis due to pneumonia    Severe malnutrition    Acute respiratory failure with hypoxia    Atrial fibrillation    Autonomic dysfunction     Past Medical History:   Diagnosis Date    Constipation     Depression     Dysautonomia orthostatic hypotension syndrome     Generalized osteoarthritis     GERD (gastroesophageal reflux disease)     s/p Nissen    Hypertension     Insomnia     Osteoarthrosis, shoulder region     Skin ulcer of scalp     Thrombophlebitis of arm     Tremor     Vitamin B12 deficiency      Past Surgical History:   Procedure Laterality Date    DILATATION AND CURETTAGE      HERNIA REPAIR      HIP SURGERY      SHOULDER SURGERY      Right      General Information       Row Name 24 1315          Physical Therapy Time and Intention    Document Type therapy note (daily note)  -KE     Mode of Treatment physical therapy  -       Row Name 24 1315          General Information    Patient Profile Reviewed yes  -KE     Existing Precautions/Restrictions fall;oxygen therapy device and L/min  -KE     Barriers to Rehab medically complex;previous functional deficit  -KE       Row Name 24 1315          Cognition    Orientation Status (Cognition) oriented x 3  -KE       Row Name 24 1315          Safety Issues, Functional Mobility    Safety Issues Affecting Function  (Mobility) awareness of need for assistance;insight into deficits/self-awareness;safety precautions follow-through/compliance;safety precaution awareness;sequencing abilities  -KE     Impairments Affecting Function (Mobility) balance;coordination;endurance/activity tolerance;motor planning;strength;shortness of breath;postural/trunk control  -KE               User Key  (r) = Recorded By, (t) = Taken By, (c) = Cosigned By      Initials Name Provider Type    Corry Gooden, PT Physical Therapist                   Mobility       Row Name 07/12/24 1316          Bed Mobility    Bed Mobility supine-sit  -KE     Supine-Sit [place] (Bed Mobility) minimum assist (75% patient effort);1 person to manage equipment  -KE     Assistive Device (Bed Mobility) bed rails;head of bed elevated  -KE     Comment, (Bed Mobility) increased time and effort,  -KE       Row Name 07/12/24 1316          Bed-Chair Transfer    Bed-Chair [place] (Transfers) minimum assist (75% patient effort);2 person assist  -KE     Assistive Device (Bed-Chair Transfers) other (see comments)  B UE support  -KE       Row Name 07/12/24 1316          Sit-Stand Transfer    Sit-Stand [place] (Transfers) minimum assist (75% patient effort);1 person assist  -KE     Assistive Device (Sit-Stand Transfers) walker, front-wheeled  -KE     Comment, (Sit-Stand Transfer) VCs for improving HP  -KE       Row Name 07/12/24 1316          Gait/Stairs (Locomotion)    [place] Level (Gait) minimum assist (75% patient effort);verbal cues;1 person assist;1 person to manage equipment  -KE     Assistive Device (Gait) walker, front-wheeled  -KE     Distance in Feet (Gait) 20  +30  -KE     Deviations/Abnormal Patterns (Gait) perri decreased;festinating/shuffling;gait speed decreased;stride length decreased;base of support, narrow;bilateral deviations  -KE     Bilateral Gait Deviations forward flexed posture;heel strike decreased  -KE     Comment, (Gait/Stairs) Pt  amb 20'+30' w/ FWW, MinAx1+chair follow for safety. Pt demo short step length, narrow SENDY, fwd flexed posture, and slow gait speed. VCs for improving FWW managment. Further mobility limited by fatigue.  -HELENE               User Key  (r) = Recorded By, (t) = Taken By, (c) = Cosigned By      Initials Name Provider Type    Corry Gooden PT Physical Therapist                   Obj/Interventions       Row Name 07/12/24 1322          Balance    Balance Assessment sitting static balance;sitting dynamic balance;sit to stand dynamic balance;standing static balance;standing dynamic balance  -HELENE     Static Sitting Balance standby assist  -HELENE     Dynamic Sitting Balance standby assist  -HLEENE     Position, Sitting Balance sitting edge of bed  -KE     Sit to Stand Dynamic Balance minimal assist  -HELENE     Static Standing Balance contact guard  -KE     Dynamic Standing Balance minimal assist  -KE     Position/Device Used, Standing Balance supported;walker, front-wheeled  -KE     Balance Interventions sitting;standing;sit to stand;supported;static;dynamic  -HELENE               User Key  (r) = Recorded By, (t) = Taken By, (c) = Cosigned By      Initials Name Provider Type    Corry Gooden, GAIL Physical Therapist                   Goals/Plan    No documentation.                  Clinical Impression       Row Name 07/12/24 1323          Pain    Pretreatment Pain Rating 0/10 - no pain  -KE     Posttreatment Pain Rating 0/10 - no pain  -HELENE     Pain Intervention(s) Ambulation/increased activity;Repositioned  -KE       Row Name 07/12/24 1323          Plan of Care Review    Plan of Care Reviewed With patient  -KE     Progress improving  -KE     Outcome Evaluation Pt progressing to ambulating 20'+30' w/ FWW, MinAx1+close chair follow for safety. Pt presents below baseline with generalized weakness, balance deficits, and decreased activity tolerance warranting skilled IP PT services. Continue progressing current PT POC as tolerated.  -HELENE        Row Name 07/12/24 1323          Vital Signs    O2 Delivery Pre Treatment nasal cannula  -KE     O2 Delivery Intra Treatment nasal cannula  -KE     Post SpO2 (%) 97  -KE     O2 Delivery Post Treatment nasal cannula  -KE     Pre Patient Position Supine  -KE     Intra Patient Position Standing  -KE     Post Patient Position Sitting  -KE       Row Name 07/12/24 1323          Positioning and Restraints    Pre-Treatment Position in bed  -KE     Post Treatment Position chair  -KE     In Chair reclined;call light within reach;encouraged to call for assist;exit alarm on;with family/caregiver;waffle cushion;notified nsg  -KE               User Key  (r) = Recorded By, (t) = Taken By, (c) = Cosigned By      Initials Name Provider Type    Corry Gooden PT Physical Therapist                   Outcome Measures       Row Name 07/12/24 1326 07/12/24 0840       How much help from another person do you currently need...    Turning from your back to your side while in flat bed without using bedrails? 3  -KE 3  -LC    Moving from lying on back to sitting on the side of a flat bed without bedrails? 3  -KE 3  -LC    Moving to and from a bed to a chair (including a wheelchair)? 3  -KE 3  -LC    Standing up from a chair using your arms (e.g., wheelchair, bedside chair)? 3  -KE 3  -LC    Climbing 3-5 steps with a railing? 2  -KE 2  -LC    To walk in hospital room? 3  -KE 2  -LC    AM-PAC 6 Clicks Score (PT) 17  -KE 16  -LC    Highest Level of Mobility Goal 5 --> Static standing  -KE 5 --> Static standing  -LC      Row Name 07/12/24 1326          Functional Assessment    Outcome Measure Options AM-PAC 6 Clicks Basic Mobility (PT)  -KE               User Key  (r) = Recorded By, (t) = Taken By, (c) = Cosigned By      Initials Name Provider Type    Marge Lynn, RN Registered Nurse    Corry Gooden PT Physical Therapist                                 Physical Therapy Education       Title: PT OT SLP Therapies (In Progress)        Topic: Physical Therapy (In Progress)       Point: Mobility training (In Progress)       Learning Progress Summary             Patient Acceptance, E, NR by KE at 7/12/2024 1028    Acceptance, E, VU,NR by NS at 7/10/2024 1604    Comment: safety with mobility, benefits/need for rehab    Acceptance, E, VU,NR by NS at 7/9/2024 1319                         Point: Home exercise program (Done)       Learning Progress Summary             Patient Acceptance, E, VU,NR by NS at 7/10/2024 1604    Comment: safety with mobility, benefits/need for rehab                         Point: Body mechanics (In Progress)       Learning Progress Summary             Patient Acceptance, E, NR by KE at 7/12/2024 1028    Acceptance, E, VU,NR by NS at 7/10/2024 1604    Comment: safety with mobility, benefits/need for rehab    Acceptance, E, VU,NR by NS at 7/9/2024 1319                         Point: Precautions (In Progress)       Learning Progress Summary             Patient Acceptance, E, NR by KE at 7/12/2024 1028    Acceptance, E, VU,NR by NS at 7/10/2024 1604    Comment: safety with mobility, benefits/need for rehab    Acceptance, E, VU,NR by NS at 7/9/2024 1319                                         User Key       Initials Effective Dates Name Provider Type Discipline    NS 06/16/21 -  Shelley Gutiérrez, PT Physical Therapist PT     11/16/23 -  Corry Nam PT Physical Therapist PT                  PT Recommendation and Plan     Plan of Care Reviewed With: patient  Progress: improving  Outcome Evaluation: Pt progressing to ambulating 20'+30' w/ FWW, MinAx1+close chair follow for safety. Pt presents below baseline with generalized weakness, balance deficits, and decreased activity tolerance warranting skilled IP PT services. Continue progressing current PT POC as tolerated.     Time Calculation:         PT Charges       Row Name 07/12/24 1328             Time Calculation    Start Time 1028  -KE      PT Received On 07/12/24  -HELENE          Timed Charges    50933 - PT Therapeutic Activity Minutes 34  -KE         Total Minutes    Timed Charges Total Minutes 34  -KE       Total Minutes 34  -KE                User Key  (r) = Recorded By, (t) = Taken By, (c) = Cosigned By      Initials Name Provider Type    Corry Gooden, PT Physical Therapist                  Therapy Charges for Today       Code Description Service Date Service Provider Modifiers Qty    84144335330 HC PT THERAPEUTIC ACT EA 15 MIN 2024 Corry Nam, PT GP 2    40356969097 HC PT THER SUPP EA 15 MIN 2024 Corry Nam, PT GP 2            PT G-Codes  Outcome Measure Options: AM-PAC 6 Clicks Basic Mobility (PT)  AM-PAC 6 Clicks Score (PT): 17  PT Discharge Summary  Anticipated Discharge Disposition (PT): skilled nursing facility    Corry Nam PT  2024      Electronically signed by Corry Nam PT at 24 1328          Occupational Therapy Notes (most recent note)        Khushbu Fernandes, OT at 24 1130          Patient Name: Bibiana Hodges  : 1935    MRN: [id number]                              Today's Date: 2024       Admit Date: 2024    Visit Dx:     ICD-10-CM ICD-9-CM   1. Hyponatremia  E87.1 276.1   2. Pneumonia of both lungs due to infectious organism, unspecified part of lung  J18.9 483.8   3. Acute respiratory failure with hypoxia  J96.01 518.81   4. Oropharyngeal dysphagia  R13.12 787.22     Patient Active Problem List   Diagnosis    Dysautonomia orthostatic hypotension syndrome    Hypertension    Flu vaccine need    Streptococcus pneumoniae vaccination indicated    Sepsis due to pneumonia    Severe malnutrition    Acute respiratory failure with hypoxia    Atrial fibrillation    Autonomic dysfunction     Past Medical History:   Diagnosis Date    Constipation     Depression     Dysautonomia orthostatic hypotension syndrome     Generalized osteoarthritis     GERD (gastroesophageal reflux disease)     s/p Nissen     Hypertension     Insomnia     Osteoarthrosis, shoulder region     Skin ulcer of scalp     Thrombophlebitis of arm     Tremor     Vitamin B12 deficiency      Past Surgical History:   Procedure Laterality Date    DILATATION AND CURETTAGE      HERNIA REPAIR      HIP SURGERY      SHOULDER SURGERY      Right      General Information       [facility] Name 07/14/24 [facility]          OT Time and Intention    Document Type evaluation  -     Mode of Treatment occupational therapy  -[facility] Name 07/14/24 [facility]          General Information    Patient Profile Reviewed yes  -JR     Prior Level of Function independent:;all household mobility;gait;transfer;min assist:;ADL's  Pt reports she uses a rollator at baseline, gets assist with ADL's, reports difficulty with feeding at baseline due to tremors  -     Existing Precautions/Restrictions fall;oxygen therapy device and L/min  Decreased vision  -     Barriers to Rehab medically complex;previous functional deficit  -JR       Row Name 07/14/24 [facility]          Living Environment    People in Home facility resident  -JR       Row Name 07/14/24 [facility]          Home Main Entrance    Number of Stairs, Main Entrance none  -JR       Row Name 07/14/24 1443          Stairs Within Home, Primary    Number of Stairs, Within Home, Primary none  -JR       Row Name 07/14/24 1443          Cognition    Orientation Status (Cognition) oriented x 3  -[facility] Name 07/14/24 [facility]          Safety Issues, Functional Mobility    Safety Issues Affecting Function (Mobility) awareness of need for assistance;insight into deficits/self-awareness;safety precaution awareness;safety precautions follow-through/compliance;sequencing abilities  -     Impairments Affecting Function (Mobility) balance;coordination;endurance/activity tolerance;motor control;motor planning;shortness of breath;strength;postural/trunk control  -               User Key  (r) = Recorded By, (t) = Taken By, (c) = Cosigned By      Initials  Name Provider Type    Khushbu Kulkarni, OT Occupational Therapist                     Mobility/ADL's       Row Name 07/14/24 1445          Bed Mobility    Bed Mobility supine-sit  -     Supine-Sit [place] (Bed Mobility) minimum assist (75% patient effort);verbal cues  -JR     Bed Mobility, Safety Issues decreased use of arms for pushing/pulling;decreased use of legs for bridging/pushing  -     Assistive Device (Bed Mobility) bed rails;head of bed elevated  -     Comment, (Bed Mobility) PT required increased time and verbal cues for supine to sit.  -       Row Name 07/14/24 1445          Bed-Chair Transfer    Bed-Chair [place] (Transfers) minimum assist (75% patient effort);verbal cues  -     Assistive Device (Bed-Chair Transfers) other (see comments)  UE support and pt reached for chair  -       Row Name 07/14/24 1445          Activities of Daily Living    BADL Assessment/Intervention upper body dressing  -[facility] Name 07/14/24 1445          Upper Body Dressing Assessment/Training    [place] Level (Upper Body Dressing) don;front opening garment;maximum assist (25% patient effort);verbal cues  -     Position (Upper Body Dressing) edge of bed sitting  -               User Key  (r) = Recorded By, (t) = Taken By, (c) = Cosigned By      Initials Name Provider Type    Khushbu Kulkarni, OT Occupational Therapist                   Obj/Interventions       Row Name 07/14/24 1446          Sensory Assessment (Somatosensory)    Sensory Assessment (Somatosensory) UE sensation intact  -[facility] Name 07/14/24 1446          Vision Assessment/Intervention    Vision Assessment Comment PT reports decreased vision in B eye  -       Row Name 07/14/24 1446          Range of Motion Comprehensive    General Range of Motion bilateral upper extremity ROM WFL  -[facility] Name 07/14/24 1446          Strength Comprehensive (MMT)    Comment, General Manual Muscle Testing (MMT) Assessment B UE  functionally 4/5  -[facility] Name 07/14/24 1446          Motor Skills    Motor Skills neuro-muscular function  -     Neuromuscular Function bilateral;upper extremity;tremor, intention;tremor, resting  -[facility] Name 07/14/24 1446          Balance    Balance Assessment sitting static balance;standing dynamic balance  -     Static Sitting Balance standby assist  -     Dynamic Standing Balance minimal assist  -     Position/Device Used, Standing Balance supported  -               User Key  (r) = Recorded By, (t) = Taken By, (c) = Cosigned By      Initials Name Provider Type    JR Khushbu Fernandes, OT Occupational Therapist                   Goals/Plan       Row Name 07/14/24 1449          Bed Mobility Goal 1 (OT)    Activity/Assistive Device (Bed Mobility Goal 1, OT) bed mobility activities, all  -JR     [place] Level/Cues Needed (Bed Mobility Goal 1, OT) supervision required;verbal cues required  -JR     Time Frame (Bed Mobility Goal 1, OT) short term goal (STG);5 days  -JR     Progress/Outcomes (Bed Mobility Goal 1, OT) new goal  -JR       Row Name 07/14/24 1449          Transfer Goal 1 (OT)    Activity/Assistive Device (Transfer Goal 1, OT) --  -JR     [place] Level/Cues Needed (Transfer Goal 1, OT) --  -JR     Time Frame (Transfer Goal 1, OT) --  -JR     Progress/Outcome (Transfer Goal 1, OT) --  -JR       Row Name 07/14/24 1449          Toileting Goal 1 (OT)    Activity/Device (Toileting Goal 1, OT) toileting skills, all  -JR     [place] Level/Cues Needed (Toileting Goal 1, OT) supervision required;verbal cues required  -JR     Time Frame (Toileting Goal 1, OT) long term goal (LTG);by discharge  -JR     Progress/Outcome (Toileting Goal 1, OT) new goal  -JR       Row Name 07/14/24 1449          Therapy Assessment/Plan (OT)    Planned Therapy Interventions (OT) activity tolerance training;adaptive equipment training;BADL retraining;functional balance retraining;occupation/activity  based interventions;ROM/therapeutic exercise;strengthening exercise;transfer/mobility retraining;patient/caregiver education/training  -               User Key  (r) = Recorded By, (t) = Taken By, (c) = Cosigned By      Initials Name Provider Type    Khushbu Kulkarni, OT Occupational Therapist                   Clinical Impression       [facility] Name 07/14/24 1447          Pain Assessment    Pretreatment Pain Rating 0/10 - no pain  -JR     Posttreatment Pain Rating 0/10 - no pain  -JR     Additional Documentation Pain Scale: Word Pre/Post-Treatment (Group)  -[facility] Name 07/14/24 1447          Plan of Care Review    Plan of Care Reviewed With patient  -JR     Outcome Evaluation OT initial eval and expanded chart review completed. Pt presents with multiple comorbidities and decreased strength, balance, activity tolerance and decreased vision limiting independence with ADL's and mobility from baseline status. Recommend continued skilled OT services and transfer to SNF at d/c.  -[facility] Name 07/14/24 1447          Therapy Assessment/Plan (OT)    Patient/Family Therapy Goal Statement (OT) go home  -     Rehab Potential (OT) good, to achieve stated therapy goals  -     Criteria for Skilled Therapeutic Interventions Met (OT) yes;meets criteria;skilled treatment is necessary  -     Therapy Frequency (OT) daily  -     Predicted Duration of Therapy Intervention (OT) 10 days  -[facility] Name 07/14/24 1447          Therapy Plan Review/Discharge Plan (OT)    Anticipated Discharge Disposition (OT) skilled nursing facility  -[facility] Name 07/14/24 1447          Vital Signs    Pre Systolic BP Rehab 150  -JR     Pre Treatment Diastolic BP 97  -JR     Post Systolic BP Rehab 152  -JR     Post Treatment Diastolic BP 76  -JR     Pre SpO2 (%) 93  -JR     O2 Delivery Pre Treatment room air  -JR     Post SpO2 (%) 90  -JR     O2 Delivery Post Treatment room air  -JR     Pre Patient Position Supine  -JR     Intra  Patient Position Standing  -JR     Post Patient Position Sitting  -JR       Row Name 07/14/24 1447          Positioning and Restraints    Pre-Treatment Position in bed  -JR     Post Treatment Position chair  -JR     In Chair notified nsg;reclined;call light within reach;encouraged to call for assist;exit alarm on;waffle cushion  -               User Key  (r) = Recorded By, (t) = Taken By, (c) = Cosigned By      Initials Name Provider Type    Khushbu Kulkarni OT Occupational Therapist                   Outcome Measures       Row Name 07/14/24 1450          How much help from another is currently needed...    Putting on and taking off regular lower body clothing? 1  -JR     Bathing (including washing, rinsing, and drying) 2  -JR     Toileting (which includes using toilet bed pan or urinal) 2  -JR     Putting on and taking off regular upper body clothing 2  -JR     Taking care of personal grooming (such as brushing teeth) 3  -JR     Eating meals 3  -JR     AM-PAC 6 Clicks Score (OT) 13  -       Row Name 07/14/24 1453          Functional Assessment    Outcome Measure Options AM-PAC 6 Clicks Daily Activity (OT)  -               User Key  (r) = Recorded By, (t) = Taken By, (c) = Cosigned By      Initials Name Provider Type    Khushbu Kulkarni, OT Occupational Therapist                    Occupational Therapy Education       Title: PT OT SLP Therapies (In Progress)       Topic: Occupational Therapy (In Progress)       Point: ADL training (In Progress)       Description:   Instruct learner(s) on proper safety adaptation and remediation techniques during self care or transfers.   Instruct in proper use of assistive devices.                  Learning Progress Summary             Patient Acceptance, E, NR by  at 7/14/2024 1130    Comment: role of therapy                         Point: Home exercise program (Not Started)       Description:   Instruct learner(s) on appropriate technique for monitoring, assisting  and/or progressing therapeutic exercises/activities.                  Learner Progress:  Not documented in this visit.              Point: Precautions (In Progress)       Description:   Instruct learner(s) on prescribed precautions during self-care and functional transfers.                  Learning Progress Summary             Patient Acceptance, E, NR by  at 7/14/2024 9120    Comment: role of therapy                         Point: Body mechanics (Not Started)       Description:   Instruct learner(s) on proper positioning and spine alignment during self-care, functional mobility activities and/or exercises.                  Learner Progress:  Not documented in this visit.                              User Key       Initials Effective Dates Name Provider Type Discipline     02/03/23 -  Dena, Khushbu HANSON, OT Occupational Therapist OT                  OT Recommendation and Plan  Planned Therapy Interventions (OT): activity tolerance training, adaptive equipment training, BADL retraining, functional balance retraining, occupation/activity based interventions, ROM/therapeutic exercise, strengthening exercise, transfer/mobility retraining, patient/caregiver education/training  Therapy Frequency (OT): daily  Plan of Care Review  Plan of Care Reviewed With: patient  Outcome Evaluation: OT initial eval and expanded chart review completed. Pt presents with multiple comorbidities and decreased strength, balance, activity tolerance and decreased vision limiting independence with ADL's and mobility from baseline status. Recommend continued skilled OT services and transfer to SNF at d/c.     Time Calculation:   Evaluation Complexity (OT)  Review Occupational Profile/Medical/Therapy History Complexity: expanded/moderate complexity  Assessment, Occupational Performance/Identification of Deficit Complexity: 3-5 performance deficits  Clinical Decision Making Complexity (OT): detailed assessment/moderate complexity  Overall  Complexity of Evaluation (OT): moderate complexity     Time Calculation- OT       Row Name 07/14/24 1451             Time Calculation- OT    OT Start Time 1130  -JR      OT Received On 07/14/24  -JR      OT Goal Re-Cert Due Date 07/24/24  -JR         Untimed Charges    OT Eval/Re-eval Minutes 46  -JR         Total Minutes    Untimed Charges Total Minutes 46  -JR       Total Minutes 46  -JR                User Key  (r) = Recorded By, (t) = Taken By, (c) = Cosigned By      Initials Name Provider Type     Khushbu Fernandes OT Occupational Therapist                  Therapy Charges for Today       Code Description Service Date Service Provider Modifiers Qty    50815703625 HC OT EVAL MOD COMPLEXITY 4 7/14/2024 Khushbu Fernandes OT GO 1                 Khushbu Fernandes OT  7/14/2024    Electronically signed by Khushbu Fernandes OT at 07/14/24 1455

## 2024-07-15 NOTE — PLAN OF CARE
Goal Outcome Evaluation:  Plan of Care Reviewed With: patient        Progress: no change  Outcome Evaluation: Patient resting in bed on room air. VSS, non tele, no complaints of pain.

## 2024-07-15 NOTE — PLAN OF CARE
Goal Outcome Evaluation:  Plan of Care Reviewed With: patient        Progress: no change       Treatment Assessment (SLP): continued, suspected, pharyngeal dysphagia, aspiration (07/15/24 0840)  Treatment Assessment Comments (SLP): Pt greatly disliking thickened liquids though also does not wish for further PNAs. Do not suspect swallow function will ever improve to point where can safely tolerate thin liquids. Will schedule for MBS today to compare to last study and help aif GOC/POC discussions. (07/15/24 0840)  Plan for Continued Treatment (SLP): continue treatment per plan of care (07/15/24 0840)

## 2024-07-16 ENCOUNTER — APPOINTMENT (OUTPATIENT)
Dept: GENERAL RADIOLOGY | Facility: HOSPITAL | Age: 89
End: 2024-07-16
Payer: MEDICARE

## 2024-07-16 LAB
QT INTERVAL: 414 MS
QTC INTERVAL: 427 MS

## 2024-07-16 PROCEDURE — 99232 SBSQ HOSP IP/OBS MODERATE 35: CPT | Performed by: NURSE PRACTITIONER

## 2024-07-16 PROCEDURE — 97535 SELF CARE MNGMENT TRAINING: CPT

## 2024-07-16 PROCEDURE — 92611 MOTION FLUOROSCOPY/SWALLOW: CPT | Performed by: SPEECH-LANGUAGE PATHOLOGIST

## 2024-07-16 PROCEDURE — 74230 X-RAY XM SWLNG FUNCJ C+: CPT

## 2024-07-16 PROCEDURE — 97110 THERAPEUTIC EXERCISES: CPT

## 2024-07-16 RX ORDER — HYDRALAZINE HYDROCHLORIDE 25 MG/1
25 TABLET, FILM COATED ORAL EVERY 8 HOURS SCHEDULED
Status: DISCONTINUED | OUTPATIENT
Start: 2024-07-16 | End: 2024-07-16

## 2024-07-16 RX ADMIN — LISINOPRIL 10 MG: 10 TABLET ORAL at 11:05

## 2024-07-16 RX ADMIN — APIXABAN 2.5 MG: 2.5 TABLET, FILM COATED ORAL at 20:21

## 2024-07-16 RX ADMIN — BARIUM SULFATE 100 ML: 0.81 POWDER, FOR SUSPENSION ORAL at 10:23

## 2024-07-16 RX ADMIN — BARIUM SULFATE 50 ML: 400 SUSPENSION ORAL at 10:23

## 2024-07-16 RX ADMIN — Medication 1 APPLICATION: at 11:10

## 2024-07-16 RX ADMIN — HYDRALAZINE HYDROCHLORIDE 50 MG: 50 TABLET ORAL at 06:17

## 2024-07-16 RX ADMIN — ATORVASTATIN CALCIUM 40 MG: 40 TABLET, FILM COATED ORAL at 20:21

## 2024-07-16 RX ADMIN — LEVETIRACETAM 250 MG: 500 TABLET, FILM COATED ORAL at 11:05

## 2024-07-16 RX ADMIN — Medication 1 CAPSULE: at 11:05

## 2024-07-16 RX ADMIN — APIXABAN 2.5 MG: 2.5 TABLET, FILM COATED ORAL at 11:05

## 2024-07-16 RX ADMIN — BARIUM SULFATE 20 ML: 400 PASTE ORAL at 10:23

## 2024-07-16 RX ADMIN — LEVETIRACETAM 250 MG: 500 TABLET, FILM COATED ORAL at 20:21

## 2024-07-16 RX ADMIN — HYDRALAZINE HYDROCHLORIDE 25 MG: 25 TABLET ORAL at 14:47

## 2024-07-16 RX ADMIN — Medication 12.5 MG: at 11:05

## 2024-07-16 RX ADMIN — ACETAMINOPHEN 650 MG: 325 TABLET ORAL at 22:42

## 2024-07-16 RX ADMIN — AMLODIPINE BESYLATE 5 MG: 5 TABLET ORAL at 11:05

## 2024-07-16 RX ADMIN — AMIODARONE HYDROCHLORIDE 200 MG: 200 TABLET ORAL at 11:05

## 2024-07-16 RX ADMIN — VENLAFAXINE HYDROCHLORIDE 75 MG: 75 CAPSULE, EXTENDED RELEASE ORAL at 11:05

## 2024-07-16 RX ADMIN — Medication 1 APPLICATION: at 20:26

## 2024-07-16 NOTE — MBS/VFSS/FEES
Acute Care - Speech Language Pathology   Swallow Re-Evaluation  Brush  Modified Barium Swallow Study (MBS)       Patient Name: Bibiana Hodges  : 1935  MRN: [id number]  Today's Date: 2024               Admit Date: 2024    Visit Dx:     ICD-10-CM ICD-9-CM   1. Hyponatremia  E87.1 276.1   2. Pneumonia of both lungs due to infectious organism, unspecified part of lung  J18.9 483.8   3. Acute respiratory failure with hypoxia  J96.01 518.81   4. Oropharyngeal dysphagia  R13.12 787.22     Patient Active Problem List   Diagnosis    Dysautonomia orthostatic hypotension syndrome    Hypertension    Flu vaccine need    Streptococcus pneumoniae vaccination indicated    Sepsis due to pneumonia    Severe malnutrition    Acute respiratory failure with hypoxia    Atrial fibrillation    Autonomic dysfunction     Past Medical History:   Diagnosis Date    Constipation     Depression     Dysautonomia orthostatic hypotension syndrome     Generalized osteoarthritis     GERD (gastroesophageal reflux disease)     s/p Nissen    Hypertension     Insomnia     Osteoarthrosis, shoulder region     Skin ulcer of scalp     Thrombophlebitis of arm     Tremor     Vitamin B12 deficiency      Past Surgical History:   Procedure Laterality Date    DILATATION AND CURETTAGE      HERNIA REPAIR      HIP SURGERY      SHOULDER SURGERY      Right       SLP Recommendation and Plan  SLP Swallowing Diagnosis: mild, oral dysphagia, moderate, pharyngeal dysphagia (24)  SLP Diet Recommendation: soft to chew textures, chopped, no mixed consistencies, nectar thick liquids (24)  Recommended Precautions and Strategies: upright posture during/after eating, small bites of food and sips of liquid, general aspiration precautions, no straw, fatigue precautions, multiple swallows per sip of liquid, multiple swallows per bite of food (24)  SLP Rec. for Method of Medication Administration: meds whole, meds crushed, with  puree, as tolerated (07/16/24 0945)     Monitor for Signs of Aspiration: yes, notify SLP if any concerns (07/16/24 0945)     Swallow Criteria for Skilled Therapeutic Interventions Met: demonstrates skilled criteria (07/16/24 0945)  Anticipated Discharge Disposition (SLP): skilled nursing facility (07/16/24 0945)  Rehab Potential/Prognosis, Swallowing: adequate, monitor progress closely (07/16/24 0945)  Therapy Frequency (Swallow): 5 days per week (07/16/24 0945)  Predicted Duration Therapy Intervention (Days): 2 weeks (07/16/24 0945)  Oral Care Recommendations: Oral Care BID/PRN, Suction toothbrush (07/16/24 0945)             Plan of Care Reviewed With: patient  Progress: no change      SWALLOW EVALUATION (Last 72 Hours)       SLP Adult Swallow Evaluation       Row Name 07/16/24 0945       Rehab Evaluation    Document Type re-evaluation  -CJ    Subjective Information no complaints  -CJ    Patient Observations alert;cooperative  -CJ    Patient/Family/Caregiver Comments/Observations no family present  -CJ    Patient Effort adequate  -CJ    Symptoms Noted During/After Treatment none  -CJ       General Information    Patient Profile Reviewed yes  -CJ    Pertinent History Of Current Problem see initial eval; referred for MBS to determine upgrade  -CJ    Current Method of Nutrition honey-thick liquids;soft to chew textures;chopped  -CJ    Precautions/Limitations, Vision WFL;for purposes of eval  -CJ    Precautions/Limitations, Hearing WFL;for purposes of eval  -CJ    Prior Level of Function-Communication WFL  -CJ    Prior Level of Function-Swallowing honey thick liquids;other (see comments)  -CJ    Plans/Goals Discussed with patient;agreed upon  -    Barriers to Rehab medically complex;previous functional deficit  -    Patient's Goals for Discharge return to regular diet  -CJ       Pain    Additional Documentation Pain Scale: FACES Pre/Post-Treatment (Group)  -CJ       Pain Scale: Numbers Pre/Post-Treatment     "Pretreatment Pain Rating --    Posttreatment Pain Rating --       Pain Scale: FACES Pre/Post-Treatment    Pain: FACES Scale, Pretreatment 0-->no hurt  -CJ    Posttreatment Pain Rating 0-->no hurt  -CJ       MBS/VFSS    Utensils Used spoon;cup;straw  -    Consistencies Trialed regular textures;thin liquids;nectar/syrup-thick liquids;honey-thick liquids;pudding thick  -CJ       MBS/VFSS Interpretation    Oral Prep Phase impaired oral phase of swallowing  -    Oral Transit Phase impaired  -CJ    Oral Residue WFL  -    VFSS Summary Mild oral, moderate pharyngeal dysphagia. Prolonged manipulation w/ solids. Deep laryngeal penetration w/ thin liquids occuring during the swallow w/ subsequent silent aspiration of thin residue after the swallow. Cued cough was ineffective to clear aspirated material. Attempted multiple compensations to eliminate aspiration risk w/ thins but they were ineffective. Deep penetration w/ nectar via straw only that didn't clear upon completion of swallow. No penetration/aspiration w/ trials of nectar thick liquids via spoon/cup. Diffuse pharyngeal residue w/ all consistencies, able to diminish w/ consecutive swallows. Would recommend modified po diet of soft chopped w/ nectar thick liquids, no straws, no mixed consistencies, intermittent extra swallow. Had lengthy d/w pt regarding MBS results and need to still adhere to thickened liquids. Pt stated \"I don't want to get sick again but I hate this stuff\". Re-educated pt on risks of aspiration vs pursuing comfort diet and pt stated again \"wants to be the safest so she doesn't get sick again for her grandbabies\". Will continue to f/u to address dysphagia however suspect some degree of chronic dysphagia at this point  -CJ       Oral Preparatory Phase    Oral Preparatory Phase prolonged manipulation  -    Prolonged Manipulation regular textures;secondary to reduced lingual strength  -       Oral Transit Phase    Impaired Oral Transit Phase " premature spillage of liquids into pharynx  -CJ    Premature Spillage of Liquids into Pharynx thin liquids;nectar-thick liquids;secondary to reduced lingual control;discoordination of lingual movement  -CJ       Initiation of Pharyngeal Swallow    Initiation of Pharyngeal Swallow bolus in pyriform sinuses  -CJ    Pharyngeal Phase impaired pharyngeal phase of swallowing  -CJ    Anatomical abnormalities noted osteophyte/bone spur per radiology report  -CJ    Anatomical abnormalities functional impact functional impact on swallowing  -CJ    Penetration During the Swallow thin liquids;nectar-thick liquids;secondary to delayed swallow initiation or mistiming;secondary to reduced laryngeal elevation;secondary to reduced vestibular closure  -CJ    Aspiration After the Swallow thin liquids;secondary to residue;in valleculae;in pyriform sinuses;in laryngeal vestibule  -CJ    Depth of Penetration deep  -CJ    Response to Penetration No  -CJ    No spontaneous response to penetration and non-effective laryngeal clearance with cue (see comments)  -CJ    Response to Aspiration No  -CJ    No spontaneous response to aspiration and non-effective subglottic clearance with cue (see comments)  cued cough  -CJ    Rosenbek's Scale thin:;8--->level 8  -CJ    Pharyngeal Residue all consistencies tested;diffuse within pharynx;secondary to reduced posterior pharyngeal wall stripping;secondary to reduced laryngeal elevation;secondary to reduced hyolaryngeal excursion  -CJ    Response to Residue partial residue clearance  -CJ    Attempted Compensatory Maneuvers bolus size;bolus presentation style;chin tuck;head turn to left;head turn to right;additional subsequent swallow;multiple swallows;throat clear after swallow;effortful (hard swallow)  -CJ    Response to Attempted Compensatory Maneuvers did not prevent aspiration  -CJ    Successful Compensatory Maneuver Competency patient able to;demonstrate compensations;with cues  -CJ       Swallowing  Quality of Life Assessment    Education and counseling provided Signs of aspiration;Silent aspiration;Risks of aspiration;Safest diet options;Oral care recommendations and rationale;Aspiration precautions  -       SLP Evaluation Clinical Impression    SLP Swallowing Diagnosis mild;oral dysphagia;moderate;pharyngeal dysphagia  -    Functional Impact risk of aspiration/pneumonia;risk of dehydration;risk of malnutrition  -    Rehab Potential/Prognosis, Swallowing adequate, monitor progress closely  -    Swallow Criteria for Skilled Therapeutic Interventions Met demonstrates skilled criteria  -       SLP Treatment Clinical Impressions    Treatment Assessment (SLP) --    Treatment Assessment Comments (SLP) --    Daily Summary of Progress (SLP) --    Barriers to Overall Progress (SLP) --    Plan for Continued Treatment (SLP) --    Care Plan Review --       Recommendations    Therapy Frequency (Swallow) 5 days per week  -    Predicted Duration Therapy Intervention (Days) 2 weeks  -    SLP Diet Recommendation soft to chew textures;chopped;no mixed consistencies;nectar thick liquids  -    Recommended Precautions and Strategies upright posture during/after eating;small bites of food and sips of liquid;general aspiration precautions;no straw;fatigue precautions;multiple swallows per sip of liquid;multiple swallows per bite of food  -    Oral Care Recommendations Oral Care BID/PRN;Suction toothbrush  -    SLP Rec. for Method of Medication Administration meds whole;meds crushed;with puree;as tolerated  -    Monitor for Signs of Aspiration yes;notify SLP if any concerns  -    Anticipated Discharge Disposition (SLP) skilled nursing [facility]  -              User Key  (r) = Recorded By, (t) = Taken By, (c) = Cosigned By      Initials Name Effective Dates    Skyla Gomez, MS CCC-SLP 02/03/23 -     Denise Gloria, MS CCC-SLP 06/03/24 -                     EDUCATION  The patient has been  educated in the following areas:   Dysphagia (Swallowing Impairment) Oral Care/Hydration Modified Diet Instruction.        SLP GOALS       Row Name 07/16/24 0945          (LTG) Patient will demonstrate functional swallow for    Diet Texture (Demonstrate functional swallow) soft to chew (chopped) textures  -CJ     Liquid viscosity (Demonstrate functional swallow) nectar/ mildly thick liquids  -CJ     [place] (Demonstrate functional swallow) with minimal cues (75-90% accuracy)  -CJ     Time Frame (Demonstrate functional swallow) by discharge  -CJ     Progress/Outcomes (Demonstrate functional swallow) goal ongoing  -CJ        (STG) Patient will tolerate trials of    Consistencies Trialed (Tolerate trials) soft to chew (chopped) textures;nectar/ mildly thick liquids  -CJ     Desired Outcome (Tolerate trials) without signs/symptoms of aspiration;without signs of distress;with adequate oral prep/transit/clearance  -CJ     [place] (Tolerate trials) with minimal cues (75-90% accuracy)  -CJ     Time Frame (Tolerate trials) 1 week  -CJ     Progress/Outcomes (Tolerate trials) goal revised this date  -CJ     Comment (Tolerate trials) --        (STG) Pharyngeal Strengthening Exercise Goal 1 (SLP)    Activity (Pharyngeal Strengthening Goal 1, SLP) increase timing;increase superior movement of the hyolaryngeal complex;increase anterior movement of the hyolaryngeal complex;increase closure at entrance to airway/closure of airway at glottis;increase squeeze/positive pressure generation  -CJ     Increase Timing prepping - 3 second prep or suck swallow or 3-step swallow  -CJ     Increase Superior Movement of the Hyolaryngeal Complex effortful pitch glide (falsetto + pharyngeal squeeze)  -CJ     Increase Anterior Movement of the Hyolaryngeal Complex chin tuck against resistance (CTAR)  -CJ     Increase Closure at Entrance to Airway/Closure of Airway at Glottis supraglottic swallow  -CJ     Increase Squeeze/Positive Pressure  Generation hard effortful swallow  -     [place]/Accuracy (Pharyngeal Strengthening Goal 1, SLP) with moderate cues (50-74% accuracy)  -     Time Frame (Pharyngeal Strengthening Goal 1, SLP) 1 week  -     Progress/Outcomes (Pharyngeal Strengthening Goal 1, SLP) goal ongoing  -               User Key  (r) = Recorded By, (t) = Taken By, (c) = Cosigned By      Initials Name Provider Type     Skyla Austin, MS CCC-SLP Speech and Language Pathologist    Denise Gloria, MS CCC-SLP Speech and Language Pathologist                         Time Calculation:    Time Calculation- SLP       Row Name 07/16/24 1505             Time Calculation- SLP    SLP Start Time 0945  -      SLP Received On 07/16/24  -         Untimed Charges    [id number] Motion Fluoro Eval Swallow Minutes 54  -         Total Minutes    Untimed Charges Total Minutes 54  -CJ       Total Minutes 54  -CJ                User Key  (r) = Recorded By, (t) = Taken By, (c) = Cosigned By      Initials Name Provider Type    Denise Gloria, MS CCC-SLP Speech and Language Pathologist                    Therapy Charges for Today       Code Description Service Date Service Provider Modifiers Qty    22638299706  ST MOTION FLUORO EVAL SWALLOW 4 7/16/2024 Denise Brown, MS CCC-SLP GN 1                 Denise Brown MS CCC-KATIE  7/16/2024

## 2024-07-16 NOTE — PLAN OF CARE
Goal Outcome Evaluation:  Plan of Care Reviewed With: patient        Progress: no change         Anticipated Discharge Disposition (SLP): skilled nursing facility          SLP Swallowing Diagnosis: mild, oral dysphagia, moderate, pharyngeal dysphagia (07/16/24 8119)

## 2024-07-16 NOTE — THERAPY TREATMENT NOTE
Patient Name: Bibiana Hodges  : 1935    MRN: [id number]                              Today's Date: 2024       Admit Date: 2024    Visit Dx:     ICD-10-CM ICD-9-CM   1. Hyponatremia  E87.1 276.1   2. Pneumonia of both lungs due to infectious organism, unspecified part of lung  J18.9 483.8   3. Acute respiratory failure with hypoxia  J96.01 518.81   4. Oropharyngeal dysphagia  R13.12 787.22     Patient Active Problem List   Diagnosis    Dysautonomia orthostatic hypotension syndrome    Hypertension    Flu vaccine need    Streptococcus pneumoniae vaccination indicated    Sepsis due to pneumonia    Severe malnutrition    Acute respiratory failure with hypoxia    Atrial fibrillation    Autonomic dysfunction     Past Medical History:   Diagnosis Date    Constipation     Depression     Dysautonomia orthostatic hypotension syndrome     Generalized osteoarthritis     GERD (gastroesophageal reflux disease)     s/p Nissen    Hypertension     Insomnia     Osteoarthrosis, shoulder region     Skin ulcer of scalp     Thrombophlebitis of arm     Tremor     Vitamin B12 deficiency      Past Surgical History:   Procedure Laterality Date    DILATATION AND CURETTAGE      HERNIA REPAIR      HIP SURGERY      SHOULDER SURGERY      Right      General Information       Row Name 24 1429          OT Time and Intention    Document Type therapy note (daily note)  -     Mode of Treatment individual therapy;occupational therapy  -       Row Name 24 1429          General Information    Patient Profile Reviewed yes  -AURE     Existing Precautions/Restrictions fall;oxygen therapy device and L/min  decreased vision  -     Barriers to Rehab medically complex;previous functional deficit;visual deficit  -       Row Name 24 1429          Cognition    Orientation Status (Cognition) oriented x 3  -       Row Name 24 1429          Safety Issues, Functional Mobility    Safety Issues Affecting Function  (Mobility) safety precaution awareness;safety precautions follow-through/compliance;sequencing abilities;awareness of need for assistance  -AURE     Impairments Affecting Function (Mobility) balance;coordination;endurance/activity tolerance;shortness of breath;strength;postural/trunk control  -               User Key  (r) = Recorded By, (t) = Taken By, (c) = Cosigned By      Initials Name Provider Type    Georgie Hernández OT Occupational Therapist                     Mobility/ADL's       Row Name 07/16/24 1430          Bed Mobility    Bed Mobility supine-sit;sit-supine  -AURE     Supine-Sit [place] (Bed Mobility) standby assist  -AURE     Sit-Supine [place] (Bed Mobility) standby assist  -AURE     Bed Mobility, Safety Issues decreased use of arms for pushing/pulling;decreased use of legs for bridging/pushing  -     Assistive Device (Bed Mobility) bed rails;head of bed elevated  -AURE     Comment, (Bed Mobility) pt. needed extra time and effort, pt. initially refusing due to cold and fatigued from MBS, when encouraged agreed and stated warmer and felt better post therapy  -       Row Name 07/16/24 1430          Transfers    Transfers sit-stand transfer;stand-sit transfer;toilet transfer  -AURE     Comment, (Transfers) cues for safety and hand placement with each transfers, cues to keep walker with her, pt. moves quickly and is impulsive at times, pt. needed extra time and effort with transfers  -AURE       Row Name 07/16/24 1430          Sit-Stand Transfer    Sit-Stand [place] (Transfers) contact guard;verbal cues  -AURE     Assistive Device (Sit-Stand Transfers) walker, front-wheeled  -AURE       Row Name 07/16/24 1430          Stand-Sit Transfer    Stand-Sit [place] (Transfers) contact guard;verbal cues  -AURE     Assistive Device (Stand-Sit Transfers) walker, front-wheeled  -AURE       Row Name 07/16/24 1430          Toilet Transfer    Type (Toilet Transfer) sit-stand;stand-sit  -AURE     [place] Level  (Toilet Transfer) contact guard;verbal cues  -     Assistive Device (Toilet Transfer) grab bars/safety frame;commode  -       Row Name 07/16/24 1430          Functional Mobility    Functional Mobility- Ind. Level contact guard assist  -     Functional Mobility- Device walker, front-wheeled  -     Functional Mobility-Distance (Feet) 8  + 4 + 16  -     Functional Mobility- Safety Issues step length decreased  -     Functional Mobility- Comment cues for safety  -       Row Name 07/16/24 1430          Activities of Daily Living    BADL Assessment/Intervention toileting;grooming  -       Row Name 07/16/24 1430          Toileting Assessment/Training    [place] Level (Toileting) perform perineal hygiene;standby assist  -AURE     Position (Toileting) supported sitting  -AURE     Comment, (Toileting) front gown on only, cues to use more toilet paper, pt. only wanting to use 2 sheets of one ply toilet paper to wipe  -       Row Name 07/16/24 1430          Grooming Assessment/Training    [place] Level (Grooming) oral care regimen;standby assist;verbal cues  -     Oral Care teeth brushed - regular toothbrush  -AURE     Position (Grooming) sink side;unsupported sitting  -AURE     Comment, (Grooming) pt. sat at sink on [facility] for grooming due to limited standing endurance, good balance demonstrated, some cues needed to initiate task on own without assist  -               User Key  (r) = Recorded By, (t) = Taken By, (c) = Cosigned By      Initials Name Provider Type    AURE Georgie Rueda OT Occupational Therapist                   Obj/Interventions       Row Name 07/16/24 1434          Shoulder (Therapeutic Exercise)    Shoulder (Therapeutic Exercise) AROM (active range of motion)  -     Shoulder AROM (Therapeutic Exercise) bilateral;flexion;extension;aBduction;aDduction;horizontal aBduction/aDduction;15 repititions  -       Row Name 07/16/24 1434          Elbow/Forearm (Therapeutic Exercise)     Elbow/Forearm (Therapeutic Exercise) strengthening exercise  -AURE     Elbow/Forearm Strengthening (Therapeutic Exercise) bilateral;flexion;extension;supine;15 repititions  mild manual resistance given  -AURE       Row Name 07/16/24 1434          Motor Skills    Therapeutic Exercise shoulder;elbow/forearm  pt. also completed 15 reps knee press with buttock squeeze  -AURE       Row Name 07/16/24 1434          Balance    Static Sitting Balance standby assist  -AURE     Dynamic Sitting Balance standby assist  -AURE     Position, Sitting Balance unsupported;sitting edge of bed;other (see comments)  toilet, BSC  -AURE     Static Standing Balance contact guard  -AURE     Dynamic Standing Balance contact guard  -AURE     Position/Device Used, Standing Balance supported;walker, rolling  -AURE     Balance Interventions sit to stand;occupation based/functional task  -AURE     Comment, Balance toileting, grooming  -AURE               User Key  (r) = Recorded By, (t) = Taken By, (c) = Cosigned By      Initials Name Provider Type    Georgie Hernández, OT Occupational Therapist                   Goals/Plan       Row Name 07/16/24 1439          Bed Mobility Goal 1 (OT)    Progress/Outcomes (Bed Mobility Goal 1, OT) good progress toward goal;goal ongoing  -[facility] Name 07/16/24 1439          Toileting Goal 1 (OT)    Progress/Outcome (Toileting Goal 1, OT) continuing progress toward goal;goal ongoing  -AURE               User Key  (r) = Recorded By, (t) = Taken By, (c) = Cosigned By      Initials Name Provider Type    Georgie Hernández, OT Occupational Therapist                   Clinical Impression       Row Name 07/16/24 1434          Pain Assessment    Pretreatment Pain Rating 0/10 - no pain  -AURE     Posttreatment Pain Rating 0/10 - no pain  -AURE       Row Name 07/16/24 1434          Plan of Care Review    Plan of Care Reviewed With patient  -AURE     Progress improving  -AURE     Outcome Evaluation Patient demonstrated improved transfer independence  and toileting independence.  Pt. continues to need cues for safety and at time cues for initiation and problem solving with tasks.  Pt. needed encouragement to participate, but then participated with all asked tasks and stated she felt better at end of session. Pt. continues to fatigue easily especially in standing.  Pt. remains below baseline independence warranting continued skilled OT services.  -AURE       Row Name 07/16/24 1434          Therapy Assessment/Plan (OT)    Therapy Frequency (OT) daily  -AURE       Row Name 07/16/24 1434          Therapy Plan Review/Discharge Plan (OT)    Anticipated Discharge Disposition (OT) skilled nursing facility  -AURE       Row Name 07/16/24 1434          Vital Signs    Pre Systolic BP Rehab 104  -AURE     Pre Treatment Diastolic BP 64  -AURE     Post Systolic BP Rehab 111  -AURE     Post Treatment Diastolic BP 88  -AURE     Pre SpO2 (%) 96  -AURE     O2 Delivery Pre Treatment nasal cannula  -AURE     O2 Delivery Intra Treatment nasal cannula  -AURE     Post SpO2 (%) 96  -AURE     O2 Delivery Post Treatment nasal cannula  -AURE     Pre Patient Position Supine  -AURE     Intra Patient Position Standing  -AURE     Post Patient Position Supine  -AURE       Row Name 07/16/24 1434          Positioning and Restraints    Pre-Treatment Position in bed  -AURE     Post Treatment Position bed  -AURE     In Bed supine;call light within reach;encouraged to call for assist;side rails up x2;legs elevated;heels elevated  -AURE               User Key  (r) = Recorded By, (t) = Taken By, (c) = Cosigned By      Initials Name Provider Type    Georgie Hernández, OT Occupational Therapist                   Outcome Measures       Row Name 07/16/24 1439          How much help from another is currently needed...    Putting on and taking off regular lower body clothing? 2  -AURE     Bathing (including washing, rinsing, and drying) 2  -AURE     Toileting (which includes using toilet bed pan or urinal) 3  -AURE     Putting on and taking off  regular upper body clothing 3  -AURE     Taking care of personal grooming (such as brushing teeth) 3  -AURE     Eating meals 3  -AURE     AM-PAC 6 Clicks Score (OT) 16  -AURE       Row Name 07/16/24 1439          Functional Assessment    Outcome Measure Options AM-PAC 6 Clicks Daily Activity (OT)  -AURE               User Key  (r) = Recorded By, (t) = Taken By, (c) = Cosigned By      Initials Name Provider Type    Georgie Hernández, OT Occupational Therapist                    Occupational Therapy Education       Title: PT OT SLP Therapies (In Progress)       Topic: Occupational Therapy (In Progress)       Point: ADL training (Done)       Description:   Instruct learner(s) on proper safety adaptation and remediation techniques during self care or transfers.   Instruct in proper use of assistive devices.                  Learning Progress Summary             Patient Acceptance, E,D, VU,NR by AURE at 7/16/2024 1439    Comment: benefit of activity, UE and LE TE, transfer/wx/BADL safety    Acceptance, E, NR by JR at 7/14/2024 1130    Comment: role of therapy                         Point: Home exercise program (Done)       Description:   Instruct learner(s) on appropriate technique for monitoring, assisting and/or progressing therapeutic exercises/activities.                  Learning Progress Summary             Patient Acceptance, E,D, VU,NR by AURE at 7/16/2024 1439    Comment: benefit of activity, UE and LE TE, transfer/wx/BADL safety                         Point: Precautions (Done)       Description:   Instruct learner(s) on prescribed precautions during self-care and functional transfers.                  Learning Progress Summary             Patient Acceptance, E,D, VU,NR by AURE at 7/16/2024 1439    Comment: benefit of activity, UE and LE TE, transfer/wx/BADL safety    Acceptance, E, NR by JR at 7/14/2024 1130    Comment: role of therapy                         Point: Body mechanics (Not Started)       Description:   Instruct  learner(s) on proper positioning and spine alignment during self-care, functional mobility activities and/or exercises.                  Learner Progress:  Not documented in this visit.                              User Key       Initials Effective Dates Name Provider Type Discipline    AURE 07/11/23 -  Georgie Rueda OT Occupational Therapist OT     02/03/23 -  Khushbu Fernandes OT Occupational Therapist OT                  OT Recommendation and Plan  Therapy Frequency (OT): daily  Plan of Care Review  Plan of Care Reviewed With: patient  Progress: improving  Outcome Evaluation: Patient demonstrated improved transfer independence and toileting independence.  Pt. continues to need cues for safety and at time cues for initiation and problem solving with tasks.  Pt. needed encouragement to participate, but then participated with all asked tasks and stated she felt better at end of session. Pt. continues to fatigue easily especially in standing.  Pt. remains below baseline independence warranting continued skilled OT services.     Time Calculation:         Time Calculation- OT       Row Name 07/16/24 1440             Time Calculation- OT    OT Start Time 1356  -AURE      OT Received On 07/16/24  -AURE      OT Goal Re-Cert Due Date 07/24/24  -AURE         Timed Charges    20187 - OT Therapeutic Exercise Minutes 8  -AURE      76945 - OT Therapeutic Activity Minutes 8  -AURE      42193 - OT Self Care/Mgmt Minutes 15  -AURE         Total Minutes    Timed Charges Total Minutes 31  -AURE       Total Minutes 31  -AURE                User Key  (r) = Recorded By, (t) = Taken By, (c) = Cosigned By      Initials Name Provider Type    Georgie Hernández OT Occupational Therapist                  Therapy Charges for Today       Code Description Service Date Service Provider Modifiers Qty    96257638923  OT THER PROC EA 15 MIN 7/16/2024 Georgie Rueda OT GO 1    86984740333 HC OT SELF CARE/MGMT/TRAIN EA 15 MIN 7/16/2024 Georgie Rueda OT  GO 1                 Georgie Rueda, OT  7/16/2024

## 2024-07-16 NOTE — CASE MANAGEMENT/SOCIAL WORK
Continued Stay Note  [facility]     Patient Name: Bibiana Hodges  MRN: [id number]  Today's Date: 7/16/2024    Admit Date: 7/8/2024    Plan: SNF   Discharge Plan       Row Name 07/16/24 1022       Plan    Plan SNF    Patient/Family in Agreement with Plan yes    Plan Comments Spoke with son, Edmond Hodges, by phone today. Bed offer at [facility] was accepted. Email sent to start insurance approval. CM spoke with patient at bedside. CM will follow.    Final Discharge Disposition Code 03 - skilled nursing facility (SNF)                   Discharge Codes    No documentation.                 Expected Discharge Date and Time       Expected Discharge Date Expected Discharge Time    Jul 15, 2024               Jess Perdue RN

## 2024-07-16 NOTE — PLAN OF CARE
Goal Outcome Evaluation:  Plan of Care Reviewed With: patient        Progress: improving  Outcome Evaluation: Patient on 2L NC. Tylenol and zofran given PO for pain and nausea. Pt remains non tele, VSS. No complaints

## 2024-07-16 NOTE — PLAN OF CARE
Goal Outcome Evaluation:  Plan of Care Reviewed With: patient        Progress: improving  Outcome Evaluation: Patient demonstrated improved transfer independence and toileting independence.  Pt. continues to need cues for safety and at time cues for initiation and problem solving with tasks.  Pt. needed encouragement to participate, but then participated with all asked tasks and stated she felt better at end of session. Pt. continues to fatigue easily especially in standing.  Pt. remains below baseline independence warranting continued skilled OT services.      Anticipated Discharge Disposition (OT): skilled nursing facility

## 2024-07-16 NOTE — DISCHARGE PLACEMENT REQUEST
"Bibiana Hodges (88 y.o. Female)       Date of Birth   1935    Social Security Number       Address   [address] [id number]    Home Phone   [phone]    MRN   [id number]       [employer]   [employer]    Marital Status                               Admission Date   24    Admission Type   Emergency    Admitting Provider   Vanna Dykes MD    Attending Provider   Vanna Dykes MD    Department, Room/Bed   [facility] 6B, N646/1       Discharge Date       Discharge Disposition       Discharge Destination                                 Attending Provider: Vanna Dykes MD    Allergies: Gabapentin, Sulfa Antibiotics    Isolation: None   Infection: None   Code Status: No CPR    Ht: 170.2 cm (67\")   Wt: 59 kg (130 lb)    Admission Cmt: None   Principal Problem: Acute respiratory failure with hypoxia [J96.01]                   Active Insurance as of 2024       Primary Coverage       Payor Plan Insurance Group Employer/Plan Group    HUMANA MEDICARE REPLACEMENT HUMANA MED ADV HMO [id number]       Payor Plan Address Payor Plan Phone Number Payor Plan Fax Number Effective Dates    [address] [address] [phone]  2023 - None Entered    [facility] [id number]         Subscriber Name Subscriber Birth Date Member ID       BIBIANA HODGES 1935 [id number]                     Emergency Contacts        (Rel.) Home Phone Work Phone Mobile Phone    Danyelle Keyes (POA) (Daughter) [phone] -- [phone]    HODGESELODIA SCHNEIDER (Son) -- -- [phone]    HODGESNIA (Son) [phone] -- [phone]                 History & Physical        Destiny Hammond MD at 24 [address] [place] [address] Medicine Services  HISTORY AND PHYSICAL    Patient Name: Bibiana Hodges  : 1935  MRN: [id number]  Primary Care Physician: Nivia Madrigal MD  Date of admission: 2024      Subjective  Subjective     Chief Complaint:SOA and " cough    HPI:  Biibana Hodges is a 88 y.o. female 88-year-old female with history of atrial fibrillation, on amiodarone and Eliquis, hypertension, autonomic dysfunction, chronic debility, prior hospitalizations for recurrent pneumonia with concerns for possible aspiration.  She presents from nursing home with 1 month of progressive worsening shortness of breath and cough, she describes the cough as productive of yellow sputum, denies any fevers or chills, no nausea no vomiting.  On arrival here [facility] with pO2 of 50, chest x-ray concerning for bilateral pneumonia.  Patient was started on broad-spectrum antibiotics, placed on HFNC and hospital medicine asked to admit.  At the time my evaluation she says she feels slightly better, does endorse lack of sleep, she is also concerned that her left breast is slightly larger than her right.    Personal History     Past Medical History:   Diagnosis Date    Constipation     Depression     Dysautonomia orthostatic hypotension syndrome     Generalized osteoarthritis     GERD (gastroesophageal reflux disease)     s/p Nissen    Hypertension     Insomnia     Osteoarthrosis, shoulder region     Skin ulcer of scalp     Thrombophlebitis of arm     Tremor     Vitamin B12 deficiency            Past Surgical History:   Procedure Laterality Date    DILATATION AND CURETTAGE      HERNIA REPAIR      HIP SURGERY      SHOULDER SURGERY      Right       Family History: family history includes Anorexia nervosa in her daughter; Cancer in her mother; Stroke in her brother.     Social History:  reports that she has never smoked. She has never used smokeless tobacco. She reports that she does not drink alcohol and does not use drugs.  Social History     Social History Narrative    Not on file       Medications:  Available home medication information reviewed.  albuterol, amiodarone, apixaban, atorvastatin, busPIRone, cholecalciferol, cyanocobalamin, fluticasone, guaiFENesin-dextromethorphan,  levETIRAcetam XR, melatonin, metoprolol tartrate, omeprazole, polyethylene glycol, senna, traZODone, and venlafaxine XR    Allergies   Allergen Reactions    Gabapentin     Sulfa Antibiotics        Objective  Objective     Vital Signs:   Temp:  [97 °F (36.1 °C)] 97 °F (36.1 °C)  Heart Rate:  [] 77  Resp:  [22-24] 22  BP: (110-115)/(71-96) 110/96  Flow (L/min):  [50] 50       Physical Exam   Constitutional: Chronically ill-appearing elderly female.  Eyes: PERRLA, sclerae anicteric, no conjunctival injection  HENT: NCAT, mucous membranes dry  Neck: Supple, no thyromegaly, no lymphadenopathy, trachea midline  Respiratory: Moderate labored respirations, diffuse coarse breath sounds, rhonchi, no wheezes on HFNC  Cardiovascular: RRR, no murmurs, rubs, or gallops, palpable pedal pulses bilaterally  Gastrointestinal: Positive bowel sounds, soft, nontender, nondistended  Musculoskeletal: No bilateral ankle edema, no clubbing or cyanosis to extremities  Psychiatric: Appropriate affect, cooperative  Neurologic: Oriented x 3, tremulous, strength symmetric in all extremities, Cranial Nerves grossly intact to confrontation, speech clear  Skin: No rashes, left breast with some leathery feeling on the lateral side    Result Review:  I have personally reviewed the results from the time of this admission to 7/8/2024 09:23 EDT and agree with these findings:  [x]  Laboratory list / accordion  []  Microbiology  [x]  Radiology  []  EKG/Telemetry   []  Cardiology/Vascular   []  Pathology  []  Old records  []  Other:  Most notable findings include:       LAB RESULTS:      Lab 07/08/24  0728   WBC 15.50*   HEMOGLOBIN 15.1   HEMATOCRIT 45.9   PLATELETS 250   NEUTROS ABS 11.34*   IMMATURE GRANS (ABS) 0.05   LYMPHS ABS 3.49*   MONOS ABS 0.40   EOS ABS 0.18   .9*   LACTATE 2.7*         Lab 07/08/24  0728   SODIUM 128*   POTASSIUM 4.1   CHLORIDE 93*   CO2 21.0*   ANION GAP 14.0   BUN 20   CREATININE 1.26*   EGFR 41.1*   GLUCOSE 89    CALCIUM 8.8         Lab 07/08/24  0728   TOTAL PROTEIN 6.1   ALBUMIN 3.3*   GLOBULIN 2.8   ALT (SGPT) 41*   AST (SGOT) 46*   BILIRUBIN 0.3   ALK PHOS 73         Lab 07/08/24  0728   PROBNP 651.7   HSTROP T 18*                 Lab 07/08/24  0718   PH, ARTERIAL 7.419   PCO2, ARTERIAL 33.2*   PO2 ART 51.2*   FIO2 44   HCO3 ART 21.5   BASE EXCESS ART -2.2*     UA          8/20/2023    18:14 4/20/2024    18:51 5/6/2024    21:54   Urinalysis   Squamous Epithelial Cells, UA 0-2  None Seen     Specific [place], UA 1.018  1.013  1.014    Ketones, UA Negative  Negative  Negative    Blood, UA Negative  Negative  Negative    Leukocytes, UA Large (3+)  Small (1+)  Negative    Nitrite, UA Positive  Negative  Negative    RBC, UA 0-2  0-2     WBC, UA Too Numerous to Count  Too Numerous to Count     Bacteria, UA 2+  4+         Microbiology Results (last 10 days)       Procedure Component Value - Date/Time    Respiratory Panel PCR w/COVID-19(SARS-CoV-2) ERICK/MARTA/ELPIDIO/PAD/COR/ALMA In-House, NP Swab in UTM/VTM, 2 HR TAT - Swab, Nasopharynx [[id number]]  (Normal) Collected: 07/08/24 0730    Lab Status: Final result Specimen: Swab from Nasopharynx Updated: 07/08/24 0840     ADENOVIRUS, PCR Not Detected     Coronavirus 229E Not Detected     Coronavirus HKU1 Not Detected     Coronavirus NL63 Not Detected     Coronavirus OC43 Not Detected     COVID19 Not Detected     Human Metapneumovirus Not Detected     Human Rhinovirus/Enterovirus Not Detected     Influenza A PCR Not Detected     Influenza B PCR Not Detected     Parainfluenza Virus 1 Not Detected     Parainfluenza Virus 2 Not Detected     Parainfluenza Virus 3 Not Detected     Parainfluenza Virus 4 Not Detected     RSV, PCR Not Detected     Bordetella pertussis pcr Not Detected     Bordetella parapertussis PCR Not Detected     Chlamydophila pneumoniae PCR Not Detected     Mycoplasma pneumo by PCR Not Detected    Narrative:      In the setting of a positive respiratory panel with a viral  infection PLUS a negative procalcitonin without other underlying concern for bacterial infection, consider observing off antibiotics or discontinuation of antibiotics and continue supportive care. If the respiratory panel is positive for atypical bacterial infection (Bordetella pertussis, Chlamydophila pneumoniae, or Mycoplasma pneumoniae), consider antibiotic de-escalation to target atypical bacterial infection.            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 Heart shadow and pulmonary vasculature appear normal in size. There is new multifocal airspace disease in the left upper and lower lung, and milder but increased interstitial opacity in the right mid and lower lung consistent with pneumonia. Disease is densest in the left upper lung. No effusion or pneumothorax is seen.     Impression: Impression: Bilateral pneumonia. Electronically Signed: Deon Vera MD  7/8/2024 8:16 AM EDT  Workstation ID: [id number]     Results for orders placed during the hospital encounter of 08/20/23    Adult Transthoracic Echo Complete W/ Cont if Necessary Per Protocol    Interpretation Summary    Left ventricular systolic function is mildly decreased. Calculated left ventricular EF = 47.9% Normal left ventricular cavity size and wall thickness noted. There is left ventricular global hypokinesis noted. Left ventricular diastolic function is consistent with (grade I) impaired relaxation.    : Normal right ventricular cavity size and systolic function noted. Electronic lead present in the ventricle.    Normal left atrial size and volume noted. Saline test results are negative.    There is moderate calcification of the aortic valve. The aortic valve was poorly visualized but appears trileaflet. Moderate aortic valve regurgitation is present. No  aortic valve stenosis is present.    Mild mitral annular calcification is present. Mild mitral valve regurgitation is present. No significant mitral valve stenosis is present.    The tricuspid valve is grossly normal in structure. Mild tricuspid valve regurgitation is present. Estimated right ventricular systolic pressure from tricuspid regurgitation is normal, 33 mmHg. No tricuspid valve stenosis is present.    Mild dilation of the ascending aorta is present. Ascending aorta = 3.7 cm    There is a small (<1cm) pericardial effusion adjacent to the right atrium and right ventricle.      Assessment & Plan  Assessment & Plan       Acute respiratory failure with hypoxia    Dysautonomia orthostatic hypotension syndrome    Hypertension    Sepsis due to pneumonia    Severe malnutrition    Atrial fibrillation    88-year-old female with history of atrial fibrillation, on amiodarone and Eliquis, hypertension, autonomic dysfunction, chronic debility, prior hospitalizations for recurrent pneumonia with concerns for possible aspiration.  She presents from nursing home with 2 weeks of progressive worsening shortness of breath and cough.  Found to have acute respiratory failure with hypoxia secondary to bilateral pneumonia.    Acute respiratory failure with hypoxia secondary to bilateral pneumonia  Sepsis(tachycardia, leukocytosis, lactic acidosis, and pneumonia)  -Patient with pO2 of 51, chest x-ray with bilateral pneumonia, previous history of aspiration pneumonia  -Follow-up blood cultures, MRSA PCR, Legionella and strep pneumo antigens respiratory PCR is negative  -Continue broad-spectrum antibiotics, currently on Rocephin and doxycycline  -She is on HFNC 50 L and 80%, continue to wean as able, of note she is full code  -Continue aggressive pulmonary toilet, Kwame  -SLP to evaluate    Atrial fibrillation  -Continue home metoprolol, amiodarone and Eliquis    Hyperlipidemia  -Continue statin    Hyponatremia  -Not clinically  significant  -Continue to closely monitor    Depression  -Continue Effexor and BuSpar    Hypertension  Autonomic dysfunction with recurrent presyncopal episodes  -BP currently low, hold home anti-hypertensives    Tremors  -Continue Keppra    Chronic debility  -Currently resides at assisted living facility  -Need GOC discussions, we will have palliative consulted, currently she is full code, she states that she has 2 great-grandchildren that she has not seen and wants to live to see them.  -PT/OT to evaluate    Left breast swelling  -Patient with some lethargy feeling on the lateral left breast, nontender  -Mentions she is supposed to have follow-up as outpatient    VTE Prophylaxis:  Pharmacologic VTE prophylaxis orders are signed & held.      CODE STATUS:    There are no questions and answers to display.     Expected Discharge   Expected Discharge Date: 7/10/2024; Expected Discharge Time:      Destiny Hammond MD  24     Electronically signed by Destiny Hammond MD at 24 0923          Physician Progress Notes (most recent note)        Vanna Dykes MD at 07/15/24 1132              [facility] Medicine Services  PROGRESS NOTE    Patient Name: Bibiana Hodges  : 1935  MRN: [id number]    Date of Admission: 2024  Primary Care Physician: Nivia Madrigal MD    Subjective   Subjective     CC:  Follow-up for pneumonia    HPI:  She reported the coughing kept her up last night.  Had some chest discomfort with coughing only.  No nausea or vomiting.  No bowel movement.  Blood pressure elevated this morning.    Objective   Objective     Vital Signs:   Temp:  [96.6 °F (35.9 °C)-97.9 °F (36.6 °C)] 97.9 °F (36.6 °C)  Heart Rate:  [60-67] 67  Resp:  [18] 18  BP: (147-185)/() 175/142  Flow (L/min):  [2] 2     Physical Exam:  Constitutional: No acute distress, awake, alert, laying in bed  HENT: NCAT, mucous membranes moist  Respiratory: Coarse throughout, respiratory effort  normal   Cardiovascular: RRR, no murmurs, rubs, or gallops  Gastrointestinal: Positive bowel sounds, soft, nontender, nondistended  Musculoskeletal: No bilateral ankle edema  Psychiatric: Appropriate affect, cooperative  Neurologic: Alert, oriented, symmetric facies, HERNÁNDEZ, speech clear, +tremors  Skin: No rashes on exposed skin    Results Reviewed:  LAB RESULTS:      Lab 07/10/24  1100 07/09/24  0420   WBC 11.96* 16.62*   HEMOGLOBIN 11.0* 11.7*   HEMATOCRIT 33.9* 36.1   PLATELETS 151 159   NEUTROS ABS 10.88* 14.86*   IMMATURE GRANS (ABS) 0.06* 0.09*   LYMPHS ABS 0.56* 1.18   MONOS ABS 0.37 0.40   EOS ABS 0.07 0.05   .8* 102.6*         Lab 07/10/24  1100 07/09/24  0420   SODIUM 134* 134*   POTASSIUM 3.8 4.9   CHLORIDE 100 101   CO2 27.0 21.0*   ANION GAP 7.0 12.0   BUN 21 27*   CREATININE 1.16* 1.30*   EGFR 45.4* 39.6*   GLUCOSE 106* 63*   CALCIUM 8.1* 8.7   MAGNESIUM 1.6  --    PHOSPHORUS 3.2  --          Lab 07/10/24  1100   TOTAL PROTEIN 5.3*   ALBUMIN 2.8*   GLOBULIN 2.5   ALT (SGPT) 33   AST (SGOT) 31   BILIRUBIN 0.4   ALK PHOS 63                         Brief Urine Lab Results  (Last result in the past 365 days)        Color   Clarity   Blood   Leuk Est   Nitrite   Protein   CREAT   Urine HCG        05/06/24 2154 Yellow   Clear   Negative   Negative   Negative   Trace                   Microbiology Results Abnormal       Procedure Component Value - Date/Time    Blood Culture - Blood, Arm, Right [[id number]]  (Normal) Collected: 07/08/24 0728    Lab Status: Final result Specimen: Blood from Arm, Right Updated: 07/13/24 0800     Blood Culture No growth at 5 days    Blood Culture - Blood, Wrist, Left [[id number]]  (Normal) Collected: 07/08/24 0729    Lab Status: Final result Specimen: Blood from Wrist, Left Updated: 07/13/24 0800     Blood Culture No growth at 5 days    Narrative:      Less than seven (7) mL's of blood was collected.  Insufficient quantity may yield false negative results.    Legionella  Antigen, Urine - Urine, Urine, Clean Catch [[id number]]  (Normal) Collected: 07/08/24 1925    Lab Status: Final result Specimen: Urine, Clean Catch Updated: 07/09/24 0109     LEGIONELLA ANTIGEN, URINE Negative    MRSA Screen, PCR (Inpatient) - Swab, Nares [[id number]]  (Normal) Collected: 07/08/24 1248    Lab Status: Final result Specimen: Swab from Nares Updated: 07/08/24 1446     MRSA PCR Negative    Narrative:      The negative predictive value of this diagnostic test is high and should only be used to consider de-escalating anti-MRSA therapy. A positive result may indicate colonization with MRSA and must be correlated clinically.  MRSA Negative    Respiratory Panel PCR w/COVID-19(SARS-CoV-2) ERICK/MARTA/ELPIDIO/PAD/COR/ALMA In-House, NP Swab in UTM/VTM, 2 HR TAT - Swab, Nasopharynx [[id number]]  (Normal) Collected: 07/08/24 0730    Lab Status: Final result Specimen: Swab from Nasopharynx Updated: 07/08/24 0840     ADENOVIRUS, PCR Not Detected     Coronavirus 229E Not Detected     Coronavirus HKU1 Not Detected     Coronavirus NL63 Not Detected     Coronavirus OC43 Not Detected     COVID19 Not Detected     Human Metapneumovirus Not Detected     Human Rhinovirus/Enterovirus Not Detected     Influenza A PCR Not Detected     Influenza B PCR Not Detected     Parainfluenza Virus 1 Not Detected     Parainfluenza Virus 2 Not Detected     Parainfluenza Virus 3 Not Detected     Parainfluenza Virus 4 Not Detected     RSV, PCR Not Detected     Bordetella pertussis pcr Not Detected     Bordetella parapertussis PCR Not Detected     Chlamydophila pneumoniae PCR Not Detected     Mycoplasma pneumo by PCR Not Detected    Narrative:      In the setting of a positive respiratory panel with a viral infection PLUS a negative procalcitonin without other underlying concern for bacterial infection, consider observing off antibiotics or discontinuation of antibiotics and continue supportive care. If the respiratory panel is positive for atypical  bacterial infection (Bordetella pertussis, Chlamydophila pneumoniae, or Mycoplasma pneumoniae), consider antibiotic de-escalation to target atypical bacterial infection.            No radiology results from the last 24 hrs    Results for orders placed during the hospital encounter of 08/20/23    Adult Transthoracic Echo Complete W/ Cont if Necessary Per Protocol    Interpretation Summary    Left ventricular systolic function is mildly decreased. Calculated left ventricular EF = 47.9% Normal left ventricular cavity size and wall thickness noted. There is left ventricular global hypokinesis noted. Left ventricular diastolic function is consistent with (grade I) impaired relaxation.    : Normal right ventricular cavity size and systolic function noted. Electronic lead present in the ventricle.    Normal left atrial size and volume noted. Saline test results are negative.    There is moderate calcification of the aortic valve. The aortic valve was poorly visualized but appears trileaflet. Moderate aortic valve regurgitation is present. No aortic valve stenosis is present.    Mild mitral annular calcification is present. Mild mitral valve regurgitation is present. No significant mitral valve stenosis is present.    The tricuspid valve is grossly normal in structure. Mild tricuspid valve regurgitation is present. Estimated right ventricular systolic pressure from tricuspid regurgitation is normal, 33 mmHg. No tricuspid valve stenosis is present.    Mild dilation of the ascending aorta is present. Ascending aorta = 3.7 cm    There is a small (<1cm) pericardial effusion adjacent to the right atrium and right ventricle.      Current medications:  Scheduled Meds:amiodarone, 200 mg, Oral, Daily  amLODIPine, 5 mg, Oral, Q24H  apixaban, 2.5 mg, Oral, BID  atorvastatin, 40 mg, Oral, Nightly  castor oil-balsam peru, 1 Application, Topical, Q12H  hydrALAZINE, 50 mg, Oral, Q8H  lactobacillus acidophilus, 1 capsule, Oral,  Daily  levETIRAcetam, 250 mg, Oral, BID  lisinopril, 10 mg, Oral, Q24H  metoprolol tartrate, 12.5 mg, Oral, BID  mineral oil, 1 enema, Rectal, Once  sodium chloride, 10 mL, Intravenous, Q12H  traZODone, 100 mg, Oral, Nightly  venlafaxine XR, 75 mg, Oral, Daily      Continuous Infusions:   PRN Meds:.  benzonatate    senna-docusate sodium **AND** polyethylene glycol **AND** bisacodyl **AND** bisacodyl    Calcium Replacement - Follow Nurse / BPA Driven Protocol    ipratropium-albuterol    Magnesium Standard Dose Replacement - Follow Nurse / BPA Driven Protocol    ondansetron    Phosphorus Replacement - Follow Nurse / BPA Driven Protocol    Potassium Replacement - Follow Nurse / BPA Driven Protocol    sodium chloride    sodium chloride    sodium chloride    Assessment & Plan   Assessment & Plan     Active Hospital Problems    Diagnosis  POA    **Acute respiratory failure with hypoxia [J96.01]  Yes    Atrial fibrillation [I48.91]  Yes    Severe malnutrition [E43]  Yes    Sepsis due to pneumonia [J18.9, A41.9]  Yes    Hypertension [I10]  Yes    Dysautonomia orthostatic hypotension syndrome [I95.1]  Yes      Resolved Hospital Problems   No resolved problems to display.        Brief Hospital Course to date:  Bibiana oHdges is a 88 y.o. female 88-year-old female with history of atrial fibrillation, on amiodarone and Eliquis, hypertension, autonomic dysfunction, chronic debility, prior hospitalizations for recurrent pneumonia with concerns for possible aspiration.  She presented from nursing home with 1 month of progressive worsening shortness of breath and cough secondary to bilateral pneumonia.    This patient's problems and plans were partially entered by my partner and updated as appropriate by me 07/15/24.     Acute respiratory failure with hypoxia secondary to bilateral strep pneumo pneumonia  Sepsis secondary to pneumonia  Patient presented with hypoxic respiratory failure requiring high flow nasal cannula on  admission, now weaned to nasal cannula  Chest x-ray with bilateral pneumonia; Strep pneumo antigen positive  Status post course of Rocephin, doxycycline  Probiotic  SLP following given her previous history of aspiration pneumonia, on modified diet, repeat swallow evaluation pending     Atrial fibrillation - Continue home metoprolol, amiodarone, Eliquis  HLD - Continue statin  Depression - Continue Effexor and BuSpar  Seizure disorder-continue Keppra, seizure precautions     Hypertension  Autonomic dysfunction with recurrent presyncopal episodes  Lisinopril, amlodipine, hydralazine    Chronic debility  Currently resides at assisted living facility  PT and OT    Goals of care  Palliative care team consulted for goals of care discussion     Left breast swelling  Patient is aware and has outpatient follow-up.  We are not able to do breast ultrasound for masses (only suspected abscesses) inpatient or mammograms so she will need to keep follow up.    Expected Discharge Location and Transportation: Facility; medically ready and awaiting placement  Expected Discharge   Expected Discharge Date: 7/15/2024; Expected Discharge Time:      VTE Prophylaxis:  Pharmacologic VTE prophylaxis orders are present.         AM-PAC 6 Clicks Score (PT): 15 (24)    CODE STATUS:   Code Status and Medical Interventions:   Ordered at: 24 1510     Level Of Support Discussed With:    Patient     Code Status (Patient has no pulse and is not breathing):    No CPR (Do Not Attempt to Resuscitate)     Medical Interventions (Patient has pulse or is breathing):    Full Support       Vanna Dykes MD  07/15/24        Electronically signed by Vanna Dykes MD at 07/15/24 1133          Physical Therapy Notes (most recent note)        Mahnaz Parker at 07/15/24 1058  Version 1 of 1         Patient Name: Bibiana Hodges  : 1935    MRN: [id number]                              Today's Date: 7/15/2024       Admit Date: 2024    Visit  Dx:     ICD-10-CM ICD-9-CM   1. Hyponatremia  E87.1 276.1   2. Pneumonia of both lungs due to infectious organism, unspecified part of lung  J18.9 483.8   3. Acute respiratory failure with hypoxia  J96.01 518.81   4. Oropharyngeal dysphagia  R13.12 787.22     Patient Active Problem List   Diagnosis    Dysautonomia orthostatic hypotension syndrome    Hypertension    Flu vaccine need    Streptococcus pneumoniae vaccination indicated    Sepsis due to pneumonia    Severe malnutrition    Acute respiratory failure with hypoxia    Atrial fibrillation    Autonomic dysfunction     Past Medical History:   Diagnosis Date    Constipation     Depression     Dysautonomia orthostatic hypotension syndrome     Generalized osteoarthritis     GERD (gastroesophageal reflux disease)     s/p Nissen    Hypertension     Insomnia     Osteoarthrosis, shoulder region     Skin ulcer of scalp     Thrombophlebitis of arm     Tremor     Vitamin B12 deficiency      Past Surgical History:   Procedure Laterality Date    DILATATION AND CURETTAGE      HERNIA REPAIR      HIP SURGERY      SHOULDER SURGERY      Right      General Information       Row Name 07/15/24 1140          Physical Therapy Time and Intention    Document Type therapy note (daily note)  -ML     Mode of Treatment physical therapy  -       Row Name 07/15/24 1140          General Information    Patient Profile Reviewed yes  -ML     Existing Precautions/Restrictions fall;oxygen therapy device and L/min  Decreased vision  -ML     Barriers to Rehab medically complex;previous functional deficit  -ML       Row Name 07/15/24 1140          Cognition    Orientation Status (Cognition) oriented x 3  -ML       Row Name 07/15/24 1140          Safety Issues, Functional Mobility    Safety Issues Affecting Function (Mobility) awareness of need for assistance;insight into deficits/self-awareness;positioning of assistive device;safety precaution awareness;safety precautions  follow-through/compliance;sequencing abilities  -ML     Impairments Affecting Function (Mobility) balance;coordination;endurance/activity tolerance;shortness of breath;strength;postural/trunk control  -ML               User Key  (r) = Recorded By, (t) = Taken By, (c) = Cosigned By      Initials Name Provider Type    Mahnaz Butler Physical Therapist                   Mobility       Row Name 07/15/24 1142          Bed Mobility    Bed Mobility supine-sit  -ML     Supine-Sit [place] (Bed Mobility) standby assist  -ML     Assistive Device (Bed Mobility) bed rails;head of bed elevated  -ML       Row Name 07/15/24 1142          Sit-Stand Transfer    Sit-Stand [place] (Transfers) minimum assist (75% patient effort);1 person assist  -ML     Assistive Device (Sit-Stand Transfers) walker, front-wheeled  -ML     Comment, (Sit-Stand Transfer) 3 total sit to stand transfers throughout session  -ML       Row Name 07/15/24 1142          Gait/Stairs (Locomotion)    [place] Level (Gait) verbal cues;contact guard  -ML     Assistive Device (Gait) walker, front-wheeled  -ML     Distance in Feet (Gait) 10  + 18  -ML     Deviations/Abnormal Patterns (Gait) perri decreased;festinating/shuffling;gait speed decreased;stride length decreased;base of support, narrow;bilateral deviations  -ML     Bilateral Gait Deviations forward flexed posture;heel strike decreased  -ML     Comment, (Gait/Stairs) patient demonstrates forward flexed posture and narrow base of support, verbal cues to step into RW and to increase safety awareness  -ML               User Key  (r) = Recorded By, (t) = Taken By, (c) = Cosigned By      Initials Name Provider Type    Mahnaz Butler Physical Therapist                   Obj/Interventions       Row Name 07/15/24 1147          Balance    Balance Assessment sitting static balance;sitting dynamic balance;sit to stand dynamic balance;standing static balance;standing dynamic balance  -ML     Static Sitting  Balance standby assist  -ML     Dynamic Sitting Balance contact guard  -ML     Position, Sitting Balance unsupported;sitting edge of bed;other (see comments)  sitting on toilet  -ML     Sit to Stand Dynamic Balance verbal cues;minimal assist;1-person assist  -ML     Static Standing Balance contact guard  -ML     Dynamic Standing Balance contact guard;verbal cues  -ML     Position/Device Used, Standing Balance supported;walker, front-wheeled  -ML     Balance Interventions sitting;standing;sit to stand;supported;occupation based/functional task  -ML               User Key  (r) = Recorded By, (t) = Taken By, (c) = Cosigned By      Initials Name Provider Type    ML Mahnaz Parker Physical Therapist                   Goals/Plan    No documentation.                  Clinical Impression       [facility] Name 07/15/24 1149          Pain    Pain Intervention(s) Repositioned;Ambulation/increased activity  -ML     Additional Documentation Pain Scale: FACES Pre/Post-Treatment (Group)  -ML       [facility] Name 07/15/24 1149          Pain Scale: FACES Pre/Post-Treatment    Pain: FACES Scale, Pretreatment 2-->hurts little bit  -ML     Posttreatment Pain Rating 2-->hurts little bit  -ML     Pain Location generalized  -ML     Pain Location - chest  -ML     Pre/Posttreatment Pain Comment patient reports chest discomfort from coughing  -ML       Row Name 07/15/24 1149          Plan of Care Review    Plan of Care Reviewed With patient  -ML     Progress improving  -ML     Outcome Evaluation Patient required less assistance during ambulation, however, limited in distance by decreased strength and activity tolerance deficits. Patient continues to present below baseline for mobility and would continue to benefit from skilled PT to address strength, balance and activity tolerance deficits. Continue current PT POC.  -ML       Row Name 07/15/24 1149          Vital Signs    Pre SpO2 (%) 98  -ML     O2 Delivery Pre Treatment nasal cannula  -ML     Post SpO2  (%) 96  -ML     O2 Delivery Post Treatment nasal cannula  -ML     Pre Patient Position Supine  -ML     Intra Patient Position Standing  -ML     Post Patient Position Sitting  -ML       Row Name 07/15/24 1149          Positioning and Restraints    Pre-Treatment Position in bed  -ML     Post Treatment Position chair  -ML     In Chair notified nsg;reclined;call light within reach;encouraged to call for assist;exit alarm on;waffle cushion;on mechanical lift sling;legs elevated;heels elevated;pillow between legs  -ML               User Key  (r) = Recorded By, (t) = Taken By, (c) = Cosigned By      Initials Name Provider Type    Mahnaz Butler Physical Therapist                   Outcome Measures       Row Name 07/15/24 1152          How much help from another person do you currently need...    Turning from your back to your side while in flat bed without using bedrails? 3  -ML     Moving from lying on back to sitting on the side of a flat bed without bedrails? 3  -ML     Moving to and from a bed to a chair (including a wheelchair)? 3  -ML     Standing up from a chair using your arms (e.g., wheelchair, bedside chair)? 3  -ML     Climbing 3-5 steps with a railing? 2  -ML     To walk in hospital room? 3  -ML     AM-PAC 6 Clicks Score (PT) 17  -ML     Highest Level of Mobility Goal 5 --> Static standing  -ML       Row Name 07/15/24 1152          Functional Assessment    Outcome Measure Options AM-PAC 6 Clicks Basic Mobility (PT)  -ML               User Key  (r) = Recorded By, (t) = Taken By, (c) = Cosigned By      Initials Name Provider Type    Mahnaz Butler Physical Therapist                                 Physical Therapy Education       Title: PT OT SLP Therapies (In Progress)       Topic: Physical Therapy (Done)       Point: Mobility training (Done)       Learning Progress Summary             Patient Acceptance, E, VU,NR by KENYA at 7/15/2024 1152    Acceptance, E, NR by HELENE at 7/12/2024 1028    Acceptance, E, VU,NR by  NS at 7/10/2024 1604    Comment: safety with mobility, benefits/need for rehab    Acceptance, E, VU,NR by NS at 7/9/2024 1319                         Point: Home exercise program (Done)       Learning Progress Summary             Patient Acceptance, E, VU,NR by NS at 7/10/2024 1604    Comment: safety with mobility, benefits/need for rehab                         Point: Body mechanics (Done)       Learning Progress Summary             Patient Acceptance, E, VU,NR by ML at 7/15/2024 1152    Acceptance, E, NR by KE at 7/12/2024 1028    Acceptance, E, VU,NR by NS at 7/10/2024 1604    Comment: safety with mobility, benefits/need for rehab    Acceptance, E, VU,NR by NS at 7/9/2024 1319                         Point: Precautions (Done)       Learning Progress Summary             Patient Acceptance, E, VU,NR by  at 7/15/2024 1152    Acceptance, E, NR by KE at 7/12/2024 1028    Acceptance, E, VU,NR by NS at 7/10/2024 1604    Comment: safety with mobility, benefits/need for rehab    Acceptance, E, VU,NR by NS at 7/9/2024 1319                                         User Key       Initials Effective Dates Name Provider Type Discipline    NS 06/16/21 -  Shelley Gutiérrez, PT Physical Therapist PT     04/22/21 -  Mahnaz Parker Physical Therapist PT     11/16/23 -  Corry Nam, GAIL Physical Therapist PT                  PT Recommendation and Plan     Plan of Care Reviewed With: patient  Progress: improving  Outcome Evaluation: Patient required less assistance during ambulation, however, limited in distance by decreased strength and activity tolerance deficits. Patient continues to present below baseline for mobility and would continue to benefit from skilled PT to address strength, balance and activity tolerance deficits. Continue current PT POC.     Time Calculation:         PT Charges       Row Name 07/15/24 1153             Time Calculation    Start Time 1058  -ML      PT Received On 07/15/24  -ML         Timed Charges     42330 - PT Therapeutic Activity Minutes 32  -ML         Total Minutes    Timed Charges Total Minutes 32  -ML       Total Minutes 32  -ML                User Key  (r) = Recorded By, (t) = Taken By, (c) = Cosigned By      Initials Name Provider Type    Mahnaz Butler Physical Therapist                  Therapy Charges for Today       Code Description Service Date Service Provider Modifiers Qty    45070186745 HC PT THERAPEUTIC ACT EA 15 MIN 7/15/2024 Mahnaz Parker GP 2            PT G-Codes  Outcome Measure Options: AM-PAC 6 Clicks Basic Mobility (PT)  AM-PAC 6 Clicks Score (PT): 17  AM-PAC 6 Clicks Score (OT): 13  PT Discharge Summary  Anticipated Discharge Disposition (PT): skilled nursing facility    Mahnaz Parker  7/15/2024      Electronically signed by Mahnaz Parker at 07/15/24 1153          Occupational Therapy Notes (most recent note)        Dena, Khushbu HANSON, OT at 24 1130          Patient Name: Bibiana Hodges  : 1935    MRN: [id number]                              Today's Date: 2024       Admit Date: 2024    Visit Dx:     ICD-10-CM ICD-9-CM   1. Hyponatremia  E87.1 276.1   2. Pneumonia of both lungs due to infectious organism, unspecified part of lung  J18.9 483.8   3. Acute respiratory failure with hypoxia  J96.01 518.81   4. Oropharyngeal dysphagia  R13.12 787.22     Patient Active Problem List   Diagnosis    Dysautonomia orthostatic hypotension syndrome    Hypertension    Flu vaccine need    Streptococcus pneumoniae vaccination indicated    Sepsis due to pneumonia    Severe malnutrition    Acute respiratory failure with hypoxia    Atrial fibrillation    Autonomic dysfunction     Past Medical History:   Diagnosis Date    Constipation     Depression     Dysautonomia orthostatic hypotension syndrome     Generalized osteoarthritis     GERD (gastroesophageal reflux disease)     s/p Nissen    Hypertension     Insomnia     Osteoarthrosis, shoulder region     Skin ulcer of scalp      Thrombophlebitis of arm     Tremor     Vitamin B12 deficiency      Past Surgical History:   Procedure Laterality Date    DILATATION AND CURETTAGE      HERNIA REPAIR      HIP SURGERY      SHOULDER SURGERY      Right      General Information       Row Name 07/14/24 1443          OT Time and Intention    Document Type evaluation  -     Mode of Treatment occupational therapy  -[facility] Name 07/14/24 [facility]          General Information    Patient Profile Reviewed yes  -JR     Prior Level of Function independent:;all household mobility;gait;transfer;min assist:;ADL's  Pt reports she uses a rollator at baseline, gets assist with ADL's, reports difficulty with feeding at baseline due to tremors  -JR     Existing Precautions/Restrictions fall;oxygen therapy device and L/min  Decreased vision  -     Barriers to Rehab medically complex;previous functional deficit  -[facility] Name 07/14/24 1443          Living Environment    People in Home facility resident  -[facility] Name 07/14/24 1443          Home Main Entrance    Number of Stairs, Main Entrance none  -[facility] Name 07/14/24 1443          Stairs Within Home, Primary    Number of Stairs, Within Home, Primary none  -JR       Row Name 07/14/24 1443          Cognition    Orientation Status (Cognition) oriented x 3  -[facility] Name 07/14/24 1443          Safety Issues, Functional Mobility    Safety Issues Affecting Function (Mobility) awareness of need for assistance;insight into deficits/self-awareness;safety precaution awareness;safety precautions follow-through/compliance;sequencing abilities  -     Impairments Affecting Function (Mobility) balance;coordination;endurance/activity tolerance;motor control;motor planning;shortness of breath;strength;postural/trunk control  -               User Key  (r) = Recorded By, (t) = Taken By, (c) = Cosigned By      Initials Name Provider Type    JR Khushbu Fernandes OT Occupational Therapist                      Mobility/ADL's       [facility] Name 07/14/24 1445          Bed Mobility    Bed Mobility supine-sit  -     Supine-Sit [place] (Bed Mobility) minimum assist (75% patient effort);verbal cues  -     Bed Mobility, Safety Issues decreased use of arms for pushing/pulling;decreased use of legs for bridging/pushing  -     Assistive Device (Bed Mobility) bed rails;head of bed elevated  -     Comment, (Bed Mobility) PT required increased time and verbal cues for supine to sit.  -[facility] Name 07/14/24 1445          Bed-Chair Transfer    Bed-Chair [place] (Transfers) minimum assist (75% patient effort);verbal cues  -     Assistive Device (Bed-Chair Transfers) other (see comments)  UE support and pt reached for chair  -[facility] Name 07/14/24 1445          Activities of Daily Living    BADL Assessment/Intervention upper body dressing  -JR       Row Name 07/14/24 1445          Upper Body Dressing Assessment/Training    [place] Level (Upper Body Dressing) don;front opening garment;maximum assist (25% patient effort);verbal cues  -     Position (Upper Body Dressing) edge of bed sitting  -               User Key  (r) = Recorded By, (t) = Taken By, (c) = Cosigned By      Initials Name Provider Type     Khushbu Fernandes, OT Occupational Therapist                   Obj/Interventions       [facility] Name 07/14/24 1446          Sensory Assessment (Somatosensory)    Sensory Assessment (Somatosensory) UE sensation intact  -[facility] Name 07/14/24 1446          Vision Assessment/Intervention    Vision Assessment Comment PT reports decreased vision in B eye  -[facility] Name 07/14/24 1446          Range of Motion Comprehensive    General Range of Motion bilateral upper extremity ROM WFL  -[facility] Name 07/14/24 1446          Strength Comprehensive (MMT)    Comment, General Manual Muscle Testing (MMT) Assessment B UE functionally 4/5  -[facility] Name 07/14/24 1446          Motor Skills    Motor Skills  neuro-muscular function  -     Neuromuscular Function bilateral;upper extremity;tremor, intention;tremor, resting  -       Row Name 07/14/24 1446          Balance    Balance Assessment sitting static balance;standing dynamic balance  -JR     Static Sitting Balance standby assist  -JR     Dynamic Standing Balance minimal assist  -JR     Position/Device Used, Standing Balance supported  -JR               User Key  (r) = Recorded By, (t) = Taken By, (c) = Cosigned By      Initials Name Provider Type    JR Khushbu Fernandes, OT Occupational Therapist                   Goals/Plan       Row Name 07/14/24 1449          Bed Mobility Goal 1 (OT)    Activity/Assistive Device (Bed Mobility Goal 1, OT) bed mobility activities, all  -JR     [place] Level/Cues Needed (Bed Mobility Goal 1, OT) supervision required;verbal cues required  -JR     Time Frame (Bed Mobility Goal 1, OT) short term goal (STG);5 days  -JR     Progress/Outcomes (Bed Mobility Goal 1, OT) new goal  -[facility] Name 07/14/24 1449          Transfer Goal 1 (OT)    Activity/Assistive Device (Transfer Goal 1, OT) --  -JR     [place] Level/Cues Needed (Transfer Goal 1, OT) --  -JR     Time Frame (Transfer Goal 1, OT) --  -JR     Progress/Outcome (Transfer Goal 1, OT) --  -[facility] Name 07/14/24 1449          Toileting Goal 1 (OT)    Activity/Device (Toileting Goal 1, OT) toileting skills, all  -JR     [place] Level/Cues Needed (Toileting Goal 1, OT) supervision required;verbal cues required  -JR     Time Frame (Toileting Goal 1, OT) long term goal (LTG);by discharge  -JR     Progress/Outcome (Toileting Goal 1, OT) new goal  -[facility] Name 07/14/24 1440          Therapy Assessment/Plan (OT)    Planned Therapy Interventions (OT) activity tolerance training;adaptive equipment training;BADL retraining;functional balance retraining;occupation/activity based interventions;ROM/therapeutic exercise;strengthening exercise;transfer/mobility  retraining;patient/caregiver education/training  -               User Key  (r) = Recorded By, (t) = Taken By, (c) = Cosigned By      Initials Name Provider Type    Khushbu Kulkarni, OT Occupational Therapist                   Clinical Impression       Row Name 07/14/24 1447          Pain Assessment    Pretreatment Pain Rating 0/10 - no pain  -JR     Posttreatment Pain Rating 0/10 - no pain  -JR     Additional Documentation Pain Scale: Word Pre/Post-Treatment (Group)  -       Row Name 07/14/24 1447          Plan of Care Review    Plan of Care Reviewed With patient  -JR     Outcome Evaluation OT initial eval and expanded chart review completed. Pt presents with multiple comorbidities and decreased strength, balance, activity tolerance and decreased vision limiting independence with ADL's and mobility from baseline status. Recommend continued skilled OT services and transfer to SNF at d/c.  -       Row Name 07/14/24 1447          Therapy Assessment/Plan (OT)    Patient/Family Therapy Goal Statement (OT) go home  -     Rehab Potential (OT) good, to achieve stated therapy goals  -     Criteria for Skilled Therapeutic Interventions Met (OT) yes;meets criteria;skilled treatment is necessary  -     Therapy Frequency (OT) daily  -     Predicted Duration of Therapy Intervention (OT) 10 days  -       Row Name 07/14/24 1447          Therapy Plan Review/Discharge Plan (OT)    Anticipated Discharge Disposition (OT) skilled nursing facility  -       Row Name 07/14/24 1441          Vital Signs    Pre Systolic BP Rehab 150  -JR     Pre Treatment Diastolic BP 97  -JR     Post Systolic BP Rehab 152  -JR     Post Treatment Diastolic BP 76  -JR     Pre SpO2 (%) 93  -JR     O2 Delivery Pre Treatment room air  -JR     Post SpO2 (%) 90  -JR     O2 Delivery Post Treatment room air  -JR     Pre Patient Position Supine  -JR     Intra Patient Position Standing  -JR     Post Patient Position Sitting  -       Row Name  07/14/24 1447          Positioning and Restraints    Pre-Treatment Position in bed  -JR     Post Treatment Position chair  -JR     In Chair notified nsg;reclined;call light within reach;encouraged to call for assist;exit alarm on;waffle cushion  -               User Key  (r) = Recorded By, (t) = Taken By, (c) = Cosigned By      Initials Name Provider Type    Khushbu Kulkarni OT Occupational Therapist                   Outcome Measures       Row Name 07/14/24 1450          How much help from another is currently needed...    Putting on and taking off regular lower body clothing? 1  -JR     Bathing (including washing, rinsing, and drying) 2  -JR     Toileting (which includes using toilet bed pan or urinal) 2  -JR     Putting on and taking off regular upper body clothing 2  -JR     Taking care of personal grooming (such as brushing teeth) 3  -JR     Eating meals 3  -JR     AM-PAC 6 Clicks Score (OT) 13  -JR       Row Name 07/14/24 1450          Functional Assessment    Outcome Measure Options AM-PAC 6 Clicks Daily Activity (OT)  -               User Key  (r) = Recorded By, (t) = Taken By, (c) = Cosigned By      Initials Name Provider Type    Khushbu Kulkarni OT Occupational Therapist                    Occupational Therapy Education       Title: PT OT SLP Therapies (In Progress)       Topic: Occupational Therapy (In Progress)       Point: ADL training (In Progress)       Description:   Instruct learner(s) on proper safety adaptation and remediation techniques during self care or transfers.   Instruct in proper use of assistive devices.                  Learning Progress Summary             Patient Acceptance, E, NR by  at 7/14/2024 1130    Comment: role of therapy                         Point: Home exercise program (Not Started)       Description:   Instruct learner(s) on appropriate technique for monitoring, assisting and/or progressing therapeutic exercises/activities.                  Learner  Progress:  Not documented in this visit.              Point: Precautions (In Progress)       Description:   Instruct learner(s) on prescribed precautions during self-care and functional transfers.                  Learning Progress Summary             Patient Acceptance, E, NR by  at 7/14/2024 1130    Comment: role of therapy                         Point: Body mechanics (Not Started)       Description:   Instruct learner(s) on proper positioning and spine alignment during self-care, functional mobility activities and/or exercises.                  Learner Progress:  Not documented in this visit.                              User Key       Initials Effective Dates Name Provider Type Discipline     02/03/23 -  Khushbu Fernandes, OT Occupational Therapist OT                  OT Recommendation and Plan  Planned Therapy Interventions (OT): activity tolerance training, adaptive equipment training, BADL retraining, functional balance retraining, occupation/activity based interventions, ROM/therapeutic exercise, strengthening exercise, transfer/mobility retraining, patient/caregiver education/training  Therapy Frequency (OT): daily  Plan of Care Review  Plan of Care Reviewed With: patient  Outcome Evaluation: OT initial eval and expanded chart review completed. Pt presents with multiple comorbidities and decreased strength, balance, activity tolerance and decreased vision limiting independence with ADL's and mobility from baseline status. Recommend continued skilled OT services and transfer to SNF at d/c.     Time Calculation:   Evaluation Complexity (OT)  Review Occupational Profile/Medical/Therapy History Complexity: expanded/moderate complexity  Assessment, Occupational Performance/Identification of Deficit Complexity: 3-5 performance deficits  Clinical Decision Making Complexity (OT): detailed assessment/moderate complexity  Overall Complexity of Evaluation (OT): moderate complexity     Time Calculation- OT        Row Name 07/14/24 1451             Time Calculation- OT    OT Start Time 1130  -JR      OT Received On 07/14/24  -JR      OT Goal Re-Cert Due Date 07/24/24  -JR         Untimed Charges    OT Eval/Re-eval Minutes 46  -JR         Total Minutes    Untimed Charges Total Minutes 46  -JR       Total Minutes 46  -JR                User Key  (r) = Recorded By, (t) = Taken By, (c) = Cosigned By      Initials Name Provider Type     Khushbu Fernandes OT Occupational Therapist                  Therapy Charges for Today       Code Description Service Date Service Provider Modifiers Qty    [id number]  OT EVAL MOD COMPLEXITY 4 7/14/2024 Khushbu Fernandes OT GO 1                 Khushbu Fernandes OT  7/14/2024    Electronically signed by Khushbu Fernandes OT at 07/14/24 8493

## 2024-07-16 NOTE — PROGRESS NOTES
[facility]  PROGRESS NOTE    Patient Name: Bibiana Hodges  : 1935  MRN: [id number]    Date of Admission: 2024  Primary Care Physician: Nivia Madrigal MD    Subjective   Subjective     CC:  Follow-up for pneumonia    HPI:  [place] [facility] [place] to N[place]- still not happy about that, but will try them. Currently on RA and breathing well    Objective   Objective     Vital Signs:   Temp:  [97.1 °F (36.2 °C)-98.2 °F (36.8 °C)] 97.1 °F (36.2 °C)  Heart Rate:  [60-68] 60  Resp:  [16-18] 18  BP: ()/() 104/64  Flow (L/min):  [2] 2     Physical Exam:  Constitutional: No acute distress, awake, alert, up in bed, frail  HENT: NCAT, mucous membranes moist  Respiratory: diminished throughout, respiratory effort normal on RA  Cardiovascular: RRR  Gastrointestinal: +BS, NT, ND, soft  Musculoskeletal: No bilateral ankle edema  Psychiatric: pleasant affect, cooperative  Neurologic: Ox3- no focal deficits      Results Reviewed:  LAB RESULTS:      Lab 07/10/24  1100   WBC 11.96*   HEMOGLOBIN 11.0*   HEMATOCRIT 33.9*   PLATELETS 151   NEUTROS ABS 10.88*   IMMATURE GRANS (ABS) 0.06*   LYMPHS ABS 0.56*   MONOS ABS 0.37   EOS ABS 0.07   .8*         Lab 07/10/24  1100   SODIUM 134*   POTASSIUM 3.8   CHLORIDE 100   CO2 27.0   ANION GAP 7.0   BUN 21   CREATININE 1.16*   EGFR 45.4*   GLUCOSE 106*   CALCIUM 8.1*   MAGNESIUM 1.6   PHOSPHORUS 3.2         Lab 07/10/24  1100   TOTAL PROTEIN 5.3*   ALBUMIN 2.8*   GLOBULIN 2.5   ALT (SGPT) 33   AST (SGOT) 31   BILIRUBIN 0.4   ALK PHOS 63                         Brief Urine Lab Results  (Last result in the past 365 days)        Color   Clarity   Blood   Leuk Est   Nitrite   Protein   CREAT   Urine HCG        244 Yellow   Clear   Negative   Negative   Negative   Trace                   Microbiology Results Abnormal       Procedure Component Value - Date/Time    Blood Culture - Blood, Arm, Right  [[id number]]  (Normal) Collected: 07/08/24 0728    Lab Status: Final result Specimen: Blood from Arm, Right Updated: 07/13/24 0800     Blood Culture No growth at 5 days    Blood Culture - Blood, Wrist, Left [[id number]]  (Normal) Collected: 07/08/24 0729    Lab Status: Final result Specimen: Blood from Wrist, Left Updated: 07/13/24 0800     Blood Culture No growth at 5 days    Narrative:      Less than seven (7) mL's of blood was collected.  Insufficient quantity may yield false negative results.    Legionella Antigen, Urine - Urine, Urine, Clean Catch [[id number]]  (Normal) Collected: 07/08/24 1925    Lab Status: Final result Specimen: Urine, Clean Catch Updated: 07/09/24 0109     LEGIONELLA ANTIGEN, URINE Negative    MRSA Screen, PCR (Inpatient) - Swab, Nares [[id number]]  (Normal) Collected: 07/08/24 1248    Lab Status: Final result Specimen: Swab from Nares Updated: 07/08/24 1446     MRSA PCR Negative    Narrative:      The negative predictive value of this diagnostic test is high and should only be used to consider de-escalating anti-MRSA therapy. A positive result may indicate colonization with MRSA and must be correlated clinically.  MRSA Negative    Respiratory Panel PCR w/COVID-19(SARS-CoV-2) ERICK/MARTA/ELPIDIO/PAD/COR/ALMA In-House, NP Swab in UTM/VTM, 2 HR TAT - Swab, Nasopharynx [[id number]]  (Normal) Collected: 07/08/24 0730    Lab Status: Final result Specimen: Swab from Nasopharynx Updated: 07/08/24 0840     ADENOVIRUS, PCR Not Detected     Coronavirus 229E Not Detected     Coronavirus HKU1 Not Detected     Coronavirus NL63 Not Detected     Coronavirus OC43 Not Detected     COVID19 Not Detected     Human Metapneumovirus Not Detected     Human Rhinovirus/Enterovirus Not Detected     Influenza A PCR Not Detected     Influenza B PCR Not Detected     Parainfluenza Virus 1 Not Detected     Parainfluenza Virus 2 Not Detected     Parainfluenza Virus 3 Not Detected     Parainfluenza Virus 4 Not Detected     RSV, PCR Not  Detected     Bordetella pertussis pcr Not Detected     Bordetella parapertussis PCR Not Detected     Chlamydophila pneumoniae PCR Not Detected     Mycoplasma pneumo by PCR Not Detected    Narrative:      In the setting of a positive respiratory panel with a viral infection PLUS a negative procalcitonin without other underlying concern for bacterial infection, consider observing off antibiotics or discontinuation of antibiotics and continue supportive care. If the respiratory panel is positive for atypical bacterial infection (Bordetella pertussis, Chlamydophila pneumoniae, or Mycoplasma pneumoniae), consider antibiotic de-escalation to target atypical bacterial infection.            No radiology results from the last 24 hrs    Results for orders placed during the hospital encounter of 08/20/23    Adult Transthoracic Echo Complete W/ Cont if Necessary Per Protocol    Interpretation Summary    Left ventricular systolic function is mildly decreased. Calculated left ventricular EF = 47.9% Normal left ventricular cavity size and wall thickness noted. There is left ventricular global hypokinesis noted. Left ventricular diastolic function is consistent with (grade I) impaired relaxation.    : Normal right ventricular cavity size and systolic function noted. Electronic lead present in the ventricle.    Normal left atrial size and volume noted. Saline test results are negative.    There is moderate calcification of the aortic valve. The aortic valve was poorly visualized but appears trileaflet. Moderate aortic valve regurgitation is present. No aortic valve stenosis is present.    Mild mitral annular calcification is present. Mild mitral valve regurgitation is present. No significant mitral valve stenosis is present.    The tricuspid valve is grossly normal in structure. Mild tricuspid valve regurgitation is present. Estimated right ventricular systolic pressure from tricuspid regurgitation is normal, 33 mmHg. No tricuspid  valve stenosis is present.    Mild dilation of the ascending aorta is present. Ascending aorta = 3.7 cm    There is a small (<1cm) pericardial effusion adjacent to the right atrium and right ventricle.      Current medications:  Scheduled Meds:amiodarone, 200 mg, Oral, Daily  amLODIPine, 5 mg, Oral, Q24H  apixaban, 2.5 mg, Oral, BID  atorvastatin, 40 mg, Oral, Nightly  castor oil-balsam peru, 1 Application, Topical, Q12H  hydrALAZINE, 25 mg, Oral, Q8H  lactobacillus acidophilus, 1 capsule, Oral, Daily  levETIRAcetam, 250 mg, Oral, BID  lisinopril, 10 mg, Oral, Q24H  metoprolol tartrate, 12.5 mg, Oral, BID  mineral oil, 1 enema, Rectal, Once  sodium chloride, 10 mL, Intravenous, Q12H  traZODone, 100 mg, Oral, Nightly  venlafaxine XR, 75 mg, Oral, Daily      Continuous Infusions:   PRN Meds:.  acetaminophen    benzonatate    senna-docusate sodium **AND** polyethylene glycol **AND** bisacodyl **AND** bisacodyl    Calcium Replacement - Follow Nurse / BPA Driven Protocol    ipratropium-albuterol    Magnesium Standard Dose Replacement - Follow Nurse / BPA Driven Protocol    ondansetron ODT    Phosphorus Replacement - Follow Nurse / BPA Driven Protocol    Potassium Replacement - Follow Nurse / BPA Driven Protocol    sodium chloride    sodium chloride    sodium chloride    Assessment & Plan   Assessment & Plan     Active Hospital Problems    Diagnosis  POA    **Acute respiratory failure with hypoxia [J96.01]  Yes    Atrial fibrillation [I48.91]  Yes    Severe malnutrition [E43]  Yes    Sepsis due to pneumonia [J18.9, A41.9]  Yes    Hypertension [I10]  Yes    Dysautonomia orthostatic hypotension syndrome [I95.1]  Yes      Resolved Hospital Problems   No resolved problems to display.        Brief Hospital Course to date:  Bibiana Hodges is a 88 y.o. female 88-year-old female with history of atrial fibrillation, on amiodarone and Eliquis, hypertension, autonomic dysfunction, chronic debility, prior hospitalizations for  recurrent pneumonia with concerns for possible aspiration.  She presented from nursing home with 1 month of progressive worsening shortness of breath and cough secondary to bilateral pneumonia.    This patient's problems and plans were partially entered by my partner and updated as appropriate by me 07/16/24.     Acute respiratory failure with hypoxia secondary to bilateral strep pneumo pneumonia  Sepsis secondary to pneumonia  Patient presented with hypoxic respiratory failure requiring high flow nasal cannula on admission, now weaned to RA- 2L nasal cannula  Chest x-ray with bilateral pneumonia; Strep pneumo antigen positive  Status post course of Rocephin, doxycycline  Probiotic  SLP following given her previous history of aspiration pneumonia, on modified diet, repeat swallow evaluation today changed to NTL with ice chips     Atrial fibrillation - Continue home metoprolol, amiodarone, Eliquis  HLD - Continue statin  Depression - Continue Effexor and BuSpar  Seizure disorder-continue Keppra, seizure precautions     Hypertension  - decrease hydralazine to 25mg     Autonomic dysfunction with recurrent presyncopal episodes  Lisinopril, amlodipine, hydralazine    Chronic debility  Currently resides at assisted living facility  PT and OT    Goals of care  Palliative care team consulted for goals of care discussion     Left breast swelling  Patient is aware and has outpatient follow-up.  We are not able to do breast ultrasound for masses (only suspected abscesses) inpatient or mammograms so she will need to keep follow up.    Expected Discharge Location and Transportation: snf rehab pending insurance  Expected Discharge   Expected Discharge Date: 7/15/2024; Expected Discharge Time:      VTE Prophylaxis:  Pharmacologic VTE prophylaxis orders are present.         AM-PAC 6 Clicks Score (PT): 15 (07/15/24 2000)    CODE STATUS:   Code Status and Medical Interventions:   Ordered at: 07/11/24 1510     Level Of Support  Discussed With:    Patient     Code Status (Patient has no pulse and is not breathing):    No CPR (Do Not Attempt to Resuscitate)     Medical Interventions (Patient has pulse or is breathing):    Full Support       Marine Koch, APRN  07/16/24

## 2024-07-17 ENCOUNTER — READMISSION MANAGEMENT (OUTPATIENT)
Dept: CALL CENTER | Facility: HOSPITAL | Age: 89
End: 2024-07-17
Payer: MEDICARE

## 2024-07-17 VITALS
DIASTOLIC BLOOD PRESSURE: 83 MMHG | WEIGHT: 130 LBS | SYSTOLIC BLOOD PRESSURE: 102 MMHG | BODY MASS INDEX: 20.4 KG/M2 | TEMPERATURE: 98.4 F | OXYGEN SATURATION: 91 % | HEART RATE: 60 BPM | HEIGHT: 67 IN | RESPIRATION RATE: 16 BRPM

## 2024-07-17 PROBLEM — J18.9 SEPSIS DUE TO PNEUMONIA: Status: RESOLVED | Noted: 2023-08-20 | Resolved: 2024-07-17

## 2024-07-17 PROBLEM — A41.9 SEPSIS DUE TO PNEUMONIA: Status: RESOLVED | Noted: 2023-08-20 | Resolved: 2024-07-17

## 2024-07-17 PROBLEM — J96.01 ACUTE RESPIRATORY FAILURE WITH HYPOXIA: Status: RESOLVED | Noted: 2024-07-08 | Resolved: 2024-07-17

## 2024-07-17 PROCEDURE — 92526 ORAL FUNCTION THERAPY: CPT

## 2024-07-17 PROCEDURE — 99239 HOSP IP/OBS DSCHRG MGMT >30: CPT | Performed by: PEDIATRICS

## 2024-07-17 RX ORDER — LISINOPRIL 10 MG/1
10 TABLET ORAL
Qty: 30 TABLET | Refills: 0 | Status: SHIPPED | OUTPATIENT
Start: 2024-07-18

## 2024-07-17 RX ORDER — BENZONATATE 100 MG/1
100 CAPSULE ORAL 3 TIMES DAILY PRN
Qty: 15 CAPSULE | Refills: 0 | Status: SHIPPED | OUTPATIENT
Start: 2024-07-17

## 2024-07-17 RX ORDER — CASTOR OIL AND BALSAM, PERU 788; 87 MG/G; MG/G
1 OINTMENT TOPICAL EVERY 12 HOURS SCHEDULED
Qty: 28.35 G | Refills: 0 | Status: SHIPPED | OUTPATIENT
Start: 2024-07-17

## 2024-07-17 RX ADMIN — ACETAMINOPHEN 650 MG: 325 TABLET ORAL at 08:28

## 2024-07-17 RX ADMIN — APIXABAN 2.5 MG: 2.5 TABLET, FILM COATED ORAL at 08:22

## 2024-07-17 RX ADMIN — TRAZODONE HYDROCHLORIDE 100 MG: 100 TABLET ORAL at 00:31

## 2024-07-17 RX ADMIN — VENLAFAXINE HYDROCHLORIDE 75 MG: 75 CAPSULE, EXTENDED RELEASE ORAL at 08:22

## 2024-07-17 RX ADMIN — LISINOPRIL 10 MG: 10 TABLET ORAL at 08:22

## 2024-07-17 RX ADMIN — Medication 12.5 MG: at 08:22

## 2024-07-17 RX ADMIN — LEVETIRACETAM 250 MG: 500 TABLET, FILM COATED ORAL at 08:22

## 2024-07-17 RX ADMIN — Medication 1 APPLICATION: at 08:23

## 2024-07-17 RX ADMIN — BENZONATATE 100 MG: 100 CAPSULE ORAL at 08:28

## 2024-07-17 RX ADMIN — Medication 1 CAPSULE: at 08:22

## 2024-07-17 RX ADMIN — AMIODARONE HYDROCHLORIDE 200 MG: 200 TABLET ORAL at 08:22

## 2024-07-17 NOTE — DISCHARGE PLACEMENT REQUEST
"Bibiana Hodges (88 y.o. Female)       Date of Birth   1935    Social Security Number       Address   [address] [id number]    Home Phone   [phone]    MRN   [id number]       Muslim   Pentecostal    Marital Status                               Admission Date   7/8/24    Admission Type   Emergency    Admitting Provider   Leny Monae MD    Attending Provider   Leny Monae MD    Department, Room/Bed   [facility] 6B, N646/1       Discharge Date       Discharge Disposition       Discharge Destination                                 Attending Provider: Leny Monae MD    Allergies: Gabapentin, Sulfa Antibiotics    Isolation: None   Infection: None   Code Status: No CPR    Ht: 170.2 cm (67\")   Wt: 59 kg (130 lb)    Admission Cmt: None   Principal Problem: Acute respiratory failure with hypoxia [J96.01]                   Active Insurance as of 7/8/2024       Primary Coverage       Payor Plan Insurance Group Employer/Plan Group    HUMANA MEDICARE REPLACEMENT HUMANA MED ADV HMO [id number]       Payor Plan Address Payor Plan Phone Number Payor Plan Fax Number Effective Dates    [address] [address] [phone]  5/1/2023 - None Entered    [facility] [id number]         Subscriber Name Subscriber Birth Date Member ID       BIBIANA HODGES 1935 [id number]                     Emergency Contacts        (Rel.) Home Phone Work Phone Mobile Phone    Danyelle Keyes (POA) (Daughter) [phone] -- [phone]    HODGESELODIA SCHNEIDER (Son) -- -- [phone]    HODGESNIA SCHNEIDER (Son) [phone] -- [phone]              Current Facility-Administered Medications   Medication Dose Route Frequency Provider Last Rate Last Admin    acetaminophen (TYLENOL) tablet 650 mg  650 mg Oral Q6H PRN Magdalena Shaikh PA-C   650 mg at 07/17/24 0828    amiodarone (PACERONE) tablet 200 mg  200 mg Oral Daily Destiny Hammond MD   200 mg at 07/17/24 0822    " [Held by provider] amLODIPine (NORVASC) tablet 5 mg  5 mg Oral Q24H Vanna Dykes MD   5 mg at 07/16/24 1105    apixaban (ELIQUIS) tablet 2.5 mg  2.5 mg Oral BID Destiny Hammond MD   2.5 mg at 07/17/24 0822    atorvastatin (LIPITOR) tablet 40 mg  40 mg Oral Nightly Destiny Hammond MD   40 mg at 07/16/24 2021    benzonatate (TESSALON) capsule 100 mg  100 mg Oral TID PRN Dione Frazier MD   100 mg at 07/17/24 0828    sennosides-docusate (PERICOLACE) 8.6-50 MG per tablet 2 tablet  2 tablet Oral BID PRN Destiny Hammond MD   2 tablet at 07/14/24 2012    And    polyethylene glycol (MIRALAX) packet 17 g  17 g Oral Daily PRN Destiny Hammond MD        And    bisacodyl (DULCOLAX) EC tablet 5 mg  5 mg Oral Daily PRN Destiny Hammond MD   5 mg at 07/10/24 1510    And    bisacodyl (DULCOLAX) suppository 10 mg  10 mg Rectal Daily PRN Destiny Hammond MD        Calcium Replacement - Follow Nurse / BPA Driven Protocol   Does not apply PRN Destiny Hammond MD        castor oil-balsam peru (VENELEX) ointment 1 Application  1 Application Topical Q12H Vanna Dykes MD   1 Application at 07/17/24 0823    ipratropium-albuterol (DUO-NEB) nebulizer solution 3 mL  3 mL Nebulization Q4H PRN Vanna Dykes MD        lactobacillus acidophilus (RISAQUAD) capsule 1 capsule  1 capsule Oral Daily Vanna Dykes MD   1 capsule at 07/17/24 0822    levETIRAcetam (KEPPRA) tablet 250 mg  250 mg Oral BID Destiny Hammond MD   250 mg at 07/17/24 0822    lisinopril (PRINIVIL,ZESTRIL) tablet 10 mg  10 mg Oral Q24H Vanna Dykes MD   10 mg at 07/17/24 0822    Magnesium Standard Dose Replacement - Follow Nurse / BPA Driven Protocol   Does not apply PRN Destiny Hammond MD        metoprolol tartrate (LOPRESSOR) half tablet 12.5 mg  12.5 mg Oral BID Destiny Hammond MD   12.5 mg at 07/17/24 0822    ondansetron ODT (ZOFRAN-ODT) disintegrating tablet 4 mg  4 mg Translingual Q6H PRN Magdalena Shaikh PA-C   4 mg at 07/15/24 9016    Phosphorus Replacement -  Follow Nurse / BPA Driven Protocol   Does not apply PRN Destiny Hammond MD        Potassium Replacement - Follow Nurse / BPA Driven Protocol   Does not apply PRN Destiny Hammond MD        sodium chloride 0.9 % flush 10 mL  10 mL Intravenous PRN Chance Mcrae MD        sodium chloride 0.9 % flush 10 mL  10 mL Intravenous Q12H Destiny Hammond MD   10 mL at 07/15/24 0924    sodium chloride 0.9 % flush 10 mL  10 mL Intravenous PRN Destiny Hammond MD        sodium chloride 0.9 % infusion 40 mL  40 mL Intravenous PRN Destiny Hammond MD        traZODone (DESYREL) tablet 100 mg  100 mg Oral Nightly Destiny Hammond MD   100 mg at 07/17/24 0031    venlafaxine XR (EFFEXOR-XR) 24 hr capsule 75 mg  75 mg Oral Daily Destiny Hammond MD   75 mg at 07/17/24 0822

## 2024-07-17 NOTE — DISCHARGE SUMMARY
[facility] Medicine Services  DISCHARGE SUMMARY    Patient Name: Bibiana Hodges  : 1935  MRN: [id number]    Date of Admission: 2024  7:07 AM  Date of Discharge:  2024  Primary Care Physician: Nivia Madrigal MD    Consults       Date and Time Order Name Status Description    2024 10:11 AM Inpatient Palliative Care MD Consult Completed             Hospital Course     Presenting Problem: SOB    Active Hospital Problems    Diagnosis  POA    Atrial fibrillation [I48.91]  Yes    Severe malnutrition [E43]  Yes    Hypertension [I10]  Yes    Dysautonomia orthostatic hypotension syndrome [I95.1]  Yes      Resolved Hospital Problems    Diagnosis Date Resolved POA    **Acute respiratory failure with hypoxia [J96.01] 2024 Yes    Sepsis due to pneumonia [J18.9, A41.9] 2024 Yes          Hospital Course:  Bibiana Hodges is a 88 y.o. female with history of atrial fibrillation, on amiodarone and Eliquis, hypertension, autonomic dysfunction, chronic debility, prior hospitalizations for recurrent pneumonia with concerns for possible aspiration.  She presents from nursing home with 2 weeks of progressive worsening shortness of breath and cough.  Found to have acute respiratory failure with hypoxia secondary to bilateral pneumonia.     Acute respiratory failure with hypoxia secondary to bilateral pneumonia  Sepsis(tachycardia, leukocytosis, lactic acidosis, and pneumonia)  -s/p Rocephin and doxycycline.  -Weaned to RA prior to d/c.    Dysphagia  --ST consultation--recommending no straw, soft to chew NDD3, chopped meat, nectar thick consistency.     Atrial fibrillation  -Continue home metoprolol, amiodarone and Eliquis     Hyperlipidemia  -Continue statin     Depression  -Continue Effexor and BuSpar     Hypertension  Autonomic dysfunction with recurrent presyncopal episodes  -stopped amlodipine.     -Cont low dose lisinopril and metoprolol.     Tremors  -Continue  Keppra     Left breast swelling  -Patient with some lethargy feeling on the lateral left breast, nontender  -Mentions she is supposed to have follow-up as outpatient      Discharge Follow Up Recommendations for outpatient labs/diagnostics:      Day of Discharge     HPI:   Pt doing well.  Anxious to leave the hospital.  +cough still but able to get it up.      Vital Signs:   Temp:  [97.8 °F (36.6 °C)-98.4 °F (36.9 °C)] 98.4 °F (36.9 °C)  Heart Rate:  [59-68] 61  Resp:  [14-16] 16  BP: ()/(56-90) 102/83  Flow (L/min):  [2] 2      Physical Exam:  Constitutional: No acute distress, awake, alert, up in bed, frail  HENT: NCAT, mucous membranes moist  Respiratory: diminished throughout, respiratory effort normal on RA  Cardiovascular: RRR  Gastrointestinal: +BS, NT, ND, soft  Musculoskeletal: No bilateral ankle edema  Psychiatric: pleasant affect, cooperative  Neurologic: Ox3- no focal deficits    Pertinent  and/or Most Recent Results     LAB RESULTS:                              Brief Urine Lab Results  (Last result in the past 365 days)        Color   Clarity   Blood   Leuk Est   Nitrite   Protein   CREAT   Urine HCG        05/06/24 2154 Yellow   Clear   Negative   Negative   Negative   Trace                 Microbiology Results (last 10 days)       Procedure Component Value - Date/Time    S. Pneumo Ag Urine or CSF - Urine, Urine, Clean Catch [[id number]]  (Abnormal) Collected: 07/08/24 1925    Lab Status: Final result Specimen: Urine, Clean Catch Updated: 07/09/24 0108     Strep Pneumo Ag Positive    Legionella Antigen, Urine - Urine, Urine, Clean Catch [[id number]]  (Normal) Collected: 07/08/24 1925    Lab Status: Final result Specimen: Urine, Clean Catch Updated: 07/09/24 0109     LEGIONELLA ANTIGEN, URINE Negative    MRSA Screen, PCR (Inpatient) - Swab, Nares [[id number]]  (Normal) Collected: 07/08/24 1248    Lab Status: Final result Specimen: Swab from Nares Updated: 07/08/24 1446     MRSA PCR Negative     Narrative:      The negative predictive value of this diagnostic test is high and should only be used to consider de-escalating anti-MRSA therapy. A positive result may indicate colonization with MRSA and must be correlated clinically.  MRSA Negative    Respiratory Panel PCR w/COVID-19(SARS-CoV-2) ERICK/MARTA/ELPIDIO/PAD/COR/ALMA In-House, NP Swab in UTM/VTM, 2 HR TAT - Swab, Nasopharynx [[id number]]  (Normal) Collected: 07/08/24 0730    Lab Status: Final result Specimen: Swab from Nasopharynx Updated: 07/08/24 0840     ADENOVIRUS, PCR Not Detected     Coronavirus 229E Not Detected     Coronavirus HKU1 Not Detected     Coronavirus NL63 Not Detected     Coronavirus OC43 Not Detected     COVID19 Not Detected     Human Metapneumovirus Not Detected     Human Rhinovirus/Enterovirus Not Detected     Influenza A PCR Not Detected     Influenza B PCR Not Detected     Parainfluenza Virus 1 Not Detected     Parainfluenza Virus 2 Not Detected     Parainfluenza Virus 3 Not Detected     Parainfluenza Virus 4 Not Detected     RSV, PCR Not Detected     Bordetella pertussis pcr Not Detected     Bordetella parapertussis PCR Not Detected     Chlamydophila pneumoniae PCR Not Detected     Mycoplasma pneumo by PCR Not Detected    Narrative:      In the setting of a positive respiratory panel with a viral infection PLUS a negative procalcitonin without other underlying concern for bacterial infection, consider observing off antibiotics or discontinuation of antibiotics and continue supportive care. If the respiratory panel is positive for atypical bacterial infection (Bordetella pertussis, Chlamydophila pneumoniae, or Mycoplasma pneumoniae), consider antibiotic de-escalation to target atypical bacterial infection.    Blood Culture - Blood, Wrist, Left [[id number]]  (Normal) Collected: 07/08/24 0729    Lab Status: Final result Specimen: Blood from Wrist, Left Updated: 07/13/24 0800     Blood Culture No growth at 5 days    Narrative:      Less than  seven (7) mL's of blood was collected.  Insufficient quantity may yield false negative results.    Blood Culture - Blood, Arm, Right [[id number]]  (Normal) Collected: 07/08/24 0728    Lab Status: Final result Specimen: Blood from Arm, Right Updated: 07/13/24 0800     Blood Culture No growth at 5 days            FL Video Swallow With Speech Single Contrast    Result Date: 7/16/2024  FL VIDEO SWALLOW W SPEECH SINGLE-CONTRAST Date of Exam: 7/16/2024 10:07 AM EDT Indication: dysphagia.   Comparison: None available. Technique:   The speech pathologist administered food and/or liquid mixed with barium to the patient with cine/video imaging.  Imaging assistance was provided to the speech pathologist and an image was saved. Fluoroscopic Time: 1 minute and 12 seconds Number of Images: 9 associated fluoroscopic loops were saved Findings: Aspiration was seen during fluoroscopic guided modified barium swallowing series. Please see speech therapy report for full details and recommendations.     Impression: Fluoroscopy provided for a modified barium swallow. Aspiration was seen during swallowing evaluation. Please see speech therapy report for full details and recommendations. Report dictated by: Shelley Walls PA-c  I have personally reviewed this case and agree with the findings above: Electronically Signed: Deon Vera MD  7/16/2024 5:24 PM EDT  Workstation ID: [id number]    SLP FEES - Fiberoptic Endo Eval Swallow    Result Date: 7/8/2024  This procedure was auto-finalized with no dictation required.    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 Heart shadow and pulmonary vasculature appear normal in size. There is new multifocal airspace disease in the left upper and lower lung, and milder but increased interstitial opacity in the right mid  and lower lung consistent with pneumonia. Disease is densest in the left upper lung. No effusion or pneumothorax is seen.     Impression: Bilateral pneumonia. Electronically Signed: Deon Vera MD  7/8/2024 8:16 AM EDT  Workstation ID: [id number]             Results for orders placed during the hospital encounter of 08/20/23    Adult Transthoracic Echo Complete W/ Cont if Necessary Per Protocol    Interpretation Summary    Left ventricular systolic function is mildly decreased. Calculated left ventricular EF = 47.9% Normal left ventricular cavity size and wall thickness noted. There is left ventricular global hypokinesis noted. Left ventricular diastolic function is consistent with (grade I) impaired relaxation.    : Normal right ventricular cavity size and systolic function noted. Electronic lead present in the ventricle.    Normal left atrial size and volume noted. Saline test results are negative.    There is moderate calcification of the aortic valve. The aortic valve was poorly visualized but appears trileaflet. Moderate aortic valve regurgitation is present. No aortic valve stenosis is present.    Mild mitral annular calcification is present. Mild mitral valve regurgitation is present. No significant mitral valve stenosis is present.    The tricuspid valve is grossly normal in structure. Mild tricuspid valve regurgitation is present. Estimated right ventricular systolic pressure from tricuspid regurgitation is normal, 33 mmHg. No tricuspid valve stenosis is present.    Mild dilation of the ascending aorta is present. Ascending aorta = 3.7 cm    There is a small (<1cm) pericardial effusion adjacent to the right atrium and right ventricle.      Plan for Follow-up of Pending Labs/Results:     Discharge Details        Discharge Medications        ASK your doctor about these medications        Instructions Start Date   acetaminophen 325 MG tablet  Commonly known as: TYLENOL   650 mg, Oral, Every 8 Hours PRN       albuterol (2.5 MG/3ML) 0.083% nebulizer solution  Commonly known as: PROVENTIL   2.5 mg, Nebulization, Every 4 Hours PRN      amiodarone 200 MG tablet  Commonly known as: PACERONE   200 mg, Oral, Daily      apixaban 2.5 MG tablet tablet  Commonly known as: ELIQUIS   2.5 mg, Oral, 2 Times Daily      atorvastatin 40 MG tablet  Commonly known as: LIPITOR   40 mg, Nightly      bisacodyl 10 MG suppository  Commonly known as: DULCOLAX   10 mg, Rectal, Every 7 Days, As needed      busPIRone 5 MG tablet  Commonly known as: BUSPAR   5 mg, 2 Times Daily PRN      cholecalciferol 25 MCG (1000 UT) tablet  Commonly known as: VITAMIN D3   1,000 Units, Daily      cholecalciferol 25 MCG (1000 UT) tablet  Commonly known as: VITAMIN D3   1,000 Units, Oral, Daily      cyanocobalamin 1000 MCG/ML injection   1,000 mcg, Every 28 Days      docusate sodium 100 MG capsule  Commonly known as: COLACE   100 mg, Oral, Daily      ferrous sulfate 325 (65 FE) MG tablet   325 mg, Oral, Daily With Breakfast      fluticasone 50 MCG/ACT nasal spray  Commonly known as: FLONASE   2 sprays, Nasal, Daily PRN      guaiFENesin-dextromethorphan 100-10 MG/5ML syrup  Commonly known as: ROBITUSSIN DM   10 mL, Oral, Every 6 Hours Scheduled      levETIRAcetam  MG tablet  Commonly known as: KEPPRA XR   500 mg, Oral, Daily      levothyroxine 100 MCG tablet  Commonly known as: SYNTHROID, LEVOTHROID   100 mcg, Oral, Daily      lisinopril 20 MG tablet  Commonly known as: PRINIVIL,ZESTRIL   20 mg, Oral, Daily      meclizine 25 MG tablet  Commonly known as: ANTIVERT   25 mg, Oral, Every 8 Hours PRN      melatonin 5 MG tablet tablet   5 mg, Oral, Nightly      metoprolol tartrate 25 MG tablet  Commonly known as: LOPRESSOR   12.5 mg, Oral, 2 Times Daily      omeprazole 40 MG capsule  Commonly known as: priLOSEC   40 mg, Daily      ondansetron ODT 4 MG disintegrating tablet  Commonly known as: ZOFRAN-ODT   4 mg, Translingual, Every 6 Hours PRN      pantoprazole 40 MG  EC tablet  Commonly known as: PROTONIX   40 mg, Oral, Daily      polyethylene glycol 17 GM/SCOOP powder  Commonly known as: MIRALAX   MIX 17 GM IN 8 OUNCES OF LIQUID AND DRINK 1 TO 2 TIMES DAILY FOR CONSTIPATION.      rosuvastatin 10 MG tablet  Commonly known as: CRESTOR   10 mg, Oral, Nightly      senna 8.6 MG tablet  Commonly known as: SENOKOT   2 tablets, Oral, Daily PRN      traZODone 100 MG tablet  Commonly known as: DESYREL   100 mg, Oral, Nightly      venlafaxine XR 75 MG 24 hr capsule  Commonly known as: EFFEXOR-XR   75 mg, Oral, Daily      Vitamin C 500 MG capsule   1 capsule, Oral, Daily               Allergies   Allergen Reactions    Gabapentin     Sulfa Antibiotics          Discharge Disposition:  Home or Self Care    Diet:  Hospital:  Diet Order   Procedures    Diet: Cardiac; Healthy Heart (2-3 Na+); No Mixed Consistencies, No Straw, Feeding Assistance - Nursing; Texture: Soft to Chew (NDD 3); Soft to Chew: Chopped Meat; Fluid Consistency: Nectar Thick            Activity:      Restrictions or Other Recommendations:  As tolerated.       CODE STATUS:    Code Status and Medical Interventions:   Ordered at: 07/11/24 1510     Level Of Support Discussed With:    Patient     Code Status (Patient has no pulse and is not breathing):    No CPR (Do Not Attempt to Resuscitate)     Medical Interventions (Patient has pulse or is breathing):    Full Support       No future appointments.    Additional Instructions for the Follow-ups that You Need to Schedule       Ambulatory Referral to Home Health   As directed      Face to Face Visit Date: 7/17/2024   Follow-up provider for Plan of Care?: I treated the patient in an acute care facility and will not continue treatment after discharge.   Follow-up provider: HUGH PORTILLO [[id number]]   Reason/Clinical Findings: S/P hospital stay   Describe mobility limitations that make leaving home difficult: Impaired gait, balance, endurance, and mobility   Nursing/Therapeutic  Services Requested: Skilled Nursing Physical Therapy Occupational Therapy Speech Therapy   Skilled nursing orders: Cardiopulmonary assessments   PT orders: Transfer training Strengthening Gait Training   Weight Bearing Status: As Tolerated   Occupational orders: Activities of daily living Energy conservation Strengthening Cognition   SLP orders: Dysphagia therapy   Frequency: 1 Week 1                      Leny Monae MD  07/17/24      Time Spent on Discharge:  I spent  35  minutes on this discharge activity which included: face-to-face encounter with the patient, reviewing the data in the system, coordination of the care with the nursing staff as well as consultants, documentation, and entering orders.           ILL APPEARING

## 2024-07-17 NOTE — DISCHARGE PLACEMENT REQUEST
"Bibiana Coello (88 y.o. Female)       Date of Birth   1935    Social Security Number       Address   [address] [address]    Home Phone   [phone]    MRN   [id number]       Baptism   Sikhism    Marital Status                               Admission Date   24    Admission Type   Emergency    Admitting Provider   Leny Monae MD    Attending Provider   Leny Monae MD    Department, Room/Bed   [facility] 6B, N646/1       Discharge Date       Discharge Disposition   Home or Self Care    Discharge Destination                                 Attending Provider: Leny Monae MD    Allergies: Gabapentin, Sulfa Antibiotics    Isolation: None   Infection: None   Code Status: No CPR    Ht: 170.2 cm (67\")   Wt: 59 kg (130 lb)    Admission Cmt: None   Principal Problem: Acute respiratory failure with hypoxia [J96.01]                   Active Insurance as of 2024       Primary Coverage       Payor Plan Insurance Group Employer/Plan Group    HUMANA MEDICARE REPLACEMENT HUMANA MED ADV HMO [id number]       Payor Plan Address Payor Plan Phone Number Payor Plan Fax Number Effective Dates    [address] [address] [phone]  2023 - None Entered    [facility] [id number]         Subscriber Name Subscriber Birth Date Member ID       BIBIANA COELLO 1935 [id number]                     Emergency Contacts        (Rel.) Home Phone Work Phone Mobile Phone    Danyelle Keyes (POA) (Daughter) [phone] -- [phone]    ELODIA COELLO (Son) -- -- [phone]    NIA COELLO (Son) [phone] -- [phone]                   Discharge Summary        Leny Monae MD at 24 [facility]              [facility] Medicine Services  DISCHARGE SUMMARY    Patient Name: Bibiana Coello  : 1935  MRN: [id number]    Date of Admission: 2024  7:07 AM  Date of Discharge:  2024  Primary " Care Physician: Nivia Madrigal MD    Consults       Date and Time Order Name Status Description    7/8/2024 10:11 AM Inpatient Palliative Care MD Consult Completed             Hospital Course     Presenting Problem: SOB    Active Hospital Problems    Diagnosis  POA    Atrial fibrillation [I48.91]  Yes    Severe malnutrition [E43]  Yes    Hypertension [I10]  Yes    Dysautonomia orthostatic hypotension syndrome [I95.1]  Yes      Resolved Hospital Problems    Diagnosis Date Resolved POA    **Acute respiratory failure with hypoxia [J96.01] 07/17/2024 Yes    Sepsis due to pneumonia [J18.9, A41.9] 07/17/2024 Yes          Hospital Course:  Bibiana Hodges is a 88 y.o. female with history of atrial fibrillation, on amiodarone and Eliquis, hypertension, autonomic dysfunction, chronic debility, prior hospitalizations for recurrent pneumonia with concerns for possible aspiration.  She presents from nursing home with 2 weeks of progressive worsening shortness of breath and cough.  Found to have acute respiratory failure with hypoxia secondary to bilateral pneumonia.     Acute respiratory failure with hypoxia secondary to bilateral pneumonia  Sepsis(tachycardia, leukocytosis, lactic acidosis, and pneumonia)  -s/p Rocephin and doxycycline.  -Weaned to RA prior to d/c.    Dysphagia  --ST consultation--recommending no straw, soft to chew NDD3, chopped meat, nectar thick consistency.     Atrial fibrillation  -Continue home metoprolol, amiodarone and Eliquis     Hyperlipidemia  -Continue statin     Depression  -Continue Effexor and BuSpar     Hypertension  Autonomic dysfunction with recurrent presyncopal episodes  -stopped amlodipine.     -Cont low dose lisinopril and metoprolol.     Tremors  -Continue Keppra     Left breast swelling  -Patient with some lethargy feeling on the lateral left breast, nontender  -Mentions she is supposed to have follow-up as outpatient      Discharge Follow Up Recommendations for outpatient  labs/diagnostics:      Day of Discharge     HPI:   Pt doing well.  Anxious to leave the hospital.  +cough still but able to get it up.      Vital Signs:   Temp:  [97.8 °F (36.6 °C)-98.4 °F (36.9 °C)] 98.4 °F (36.9 °C)  Heart Rate:  [59-68] 61  Resp:  [14-16] 16  BP: ()/(56-90) 102/83  Flow (L/min):  [2] 2      Physical Exam:  Constitutional: No acute distress, awake, alert, up in bed, frail  HENT: NCAT, mucous membranes moist  Respiratory: diminished throughout, respiratory effort normal on RA  Cardiovascular: RRR  Gastrointestinal: +BS, NT, ND, soft  Musculoskeletal: No bilateral ankle edema  Psychiatric: pleasant affect, cooperative  Neurologic: Ox3- no focal deficits    Pertinent  and/or Most Recent Results     LAB RESULTS:                              Brief Urine Lab Results  (Last result in the past 365 days)        Color   Clarity   Blood   Leuk Est   Nitrite   Protein   CREAT   Urine HCG        05/06/24 2154 Yellow   Clear   Negative   Negative   Negative   Trace                 Microbiology Results (last 10 days)       Procedure Component Value - Date/Time    S. Pneumo Ag Urine or CSF - Urine, Urine, Clean Catch [[id number]]  (Abnormal) Collected: 07/08/24 1925    Lab Status: Final result Specimen: Urine, Clean Catch Updated: 07/09/24 0108     Strep Pneumo Ag Positive    Legionella Antigen, Urine - Urine, Urine, Clean Catch [[id number]]  (Normal) Collected: 07/08/24 1925    Lab Status: Final result Specimen: Urine, Clean Catch Updated: 07/09/24 0109     LEGIONELLA ANTIGEN, URINE Negative    MRSA Screen, PCR (Inpatient) - Swab, Nares [[id number]]  (Normal) Collected: 07/08/24 1248    Lab Status: Final result Specimen: Swab from Nares Updated: 07/08/24 1446     MRSA PCR Negative    Narrative:      The negative predictive value of this diagnostic test is high and should only be used to consider de-escalating anti-MRSA therapy. A positive result may indicate colonization with MRSA and must be correlated  clinically.  MRSA Negative    Respiratory Panel PCR w/COVID-19(SARS-CoV-2) ERICK/MARTA/ELPIDIO/PAD/COR/ALMA In-House, NP Swab in UTM/VTM, 2 HR TAT - Swab, Nasopharynx [[id number]]  (Normal) Collected: 07/08/24 0730    Lab Status: Final result Specimen: Swab from Nasopharynx Updated: 07/08/24 0840     ADENOVIRUS, PCR Not Detected     Coronavirus 229E Not Detected     Coronavirus HKU1 Not Detected     Coronavirus NL63 Not Detected     Coronavirus OC43 Not Detected     COVID19 Not Detected     Human Metapneumovirus Not Detected     Human Rhinovirus/Enterovirus Not Detected     Influenza A PCR Not Detected     Influenza B PCR Not Detected     Parainfluenza Virus 1 Not Detected     Parainfluenza Virus 2 Not Detected     Parainfluenza Virus 3 Not Detected     Parainfluenza Virus 4 Not Detected     RSV, PCR Not Detected     Bordetella pertussis pcr Not Detected     Bordetella parapertussis PCR Not Detected     Chlamydophila pneumoniae PCR Not Detected     Mycoplasma pneumo by PCR Not Detected    Narrative:      In the setting of a positive respiratory panel with a viral infection PLUS a negative procalcitonin without other underlying concern for bacterial infection, consider observing off antibiotics or discontinuation of antibiotics and continue supportive care. If the respiratory panel is positive for atypical bacterial infection (Bordetella pertussis, Chlamydophila pneumoniae, or Mycoplasma pneumoniae), consider antibiotic de-escalation to target atypical bacterial infection.    Blood Culture - Blood, Wrist, Left [[id number]]  (Normal) Collected: 07/08/24 0729    Lab Status: Final result Specimen: Blood from Wrist, Left Updated: 07/13/24 0800     Blood Culture No growth at 5 days    Narrative:      Less than seven (7) mL's of blood was collected.  Insufficient quantity may yield false negative results.    Blood Culture - Blood, Arm, Right [[id number]]  (Normal) Collected: 07/08/24 0728    Lab Status: Final result Specimen: Blood  from Arm, Right Updated: 07/13/24 0800     Blood Culture No growth at 5 days            FL Video Swallow With Speech Single Contrast    Result Date: 7/16/2024  FL VIDEO SWALLOW W SPEECH SINGLE-CONTRAST Date of Exam: 7/16/2024 10:07 AM EDT Indication: dysphagia.   Comparison: None available. Technique:   The speech pathologist administered food and/or liquid mixed with barium to the patient with cine/video imaging.  Imaging assistance was provided to the speech pathologist and an image was saved. Fluoroscopic Time: 1 minute and 12 seconds Number of Images: 9 associated fluoroscopic loops were saved Findings: Aspiration was seen during fluoroscopic guided modified barium swallowing series. Please see speech therapy report for full details and recommendations.     Impression: Fluoroscopy provided for a modified barium swallow. Aspiration was seen during swallowing evaluation. Please see speech therapy report for full details and recommendations. Report dictated by: Shelley Walls PA-c  I have personally reviewed this case and agree with the findings above: Electronically Signed: Deon Vera MD  7/16/2024 5:24 PM EDT  Workstation ID: [id number]    SLP FEES - Fiberoptic Endo Eval Swallow    Result Date: 7/8/2024  This procedure was auto-finalized with no dictation required.    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 Heart shadow and pulmonary vasculature appear normal in size. There is new multifocal airspace disease in the left upper and lower lung, and milder but increased interstitial opacity in the right mid and lower lung consistent with pneumonia. Disease is densest in the left upper lung. No effusion or pneumothorax is seen.     Impression: Bilateral pneumonia. Electronically Signed: Deon Vera MD  7/8/2024 8:16 AM EDT   Workstation ID: [id number]             Results for orders placed during the hospital encounter of 08/20/23    Adult Transthoracic Echo Complete W/ Cont if Necessary Per Protocol    Interpretation Summary    Left ventricular systolic function is mildly decreased. Calculated left ventricular EF = 47.9% Normal left ventricular cavity size and wall thickness noted. There is left ventricular global hypokinesis noted. Left ventricular diastolic function is consistent with (grade I) impaired relaxation.    : Normal right ventricular cavity size and systolic function noted. Electronic lead present in the ventricle.    Normal left atrial size and volume noted. Saline test results are negative.    There is moderate calcification of the aortic valve. The aortic valve was poorly visualized but appears trileaflet. Moderate aortic valve regurgitation is present. No aortic valve stenosis is present.    Mild mitral annular calcification is present. Mild mitral valve regurgitation is present. No significant mitral valve stenosis is present.    The tricuspid valve is grossly normal in structure. Mild tricuspid valve regurgitation is present. Estimated right ventricular systolic pressure from tricuspid regurgitation is normal, 33 mmHg. No tricuspid valve stenosis is present.    Mild dilation of the ascending aorta is present. Ascending aorta = 3.7 cm    There is a small (<1cm) pericardial effusion adjacent to the right atrium and right ventricle.      Plan for Follow-up of Pending Labs/Results:     Discharge Details        Discharge Medications        ASK your doctor about these medications        Instructions Start Date   acetaminophen 325 MG tablet  Commonly known as: TYLENOL   650 mg, Oral, Every 8 Hours PRN      albuterol (2.5 MG/3ML) 0.083% nebulizer solution  Commonly known as: PROVENTIL   2.5 mg, Nebulization, Every 4 Hours PRN      amiodarone 200 MG tablet  Commonly known as: PACERONE   200 mg, Oral, Daily      apixaban 2.5 MG  tablet tablet  Commonly known as: ELIQUIS   2.5 mg, Oral, 2 Times Daily      atorvastatin 40 MG tablet  Commonly known as: LIPITOR   40 mg, Nightly      bisacodyl 10 MG suppository  Commonly known as: DULCOLAX   10 mg, Rectal, Every 7 Days, As needed      busPIRone 5 MG tablet  Commonly known as: BUSPAR   5 mg, 2 Times Daily PRN      cholecalciferol 25 MCG (1000 UT) tablet  Commonly known as: VITAMIN D3   1,000 Units, Daily      cholecalciferol 25 MCG (1000 UT) tablet  Commonly known as: VITAMIN D3   1,000 Units, Oral, Daily      cyanocobalamin 1000 MCG/ML injection   1,000 mcg, Every 28 Days      docusate sodium 100 MG capsule  Commonly known as: COLACE   100 mg, Oral, Daily      ferrous sulfate 325 (65 FE) MG tablet   325 mg, Oral, Daily With Breakfast      fluticasone 50 MCG/ACT nasal spray  Commonly known as: FLONASE   2 sprays, Nasal, Daily PRN      guaiFENesin-dextromethorphan 100-10 MG/5ML syrup  Commonly known as: ROBITUSSIN DM   10 mL, Oral, Every 6 Hours Scheduled      levETIRAcetam  MG tablet  Commonly known as: KEPPRA XR   500 mg, Oral, Daily      levothyroxine 100 MCG tablet  Commonly known as: SYNTHROID, LEVOTHROID   100 mcg, Oral, Daily      lisinopril 20 MG tablet  Commonly known as: PRINIVIL,ZESTRIL   20 mg, Oral, Daily      meclizine 25 MG tablet  Commonly known as: ANTIVERT   25 mg, Oral, Every 8 Hours PRN      melatonin 5 MG tablet tablet   5 mg, Oral, Nightly      metoprolol tartrate 25 MG tablet  Commonly known as: LOPRESSOR   12.5 mg, Oral, 2 Times Daily      omeprazole 40 MG capsule  Commonly known as: priLOSEC   40 mg, Daily      ondansetron ODT 4 MG disintegrating tablet  Commonly known as: ZOFRAN-ODT   4 mg, Translingual, Every 6 Hours PRN      pantoprazole 40 MG EC tablet  Commonly known as: PROTONIX   40 mg, Oral, Daily      polyethylene glycol 17 GM/SCOOP powder  Commonly known as: MIRALAX   MIX 17 GM IN 8 OUNCES OF LIQUID AND DRINK 1 TO 2 TIMES DAILY FOR CONSTIPATION.       rosuvastatin 10 MG tablet  Commonly known as: CRESTOR   10 mg, Oral, Nightly      senna 8.6 MG tablet  Commonly known as: SENOKOT   2 tablets, Oral, Daily PRN      traZODone 100 MG tablet  Commonly known as: DESYREL   100 mg, Oral, Nightly      venlafaxine XR 75 MG 24 hr capsule  Commonly known as: EFFEXOR-XR   75 mg, Oral, Daily      Vitamin C 500 MG capsule   1 capsule, Oral, Daily               Allergies   Allergen Reactions    Gabapentin     Sulfa Antibiotics          Discharge Disposition:  Home or Self Care    Diet:  Hospital:  Diet Order   Procedures    Diet: Cardiac; Healthy Heart (2-3 Na+); No Mixed Consistencies, No Straw, Feeding Assistance - Nursing; Texture: Soft to Chew (NDD 3); Soft to Chew: Chopped Meat; Fluid Consistency: Nectar Thick            Activity:      Restrictions or Other Recommendations:  As tolerated.       CODE STATUS:    Code Status and Medical Interventions:   Ordered at: 07/11/24 1510     Level Of Support Discussed With:    Patient     Code Status (Patient has no pulse and is not breathing):    No CPR (Do Not Attempt to Resuscitate)     Medical Interventions (Patient has pulse or is breathing):    Full Support       No future appointments.    Additional Instructions for the Follow-ups that You Need to Schedule       Ambulatory Referral to Home Health   As directed      Face to Face Visit Date: 7/17/2024   Follow-up provider for Plan of Care?: I treated the patient in an acute care facility and will not continue treatment after discharge.   Follow-up provider: HUGH PORTILLO [[id number]]   Reason/Clinical Findings: S/P hospital stay   Describe mobility limitations that make leaving home difficult: Impaired gait, balance, endurance, and mobility   Nursing/Therapeutic Services Requested: Skilled Nursing Physical Therapy Occupational Therapy Speech Therapy   Skilled nursing orders: Cardiopulmonary assessments   PT orders: Transfer training Strengthening Gait Training   Weight Bearing  Status: As Tolerated   Occupational orders: Activities of daily living Energy conservation Strengthening Cognition   SLP orders: Dysphagia therapy   Frequency: 1 Week 1                      Leny Monae MD  07/17/24      Time Spent on Discharge:  I spent  35  minutes on this discharge activity which included: face-to-face encounter with the patient, reviewing the data in the system, coordination of the care with the nursing staff as well as consultants, documentation, and entering orders.            Electronically signed by Leny Monae MD at 07/17/24 3829

## 2024-07-17 NOTE — PROGRESS NOTES
Enter Query Response Below      Query Response: Severe sepsis causing acute organ failure- acute hypoxic respiratory failure    Electronically signed by Leny Monae MD, 24, 3:09 PM EDT.               If applicable, please update the problem list.     Patient: Bibiana Hodges        : 1935  Account: [id number]           Admit Date:         How to Respond to this query:       a. Click New Note     b. Answer query within the yellow box.                c. Update the Problem List, if applicable.      If you have any questions about this query contact me at: [email or web address]     Dr. Monae,    88 year old female admitted  with acute hypoxic respiratory failure secondary to pneumonia and sepsis.     Treatment: supplemental oxygen to 6 LPM HFNC, IV ceftriaxone, doxycycline    Please clarify:    Severe sepsis causing acute organ failure- acute hypoxic respiratory failure  Acute hypoxic respiratory failure due to pneumonia, unrelated to sepsis  Other (please specify) ___________  Unable to clarify    By submitting this query, we are merely seeking further clarification of documentation to accurately reflect all conditions that you are monitoring, evaluating, treating or that extend the hospitalization or utilize additional resources of care. Please utilize your independent clinical judgment when addressing the question(s) above.     This query and your response, once completed, will be entered into the legal medical record.    Sincerely,  Sandy Dorsey RN CCDS  Clinical Documentation Integrity Program

## 2024-07-17 NOTE — CASE MANAGEMENT/SOCIAL WORK
Case Management Discharge Note      Final Note: Patient discharging back to [facility] at [facility] today. Nurse to call report to [phone]. CM will fax the discharge summary to [phone]. CM called [facility] health. Order is in epic for SN, PT, OT, and SP. Pharmacy is [facility]. CM called notified son of patient discharging today. Son can transport to [facility]. All parties are in agreement with discharge plan.         Selected Continued Care - Admitted Since 7/8/2024       Destination    No services have been selected for the patient.                Durable Medical Equipment    No services have been selected for the patient.                Dialysis/Infusion    No services have been selected for the patient.                Home Medical Care    No services have been selected for the patient.                Therapy    No services have been selected for the patient.                Community Resources    No services have been selected for the patient.                Community & DME    No services have been selected for the patient.                         Final Discharge Disposition Code: 01 - home or self-care

## 2024-07-17 NOTE — PLAN OF CARE
Problem: Adult Inpatient Plan of Care  Goal: Plan of Care Review  Outcome: Ongoing, Progressing  Flowsheets (Taken 7/17/2024 0938)  Progress: improving  Plan of Care Reviewed With: patient     Problem: Palliative Care  Goal: Enhanced Quality of Life  Intervention: Maximize Comfort  Recent Flowsheet Documentation  Taken 7/17/2024 0900 by Nika Lovett MS [facility]-SLP  Oral Care: (pt able to brush her own teeth) teeth brushed - regular toothbrush   Goal Outcome Evaluation:  Plan of Care Reviewed With: patient        Progress: improving                       Treatment Assessment (SLP): continued, toleration of diet (07/17/24 0900)  Treatment Assessment Comments (SLP): Pt awaiting her grits to come up. Does not want to eat the rest of her breakfast until she has all of it. Did tolerate drink of nectar and brushed her teeth. Reminded pt of taking additional swallows when she does eat. Also having some ice to keep her mouth moist. Pt wanting to rest before she eats breakfast as she did not go to sleep til 1am she states. Will f/u for cont treatment (07/17/24 0900)  Plan for Continued Treatment (SLP): continue treatment per plan of care (07/17/24 0900)  SLP treatment completed. Will continue to address dysphagia - pt tolerating nectar thick liquids. She is having productive cough, will cont to monitor. Please see note for further details and recommendations.

## 2024-07-17 NOTE — CASE MANAGEMENT/SOCIAL WORK
Continued Stay Note  [facility]     Patient Name: Bibiana Hodges  MRN: [id number]  Today's Date: 7/17/2024    Admit Date: 7/8/2024    Plan: Back to [facility]   Discharge Plan       Row Name 07/17/24 1208       Plan    Plan Back to [facility]    Patient/Family in Agreement with Plan yes    Plan Comments Spoke with son, Edmond Hodges, by phone today. He went by [facility] and [facility]. He declined both. She got approval to go to SNF. However, [facility] . CM called Dara with [facility] at . Patient can return back to [facility] at . They have [facility] health problem through the [facility]. CM sent referral over and placed order in Epic. They have a program that can help with PT and OT after home health called [facility]. CM faxed over current med list to Dara. CM called son updated him. Patient can return back to [facility] at  today. Patient is medically ready per MDRs. Patient discharge plan is back to [facility] at  and son to transport.    Final Discharge Disposition Code 01 - home or self-care                   Discharge Codes    No documentation.                 Expected Discharge Date and Time       Expected Discharge Date Expected Discharge Time    Jul 17, 2024               Jess Perdue RN

## 2024-07-17 NOTE — OUTREACH NOTE
Prep Survey      Flowsheet Row Responses   [facility] facility patient discharged from? [facility]   Is LACE score < 7 ? No   Eligibility Readm Mgmt   Discharge diagnosis Sepsis due to pneumonia   Does the patient have one of the following disease processes/diagnoses(primary or secondary)? Sepsis   Does the patient have Home health ordered? Yes   What [facility] agency[facility]   Prep survey completed? Yes            Melanie CEBALLOS - Registered Nurse

## 2024-07-17 NOTE — PROGRESS NOTES
"Enter Query Response Below      Query Response: Lactic acidosis-clinically significant and due to sepsis    Electronically signed by Leny Monae MD, 24, 3:09 PM EDT.               If applicable, please update the problem list.     Patient: Bibiana Hodges        : 1935  Account: [id number]           Admit Date:         How to Respond to this query:       a. Click New Note     b. Answer query within the yellow box.                c. Update the Problem List, if applicable.      If you have any questions about this query contact me at: [email or web address]     ,    88 year old female admitted  with sepsis secondary to pneumonia. Clinical findings per the discharge summary  include \"tachycardia, leukocytosis, lactic acidosis...\"  Lactic acid 2.7 ().    Treatment; IV NS 1000 mL bolus ( @ 0736), IV antibiotics    Please clarify the following:    Lactic acidosis-clinically significant and due to sepsis  Lactic acidosis-clinically insignificant  Other- specify ___  Unable to determine      By submitting this query, we are merely seeking further clarification of documentation to accurately reflect all conditions that you are monitoring, evaluating, treating or that extend the hospitalization or utilize additional resources of care. Please utilize your independent clinical judgment when addressing the question(s) above.     This query and your response, once completed, will be entered into the legal medical record.    Sincerely,  Sandy Dorsey RN CCDS  Clinical Documentation Integrity Program     "

## 2024-07-17 NOTE — PLAN OF CARE
Goal Outcome Evaluation:  Plan of Care Reviewed With: patient        Progress: improving  Outcome Evaluation: Patient resting on 2L NC. VSS, nontele. Awaiting facility placement. No complaints of pain or nausea

## 2024-07-17 NOTE — THERAPY TREATMENT NOTE
Acute Care - Speech Language Pathology   Swallow Treatment Note [facility]     Patient Name: Bibiana Hodges  : 1935  MRN: [id number]  Today's Date: 2024               Admit Date: 2024    Visit Dx:     ICD-10-CM ICD-9-CM   1. Hyponatremia  E87.1 276.1   2. Pneumonia of both lungs due to infectious organism, unspecified part of lung  J18.9 483.8   3. Acute respiratory failure with hypoxia  J96.01 518.81   4. Oropharyngeal dysphagia  R13.12 787.22     Patient Active Problem List   Diagnosis    Dysautonomia orthostatic hypotension syndrome    Hypertension    Flu vaccine need    Streptococcus pneumoniae vaccination indicated    Sepsis due to pneumonia    Severe malnutrition    Acute respiratory failure with hypoxia    Atrial fibrillation    Autonomic dysfunction     Past Medical History:   Diagnosis Date    Constipation     Depression     Dysautonomia orthostatic hypotension syndrome     Generalized osteoarthritis     GERD (gastroesophageal reflux disease)     s/p Nissen    Hypertension     Insomnia     Osteoarthrosis, shoulder region     Skin ulcer of scalp     Thrombophlebitis of arm     Tremor     Vitamin B12 deficiency      Past Surgical History:   Procedure Laterality Date    DILATATION AND CURETTAGE      HERNIA REPAIR      HIP SURGERY      SHOULDER SURGERY      Right       SLP Recommendation and Plan                                               Daily Summary of Progress (SLP): progress toward functional goals is good (24 0900)               Treatment Assessment (SLP): continued, toleration of diet (24 0900)  Treatment Assessment Comments (SLP): Pt awaiting her grits to come up. Does not want to eat the rest of her breakfast until she has all of it. Did tolerate drink of nectar and brushed her teeth. Reminded pt of taking additional swallows when she does eat. Also having some ice to keep her mouth moist. Pt wanting to rest before she eats breakfast as she did not go to sleep til  1am she states. Will f/u for cont treatment (07/17/24 0900)  Plan for Continued Treatment (SLP): continue treatment per plan of care (07/17/24 0900)         Plan of Care Reviewed With: patient  Progress: improving      SWALLOW EVALUATION (Last 72 Hours)       SLP Adult Swallow Evaluation       Row Name 07/17/24 0900 07/16/24 0945 07/15/24 0840             Rehab Evaluation    Document Type therapy note (daily note)  -AW re-evaluation  -CJ therapy note (daily note)  -SM      Subjective Information no complaints  states she didn't get to sleep til 1am  -AW no complaints  -CJ complains of  wanting to go back to sleep  -SM      Patient Observations alert;cooperative  -AW alert;cooperative  -CJ alert  -SM      Patient/Family/Caregiver Comments/Observations no family present  -AW no family present  -CJ --      Patient Effort good  -AW adequate  -CJ adequate  -SM      Symptoms Noted During/After Treatment none  -AW none  -CJ --      Oral Care teeth brushed - regular toothbrush  pt able to brush her own teeth  -AW -- --         General Information    Patient Profile Reviewed -- yes  -CJ --      Pertinent History Of Current Problem -- see initial eval; referred for MBS to determine upgrade  -CJ --      Current Method of Nutrition -- honey-thick liquids;soft to chew textures;chopped  -CJ --      Precautions/Limitations, Vision -- WFL;for purposes of eval  -CJ --      Precautions/Limitations, Hearing -- WFL;for purposes of eval  -CJ --      Prior Level of Function-Communication -- WFL  -CJ --      Prior Level of Function-Swallowing -- honey thick liquids;other (see comments)  -CJ --      Plans/Goals Discussed with -- patient;agreed upon  -CJ --      Barriers to Rehab -- medically complex;previous functional deficit  -CJ --      Patient's Goals for Discharge -- return to regular diet  -CJ --         Pain    Additional Documentation -- Pain Scale: FACES Pre/Post-Treatment (Group)  -CJ --         Pain Scale: Numbers  "Pre/Post-Treatment    Pretreatment Pain Rating 0/10 - no pain  -AW -- 0/10 - no pain  -SM      Posttreatment Pain Rating 0/10 - no pain  -AW -- 0/10 - no pain  -SM         Pain Scale: FACES Pre/Post-Treatment    Pain: FACES Scale, Pretreatment -- 0-->no hurt  -CJ --      Posttreatment Pain Rating -- 0-->no hurt  -CJ --         MBS/VFSS    Utensils Used -- spoon;cup;straw  -CJ --      Consistencies Trialed -- regular textures;thin liquids;nectar/syrup-thick liquids;honey-thick liquids;pudding thick  -CJ --         MBS/VFSS Interpretation    Oral Prep Phase -- impaired oral phase of swallowing  -CJ --      Oral Transit Phase -- impaired  -CJ --      Oral Residue -- WFL  -CJ --      VFSS Summary -- Mild oral, moderate pharyngeal dysphagia. Prolonged manipulation w/ solids. Deep laryngeal penetration w/ thin liquids occuring during the swallow w/ subsequent silent aspiration of thin residue after the swallow. Cued cough was ineffective to clear aspirated material. Attempted multiple compensations to eliminate aspiration risk w/ thins but they were ineffective. Deep penetration w/ nectar via straw only that didn't clear upon completion of swallow. No penetration/aspiration w/ trials of nectar thick liquids via spoon/cup. Diffuse pharyngeal residue w/ all consistencies, able to diminish w/ consecutive swallows. Would recommend modified po diet of soft chopped w/ nectar thick liquids, no straws, no mixed consistencies, intermittent extra swallow. Had lengthy d/w pt regarding MBS results and need to still adhere to thickened liquids. Pt stated \"I don't want to get sick again but I hate this stuff\". Re-educated pt on risks of aspiration vs pursuing comfort diet and pt stated again \"wants to be the safest so she doesn't get sick again for her grandbabies\". Will continue to f/u to address dysphagia however suspect some degree of chronic dysphagia at this point  -CJ --         Oral Preparatory Phase    Oral Preparatory Phase -- " prolonged manipulation  -CJ --      Prolonged Manipulation -- regular textures;secondary to reduced lingual strength  -CJ --         Oral Transit Phase    Impaired Oral Transit Phase -- premature spillage of liquids into pharynx  -CJ --      Premature Spillage of Liquids into Pharynx -- thin liquids;nectar-thick liquids;secondary to reduced lingual control;discoordination of lingual movement  -CJ --         Initiation of Pharyngeal Swallow    Initiation of Pharyngeal Swallow -- bolus in pyriform sinuses  -CJ --      Pharyngeal Phase -- impaired pharyngeal phase of swallowing  -CJ --      Anatomical abnormalities noted -- osteophyte/bone spur per radiology report  -CJ --      Anatomical abnormalities functional impact -- functional impact on swallowing  -CJ --      Penetration During the Swallow -- thin liquids;nectar-thick liquids;secondary to delayed swallow initiation or mistiming;secondary to reduced laryngeal elevation;secondary to reduced vestibular closure  -CJ --      Aspiration After the Swallow -- thin liquids;secondary to residue;in valleculae;in pyriform sinuses;in laryngeal vestibule  -CJ --      Depth of Penetration -- deep  -CJ --      Response to Penetration -- No  -CJ --      No spontaneous response to penetration and -- non-effective laryngeal clearance with cue (see comments)  -CJ --      Response to Aspiration -- No  -CJ --      No spontaneous response to aspiration and -- non-effective subglottic clearance with cue (see comments)  cued cough  -CJ --      Rosenbek's Scale -- thin:;8--->level 8  -CJ --      Pharyngeal Residue -- all consistencies tested;diffuse within pharynx;secondary to reduced posterior pharyngeal wall stripping;secondary to reduced laryngeal elevation;secondary to reduced hyolaryngeal excursion  -CJ --      Response to Residue -- partial residue clearance  -CJ --      Attempted Compensatory Maneuvers -- bolus size;bolus presentation style;chin tuck;head turn to left;head turn  to right;additional subsequent swallow;multiple swallows;throat clear after swallow;effortful (hard swallow)  -CJ --      Response to Attempted Compensatory Maneuvers -- did not prevent aspiration  -CJ --      Successful Compensatory Maneuver Competency -- patient able to;demonstrate compensations;with cues  -CJ --         Swallowing Quality of Life Assessment    Education and counseling provided -- Signs of aspiration;Silent aspiration;Risks of aspiration;Safest diet options;Oral care recommendations and rationale;Aspiration precautions  -CJ --         SLP Evaluation Clinical Impression    SLP Swallowing Diagnosis -- mild;oral dysphagia;moderate;pharyngeal dysphagia  -CJ --      Functional Impact -- risk of aspiration/pneumonia;risk of dehydration;risk of malnutrition  -CJ --      Rehab Potential/Prognosis, Swallowing -- adequate, monitor progress closely  -CJ --      Swallow Criteria for Skilled Therapeutic Interventions Met -- demonstrates skilled criteria  -CJ --         SLP Treatment Clinical Impressions    Treatment Assessment (SLP) continued;toleration of diet  -AW -- continued;suspected;pharyngeal dysphagia;aspiration  -      Treatment Assessment Comments (SLP) Pt awaiting her grits to come up. Does not want to eat the rest of her breakfast until she has all of it. Did tolerate drink of nectar and brushed her teeth. Reminded pt of taking additional swallows when she does eat. Also having some ice to keep her mouth moist. Pt wanting to rest before she eats breakfast as she did not go to sleep til 1am she states. Will f/u for cont treatment  -AW -- Pt greatly disliking thickened liquids though also does not wish for further PNAs. Do not suspect swallow function will ever improve to point where can safely tolerate thin liquids. Will schedule for MBS today to compare to last study and help aif GOC/POC discussions.  -      Daily Summary of Progress (SLP) progress toward functional goals is good  -AW --  progress toward functional goals as expected  -      Barriers to Overall Progress (SLP) Fatigue  -AW -- Fatigue;Baseline deficits  -      Plan for Continued Treatment (SLP) continue treatment per plan of care  -AW -- continue treatment per plan of care  -      Care Plan Review care plan/treatment goals reviewed  -AW -- evaluation/treatment results reviewed;care plan/treatment goals reviewed;risks/benefits reviewed;current/potential barriers reviewed;patient/other agree to care plan  -         Recommendations    Therapy Frequency (Swallow) -- 5 days per week  -CJ --      Predicted Duration Therapy Intervention (Days) -- 2 weeks  -CJ --      SLP Diet Recommendation -- soft to chew textures;chopped;no mixed consistencies;nectar thick liquids  - --      Recommended Precautions and Strategies -- upright posture during/after eating;small bites of food and sips of liquid;general aspiration precautions;no straw;fatigue precautions;multiple swallows per sip of liquid;multiple swallows per bite of food  - --      Oral Care Recommendations -- Oral Care BID/PRN;Suction toothbrush  - --      SLP Rec. for Method of Medication Administration -- meds whole;meds crushed;with puree;as tolerated  - --      Monitor for Signs of Aspiration -- yes;notify SLP if any concerns  - --      Anticipated Discharge Disposition (SLP) -- skilled nursing facility  - --                User Key  (r) = Recorded By, (t) = Taken By, (c) = Cosigned By      Initials Name Effective Dates     Skyla Austin, MS CCC-SLP 02/03/23 -     Nika Cedeno, MS CCC-SLP 02/03/23 -     Denise Gloria, MS CCC-SLP 06/03/24 -                     EDUCATION  The patient has been educated in the following areas:   Dysphagia (Swallowing Impairment).        SLP GOALS       Row Name 07/17/24 0900 07/16/24 0945 07/15/24 0840       (LTG) Patient will demonstrate functional swallow for    Diet Texture (Demonstrate functional swallow) soft to  chew (chopped) textures  -AW soft to chew (chopped) textures  -CJ soft to chew (chopped) textures  -SM    Liquid viscosity (Demonstrate functional swallow) nectar/ mildly thick liquids  -AW nectar/ mildly thick liquids  -CJ nectar/ mildly thick liquids  -SM    [place]aw (Demonstrate functional swallow) with minimal cues (75-90% accuracy)  -AW with minimal cues (75-90% accuracy)  -CJ with minimal cues (75-90% accuracy)  -SM    Time Frame (Demonstrate functional swallow) by discharge  -AW by discharge  -CJ by discharge  -SM    Progress/Outcomes (Demonstrate functional swallow) good progress toward goal  -AW goal ongoing  -CJ continuing progress toward goal  -SM       (STG) Patient will tolerate trials of    Consistencies Trialed (Tolerate trials) soft to chew (chopped) textures;nectar/ mildly thick liquids  -AW soft to chew (chopped) textures;nectar/ mildly thick liquids  -CJ soft to chew (chopped) textures;honey/ moderately thick liquids  -SM    Desired Outcome (Tolerate trials) without signs/symptoms of aspiration;without signs of distress;with adequate oral prep/transit/clearance  -AW without signs/symptoms of aspiration;without signs of distress;with adequate oral prep/transit/clearance  -CJ without signs/symptoms of aspiration;without signs of distress;with adequate oral prep/transit/clearance  -SM    [place]aw (Tolerate trials) with minimal cues (75-90% accuracy)  -AW with minimal cues (75-90% accuracy)  -CJ with minimal cues (75-90% accuracy)  -SM    Time Frame (Tolerate trials) 1 week  -AW 1 week  -CJ 1 week  -SM    Progress/Outcomes (Tolerate trials) continuing progress toward goal  -AW goal revised this date  -CJ continuing progress toward goal  -SM    Comment (Tolerate trials) -- -- Pt did not wish for meal tray yet, only wanting to get back to sleep. Agreeable for pills (RN present) with honey-thick liquids with encouragement and few bites of banana and applesauce 2' pt not wishing to feel sick to  her stomach.  -SM       (STG) Pharyngeal Strengthening Exercise Goal 1 (SLP)    Activity (Pharyngeal Strengthening Goal 1, SLP) increase timing;increase superior movement of the hyolaryngeal complex;increase anterior movement of the hyolaryngeal complex;increase closure at entrance to airway/closure of airway at glottis;increase squeeze/positive pressure generation  -AW increase timing;increase superior movement of the hyolaryngeal complex;increase anterior movement of the hyolaryngeal complex;increase closure at entrance to airway/closure of airway at glottis;increase squeeze/positive pressure generation  -CJ increase timing;increase superior movement of the hyolaryngeal complex;increase anterior movement of the hyolaryngeal complex;increase closure at entrance to airway/closure of airway at glottis;increase squeeze/positive pressure generation  -SM    Increase Timing prepping - 3 second prep or suck swallow or 3-step swallow  -AW prepping - 3 second prep or suck swallow or 3-step swallow  -CJ prepping - 3 second prep or suck swallow or 3-step swallow  -SM    Increase Superior Movement of the Hyolaryngeal Complex effortful pitch glide (falsetto + pharyngeal squeeze)  -AW effortful pitch glide (falsetto + pharyngeal squeeze)  -CJ effortful pitch glide (falsetto + pharyngeal squeeze)  -SM    Increase Anterior Movement of the Hyolaryngeal Complex chin tuck against resistance (CTAR)  -AW chin tuck against resistance (CTAR)  -CJ chin tuck against resistance (CTAR)  -SM    Increase Closure at Entrance to Airway/Closure of Airway at Glottis supraglottic swallow  -AW supraglottic swallow  -CJ supraglottic swallow  -SM    Increase Squeeze/Positive Pressure Generation hard effortful swallow  -AW hard effortful swallow  -CJ hard effortful swallow  -SM    Willard/Accuracy (Pharyngeal Strengthening Goal 1, SLP) with moderate cues (50-74% accuracy)  -AW with moderate cues (50-74% accuracy)  -CJ with moderate cues (50-74%  accuracy)  -SM    Time Frame (Pharyngeal Strengthening Goal 1, SLP) 1 week  -AW 1 week  -CJ 1 week  -SM    Progress/Outcomes (Pharyngeal Strengthening Goal 1, SLP) goal ongoing  -AW goal ongoing  -CJ goal ongoing  -SM              User Key  (r) = Recorded By, (t) = Taken By, (c) = Cosigned By      Initials Name Provider Type    Skyla Gomez, MS CCC-SLP Speech and Language Pathologist    Nika Cedeno, MS CCC-SLP Speech and Language Pathologist    Denise Gloria, MS CCC-SLP Speech and Language Pathologist                         Time Calculation:    Time Calculation- SLP       Row Name 07/17/24 0938             Time Calculation- SLP    SLP Start Time 0830  -AW      SLP Stop Time 0900  -AW      SLP Time Calculation (min) 30 min  -      SLP Received On 07/17/24  -                User Key  (r) = Recorded By, (t) = Taken By, (c) = Cosigned By      Initials Name Provider Type    Nika Cedeno, MS CCC-SLP Speech and Language Pathologist                    Therapy Charges for Today       Code Description Service Date Service Provider Modifiers Qty    [id number]  ST TREATMENT SWALLOW 2 7/17/2024 Nika Lovett, MS CCC-SLP GN 1                 Nika Lovett MS CCC-SLP  7/17/2024

## 2024-07-23 ENCOUNTER — READMISSION MANAGEMENT (OUTPATIENT)
Dept: CALL CENTER | Facility: HOSPITAL | Age: 89
End: 2024-07-23
Payer: MEDICARE

## 2024-07-23 NOTE — OUTREACH NOTE
Sepsis Week 1 Survey      Flowsheet Row Responses   [facility] patient discharged from? [place]   Does the patient have one of the following disease processes/diagnoses(primary or secondary)? Sepsis   Week 1 attempt successful? Yes   Call start time 1035   Call end time 1040   Discharge diagnosis Sepsis due to pneumonia   Person spoke with today (if not patient) and relationship ruma Pruitt reviewed with patient/caregiver? Yes   Is the patient having any side effects they believe may be caused by any medication additions or changes? No   Does the patient have all medications related to this admission filled (includes all antibiotics, inhalers, nebulizers,steroids,etc.) Yes   Is the patient taking all medications as directed (includes completed medication regime)? Yes   Comments regarding appointments PCP visits pt at [facility]   Does the patient have a primary care provider?  Yes   Does the patient have an appointment with their PCP within 7 days of discharge? Yes   Has the patient kept scheduled appointments due by today? N/A   What is the Home health agency?  [facility]   Has home health visited the patient within 72 hours of discharge? Yes   Psychosocial issues? No   Did the patient receive a copy of their discharge instructions? Yes   Nursing interventions Reviewed instructions with patient   What is the patient's perception of their health status since discharge? Improving   Nursing interventions Nurse provided patient education   Is the patient/caregiver able to teach back TIME? T emperature - higher or lower than normal, I nfection - may have signs and symptoms of an infection, M ental Decline - confused, sleepy, difficult to arouse, E xtremely Ill - severe pain, discomfort, shortness of breath   Nursing interventions Nurse provided reassurance to patient, Nurse provided patient education   Is patient/caregiver able to teach back steps to recovery at home? Set small, achievable goals for return  to baseline health, Rest and regain strength, Eat a balanced diet   Is the patient/caregiver able to teach back signs and symptoms of worsening condition: Fever, Hyperthermia, Rapid heart rate (>90), Shortness of breath/rapid respiratory rate, Altered mental status(confusion/coma), Edema   If the patient is a current smoker, are they able to teach back resources for cessation? Not a smoker   Is the patient/caregiver able to teach back the hierarchy of who to call/visit for symptoms/problems? PCP, Specialist, Home health nurse, Urgent Care, ED, 911 Yes   Additional teach back comments states appetite is poor which is normal for pt   Week 1 call completed? Yes   Call end time 1040            Aleisha HOOPER - Registered Nurse

## 2024-07-31 ENCOUNTER — READMISSION MANAGEMENT (OUTPATIENT)
Dept: CALL CENTER | Facility: HOSPITAL | Age: 89
End: 2024-07-31
Payer: MEDICARE

## 2024-07-31 NOTE — OUTREACH NOTE
Sepsis Week 2 Survey      Flowsheet Row Responses   [facility] facility patient discharged from? [place]   Does the patient have one of the following disease processes/diagnoses(primary or secondary)? Sepsis   Week 2 attempt successful? No   Unsuccessful attempts Attempt 1            Selina QUICK - Licensed Nurse

## 2024-07-31 NOTE — OUTREACH NOTE
Sepsis Week 2 Survey      Flowsheet Row Responses   [facility] facility patient discharged from? [place]   Does the patient have one of the following disease processes/diagnoses(primary or secondary)? Sepsis   Week 2 attempt successful? No   Unsuccessful attempts Attempt 1            Selina QUICK - Licensed Nurse  
no

## 2025-01-29 ENCOUNTER — APPOINTMENT (OUTPATIENT)
Dept: GENERAL RADIOLOGY | Facility: HOSPITAL | Age: OVER 89
DRG: 177 | End: 2025-01-29
Payer: MEDICARE

## 2025-01-29 ENCOUNTER — HOSPITAL ENCOUNTER (INPATIENT)
Facility: HOSPITAL | Age: OVER 89
LOS: 9 days | Discharge: NURSING, REHAB OR LONG-TERM CARE | DRG: 177 | End: 2025-02-08
Attending: EMERGENCY MEDICINE | Admitting: STUDENT IN AN ORGANIZED HEALTH CARE EDUCATION/TRAINING PROGRAM
Payer: MEDICARE

## 2025-01-29 DIAGNOSIS — R13.10 DYSPHAGIA, UNSPECIFIED TYPE: ICD-10-CM

## 2025-01-29 DIAGNOSIS — R09.02 HYPOXIA: ICD-10-CM

## 2025-01-29 DIAGNOSIS — J18.9 COMMUNITY ACQUIRED PNEUMONIA OF LEFT LUNG, UNSPECIFIED PART OF LUNG: Primary | ICD-10-CM

## 2025-01-29 DIAGNOSIS — Z74.09 IMPAIRED FUNCTIONAL MOBILITY, BALANCE, GAIT, AND ENDURANCE: ICD-10-CM

## 2025-01-29 PROBLEM — E78.5 HYPERLIPIDEMIA: Status: ACTIVE | Noted: 2025-01-29

## 2025-01-29 LAB
ALBUMIN SERPL-MCNC: 3.4 G/DL (ref 3.5–5.2)
ALBUMIN/GLOB SERPL: 1.4 G/DL
ALP SERPL-CCNC: 55 U/L (ref 39–117)
ALT SERPL W P-5'-P-CCNC: 27 U/L (ref 1–33)
ANION GAP SERPL CALCULATED.3IONS-SCNC: 10 MMOL/L (ref 5–15)
AST SERPL-CCNC: 31 U/L (ref 1–32)
B PARAPERT DNA SPEC QL NAA+PROBE: NOT DETECTED
B PERT DNA SPEC QL NAA+PROBE: NOT DETECTED
BASOPHILS # BLD AUTO: 0.03 10*3/MM3 (ref 0–0.2)
BASOPHILS NFR BLD AUTO: 0.3 % (ref 0–1.5)
BILIRUB SERPL-MCNC: 0.5 MG/DL (ref 0–1.2)
BUN SERPL-MCNC: 23 MG/DL (ref 8–23)
BUN/CREAT SERPL: 19.8 (ref 7–25)
C PNEUM DNA NPH QL NAA+NON-PROBE: NOT DETECTED
CALCIUM SPEC-SCNC: 9.2 MG/DL (ref 8.6–10.5)
CHLORIDE SERPL-SCNC: 101 MMOL/L (ref 98–107)
CO2 SERPL-SCNC: 26 MMOL/L (ref 22–29)
CREAT SERPL-MCNC: 1.16 MG/DL (ref 0.57–1)
D-LACTATE SERPL-SCNC: 1.4 MMOL/L (ref 0.5–2)
D-LACTATE SERPL-SCNC: 2.3 MMOL/L (ref 0.5–2)
DEPRECATED RDW RBC AUTO: 48.4 FL (ref 37–54)
EGFRCR SERPLBLD CKD-EPI 2021: 45.2 ML/MIN/1.73
EOSINOPHIL # BLD AUTO: 0.05 10*3/MM3 (ref 0–0.4)
EOSINOPHIL NFR BLD AUTO: 0.5 % (ref 0.3–6.2)
ERYTHROCYTE [DISTWIDTH] IN BLOOD BY AUTOMATED COUNT: 12.3 % (ref 12.3–15.4)
FLUAV SUBTYP SPEC NAA+PROBE: NOT DETECTED
FLUBV RNA ISLT QL NAA+PROBE: NOT DETECTED
GEN 5 1HR TROPONIN T REFLEX: 16 NG/L
GLOBULIN UR ELPH-MCNC: 2.4 GM/DL
GLUCOSE SERPL-MCNC: 138 MG/DL (ref 65–99)
HADV DNA SPEC NAA+PROBE: NOT DETECTED
HCOV 229E RNA SPEC QL NAA+PROBE: NOT DETECTED
HCOV HKU1 RNA SPEC QL NAA+PROBE: NOT DETECTED
HCOV NL63 RNA SPEC QL NAA+PROBE: NOT DETECTED
HCOV OC43 RNA SPEC QL NAA+PROBE: NOT DETECTED
HCT VFR BLD AUTO: 43.3 % (ref 34–46.6)
HGB BLD-MCNC: 14 G/DL (ref 12–15.9)
HMPV RNA NPH QL NAA+NON-PROBE: NOT DETECTED
HOLD SPECIMEN: NORMAL
HPIV1 RNA ISLT QL NAA+PROBE: NOT DETECTED
HPIV2 RNA SPEC QL NAA+PROBE: NOT DETECTED
HPIV3 RNA NPH QL NAA+PROBE: NOT DETECTED
HPIV4 P GENE NPH QL NAA+PROBE: NOT DETECTED
IMM GRANULOCYTES # BLD AUTO: 0.02 10*3/MM3 (ref 0–0.05)
IMM GRANULOCYTES NFR BLD AUTO: 0.2 % (ref 0–0.5)
LYMPHOCYTES # BLD AUTO: 0.46 10*3/MM3 (ref 0.7–3.1)
LYMPHOCYTES NFR BLD AUTO: 4.6 % (ref 19.6–45.3)
M PNEUMO IGG SER IA-ACNC: NOT DETECTED
MCH RBC QN AUTO: 34.1 PG (ref 26.6–33)
MCHC RBC AUTO-ENTMCNC: 32.3 G/DL (ref 31.5–35.7)
MCV RBC AUTO: 105.4 FL (ref 79–97)
MONOCYTES # BLD AUTO: 0.38 10*3/MM3 (ref 0.1–0.9)
MONOCYTES NFR BLD AUTO: 3.8 % (ref 5–12)
NEUTROPHILS NFR BLD AUTO: 9.06 10*3/MM3 (ref 1.7–7)
NEUTROPHILS NFR BLD AUTO: 90.6 % (ref 42.7–76)
NRBC BLD AUTO-RTO: 0 /100 WBC (ref 0–0.2)
NT-PROBNP SERPL-MCNC: 1024 PG/ML (ref 0–1800)
PLATELET # BLD AUTO: 167 10*3/MM3 (ref 140–450)
PMV BLD AUTO: 10 FL (ref 6–12)
POTASSIUM SERPL-SCNC: 4.8 MMOL/L (ref 3.5–5.2)
PROT SERPL-MCNC: 5.8 G/DL (ref 6–8.5)
RBC # BLD AUTO: 4.11 10*6/MM3 (ref 3.77–5.28)
RHINOVIRUS RNA SPEC NAA+PROBE: NOT DETECTED
RSV RNA NPH QL NAA+NON-PROBE: NOT DETECTED
SARS-COV-2 RNA NPH QL NAA+NON-PROBE: NOT DETECTED
SODIUM SERPL-SCNC: 137 MMOL/L (ref 136–145)
TROPONIN T NUMERIC DELTA: 3 NG/L
TROPONIN T SERPL HS-MCNC: 13 NG/L
WBC NRBC COR # BLD AUTO: 10 10*3/MM3 (ref 3.4–10.8)
WHOLE BLOOD HOLD COAG: NORMAL
WHOLE BLOOD HOLD SPECIMEN: NORMAL

## 2025-01-29 PROCEDURE — 93005 ELECTROCARDIOGRAM TRACING: CPT | Performed by: EMERGENCY MEDICINE

## 2025-01-29 PROCEDURE — 36415 COLL VENOUS BLD VENIPUNCTURE: CPT

## 2025-01-29 PROCEDURE — 84484 ASSAY OF TROPONIN QUANT: CPT | Performed by: EMERGENCY MEDICINE

## 2025-01-29 PROCEDURE — 80053 COMPREHEN METABOLIC PANEL: CPT | Performed by: EMERGENCY MEDICINE

## 2025-01-29 PROCEDURE — 99285 EMERGENCY DEPT VISIT HI MDM: CPT

## 2025-01-29 PROCEDURE — 87040 BLOOD CULTURE FOR BACTERIA: CPT | Performed by: EMERGENCY MEDICINE

## 2025-01-29 PROCEDURE — 87154 CUL TYP ID BLD PTHGN 6+ TRGT: CPT | Performed by: EMERGENCY MEDICINE

## 2025-01-29 PROCEDURE — 83605 ASSAY OF LACTIC ACID: CPT | Performed by: EMERGENCY MEDICINE

## 2025-01-29 PROCEDURE — G0378 HOSPITAL OBSERVATION PER HR: HCPCS

## 2025-01-29 PROCEDURE — 83880 ASSAY OF NATRIURETIC PEPTIDE: CPT | Performed by: EMERGENCY MEDICINE

## 2025-01-29 PROCEDURE — 71045 X-RAY EXAM CHEST 1 VIEW: CPT

## 2025-01-29 PROCEDURE — 0202U NFCT DS 22 TRGT SARS-COV-2: CPT | Performed by: EMERGENCY MEDICINE

## 2025-01-29 PROCEDURE — 99223 1ST HOSP IP/OBS HIGH 75: CPT | Performed by: NURSE PRACTITIONER

## 2025-01-29 PROCEDURE — 87147 CULTURE TYPE IMMUNOLOGIC: CPT | Performed by: EMERGENCY MEDICINE

## 2025-01-29 PROCEDURE — 85025 COMPLETE CBC W/AUTO DIFF WBC: CPT | Performed by: EMERGENCY MEDICINE

## 2025-01-29 PROCEDURE — 25010000002 AMPICILLIN-SULBACTAM PER 1.5 G: Performed by: EMERGENCY MEDICINE

## 2025-01-29 RX ORDER — VENLAFAXINE HYDROCHLORIDE 75 MG/1
75 CAPSULE, EXTENDED RELEASE ORAL DAILY
Status: DISCONTINUED | OUTPATIENT
Start: 2025-01-30 | End: 2025-02-08 | Stop reason: HOSPADM

## 2025-01-29 RX ORDER — IPRATROPIUM BROMIDE AND ALBUTEROL SULFATE 2.5; .5 MG/3ML; MG/3ML
3 SOLUTION RESPIRATORY (INHALATION) EVERY 4 HOURS PRN
Status: DISCONTINUED | OUTPATIENT
Start: 2025-01-29 | End: 2025-02-08 | Stop reason: HOSPADM

## 2025-01-29 RX ORDER — ACETAMINOPHEN 650 MG/1
650 SUPPOSITORY RECTAL EVERY 4 HOURS PRN
Status: DISCONTINUED | OUTPATIENT
Start: 2025-01-29 | End: 2025-02-08 | Stop reason: HOSPADM

## 2025-01-29 RX ORDER — METOPROLOL TARTRATE 25 MG/1
12.5 TABLET, FILM COATED ORAL 2 TIMES DAILY
Status: DISCONTINUED | OUTPATIENT
Start: 2025-01-29 | End: 2025-02-08 | Stop reason: HOSPADM

## 2025-01-29 RX ORDER — SODIUM CHLORIDE 0.9 % (FLUSH) 0.9 %
10 SYRINGE (ML) INJECTION AS NEEDED
Status: DISCONTINUED | OUTPATIENT
Start: 2025-01-29 | End: 2025-02-08 | Stop reason: HOSPADM

## 2025-01-29 RX ORDER — POLYETHYLENE GLYCOL 3350 17 G/17G
17 POWDER, FOR SOLUTION ORAL DAILY PRN
Status: DISCONTINUED | OUTPATIENT
Start: 2025-01-29 | End: 2025-02-08 | Stop reason: HOSPADM

## 2025-01-29 RX ORDER — LISINOPRIL 10 MG/1
10 TABLET ORAL
Status: DISCONTINUED | OUTPATIENT
Start: 2025-01-30 | End: 2025-02-08 | Stop reason: HOSPADM

## 2025-01-29 RX ORDER — ALBUTEROL SULFATE 0.83 MG/ML
2.5 SOLUTION RESPIRATORY (INHALATION) EVERY 4 HOURS PRN
Status: DISCONTINUED | OUTPATIENT
Start: 2025-01-29 | End: 2025-02-08 | Stop reason: HOSPADM

## 2025-01-29 RX ORDER — FLUTICASONE PROPIONATE 50 MCG
2 SPRAY, SUSPENSION (ML) NASAL DAILY PRN
Status: DISCONTINUED | OUTPATIENT
Start: 2025-01-29 | End: 2025-02-08 | Stop reason: HOSPADM

## 2025-01-29 RX ORDER — ACETAMINOPHEN 160 MG/5ML
650 SOLUTION ORAL EVERY 4 HOURS PRN
Status: DISCONTINUED | OUTPATIENT
Start: 2025-01-29 | End: 2025-02-08 | Stop reason: HOSPADM

## 2025-01-29 RX ORDER — SODIUM CHLORIDE 9 MG/ML
40 INJECTION, SOLUTION INTRAVENOUS AS NEEDED
Status: DISCONTINUED | OUTPATIENT
Start: 2025-01-29 | End: 2025-02-08 | Stop reason: HOSPADM

## 2025-01-29 RX ORDER — BISACODYL 5 MG/1
5 TABLET, DELAYED RELEASE ORAL DAILY PRN
Status: DISCONTINUED | OUTPATIENT
Start: 2025-01-29 | End: 2025-02-08 | Stop reason: HOSPADM

## 2025-01-29 RX ORDER — TRAZODONE HYDROCHLORIDE 50 MG/1
100 TABLET, FILM COATED ORAL NIGHTLY
Status: DISCONTINUED | OUTPATIENT
Start: 2025-01-29 | End: 2025-02-08 | Stop reason: HOSPADM

## 2025-01-29 RX ORDER — PANTOPRAZOLE SODIUM 40 MG/1
40 TABLET, DELAYED RELEASE ORAL DAILY
Status: DISCONTINUED | OUTPATIENT
Start: 2025-01-30 | End: 2025-02-08 | Stop reason: HOSPADM

## 2025-01-29 RX ORDER — BISACODYL 10 MG
10 SUPPOSITORY, RECTAL RECTAL DAILY PRN
Status: DISCONTINUED | OUTPATIENT
Start: 2025-01-29 | End: 2025-02-08 | Stop reason: HOSPADM

## 2025-01-29 RX ORDER — MECLIZINE HYDROCHLORIDE 25 MG/1
25 TABLET ORAL EVERY 8 HOURS PRN
Status: DISCONTINUED | OUTPATIENT
Start: 2025-01-29 | End: 2025-02-08 | Stop reason: HOSPADM

## 2025-01-29 RX ORDER — DOCUSATE SODIUM 100 MG/1
100 CAPSULE, LIQUID FILLED ORAL DAILY
Status: DISCONTINUED | OUTPATIENT
Start: 2025-01-30 | End: 2025-02-08 | Stop reason: HOSPADM

## 2025-01-29 RX ORDER — ROSUVASTATIN CALCIUM 10 MG/1
10 TABLET, COATED ORAL NIGHTLY
Status: DISCONTINUED | OUTPATIENT
Start: 2025-01-29 | End: 2025-02-08 | Stop reason: HOSPADM

## 2025-01-29 RX ORDER — ACETAMINOPHEN 325 MG/1
650 TABLET ORAL EVERY 4 HOURS PRN
Status: DISCONTINUED | OUTPATIENT
Start: 2025-01-29 | End: 2025-02-08 | Stop reason: HOSPADM

## 2025-01-29 RX ORDER — BENZONATATE 100 MG/1
100 CAPSULE ORAL 3 TIMES DAILY PRN
Status: DISCONTINUED | OUTPATIENT
Start: 2025-01-29 | End: 2025-02-08 | Stop reason: HOSPADM

## 2025-01-29 RX ORDER — AMOXICILLIN 250 MG
2 CAPSULE ORAL 2 TIMES DAILY PRN
Status: DISCONTINUED | OUTPATIENT
Start: 2025-01-29 | End: 2025-02-08 | Stop reason: HOSPADM

## 2025-01-29 RX ORDER — LEVETIRACETAM 500 MG/1
250 TABLET ORAL 2 TIMES DAILY
Status: DISCONTINUED | OUTPATIENT
Start: 2025-01-29 | End: 2025-02-08 | Stop reason: HOSPADM

## 2025-01-29 RX ORDER — NITROGLYCERIN 0.4 MG/1
0.4 TABLET SUBLINGUAL
Status: DISCONTINUED | OUTPATIENT
Start: 2025-01-29 | End: 2025-02-08 | Stop reason: HOSPADM

## 2025-01-29 RX ORDER — LEVOTHYROXINE SODIUM 100 UG/1
100 TABLET ORAL
Status: DISCONTINUED | OUTPATIENT
Start: 2025-01-30 | End: 2025-02-08 | Stop reason: HOSPADM

## 2025-01-29 RX ORDER — SODIUM CHLORIDE 0.9 % (FLUSH) 0.9 %
10 SYRINGE (ML) INJECTION EVERY 12 HOURS SCHEDULED
Status: DISCONTINUED | OUTPATIENT
Start: 2025-01-29 | End: 2025-02-08 | Stop reason: HOSPADM

## 2025-01-29 RX ORDER — DOXYCYCLINE 100 MG/1
100 CAPSULE ORAL ONCE
Status: COMPLETED | OUTPATIENT
Start: 2025-01-29 | End: 2025-01-29

## 2025-01-29 RX ORDER — DOXYCYCLINE 100 MG/1
100 CAPSULE ORAL EVERY 12 HOURS SCHEDULED
Status: COMPLETED | OUTPATIENT
Start: 2025-01-30 | End: 2025-02-03

## 2025-01-29 RX ORDER — AMIODARONE HYDROCHLORIDE 200 MG/1
200 TABLET ORAL DAILY
Status: DISCONTINUED | OUTPATIENT
Start: 2025-01-30 | End: 2025-02-08 | Stop reason: HOSPADM

## 2025-01-29 RX ORDER — DOXYCYCLINE 100 MG/1
100 CAPSULE ORAL EVERY 12 HOURS SCHEDULED
Status: DISCONTINUED | OUTPATIENT
Start: 2025-01-29 | End: 2025-01-29

## 2025-01-29 RX ADMIN — Medication 5 MG: at 22:28

## 2025-01-29 RX ADMIN — ACETAMINOPHEN 650 MG: 325 TABLET, FILM COATED ORAL at 22:28

## 2025-01-29 RX ADMIN — APIXABAN 2.5 MG: 2.5 TABLET, FILM COATED ORAL at 22:29

## 2025-01-29 RX ADMIN — METOPROLOL TARTRATE 12.5 MG: 25 TABLET, FILM COATED ORAL at 22:28

## 2025-01-29 RX ADMIN — TRAZODONE HYDROCHLORIDE 100 MG: 50 TABLET ORAL at 22:28

## 2025-01-29 RX ADMIN — DOXYCYCLINE 100 MG: 100 CAPSULE ORAL at 19:13

## 2025-01-29 RX ADMIN — ROSUVASTATIN 10 MG: 10 TABLET, FILM COATED ORAL at 22:28

## 2025-01-29 RX ADMIN — SODIUM CHLORIDE 1.5 G: 900 INJECTION INTRAVENOUS at 19:13

## 2025-01-29 RX ADMIN — LEVETIRACETAM 250 MG: 500 TABLET, FILM COATED ORAL at 22:28

## 2025-01-29 RX ADMIN — Medication 10 ML: at 22:29

## 2025-01-29 NOTE — ED PROVIDER NOTES
Subjective   History of Present Illness  89-year-old female presents for evaluation of increased shortness of breath and hypoxia.  She describes increased work of breath for the last 2 days.  She currently lives at [facility].  She denies fever.  She does not use oxygen at home but does use a breathing treatment intermittently at home.  She denies chest pain or abdominal pain.  No definitive sick contacts.  No reported change in bowel urinary function.  She exhibits normal baseline mentation.  She states that she has had her nose broken twice therefore she is a mouth breather.  4LNC oxygen has been applied to the nose and oxygen saturations improved into the mid 90s.  He should be nasal the patient has had a history of recurrent pneumonia in the left lung.  She reports that she began to have this after she had scarring from COVID-19.  She does have significant crackles on auscultation throughout the entire left lung field that is not present throughout the right lung field.      Review of Systems   Constitutional:  Positive for activity change and fatigue. Negative for chills and fever.   HENT:  Negative for congestion, ear pain, postnasal drip, sinus pressure and sore throat.    Eyes:  Negative for pain, redness and visual disturbance.   Respiratory:  Positive for cough and shortness of breath. Negative for chest tightness.    Cardiovascular:  Negative for chest pain, palpitations and leg swelling.   Gastrointestinal:  Negative for abdominal pain, anal bleeding, blood in stool, diarrhea, nausea and vomiting.   Endocrine: Negative for polydipsia and polyuria.   Genitourinary:  Negative for difficulty urinating, dysuria, frequency and urgency.   Musculoskeletal:  Negative for arthralgias, back pain and neck pain.   Skin:  Negative for pallor and rash.   Allergic/Immunologic: Negative for environmental allergies and immunocompromised state.   Neurological:  Negative for dizziness, weakness and headaches.    Hematological:  Negative for adenopathy.   Psychiatric/Behavioral:  Negative for confusion, self-injury and suicidal ideas. The patient is not nervous/anxious.    All other systems reviewed and are negative.      Past Medical History:   Diagnosis Date    Constipation     Depression     Dysautonomia orthostatic hypotension syndrome     Generalized osteoarthritis     GERD (gastroesophageal reflux disease)     s/p Nissen    Hypertension     Insomnia     Osteoarthrosis, shoulder region     Skin ulcer of scalp     Thrombophlebitis of arm     Tremor     Vitamin B12 deficiency        Allergies   Allergen Reactions    Gabapentin     Sulfa Antibiotics        Past Surgical History:   Procedure Laterality Date    DILATATION AND CURETTAGE      HERNIA REPAIR      HIP SURGERY      SHOULDER SURGERY      Right       Family History   Problem Relation Age of Onset    Cancer Mother         Pancreatic    Stroke Brother     Anorexia nervosa Daughter        Social History     Socioeconomic History    Marital status:    Tobacco Use    Smoking status: Never    Smokeless tobacco: Never   Vaping Use    Vaping status: Never Used   Substance and Sexual Activity    Alcohol use: No     Comment: stopped drinking alcohol    Drug use: No    Sexual activity: Defer           Objective   Physical Exam  Vitals and nursing note reviewed.   Constitutional:       General: She is not in acute distress.     Appearance: Normal appearance. She is well-developed. She is not toxic-appearing or diaphoretic.      Comments: Thin and chronically ill-appearing.   HENT:      Head: Normocephalic and atraumatic.      Right Ear: External ear normal.      Left Ear: External ear normal.      Nose: Nose normal.   Eyes:      General: Lids are normal.      Pupils: Pupils are equal, round, and reactive to light.   Neck:      Trachea: No tracheal deviation.   Cardiovascular:      Rate and Rhythm: Normal rate and regular rhythm.      Pulses: No decreased pulses.       Heart sounds: Normal heart sounds. No murmur heard.     No friction rub. No gallop.   Pulmonary:      Effort: Pulmonary effort is normal. Tachypnea present. No respiratory distress.      Breath sounds: Examination of the left-upper field reveals rales. Examination of the left-middle field reveals rales. Examination of the left-lower field reveals rales. Rales present. No decreased breath sounds, wheezing or rhonchi.   Abdominal:      General: Bowel sounds are normal.      Palpations: Abdomen is soft.      Tenderness: There is no abdominal tenderness. There is no guarding or rebound.   Musculoskeletal:         General: No deformity. Normal range of motion.      Cervical back: Normal range of motion and neck supple.   Lymphadenopathy:      Cervical: No cervical adenopathy.   Skin:     General: Skin is warm and dry.      Findings: No rash.   Neurological:      Mental Status: She is alert and oriented to person, place, and time.      Cranial Nerves: No cranial nerve deficit.      Sensory: No sensory deficit.   Psychiatric:         Speech: Speech normal.         Behavior: Behavior normal.         Thought Content: Thought content normal.         Judgment: Judgment normal.         Procedures           ED Course  ED Course as of 01/30/25 0852   Wed Jan 29, 2025 1749 The patient has a history of chronic abnormal appearance of left lung secondary to previous COVID infection the past.  She presents with a complaint of shortness of breath.  Ox saturations observed into the mid 80s.  She does not have oxygen available to her at her nursing facility.  Labs show normal white count but neutrophil predominance.  Viral panel is negative. I have applied 2 L of nasal cannula oxygen and oxygen saturations have corrected to 94%. I have ordered IV antibiotics and would like to admit for treatment of pneumonia. [NS]      ED Course User Index  [NS] Claudio Hoffmann MD                                                       Medical  Decision Making  Differential diagnosis includes pneumonia, sepsis, CHF exacerbation, viral illness, other unspecified etiology.    Labs show normal BNP, normal troponin with no significant change on repeat evaluation.  Normal viral respiratory panel.  Normal white count.  Normal H&H.  Normal lactic acid level.    Chest x-rayReports grossly stable chronic multifocal opacities throughout the left lung likely representing a chronic infectious or inflammatory process.    The patient does have a history of significant scarring to the left lung secondary to COVID per her report.    Her oxygen saturations were identified to decrease on room air.  Reportedly she does not currently have oxygen available at her facility.    She strongly desired to go home but I feel admission is more appropriate.    I have ordered blood cultures and antibiotics and I discussed the patient with the hospitalist who will consult for admission.    Problems Addressed:  Community acquired pneumonia of left lung, unspecified part of lung: complicated acute illness or injury with systemic symptoms  Hypoxia: complicated acute illness or injury with systemic symptoms that poses a threat to life or bodily functions    Amount and/or Complexity of Data Reviewed  Independent Historian: friend and EMS     Details: Son provides additional information.  EMS provides additional information.  External Data Reviewed: labs, radiology and ECG.  Labs: ordered. Decision-making details documented in ED Course.  Radiology: ordered and independent interpretation performed. Decision-making details documented in ED Course.  ECG/medicine tests: ordered and independent interpretation performed. Decision-making details documented in ED Course.     Details: EKG performed 1/29/2025 at 1316 shows significant motion artifact and shows atrial paced rhythm, no acute ischemic changes.    Risk  Prescription drug management.  Decision regarding hospitalization.        Final  diagnoses:   Community acquired pneumonia of left lung, unspecified part of lung   Hypoxia       ED Disposition  ED Disposition       ED Disposition   Decision to Admit    Condition   --    Comment   Level of Care: Telemetry [5]   Diagnosis: Multifocal pneumonia [[id number]]   Admitting Physician: DARLENE BABCOCK [[id number]]   Attending Physician: DARLENE BABCOCK [[id number]]   Is patient appropriate for Inpatient Observation Unit?: No [0]                 No follow-up provider specified.       Medication List      No changes were made to your prescriptions during this visit.            Claudio Hoffmann MD  01/30/25 0872

## 2025-01-30 ENCOUNTER — APPOINTMENT (OUTPATIENT)
Dept: GENERAL RADIOLOGY | Facility: HOSPITAL | Age: OVER 89
DRG: 177 | End: 2025-01-30
Payer: MEDICARE

## 2025-01-30 LAB
ANION GAP SERPL CALCULATED.3IONS-SCNC: 10 MMOL/L (ref 5–15)
BACTERIA BLD CULT: ABNORMAL
BACTERIA UR QL AUTO: ABNORMAL /HPF
BILIRUB UR QL STRIP: NEGATIVE
BOTTLE TYPE: ABNORMAL
BUN SERPL-MCNC: 28 MG/DL (ref 8–23)
BUN/CREAT SERPL: 22.4 (ref 7–25)
CALCIUM SPEC-SCNC: 8.8 MG/DL (ref 8.6–10.5)
CHLORIDE SERPL-SCNC: 102 MMOL/L (ref 98–107)
CLARITY UR: ABNORMAL
CO2 SERPL-SCNC: 23 MMOL/L (ref 22–29)
COLOR UR: YELLOW
CREAT SERPL-MCNC: 1.25 MG/DL (ref 0.57–1)
EGFRCR SERPLBLD CKD-EPI 2021: 41.3 ML/MIN/1.73
GLUCOSE SERPL-MCNC: 74 MG/DL (ref 65–99)
GLUCOSE UR STRIP-MCNC: NEGATIVE MG/DL
HGB UR QL STRIP.AUTO: NEGATIVE
HYALINE CASTS UR QL AUTO: ABNORMAL /LPF
KETONES UR QL STRIP: ABNORMAL
L PNEUMO1 AG UR QL IA: NEGATIVE
LEUKOCYTE ESTERASE UR QL STRIP.AUTO: ABNORMAL
NITRITE UR QL STRIP: NEGATIVE
PH UR STRIP.AUTO: 5.5 [PH] (ref 5–8)
POTASSIUM SERPL-SCNC: 4.8 MMOL/L (ref 3.5–5.2)
PROCALCITONIN SERPL-MCNC: 1.38 NG/ML (ref 0–0.25)
PROT UR QL STRIP: ABNORMAL
QT INTERVAL: 424 MS
QTC INTERVAL: 440 MS
RBC # UR STRIP: ABNORMAL /HPF
REF LAB TEST METHOD: ABNORMAL
S PNEUM AG SPEC QL LA: NEGATIVE
SODIUM SERPL-SCNC: 135 MMOL/L (ref 136–145)
SP GR UR STRIP: 1.03 (ref 1–1.03)
SQUAMOUS #/AREA URNS HPF: ABNORMAL /HPF
TROPONIN T SERPL HS-MCNC: 18 NG/L
UROBILINOGEN UR QL STRIP: ABNORMAL
WBC # UR STRIP: ABNORMAL /HPF

## 2025-01-30 PROCEDURE — 87899 AGENT NOS ASSAY W/OPTIC: CPT | Performed by: NURSE PRACTITIONER

## 2025-01-30 PROCEDURE — 81001 URINALYSIS AUTO W/SCOPE: CPT | Performed by: NURSE PRACTITIONER

## 2025-01-30 PROCEDURE — 25010000002 AMPICILLIN-SULBACTAM PER 1.5 G: Performed by: NURSE PRACTITIONER

## 2025-01-30 PROCEDURE — 84484 ASSAY OF TROPONIN QUANT: CPT | Performed by: FAMILY MEDICINE

## 2025-01-30 PROCEDURE — 80048 BASIC METABOLIC PNL TOTAL CA: CPT | Performed by: FAMILY MEDICINE

## 2025-01-30 PROCEDURE — 97165 OT EVAL LOW COMPLEX 30 MIN: CPT

## 2025-01-30 PROCEDURE — 87449 NOS EACH ORGANISM AG IA: CPT | Performed by: NURSE PRACTITIONER

## 2025-01-30 PROCEDURE — 84145 PROCALCITONIN (PCT): CPT | Performed by: INTERNAL MEDICINE

## 2025-01-30 PROCEDURE — 99232 SBSQ HOSP IP/OBS MODERATE 35: CPT | Performed by: INTERNAL MEDICINE

## 2025-01-30 PROCEDURE — 92610 EVALUATE SWALLOWING FUNCTION: CPT

## 2025-01-30 PROCEDURE — 25010000002 CEFTRIAXONE PER 250 MG: Performed by: INTERNAL MEDICINE

## 2025-01-30 PROCEDURE — 97162 PT EVAL MOD COMPLEX 30 MIN: CPT

## 2025-01-30 RX ADMIN — LEVOTHYROXINE SODIUM 100 MCG: 100 TABLET ORAL at 06:18

## 2025-01-30 RX ADMIN — SODIUM CHLORIDE 1.5 G: 900 INJECTION INTRAVENOUS at 06:18

## 2025-01-30 RX ADMIN — Medication 10 ML: at 08:47

## 2025-01-30 RX ADMIN — SODIUM CHLORIDE 2000 MG: 900 INJECTION INTRAVENOUS at 11:18

## 2025-01-30 RX ADMIN — VENLAFAXINE HYDROCHLORIDE 75 MG: 75 CAPSULE, EXTENDED RELEASE ORAL at 08:38

## 2025-01-30 RX ADMIN — DOXYCYCLINE 100 MG: 100 CAPSULE ORAL at 08:35

## 2025-01-30 RX ADMIN — ACETAMINOPHEN 650 MG: 325 TABLET, FILM COATED ORAL at 15:46

## 2025-01-30 RX ADMIN — AMIODARONE HYDROCHLORIDE 200 MG: 200 TABLET ORAL at 08:35

## 2025-01-30 RX ADMIN — METOPROLOL TARTRATE 12.5 MG: 25 TABLET, FILM COATED ORAL at 21:01

## 2025-01-30 RX ADMIN — DOXYCYCLINE 100 MG: 100 CAPSULE ORAL at 21:03

## 2025-01-30 RX ADMIN — SODIUM CHLORIDE 1.5 G: 900 INJECTION INTRAVENOUS at 00:25

## 2025-01-30 RX ADMIN — Medication 5 MG: at 21:02

## 2025-01-30 RX ADMIN — APIXABAN 2.5 MG: 2.5 TABLET, FILM COATED ORAL at 21:03

## 2025-01-30 RX ADMIN — ACETAMINOPHEN 650 MG: 325 TABLET, FILM COATED ORAL at 21:01

## 2025-01-30 RX ADMIN — DOCUSATE SODIUM 100 MG: 100 CAPSULE, LIQUID FILLED ORAL at 08:38

## 2025-01-30 RX ADMIN — PANTOPRAZOLE SODIUM 40 MG: 40 TABLET, DELAYED RELEASE ORAL at 08:36

## 2025-01-30 RX ADMIN — LISINOPRIL 10 MG: 10 TABLET ORAL at 08:38

## 2025-01-30 RX ADMIN — LEVETIRACETAM 250 MG: 500 TABLET, FILM COATED ORAL at 21:02

## 2025-01-30 RX ADMIN — ROSUVASTATIN 10 MG: 10 TABLET, FILM COATED ORAL at 21:01

## 2025-01-30 RX ADMIN — METOPROLOL TARTRATE 12.5 MG: 25 TABLET, FILM COATED ORAL at 08:37

## 2025-01-30 RX ADMIN — LEVETIRACETAM 250 MG: 500 TABLET, FILM COATED ORAL at 08:36

## 2025-01-30 RX ADMIN — APIXABAN 2.5 MG: 2.5 TABLET, FILM COATED ORAL at 08:38

## 2025-01-30 RX ADMIN — TRAZODONE HYDROCHLORIDE 100 MG: 50 TABLET ORAL at 21:02

## 2025-01-30 NOTE — PROGRESS NOTES
"          Clinical Nutrition Assessment     Patient Name: Bibiana Hodges  YOB: 1935  MRN: [id number]  Date of Encounter: 01/30/25 10:37 EST  Admission date: 1/29/2025  Reason for Visit: MST score 2+, Difficulty chewing/swallowing, Reduced oral intake, \"Unsure\" unintentional weight loss    Assessment   Nutrition Assessment   Admission Diagnosis:  Multifocal pneumonia [J18.9]    Problem List:    Multifocal pneumonia    Hypertension    Acute respiratory failure with hypoxia    Atrial fibrillation    Hyperlipidemia      PMH:   She  has a past medical history of Constipation, Depression, Dysautonomia orthostatic hypotension syndrome, Generalized osteoarthritis, GERD (gastroesophageal reflux disease), Hypertension, Insomnia, Osteoarthrosis, shoulder region, Skin ulcer of scalp, Thrombophlebitis of arm, Tremor, and Vitamin B12 deficiency.    PSH:  She  has a past surgical history that includes Hernia repair; Dilation and curettage of uterus; Hip surgery; and Shoulder surgery.    Applicable Nutrition History:       Anthropometrics     Height: Height: 170.2 cm (67\")  Last Filed Weight: Weight: 59 kg (130 lb 1.1 oz) (01/29/25 1350)  Method: Weight Method: Estimated  BMI: BMI (Calculated): 20.4    UBW:  140lbs per EMR  Weight change:  [place], need measured wt   Weight       Weight (kg) Weight (lbs) Weight Method Visit Report   11/29/2023 56.7 kg  125 lb      4/20/2024 65.772 kg  145 lb      5/6/2024 63.504 kg  140 lb  Bed scale     5/7/2024 63.5 kg  139 lb 15.9 oz  Stated     7/8/2024 58.968 kg  130 lb      1/29/2025 59 kg  130 lb 1.1 oz  Estimated        Stated       Nutrition Focused Physical Exam    Date: 1/30    Patient meets criteria for malnutrition diagnosis, see MSA note.     Subjective   Reported/Observed/Food/Nutrition Related History:     Pt able to provide nutrition hx, states she has not been eating well 2/2 dislike of food at [place], pt also notes difficulty swallowing food if served w/o gravy. Pt " states she usually drinks 2 Boost a day, sometimes peanut butter and jelly or cheese. Pt also notes family brings food occasionally. Pt would like to have ONS while here, requests TID. Pt to have SLP eval for dysphagia.     Current Nutrition Prescription   PO: Diet: Cardiac; Healthy Heart (2-3 Na+); Fluid Consistency: Thin (IDDSI 0)  Oral Nutrition Supplement:   Intake: Insufficient data    Assessment & Plan   Nutrition Diagnosis   Date:  1/30            Updated:    Problem Malnutrition chronic severe   Etiology Food preferences at living facility, dysphagia   Signs/Symptoms Severe muscle wasting and subcutaneous fat loss, po intake </=75% EEN x >/=1 month   Status: New      Goal / Objectives:   Nutrition to support treatment and Tolerate PO, Increase intake      Nutrition Intervention      Follow treatment progress, Care plan reviewed, Encourage intake, Supplement provided    Pt meets criteria for malnutrition (see MSA)  Ordered Boost (junito) TID  RD to adjust ONS pending SLP recommendations    Monitoring/Evaluation:   Per protocol, PO intake, Supplement intake, Weight, Swallow function    Judy Hilton, MS,RD,LD  Time Spent:30

## 2025-01-30 NOTE — THERAPY EVALUATION
Patient Name: Bibiana Hodges  : 1935    MRN: [id number]                              Today's Date: 2025       Admit Date: 2025    Visit Dx:     ICD-10-CM ICD-9-CM   1. Community acquired pneumonia of left lung, unspecified part of lung  J18.9 486   2. Hypoxia  R09.02 799.02   3. Dysphagia, unspecified type  R13.10 787.20     Patient Active Problem List   Diagnosis    Dysautonomia orthostatic hypotension syndrome    Hypertension    Flu vaccine need    Streptococcus pneumoniae vaccination indicated    Acute respiratory failure with hypoxia    Severe malnutrition    Atrial fibrillation    Autonomic dysfunction    Multifocal pneumonia    Hyperlipidemia     Past Medical History:   Diagnosis Date    Constipation     Depression     Dysautonomia orthostatic hypotension syndrome     Generalized osteoarthritis     GERD (gastroesophageal reflux disease)     s/p Nissen    Hypertension     Insomnia     Osteoarthrosis, shoulder region     Skin ulcer of scalp     Thrombophlebitis of arm     Tremor     Vitamin B12 deficiency      Past Surgical History:   Procedure Laterality Date    DILATATION AND CURETTAGE      HERNIA REPAIR      HIP SURGERY      SHOULDER SURGERY      Right      General Information       Row Name 25 0920          OT Time and Intention    Document Type evaluation  -     Mode of Treatment occupational therapy  -       Row Name 25 0920          General Information    Patient Profile Reviewed yes  -LC     Prior Level of Function independent:;all household mobility;transfer;feeding;grooming;min assist:;dressing;bathing  Reports use on RW for short distances, W/C for community.  -     Existing Precautions/Restrictions fall;oxygen therapy device and L/min  -     Barriers to Rehab medically complex;previous functional deficit  -       Row Name 25 0920          Living Environment    People in Home facility resident  -       Row Name 25 0920          Home Main  Entrance    Number of Stairs, Main Entrance none  -       Row Name 01/30/25 0920          Stairs Within Home, Primary    Number of Stairs, Within Home, Primary none  -       Row Name 01/30/25 0920          Cognition    Orientation Status (Cognition) oriented x 3  -       Row Name 01/30/25 0920          Safety Issues/Impairments Affecting Functional Mobility    Safety Issues Affecting Function (Mobility) awareness of need for assistance;insight into deficits/self-awareness;safety precaution awareness;safety precautions follow-through/compliance;sequencing abilities  -     Impairments Affecting Function (Mobility) balance;endurance/activity tolerance;postural/trunk control;strength;shortness of breath;pain  -               User Key  (r) = Recorded By, (t) = Taken By, (c) = Cosigned By      Initials Name Provider Type     Jeannie Baker OT Occupational Therapist                     Mobility/ADL's       Row Name 01/30/25 0922          Bed Mobility    Bed Mobility scooting/bridging;supine-sit;sit-supine  -     Scooting/Bridging Dorothy (Bed Mobility) minimum assist (75% patient effort);verbal cues  -     Supine-Sit Dorothy (Bed Mobility) minimum assist (75% patient effort);verbal cues  -     Sit-Supine Dorothy (Bed Mobility) minimum assist (75% patient effort);verbal cues  -     Assistive Device (Bed Mobility) bed rails;head of bed elevated  -     Comment, (Bed Mobility) VC's to sequence and increased time and effort to transition from supine to sit.  -       Row Name 01/30/25 0922          Transfers    Transfers sit-stand transfer  -     Comment, (Transfers) VC's for hand placement and sequencing.  -       Row Name 01/30/25 0922          Sit-Stand Transfer    Sit-Stand Dorothy (Transfers) minimum assist (75% patient effort);2 person assist;verbal cues  -     Assistive Device (Sit-Stand Transfers) other (see comments)  B UE support  -       Row Name 01/30/25 0922           Activities of Daily Living    BADL Assessment/Intervention grooming;lower body dressing  -       Row Name 01/30/25 0922          Grooming Assessment/Training    [place] Level (Grooming) grooming skills;wash face, hands;set up  -     Position (Grooming) unsupported sitting  -       Row Name 01/30/25 0922          Lower Body Dressing Assessment/Training    [place] Level (Lower Body Dressing) lower body dressing skills;dependent (less than 25% patient effort)  -               User Key  (r) = Recorded By, (t) = Taken By, (c) = Cosigned By      Initials Name Provider Type     Jeannie Baker OT Occupational Therapist                   Obj/Interventions       Row Name 01/30/25 0933          Sensory Assessment (Somatosensory)    Sensory Assessment (Somatosensory) UE sensation intact  -[facility] Name 01/30/25 0933          Vision Assessment/Intervention    Visual Impairment/Limitations other (see comments)  Reports decreased vision B eyes  -[facility] Name 01/30/25 0933          Range of Motion Comprehensive    General Range of Motion bilateral upper extremity ROM WFL  -[facility] Name 01/30/25 0933          Strength Comprehensive (MMT)    General Manual Muscle Testing (MMT) Assessment upper extremity strength deficits identified  -[facility] Name 01/30/25 0933          Upper Extremity (Manual Muscle Testing)    Upper Extremity: Manual Muscle Testing (MMT) left UE strength is WFL;right UE strength is WFL  -[facility] Name 01/30/25 0933          Motor Skills    Motor Skills functional endurance  -     Functional Endurance impaired activity tolerance  -       Row Name 01/30/25 0933          Balance    Balance Assessment sitting static balance;sitting dynamic balance;standing static balance;standing dynamic balance  -     Static Sitting Balance standby assist  -     Dynamic Sitting Balance contact guard  -     Position, Sitting Balance unsupported;sitting edge of bed  -     Static  Standing Balance contact guard  -     Dynamic Standing Balance minimal assist  -     Position/Device Used, Standing Balance supported;other (see comments)  B UE support  -     Balance Interventions sitting;standing;sit to stand;supported;occupation based/functional task;weight shifting activity  -     Comment, Balance MIn A to steady  -               User Key  (r) = Recorded By, (t) = Taken By, (c) = Cosigned By      Initials Name Provider Type     Jeannie Baker, OT Occupational Therapist                   Goals/Plan       Row Name 01/30/25 0946          Transfer Goal 1 (OT)    Activity/Assistive Device (Transfer Goal 1, OT) sit-to-stand/stand-to-sit;bed-to-chair/chair-to-bed;toilet;walker, rolling  -     [place] Level/Cues Needed (Transfer Goal 1, OT) contact guard required;verbal cues required  -     Time Frame (Transfer Goal 1, OT) long term goal (LTG);10 days  -     Progress/Outcome (Transfer Goal 1, OT) goal ongoing  -       Row Name 01/30/25 0946          Dressing Goal 1 (OT)    Activity/Device (Dressing Goal 1, OT) lower body dressing  -     [place]/Cues Needed (Dressing Goal 1, OT) moderate assist (50-74% patient effort)  -     Time Frame (Dressing Goal 1, OT) short term goal (STG);5 days  -     Progress/Outcome (Dressing Goal 1, OT) goal ongoing  -       Row Name 01/30/25 0946          Toileting Goal 1 (OT)    Activity/Device (Toileting Goal 1, OT) adjust/manage clothing;perform perineal hygiene;commode;grab bar/safety frame  -     [place] Level/Cues Needed (Toileting Goal 1, OT) minimum assist (75% or more patient effort)  -     Time Frame (Toileting Goal 1, OT) long term goal (LTG);10 days  -     Progress/Outcome (Toileting Goal 1, OT) goal ongoing  -       Row Name 01/30/25 0946          Therapy Assessment/Plan (OT)    Planned Therapy Interventions (OT) activity tolerance training;adaptive equipment training;BADL retraining;functional balance  retraining;occupation/activity based interventions;patient/caregiver education/training;strengthening exercise;transfer/mobility retraining  -               User Key  (r) = Recorded By, (t) = Taken By, (c) = Cosigned By      Initials Name Provider Type    LC Jeannie Baker, OT Occupational Therapist                   Clinical Impression       Row Name 01/30/25 0941          Pain Assessment    Pain Location chest  -LC     Pain Side/Orientation left  -LC     Pain Management Interventions nursing notified;positioning techniques utilized  -LC     Response to Pain Interventions activity participation with tolerable pain  -LC     Additional Documentation Pain Scale: FACES Pre/Post-Treatment (Group)  -LC       Row Name 01/30/25 0941          Pain Scale: FACES Pre/Post-Treatment    Pain: FACES Scale, Pretreatment 2-->hurts little bit  -LC     Posttreatment Pain Rating 2-->hurts little bit  -LC       Row Name 01/30/25 0941          Plan of Care Review    Plan of Care Reviewed With patient  -LC     Progress no change  -LC     Outcome Evaluation Pt. presents below baseline with ADLs and functional mobility. Limited by decreased activity tolerance, generalized weakness, and balance. Skilled OT services warranted to promote return to PLOF. Recommend IPR at discharge.  -       Row Name 01/30/25 0941          Therapy Assessment/Plan (OT)    Patient/Family Therapy Goal Statement (OT) To take care of self  -LC     Therapy Frequency (OT) daily  -       Row Name 01/30/25 0941          Therapy Plan Review/Discharge Plan (OT)    Anticipated Discharge Disposition (OT) inpatient rehabilitation facility  -       Row Name 01/30/25 0941          Vital Signs    Pre Systolic BP Rehab 138  -LC     Pre Treatment Diastolic BP 65  -LC     Post Systolic BP Rehab 136  -LC     Post Treatment Diastolic BP 70  -LC     Pre Patient Position Supine  -LC     Post Patient Position Supine  -LC       Row Name 01/30/25 0941          Positioning and  Restraints    Pre-Treatment Position in bed  -LC     Post Treatment Position bed  -LC     In Bed notified nsg;side lying right;call light within reach;encouraged to call for assist;exit alarm on;pillow between legs  -               User Key  (r) = Recorded By, (t) = Taken By, (c) = Cosigned By      Initials Name Provider Type    Jeannie Garcia OT Occupational Therapist                   Outcome Measures       Row Name 01/30/25 0947          How much help from another is currently needed...    Putting on and taking off regular lower body clothing? 2  -LC     Bathing (including washing, rinsing, and drying) 2  -LC     Toileting (which includes using toilet bed pan or urinal) 2  -LC     Putting on and taking off regular upper body clothing 3  -LC     Taking care of personal grooming (such as brushing teeth) 3  -LC     Eating meals 3  -     AM-PAC 6 Clicks Score (OT) 15  -       Row Name 01/30/25 0947          Functional Assessment    Outcome Measure Options AM-PAC 6 Clicks Daily Activity (OT)  -               User Key  (r) = Recorded By, (t) = Taken By, (c) = Cosigned By      Initials Name Provider Type    Jeannie Garcia OT Occupational Therapist                    Occupational Therapy Education       Title: PT OT SLP Therapies (In Progress)       Topic: Occupational Therapy (In Progress)       Point: ADL training (In Progress)       Description:   Instruct learner(s) on proper safety adaptation and remediation techniques during self care or transfers.   Instruct in proper use of assistive devices.                  Learning Progress Summary            Patient Acceptance, E, NR by  at 1/30/2025 9813                      Point: Home exercise program (Not Started)       Description:   Instruct learner(s) on appropriate technique for monitoring, assisting and/or progressing therapeutic exercises/activities.                  Learner Progress:  Not documented in this visit.              Point: Precautions  (In Progress)       Description:   Instruct learner(s) on prescribed precautions during self-care and functional transfers.                  Learning Progress Summary            Patient Acceptance, E, NR by  at 1/30/2025 0758                      Point: Body mechanics (In Progress)       Description:   Instruct learner(s) on proper positioning and spine alignment during self-care, functional mobility activities and/or exercises.                  Learning Progress Summary            Patient Acceptance, E, NR by  at 1/30/2025 0758                                      User Key       Initials Effective Dates Name Provider Type Discipline     06/16/21 -  Jeannie Baker OT Occupational Therapist OT                  OT Recommendation and Plan  Planned Therapy Interventions (OT): activity tolerance training, adaptive equipment training, BADL retraining, functional balance retraining, occupation/activity based interventions, patient/caregiver education/training, strengthening exercise, transfer/mobility retraining  Therapy Frequency (OT): daily  Plan of Care Review  Plan of Care Reviewed With: patient  Progress: no change  Outcome Evaluation: Pt. presents below baseline with ADLs and functional mobility. Limited by decreased activity tolerance, generalized weakness, and balance. Skilled OT services warranted to promote return to PLOF. Recommend IPR at discharge.     Time Calculation:   Evaluation Complexity (OT)  Review Occupational Profile/Medical/Therapy History Complexity: brief/low complexity  Assessment, Occupational Performance/Identification of Deficit Complexity: 1-3 performance deficits  Clinical Decision Making Complexity (OT): problem focused assessment/low complexity  Overall Complexity of Evaluation (OT): low complexity     Time Calculation- OT       Row Name 01/30/25 0948             Time Calculation- OT    OT Start Time 0758  -      OT Received On 01/30/25  -      OT Goal Re-Cert Due Date 02/09/25   -LC         Untimed Charges    OT Eval/Re-eval Minutes 52  -LC         Total Minutes    Untimed Charges Total Minutes 52  -LC       Total Minutes 52  -LC                User Key  (r) = Recorded By, (t) = Taken By, (c) = Cosigned By      Initials Name Provider Type    Jeannie Garcia OT Occupational Therapist                  Therapy Charges for Today       Code Description Service Date Service Provider Modifiers Qty    15354240640  OT EVAL LOW COMPLEXITY 4 1/30/2025 Jeannie Baker OT GO 1    [id number]  OT THER SUPP EA 15 MIN 1/30/2025 Jeannie Baker OT  3                 Jeannie Baker OT  1/30/2025

## 2025-01-30 NOTE — THERAPY EVALUATION
Acute Care - Speech Language Pathology   Swallow Initial Evaluation [facility]  Clinical Swallow Evaluation       Patient Name: Bibiana Hodges  : 1935  MRN: [id number]  Today's Date: 2025               Admit Date: 2025    Visit Dx:     ICD-10-CM ICD-9-CM   1. Community acquired pneumonia of left lung, unspecified part of lung  J18.9 486   2. Hypoxia  R09.02 799.02   3. Dysphagia, unspecified type  R13.10 787.20     Patient Active Problem List   Diagnosis    Dysautonomia orthostatic hypotension syndrome    Hypertension    Flu vaccine need    Streptococcus pneumoniae vaccination indicated    Acute respiratory failure with hypoxia    Severe malnutrition    Atrial fibrillation    Autonomic dysfunction    Multifocal pneumonia    Hyperlipidemia     Past Medical History:   Diagnosis Date    Constipation     Depression     Dysautonomia orthostatic hypotension syndrome     Generalized osteoarthritis     GERD (gastroesophageal reflux disease)     s/p Nissen    Hypertension     Insomnia     Osteoarthrosis, shoulder region     Skin ulcer of scalp     Thrombophlebitis of arm     Tremor     Vitamin B12 deficiency      Past Surgical History:   Procedure Laterality Date    DILATATION AND CURETTAGE      HERNIA REPAIR      HIP SURGERY      SHOULDER SURGERY      Right       SLP Recommendation and Plan  SLP Swallowing Diagnosis: R/O pharyngeal dysphagia (25)  SLP Diet Recommendation: NPO (25)     SLP Rec. for Method of Medication Administration: meds whole, with puree (25)     Monitor for Signs of Aspiration: yes, notify SLP if any concerns (25)  Recommended Diagnostics: reassess via VFSS (MBS) (25)     Anticipated Discharge Disposition (SLP): skilled nursing facility (25)           Oral Care Recommendations: Oral Care BID/PRN, Toothbrush (25)                                               SWALLOW EVALUATION (Last 72 Hours)       SLP  "Adult Swallow Evaluation       Row Name 01/30/25 0815                   Rehab Evaluation    Document Type evaluation  -RS        Subjective Information complains of;fatigue  -RS        Patient Observations alert;cooperative  -RS        Patient/Family/Caregiver Comments/Observations No family present  -RS        Patient Effort fair  -RS        Symptoms Noted During/After Treatment fatigue  -RS           General Information    Patient Profile Reviewed yes  -RS        Pertinent History Of Current Problem Pt is an 89 yoF w PMHx significant for A-fib, HTN, autonomic dysfunction, chronic debility, recurrent pneumonia with concerns for possible aspiration per MD note, HLD, depression, and dysphagia. She was transferred from SNF w c/o SOA, she is found to have multifocal pna, L>R  -RS        Current Method of Nutrition regular textures;thin liquids  -RS        Precautions/Limitations, Vision difficult to assess  -RS        Precautions/Limitations, Hearing WFL;for purposes of eval  -RS        Prior Level of Function-Communication WFL  -RS        Prior Level of Function-Swallowing other (see comments)  longstanding hx of dysphagia w FEES 7/2024 recs for soft/nectar, no straws, and several strategies  -RS        Plans/Goals Discussed with patient  -RS           Pain    Pretreatment Pain Rating 0/10 - no pain  -RS        Posttreatment Pain Rating 0/10 - no pain  -RS           Oral Musculature and Cranial Nerve Assessment    Oral Motor General Assessment generalized oral motor weakness  -RS           Clinical Swallow Eval    Pharyngeal Phase suspected pharyngeal impairment  -RS        Clinical Swallow Evaluation Summary Limited assessment 2/2 pt requests to rest and not participate. She verbalizes understanding of prior recs for thickened liquids; however, states that she \"can't\" drink them. Discuss need for further pharyngeal assessment and pt is agreeable to MBS. D/w RN, rec PO meds w puree until study  -RS           " Pharyngeal Phase Concerns    Pharyngeal Phase Concerns other (see comments)  -RS        Pharyngeal Phase Concerns, Comment hx pharyngeal dysphagia  -RS           SLP Evaluation Clinical Impression    SLP Swallowing Diagnosis R/O pharyngeal dysphagia  -RS        Functional Impact risk of aspiration/pneumonia  -RS           Recommendations    SLP Diet Recommendation NPO  -RS        Recommended Diagnostics reassess via VFSS (MBS)  -RS        Oral Care Recommendations Oral Care BID/PRN;Toothbrush  -RS        SLP Rec. for Method of Medication Administration meds whole;with puree  -RS        Monitor for Signs of Aspiration yes;notify SLP if any concerns  -RS        Anticipated Discharge Disposition (SLP) skilled nursing facility  -RS                  User Key  (r) = Recorded By, (t) = Taken By, (c) = Cosigned By      Initials Name Effective Dates    RS Pascual Villafuerte MS CCC-SLP 09/14/23 -                     EDUCATION  The patient has been educated in the following areas:   Dysphagia (Swallowing Impairment).                Time Calculation:    Time Calculation- SLP       Row Name 01/30/25 0844             Time Calculation- SLP    SLP Start Time 0815  -RS      SLP Received On 01/30/25  -RS         Untimed Charges    55200-KL Eval Oral Pharyng Swallow Minutes 30  -RS         Total Minutes    Untimed Charges Total Minutes 30  -RS       Total Minutes 30  -RS                User Key  (r) = Recorded By, (t) = Taken By, (c) = Cosigned By      Initials Name Provider Type    RS Pascual Villafuerte MS CCC-SLP Speech and Language Pathologist                    Therapy Charges for Today       Code Description Service Date Service Provider Modifiers Qty    [id number] HC ST EVAL ORAL PHARYNG SWALLOW 2 1/30/2025 Pascual Villafuerte MS CCC-SLP GN 1                 MS CODY Vo  1/30/2025

## 2025-01-30 NOTE — ED NOTES
Bibiana Hodges    Nursing Report ED to Floor:  Mental status: A+Ox4  Ambulatory status: Bedfast  Oxygen Therapy:  4L NC  Cardiac Rhythm: ST  Admitted from: [facility]  Safety Concerns:  Falls  Precautions: N/A  Social Issues: N/A  ED Room #:  29    ED Nurse Phone Extension - [phone] or may call [phone].      HPI:   Chief Complaint   Patient presents with    Shortness of Breath       Past Medical History:  Past Medical History:   Diagnosis Date    Constipation     Depression     Dysautonomia orthostatic hypotension syndrome     Generalized osteoarthritis     GERD (gastroesophageal reflux disease)     s/p Nissen    Hypertension     Insomnia     Osteoarthrosis, shoulder region     Skin ulcer of scalp     Thrombophlebitis of arm     Tremor     Vitamin B12 deficiency         Past Surgical History:  Past Surgical History:   Procedure Laterality Date    DILATATION AND CURETTAGE      HERNIA REPAIR      HIP SURGERY      SHOULDER SURGERY      Right        Admitting Doctor:   Mary Jane Ann MD    Consulting Provider(s):  Consults       No orders found from 12/31/2024 to 1/30/2025.             Admitting Diagnosis:   The primary encounter diagnosis was Community acquired pneumonia of left lung, unspecified part of lung. A diagnosis of Hypoxia was also pertinent to this visit.    Most Recent Vitals:   Vitals:    01/29/25 1700 01/29/25 1730 01/29/25 1756 01/29/25 1800   BP: (!) 114/101 122/100  127/64   BP Location:       Patient Position:       Pulse: 67 61  65   Resp:       TempSrc:       SpO2: 98% 94% (!) 86% 94%   Weight:       Height:           Active LDAs/IV Access:   Lines, Drains & Airways       Active LDAs       Name Placement date Placement time Site Days    Peripheral IV 01/29/25 1349 Anterior;Right Forearm 01/29/25  1349  Forearm  less than 1                    Labs (abnormal labs have a star):   Labs Reviewed   COMPREHENSIVE METABOLIC PANEL - Abnormal; Notable for the following components:       Result Value     Glucose 138 (*)     Creatinine 1.16 (*)     Total Protein 5.8 (*)     Albumin 3.4 (*)     eGFR 45.2 (*)     All other components within normal limits    Narrative:     GFR Categories in Chronic Kidney Disease (CKD)      GFR Category          GFR (mL/min/1.73)    Interpretation  G1                     90 or greater         Normal or high (1)  G2                      60-89                Mild decrease (1)  G3a                   45-59                Mild to moderate decrease  G3b                   30-44                Moderate to severe decrease  G4                    15-29                Severe decrease  G5                    14 or less           Kidney failure          (1)In the absence of evidence of kidney disease, neither GFR category G1 or G2 fulfill the criteria for CKD.    eGFR calculation 2021 CKD-EPI creatinine equation, which does not include race as a factor   CBC WITH AUTO DIFFERENTIAL - Abnormal; Notable for the following components:    .4 (*)     MCH 34.1 (*)     Neutrophil % 90.6 (*)     Lymphocyte % 4.6 (*)     Monocyte % 3.8 (*)     Neutrophils, Absolute 9.06 (*)     Lymphocytes, Absolute 0.46 (*)     All other components within normal limits   LACTIC ACID, PLASMA - Abnormal; Notable for the following components:    Lactate 2.3 (*)     All other components within normal limits   HIGH SENSITIVITIY TROPONIN T 1HR - Abnormal; Notable for the following components:    HS Troponin T 16 (*)     Troponin T Numeric Delta 3 (*)     All other components within normal limits    Narrative:     High Sensitive Troponin T Reference Range:  <14.0 ng/L- Negative Female for AMI  <22.0 ng/L- Negative Male for AMI  >=14 - Abnormal Female indicating possible myocardial injury.  >=22 - Abnormal Male indicating possible myocardial injury.   Clinicians would have to utilize clinical acumen, EKG, Troponin, and serial changes to determine if it is an Acute Myocardial Infarction or myocardial injury due to an underlying  chronic condition.        RESPIRATORY PANEL PCR W/ COVID-19 (SARS-COV-2), NP SWAB IN UTM/VTP, 2 HR TAT - Normal    Narrative:     In the setting of a positive respiratory panel with a viral infection PLUS a negative procalcitonin without other underlying concern for bacterial infection, consider observing off antibiotics or discontinuation of antibiotics and continue supportive care. If the respiratory panel is positive for atypical bacterial infection (Bordetella pertussis, Chlamydophila pneumoniae, or Mycoplasma pneumoniae), consider antibiotic de-escalation to target atypical bacterial infection.   BNP (IN-HOUSE) - Normal    Narrative:     This assay is used as an aid in the diagnosis of individuals suspected of having heart failure. It can be used as an aid in the diagnosis of acute decompensated heart failure (ADHF) in patients presenting with signs and symptoms of ADHF to the emergency department (ED). In addition, NT-proBNP of <300 pg/mL indicates ADHF is not likely.    Age Range Result Interpretation  NT-proBNP Concentration (pg/mL:      <50             Positive            >450                   Gray                 300-450                    Negative             <300    50-75           Positive            >900                  Gray                300-900                  Negative            <300      >75             Positive            >1800                  Gray                300-1800                  Negative            <300   TROPONIN - Normal    Narrative:     High Sensitive Troponin T Reference Range:  <14.0 ng/L- Negative Female for AMI  <22.0 ng/L- Negative Male for AMI  >=14 - Abnormal Female indicating possible myocardial injury.  >=22 - Abnormal Male indicating possible myocardial injury.   Clinicians would have to utilize clinical acumen, EKG, Troponin, and serial changes to determine if it is an Acute Myocardial Infarction or myocardial injury due to an underlying chronic condition.         LACTIC ACID, REFLEX - Normal   COVID PRE-OP / PRE-PROCEDURE SCREENING ORDER (NO ISOLATION)    Narrative:     The following orders were created for panel order COVID PRE-OP / PRE-PROCEDURE SCREENING ORDER (NO ISOLATION) - Swab, Nasopharynx.  Procedure                               Abnormality         Status                     ---------                               -----------         ------                     Respiratory Panel PCR w/...[[id number]]  Normal              Final result                 Please view results for these tests on the individual orders.   BLOOD CULTURE   BLOOD CULTURE   RAINBOW DRAW    Narrative:     The following orders were created for panel order [place] Draw.  Procedure                               Abnormality         Status                     ---------                               -----------         ------                     Green Top (Gel)[[id number]]                                  Final result               Lavender Top[[id number]]                                     Final result               Gold Top - SST[[id number]]                                   Final result               [place]re Top[[id number]]                                         Final result               Light Blue Top[[id number]]                                   Final result                 Please view results for these tests on the individual orders.   CBC AND DIFFERENTIAL    Narrative:     The following orders were created for panel order CBC & Differential.  Procedure                               Abnormality         Status                     ---------                               -----------         ------                     CBC Auto Differential[[id number]]        Abnormal            Final result               Scan Slide[[id number]]                                                                    Please view results for these tests on the individual orders.   GREEN TOP   LAVENDER TOP   GOLD TOP - SST   GRAY TOP    LIGHT BLUE TOP       Meds Given in ED:   Medications   sodium chloride 0.9 % flush 10 mL (has no administration in time range)   ampicillin-sulbactam (UNASYN) 1.5 g in sodium chloride 0.9 % 100 mL MBP (has no administration in time range)   doxycycline (MONODOX) capsule 100 mg (has no administration in time range)           Last NIH score:                                                          Dysphagia screening results:        Chanel Coma Scale:  No data recorded     CIWA:        Restraint Type:            Isolation Status:  No active isolations

## 2025-01-30 NOTE — PAYOR COMM NOTE
"Bibiana Hodges (89 y.o. Female)     [id number]     Khushbu Rodriguez, RN  Utilization Review  Ffprv-[phone]  [phone]        Date of Birth   1935    Social Security Number       Address   [address] [address]    Home Phone   [phone]    MRN   [id number]       Church   [employer]    Marital Status                               Admission Date   1/29/25    Admission Type   Emergency    Admitting Provider   Kelly Villafuerte MD    Attending Provider   Kelly Villafuerte MD    Department, Room/Bed   [address], S587/1       Discharge Date       Discharge Disposition       Discharge Destination                                 Attending Provider: Kelly Villafuerte MD    Allergies: Gabapentin, Sulfa Antibiotics    Isolation: None   Infection: None   Code Status: CPR    Ht: 170.2 cm (67\")   Wt: 59 kg (130 lb 1.1 oz)    Admission Cmt: None   Principal Problem: Multifocal pneumonia [J18.9]                   Active Insurance as of 1/29/2025       Primary Coverage       Payor Plan Insurance Group Employer/Plan Group    ANTHEM MEDICARE REPLACEMENT ANTHEM MED ADV HMO [id number]       Payor Plan Address Payor Plan Phone Number Payor Plan Fax Number Effective Dates    [address] [phone]  1/1/2025 - None Entered    [facility], [place] [address]         Subscriber Name Subscriber Birth Date Member BIBIANA ANTONIO 1935 [id number]                     Emergency Contacts        (Rel.) Home Phone Work Phone Mobile Phone    Danyelle Keyes (POA) (Daughter) [phone] -- [phone]    ELODIA HODGES (Son) -- -- [phone]    NIA HODGES (Son) [phone] -- [phone]                 History & Physical        Zarina Crawford APRN at 01/29/25 1906       Attestation signed by Mary Jane Ann MD at 01/29/25 8101    I have reviewed this documentation and agree.                      [facility] Medicine " Services  HISTORY AND PHYSICAL    Patient Name: Bibiana Hodges  : 1935  MRN: [id number]  Primary Care Physician: Nivia Madrigal MD  Date of admission: 2025    Subjective  Subjective     Chief Complaint:  Shortness of air    HPI:  Bibiana Hodges is a 89 y.o. female with history A-fib, hypertension, autonomic dysfunction, chronic debility, recurrent pneumonia with concerns for possible aspiration, hyperlipidemia, depression, dysphagia, was transferred from [facility] [place] with complaints of shortness of air.  Patient reports that she has had worsening shortness of air over the past 2 days.  Patient states that she frequently has shortness of air at night when she lays down.  She has a productive cough with yellow sputum.  She states that sometimes when she takes a deep breath she does experience some chest pain.  Earlier today she had some nausea and vomiting that is now resolved.  Patient also endorses sneezing, fatigue, constipation, dysuria, headaches, and generalized weakness.  No abdominal pain, diarrhea, fevers, chills, or any other issues at this time.  Chest x-ray shows grossly stable chronic multifocal opacities throughout the left lung, likely representing chronic infectious or inflammatory process.  Respiratory panel PCR negative.  Patient was started on doxycycline and Unasyn in the ED.  Patient is being admitted to the hospitalist for further evaluation management.        Review of Systems   Constitutional:  Positive for fatigue. Negative for chills and fever.   HENT:  Positive for sneezing. Negative for congestion and sore throat.    Eyes: Negative.    Respiratory:  Positive for cough and shortness of breath. Negative for wheezing.    Cardiovascular:  Positive for chest pain. Negative for palpitations and leg swelling.   Gastrointestinal:  Positive for constipation, nausea and vomiting. Negative for abdominal pain, blood in stool and diarrhea.   Endocrine: Negative.     Genitourinary:  Positive for dysuria. Negative for frequency and urgency.   Musculoskeletal: Negative.    Skin: Negative.    Allergic/Immunologic: Negative.    Neurological:  Positive for weakness and headaches. Negative for dizziness and light-headedness.   Hematological: Negative.    Psychiatric/Behavioral: Negative.                  Personal History     Past Medical History:   Diagnosis Date    Constipation     Depression     Dysautonomia orthostatic hypotension syndrome     Generalized osteoarthritis     GERD (gastroesophageal reflux disease)     s/p Nissen    Hypertension     Insomnia     Osteoarthrosis, shoulder region     Skin ulcer of scalp     Thrombophlebitis of arm     Tremor     Vitamin B12 deficiency              Past Surgical History:   Procedure Laterality Date    DILATATION AND CURETTAGE      HERNIA REPAIR      HIP SURGERY      SHOULDER SURGERY      Right       Family History:  family history includes Anorexia nervosa in her daughter; Cancer in her mother; Stroke in her brother.     Social History:  reports that she has never smoked. She has never used smokeless tobacco. She reports that she does not drink alcohol and does not use drugs.  Social History     Social History Narrative    Not on file       Medications:  Vitamin C, acetaminophen, albuterol, amiodarone, apixaban, benzonatate, bisacodyl, castor oil-balsam peru, docusate sodium, ferrous sulfate, fluticasone, levETIRAcetam XR, levothyroxine, lisinopril, meclizine, melatonin, metoprolol tartrate, ondansetron ODT, pantoprazole, polyethylene glycol, rosuvastatin, senna, traZODone, and venlafaxine XR    Allergies   Allergen Reactions    Gabapentin     Sulfa Antibiotics        Objective  Objective     Vital Signs:   Temp:  [98.2 °F (36.8 °C)] 98.2 °F (36.8 °C)  Heart Rate:  [60-67] 65  Resp:  [22] 22  BP: (111-152)/() 127/64  Flow (L/min) (Oxygen Therapy):  [4] 4    Physical Exam   Constitutional: Awake, alert, resting in bed  Eyes:  PERRLA, sclerae anicteric, no conjunctival injection  HENT: NCAT, mucous membranes moist  Neck: Supple, no thyromegaly, no lymphadenopathy, trachea midline  Respiratory: Clear to auscultation bilaterally, nonlabored respirations   Cardiovascular: RRR, no murmurs, rubs, or gallops, palpable pedal pulses bilaterally  Gastrointestinal: Positive bowel sounds, soft, nontender, nondistended  Musculoskeletal: No bilateral ankle edema, no clubbing or cyanosis to extremities  Psychiatric: Appropriate affect, cooperative  Neurologic: Oriented x 3, strength symmetric in all extremities, Cranial Nerves grossly intact to confrontation, speech clear, tremors BUE  Skin: No rashes       Result Review:  I have personally reviewed the results from the time of this admission to 1/29/2025 20:11 EST and agree with these findings:  [x]  Laboratory list / accordion  [x]  Microbiology  [x]  Radiology  [x]  EKG/Telemetry   []  Cardiology/Vascular   []  Pathology  [x]  Old records  []  Other:  Most notable findings include:     LAB RESULTS:      Lab 01/29/25  1723 01/29/25  1349   WBC  --  10.00   HEMOGLOBIN  --  14.0   HEMATOCRIT  --  43.3   PLATELETS  --  167   NEUTROS ABS  --  9.06*   IMMATURE GRANS (ABS)  --  0.02   LYMPHS ABS  --  0.46*   MONOS ABS  --  0.38   EOS ABS  --  0.05   MCV  --  105.4*   LACTATE 1.4 2.3*         Lab 01/29/25  1349   SODIUM 137   POTASSIUM 4.8   CHLORIDE 101   CO2 26.0   ANION GAP 10.0   BUN 23   CREATININE 1.16*   EGFR 45.2*   GLUCOSE 138*   CALCIUM 9.2         Lab 01/29/25  1349   TOTAL PROTEIN 5.8*   ALBUMIN 3.4*   GLOBULIN 2.4   ALT (SGPT) 27   AST (SGOT) 31   BILIRUBIN 0.5   ALK PHOS 55         Lab 01/29/25  1543 01/29/25  1349   PROBNP  --  1,024.0   HSTROP T 16* 13                 Brief Urine Lab Results  (Last result in the past 365 days)        Color   Clarity   Blood   Leuk Est   Nitrite   Protein   CREAT   Urine HCG        05/06/24 2154 Yellow   Clear   Negative   Negative   Negative   Trace                  Microbiology Results (last 10 days)       Procedure Component Value - Date/Time    COVID PRE-OP / PRE-PROCEDURE SCREENING ORDER (NO ISOLATION) - Swab, Nasopharynx [[id number]]  (Normal) Collected: 01/29/25 1440    Lab Status: Final result Specimen: Swab from Nasopharynx Updated: 01/29/25 2606    Narrative:      The following orders were created for panel order COVID PRE-OP / PRE-PROCEDURE SCREENING ORDER (NO ISOLATION) - Swab, Nasopharynx.  Procedure                               Abnormality         Status                     ---------                               -----------         ------                     Respiratory Panel PCR w/...[[id number]]  Normal              Final result                 Please view results for these tests on the individual orders.    Respiratory Panel PCR w/COVID-19(SARS-CoV-2) ERICK/MARTA/ELPIDIO/PAD/COR/ALMA In-House, NP Swab in UTM/VTM, 2 HR TAT - Swab, Nasopharynx [[id number]]  (Normal) Collected: 01/29/25 1440    Lab Status: Final result Specimen: Swab from Nasopharynx Updated: 01/29/25 7966     ADENOVIRUS, PCR Not Detected     Coronavirus 229E Not Detected     Coronavirus HKU1 Not Detected     Coronavirus NL63 Not Detected     Coronavirus OC43 Not Detected     COVID19 Not Detected     Human Metapneumovirus Not Detected     Human Rhinovirus/Enterovirus Not Detected     Influenza A PCR Not Detected     Influenza B PCR Not Detected     Parainfluenza Virus 1 Not Detected     Parainfluenza Virus 2 Not Detected     Parainfluenza Virus 3 Not Detected     Parainfluenza Virus 4 Not Detected     RSV, PCR Not Detected     Bordetella pertussis pcr Not Detected     Bordetella parapertussis PCR Not Detected     Chlamydophila pneumoniae PCR Not Detected     Mycoplasma pneumo by PCR Not Detected    Narrative:      In the setting of a positive respiratory panel with a viral infection PLUS a negative procalcitonin without other underlying concern for bacterial infection, consider observing off  antibiotics or discontinuation of antibiotics and continue supportive care. If the respiratory panel is positive for atypical bacterial infection (Bordetella pertussis, Chlamydophila pneumoniae, or Mycoplasma pneumoniae), consider antibiotic de-escalation to target atypical bacterial infection.            XR Chest 1 View    Result Date: 1/29/2025  XR CHEST 1 VW Date of Exam: 1/29/2025 1:31 PM EST Indication: SOA triage protocol Comparison: None available. Findings: There is a dual-chamber pacemaker. Heart size and pulmonary vasculature are within normal limits. There are multiple airspace opacities throughout the left lung that are grossly similar in appearance to the prior study. The right lung is clear     Impression: Impression: Grossly stable chronic multifocal opacities throughout the left lung, likely representing a chronic infectious or inflammatory process Electronically Signed: Kenny Staton  1/29/2025 2:06 PM EST  Workstation ID: [id number]     Results for orders placed during the hospital encounter of 08/20/23    Adult Transthoracic Echo Complete W/ Cont if Necessary Per Protocol    Interpretation Summary    Left ventricular systolic function is mildly decreased. Calculated left ventricular EF = 47.9% Normal left ventricular cavity size and wall thickness noted. There is left ventricular global hypokinesis noted. Left ventricular diastolic function is consistent with (grade I) impaired relaxation.    : Normal right ventricular cavity size and systolic function noted. Electronic lead present in the ventricle.    Normal left atrial size and volume noted. Saline test results are negative.    There is moderate calcification of the aortic valve. The aortic valve was poorly visualized but appears trileaflet. Moderate aortic valve regurgitation is present. No aortic valve stenosis is present.    Mild mitral annular calcification is present. Mild mitral valve regurgitation is present. No significant mitral valve  stenosis is present.    The tricuspid valve is grossly normal in structure. Mild tricuspid valve regurgitation is present. Estimated right ventricular systolic pressure from tricuspid regurgitation is normal, 33 mmHg. No tricuspid valve stenosis is present.    Mild dilation of the ascending aorta is present. Ascending aorta = 3.7 cm    There is a small (<1cm) pericardial effusion adjacent to the right atrium and right ventricle.      Assessment & Plan  Assessment & Plan       Multifocal pneumonia    Hypertension    Acute respiratory failure with hypoxia    Atrial fibrillation    Hyperlipidemia    Bibiana Hodges is a 89 y.o. female with history A-fib, hypertension, autonomic dysfunction, chronic debility, recurrent pneumonia with concerns for possible aspiration, hyperlipidemia, depression, dysphagia, was transferred from [facility] with complaints of shortness of air.      Assessment and Plan:    Acute respiratory failure with hypoxia  Pneumonia  Lactic acidosis--resolved  -- Lactate 2.3 and 1.4  -- Chest x-ray shows grossly stable chronic multifocal opacities throughout the left lung likely representing a chronic infectious or inflammatory process.  -- Respiratory panel PCR negative  -- BC x 2 pending  -- Sputum culture  -- DuoNebs  -- Urine Legionella and S.  Pneumo Ag  -- Consult SLP  -- Unasyn and doxycycline  -- A.m. labs    Elevated troponin  -- Troponin 13 and 16  -- Trend troponin and EKG  -- denies chest pain    A-fib  -- Continue metoprolol, amiodarone, Eliquis    Hypertension  -- Metoprolol    Hyperlipidemia  -- Statin    Depression  -- Effexor and BuSpar    Tremors  -- Continue Keppra    Generalized weakness  -- fall precautions  -- pt/ot consult  -- consult case management    DVT prophylaxis: On Eliquis    CODE STATUS:    Level Of Support Discussed With: Patient  Code Status (Patient has no pulse and is not breathing): CPR (Attempt to Resuscitate)  Medical Interventions (Patient has pulse  or is breathing): Full Support      Expected Discharge  TBD  Expected discharge date/ time has not been documented.      This note has been completed as part of a split-shared workflow.     Signature: Electronically signed by ANASTASIA Vicente, 01/29/25, 8:12 PM EST.                   Electronically signed by Mary Jane Ann MD at 01/29/25 9794          Emergency Department Notes        Alma Rosa Gant, RN at 01/29/25 190           Bibiana Hodges    Nursing Report ED to Floor:  Mental status: A+Ox4  Ambulatory status: Bedfast  Oxygen Therapy:  4L NC  Cardiac Rhythm: ST  Admitted from: [facility]  Safety Concerns:  Falls  Precautions: N/A  Social Issues: N/A  ED Room #:  29    ED Nurse Phone Extension - [phone] or may call [phone].      HPI:   Chief Complaint   Patient presents with    Shortness of Breath       Past Medical History:  Past Medical History:   Diagnosis Date    Constipation     Depression     Dysautonomia orthostatic hypotension syndrome     Generalized osteoarthritis     GERD (gastroesophageal reflux disease)     s/p Nissen    Hypertension     Insomnia     Osteoarthrosis, shoulder region     Skin ulcer of scalp     Thrombophlebitis of arm     Tremor     Vitamin B12 deficiency         Past Surgical History:  Past Surgical History:   Procedure Laterality Date    DILATATION AND CURETTAGE      HERNIA REPAIR      HIP SURGERY      SHOULDER SURGERY      Right        Admitting Doctor:   Mary Jane Ann MD    Consulting Provider(s):  Consults       No orders found from 12/31/2024 to 1/30/2025.             Admitting Diagnosis:   The primary encounter diagnosis was Community acquired pneumonia of left lung, unspecified part of lung. A diagnosis of Hypoxia was also pertinent to this visit.    Most Recent Vitals:   Vitals:    01/29/25 1700 01/29/25 1730 01/29/25 1756 01/29/25 1800   BP: (!) 114/101 122/100  127/64   BP Location:       Patient Position:       Pulse: 67 61  65   Resp:       TempSrc:        SpO2: 98% 94% (!) 86% 94%   Weight:       Height:           Active LDAs/IV Access:   Lines, Drains & Airways       Active LDAs       Name Placement date Placement time Site Days    Peripheral IV 01/29/25 1349 Anterior;Right Forearm 01/29/25  1349  Forearm  less than 1                    Labs (abnormal labs have a star):   Labs Reviewed   COMPREHENSIVE METABOLIC PANEL - Abnormal; Notable for the following components:       Result Value    Glucose 138 (*)     Creatinine 1.16 (*)     Total Protein 5.8 (*)     Albumin 3.4 (*)     eGFR 45.2 (*)     All other components within normal limits    Narrative:     GFR Categories in Chronic Kidney Disease (CKD)      GFR Category          GFR (mL/min/1.73)    Interpretation  G1                     90 or greater         Normal or high (1)  G2                      60-89                Mild decrease (1)  G3a                   45-59                Mild to moderate decrease  G3b                   30-44                Moderate to severe decrease  G4                    15-29                Severe decrease  G5                    14 or less           Kidney failure          (1)In the absence of evidence of kidney disease, neither GFR category G1 or G2 fulfill the criteria for CKD.    eGFR calculation 2021 CKD-EPI creatinine equation, which does not include race as a factor   CBC WITH AUTO DIFFERENTIAL - Abnormal; Notable for the following components:    .4 (*)     MCH 34.1 (*)     Neutrophil % 90.6 (*)     Lymphocyte % 4.6 (*)     Monocyte % 3.8 (*)     Neutrophils, Absolute 9.06 (*)     Lymphocytes, Absolute 0.46 (*)     All other components within normal limits   LACTIC ACID, PLASMA - Abnormal; Notable for the following components:    Lactate 2.3 (*)     All other components within normal limits   HIGH SENSITIVITIY TROPONIN T 1HR - Abnormal; Notable for the following components:    HS Troponin T 16 (*)     Troponin T Numeric Delta 3 (*)     All other components within normal  limits    Narrative:     High Sensitive Troponin T Reference Range:  <14.0 ng/L- Negative Female for AMI  <22.0 ng/L- Negative Male for AMI  >=14 - Abnormal Female indicating possible myocardial injury.  >=22 - Abnormal Male indicating possible myocardial injury.   Clinicians would have to utilize clinical acumen, EKG, Troponin, and serial changes to determine if it is an Acute Myocardial Infarction or myocardial injury due to an underlying chronic condition.        RESPIRATORY PANEL PCR W/ COVID-19 (SARS-COV-2), NP SWAB IN UTM/VTP, 2 HR TAT - Normal    Narrative:     In the setting of a positive respiratory panel with a viral infection PLUS a negative procalcitonin without other underlying concern for bacterial infection, consider observing off antibiotics or discontinuation of antibiotics and continue supportive care. If the respiratory panel is positive for atypical bacterial infection (Bordetella pertussis, Chlamydophila pneumoniae, or Mycoplasma pneumoniae), consider antibiotic de-escalation to target atypical bacterial infection.   BNP (IN-HOUSE) - Normal    Narrative:     This assay is used as an aid in the diagnosis of individuals suspected of having heart failure. It can be used as an aid in the diagnosis of acute decompensated heart failure (ADHF) in patients presenting with signs and symptoms of ADHF to the emergency department (ED). In addition, NT-proBNP of <300 pg/mL indicates ADHF is not likely.    Age Range Result Interpretation  NT-proBNP Concentration (pg/mL:      <50             Positive            >450                   Gray                 300-450                    Negative             <300    50-75           Positive            >900                  Gray                300-900                  Negative            <300      >75             Positive            >1800                  Gray                300-1800                  Negative            <300   TROPONIN - Normal    Narrative:     High  Sensitive Troponin T Reference Range:  <14.0 ng/L- Negative Female for AMI  <22.0 ng/L- Negative Male for AMI  >=14 - Abnormal Female indicating possible myocardial injury.  >=22 - Abnormal Male indicating possible myocardial injury.   Clinicians would have to utilize clinical acumen, EKG, Troponin, and serial changes to determine if it is an Acute Myocardial Infarction or myocardial injury due to an underlying chronic condition.        LACTIC ACID, REFLEX - Normal   COVID PRE-OP / PRE-PROCEDURE SCREENING ORDER (NO ISOLATION)    Narrative:     The following orders were created for panel order COVID PRE-OP / PRE-PROCEDURE SCREENING ORDER (NO ISOLATION) - Swab, Nasopharynx.  Procedure                               Abnormality         Status                     ---------                               -----------         ------                     Respiratory Panel PCR w/...[[id number]]  Normal              Final result                 Please view results for these tests on the individual orders.   BLOOD CULTURE   BLOOD CULTURE   RAINBOW DRAW    Narrative:     The following orders were created for panel order [place] Draw.  Procedure                               Abnormality         Status                     ---------                               -----------         ------                     Green Top (Gel)[[id number]]                                  Final result               Lavender Top[[id number]]                                     Final result               Gold Top - SST[[id number]]                                   Final result               [place]re Top[[id number]]                                         Final result               Light Blue Top[[id number]]                                   Final result                 Please view results for these tests on the individual orders.   CBC AND DIFFERENTIAL    Narrative:     The following orders were created for panel order CBC & Differential.  Procedure                                Abnormality         Status                     ---------                               -----------         ------                     CBC Auto Differential[[id number]]        Abnormal            Final result               Scan Slide[[id number]]                                                                    Please view results for these tests on the individual orders.   GREEN TOP   LAVENDER TOP   GOLD TOP - SST   GRAY TOP   LIGHT BLUE TOP       Meds Given in ED:   Medications   sodium chloride 0.9 % flush 10 mL (has no administration in time range)   ampicillin-sulbactam (UNASYN) 1.5 g in sodium chloride 0.9 % 100 mL MBP (has no administration in time range)   doxycycline (MONODOX) capsule 100 mg (has no administration in time range)           Last NIH score:                                                          Dysphagia screening results:        Chanel Coma Scale:  No data recorded     CIWA:        Restraint Type:            Isolation Status:  No active isolations          Electronically signed by Alma Rosa Gant RN at 01/29/25 9778       Claudio Hoffmann MD at 01/29/25 1401          Subjective   History of Present Illness  89-year-old female presents for evaluation of increased shortness of breath and hypoxia.  She describes increased work of breath for the last 2 days.  She currently lives at [facility].  She denies fever.  She does not use oxygen at home but does use a breathing treatment intermittently at home.  She denies chest pain or abdominal pain.  No definitive sick contacts.  No reported change in bowel urinary function.  She exhibits normal baseline mentation.  She states that she has had her nose broken twice therefore she is a mouth breather.  4LNC oxygen has been applied to the nose and oxygen saturations improved into the mid 90s.  He should be nasal the patient has had a history of recurrent pneumonia in the left lung.  She reports that she began to have this  after she had scarring from COVID-19.  She does have significant crackles on auscultation throughout the entire left lung field that is not present throughout the right lung field.      Review of Systems   Constitutional:  Positive for activity change and fatigue. Negative for chills and fever.   HENT:  Negative for congestion, ear pain, postnasal drip, sinus pressure and sore throat.    Eyes:  Negative for pain, redness and visual disturbance.   Respiratory:  Positive for cough and shortness of breath. Negative for chest tightness.    Cardiovascular:  Negative for chest pain, palpitations and leg swelling.   Gastrointestinal:  Negative for abdominal pain, anal bleeding, blood in stool, diarrhea, nausea and vomiting.   Endocrine: Negative for polydipsia and polyuria.   Genitourinary:  Negative for difficulty urinating, dysuria, frequency and urgency.   Musculoskeletal:  Negative for arthralgias, back pain and neck pain.   Skin:  Negative for pallor and rash.   Allergic/Immunologic: Negative for environmental allergies and immunocompromised state.   Neurological:  Negative for dizziness, weakness and headaches.   Hematological:  Negative for adenopathy.   Psychiatric/Behavioral:  Negative for confusion, self-injury and suicidal ideas. The patient is not nervous/anxious.    All other systems reviewed and are negative.      Past Medical History:   Diagnosis Date    Constipation     Depression     Dysautonomia orthostatic hypotension syndrome     Generalized osteoarthritis     GERD (gastroesophageal reflux disease)     s/p Nissen    Hypertension     Insomnia     Osteoarthrosis, shoulder region     Skin ulcer of scalp     Thrombophlebitis of arm     Tremor     Vitamin B12 deficiency        Allergies   Allergen Reactions    Gabapentin     Sulfa Antibiotics        Past Surgical History:   Procedure Laterality Date    DILATATION AND CURETTAGE      HERNIA REPAIR      HIP SURGERY      SHOULDER SURGERY      Right       Family  History   Problem Relation Age of Onset    Cancer Mother         Pancreatic    Stroke Brother     Anorexia nervosa Daughter        Social History     Socioeconomic History    Marital status:    Tobacco Use    Smoking status: Never    Smokeless tobacco: Never   Vaping Use    Vaping status: Never Used   Substance and Sexual Activity    Alcohol use: No     Comment: stopped drinking alcohol    Drug use: No    Sexual activity: Defer           Objective   Physical Exam  Vitals and nursing note reviewed.   Constitutional:       General: She is not in acute distress.     Appearance: Normal appearance. She is well-developed. She is not toxic-appearing or diaphoretic.      Comments: Thin and chronically ill-appearing.   HENT:      Head: Normocephalic and atraumatic.      Right Ear: External ear normal.      Left Ear: External ear normal.      Nose: Nose normal.   Eyes:      General: Lids are normal.      Pupils: Pupils are equal, round, and reactive to light.   Neck:      Trachea: No tracheal deviation.   Cardiovascular:      Rate and Rhythm: Normal rate and regular rhythm.      Pulses: No decreased pulses.      Heart sounds: Normal heart sounds. No murmur heard.     No friction rub. No gallop.   Pulmonary:      Effort: Pulmonary effort is normal. Tachypnea present. No respiratory distress.      Breath sounds: Examination of the left-upper field reveals rales. Examination of the left-middle field reveals rales. Examination of the left-lower field reveals rales. Rales present. No decreased breath sounds, wheezing or rhonchi.   Abdominal:      General: Bowel sounds are normal.      Palpations: Abdomen is soft.      Tenderness: There is no abdominal tenderness. There is no guarding or rebound.   Musculoskeletal:         General: No deformity. Normal range of motion.      Cervical back: Normal range of motion and neck supple.   Lymphadenopathy:      Cervical: No cervical adenopathy.   Skin:     General: Skin is warm and  dry.      Findings: No rash.   Neurological:      Mental Status: She is alert and oriented to person, place, and time.      Cranial Nerves: No cranial nerve deficit.      Sensory: No sensory deficit.   Psychiatric:         Speech: Speech normal.         Behavior: Behavior normal.         Thought Content: Thought content normal.         Judgment: Judgment normal.         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  She presents with a complaint of shortness of breath.  Ox saturations observed into the mid 80s.  She does not have oxygen available to her at her nursing facility.  Labs show normal white count but neutrophil predominance.  Viral panel is negative. I have applied 2 L of nasal cannula oxygen and oxygen saturations have corrected to 94%. I have ordered IV antibiotics and would like to admit for treatment of pneumonia. [NS]      ED Course User Index  [NS] Claudio Hoffmann MD                                                       Medical Decision Making  Differential diagnosis includes pneumonia, sepsis, CHF exacerbation, viral illness, other unspecified etiology.    Labs show normal BNP, normal troponin with no significant change on repeat evaluation.  Normal viral respiratory panel.  Normal white count.  Normal H&H.  Normal lactic acid level.    Chest x-rayReports grossly stable chronic multifocal opacities throughout the left lung likely representing a chronic infectious or inflammatory process.    The patient does have a history of significant scarring to the left lung secondary to COVID per her report.    Her oxygen saturations were identified to decrease on room air.  Reportedly she does not currently have oxygen available at her facility.    She strongly desired to go home but I feel admission is more appropriate.    I have ordered blood  cultures and antibiotics and I discussed the patient with the hospitalist who will consult for admission.    Problems Addressed:  Community acquired pneumonia of left lung, unspecified part of lung: complicated acute illness or injury with systemic symptoms  Hypoxia: complicated acute illness or injury with systemic symptoms that poses a threat to life or bodily functions    Amount and/or Complexity of Data Reviewed  Independent Historian: friend and EMS     Details: Son provides additional information.  EMS provides additional information.  External Data Reviewed: labs, radiology and ECG.  Labs: ordered. Decision-making details documented in ED Course.  Radiology: ordered and independent interpretation performed. Decision-making details documented in ED Course.  ECG/medicine tests: ordered and independent interpretation performed. Decision-making details documented in ED Course.     Details: EKG performed 1/29/2025 at 1316 shows significant motion artifact and shows atrial paced rhythm, no acute ischemic changes.    Risk  Prescription drug management.  Decision regarding hospitalization.        Final diagnoses:   Community acquired pneumonia of left lung, unspecified part of lung   Hypoxia       ED Disposition  ED Disposition       ED Disposition   Decision to Admit    Condition   --    Comment   Level of Care: Telemetry [5]   Diagnosis: Multifocal pneumonia [[id number]]   Admitting Physician: DARLENE BABCOCK [[id number]]   Attending Physician: DARLENE BABCOCK [[id number]]   Is patient appropriate for Inpatient Observation Unit?: No [0]                 No follow-up provider specified.       Medication List      No changes were made to your prescriptions during this visit.            Claudio Hoffmann MD  01/30/25 0852      Electronically signed by Claudio Hoffmann MD at 01/30/25 0823       Vital Signs (last day)       Date/Time Temp Temp src Pulse Resp BP Patient Position SpO2    01/30/25 0835 -- -- 61 -- 129/94 --  --    01/30/25 0447 98 (36.7) Axillary 59 16 138/84 Lying 95    01/29/25 2159 98 (36.7) Oral 64 18 159/95 -- 95    01/29/25 2130 98 (36.7) Oral 60 16 100/74 Lying 82    01/29/25 2100 -- -- 62 -- 118/64 -- --    01/29/25 2014 -- -- 71 -- -- -- 90    01/29/25 1915 98.2 (36.8) Oral -- -- -- -- --    01/29/25 1800 -- -- 65 -- 127/64 -- 94    01/29/25 1756 -- -- -- -- -- -- 86    01/29/25 1730 -- -- 61 -- 122/100 -- 94    01/29/25 1700 -- -- 67 -- 114/101 -- 98    01/29/25 1630 -- -- 66 -- 121/67 -- 93    01/29/25 1600 -- -- 64 -- 152/111 -- 91    01/29/25 1530 -- -- 60 -- 145/135 -- 98    01/29/25 1500 -- Oral 60 -- 130/98 Sitting 98    01/29/25 1430 -- -- 63 -- 120/79 -- 93    01/29/25 1401 -- -- 61 -- -- -- 92    01/29/25 1400 -- -- -- -- 131/94 -- --    01/29/25 1350 -- -- 62 22 111/99 Sitting 92    01/29/25 1349 -- -- 61 -- 132/97 -- 86          Oxygen Therapy (last day)       Date/Time SpO2 Device (Oxygen Therapy) Flow (L/min) (Oxygen Therapy) Oxygen Concentration (%) ETCO2 (mmHg)    01/30/25 0600 -- nasal cannula -- -- --    01/30/25 0447 95 nasal cannula 4 -- --    01/30/25 0400 -- nasal cannula 4 -- --    01/29/25 2159 95 nasal cannula -- -- --    01/29/25 2130 82 nasal cannula 4 -- --    01/29/25 2014 90 -- -- -- --    01/29/25 1800 94 -- -- -- --    01/29/25 1756 86 -- -- -- --    01/29/25 1730 94 -- -- -- --    01/29/25 1700 98 -- -- -- --    01/29/25 1630 93 -- -- -- --    01/29/25 1600 91 -- -- -- --    01/29/25 1530 98 -- -- -- --    01/29/25 1500 98 -- -- -- --    01/29/25 1430 93 -- -- -- --    01/29/25 1401 92 -- -- -- --    01/29/25 1350 92 nasal cannula 4 -- --    01/29/25 1349 86 -- -- -- --          Lines, Drains & Airways       Active LDAs       Name Placement date Placement time Site Days    Peripheral IV 01/29/25 1349 Anterior;Right Forearm 01/29/25  1349  Forearm  less than 1    External Urinary Catheter 01/29/25  2300  --  less than 1                  Current Facility-Administered Medications    Medication Dose Route Frequency Provider Last Rate Last Admin    acetaminophen (TYLENOL) tablet 650 mg  650 mg Oral Q4H PRN Zarina Crawford APRN   650 mg at 01/29/25 2228    Or    acetaminophen (TYLENOL) 160 MG/5ML oral solution 650 mg  650 mg Oral Q4H PRN Zarina Crawford APRN        Or    acetaminophen (TYLENOL) suppository 650 mg  650 mg Rectal Q4H PRN Zarina Crawford APRN        albuterol (PROVENTIL) nebulizer solution 0.083% 2.5 mg/3mL  2.5 mg Nebulization Q4H PRN Zarina Crawford APRN        amiodarone (PACERONE) tablet 200 mg  200 mg Oral Daily Zarina Crawford APRN   200 mg at 01/30/25 0835    apixaban (ELIQUIS) tablet 2.5 mg  2.5 mg Oral BID Zarina Crawford APRN   2.5 mg at 01/30/25 0838    benzonatate (TESSALON) capsule 100 mg  100 mg Oral TID PRN Zarina Crawford APRN        sennosides-docusate (PERICOLACE) 8.6-50 MG per tablet 2 tablet  2 tablet Oral BID PRN Zarina Crawford APRN        And    polyethylene glycol (MIRALAX) packet 17 g  17 g Oral Daily PRN Zarina Crawford APRN        And    bisacodyl (DULCOLAX) EC tablet 5 mg  5 mg Oral Daily PRN Zarina Crawford APRN        And    bisacodyl (DULCOLAX) suppository 10 mg  10 mg Rectal Daily PRN Zarina Crawford APRN        cefTRIAXone (ROCEPHIN) 2,000 mg in sodium chloride 0.9 % 100 mL MBP  2,000 mg Intravenous Q24H Kelly Villafuerte MD        docusate sodium (COLACE) capsule 100 mg  100 mg Oral Daily Zarina Crawford APRN   100 mg at 01/30/25 0838    doxycycline (MONODOX) capsule 100 mg  100 mg Oral Q12H Zarina Crawford APRN   100 mg at 01/30/25 0835    fluticasone (FLONASE) 50 MCG/ACT nasal spray 2 spray  2 spray Nasal Daily PRN Zarina Crawford APRN        ipratropium-albuterol (DUO-NEB) nebulizer solution 3 mL  3 mL Nebulization Q4H PRN Zarina Crawford APRN        levETIRAcetam (KEPPRA) tablet 250 mg  250 mg Oral BID Zarina Crawford APRN   250 mg at 01/30/25 0836     levothyroxine (SYNTHROID, LEVOTHROID) tablet 100 mcg  100 mcg Oral Q AM Yossi Crawforda W, APRN   100 mcg at 01/30/25 0618    lisinopril (PRINIVIL,ZESTRIL) tablet 10 mg  10 mg Oral Q24H Ty, Zarina W, APRN   10 mg at 01/30/25 0838    meclizine (ANTIVERT) tablet 25 mg  25 mg Oral Q8H PRN Zarina Crawford W, APRN        melatonin tablet 5 mg  5 mg Oral Nightly Ty, Zarina W, APRN   5 mg at 01/29/25 2228    metoprolol tartrate (LOPRESSOR) tablet 12.5 mg  12.5 mg Oral BID Zarina Crawford W, APRN   12.5 mg at 01/30/25 0837    nitroglycerin (NITROSTAT) SL tablet 0.4 mg  0.4 mg Sublingual Q5 Min PRN Ty, Zarina W, APRN        pantoprazole (PROTONIX) EC tablet 40 mg  40 mg Oral Daily Zarina Crawford, APRN   40 mg at 01/30/25 0836    rosuvastatin (CRESTOR) tablet 10 mg  10 mg Oral Nightly Ty, Zarina W, APRN   10 mg at 01/29/25 2228    sodium chloride 0.9 % flush 10 mL  10 mL Intravenous PRN Yossi Crawforda W, APRN        sodium chloride 0.9 % flush 10 mL  10 mL Intravenous Q12H Yossi Crawforda W, APRN   10 mL at 01/30/25 0847    sodium chloride 0.9 % flush 10 mL  10 mL Intravenous PRN Zarina Crawford, APRN        sodium chloride 0.9 % infusion 40 mL  40 mL Intravenous PRN Yossi Crawforda W, APRN        traZODone (DESYREL) tablet 100 mg  100 mg Oral Nightly Yossi Crawforda W, APRN   100 mg at 01/29/25 2228    venlafaxine XR (EFFEXOR-XR) 24 hr capsule 75 mg  75 mg Oral Daily Yossi Crawforda W, APRN   75 mg at 01/30/25 0838     Lab Results (last 24 hours)       Procedure Component Value Units Date/Time    Urinalysis With Microscopic If Indicated (No Culture) - Urine, Clean Catch [[id number]]  (Abnormal) Collected: 01/30/25 0859    Specimen: Urine, Clean Catch Updated: 01/30/25 0939     Color, UA Yellow     Appearance, UA Cloudy     pH, UA 5.5     Specific Gravity, UA 1.029     Glucose, UA Negative     Ketones, UA Trace     Bilirubin, UA Negative     Blood, UA Negative  "    Protein, UA 30 mg/dL (1+)     Leuk Esterase, UA Large (3+)     Nitrite, UA Negative     Urobilinogen, UA 1.0 E.U./dL    Urinalysis, Microscopic Only - Urine, Clean Catch [[id number]]  (Abnormal) Collected: 01/30/25 0859    Specimen: Urine, Clean Catch Updated: 01/30/25 0939     RBC, UA 0-2 /HPF      WBC, UA Too Numerous to Count /HPF      Bacteria, UA Trace /HPF      Squamous Epithelial Cells, UA 0-2 /HPF      Hyaline Casts, UA 0-6 /LPF      Methodology Automated Microscopy    S. Pneumo Ag Urine or CSF - Urine, Urine, Clean Catch [[id number]] Collected: 01/30/25 0859    Specimen: Urine, Clean Catch Updated: 01/30/25 0907    Legionella Antigen, Urine - Urine, Urine, Clean Catch [[id number]] Collected: 01/30/25 0859    Specimen: Urine, Clean Catch Updated: 01/30/25 0907    Procalcitonin [[id number]]  (Abnormal) Collected: 01/30/25 0551    Specimen: Blood Updated: 01/30/25 0824     Procalcitonin 1.38 ng/mL     Narrative:      As a Marker for Sepsis (Non-Neonates):    1. <0.5 ng/mL represents a low risk of severe sepsis and/or septic shock.  2. >2 ng/mL represents a high risk of severe sepsis and/or septic shock.    As a Marker for Lower Respiratory Tract Infections that require antibiotic therapy:    PCT on Admission    Antibiotic Therapy       6-12 Hrs later    >0.5                Strongly Recommended  >0.25 - <0.5        Recommended   0.1 - 0.25          Discouraged              Remeasure/reassess PCT  <0.1                Strongly Discouraged     Remeasure/reassess PCT    As 28 day mortality risk marker: \"Change in Procalcitonin Result\" (>80% or <=80%) if Day 0 (or Day 1) and Day 4 values are available. Refer to http://www.[facility]-pct-calculator.com    Change in PCT <=80%  A decrease of PCT levels below or equal to 80% defines a positive change in PCT test result representing a higher risk for 28-day all-cause mortality of patients diagnosed with severe sepsis for septic shock.    Change in PCT >80%  A decrease of PCT " levels of more than 80% defines a negative change in PCT result representing a lower risk for 28-day all-cause mortality of patients diagnosed with severe sepsis or septic shock.       Basic Metabolic Panel [[id number]]  (Abnormal) Collected: 01/30/25 0551    Specimen: Blood Updated: 01/30/25 0704     Glucose 74 mg/dL      BUN 28 mg/dL      Creatinine 1.25 mg/dL      Sodium 135 mmol/L      Potassium 4.8 mmol/L      Chloride 102 mmol/L      CO2 23.0 mmol/L      Calcium 8.8 mg/dL      BUN/Creatinine Ratio 22.4     Anion Gap 10.0 mmol/L      eGFR 41.3 mL/min/1.73     Narrative:      GFR Categories in Chronic Kidney Disease (CKD)      GFR Category          GFR (mL/min/1.73)    Interpretation  G1                     90 or greater         Normal or high (1)  G2                      60-89                Mild decrease (1)  G3a                   45-59                Mild to moderate decrease  G3b                   30-44                Moderate to severe decrease  G4                    15-29                Severe decrease  G5                    14 or less           Kidney failure          (1)In the absence of evidence of kidney disease, neither GFR category G1 or G2 fulfill the criteria for CKD.    eGFR calculation 2021 CKD-EPI creatinine equation, which does not include race as a factor    CBC & Differential [[id number]] Updated: 01/30/25 0645    Specimen: Blood     Narrative:      The following orders were created for panel order CBC & Differential.  Procedure                               Abnormality         Status                     ---------                               -----------         ------                     CBC Auto Differential[[id number]]                                                         Please view results for these tests on the individual orders.    CBC Auto Differential [[id number]] Updated: 01/30/25 0645    Specimen: Blood     High Sensitivity Troponin T [[id number]]  (Abnormal) Collected: 01/30/25 0551     Specimen: Blood Updated: 01/30/25 0645     HS Troponin T 18 ng/L     Narrative:      High Sensitive Troponin T Reference Range:  <14.0 ng/L- Negative Female for AMI  <22.0 ng/L- Negative Male for AMI  >=14 - Abnormal Female indicating possible myocardial injury.  >=22 - Abnormal Male indicating possible myocardial injury.   Clinicians would have to utilize clinical acumen, EKG, Troponin, and serial changes to determine if it is an Acute Myocardial Infarction or myocardial injury due to an underlying chronic condition.         STAT Lactic Acid, Reflex [[id number]]  (Normal) Collected: 01/29/25 1723    Specimen: Blood Updated: 01/29/25 1748     Lactate 1.4 mmol/L      Comment: Falsely depressed results may occur on samples drawn from patients receiving N-Acetylcysteine (NAC) or Metamizole.       High Sensitivity Troponin T 1Hr [[id number]]  (Abnormal) Collected: 01/29/25 1543    Specimen: Blood from Arm, Right Updated: 01/29/25 1633     HS Troponin T 16 ng/L      Troponin T Numeric Delta 3 ng/L     Narrative:      High Sensitive Troponin T Reference Range:  <14.0 ng/L- Negative Female for AMI  <22.0 ng/L- Negative Male for AMI  >=14 - Abnormal Female indicating possible myocardial injury.  >=22 - Abnormal Male indicating possible myocardial injury.   Clinicians would have to utilize clinical acumen, EKG, Troponin, and serial changes to determine if it is an Acute Myocardial Infarction or myocardial injury due to an underlying chronic condition.         Blood Culture - Blood, Arm, Right [[id number]] Collected: 01/29/25 1545    Specimen: Blood from Arm, Right Updated: 01/29/25 1606    COVID PRE-OP / PRE-PROCEDURE SCREENING ORDER (NO ISOLATION) - Swab, Nasopharynx [[id number]]  (Normal) Collected: 01/29/25 1440    Specimen: Swab from Nasopharynx Updated: 01/29/25 1557    Narrative:      The following orders were created for panel order COVID PRE-OP / PRE-PROCEDURE SCREENING ORDER (NO ISOLATION) - Swab,  Nasopharynx.  Procedure                               Abnormality         Status                     ---------                               -----------         ------                     Respiratory Panel PCR w/...[[id number]]  Normal              Final result                 Please view results for these tests on the individual orders.    Respiratory Panel PCR w/COVID-19(SARS-CoV-2) ERICK/MARTA/ELPIDIO/PAD/COR/ALMA In-House, NP Swab in UTM/VTM, 2 HR TAT - Swab, Nasopharynx [[id number]]  (Normal) Collected: 01/29/25 1440    Specimen: Swab from Nasopharynx Updated: 01/29/25 1557     ADENOVIRUS, PCR Not Detected     Coronavirus 229E Not Detected     Coronavirus HKU1 Not Detected     Coronavirus NL63 Not Detected     Coronavirus OC43 Not Detected     COVID19 Not Detected     Human Metapneumovirus Not Detected     Human Rhinovirus/Enterovirus Not Detected     Influenza A PCR Not Detected     Influenza B PCR Not Detected     Parainfluenza Virus 1 Not Detected     Parainfluenza Virus 2 Not Detected     Parainfluenza Virus 3 Not Detected     Parainfluenza Virus 4 Not Detected     RSV, PCR Not Detected     Bordetella pertussis pcr Not Detected     Bordetella parapertussis PCR Not Detected     Chlamydophila pneumoniae PCR Not Detected     Mycoplasma pneumo by PCR Not Detected    Narrative:      In the setting of a positive respiratory panel with a viral infection PLUS a negative procalcitonin without other underlying concern for bacterial infection, consider observing off antibiotics or discontinuation of antibiotics and continue supportive care. If the respiratory panel is positive for atypical bacterial infection (Bordetella pertussis, Chlamydophila pneumoniae, or Mycoplasma pneumoniae), consider antibiotic de-escalation to target atypical bacterial infection.    Lactic Acid, Plasma [[id number]]  (Abnormal) Collected: 01/29/25 1349    Specimen: Blood Updated: 01/29/25 1527     Lactate 2.3 mmol/L      Comment: Falsely depressed  results may occur on samples drawn from patients receiving N-Acetylcysteine (NAC) or Metamizole.       Blood Culture - Blood, Wrist, Left [[id number]] Collected: 01/29/25 1450    Specimen: Blood from Wrist, Left Updated: 01/29/25 1506    CBC & Differential [[id number]]  (Abnormal) Collected: 01/29/25 1349    Specimen: Blood Updated: 01/29/25 1448    Narrative:      The following orders were created for panel order CBC & Differential.  Procedure                               Abnormality         Status                     ---------                               -----------         ------                     CBC Auto Differential[[id number]]        Abnormal            Final result               Scan Slide[[id number]]                                                                    Please view results for these tests on the individual orders.    CBC Auto Differential [[id number]]  (Abnormal) Collected: 01/29/25 1349    Specimen: Blood Updated: 01/29/25 1448     WBC 10.00 10*3/mm3      RBC 4.11 10*6/mm3      Hemoglobin 14.0 g/dL      Hematocrit 43.3 %      .4 fL      MCH 34.1 pg      MCHC 32.3 g/dL      RDW 12.3 %      RDW-SD 48.4 fl      MPV 10.0 fL      Platelets 167 10*3/mm3      Neutrophil % 90.6 %      Lymphocyte % 4.6 %      Monocyte % 3.8 %      Eosinophil % 0.5 %      Basophil % 0.3 %      Immature Grans % 0.2 %      Neutrophils, Absolute 9.06 10*3/mm3      Lymphocytes, Absolute 0.46 10*3/mm3      Monocytes, Absolute 0.38 10*3/mm3      Eosinophils, Absolute 0.05 10*3/mm3      Basophils, Absolute 0.03 10*3/mm3      Immature Grans, Absolute 0.02 10*3/mm3      nRBC 0.0 /100 WBC     Comprehensive Metabolic Panel [[id number]]  (Abnormal) Collected: 01/29/25 1349    Specimen: Blood Updated: 01/29/25 1447     Glucose 138 mg/dL      BUN 23 mg/dL      Creatinine 1.16 mg/dL      Sodium 137 mmol/L      Potassium 4.8 mmol/L      Chloride 101 mmol/L      CO2 26.0 mmol/L      Calcium 9.2 mg/dL      Total Protein 5.8 g/dL       Albumin 3.4 g/dL      ALT (SGPT) 27 U/L      AST (SGOT) 31 U/L      Alkaline Phosphatase 55 U/L      Total Bilirubin 0.5 mg/dL      Globulin 2.4 gm/dL      Comment: Calculated Result        A/G Ratio 1.4 g/dL      BUN/Creatinine Ratio 19.8     Anion Gap 10.0 mmol/L      eGFR 45.2 mL/min/1.73     Narrative:      GFR Categories in Chronic Kidney Disease (CKD)      GFR Category          GFR (mL/min/1.73)    Interpretation  G1                     90 or greater         Normal or high (1)  G2                      60-89                Mild decrease (1)  G3a                   45-59                Mild to moderate decrease  G3b                   30-44                Moderate to severe decrease  G4                    15-29                Severe decrease  G5                    14 or less           Kidney failure          (1)In the absence of evidence of kidney disease, neither GFR category G1 or G2 fulfill the criteria for CKD.    eGFR calculation 2021 CKD-EPI creatinine equation, which does not include race as a factor    BNP [[id number]]  (Normal) Collected: 01/29/25 1349    Specimen: Blood Updated: 01/29/25 1447     proBNP 1,024.0 pg/mL     Narrative:      This assay is used as an aid in the diagnosis of individuals suspected of having heart failure. It can be used as an aid in the diagnosis of acute decompensated heart failure (ADHF) in patients presenting with signs and symptoms of ADHF to the emergency department (ED). In addition, NT-proBNP of <300 pg/mL indicates ADHF is not likely.    Age Range Result Interpretation  NT-proBNP Concentration (pg/mL:      <50             Positive            >450                   Gray                 300-450                    Negative             <300    50-75           Positive            >900                  Gray                300-900                  Negative            <300      >75             Positive            >1800                  Gray                300-1800                   Negative            <300    High Sensitivity Troponin T [[id number]]  (Normal) Collected: 01/29/25 1349    Specimen: Blood Updated: 01/29/25 1447     HS Troponin T 13 ng/L     Narrative:      High Sensitive Troponin T Reference Range:  <14.0 ng/L- Negative Female for AMI  <22.0 ng/L- Negative Male for AMI  >=14 - Abnormal Female indicating possible myocardial injury.  >=22 - Abnormal Male indicating possible myocardial injury.   Clinicians would have to utilize clinical acumen, EKG, Troponin, and serial changes to determine if it is an Acute Myocardial Infarction or myocardial injury due to an underlying chronic condition.         [facility] Draw [[id number]] Collected: 01/29/25 1349    Specimen: Blood Updated: 01/29/25 1400    Narrative:      The following orders were created for panel order [facility].  Procedure                               Abnormality         Status                     ---------                               -----------         ------                     Green Top (Gel)[[id number]]                                  Final result               Lavender Top[[id number]]                                     Final result               Gold Top - SST[[id number]]                                   Final result               Izaguirre Top[[id number]]                                         Final result               Light Blue Top[[id number]]                                   Final result                 Please view results for these tests on the individual orders.    Green Top (Gel) [[id number]] Collected: 01/29/25 1349    Specimen: Blood Updated: 01/29/25 1400     Extra Tube Hold for add-ons.     Comment: Auto resulted.       Lavender Top [[id number]] Collected: 01/29/25 1349    Specimen: Blood Updated: 01/29/25 1400     Extra Tube hold for add-on     Comment: Auto resulted       Gold Top - SST [[id number]] Collected: 01/29/25 1349    Specimen: Blood Updated: 01/29/25 1400     Extra Tube Hold for add-ons.      Comment: Auto resulted.       Izaguirre Top [[id number]] Collected: 01/29/25 1349    Specimen: Blood Updated: 01/29/25 1400     Extra Tube Hold for add-ons.     Comment: Auto resulted.       Light Blue Top [[id number]] Collected: 01/29/25 1349    Specimen: Blood Updated: 01/29/25 1400     Extra Tube Hold for add-ons.     Comment: Auto resulted             Imaging Results (Last 24 Hours)       Procedure Component Value Units Date/Time    XR Chest 1 View [[id number]] Collected: 01/29/25 1403     Updated: 01/29/25 1409    Narrative:      XR CHEST 1 VW    Date of Exam: 1/29/2025 1:31 PM EST    Indication: SOA triage protocol    Comparison: None available.    Findings:  There is a dual-chamber pacemaker. Heart size and pulmonary vasculature are within normal limits. There are multiple airspace opacities throughout the left lung that are grossly similar in appearance to the prior study. The right lung is clear      Impression:      Impression:  Grossly stable chronic multifocal opacities throughout the left lung, likely representing a chronic infectious or inflammatory process      Electronically Signed: Kenny Staton    1/29/2025 2:06 PM EST    Workstation ID: [id number]          ECG/EMG Results (last 24 hours)       Procedure Component Value Units Date/Time    ECG 12 Lead ED Triage Standing Order; SOA [[id number]] Collected: 01/29/25 1316     Updated: 01/29/25 1326     QT Interval 424 ms      QTC Interval 440 ms     Narrative:      Test Reason : ED Triage Standing Order~  Blood Pressure :   */*   mmHG  Vent. Rate :  65 BPM     Atrial Rate : 300 BPM     P-R Int :   * ms          QRS Dur : 102 ms      QT Int : 424 ms       P-R-T Axes :   * -48  49 degrees    QTcB Int : 440 ms    ** Suspect unspecified pacemaker failure  Junctional rhythm  Left axis deviation  Anteroseptal infarct (cited on or before 26-May-2023)  ST & T wave abnormality, consider lateral ischemia  Abnormal ECG  When compared with ECG of 15-Jul-2024  21:37,  Junctional rhythm has replaced Electronic atrial pacemaker  Nonspecific T wave abnormality no longer evident in Anterior leads    Referred By: EDMD           Confirmed By:           Physician Progress Notes (last 24 hours)  Notes from 01/29/25 1009 through 01/30/25 1009   No notes of this type exist for this encounter.       Consult Notes (last 24 hours)  Notes from 01/29/25 1009 through 01/30/25 1009   No notes of this type exist for this encounter.

## 2025-01-30 NOTE — THERAPY EVALUATION
Patient Name: Bibiana Hodges  : 1935    MRN: [id number]                              Today's Date: 2025       Admit Date: 2025    Visit Dx:     ICD-10-CM ICD-9-CM   1. Community acquired pneumonia of left lung, unspecified part of lung  J18.9 486   2. Hypoxia  R09.02 799.02   3. Dysphagia, unspecified type  R13.10 787.20   4. Impaired functional mobility, balance, gait, and endurance  Z74.09 V49.89     Patient Active Problem List   Diagnosis    Dysautonomia orthostatic hypotension syndrome    Hypertension    Flu vaccine need    Streptococcus pneumoniae vaccination indicated    Acute respiratory failure with hypoxia    Severe malnutrition    Atrial fibrillation    Autonomic dysfunction    Multifocal pneumonia    Hyperlipidemia     Past Medical History:   Diagnosis Date    Constipation     Depression     Dysautonomia orthostatic hypotension syndrome     Generalized osteoarthritis     GERD (gastroesophageal reflux disease)     s/p Nissen    Hypertension     Insomnia     Osteoarthrosis, shoulder region     Skin ulcer of scalp     Thrombophlebitis of arm     Tremor     Vitamin B12 deficiency      Past Surgical History:   Procedure Laterality Date    DILATATION AND CURETTAGE      HERNIA REPAIR      HIP SURGERY      SHOULDER SURGERY      Right      General Information       Row Name 25 1341          Physical Therapy Time and Intention    Document Type evaluation  -SD     Mode of Treatment physical therapy  -SD       Row Name 25 1341          General Information    Patient Profile Reviewed yes  -SD     Prior Level of Function independent:;all household mobility;transfer;w/c or scooter;min assist:;dressing;bathing  Reports use on RW for short distances, W/C for community.  -SD     Existing Precautions/Restrictions fall;oxygen therapy device and L/min;orthostatic hypotension  Hx of dysautonomic othostasis  -SD     Barriers to Rehab medically complex;previous functional deficit  -SD       Row  Name 01/30/25 1341          Living Environment    People in Home facility resident  -SD       Row Name 01/30/25 1341          Cognition    Orientation Status (Cognition) oriented x 3  -SD       Row Name 01/30/25 1341          Safety Issues/Impairments Affecting Functional Mobility    Safety Issues Affecting Function (Mobility) friction/shear risk;sequencing abilities;safety precaution awareness  -SD     Impairments Affecting Function (Mobility) balance;endurance/activity tolerance;postural/trunk control;strength;shortness of breath;pain  -SD     Comment, Safety Issues/Impairments (Mobility) decreased skin integrity, incontinence  -SD               User Key  (r) = Recorded By, (t) = Taken By, (c) = Cosigned By      Initials Name Provider Type    SD Marie Tao, PT Physical Therapist                   Mobility       Row Name 01/30/25 1438          Bed Mobility    Bed Mobility supine-sit;rolling left;rolling right  -SD     Rolling Left [place] (Bed Mobility) contact guard  -SD     Rolling Right [place] (Bed Mobility) contact guard  -SD     Supine-Sit [place] (Bed Mobility) contact guard  -SD     Assistive Device (Bed Mobility) bed rails;head of bed elevated  -SD     Comment, (Bed Mobility) pt rolled side to side for dependent pericare and to don incontinence brief, then transitioned to EOB with CGA.  -SD       Row Name 01/30/25 1438          Transfers    Comment, (Transfers) Pt t/f STS from EOB x2 reps with CGA, needing cues for safe hand placement and sequencing  -SD       Row Name 01/30/25 1438          Sit-Stand Transfer    Sit-Stand [place] (Transfers) contact guard;1 person assist;verbal cues  -SD     Assistive Device (Sit-Stand Transfers) walker, front-wheeled  -SD       Row Name 01/30/25 1438          Gait/Stairs (Locomotion)    [place] Level (Gait) contact guard;1 person assist;1 person to manage equipment;verbal cues  -SD     Assistive Device (Gait) walker, front-wheeled  -SD      Distance in Feet (Gait) 11  -SD     Deviations/Abnormal Patterns (Gait) bilateral deviations;perri decreased;gait speed decreased  -SD     Bilateral Gait Deviations forward flexed posture;heel strike decreased  -SD     Comment, (Gait/Stairs) Pt amb within room with RW and CGA x1 +1 for equip dem significant forward flexed posture and needing cues to correct. Pt required cues for safety with turns and awareness of lines/tubes.  -SD               User Key  (r) = Recorded By, (t) = Taken By, (c) = Cosigned By      Initials Name Provider Type    Marie Vegas PT Physical Therapist                   Obj/Interventions       Row Name 01/30/25 1447          Range of Motion Comprehensive    General Range of Motion bilateral lower extremity ROM WFL  -SD       Row Name 01/30/25 1447          Strength Comprehensive (MMT)    General Manual Muscle Testing (MMT) Assessment lower extremity strength deficits identified  -SD       Row Name 01/30/25 1447          Balance    Balance Assessment sitting static balance;standing static balance  -SD     Static Sitting Balance standby assist  -SD     Dynamic Sitting Balance contact guard  -SD     Position, Sitting Balance unsupported;sitting edge of bed  -SD     Static Standing Balance contact guard  -SD     Position/Device Used, Standing Balance supported  -SD               User Key  (r) = Recorded By, (t) = Taken By, (c) = Cosigned By      Initials Name Provider Type    Marie Vegas PT Physical Therapist                   Goals/Plan       Row Name 01/30/25 1450          Bed Mobility Goal 1 (PT)    Activity/Assistive Device (Bed Mobility Goal 1, PT) sit to supine;supine to sit  -SD     [place] Level/Cues Needed (Bed Mobility Goal 1, PT) modified independence  -SD     Time Frame (Bed Mobility Goal 1, PT) short term goal (STG)  -SD       Row Name 01/30/25 1450          Transfer Goal 1 (PT)    Activity/Assistive Device (Transfer Goal 1, PT)  sit-to-stand/stand-to-sit;bed-to-chair/chair-to-bed;walker, rolling  -SD     [place] Level/Cues Needed (Transfer Goal 1, PT) modified independence  -SD     Time Frame (Transfer Goal 1, PT) long term goal (LTG);2 weeks  -SD       Row Name 01/30/25 8261          Gait Training Goal 1 (PT)    Activity/Assistive Device (Gait Training Goal 1, PT) gait (walking locomotion)  -SD     [place] Level (Gait Training Goal 1, PT) supervision required  -SD     Distance (Gait Training Goal 1, PT) 100  -SD     Time Frame (Gait Training Goal 1, PT) long term goal (LTG);2 weeks  -SD       Row Name 01/30/25 1450          Therapy Assessment/Plan (PT)    Planned Therapy Interventions (PT) balance training;bed mobility training;gait training;home exercise program;patient/family education;neuromuscular re-education;transfer training;strengthening  -SD               User Key  (r) = Recorded By, (t) = Taken By, (c) = Cosigned By      Initials Name Provider Type    Marie Vegas, PT Physical Therapist                   Clinical Impression       Row Name 01/30/25 2743          Pain    Pretreatment Pain Rating 0/10 - no pain  -SD     Posttreatment Pain Rating 0/10 - no pain  -SD       Row Name 01/30/25 1448          Plan of Care Review    Plan of Care Reviewed With patient  -SD     Outcome Evaluation Pt presents with weakness, decreased activity arcadio, and difficulty walking and is below baseline level of function for mobility. Pt able to amb a short distance in room with RW and CGA on 1LO2nc. Pt is pleasant and cooperative, motivated to improve funcitonal mobility independence, and will benefit from IPPT services to address limitations. PT rec d/c IRF.  -SD       Row Name 01/30/25 0144          Therapy Assessment/Plan (PT)    Patient/Family Therapy Goals Statement (PT) get stronger  -SD     Rehab Potential (PT) good  -SD     Criteria for Skilled Interventions Met (PT) yes;skilled treatment is necessary  -SD     Therapy  Frequency (PT) daily  -SD     Predicted Duration of Therapy Intervention (PT) 2 wks  -SD       Row Name 01/30/25 1447          Vital Signs    Pre Systolic BP Rehab 121  -SD     Pre Treatment Diastolic BP 55  -SD     Pretreatment Heart Rate (beats/min) 60  -SD     Posttreatment Heart Rate (beats/min) 74  -SD     Pre SpO2 (%) 93  -SD     O2 Delivery Pre Treatment nasal cannula  -SD     Post SpO2 (%) 95  -SD     O2 Delivery Post Treatment nasal cannula  -SD     Pre Patient Position Supine  -SD     Post Patient Position Sitting  -SD       Row Name 01/30/25 1447          Positioning and Restraints    Post Treatment Position chair  -SD     In Chair notified nsg;reclined;call light within reach;encouraged to call for assist;exit alarm on;waffle cushion;heels elevated  -SD               User Key  (r) = Recorded By, (t) = Taken By, (c) = Cosigned By      Initials Name Provider Type    Marie Vegas, PT Physical Therapist                   Outcome Measures       Row Name 01/30/25 1451          How much help from another person do you currently need...    Turning from your back to your side while in flat bed without using bedrails? 4  -SD     Moving from lying on back to sitting on the side of a flat bed without bedrails? 3  -SD     Moving to and from a bed to a chair (including a wheelchair)? 3  -SD     Standing up from a chair using your arms (e.g., wheelchair, bedside chair)? 3  -SD     Climbing 3-5 steps with a railing? 2  -SD     To walk in hospital room? 3  -SD     AM-PAC 6 Clicks Score (PT) 18  -SD     Highest Level of Mobility Goal 6 --> Walk 10 steps or more  -SD       Row Name 01/30/25 1451 01/30/25 0947       Functional Assessment    Outcome Measure Options AM-PAC 6 Clicks Basic Mobility (PT)  -SD AM-PAC 6 Clicks Daily Activity (OT)  -LC              User Key  (r) = Recorded By, (t) = Taken By, (c) = Cosigned By      Initials Name Provider Type    Marie Vegas, GAIL Physical Therapist    JAVIER  Jeannie Baker, CHONG Occupational Therapist                                 Physical Therapy Education       Title: PT OT SLP Therapies (In Progress)       Topic: Physical Therapy (Done)       Point: Mobility training (Done)       Learning Progress Summary            Patient Eager, E, VU,NR by SD at 1/30/2025 1452                      Point: Home exercise program (Done)       Learning Progress Summary            Patient Eager, E, VU,NR by SD at 1/30/2025 1452                      Point: Body mechanics (Done)       Learning Progress Summary            Patient Eager, E, VU,NR by SD at 1/30/2025 1452                      Point: Precautions (Done)       Learning Progress Summary            Patient Eager, E, VU,NR by SD at 1/30/2025 1452                                      User Key       Initials Effective Dates Name Provider Type Discipline    SD 03/13/23 -  Marie Tao, PT Physical Therapist PT                  PT Recommendation and Plan  Planned Therapy Interventions (PT): balance training, bed mobility training, gait training, home exercise program, patient/family education, neuromuscular re-education, transfer training, strengthening  Outcome Evaluation: Pt presents with weakness, decreased activity arcadio, and difficulty walking and is below baseline level of function for mobility. Pt able to amb a short distance in room with RW and CGA on 1LO2nc. Pt is pleasant and cooperative, motivated to improve funcitonal mobility independence, and will benefit from IPPT services to address limitations. PT rec d/c IRF.     Time Calculation:   PT Evaluation Complexity  History, PT Evaluation Complexity: 3 or more personal factors and/or comorbidities  Examination of Body Systems (PT Eval Complexity): total of 3 or more elements  Clinical Presentation (PT Evaluation Complexity): evolving  Clinical Decision Making (PT Evaluation Complexity): moderate complexity  Overall Complexity (PT Evaluation Complexity): moderate  complexity     PT Charges       Row Name 01/30/25 1453             Time Calculation    Start Time 1341  -SD      PT Non-Billable Time (min) 52 min  -SD      PT Received On 01/30/25  -SD      PT Goal Re-Cert Due Date 02/09/25  -SD                User Key  (r) = Recorded By, (t) = Taken By, (c) = Cosigned By      Initials Name Provider Type    SD Marie Tao, PT Physical Therapist                  Therapy Charges for Today       Code Description Service Date Service Provider Modifiers Qty    05980087243 HC PT THER SUPP EA 15 MIN 1/30/2025 Marie Tao, PT GP 2    [id number] HC PT EVAL MOD COMPLEXITY 4 1/30/2025 Marie Tao, PT GP 1            PT G-Codes  Outcome Measure Options: AM-PAC 6 Clicks Basic Mobility (PT)  AM-PAC 6 Clicks Score (PT): 18  AM-PAC 6 Clicks Score (OT): 15  PT Discharge Summary  Anticipated Discharge Disposition (PT): inpatient rehabilitation facility    Marie Tao PT  1/30/2025

## 2025-01-30 NOTE — PROGRESS NOTES
"    [facility] Medicine Services  PROGRESS NOTE    Patient Name: Bibiana Hodges  : 1935  MRN: [id number]    Date of Admission: 2025  Primary Care Physician: Nivia Madrigal MD    Subjective   Subjective     CC: shortness of breath    HPI:  Patient reports to me that \"no matter what I say [she's] eating a normal diet\" Expresses that her nutrition worsened with modified diet and she cannot tolerate it  Tired after boarding in the ED  I wean her to room air w sat low 90's  Reports productive cough      Objective   Objective     Vital Signs:   Temp:  [97.6 °F (36.4 °C)-98.2 °F (36.8 °C)] 97.6 °F (36.4 °C)  Heart Rate:  [59-71] 60  Resp:  [16-18] 18  BP: ()/() 121/55  Flow (L/min) (Oxygen Therapy):  [1-4] 1     Physical Exam:  Constitutional: No acute distress, awake, alert very thin older female lying in bed  Respiratory: Clear to auscultation bilaterally, respiratory effort increased, weaned to room air 1/30 AM on exam w sats low 90's  Cardiovascular: RRR, no murmurs, rubs, or gallops  Gastrointestinal: Soft, nontender, nondistended  Musculoskeletal: Muscle tone decreased significantly throughout, no joint effusions appreciated  Psychiatric: Appropriate affect, cooperative  Neurologic: Alert and oriented, facial movements symmetric and spontaneous movement of all 4 extremities grossly equal bilaterally, speech clear  Skin: No rashes    Results Reviewed:  LAB RESULTS:      Lab 25  0551 25  1723 25  1543 25  1349   WBC  --   --   --  10.00   HEMOGLOBIN  --   --   --  14.0   HEMATOCRIT  --   --   --  43.3   PLATELETS  --   --   --  167   NEUTROS ABS  --   --   --  9.06*   IMMATURE GRANS (ABS)  --   --   --  0.02   LYMPHS ABS  --   --   --  0.46*   MONOS ABS  --   --   --  0.38   EOS ABS  --   --   --  0.05   MCV  --   --   --  105.4*   PROCALCITONIN 1.38*  --   --   --    LACTATE  --  1.4  --  2.3*   HSTROP T 18*  --  16* 13         Lab " 01/30/25  0551 01/29/25  1349   SODIUM 135* 137   POTASSIUM 4.8 4.8   CHLORIDE 102 101   CO2 23.0 26.0   ANION GAP 10.0 10.0   BUN 28* 23   CREATININE 1.25* 1.16*   EGFR 41.3* 45.2*   GLUCOSE 74 138*   CALCIUM 8.8 9.2         Lab 01/29/25  1349   TOTAL PROTEIN 5.8*   ALBUMIN 3.4*   GLOBULIN 2.4   ALT (SGPT) 27   AST (SGOT) 31   BILIRUBIN 0.5   ALK PHOS 55         Lab 01/30/25  0551 01/29/25  1543 01/29/25  1349   PROBNP  --   --  1,024.0   HSTROP T 18* 16* 13                 Brief Urine Lab Results  (Last result in the past 365 days)        Color   Clarity   Blood   Leuk Est   Nitrite   Protein   CREAT   Urine HCG        01/30/25 0859 Yellow   Cloudy   Negative   Large (3+)   Negative   30 mg/dL (1+)                   Microbiology Results Abnormal       Procedure Component Value - Date/Time    Blood Culture ID, PCR - Blood, Arm, Right [[id number]]  (Abnormal) Collected: 01/29/25 1545    Lab Status: Final result Specimen: Blood from Arm, Right Updated: 01/30/25 1441     BCID, PCR Staph spp, not aureus or lugdunensis. Identification by BCID2 PCR.     BOTTLE TYPE Aerobic Bottle    Blood Culture - Blood, Arm, Right [[id number]]  (Abnormal) Collected: 01/29/25 1545    Lab Status: Preliminary result Specimen: Blood from Arm, Right Updated: 01/30/25 1319     Blood Culture Abnormal Stain     Gram Stain Aerobic Bottle Gram positive cocci in clusters            XR Chest 1 View    Result Date: 1/29/2025  XR CHEST 1 VW Date of Exam: 1/29/2025 1:31 PM EST Indication: SOA triage protocol Comparison: None available. Findings: There is a dual-chamber pacemaker. Heart size and pulmonary vasculature are within normal limits. There are multiple airspace opacities throughout the left lung that are grossly similar in appearance to the prior study. The right lung is clear     Impression: Impression: Grossly stable chronic multifocal opacities throughout the left lung, likely representing a chronic infectious or inflammatory process  Electronically Signed: Kenny Staton  1/29/2025 2:06 PM EST  Workstation ID: [id number]     Results for orders placed during the hospital encounter of 08/20/23    Adult Transthoracic Echo Complete W/ Cont if Necessary Per Protocol    Interpretation Summary    Left ventricular systolic function is mildly decreased. Calculated left ventricular EF = 47.9% Normal left ventricular cavity size and wall thickness noted. There is left ventricular global hypokinesis noted. Left ventricular diastolic function is consistent with (grade I) impaired relaxation.    : Normal right ventricular cavity size and systolic function noted. Electronic lead present in the ventricle.    Normal left atrial size and volume noted. Saline test results are negative.    There is moderate calcification of the aortic valve. The aortic valve was poorly visualized but appears trileaflet. Moderate aortic valve regurgitation is present. No aortic valve stenosis is present.    Mild mitral annular calcification is present. Mild mitral valve regurgitation is present. No significant mitral valve stenosis is present.    The tricuspid valve is grossly normal in structure. Mild tricuspid valve regurgitation is present. Estimated right ventricular systolic pressure from tricuspid regurgitation is normal, 33 mmHg. No tricuspid valve stenosis is present.    Mild dilation of the ascending aorta is present. Ascending aorta = 3.7 cm    There is a small (<1cm) pericardial effusion adjacent to the right atrium and right ventricle.      Current medications:  Scheduled Meds:amiodarone, 200 mg, Oral, Daily  apixaban, 2.5 mg, Oral, BID  cefTRIAXone, 2,000 mg, Intravenous, Q24H  docusate sodium, 100 mg, Oral, Daily  doxycycline, 100 mg, Oral, Q12H  levETIRAcetam, 250 mg, Oral, BID  levothyroxine, 100 mcg, Oral, Q AM  lisinopril, 10 mg, Oral, Q24H  melatonin, 5 mg, Oral, Nightly  metoprolol tartrate, 12.5 mg, Oral, BID  pantoprazole, 40 mg, Oral, Daily  rosuvastatin, 10 mg,  Oral, Nightly  sodium chloride, 10 mL, Intravenous, Q12H  traZODone, 100 mg, Oral, Nightly  venlafaxine XR, 75 mg, Oral, Daily      Continuous Infusions:   PRN Meds:.  acetaminophen **OR** acetaminophen **OR** acetaminophen    albuterol    benzonatate    senna-docusate sodium **AND** polyethylene glycol **AND** bisacodyl **AND** bisacodyl    fluticasone    ipratropium-albuterol    meclizine    nitroglycerin    sodium chloride    sodium chloride    sodium chloride    Assessment & Plan   Assessment & Plan     Active Hospital Problems    Diagnosis  POA    **Multifocal pneumonia [J18.9]  Yes    Hyperlipidemia [E78.5]  Unknown    Atrial fibrillation [I48.91]  Yes    Acute respiratory failure with hypoxia [J96.01]  Yes    Hypertension [I10]  Yes      Resolved Hospital Problems   No resolved problems to display.        Brief Hospital Course to date:  Bibiana Hodges is a 89 y.o. female w h/o pacemaker placement, recurrent pneumonia with known aspiration who presents from [facility] with shortness of breath, congestive cough. Found to have chronic appearing pulm infiltrates, clinical concern for acute PNA    Acute hypoxic resp failure 2/2 aspiration PNA - improved  Chronic oropharyngeal dysphagia  -rocephin, doxycycline  -d/w patient who declines modified diet (intolerant in the past), d/w speech and will hold on formal assessment given this. Daughter in agreement w mother's decision. I will re-discuss code status w patient: has been DNR/DNI on past admissions and daughter in support of these ongoing conversations    Afib s/p PPM - home eliquis, metoprolol, amiodarone  Depression - home effexor, buspar  Severe malnutrition  Tremor - home keppra    Expected Discharge Location and Transportation: back to [facility]  Expected Discharge 2/1 (Discharge date is tentative pending patient's medical condition and is subject to change)  Expected Discharge Date: 2/1/2025; Expected Discharge Time:      VTE Prophylaxis:  Pharmacologic VTE  prophylaxis orders are present.         AM-PAC 6 Clicks Score (PT): 18 (01/30/25 1208)    CODE STATUS:   Code Status and Medical Interventions: CPR (Attempt to Resuscitate); Full Support   Ordered at: 01/29/25 2011     Level Of Support Discussed With:    Patient     Code Status (Patient has no pulse and is not breathing):    CPR (Attempt to Resuscitate)     Medical Interventions (Patient has pulse or is breathing):    Full Support       Kelly Villafuerte MD  01/30/25

## 2025-01-30 NOTE — PROGRESS NOTES
Malnutrition Severity Assessment    Patient Name:  Bibiana Hodges  YOB: 1935  MRN: [id number]  Admit Date:  1/29/2025    Patient meets criteria for : Severe Malnutrition    Comments:  Pt meets criteria for severe malnutrition in the context of chronic illness indicated by severe muscle wasting and subcutaneous fat loss, po intake </=75% EEN x >/=1 month    Malnutrition Severity Assessment  Malnutrition Type: Chronic Disease - Related Malnutrition  Malnutrition Type (Last 8 Hours)       Malnutrition Severity Assessment       Row Name 01/30/25 1048       Malnutrition Severity Assessment    Malnutrition Type Chronic Disease - Related Malnutrition      Row Name 01/30/25 1048       Insufficient Energy Intake     Insufficient Energy Intake Findings Moderate    Insufficient Energy Intake  <75% of est. energy requirement for > or equal to 1 month      Row Name 01/30/25 1048       Muscle Loss    Loss of Muscle Mass Findings Severe    Zoroastrian Region Severe - deep hollowing/scooping, lack of muscle to touch, facial bones well defined    Clavicle Bone Region Severe - protruding prominent bone    Acromion Bone Region Severe - squared shoulders, bones, and acromion process protrusion prominent    Patellar Region Severe - prominent bone, square looking, very little muscle definition    Anterior Thigh Region Severe - line/depression along thigh, obviously thin    Posterior Calf Region Severe - thin with very little definition/firmness      Row Name 01/30/25 1048       Fat Loss    Subcutaneous Fat Loss Findings Severe    Orbital Region  Severe - pronounced hollowness/depression, dark circles, loose saggy skin    Upper Arm Region Severe - mostly skin, very little space between folds, fingers touch      Row Name 01/30/25 1048       Criteria Met (Must meet criteria for severity in at least 2 of these categories: M Wasting, Fat Loss, Fluid, Secondary Signs, Wt. Status, Intake)    Patient meets criteria for  Severe  Malnutrition                    Electronically signed by:  Judy Hilton MS,RD,LD  01/30/25 10:49 EST

## 2025-01-30 NOTE — PLAN OF CARE
Goal Outcome Evaluation:  Plan of Care Reviewed With: patient        Progress: no change  Outcome Evaluation: Pt. presents below baseline with ADLs and functional mobility. Limited by decreased activity tolerance, generalized weakness, and balance. Skilled OT services warranted to promote return to PLOF. Recommend IPR at discharge.    Anticipated Discharge Disposition (OT): inpatient rehabilitation facility

## 2025-01-30 NOTE — PLAN OF CARE
Goal Outcome Evaluation:  Plan of Care Reviewed With: patient        Progress: no change       Anticipated Discharge Disposition (SLP): No further SLP services warranted          SLP Swallowing Diagnosis: R/O pharyngeal dysphagia (01/30/25 9688)

## 2025-01-30 NOTE — CASE MANAGEMENT/SOCIAL WORK
Discharge Planning Assessment  [facility]     Patient Name: Bibiana Hodges  MRN: [id number]  Today's Date: 1/30/2025    Admit Date: 1/29/2025    Plan: home   Discharge Needs Assessment       Row Name 01/30/25 1527       Living Environment    People in Home alone    Current Living Arrangements assisted living facility  Merged with [facility]    Potentially Unsafe Housing Conditions none    In the past 12 months has the electric, gas, oil, or water company threatened to shut off services in your home? No    Primary Care Provided by homecare agency    Provides Primary Care For no one, unable/limited ability to care for self    Family Caregiver if Needed child(alana), adult    Quality of Family Relationships helpful;involved    Able to Return to Prior Arrangements yes       Resource/Environmental Concerns    Resource/Environmental Concerns none       Transportation Needs    In the past 12 months, has lack of transportation kept you from medical appointments or from getting medications? no    In the past 12 months, has lack of transportation kept you from meetings, work, or from getting things needed for daily living? No       Food Insecurity    Within the past 12 months, you worried that your food would run out before you got the money to buy more. Never true    Within the past 12 months, the food you bought just didn't last and you didn't have money to get more. Never true       Transition Planning    Patient/Family Anticipates Transition to inpatient rehabilitation facility    Patient/Family Anticipated Services at Transition     Transportation Anticipated health plan transportation       Discharge Needs Assessment    Readmission Within the Last 30 Days no previous admission in last 30 days    Equipment Currently Used at Home wheelchair    Concerns to be Addressed discharge planning                   Discharge Plan       Row Name 01/30/25 1528       Plan    Plan home    Patient/Family in Agreement with Plan yes     Plan Comments Met with patient at the bedside to initiate discharge planning. Patient lives in assisted living at [facility]. Patient is dependent with ADLs and uses a wheelchair to assist with mobility. Patient is unsure about current home health services but has used [facility] in the past. Patient denies any home oxygen use. Patient has [place] Medicare with prescription benefits and fills scripts at [facility]. Patient's goal is home. Patient may need transportation arranged. CM will continue to follow.    Final Discharge Disposition Code 01 - home or self-care                  Continued Care and Services - Admitted Since 1/29/2025    No active coordination exists for this encounter.       Expected Discharge Date and Time       Expected Discharge Date Expected Discharge Time    Feb 1, 2025            Demographic Summary       Row Name 01/30/25 1526       General Information    Admission Type inpatient    Arrived From home    Referral Source physician    Reason for Consult discharge planning    Preferred Language English    General Information Comments PCP Nivia Madrigal       Contact Information    Permission Granted to Share Info With family/designee    Contact Information Comments Danyelle Keyes (POA) (Daughter)  [phone] (Mobile)                   Functional Status       Row Name 01/30/25 1527       Functional Status    Usual Activity Tolerance moderate    Current Activity Tolerance fair       Functional Status, IADL    Medications assistive person    Meal Preparation assistive equipment and person    Housekeeping assistive equipment and person    Laundry assistive equipment and person    Shopping assistive equipment and person       Mental Status    General Appearance WDL WDL       Mental Status Summary    Recent Changes in Mental Status/Cognitive Functioning no changes       Employment/    Employment Status retired                   Psychosocial    No documentation.                   Abuse/Neglect    No documentation.                  Legal    No documentation.                  Substance Abuse    No documentation.                  Patient Forms    No documentation.                     Soraya Campbell RN

## 2025-01-30 NOTE — NURSING NOTE
WOC consult for   Wound    Indicate Wound Location / Details coccyx     Visited patient while standing with PT.     Noted dry flaky gluteal cleft area, some mild maceration in this area as well. There is some scattered linear cracked areas on bilateral gluteals as well.  Recommend continuing allevyn.

## 2025-01-30 NOTE — PLAN OF CARE
Goal Outcome Evaluation:  Plan of Care Reviewed With: patient           Outcome Evaluation: Pt presents with weakness, decreased activity arcadio, and difficulty walking and is below baseline level of function for mobility. Pt able to amb a short distance in room with RW and CGA on 1LO2nc. Pt is pleasant and cooperative, motivated to improve funcitonal mobility independence, and will benefit from IPPT services to address limitations. PT rec d/c IRF.    Anticipated Discharge Disposition (PT): inpatient rehabilitation facility

## 2025-01-30 NOTE — PLAN OF CARE
Goal Outcome Evaluation:  Plan of Care Reviewed With: patient        Progress: no change               Problem: Adult Inpatient Plan of Care  Goal: Absence of Hospital-Acquired Illness or Injury  Intervention: Identify and Manage Fall Risk  Description: Perform standard risk assessment on admission using a validated tool or comprehensive approach appropriate to the patient; reassess fall risk frequently, with change in status or transfer to another level of care.  Communicate risk to interprofessional healthcare team; ensure fall risk visible cue.  Determine need for increased observation, equipment and environmental modification, as well as use of supportive, nonskid footwear.  Adjust safety measures to individual needs and identified risk factors.  Reinforce the importance of active participation with fall risk prevention, safety, and physical activity with the patient and family.  Perform regular intentional rounding to assess need for position change, pain assessment and personal needs, including assistance with toileting.  Recent Flowsheet Documentation  Taken 1/30/2025 0000 by Heydi Persaud RN  Safety Promotion/Fall Prevention: activity supervised  Taken 1/29/2025 2300 by Heydi Persaud RN  Safety Promotion/Fall Prevention:   room organization consistent   activity supervised  Intervention: Prevent Skin Injury  Description: Perform a screening for skin injury risk, such as pressure or moisture-associated skin damage on admission and at regular intervals throughout hospital stay.  Keep all areas of skin (especially folds) clean and dry.  Maintain adequate skin hydration.  Relieve and redistribute pressure and protect bony prominences and skin at risk for injury; implement measures based on patient-specific risk factors.  Match turning and repositioning schedule to clinical condition.  Encourage weight shift frequently; assist with reposition if unable to complete independently.  Float heels off bed;  avoid pressure on the Achilles tendon.  Keep skin free from extended contact with medical devices.  Optimize nutrition and hydration.  Encourage functional activity and mobility, as early as tolerated.  Use aids (e.g., slide boards, mechanical lift) during transfer.  Recent Flowsheet Documentation  Taken 1/30/2025 0000 by Heydi Persaud RN  Body Position:   turned   right   heels elevated  Taken 1/29/2025 2322 by Heydi Persaud RN  Skin Protection: silicone foam dressing in place  Taken 1/29/2025 2300 by Heydi Persaud RN  Body Position:   supine   heels elevated     Problem: Adult Inpatient Plan of Care  Goal: Absence of Hospital-Acquired Illness or Injury  Intervention: Prevent Skin Injury  Description: Perform a screening for skin injury risk, such as pressure or moisture-associated skin damage on admission and at regular intervals throughout hospital stay.  Keep all areas of skin (especially folds) clean and dry.  Maintain adequate skin hydration.  Relieve and redistribute pressure and protect bony prominences and skin at risk for injury; implement measures based on patient-specific risk factors.  Match turning and repositioning schedule to clinical condition.  Encourage weight shift frequently; assist with reposition if unable to complete independently.  Float heels off bed; avoid pressure on the Achilles tendon.  Keep skin free from extended contact with medical devices.  Optimize nutrition and hydration.  Encourage functional activity and mobility, as early as tolerated.  Use aids (e.g., slide boards, mechanical lift) during transfer.  Recent Flowsheet Documentation  Taken 1/30/2025 0000 by Heydi Persaud RN  Body Position:   turned   right   heels elevated  Taken 1/29/2025 2322 by Heydi Persaud RN  Skin Protection: silicone foam dressing in place  Taken 1/29/2025 2300 by Heydi Persaud RN  Body Position:   supine   heels elevated     Problem: Adult Inpatient Plan of Care  Goal:  Readiness for Transition of Care  Intervention: Mutually Develop Transition Plan  Description: Identify available resources for support (e.g., family, friends, community).  Identify and address barriers to ongoing treatment and home management (e.g., environmental, financial).  Provide opportunities to practice self-management skills.  Assess and monitor emotional readiness for transition.  Establish or reconnect linkage with outpatient providers or community-based services.  Recent Flowsheet Documentation  Taken 1/29/2025 2315 by Heydi Persaud RN  Transportation Anticipated: agency  Patient/Family Anticipated Services at Transition:   Patient/Family Anticipates Transition to:   long-term care facility   inpatient rehabilitation facility  Taken 1/29/2025 2303 by Heydi Persaud, RN  Equipment Currently Used at Home: walker, standard

## 2025-01-30 NOTE — H&P
[facility] Medicine Services  HISTORY AND PHYSICAL    Patient Name: Bibiana Hodges  : 1935  MRN: [id number]  Primary Care Physician: Nivia Madrigal MD  Date of admission: 2025    Subjective   Subjective     Chief Complaint:  Shortness of air    HPI:  Bibiana Hodges is a 89 y.o. female with history A-fib, hypertension, autonomic dysfunction, chronic debility, recurrent pneumonia with concerns for possible aspiration, hyperlipidemia, depression, dysphagia, was transferred from [facility] with complaints of shortness of air.  Patient reports that she has had worsening shortness of air over the past 2 days.  Patient states that she frequently has shortness of air at night when she lays down.  She has a productive cough with yellow sputum.  She states that sometimes when she takes a deep breath she does experience some chest pain.  Earlier today she had some nausea and vomiting that is now resolved.  Patient also endorses sneezing, fatigue, constipation, dysuria, headaches, and generalized weakness.  No abdominal pain, diarrhea, fevers, chills, or any other issues at this time.  Chest x-ray shows grossly stable chronic multifocal opacities throughout the left lung, likely representing chronic infectious or inflammatory process.  Respiratory panel PCR negative.  Patient was started on doxycycline and Unasyn in the ED.  Patient is being admitted to the hospitalist for further evaluation management.        Review of Systems   Constitutional:  Positive for fatigue. Negative for chills and fever.   HENT:  Positive for sneezing. Negative for congestion and sore throat.    Eyes: Negative.    Respiratory:  Positive for cough and shortness of breath. Negative for wheezing.    Cardiovascular:  Positive for chest pain. Negative for palpitations and leg swelling.   Gastrointestinal:  Positive for constipation, nausea and vomiting. Negative for abdominal pain, blood in  stool and diarrhea.   Endocrine: Negative.    Genitourinary:  Positive for dysuria. Negative for frequency and urgency.   Musculoskeletal: Negative.    Skin: Negative.    Allergic/Immunologic: Negative.    Neurological:  Positive for weakness and headaches. Negative for dizziness and light-headedness.   Hematological: Negative.    Psychiatric/Behavioral: Negative.                  Personal History     Past Medical History:   Diagnosis Date    Constipation     Depression     Dysautonomia orthostatic hypotension syndrome     Generalized osteoarthritis     GERD (gastroesophageal reflux disease)     s/p Nissen    Hypertension     Insomnia     Osteoarthrosis, shoulder region     Skin ulcer of scalp     Thrombophlebitis of arm     Tremor     Vitamin B12 deficiency              Past Surgical History:   Procedure Laterality Date    DILATATION AND CURETTAGE      HERNIA REPAIR      HIP SURGERY      SHOULDER SURGERY      Right       Family History:  family history includes Anorexia nervosa in her daughter; Cancer in her mother; Stroke in her brother.     Social History:  reports that she has never smoked. She has never used smokeless tobacco. She reports that she does not drink alcohol and does not use drugs.  Social History     Social History Narrative    Not on file       Medications:  Vitamin C, acetaminophen, albuterol, amiodarone, apixaban, benzonatate, bisacodyl, castor oil-balsam peru, docusate sodium, ferrous sulfate, fluticasone, levETIRAcetam XR, levothyroxine, lisinopril, meclizine, melatonin, metoprolol tartrate, ondansetron ODT, pantoprazole, polyethylene glycol, rosuvastatin, senna, traZODone, and venlafaxine XR    Allergies   Allergen Reactions    Gabapentin     Sulfa Antibiotics        Objective   Objective     Vital Signs:   Temp:  [98.2 °F (36.8 °C)] 98.2 °F (36.8 °C)  Heart Rate:  [60-67] 65  Resp:  [22] 22  BP: (111-152)/() 127/64  Flow (L/min) (Oxygen Therapy):  [4] 4    Physical Exam    Constitutional: Awake, alert, resting in bed  Eyes: PERRLA, sclerae anicteric, no conjunctival injection  HENT: NCAT, mucous membranes moist  Neck: Supple, no thyromegaly, no lymphadenopathy, trachea midline  Respiratory: Clear to auscultation bilaterally, nonlabored respirations   Cardiovascular: RRR, no murmurs, rubs, or gallops, palpable pedal pulses bilaterally  Gastrointestinal: Positive bowel sounds, soft, nontender, nondistended  Musculoskeletal: No bilateral ankle edema, no clubbing or cyanosis to extremities  Psychiatric: Appropriate affect, cooperative  Neurologic: Oriented x 3, strength symmetric in all extremities, Cranial Nerves grossly intact to confrontation, speech clear, tremors BUE  Skin: No rashes       Result Review:  I have personally reviewed the results from the time of this admission to 1/29/2025 20:11 EST and agree with these findings:  [x]  Laboratory list / accordion  [x]  Microbiology  [x]  Radiology  [x]  EKG/Telemetry   []  Cardiology/Vascular   []  Pathology  [x]  Old records  []  Other:  Most notable findings include:     LAB RESULTS:      Lab 01/29/25  1723 01/29/25  1349   WBC  --  10.00   HEMOGLOBIN  --  14.0   HEMATOCRIT  --  43.3   PLATELETS  --  167   NEUTROS ABS  --  9.06*   IMMATURE GRANS (ABS)  --  0.02   LYMPHS ABS  --  0.46*   MONOS ABS  --  0.38   EOS ABS  --  0.05   MCV  --  105.4*   LACTATE 1.4 2.3*         Lab 01/29/25  1349   SODIUM 137   POTASSIUM 4.8   CHLORIDE 101   CO2 26.0   ANION GAP 10.0   BUN 23   CREATININE 1.16*   EGFR 45.2*   GLUCOSE 138*   CALCIUM 9.2         Lab 01/29/25  1349   TOTAL PROTEIN 5.8*   ALBUMIN 3.4*   GLOBULIN 2.4   ALT (SGPT) 27   AST (SGOT) 31   BILIRUBIN 0.5   ALK PHOS 55         Lab 01/29/25  1543 01/29/25  1349   PROBNP  --  1,024.0   HSTROP T 16* 13                 Brief Urine Lab Results  (Last result in the past 365 days)        Color   Clarity   Blood   Leuk Est   Nitrite   Protein   CREAT   Urine HCG        05/06/24 2154 Yellow    Clear   Negative   Negative   Negative   Trace                 Microbiology Results (last 10 days)       Procedure Component Value - Date/Time    COVID PRE-OP / PRE-PROCEDURE SCREENING ORDER (NO ISOLATION) - Swab, Nasopharynx [[id number]]  (Normal) Collected: 01/29/25 1440    Lab Status: Final result Specimen: Swab from Nasopharynx Updated: 01/29/25 6581    Narrative:      The following orders were created for panel order COVID PRE-OP / PRE-PROCEDURE SCREENING ORDER (NO ISOLATION) - Swab, Nasopharynx.  Procedure                               Abnormality         Status                     ---------                               -----------         ------                     Respiratory Panel PCR w/...[[id number]]  Normal              Final result                 Please view results for these tests on the individual orders.    Respiratory Panel PCR w/COVID-19(SARS-CoV-2) ERICK/MARTA/ELPIDIO/PAD/COR/ALMA In-House, NP Swab in UTM/VTM, 2 HR TAT - Swab, Nasopharynx [[id number]]  (Normal) Collected: 01/29/25 1440    Lab Status: Final result Specimen: Swab from Nasopharynx Updated: 01/29/25 1869     ADENOVIRUS, PCR Not Detected     Coronavirus 229E Not Detected     Coronavirus HKU1 Not Detected     Coronavirus NL63 Not Detected     Coronavirus OC43 Not Detected     COVID19 Not Detected     Human Metapneumovirus Not Detected     Human Rhinovirus/Enterovirus Not Detected     Influenza A PCR Not Detected     Influenza B PCR Not Detected     Parainfluenza Virus 1 Not Detected     Parainfluenza Virus 2 Not Detected     Parainfluenza Virus 3 Not Detected     Parainfluenza Virus 4 Not Detected     RSV, PCR Not Detected     Bordetella pertussis pcr Not Detected     Bordetella parapertussis PCR Not Detected     Chlamydophila pneumoniae PCR Not Detected     Mycoplasma pneumo by PCR Not Detected    Narrative:      In the setting of a positive respiratory panel with a viral infection PLUS a negative procalcitonin without other underlying concern  for bacterial infection, consider observing off antibiotics or discontinuation of antibiotics and continue supportive care. If the respiratory panel is positive for atypical bacterial infection (Bordetella pertussis, Chlamydophila pneumoniae, or Mycoplasma pneumoniae), consider antibiotic de-escalation to target atypical bacterial infection.            XR Chest 1 View    Result Date: 1/29/2025  XR CHEST 1 VW Date of Exam: 1/29/2025 1:31 PM EST Indication: SOA triage protocol Comparison: None available. Findings: There is a dual-chamber pacemaker. Heart size and pulmonary vasculature are within normal limits. There are multiple airspace opacities throughout the left lung that are grossly similar in appearance to the prior study. The right lung is clear     Impression: Impression: Grossly stable chronic multifocal opacities throughout the left lung, likely representing a chronic infectious or inflammatory process Electronically Signed: Kenny Staton  1/29/2025 2:06 PM EST  Workstation ID: [id number]     Results for orders placed during the hospital encounter of 08/20/23    Adult Transthoracic Echo Complete W/ Cont if Necessary Per Protocol    Interpretation Summary    Left ventricular systolic function is mildly decreased. Calculated left ventricular EF = 47.9% Normal left ventricular cavity size and wall thickness noted. There is left ventricular global hypokinesis noted. Left ventricular diastolic function is consistent with (grade I) impaired relaxation.    : Normal right ventricular cavity size and systolic function noted. Electronic lead present in the ventricle.    Normal left atrial size and volume noted. Saline test results are negative.    There is moderate calcification of the aortic valve. The aortic valve was poorly visualized but appears trileaflet. Moderate aortic valve regurgitation is present. No aortic valve stenosis is present.    Mild mitral annular calcification is present. Mild mitral valve  regurgitation is present. No significant mitral valve stenosis is present.    The tricuspid valve is grossly normal in structure. Mild tricuspid valve regurgitation is present. Estimated right ventricular systolic pressure from tricuspid regurgitation is normal, 33 mmHg. No tricuspid valve stenosis is present.    Mild dilation of the ascending aorta is present. Ascending aorta = 3.7 cm    There is a small (<1cm) pericardial effusion adjacent to the right atrium and right ventricle.      Assessment & Plan   Assessment & Plan       Multifocal pneumonia    Hypertension    Acute respiratory failure with hypoxia    Atrial fibrillation    Hyperlipidemia    Bibiana Hodges is a 89 y.o. female with history A-fib, hypertension, autonomic dysfunction, chronic debility, recurrent pneumonia with concerns for possible aspiration, hyperlipidemia, depression, dysphagia, was transferred from [facility] with complaints of shortness of air.      Assessment and Plan:    Acute respiratory failure with hypoxia  Pneumonia  Lactic acidosis--resolved  -- Lactate 2.3 and 1.4  -- Chest x-ray shows grossly stable chronic multifocal opacities throughout the left lung likely representing a chronic infectious or inflammatory process.  -- Respiratory panel PCR negative  -- BC x 2 pending  -- Sputum culture  -- DuoNebs  -- Urine Legionella and S.  Pneumo Ag  -- Consult SLP  -- Unasyn and doxycycline  -- A.m. labs    Elevated troponin  -- Troponin 13 and 16  -- Trend troponin and EKG  -- denies chest pain    A-fib  -- Continue metoprolol, amiodarone, Eliquis    Hypertension  -- Metoprolol    Hyperlipidemia  -- Statin    Depression  -- Effexor and BuSpar    Tremors  -- Continue Keppra    Generalized weakness  -- fall precautions  -- pt/ot consult  -- consult case management    DVT prophylaxis: On Eliquis    CODE STATUS:    Level Of Support Discussed With: Patient  Code Status (Patient has no pulse and is not breathing): CPR (Attempt to  Resuscitate)  Medical Interventions (Patient has pulse or is breathing): Full Support      Expected Discharge  TBD  Expected discharge date/ time has not been documented.      This note has been completed as part of a split-shared workflow.     Signature: Electronically signed by ANASTASIA Vicente, 01/29/25, 8:12 PM EST.

## 2025-01-30 NOTE — THERAPY DISCHARGE NOTE
Acute Care - Speech Language Pathology   Swallow Discharge [facility]     Patient Name: Bibiana Hodges  : 1935  MRN: [id number]  Today's Date: 2025               Admit Date: 2025    Visit Dx:    ICD-10-CM ICD-9-CM   1. Community acquired pneumonia of left lung, unspecified part of lung  J18.9 486   2. Hypoxia  R09.02 799.02   3. Dysphagia, unspecified type  R13.10 787.20   4. Impaired functional mobility, balance, gait, and endurance  Z74.09 V49.89     Patient Active Problem List   Diagnosis    Dysautonomia orthostatic hypotension syndrome    Hypertension    Flu vaccine need    Streptococcus pneumoniae vaccination indicated    Acute respiratory failure with hypoxia    Severe malnutrition    Atrial fibrillation    Autonomic dysfunction    Multifocal pneumonia    Hyperlipidemia     Past Medical History:   Diagnosis Date    Constipation     Depression     Dysautonomia orthostatic hypotension syndrome     Generalized osteoarthritis     GERD (gastroesophageal reflux disease)     s/p Nissen    Hypertension     Insomnia     Osteoarthrosis, shoulder region     Skin ulcer of scalp     Thrombophlebitis of arm     Tremor     Vitamin B12 deficiency      Past Surgical History:   Procedure Laterality Date    DILATATION AND CURETTAGE      HERNIA REPAIR      HIP SURGERY      SHOULDER SURGERY      Right       SLP Recommendation and Plan    SLP Diet Recommendation: comfort diet, per physician discretion (25 145)     Monitor for Signs of Aspiration: notify SLP if any concerns, other (see comments) (if change in GOC) (25 145)  Recommended Diagnostics: No further SLP services recommended (25 145)     Anticipated Discharge Disposition (SLP): No further SLP services warranted (25 1452)                    Anticipated Discharge Disposition (SLP): No further SLP services warranted (25 145)                            Progress: no change (25 1454)    SWALLOW EVALUATION (Last 72  Hours)       SLP Adult Swallow Evaluation       Row Name 01/30/25 1452 01/30/25 0815                Rehab Evaluation    Document Type discharge evaluation/summary  -RS evaluation  -RS       Subjective Information -- complains of;fatigue  -RS       Patient Observations -- alert;cooperative  -RS       Patient/Family/Caregiver Comments/Observations D/w MD who had conversation w pt and pt's POA. Decision is to proceed with full comfort diet of reg/thins. No indication to proceed w MBS. Nothing further for SLP to offer and will sign off.  -RS No family present  -RS       Patient Effort -- fair  -RS       Symptoms Noted During/After Treatment -- fatigue  -RS          General Information    Patient Profile Reviewed -- yes  -RS       Pertinent History Of Current Problem -- Pt is an 89 yoF w PMHx significant for A-fib, HTN, autonomic dysfunction, chronic debility, recurrent pneumonia with concerns for possible aspiration per MD note, HLD, depression, and dysphagia. She was transferred from SNF w c/o SOA, she is found to have multifocal pna, L>R  -RS       Current Method of Nutrition -- regular textures;thin liquids  -RS       Precautions/Limitations, Vision -- difficult to assess  -RS       Precautions/Limitations, Hearing -- WFL;for purposes of eval  -RS       Prior Level of Function-Communication -- WFL  -RS       Prior Level of Function-Swallowing -- other (see comments)  longstanding hx of dysphagia w FEES 7/2024 recs for soft/nectar, no straws, and several strategies  -RS       Plans/Goals Discussed with -- patient  -RS          Pain    Pretreatment Pain Rating -- 0/10 - no pain  -RS       Posttreatment Pain Rating -- 0/10 - no pain  -RS          Oral Musculature and Cranial Nerve Assessment    Oral Motor General Assessment -- generalized oral motor weakness  -RS          Clinical Swallow Eval    Pharyngeal Phase -- suspected pharyngeal impairment  -RS       Clinical Swallow Evaluation Summary -- Limited assessment 2/2  "pt requests to rest and not participate. She verbalizes understanding of prior recs for thickened liquids; however, states that she \"can't\" drink them. Discuss need for further pharyngeal assessment and pt is agreeable to MBS. D/w RN, rec PO meds w puree until study  -RS          Pharyngeal Phase Concerns    Pharyngeal Phase Concerns -- other (see comments)  -RS       Pharyngeal Phase Concerns, Comment -- hx pharyngeal dysphagia  -RS          SLP Evaluation Clinical Impression    SLP Swallowing Diagnosis -- R/O pharyngeal dysphagia  -RS       Functional Impact -- risk of aspiration/pneumonia  -RS          Recommendations    SLP Diet Recommendation comfort diet, per physician discretion  -RS NPO  -RS       Recommended Diagnostics No further SLP services recommended  -RS reassess via [facility] ([facility])  -RS       Oral Care Recommendations Oral Care BID/PRN;Toothbrush  -RS Oral Care BID/PRN;Toothbrush  -RS       SLP Rec. for Method of Medication Administration as tolerated  -RS meds whole;with puree  -RS       Monitor for Signs of Aspiration notify SLP if any concerns;other (see comments)  if change in GOC  -RS yes;notify SLP if any concerns  -RS       Anticipated Discharge Disposition (SLP) No further SLP services warranted  -RS skilled nursing facility  -RS                 User Key  (r) = Recorded By, (t) = Taken By, (c) = Cosigned By      Initials Name Effective Dates    RS Pascual Villafuerte, MS CCC-SLP 09/14/23 -                     EDUCATION  The patient has been educated in the following areas:   Dysphagia (Swallowing Impairment).                   Time Calculation:    Time Calculation- SLP       Row Name 01/30/25 0844             Time Calculation- SLP    SLP Start Time 0815  -RS      SLP Received On 01/30/25  -RS         Untimed Charges    38344-TA Eval Oral Pharyng Swallow Minutes 30  -RS         Total Minutes    Untimed Charges Total Minutes 30  -RS       Total Minutes 30  -RS                User Key  (r) = Recorded By, (t) " = Taken By, (c) = Cosigned By      Initials Name Provider Type    RS Pascual Villafuerte MS CCC-SLP Speech and Language Pathologist                    Therapy Charges for Today       Code Description Service Date Service Provider Modifiers Qty    [id number] HC ST EVAL ORAL PHARYNG SWALLOW 2 1/30/2025 Pascual Villafuerte MS CCC-SLP GN 1                 SLP Discharge Summary  Anticipated Discharge Disposition (SLP): No further SLP services warranted    MS YOAN VoSLP  1/30/2025

## 2025-01-31 LAB
BACTERIA SPEC AEROBE CULT: ABNORMAL
GRAM STN SPEC: ABNORMAL
ISOLATED FROM: ABNORMAL

## 2025-01-31 PROCEDURE — 87070 CULTURE OTHR SPECIMN AEROBIC: CPT | Performed by: NURSE PRACTITIONER

## 2025-01-31 PROCEDURE — 25010000002 CEFTRIAXONE PER 250 MG: Performed by: INTERNAL MEDICINE

## 2025-01-31 PROCEDURE — 87205 SMEAR GRAM STAIN: CPT | Performed by: NURSE PRACTITIONER

## 2025-01-31 PROCEDURE — 99232 SBSQ HOSP IP/OBS MODERATE 35: CPT | Performed by: INTERNAL MEDICINE

## 2025-01-31 RX ADMIN — DOXYCYCLINE 100 MG: 100 CAPSULE ORAL at 09:14

## 2025-01-31 RX ADMIN — DOXYCYCLINE 100 MG: 100 CAPSULE ORAL at 20:23

## 2025-01-31 RX ADMIN — METOPROLOL TARTRATE 12.5 MG: 25 TABLET, FILM COATED ORAL at 20:24

## 2025-01-31 RX ADMIN — ACETAMINOPHEN 650 MG: 650 SOLUTION ORAL at 22:05

## 2025-01-31 RX ADMIN — DOCUSATE SODIUM 100 MG: 100 CAPSULE, LIQUID FILLED ORAL at 09:14

## 2025-01-31 RX ADMIN — LEVETIRACETAM 250 MG: 500 TABLET, FILM COATED ORAL at 09:13

## 2025-01-31 RX ADMIN — ACETAMINOPHEN 650 MG: 650 SOLUTION ORAL at 14:08

## 2025-01-31 RX ADMIN — TRAZODONE HYDROCHLORIDE 100 MG: 50 TABLET ORAL at 20:24

## 2025-01-31 RX ADMIN — ACETAMINOPHEN 650 MG: 650 SOLUTION ORAL at 09:27

## 2025-01-31 RX ADMIN — Medication 5 MG: at 20:24

## 2025-01-31 RX ADMIN — APIXABAN 2.5 MG: 2.5 TABLET, FILM COATED ORAL at 09:14

## 2025-01-31 RX ADMIN — AMIODARONE HYDROCHLORIDE 200 MG: 200 TABLET ORAL at 09:14

## 2025-01-31 RX ADMIN — SODIUM CHLORIDE 2000 MG: 900 INJECTION INTRAVENOUS at 09:12

## 2025-01-31 RX ADMIN — SENNOSIDES AND DOCUSATE SODIUM 2 TABLET: 50; 8.6 TABLET ORAL at 18:10

## 2025-01-31 RX ADMIN — APIXABAN 2.5 MG: 2.5 TABLET, FILM COATED ORAL at 20:23

## 2025-01-31 RX ADMIN — LEVETIRACETAM 250 MG: 500 TABLET, FILM COATED ORAL at 20:23

## 2025-01-31 RX ADMIN — VENLAFAXINE HYDROCHLORIDE 75 MG: 75 CAPSULE, EXTENDED RELEASE ORAL at 09:14

## 2025-01-31 RX ADMIN — PANTOPRAZOLE SODIUM 40 MG: 40 TABLET, DELAYED RELEASE ORAL at 09:14

## 2025-01-31 RX ADMIN — ROSUVASTATIN 10 MG: 10 TABLET, FILM COATED ORAL at 20:23

## 2025-01-31 RX ADMIN — Medication 10 ML: at 09:14

## 2025-01-31 RX ADMIN — FLUTICASONE PROPIONATE 2 SPRAY: 50 SPRAY, METERED NASAL at 22:05

## 2025-01-31 RX ADMIN — LEVOTHYROXINE SODIUM 100 MCG: 100 TABLET ORAL at 05:18

## 2025-01-31 NOTE — PROGRESS NOTES
[facility] Medicine Services  PROGRESS NOTE    Patient Name: Bibiana Hodges  : 1935  MRN: [id number]    Date of Admission: 2025  Primary Care Physician: Nivia Madrigal MD    Subjective   Subjective     CC: shortness of breath    HPI:  She feels much better - very grateful for our care here  On room air  Cough persists but is overall improved    Objective   Objective     Vital Signs:   Temp:  [97.9 °F (36.6 °C)-98.1 °F (36.7 °C)] 97.9 °F (36.6 °C)  Heart Rate:  [60-62] 62  Resp:  [16-22] 18  BP: (103-172)/(50-82) 103/50  Flow (L/min) (Oxygen Therapy):  [2] 2     Physical Exam:  Constitutional: No acute distress, awake, alert very thin older female lying in bed  Respiratory: Clear to auscultation bilaterally, respiratory effort improved. On room air  Cardiovascular: RRR, no murmurs, rubs, or gallops  Gastrointestinal: Soft, nontender, nondistended  Musculoskeletal: Muscle tone decreased significantly throughout, no joint effusions appreciated  Psychiatric: Appropriate affect, cooperative  Neurologic: Alert and oriented, facial movements symmetric and spontaneous movement of all 4 extremities grossly equal bilaterally, speech clear  Skin: No rashes    Results Reviewed:  LAB RESULTS:      Lab 25  0551 25  1723 25  1543 25  1349   WBC  --   --   --  10.00   HEMOGLOBIN  --   --   --  14.0   HEMATOCRIT  --   --   --  43.3   PLATELETS  --   --   --  167   NEUTROS ABS  --   --   --  9.06*   IMMATURE GRANS (ABS)  --   --   --  0.02   LYMPHS ABS  --   --   --  0.46*   MONOS ABS  --   --   --  0.38   EOS ABS  --   --   --  0.05   MCV  --   --   --  105.4*   PROCALCITONIN 1.38*  --   --   --    LACTATE  --  1.4  --  2.3*   HSTROP T 18*  --  16* 13         Lab 25  0551 25  1349   SODIUM 135* 137   POTASSIUM 4.8 4.8   CHLORIDE 102 101   CO2 23.0 26.0   ANION GAP 10.0 10.0   BUN 28* 23   CREATININE 1.25* 1.16*   EGFR 41.3* 45.2*   GLUCOSE 74 138*  Detail Level: Detailed   CALCIUM 8.8 9.2         Lab 01/29/25  1349   TOTAL PROTEIN 5.8*   ALBUMIN 3.4*   GLOBULIN 2.4   ALT (SGPT) 27   AST (SGOT) 31   BILIRUBIN 0.5   ALK PHOS 55         Lab 01/30/25  0551 01/29/25  1543 01/29/25  1349   PROBNP  --   --  1,024.0   HSTROP T 18* 16* 13                 Brief Urine Lab Results  (Last result in the past 365 days)        Color   Clarity   Blood   Leuk Est   Nitrite   Protein   CREAT   Urine HCG        01/30/25 0859 Yellow   Cloudy   Negative   Large (3+)   Negative   30 mg/dL (1+)                   Microbiology Results Abnormal       Procedure Component Value - Date/Time    Blood Culture - Blood, Arm, Right [[id number]]  (Abnormal) Collected: 01/29/25 1545    Lab Status: Final result Specimen: Blood from Arm, Right Updated: 01/31/25 0712     Blood Culture Staphylococcus, coagulase negative     Isolated from Aerobic Bottle     Gram Stain Aerobic Bottle Gram positive cocci in clusters    Narrative:      Probable contaminant requires clinical correlation, susceptibility not performed unless requested by physician.      Blood Culture ID, PCR - Blood, Arm, Right [[id number]]  (Abnormal) Collected: 01/29/25 1545    Lab Status: Final result Specimen: Blood from Arm, Right Updated: 01/30/25 1441     BCID, PCR Staph spp, not aureus or lugdunensis. Identification by BCID2 PCR.     BOTTLE TYPE Aerobic Bottle            No radiology results from the last 24 hrs    Results for orders placed during the hospital encounter of 08/20/23    Adult Transthoracic Echo Complete W/ Cont if Necessary Per Protocol    Interpretation Summary    Left ventricular systolic function is mildly decreased. Calculated left ventricular EF = 47.9% Normal left ventricular cavity size and wall thickness noted. There is left ventricular global hypokinesis noted. Left ventricular diastolic function is consistent with (grade I) impaired relaxation.    : Normal right ventricular cavity size and systolic function noted. Electronic lead  present in the ventricle.    Normal left atrial size and volume noted. Saline test results are negative.    There is moderate calcification of the aortic valve. The aortic valve was poorly visualized but appears trileaflet. Moderate aortic valve regurgitation is present. No aortic valve stenosis is present.    Mild mitral annular calcification is present. Mild mitral valve regurgitation is present. No significant mitral valve stenosis is present.    The tricuspid valve is grossly normal in structure. Mild tricuspid valve regurgitation is present. Estimated right ventricular systolic pressure from tricuspid regurgitation is normal, 33 mmHg. No tricuspid valve stenosis is present.    Mild dilation of the ascending aorta is present. Ascending aorta = 3.7 cm    There is a small (<1cm) pericardial effusion adjacent to the right atrium and right ventricle.      Current medications:  Scheduled Meds:amiodarone, 200 mg, Oral, Daily  apixaban, 2.5 mg, Oral, BID  cefTRIAXone, 2,000 mg, Intravenous, Q24H  docusate sodium, 100 mg, Oral, Daily  doxycycline, 100 mg, Oral, Q12H  levETIRAcetam, 250 mg, Oral, BID  levothyroxine, 100 mcg, Oral, Q AM  lisinopril, 10 mg, Oral, Q24H  melatonin, 5 mg, Oral, Nightly  metoprolol tartrate, 12.5 mg, Oral, BID  pantoprazole, 40 mg, Oral, Daily  rosuvastatin, 10 mg, Oral, Nightly  sodium chloride, 10 mL, Intravenous, Q12H  traZODone, 100 mg, Oral, Nightly  venlafaxine XR, 75 mg, Oral, Daily      Continuous Infusions:   PRN Meds:.  acetaminophen **OR** acetaminophen **OR** acetaminophen    albuterol    benzonatate    senna-docusate sodium **AND** polyethylene glycol **AND** bisacodyl **AND** bisacodyl    fluticasone    ipratropium-albuterol    meclizine    nitroglycerin    sodium chloride    sodium chloride    sodium chloride    Assessment & Plan   Assessment & Plan     Active Hospital Problems    Diagnosis  POA    **Multifocal pneumonia [J18.9]  Yes    Hyperlipidemia [E78.5]  Unknown    Atrial  Detail Level: Simple fibrillation [I48.91]  Yes    Acute respiratory failure with hypoxia [J96.01]  Yes    Hypertension [I10]  Yes      Resolved Hospital Problems   No resolved problems to display.        Brief Hospital Course to date:  Bibiana Hodges is a 89 y.o. female w h/o pacemaker placement, recurrent pneumonia with known aspiration who presents from [place] with shortness of breath, congestive cough. Found to have chronic appearing pulm infiltrates, clinical concern for acute PNA    Acute hypoxic resp failure 2/2 aspiration PNA - improved -rocephin, doxycycline    Chronic oropharyngeal dysphagia  -d/w patient who declines modified diet (intolerant in the past), d/w speech and will hold on formal assessment given this. Daughter in agreement w mother's decision    Afib s/p PPM - home eliquis, metoprolol, amiodarone  Depression - home effexor, buspar  Severe malnutrition  Tremor - home keppra  Blood culture x1 c/w contaminant    GOC:  -D/w patient 1/31 and she reports not wanting treatments that would not give her quality of life benefit. We discuss that ventilator and CPR would be very unlikely to benefit her given her overall age/situation/comorbidities. She reports being DNR/DNI. On review of past code statuses, appears patient fluctuates back/forth on this. Daughter reports fear related to death. DNR/DNI based on our discussion changed 1/31    Expected Discharge Location and Transportation: SNF  Expected Discharge medically ready 1/31  Expected Discharge Date: 2/3/2025; Expected Discharge Time:      VTE Prophylaxis:  Pharmacologic VTE prophylaxis orders are present.         AM-PAC 6 Clicks Score (PT): 18 (01/30/25 2100)    CODE STATUS:   Code Status and Medical Interventions: No CPR (Do Not Attempt to Resuscitate); Limited Support; No intubation (DNI)   Ordered at: 01/31/25 0952     Medical Intervention Limits:    No intubation (DNI)     Level Of Support Discussed With:    Patient     Code Status (Patient has no pulse and is not  breathing):    No CPR (Do Not Attempt to Resuscitate)     Medical Interventions (Patient has pulse or is breathing):    Limited Support       Kelly Villafuerte MD  01/31/25

## 2025-01-31 NOTE — CASE MANAGEMENT/SOCIAL WORK
Continued Stay Note  [facility]     Patient Name: Bibiana Hodges  MRN: [id number]  Today's Date: 1/31/2025    Admit Date: 1/29/2025    Plan: rehab   Discharge Plan       Row Name 01/31/25 1459       Plan    Plan rehab    Patient/Family in Agreement with Plan yes    Plan Comments Met with patient at the bedside to discuss discharge plan. PT/OT recommend rehab at discharge. Patient is agreeable. Patient choice list provided. Referrals sent to [facility] and [facility]. CM will continue to follow.    Final Discharge Disposition Code 03 - skilled nursing facility (SNF)                   Discharge Codes    No documentation.                 Expected Discharge Date and Time       Expected Discharge Date Expected Discharge Time    Feb 1, 2025               Soraya Campbell RN

## 2025-01-31 NOTE — PLAN OF CARE
Problem: Adult Inpatient Plan of Care  Goal: Plan of Care Review  Outcome: Progressing  Goal: Patient-Specific Goal (Individualized)  Outcome: Progressing  Goal: Absence of Hospital-Acquired Illness or Injury  Outcome: Progressing  Intervention: Identify and Manage Fall Risk  Recent Flowsheet Documentation  Taken 1/30/2025 2100 by Rima Love RN  Safety Promotion/Fall Prevention:   toileting scheduled   safety round/check completed   nonskid shoes/slippers when out of bed   fall prevention program maintained  Intervention: Prevent and Manage VTE (Venous Thromboembolism) Risk  Recent Flowsheet Documentation  Taken 1/30/2025 2100 by Rima Love RN  VTE Prevention/Management: (see mar) other (see comments)  Goal: Optimal Comfort and Wellbeing  Outcome: Progressing  Intervention: Monitor Pain and Promote Comfort  Recent Flowsheet Documentation  Taken 1/30/2025 2100 by Rima Love RN  Pain Management Interventions: pain medication given  Goal: Readiness for Transition of Care  Outcome: Progressing     Problem: Comorbidity Management  Goal: Maintenance of Heart Failure Symptom Control  Outcome: Progressing  Goal: Blood Pressure in Desired Range  Outcome: Progressing     Problem: Skin Injury Risk Increased  Goal: Skin Health and Integrity  Outcome: Progressing  Intervention: Optimize Skin Protection  Recent Flowsheet Documentation  Taken 1/30/2025 2100 by Rima Love RN  Pressure Reduction Techniques:   frequent weight shift encouraged   heels elevated off bed   positioned off wounds   pressure points protected   weight shift assistance provided  Pressure Reduction Devices: positioning supports utilized     Problem: Fall Injury Risk  Goal: Absence of Fall and Fall-Related Injury  Outcome: Progressing  Intervention: Promote Injury-Free Environment  Recent Flowsheet Documentation  Taken 1/30/2025 2100 by Rima Love RN  Safety Promotion/Fall Prevention:   toileting scheduled    safety round/check completed   nonskid shoes/slippers when out of bed   fall prevention program maintained   Goal Outcome Evaluation:

## 2025-02-01 PROCEDURE — 99232 SBSQ HOSP IP/OBS MODERATE 35: CPT | Performed by: INTERNAL MEDICINE

## 2025-02-01 PROCEDURE — 25010000002 CEFTRIAXONE PER 250 MG: Performed by: INTERNAL MEDICINE

## 2025-02-01 RX ADMIN — Medication 5 MG: at 19:43

## 2025-02-01 RX ADMIN — TRAZODONE HYDROCHLORIDE 100 MG: 50 TABLET ORAL at 19:41

## 2025-02-01 RX ADMIN — PANTOPRAZOLE SODIUM 40 MG: 40 TABLET, DELAYED RELEASE ORAL at 09:18

## 2025-02-01 RX ADMIN — APIXABAN 2.5 MG: 2.5 TABLET, FILM COATED ORAL at 09:18

## 2025-02-01 RX ADMIN — AMIODARONE HYDROCHLORIDE 200 MG: 200 TABLET ORAL at 09:18

## 2025-02-01 RX ADMIN — ROSUVASTATIN 10 MG: 10 TABLET, FILM COATED ORAL at 19:43

## 2025-02-01 RX ADMIN — ACETAMINOPHEN 650 MG: 650 SOLUTION ORAL at 12:55

## 2025-02-01 RX ADMIN — ACETAMINOPHEN 650 MG: 650 SOLUTION ORAL at 20:57

## 2025-02-01 RX ADMIN — SENNOSIDES AND DOCUSATE SODIUM 2 TABLET: 50; 8.6 TABLET ORAL at 21:38

## 2025-02-01 RX ADMIN — LEVETIRACETAM 250 MG: 500 TABLET, FILM COATED ORAL at 19:44

## 2025-02-01 RX ADMIN — Medication 10 ML: at 09:19

## 2025-02-01 RX ADMIN — LISINOPRIL 10 MG: 10 TABLET ORAL at 09:18

## 2025-02-01 RX ADMIN — VENLAFAXINE HYDROCHLORIDE 75 MG: 75 CAPSULE, EXTENDED RELEASE ORAL at 09:18

## 2025-02-01 RX ADMIN — APIXABAN 2.5 MG: 2.5 TABLET, FILM COATED ORAL at 19:44

## 2025-02-01 RX ADMIN — LEVOTHYROXINE SODIUM 100 MCG: 100 TABLET ORAL at 06:05

## 2025-02-01 RX ADMIN — DOCUSATE SODIUM 100 MG: 100 CAPSULE, LIQUID FILLED ORAL at 09:18

## 2025-02-01 RX ADMIN — WHITE PETROLATUM 57.7 %-MINERAL OIL 31.9 % EYE OINTMENT: at 15:36

## 2025-02-01 RX ADMIN — METOPROLOL TARTRATE 12.5 MG: 25 TABLET, FILM COATED ORAL at 19:42

## 2025-02-01 RX ADMIN — FLUTICASONE PROPIONATE 2 SPRAY: 50 SPRAY, METERED NASAL at 19:44

## 2025-02-01 RX ADMIN — SODIUM CHLORIDE 2000 MG: 900 INJECTION INTRAVENOUS at 09:18

## 2025-02-01 RX ADMIN — LEVETIRACETAM 250 MG: 500 TABLET, FILM COATED ORAL at 09:18

## 2025-02-01 RX ADMIN — DOXYCYCLINE 100 MG: 100 CAPSULE ORAL at 19:41

## 2025-02-01 RX ADMIN — DOXYCYCLINE 100 MG: 100 CAPSULE ORAL at 09:18

## 2025-02-01 RX ADMIN — METOPROLOL TARTRATE 12.5 MG: 25 TABLET, FILM COATED ORAL at 09:18

## 2025-02-01 NOTE — PLAN OF CARE
Problem: Adult Inpatient Plan of Care  Goal: Plan of Care Review  Outcome: Progressing  Goal: Patient-Specific Goal (Individualized)  Outcome: Progressing  Goal: Absence of Hospital-Acquired Illness or Injury  Outcome: Progressing  Goal: Optimal Comfort and Wellbeing  Outcome: Progressing  Goal: Readiness for Transition of Care  Outcome: Progressing     Problem: Comorbidity Management  Goal: Maintenance of Heart Failure Symptom Control  Outcome: Progressing  Goal: Blood Pressure in Desired Range  Outcome: Progressing     Problem: Skin Injury Risk Increased  Goal: Skin Health and Integrity  Outcome: Progressing     Problem: Fall Injury Risk  Goal: Absence of Fall and Fall-Related Injury  Outcome: Progressing   Goal Outcome Evaluation:

## 2025-02-01 NOTE — PLAN OF CARE
Problem: Adult Inpatient Plan of Care  Goal: Plan of Care Review  Outcome: Progressing  Goal: Patient-Specific Goal (Individualized)  Outcome: Progressing  Goal: Absence of Hospital-Acquired Illness or Injury  Outcome: Progressing  Intervention: Identify and Manage Fall Risk  Recent Flowsheet Documentation  Taken 2/1/2025 1200 by Louann Whalen RN  Safety Promotion/Fall Prevention:   activity supervised   clutter free environment maintained   assistive device/personal items within reach   toileting scheduled   safety round/check completed   room organization consistent  Intervention: Prevent Skin Injury  Recent Flowsheet Documentation  Taken 2/1/2025 1200 by Louann Whalen RN  Skin Protection:   drying agents applied   transparent dressing maintained   silicone foam dressing in place  Taken 2/1/2025 0900 by Louann Whalen RN  Body Position:   turned   supine  Skin Protection:   drying agents applied   transparent dressing maintained   silicone foam dressing in place  Goal: Optimal Comfort and Wellbeing  Outcome: Progressing  Goal: Readiness for Transition of Care  Outcome: Progressing     Problem: Comorbidity Management  Goal: Maintenance of Heart Failure Symptom Control  Outcome: Progressing  Goal: Blood Pressure in Desired Range  Outcome: Progressing     Problem: Skin Injury Risk Increased  Goal: Skin Health and Integrity  Outcome: Progressing  Intervention: Optimize Skin Protection  Recent Flowsheet Documentation  Taken 2/1/2025 1200 by Louann Whalen RN  Activity Management: activity encouraged  Pressure Reduction Techniques:   frequent weight shift encouraged   heels elevated off bed   pressure points protected   weight shift assistance provided  Pressure Reduction Devices:   pressure-redistributing mattress utilized   positioning supports utilized   heel offloading device utilized   foam padding utilized  Skin Protection:   drying agents applied   transparent dressing maintained   silicone foam  dressing in place  Taken 2/1/2025 0900 by Louann Whalen RN  Pressure Reduction Techniques:   frequent weight shift encouraged   heels elevated off bed   pressure points protected   weight shift assistance provided  Head of Bed (HOB) Positioning: HOB elevated  Pressure Reduction Devices:   pressure-redistributing mattress utilized   positioning supports utilized   heel offloading device utilized   foam padding utilized  Skin Protection:   drying agents applied   transparent dressing maintained   silicone foam dressing in place     Problem: Fall Injury Risk  Goal: Absence of Fall and Fall-Related Injury  Outcome: Progressing  Intervention: Promote Injury-Free Environment  Recent Flowsheet Documentation  Taken 2/1/2025 1200 by Louann Whalen, RN  Safety Promotion/Fall Prevention:   activity supervised   clutter free environment maintained   assistive device/personal items within reach   toileting scheduled   safety round/check completed   room organization consistent   Goal Outcome Evaluation:

## 2025-02-01 NOTE — PROGRESS NOTES
"    [facility] Medicine Services  PROGRESS NOTE    Patient Name: Bibiana Hodges  : 1935  MRN: [id number]    Date of Admission: 2025  Primary Care Physician: Nivia Madrigal MD    Subjective   Subjective     CC: shortness of breath    HPI:  Enjoying respite from normal home - feels she is being well taken care of   IN regards to breathing, I'm \"a whole different person\" - much improved    Objective   Objective     Vital Signs:   Temp:  [97.3 °F (36.3 °C)-98 °F (36.7 °C)] 97.3 °F (36.3 °C)  Heart Rate:  [] 102  Resp:  [18-26] 18  BP: (120-172)/(68-83) 143/68     Physical Exam:  Constitutional: No acute distress, awake, alert very thin older female lying in bed  Respiratory: Clear to auscultation bilaterally, respiratory effort now normal. On room air  Cardiovascular: RRR, no murmurs, rubs, or gallops  Gastrointestinal: Soft, nontender, nondistended  Musculoskeletal: Muscle tone decreased significantly throughout, no joint effusions appreciated  Psychiatric: Appropriate affect, cooperative  Neurologic: Alert and oriented, facial movements symmetric and spontaneous movement of all 4 extremities grossly equal bilaterally, speech clear  Skin: No rashes    Results Reviewed:  LAB RESULTS:      Lab 25  0551 25  1723 25  1543 25  1349   WBC  --   --   --  10.00   HEMOGLOBIN  --   --   --  14.0   HEMATOCRIT  --   --   --  43.3   PLATELETS  --   --   --  167   NEUTROS ABS  --   --   --  9.06*   IMMATURE GRANS (ABS)  --   --   --  0.02   LYMPHS ABS  --   --   --  0.46*   MONOS ABS  --   --   --  0.38   EOS ABS  --   --   --  0.05   MCV  --   --   --  105.4*   PROCALCITONIN 1.38*  --   --   --    LACTATE  --  1.4  --  2.3*   HSTROP T 18*  --  16* 13         Lab 25  0551 25  1349   SODIUM 135* 137   POTASSIUM 4.8 4.8   CHLORIDE 102 101   CO2 23.0 26.0   ANION GAP 10.0 10.0   BUN 28* 23   CREATININE 1.25* 1.16*   EGFR 41.3* 45.2*   GLUCOSE 74 138* "   CALCIUM 8.8 9.2         Lab 01/29/25  1349   TOTAL PROTEIN 5.8*   ALBUMIN 3.4*   GLOBULIN 2.4   ALT (SGPT) 27   AST (SGOT) 31   BILIRUBIN 0.5   ALK PHOS 55         Lab 01/30/25  0551 01/29/25  1543 01/29/25  1349   PROBNP  --   --  1,024.0   HSTROP T 18* 16* 13                 Brief Urine Lab Results  (Last result in the past 365 days)        Color   Clarity   Blood   Leuk Est   Nitrite   Protein   CREAT   Urine HCG        01/30/25 0859 Yellow   Cloudy   Negative   Large (3+)   Negative   30 mg/dL (1+)                   Microbiology Results Abnormal       Procedure Component Value - Date/Time    Blood Culture - Blood, Arm, Right [[id number]]  (Abnormal) Collected: 01/29/25 1545    Lab Status: Final result Specimen: Blood from Arm, Right Updated: 01/31/25 0712     Blood Culture Staphylococcus, coagulase negative     Isolated from Aerobic Bottle     Gram Stain Aerobic Bottle Gram positive cocci in clusters    Narrative:      Probable contaminant requires clinical correlation, susceptibility not performed unless requested by physician.      Blood Culture ID, PCR - Blood, Arm, Right [[id number]]  (Abnormal) Collected: 01/29/25 1545    Lab Status: Final result Specimen: Blood from Arm, Right Updated: 01/30/25 1441     BCID, PCR Staph spp, not aureus or lugdunensis. Identification by BCID2 PCR.     BOTTLE TYPE Aerobic Bottle            No radiology results from the last 24 hrs    Results for orders placed during the hospital encounter of 08/20/23    Adult Transthoracic Echo Complete W/ Cont if Necessary Per Protocol    Interpretation Summary    Left ventricular systolic function is mildly decreased. Calculated left ventricular EF = 47.9% Normal left ventricular cavity size and wall thickness noted. There is left ventricular global hypokinesis noted. Left ventricular diastolic function is consistent with (grade I) impaired relaxation.    : Normal right ventricular cavity size and systolic function noted. Electronic lead  present in the ventricle.    Normal left atrial size and volume noted. Saline test results are negative.    There is moderate calcification of the aortic valve. The aortic valve was poorly visualized but appears trileaflet. Moderate aortic valve regurgitation is present. No aortic valve stenosis is present.    Mild mitral annular calcification is present. Mild mitral valve regurgitation is present. No significant mitral valve stenosis is present.    The tricuspid valve is grossly normal in structure. Mild tricuspid valve regurgitation is present. Estimated right ventricular systolic pressure from tricuspid regurgitation is normal, 33 mmHg. No tricuspid valve stenosis is present.    Mild dilation of the ascending aorta is present. Ascending aorta = 3.7 cm    There is a small (<1cm) pericardial effusion adjacent to the right atrium and right ventricle.      Current medications:  Scheduled Meds:amiodarone, 200 mg, Oral, Daily  apixaban, 2.5 mg, Oral, BID  cefTRIAXone, 2,000 mg, Intravenous, Q24H  docusate sodium, 100 mg, Oral, Daily  doxycycline, 100 mg, Oral, Q12H  levETIRAcetam, 250 mg, Oral, BID  levothyroxine, 100 mcg, Oral, Q AM  lisinopril, 10 mg, Oral, Q24H  melatonin, 5 mg, Oral, Nightly  metoprolol tartrate, 12.5 mg, Oral, BID  pantoprazole, 40 mg, Oral, Daily  rosuvastatin, 10 mg, Oral, Nightly  sodium chloride, 10 mL, Intravenous, Q12H  traZODone, 100 mg, Oral, Nightly  venlafaxine XR, 75 mg, Oral, Daily      Continuous Infusions:   PRN Meds:.  acetaminophen **OR** acetaminophen **OR** acetaminophen    albuterol    benzonatate    senna-docusate sodium **AND** polyethylene glycol **AND** bisacodyl **AND** bisacodyl    fluticasone    ipratropium-albuterol    meclizine    nitroglycerin    sodium chloride    sodium chloride    sodium chloride    Assessment & Plan   Assessment & Plan     Active Hospital Problems    Diagnosis  POA    **Multifocal pneumonia [J18.9]  Yes    Hyperlipidemia [E78.5]  Unknown    Atrial  fibrillation [I48.91]  Yes    Acute respiratory failure with hypoxia [J96.01]  Yes    Hypertension [I10]  Yes      Resolved Hospital Problems   No resolved problems to display.        Brief Hospital Course to date:  Bibiana Hodges is a 89 y.o. female w h/o pacemaker placement, recurrent pneumonia with known aspiration who presents from [place] with shortness of breath, congestive cough. Found to have chronic appearing pulm infiltrates, clinical concern for acute PNA    Acute hypoxic resp failure 2/2 aspiration PNA - improved -rocephin, doxycycline or equivalent to complete 5 days    Chronic oropharyngeal dysphagia  -d/w patient who declines modified diet (intolerant in the past), d/w speech and will hold on formal assessment given this. Daughter in agreement w mother's decision    Afib s/p PPM - home eliquis, metoprolol, amiodarone  Depression - home effexor, buspar  Severe malnutrition  Tremor - home keppra  Blood culture x1 c/w contaminant    GOC:  -D/w patient 1/31 and she reports not wanting treatments that would not give her quality of life benefit. We discuss that ventilator and CPR would be very unlikely to benefit her given her overall age/situation/comorbidities. She reports being DNR/DNI. On review of past code statuses, appears patient fluctuates back/forth on this. Daughter reports fear related to death. DNR/DNI based on our discussion changed 1/31    Expected Discharge Location and Transportation: SNF  Expected Discharge medically ready 1/31  Expected Discharge Date: 2/3/2025; Expected Discharge Time:      VTE Prophylaxis:  Pharmacologic VTE prophylaxis orders are present.         AM-PAC 6 Clicks Score (PT): 19 (02/01/25 0900)    CODE STATUS:   Code Status and Medical Interventions: No CPR (Do Not Attempt to Resuscitate); Limited Support; No intubation (DNI)   Ordered at: 01/31/25 0952     Medical Intervention Limits:    No intubation (DNI)     Level Of Support Discussed With:    Patient     Code Status  (Patient has no pulse and is not breathing):    No CPR (Do Not Attempt to Resuscitate)     Medical Interventions (Patient has pulse or is breathing):    Limited Support       Kelly Villafuerte MD  02/01/25

## 2025-02-02 LAB
BACTERIA SPEC RESP CULT: NORMAL
GRAM STN SPEC: NORMAL

## 2025-02-02 PROCEDURE — 97535 SELF CARE MNGMENT TRAINING: CPT

## 2025-02-02 PROCEDURE — 97116 GAIT TRAINING THERAPY: CPT

## 2025-02-02 PROCEDURE — 99232 SBSQ HOSP IP/OBS MODERATE 35: CPT | Performed by: INTERNAL MEDICINE

## 2025-02-02 PROCEDURE — 25010000002 CEFTRIAXONE PER 250 MG: Performed by: INTERNAL MEDICINE

## 2025-02-02 RX ADMIN — METOPROLOL TARTRATE 12.5 MG: 25 TABLET, FILM COATED ORAL at 20:05

## 2025-02-02 RX ADMIN — DOCUSATE SODIUM 100 MG: 100 CAPSULE, LIQUID FILLED ORAL at 08:38

## 2025-02-02 RX ADMIN — Medication 10 ML: at 20:06

## 2025-02-02 RX ADMIN — POLYETHYLENE GLYCOL 3350 17 G: 17 POWDER, FOR SOLUTION ORAL at 08:50

## 2025-02-02 RX ADMIN — ACETAMINOPHEN 650 MG: 325 TABLET, FILM COATED ORAL at 17:39

## 2025-02-02 RX ADMIN — LEVOTHYROXINE SODIUM 100 MCG: 100 TABLET ORAL at 08:38

## 2025-02-02 RX ADMIN — ACETAMINOPHEN 650 MG: 325 TABLET, FILM COATED ORAL at 11:59

## 2025-02-02 RX ADMIN — METOPROLOL TARTRATE 12.5 MG: 25 TABLET, FILM COATED ORAL at 08:38

## 2025-02-02 RX ADMIN — DOXYCYCLINE 100 MG: 100 CAPSULE ORAL at 08:38

## 2025-02-02 RX ADMIN — APIXABAN 2.5 MG: 2.5 TABLET, FILM COATED ORAL at 08:38

## 2025-02-02 RX ADMIN — TRAZODONE HYDROCHLORIDE 100 MG: 50 TABLET ORAL at 20:06

## 2025-02-02 RX ADMIN — DOXYCYCLINE 100 MG: 100 CAPSULE ORAL at 20:06

## 2025-02-02 RX ADMIN — LEVETIRACETAM 250 MG: 500 TABLET, FILM COATED ORAL at 20:06

## 2025-02-02 RX ADMIN — LEVETIRACETAM 250 MG: 500 TABLET, FILM COATED ORAL at 08:39

## 2025-02-02 RX ADMIN — VENLAFAXINE HYDROCHLORIDE 75 MG: 75 CAPSULE, EXTENDED RELEASE ORAL at 08:39

## 2025-02-02 RX ADMIN — SODIUM CHLORIDE 2000 MG: 900 INJECTION INTRAVENOUS at 08:39

## 2025-02-02 RX ADMIN — AMIODARONE HYDROCHLORIDE 200 MG: 200 TABLET ORAL at 08:38

## 2025-02-02 RX ADMIN — APIXABAN 2.5 MG: 2.5 TABLET, FILM COATED ORAL at 20:06

## 2025-02-02 RX ADMIN — LISINOPRIL 10 MG: 10 TABLET ORAL at 08:38

## 2025-02-02 RX ADMIN — PANTOPRAZOLE SODIUM 40 MG: 40 TABLET, DELAYED RELEASE ORAL at 08:38

## 2025-02-02 RX ADMIN — Medication 5 MG: at 20:05

## 2025-02-02 RX ADMIN — ROSUVASTATIN 10 MG: 10 TABLET, FILM COATED ORAL at 20:05

## 2025-02-02 NOTE — PLAN OF CARE
Problem: Adult Inpatient Plan of Care  Goal: Plan of Care Review  Outcome: Progressing  Goal: Patient-Specific Goal (Individualized)  Outcome: Progressing  Goal: Absence of Hospital-Acquired Illness or Injury  Outcome: Progressing  Intervention: Identify and Manage Fall Risk  Recent Flowsheet Documentation  Taken 2/1/2025 2032 by Rima Love RN  Safety Promotion/Fall Prevention:   safety round/check completed   toileting scheduled  Goal: Optimal Comfort and Wellbeing  Outcome: Progressing  Goal: Readiness for Transition of Care  Outcome: Progressing     Problem: Comorbidity Management  Goal: Maintenance of Heart Failure Symptom Control  Outcome: Progressing  Goal: Blood Pressure in Desired Range  Outcome: Progressing     Problem: Skin Injury Risk Increased  Goal: Skin Health and Integrity  Outcome: Progressing     Problem: Fall Injury Risk  Goal: Absence of Fall and Fall-Related Injury  Outcome: Progressing  Intervention: Promote Injury-Free Environment  Recent Flowsheet Documentation  Taken 2/1/2025 2032 by Rima Love RN  Safety Promotion/Fall Prevention:   safety round/check completed   toileting scheduled   Goal Outcome Evaluation:

## 2025-02-02 NOTE — THERAPY TREATMENT NOTE
Patient Name: Bibiana Hodges  : 1935    MRN: [id number]                              Today's Date: 2025       Admit Date: 2025    Visit Dx:     ICD-10-CM ICD-9-CM   1. Community acquired pneumonia of left lung, unspecified part of lung  J18.9 486   2. Hypoxia  R09.02 799.02   3. Dysphagia, unspecified type  R13.10 787.20   4. Impaired functional mobility, balance, gait, and endurance  Z74.09 V49.89     Patient Active Problem List   Diagnosis    Dysautonomia orthostatic hypotension syndrome    Hypertension    Flu vaccine need    Streptococcus pneumoniae vaccination indicated    Acute respiratory failure with hypoxia    Severe malnutrition    Atrial fibrillation    Autonomic dysfunction    Multifocal pneumonia    Hyperlipidemia     Past Medical History:   Diagnosis Date    Constipation     Depression     Dysautonomia orthostatic hypotension syndrome     Generalized osteoarthritis     GERD (gastroesophageal reflux disease)     s/p Nissen    Hypertension     Insomnia     Osteoarthrosis, shoulder region     Skin ulcer of scalp     Thrombophlebitis of arm     Tremor     Vitamin B12 deficiency      Past Surgical History:   Procedure Laterality Date    DILATATION AND CURETTAGE      HERNIA REPAIR      HIP SURGERY      SHOULDER SURGERY      Right      General Information       Row Name 25 1448          OT Time and Intention    Document Type therapy note (daily note)  -     Mode of Treatment occupational therapy  -       Row Name 25 1448          General Information    Patient Profile Reviewed yes  -AURE     Existing Precautions/Restrictions fall;orthostatic hypotension;other (see comments)  hx dysautonomic orthostasis; hand tremors  -       Row Name 25 1448          Cognition    Orientation Status (Cognition) oriented x 3  -       Row Name 25 1448          Safety Issues/Impairments Affecting Functional Mobility    Safety Issues Affecting Function (Mobility) friction/shear  risk;insight into deficits/self-awareness;safety precaution awareness;sequencing abilities  -     Impairments Affecting Function (Mobility) balance;endurance/activity tolerance;motor planning;postural/trunk control;shortness of breath;strength;motor control;coordination  -               User Key  (r) = Recorded By, (t) = Taken By, (c) = Cosigned By      Initials Name Provider Type    AURE Yolette Mckay OT Occupational Therapist                     Mobility/ADL's       Row Name 02/02/25 1450          Bed Mobility    Bed Mobility supine-sit;rolling left;rolling right  -AURE     Rolling Left Skillman (Bed Mobility) contact guard;verbal cues  -AURE     Rolling Right Skillman (Bed Mobility) contact guard;verbal cues  -AURE     Supine-Sit Skillman (Bed Mobility) contact guard  -AURE     Assistive Device (Bed Mobility) bed rails;head of bed elevated  -       Row Name 02/02/25 1450          Transfers    Transfers sit-stand transfer  -       Row Name 02/02/25 1450          Sit-Stand Transfer    Sit-Stand Skillman (Transfers) contact guard  -     Assistive Device (Sit-Stand Transfers) walker, front-wheeled  -       Row Name 02/02/25 1450          Functional Mobility    Functional Mobility- Ind. Level contact guard assist  -AURE     Functional Mobility- Device walker, front-wheeled  -     Functional Mobility-Distance (Feet) 3  -AURE     Functional Mobility- Comment Pt took steps from EOB to sink to complete grooming  -       Row Name 02/02/25 1450          Activities of Daily Living    BADL Assessment/Intervention lower body dressing;grooming;toileting  -       Row Name 02/02/25 1450          Grooming Assessment/Training    Skillman Level (Grooming) oral care regimen;wash face, hands;contact guard assist  -AURE     Position (Grooming) supported standing;supported sitting  -     Comment, (Grooming) Pt completed oral care standing sink side with CGA for balance; face/hand washing completed while seated  with ORTIZ  -AURE       Row Name 02/02/25 1450          Lower Body Dressing Assessment/Training    Bell Level (Lower Body Dressing) don;shoes/slippers;dependent (less than 25% patient effort)  -AURE     Position (Lower Body Dressing) sitting up in bed  -AURE       Row Name 02/02/25 1450          Toileting Assessment/Training    Bell Level (Toileting) perform perineal hygiene;change pad/brief;dependent (less than 25% patient effort)  -AURE     Position (Toileting) supine  -AURE     Comment, (Toileting) pericare completed in supine following bowel incontinence, brief donned at bed level  -AURE               User Key  (r) = Recorded By, (t) = Taken By, (c) = Cosigned By      Initials Name Provider Type    Yolette Poon OT Occupational Therapist                   Obj/Interventions       Row Name 02/02/25 1452          Balance    Balance Assessment sitting static balance;sitting dynamic balance;standing static balance;standing dynamic balance  -AURE     Static Sitting Balance supervision  -AURE     Dynamic Sitting Balance contact guard  -AURE     Position, Sitting Balance unsupported;sitting edge of bed  -AURE     Static Standing Balance contact guard  -AURE     Dynamic Standing Balance minimal assist  -AURE     Position/Device Used, Standing Balance supported;walker, front-wheeled  -AURE     Balance Interventions standing;occupation based/functional task  -AURE     Comment, Balance CGA for standing balance during oral care at sink  -AURE               User Key  (r) = Recorded By, (t) = Taken By, (c) = Cosigned By      Initials Name Provider Type    Yolette Poon OT Occupational Therapist                   Goals/Plan    No documentation.                  Clinical Impression       Row Name 02/02/25 1451          Pain Assessment    Pain Management Interventions exercise or physical activity utilized  -AURE     Response to Pain Interventions activity participation with tolerable pain  -AURE     Pre/Posttreatment Pain Comment RN aware  and managing  -AURE       Row Name 02/02/25 1453          Pain Scale: FACES Pre/Post-Treatment    Pain: FACES Scale, Pretreatment 2-->hurts little bit  -AURE     Posttreatment Pain Rating 4-->hurts little more  -AURE       Row Name 02/02/25 1452          Plan of Care Review    Plan of Care Reviewed With patient  -AURE     Progress improving  -AURE     Outcome Evaluation Pt demonstrated increased activity tolerance this date, able to complete oral care standing sink side with CGA for balance. Pt's ADL performance continues to be limited by significant weakness and impaired balance. Recommend IPR at discharge for optimal outcomes.  -AURE       Row Name 02/02/25 1453          Therapy Plan Review/Discharge Plan (OT)    Anticipated Discharge Disposition (OT) inpatient rehabilitation facility  -       Row Name 02/02/25 1452          Vital Signs    O2 Delivery Pre Treatment room air  -AURE     O2 Delivery Intra Treatment room air  -AURE     O2 Delivery Post Treatment room air  -AURE     Pre Patient Position Supine  -AURE     Intra Patient Position Standing  -AURE     Post Patient Position Sitting  -AURE       Row Name 02/02/25 1453          Positioning and Restraints    Pre-Treatment Position in bed  -AURE     Post Treatment Position chair  -AURE     In Chair reclined;call light within reach;encouraged to call for assist;exit alarm on;waffle cushion;legs elevated;heels elevated;with nsg  -AURE               User Key  (r) = Recorded By, (t) = Taken By, (c) = Cosigned By      Initials Name Provider Type    Yolette Poon, CHONG Occupational Therapist                   Outcome Measures       Row Name 02/02/25 1454          How much help from another is currently needed...    Putting on and taking off regular lower body clothing? 2  -AURE     Bathing (including washing, rinsing, and drying) 2  -AURE     Toileting (which includes using toilet bed pan or urinal) 1  -AURE     Putting on and taking off regular upper body clothing 3  -AURE     Taking care of personal  grooming (such as brushing teeth) 3  -AURE     Eating meals 3  -AURE     AM-PAC 6 Clicks Score (OT) 14  -AURE       Row Name 02/02/25 1225 02/02/25 0800       How much help from another person do you currently need...    Turning from your back to your side while in flat bed without using bedrails? 3  -SD 3  -SW    Moving from lying on back to sitting on the side of a flat bed without bedrails? 3  -SD 3  -SW    Moving to and from a bed to a chair (including a wheelchair)? 3  -SD 3  -SW    Standing up from a chair using your arms (e.g., wheelchair, bedside chair)? 3  -SD 3  -SW    Climbing 3-5 steps with a railing? 3  -SD 3  -SW    To walk in hospital room? 3  -SD 3  -SW    AM-PAC 6 Clicks Score (PT) 18  -SD 18  -SW    Highest Level of Mobility Goal 6 --> Walk 10 steps or more  -SD 6 --> Walk 10 steps or more  -SW      Row Name 02/02/25 1458 02/02/25 1225       Functional Assessment    Outcome Measure Options AM-PAC 6 Clicks Daily Activity (OT)  -AURE AM-PAC 6 Clicks Basic Mobility (PT)  -SD              User Key  (r) = Recorded By, (t) = Taken By, (c) = Cosigned By      Initials Name Provider Type    Marie Vegas, PT Physical Therapist    Yolette Poon OT Occupational Therapist    Louann Mejias, RN Registered Nurse                    Occupational Therapy Education       Title: PT OT SLP Therapies (In Progress)       Topic: Occupational Therapy (In Progress)       Point: ADL training (Done)       Description:   Instruct learner(s) on proper safety adaptation and remediation techniques during self care or transfers.   Instruct in proper use of assistive devices.                  Learning Progress Summary            Patient Acceptance, E, VU,NR by AURE at 2/2/2025 1451    Comment: OT POC; energy conservation/pacing techniques; discharge planning    Acceptance, E, NR by JAVIER at 1/30/2025 5476                      Point: Home exercise program (Not Started)       Description:   Instruct learner(s) on appropriate  technique for monitoring, assisting and/or progressing therapeutic exercises/activities.                  Learner Progress:  Not documented in this visit.              Point: Precautions (Done)       Description:   Instruct learner(s) on prescribed precautions during self-care and functional transfers.                  Learning Progress Summary            Patient Acceptance, E, VU,NR by AURE at 2/2/2025 1454    Comment: OT POC; energy conservation/pacing techniques; discharge planning    Acceptance, E, NR by  at 1/30/2025 0758                      Point: Body mechanics (In Progress)       Description:   Instruct learner(s) on proper positioning and spine alignment during self-care, functional mobility activities and/or exercises.                  Learning Progress Summary            Patient Acceptance, E, NR by  at 1/30/2025 0758                                      User Key       Initials Effective Dates Name Provider Type Discipline     06/16/21 -  Jeannie Baker, OT Occupational Therapist OT    AURE 06/16/21 -  Yolette Mckay OT Occupational Therapist OT                  OT Recommendation and Plan     Plan of Care Review  Plan of Care Reviewed With: patient  Progress: improving  Outcome Evaluation: Pt demonstrated increased activity tolerance this date, able to complete oral care standing sink side with CGA for balance. Pt's ADL performance continues to be limited by significant weakness and impaired balance. Recommend IPR at discharge for optimal outcomes.     Time Calculation:         Time Calculation- OT       Row Name 02/02/25 1225 02/02/25 1132          Time Calculation- OT    OT Start Time -- 1132  -AURE     OT Received On -- 02/02/25  -AURE        Timed Charges    56838 - Gait Training Minutes  18  -SD --     48860 - OT Self Care/Mgmt Minutes -- 20  -AURE        Total Minutes    Timed Charges Total Minutes 18  -SD 20  -AURE      Total Minutes 18  -SD 20  -AURE               User Key  (r) = Recorded By, (t) = Taken  By, (c) = Cosigned By      Initials Name Provider Type    Marie Vegas, PT Physical Therapist    Yolette Poon OT Occupational Therapist                  Therapy Charges for Today       Code Description Service Date Service Provider Modifiers Qty    [id number] HC OT SELF CARE/MGMT/TRAIN EA 15 MIN 2/2/2025 Yolette Mckay OT GO 1                 Yolette Mckay OT  2/2/2025

## 2025-02-02 NOTE — PROGRESS NOTES
[facility] Medicine Services  PROGRESS NOTE    Patient Name: Bibiana Hodges  : 1935  MRN: [id number]    Date of Admission: 2025  Primary Care Physician: Nivia Madrigal MD    Subjective   Subjective     CC: shortness of breath    HPI:  Doing fine  Enjoys being here  Some cough persisting    Objective   Objective     Vital Signs:   Temp:  [97.3 °F (36.3 °C)-98.2 °F (36.8 °C)] 97.9 °F (36.6 °C)  Heart Rate:  [60-63] 60  Resp:  [14-18] 14  BP: (145-182)/(72-97) 171/97     Physical Exam:  Constitutional: No acute distress, awake, alert very thin older female lying in bed  Respiratory: Clear to auscultation bilaterally, respiratory effort now normal. On room air  Cardiovascular: RRR, no murmurs, rubs, or gallops  Gastrointestinal: Soft, nontender, nondistended  Musculoskeletal: Muscle tone decreased significantly throughout  Psychiatric: Appropriate affect, cooperative  Skin: No rashes    Results Reviewed:  LAB RESULTS:      Lab 25  0551 25  1723 25  1543 25  1349   WBC  --   --   --  10.00   HEMOGLOBIN  --   --   --  14.0   HEMATOCRIT  --   --   --  43.3   PLATELETS  --   --   --  167   NEUTROS ABS  --   --   --  9.06*   IMMATURE GRANS (ABS)  --   --   --  0.02   LYMPHS ABS  --   --   --  0.46*   MONOS ABS  --   --   --  0.38   EOS ABS  --   --   --  0.05   MCV  --   --   --  105.4*   PROCALCITONIN 1.38*  --   --   --    LACTATE  --  1.4  --  2.3*   HSTROP T 18*  --  16* 13         Lab 25  0551 25  1349   SODIUM 135* 137   POTASSIUM 4.8 4.8   CHLORIDE 102 101   CO2 23.0 26.0   ANION GAP 10.0 10.0   BUN 28* 23   CREATININE 1.25* 1.16*   EGFR 41.3* 45.2*   GLUCOSE 74 138*   CALCIUM 8.8 9.2         Lab 25  1349   TOTAL PROTEIN 5.8*   ALBUMIN 3.4*   GLOBULIN 2.4   ALT (SGPT) 27   AST (SGOT) 31   BILIRUBIN 0.5   ALK PHOS 55         Lab 25  0551 25  1543 25  1349   PROBNP  --   --  1,024.0   HSTROP T 18* 16* 13                  Brief Urine Lab Results  (Last result in the past 365 days)        Color   Clarity   Blood   Leuk Est   Nitrite   Protein   CREAT   Urine HCG        01/30/25 0859 Yellow   Cloudy   Negative   Large (3+)   Negative   30 mg/dL (1+)                   Microbiology Results Abnormal       Procedure Component Value - Date/Time    Blood Culture - Blood, Arm, Right [[id number]]  (Abnormal) Collected: 01/29/25 1545    Lab Status: Final result Specimen: Blood from Arm, Right Updated: 01/31/25 0712     Blood Culture Staphylococcus, coagulase negative     Isolated from Aerobic Bottle     Gram Stain Aerobic Bottle Gram positive cocci in clusters    Narrative:      Probable contaminant requires clinical correlation, susceptibility not performed unless requested by physician.      Blood Culture ID, PCR - Blood, Arm, Right [[id number]]  (Abnormal) Collected: 01/29/25 1545    Lab Status: Final result Specimen: Blood from Arm, Right Updated: 01/30/25 1441     BCID, PCR Staph spp, not aureus or lugdunensis. Identification by BCID2 PCR.     BOTTLE TYPE Aerobic Bottle            No radiology results from the last 24 hrs    Results for orders placed during the hospital encounter of 08/20/23    Adult Transthoracic Echo Complete W/ Cont if Necessary Per Protocol    Interpretation Summary    Left ventricular systolic function is mildly decreased. Calculated left ventricular EF = 47.9% Normal left ventricular cavity size and wall thickness noted. There is left ventricular global hypokinesis noted. Left ventricular diastolic function is consistent with (grade I) impaired relaxation.    : Normal right ventricular cavity size and systolic function noted. Electronic lead present in the ventricle.    Normal left atrial size and volume noted. Saline test results are negative.    There is moderate calcification of the aortic valve. The aortic valve was poorly visualized but appears trileaflet. Moderate aortic valve regurgitation is present. No  aortic valve stenosis is present.    Mild mitral annular calcification is present. Mild mitral valve regurgitation is present. No significant mitral valve stenosis is present.    The tricuspid valve is grossly normal in structure. Mild tricuspid valve regurgitation is present. Estimated right ventricular systolic pressure from tricuspid regurgitation is normal, 33 mmHg. No tricuspid valve stenosis is present.    Mild dilation of the ascending aorta is present. Ascending aorta = 3.7 cm    There is a small (<1cm) pericardial effusion adjacent to the right atrium and right ventricle.      Current medications:  Scheduled Meds:amiodarone, 200 mg, Oral, Daily  apixaban, 2.5 mg, Oral, BID  docusate sodium, 100 mg, Oral, Daily  doxycycline, 100 mg, Oral, Q12H  levETIRAcetam, 250 mg, Oral, BID  levothyroxine, 100 mcg, Oral, Q AM  lisinopril, 10 mg, Oral, Q24H  melatonin, 5 mg, Oral, Nightly  metoprolol tartrate, 12.5 mg, Oral, BID  pantoprazole, 40 mg, Oral, Daily  rosuvastatin, 10 mg, Oral, Nightly  sodium chloride, 10 mL, Intravenous, Q12H  traZODone, 100 mg, Oral, Nightly  venlafaxine XR, 75 mg, Oral, Daily      Continuous Infusions:   PRN Meds:.  acetaminophen **OR** acetaminophen **OR** acetaminophen    albuterol    artificial tears    benzonatate    senna-docusate sodium **AND** polyethylene glycol **AND** bisacodyl **AND** bisacodyl    fluticasone    ipratropium-albuterol    meclizine    nitroglycerin    sodium chloride    sodium chloride    sodium chloride    Assessment & Plan   Assessment & Plan     Active Hospital Problems    Diagnosis  POA    **Multifocal pneumonia [J18.9]  Yes    Hyperlipidemia [E78.5]  Unknown    Atrial fibrillation [I48.91]  Yes    Acute respiratory failure with hypoxia [J96.01]  Yes    Hypertension [I10]  Yes      Resolved Hospital Problems   No resolved problems to display.        Brief Hospital Course to date:  Bibiana Hodges is a 89 y.o. female w h/o pacemaker placement, recurrent  pneumonia with known aspiration who presents from [place] with shortness of breath, congestive cough. Found to have chronic appearing pulm infiltrates, clinical concern for acute PNA.  Much improved within first 24 hours  Now awaiting SNF, declined more labs    Acute hypoxic resp failure 2/2 aspiration PNA - improved   -rocephin, doxycycline or equivalent to complete 5 days    Chronic oropharyngeal dysphagia  -d/w patient who declines modified diet (intolerant in the past), d/w speech and will hold on formal assessment given this. Daughter in agreement w mother's decision    Afib s/p PPM - home eliquis, metoprolol, amiodarone  Depression - home effexor, buspar  Severe malnutrition  Tremor - home keppra  Blood culture x1 c/w contaminant    GOC:  -D/w patient 1/31 and she reports not wanting treatments that would not give her quality of life benefit. We discuss that ventilator and CPR would be very unlikely to benefit her given her overall age/situation/comorbidities. She reports being DNR/DNI. On review of past code statuses, appears patient fluctuates back/forth on this. Daughter reports fear related to death. DNR/DNI based on our discussion changed 1/31    Expected Discharge Location and Transportation: SNF  Expected Discharge medically ready 1/31  Expected Discharge Date: 2/3/2025; Expected Discharge Time:      VTE Prophylaxis:  Pharmacologic VTE prophylaxis orders are present.         AM-PAC 6 Clicks Score (PT): 18 (02/02/25 0784)    CODE STATUS:   Code Status and Medical Interventions: No CPR (Do Not Attempt to Resuscitate); Limited Support; No intubation (DNI)   Ordered at: 01/31/25 0952     Medical Intervention Limits:    No intubation (DNI)     Level Of Support Discussed With:    Patient     Code Status (Patient has no pulse and is not breathing):    No CPR (Do Not Attempt to Resuscitate)     Medical Interventions (Patient has pulse or is breathing):    Limited Support       Kelly Villafuerte MD  02/02/25

## 2025-02-02 NOTE — PLAN OF CARE
Goal Outcome Evaluation:  Plan of Care Reviewed With: patient        Progress: improving  Outcome Evaluation: Pt able to increase gait distance this date, however needed 1 seated rest break due to fatigue. Pt dem forward flexed posture and bilat hip internal rotation. Pt remains below baseline level of function and IPPT services continue to be warranted at this time. PT continues to rec d/c IRF.    Anticipated Discharge Disposition (PT): inpatient rehabilitation facility

## 2025-02-02 NOTE — THERAPY TREATMENT NOTE
Patient Name: Bibiana Hodges  : 1935    MRN: [id number]                              Today's Date: 2025       Admit Date: 2025    Visit Dx:     ICD-10-CM ICD-9-CM   1. Community acquired pneumonia of left lung, unspecified part of lung  J18.9 486   2. Hypoxia  R09.02 799.02   3. Dysphagia, unspecified type  R13.10 787.20   4. Impaired functional mobility, balance, gait, and endurance  Z74.09 V49.89     Patient Active Problem List   Diagnosis    Dysautonomia orthostatic hypotension syndrome    Hypertension    Flu vaccine need    Streptococcus pneumoniae vaccination indicated    Acute respiratory failure with hypoxia    Severe malnutrition    Atrial fibrillation    Autonomic dysfunction    Multifocal pneumonia    Hyperlipidemia     Past Medical History:   Diagnosis Date    Constipation     Depression     Dysautonomia orthostatic hypotension syndrome     Generalized osteoarthritis     GERD (gastroesophageal reflux disease)     s/p Nissen    Hypertension     Insomnia     Osteoarthrosis, shoulder region     Skin ulcer of scalp     Thrombophlebitis of arm     Tremor     Vitamin B12 deficiency      Past Surgical History:   Procedure Laterality Date    DILATATION AND CURETTAGE      HERNIA REPAIR      HIP SURGERY      SHOULDER SURGERY      Right      General Information       Row Name 25 1129          Physical Therapy Time and Intention    Document Type therapy note (daily note)  -SD     Mode of Treatment physical therapy  -SD       Row Name 25 1129          General Information    Existing Precautions/Restrictions fall;oxygen therapy device and L/min;orthostatic hypotension  Hx of dysautonomic othostasis  -SD       Row Name 25 1129          Cognition    Orientation Status (Cognition) oriented x 3  -SD       Row Name 25 1129          Safety Issues/Impairments Affecting Functional Mobility    Safety Issues Affecting Function (Mobility) friction/shear risk;insight into  deficits/self-awareness;sequencing abilities;safety precautions follow-through/compliance;safety precaution awareness  -SD     Impairments Affecting Function (Mobility) balance;endurance/activity tolerance;postural/trunk control;strength;shortness of breath;pain  -SD     Comment, Safety Issues/Impairments (Mobility) decreased skin integrity, incontinence  -SD               User Key  (r) = Recorded By, (t) = Taken By, (c) = Cosigned By      Initials Name Provider Type    SD Marie Tao, PT Physical Therapist                   Mobility       Row Name 02/02/25 1219          Bed Mobility    Bed Mobility supine-sit;rolling left;rolling right  -SD     Rolling Left [place] (Bed Mobility) contact guard  -SD     Rolling Right [place] (Bed Mobility) contact guard  -SD     Supine-Sit [place] (Bed Mobility) contact guard  -SD     Comment, (Bed Mobility) pt rolled side to side for dependent pericare and to don incontinence brief, then transitioned to EOB with CGA.  -SD       Row Name 02/02/25 1219          Transfers    Comment, (Transfers) Pt t/f STS from EOB x1, and recliner x2 with SBA with cues for optimal hand placement  -SD       Row Name 02/02/25 1219          Sit-Stand Transfer    Sit-Stand [place] (Transfers) standby assist  -SD     Assistive Device (Sit-Stand Transfers) walker, front-wheeled  -SD       Row Name 02/02/25 1219          Gait/Stairs (Locomotion)    [place] Level (Gait) contact guard;1 person assist;1 person to manage equipment;verbal cues  -SD     Assistive Device (Gait) walker, front-wheeled  -SD     Distance in Feet (Gait) 24  24 x2  -SD     Deviations/Abnormal Patterns (Gait) bilateral deviations;perri decreased;gait speed decreased;base of support, narrow  -SD     Bilateral Gait Deviations forward flexed posture;heel strike decreased  -SD     Comment, (Gait/Stairs) Pt able to increase gait distance this date, however needed 1 seated rest break due to fatigue. Pt dem  forward flexed posture and bilat hip internal rotation. Recliner in tow.  -SD               User Key  (r) = Recorded By, (t) = Taken By, (c) = Cosigned By      Initials Name Provider Type    SD Marie Tao PT Physical Therapist                   Obj/Interventions    No documentation.                  Goals/Plan    No documentation.                  Clinical Impression       Row Name 02/02/25 1223          Pain    Pain Location --  sacrum  -SD     Pain Management Interventions exercise or physical activity utilized;nursing notified  -SD     Response to Pain Interventions activity participation with tolerable pain  -SD     Additional Documentation Pain Scale: FACES Pre/Post-Treatment (Group)  -SD       Row Name 02/02/25 1223          Pain Scale: FACES Pre/Post-Treatment    Pain: FACES Scale, Pretreatment 2-->hurts little bit  -SD     Posttreatment Pain Rating 4-->hurts little more  -SD       Row Name 02/02/25 1223          Plan of Care Review    Plan of Care Reviewed With patient  -SD     Progress improving  -SD     Outcome Evaluation Pt able to increase gait distance this date, however needed 1 seated rest break due to fatigue. Pt dem forward flexed posture and bilat hip internal rotation. Pt remains below baseline level of function and IPPT services continue to be warranted at this time. PT continues to rec d/c IRF.  -SD       Row Name 02/02/25 1223          Vital Signs    Pre Systolic BP Rehab 171  -SD     Pre Treatment Diastolic BP 97  -SD     Pre SpO2 (%) 96  -SD     O2 Delivery Pre Treatment room air  -SD     Pre Patient Position Supine  -SD     Post Patient Position Sitting  -SD       Row Name 02/02/25 1223          Positioning and Restraints    Pre-Treatment Position in bed  -SD     Post Treatment Position chair  -SD     In Chair reclined;call light within reach;encouraged to call for assist;exit alarm on;with nsg;waffle cushion;heels elevated  -SD               User Key  (r) = Recorded By, (t) =  Taken By, (c) = Cosigned By      Initials Name Provider Type    Marie Vegas, PT Physical Therapist                   Outcome Measures       Row Name 02/02/25 1225 02/02/25 0800       How much help from another person do you currently need...    Turning from your back to your side while in flat bed without using bedrails? 3  -SD 3  -SW    Moving from lying on back to sitting on the side of a flat bed without bedrails? 3  -SD 3  -SW    Moving to and from a bed to a chair (including a wheelchair)? 3  -SD 3  -SW    Standing up from a chair using your arms (e.g., wheelchair, bedside chair)? 3  -SD 3  -SW    Climbing 3-5 steps with a railing? 3  -SD 3  -SW    To walk in hospital room? 3  -SD 3  -SW    AM-PAC 6 Clicks Score (PT) 18  -SD 18  -SW    Highest Level of Mobility Goal 6 --> Walk 10 steps or more  -SD 6 --> Walk 10 steps or more  -SW      Row Name 02/02/25 1225          Functional Assessment    Outcome Measure Options AM-PAC 6 Clicks Basic Mobility (PT)  -SD               User Key  (r) = Recorded By, (t) = Taken By, (c) = Cosigned By      Initials Name Provider Type    Marie Vegas, GAIL Physical Therapist    Louann Mejias, RN Registered Nurse                                 Physical Therapy Education       Title: PT OT SLP Therapies (In Progress)       Topic: Physical Therapy (Done)       Point: Mobility training (Done)       Learning Progress Summary            Patient Eager, E, VU,NR by SD at 2/2/2025 1225    Eager, E, VU,NR by SD at 1/30/2025 1452                      Point: Home exercise program (Done)       Learning Progress Summary            Patient Eager, E, VU,NR by SD at 2/2/2025 1225    Eager, E, VU,NR by SD at 1/30/2025 1452                      Point: Body mechanics (Done)       Learning Progress Summary            Patient Eager, E, VU,NR by SD at 2/2/2025 1225    Eager, E, VU,NR by SD at 1/30/2025 1452                      Point: Precautions (Done)       Learning Progress  Summary            Patient Eager, E, VU,NR by SD at 2/2/2025 1225    Eager, E, VU,NR by SD at 1/30/2025 1452                                      User Key       Initials Effective Dates Name Provider Type Discipline    SD 03/13/23 -  Marie Tao PT Physical Therapist PT                  PT Recommendation and Plan  Planned Therapy Interventions (PT): balance training, bed mobility training, gait training, home exercise program, patient/family education, neuromuscular re-education, transfer training, strengthening  Progress: improving  Outcome Evaluation: Pt able to increase gait distance this date, however needed 1 seated rest break due to fatigue. Pt dem forward flexed posture and bilat hip internal rotation. Pt remains below baseline level of function and IPPT services continue to be warranted at this time. PT continues to rec d/c IRF.     Time Calculation:   PT Evaluation Complexity  History, PT Evaluation Complexity: 3 or more personal factors and/or comorbidities  Examination of Body Systems (PT Eval Complexity): total of 3 or more elements  Clinical Presentation (PT Evaluation Complexity): evolving  Clinical Decision Making (PT Evaluation Complexity): moderate complexity  Overall Complexity (PT Evaluation Complexity): moderate complexity     PT Charges       Row Name 02/02/25 1225             Time Calculation    Start Time 1129  -SD      PT Received On 02/02/25  -SD      PT Goal Re-Cert Due Date 02/09/25  -SD         Time Calculation- PT    Total Timed Code Minutes- PT 18 minute(s)  -SD         Timed Charges    36346 - Gait Training Minutes  18  -SD         Total Minutes    Timed Charges Total Minutes 18  -SD       Total Minutes 18  -SD                User Key  (r) = Recorded By, (t) = Taken By, (c) = Cosigned By      Initials Name Provider Type    SD Marie Tao PT Physical Therapist                  Therapy Charges for Today       Code Description Service Date Service Provider Modifiers  Qty    17512502847 HC GAIT TRAINING EA 15 MIN 2/2/2025 Marie Tao, PT GP 1            PT G-Codes  Outcome Measure Options: AM-PAC 6 Clicks Basic Mobility (PT)  AM-PAC 6 Clicks Score (PT): 18  AM-PAC 6 Clicks Score (OT): 15  PT Discharge Summary  Anticipated Discharge Disposition (PT): inpatient rehabilitation facility    Marie Tao, PT  2/2/2025

## 2025-02-02 NOTE — PLAN OF CARE
Goal Outcome Evaluation:  Plan of Care Reviewed With: patient        Progress: improving  Outcome Evaluation: Pt demonstrated increased activity tolerance this date, able to complete oral care standing sink side with CGA for balance. Pt's ADL performance continues to be limited by significant weakness and impaired balance. Recommend IPR at discharge for optimal outcomes.    Anticipated Discharge Disposition (OT): inpatient rehabilitation facility

## 2025-02-02 NOTE — PLAN OF CARE
Problem: Adult Inpatient Plan of Care  Goal: Plan of Care Review  Outcome: Progressing  Goal: Patient-Specific Goal (Individualized)  Outcome: Progressing  Goal: Absence of Hospital-Acquired Illness or Injury  Outcome: Progressing  Intervention: Identify and Manage Fall Risk  Recent Flowsheet Documentation  Taken 2/2/2025 1200 by Louann Whalen RN  Safety Promotion/Fall Prevention:   activity supervised   assistive device/personal items within reach   clutter free environment maintained   toileting scheduled   safety round/check completed   room organization consistent  Taken 2/2/2025 0800 by Louann Whalen RN  Safety Promotion/Fall Prevention:   activity supervised   assistive device/personal items within reach   clutter free environment maintained   safety round/check completed   toileting scheduled   room organization consistent  Intervention: Prevent Skin Injury  Recent Flowsheet Documentation  Taken 2/2/2025 1200 by Louann Whalen RN  Body Position: (in chair) other (see comments)  Skin Protection:   drying agents applied   transparent dressing maintained   silicone foam dressing in place  Taken 2/2/2025 0800 by Louann Whalen RN  Body Position:   turned   supine  Skin Protection:   drying agents applied   transparent dressing maintained   silicone foam dressing in place  Goal: Optimal Comfort and Wellbeing  Outcome: Progressing  Goal: Readiness for Transition of Care  Outcome: Progressing     Problem: Comorbidity Management  Goal: Maintenance of Heart Failure Symptom Control  Outcome: Progressing  Goal: Blood Pressure in Desired Range  Outcome: Progressing     Problem: Skin Injury Risk Increased  Goal: Skin Health and Integrity  Outcome: Progressing  Intervention: Optimize Skin Protection  Recent Flowsheet Documentation  Taken 2/2/2025 1200 by Louann Whalen RN  Activity Management: up in chair  Pressure Reduction Techniques:   frequent weight shift encouraged   heels elevated off bed    pressure points protected   weight shift assistance provided  Head of Bed (HOB) Positioning: HOB elevated  Pressure Reduction Devices:   pressure-redistributing mattress utilized   positioning supports utilized   heel offloading device utilized   foam padding utilized  Skin Protection:   drying agents applied   transparent dressing maintained   silicone foam dressing in place  Taken 2/2/2025 0800 by Louann Whalen RN  Activity Management: activity encouraged  Pressure Reduction Techniques:   frequent weight shift encouraged   heels elevated off bed   pressure points protected   weight shift assistance provided  Head of Bed (HOB) Positioning: HOB elevated  Pressure Reduction Devices:   pressure-redistributing mattress utilized   positioning supports utilized   heel offloading device utilized   foam padding utilized  Skin Protection:   drying agents applied   transparent dressing maintained   silicone foam dressing in place     Problem: Fall Injury Risk  Goal: Absence of Fall and Fall-Related Injury  Outcome: Progressing  Intervention: Promote Injury-Free Environment  Recent Flowsheet Documentation  Taken 2/2/2025 1200 by Louann Whalen RN  Safety Promotion/Fall Prevention:   activity supervised   assistive device/personal items within reach   clutter free environment maintained   toileting scheduled   safety round/check completed   room organization consistent  Taken 2/2/2025 0800 by Louann Whalen RN  Safety Promotion/Fall Prevention:   activity supervised   assistive device/personal items within reach   clutter free environment maintained   safety round/check completed   toileting scheduled   room organization consistent   Goal Outcome Evaluation:

## 2025-02-03 LAB — BACTERIA SPEC AEROBE CULT: NORMAL

## 2025-02-03 PROCEDURE — 99232 SBSQ HOSP IP/OBS MODERATE 35: CPT | Performed by: INTERNAL MEDICINE

## 2025-02-03 RX ADMIN — Medication 10 ML: at 10:16

## 2025-02-03 RX ADMIN — DOXYCYCLINE 100 MG: 100 CAPSULE ORAL at 10:13

## 2025-02-03 RX ADMIN — LISINOPRIL 10 MG: 10 TABLET ORAL at 10:14

## 2025-02-03 RX ADMIN — DOCUSATE SODIUM 100 MG: 100 CAPSULE, LIQUID FILLED ORAL at 10:15

## 2025-02-03 RX ADMIN — VENLAFAXINE HYDROCHLORIDE 75 MG: 75 CAPSULE, EXTENDED RELEASE ORAL at 10:14

## 2025-02-03 RX ADMIN — PANTOPRAZOLE SODIUM 40 MG: 40 TABLET, DELAYED RELEASE ORAL at 10:14

## 2025-02-03 RX ADMIN — APIXABAN 2.5 MG: 2.5 TABLET, FILM COATED ORAL at 20:37

## 2025-02-03 RX ADMIN — Medication 10 ML: at 21:25

## 2025-02-03 RX ADMIN — LEVETIRACETAM 250 MG: 500 TABLET, FILM COATED ORAL at 20:37

## 2025-02-03 RX ADMIN — ROSUVASTATIN 10 MG: 10 TABLET, FILM COATED ORAL at 20:37

## 2025-02-03 RX ADMIN — METOPROLOL TARTRATE 12.5 MG: 25 TABLET, FILM COATED ORAL at 10:12

## 2025-02-03 RX ADMIN — APIXABAN 2.5 MG: 2.5 TABLET, FILM COATED ORAL at 10:14

## 2025-02-03 RX ADMIN — LEVETIRACETAM 250 MG: 500 TABLET, FILM COATED ORAL at 10:14

## 2025-02-03 RX ADMIN — DOXYCYCLINE 100 MG: 100 CAPSULE ORAL at 20:37

## 2025-02-03 RX ADMIN — LEVOTHYROXINE SODIUM 100 MCG: 100 TABLET ORAL at 05:55

## 2025-02-03 RX ADMIN — METOPROLOL TARTRATE 12.5 MG: 25 TABLET, FILM COATED ORAL at 20:37

## 2025-02-03 RX ADMIN — AMIODARONE HYDROCHLORIDE 200 MG: 200 TABLET ORAL at 10:15

## 2025-02-03 NOTE — PROGRESS NOTES
[facility] Medicine Services  PROGRESS NOTE    Patient Name: Bibiana Hodges  : 1935  MRN: [id number]    Date of Admission: 2025  Primary Care Physician: Nivia Madrigal MD    Subjective   Subjective     CC: shortness of breath    HPI:  Patient resting in bed. No issues overnight. Says she likes it here. Coughing a lot    Objective   Objective     Vital Signs:   Temp:  [97.6 °F (36.4 °C)-98.6 °F (37 °C)] 98.6 °F (37 °C)  Heart Rate:  [65] 65  Resp:  [14-18] 18  BP: (128-172)/(70-86) 128/83     Physical Exam:  Constitutional: No acute distress, awake, alert  HENT: NCAT, mucous membranes moist  Respiratory: Clear to auscultation bilaterally, respiratory effort normal   Cardiovascular: RRR, no murmurs, rubs, or gallops  Gastrointestinal: soft, nontender, nondistended  Musculoskeletal: No bilateral ankle edema  Psychiatric: Appropriate affect, cooperative  Neurologic: Oriented x 3, speech clear, no focal deficits  Skin: No rashes      Results Reviewed:  LAB RESULTS:      Lab 25  0551 25  1723 25  1543 25  1349   WBC  --   --   --  10.00   HEMOGLOBIN  --   --   --  14.0   HEMATOCRIT  --   --   --  43.3   PLATELETS  --   --   --  167   NEUTROS ABS  --   --   --  9.06*   IMMATURE GRANS (ABS)  --   --   --  0.02   LYMPHS ABS  --   --   --  0.46*   MONOS ABS  --   --   --  0.38   EOS ABS  --   --   --  0.05   MCV  --   --   --  105.4*   PROCALCITONIN 1.38*  --   --   --    LACTATE  --  1.4  --  2.3*   HSTROP T 18*  --  16* 13         Lab 25  0551 25  1349   SODIUM 135* 137   POTASSIUM 4.8 4.8   CHLORIDE 102 101   CO2 23.0 26.0   ANION GAP 10.0 10.0   BUN 28* 23   CREATININE 1.25* 1.16*   EGFR 41.3* 45.2*   GLUCOSE 74 138*   CALCIUM 8.8 9.2         Lab 25  1349   TOTAL PROTEIN 5.8*   ALBUMIN 3.4*   GLOBULIN 2.4   ALT (SGPT) 27   AST (SGOT) 31   BILIRUBIN 0.5   ALK PHOS 55         Lab 25  0551 25  1543 25  1349   PROBNP   --   --  1,024.0   HSTROP T 18* 16* 13                 Brief Urine Lab Results  (Last result in the past 365 days)        Color   Clarity   Blood   Leuk Est   Nitrite   Protein   CREAT   Urine HCG        01/30/25 0859 Yellow   Cloudy   Negative   Large (3+)   Negative   30 mg/dL (1+)                   Microbiology Results Abnormal       Procedure Component Value - Date/Time    Blood Culture - Blood, Arm, Right [[id number]]  (Abnormal) Collected: 01/29/25 1545    Lab Status: Final result Specimen: Blood from Arm, Right Updated: 01/31/25 0712     Blood Culture Staphylococcus, coagulase negative     Isolated from Aerobic Bottle     Gram Stain Aerobic Bottle Gram positive cocci in clusters    Narrative:      Probable contaminant requires clinical correlation, susceptibility not performed unless requested by physician.      Blood Culture ID, PCR - Blood, Arm, Right [[id number]]  (Abnormal) Collected: 01/29/25 1545    Lab Status: Final result Specimen: Blood from Arm, Right Updated: 01/30/25 1441     BCID, PCR Staph spp, not aureus or lugdunensis. Identification by BCID2 PCR.     BOTTLE TYPE Aerobic Bottle            No radiology results from the last 24 hrs    Results for orders placed during the hospital encounter of 08/20/23    Adult Transthoracic Echo Complete W/ Cont if Necessary Per Protocol    Interpretation Summary    Left ventricular systolic function is mildly decreased. Calculated left ventricular EF = 47.9% Normal left ventricular cavity size and wall thickness noted. There is left ventricular global hypokinesis noted. Left ventricular diastolic function is consistent with (grade I) impaired relaxation.    : Normal right ventricular cavity size and systolic function noted. Electronic lead present in the ventricle.    Normal left atrial size and volume noted. Saline test results are negative.    There is moderate calcification of the aortic valve. The aortic valve was poorly visualized but appears trileaflet.  Moderate aortic valve regurgitation is present. No aortic valve stenosis is present.    Mild mitral annular calcification is present. Mild mitral valve regurgitation is present. No significant mitral valve stenosis is present.    The tricuspid valve is grossly normal in structure. Mild tricuspid valve regurgitation is present. Estimated right ventricular systolic pressure from tricuspid regurgitation is normal, 33 mmHg. No tricuspid valve stenosis is present.    Mild dilation of the ascending aorta is present. Ascending aorta = 3.7 cm    There is a small (<1cm) pericardial effusion adjacent to the right atrium and right ventricle.      Current medications:  Scheduled Meds:amiodarone, 200 mg, Oral, Daily  apixaban, 2.5 mg, Oral, BID  docusate sodium, 100 mg, Oral, Daily  doxycycline, 100 mg, Oral, Q12H  levETIRAcetam, 250 mg, Oral, BID  levothyroxine, 100 mcg, Oral, Q AM  lisinopril, 10 mg, Oral, Q24H  melatonin, 5 mg, Oral, Nightly  metoprolol tartrate, 12.5 mg, Oral, BID  pantoprazole, 40 mg, Oral, Daily  rosuvastatin, 10 mg, Oral, Nightly  sodium chloride, 10 mL, Intravenous, Q12H  traZODone, 100 mg, Oral, Nightly  venlafaxine XR, 75 mg, Oral, Daily      Continuous Infusions:   PRN Meds:.  acetaminophen **OR** acetaminophen **OR** acetaminophen    albuterol    artificial tears    benzonatate    senna-docusate sodium **AND** polyethylene glycol **AND** bisacodyl **AND** bisacodyl    fluticasone    ipratropium-albuterol    meclizine    nitroglycerin    sodium chloride    sodium chloride    sodium chloride    Assessment & Plan   Assessment & Plan     Active Hospital Problems    Diagnosis  POA    **Multifocal pneumonia [J18.9]  Yes    Hyperlipidemia [E78.5]  Unknown    Atrial fibrillation [I48.91]  Yes    Acute respiratory failure with hypoxia [J96.01]  Yes    Hypertension [I10]  Yes      Resolved Hospital Problems   No resolved problems to display.        Brief Hospital Course to date:  Bibiana Hodges is a 89 y.o.  female w h/o pacemaker placement, recurrent pneumonia with known aspiration who presents from [place] with shortness of breath, congestive cough. Found to have chronic appearing pulm infiltrates, clinical concern for acute PNA.  Much improved within first 24 hours  Now awaiting SNF, declined more labs    Acute hypoxic resp failure 2/2 aspiration PNA - improved   -to complete 5 days of abx today  -on comfort diet    Chronic oropharyngeal dysphagia  -d/w patient who declines modified diet, currently on comfort based diet (intolerant in the past of modifications), Daughter in agreement w mother's decision    Afib s/p PPM - home eliquis, metoprolol, amiodarone  Depression - home effexor, buspar  Severe malnutrition  Tremor - home keppra  Blood culture x1 c/w contaminant    GOC:  - to remain DNR/DNI    Expected Discharge Location and Transportation: SNF  Expected Discharge medically ready 1/31  Expected Discharge Date: 2/3/2025; Expected Discharge Time:      VTE Prophylaxis:  Pharmacologic VTE prophylaxis orders are present.         AM-PAC 6 Clicks Score (PT): 18 (02/02/25 1225)    CODE STATUS:   Code Status and Medical Interventions: No CPR (Do Not Attempt to Resuscitate); Limited Support; No intubation (DNI)   Ordered at: 01/31/25 0952     Medical Intervention Limits:    No intubation (DNI)     Level Of Support Discussed With:    Patient     Code Status (Patient has no pulse and is not breathing):    No CPR (Do Not Attempt to Resuscitate)     Medical Interventions (Patient has pulse or is breathing):    Limited Support       Telma Meade, DO  02/03/25

## 2025-02-03 NOTE — CASE MANAGEMENT/SOCIAL WORK
Continued Stay Note  [facility]     Patient Name: Bibiana Hodges  MRN: [id number]  Today's Date: 2/3/2025    Admit Date: 1/29/2025    Plan: rehab   Discharge Plan       Row Name 02/03/25 1602       Plan    Plan rehab    Patient/Family in Agreement with Plan yes    Plan Comments Met with patient at the bedside to discuss discharge plan. Patient's plan is rehab at [facility]. [facility] will initiate precertification for insurance approval today.  will continue to follow.    Final Discharge Disposition Code 03 - skilled nursing facility (SNF)                   Discharge Codes    No documentation.                 Expected Discharge Date and Time       Expected Discharge Date Expected Discharge Time    Feb 3, 2025               Soraya Campbell RN

## 2025-02-04 PROCEDURE — 99232 SBSQ HOSP IP/OBS MODERATE 35: CPT | Performed by: INTERNAL MEDICINE

## 2025-02-04 RX ADMIN — LEVETIRACETAM 250 MG: 500 TABLET, FILM COATED ORAL at 10:48

## 2025-02-04 RX ADMIN — Medication 5 MG: at 23:44

## 2025-02-04 RX ADMIN — AMIODARONE HYDROCHLORIDE 200 MG: 200 TABLET ORAL at 10:47

## 2025-02-04 RX ADMIN — LEVOTHYROXINE SODIUM 100 MCG: 100 TABLET ORAL at 05:22

## 2025-02-04 RX ADMIN — TRAZODONE HYDROCHLORIDE 100 MG: 50 TABLET ORAL at 00:00

## 2025-02-04 RX ADMIN — ROSUVASTATIN 10 MG: 10 TABLET, FILM COATED ORAL at 20:45

## 2025-02-04 RX ADMIN — Medication 5 MG: at 00:00

## 2025-02-04 RX ADMIN — DOCUSATE SODIUM 100 MG: 100 CAPSULE, LIQUID FILLED ORAL at 10:48

## 2025-02-04 RX ADMIN — APIXABAN 2.5 MG: 2.5 TABLET, FILM COATED ORAL at 10:49

## 2025-02-04 RX ADMIN — METOPROLOL TARTRATE 12.5 MG: 25 TABLET, FILM COATED ORAL at 20:46

## 2025-02-04 RX ADMIN — TRAZODONE HYDROCHLORIDE 100 MG: 50 TABLET ORAL at 23:44

## 2025-02-04 RX ADMIN — Medication 10 ML: at 23:44

## 2025-02-04 RX ADMIN — VENLAFAXINE HYDROCHLORIDE 75 MG: 75 CAPSULE, EXTENDED RELEASE ORAL at 10:48

## 2025-02-04 RX ADMIN — METOPROLOL TARTRATE 12.5 MG: 25 TABLET, FILM COATED ORAL at 10:47

## 2025-02-04 RX ADMIN — LEVETIRACETAM 250 MG: 500 TABLET, FILM COATED ORAL at 20:45

## 2025-02-04 RX ADMIN — PANTOPRAZOLE SODIUM 40 MG: 40 TABLET, DELAYED RELEASE ORAL at 10:49

## 2025-02-04 RX ADMIN — ACETAMINOPHEN 650 MG: 325 TABLET, FILM COATED ORAL at 00:00

## 2025-02-04 RX ADMIN — LISINOPRIL 10 MG: 10 TABLET ORAL at 10:48

## 2025-02-04 RX ADMIN — Medication 10 ML: at 10:49

## 2025-02-04 RX ADMIN — ACETAMINOPHEN 650 MG: 325 TABLET, FILM COATED ORAL at 16:17

## 2025-02-04 RX ADMIN — APIXABAN 2.5 MG: 2.5 TABLET, FILM COATED ORAL at 20:45

## 2025-02-04 NOTE — PROGRESS NOTES
[facility] Medicine Services  PROGRESS NOTE    Patient Name: Bibiana Hodges  : 1935  MRN: [id number]    Date of Admission: 2025  Primary Care Physician: Nivia Madrigal MD    Subjective   Subjective     CC: shortness of breath    HPI:  Patient resting in bed. Feels okay. Still has a cough.    Objective   Objective     Vital Signs:   Temp:  [96.3 °F (35.7 °C)-98.3 °F (36.8 °C)] 96.3 °F (35.7 °C)  Heart Rate:  [68] 68  Resp:  [18] 18  BP: (110-123)/(67-87) 123/67     Physical Exam:  Constitutional: No acute distress, awake, alert  HENT: NCAT, mucous membranes moist  Respiratory: Clear to auscultation bilaterally, respiratory effort normal   Cardiovascular: RRR, no murmurs, rubs, or gallops  Gastrointestinal: soft, nontender, nondistended  Musculoskeletal: No bilateral ankle edema  Psychiatric: Appropriate affect, cooperative  Neurologic: Oriented x 3, speech clear, no focal deficits  Skin: No rashes    Exam is unchanged from       Results Reviewed:  LAB RESULTS:      Lab 25  0551 25  1723 25  1543 25  1349   WBC  --   --   --  10.00   HEMOGLOBIN  --   --   --  14.0   HEMATOCRIT  --   --   --  43.3   PLATELETS  --   --   --  167   NEUTROS ABS  --   --   --  9.06*   IMMATURE GRANS (ABS)  --   --   --  0.02   LYMPHS ABS  --   --   --  0.46*   MONOS ABS  --   --   --  0.38   EOS ABS  --   --   --  0.05   MCV  --   --   --  105.4*   PROCALCITONIN 1.38*  --   --   --    LACTATE  --  1.4  --  2.3*   HSTROP T 18*  --  16* 13         Lab 25  0551 25  1349   SODIUM 135* 137   POTASSIUM 4.8 4.8   CHLORIDE 102 101   CO2 23.0 26.0   ANION GAP 10.0 10.0   BUN 28* 23   CREATININE 1.25* 1.16*   EGFR 41.3* 45.2*   GLUCOSE 74 138*   CALCIUM 8.8 9.2         Lab 25  1349   TOTAL PROTEIN 5.8*   ALBUMIN 3.4*   GLOBULIN 2.4   ALT (SGPT) 27   AST (SGOT) 31   BILIRUBIN 0.5   ALK PHOS 55         Lab 25  0551 25  1543 25  1349   PROBNP   --   --  1,024.0   HSTROP T 18* 16* 13                 Brief Urine Lab Results  (Last result in the past 365 days)        Color   Clarity   Blood   Leuk Est   Nitrite   Protein   CREAT   Urine HCG        01/30/25 0859 Yellow   Cloudy   Negative   Large (3+)   Negative   30 mg/dL (1+)                   Microbiology Results Abnormal       Procedure Component Value - Date/Time    Blood Culture - Blood, Arm, Right [[id number]]  (Abnormal) Collected: 01/29/25 1545    Lab Status: Final result Specimen: Blood from Arm, Right Updated: 01/31/25 0712     Blood Culture Staphylococcus, coagulase negative     Isolated from Aerobic Bottle     Gram Stain Aerobic Bottle Gram positive cocci in clusters    Narrative:      Probable contaminant requires clinical correlation, susceptibility not performed unless requested by physician.      Blood Culture ID, PCR - Blood, Arm, Right [[id number]]  (Abnormal) Collected: 01/29/25 1545    Lab Status: Final result Specimen: Blood from Arm, Right Updated: 01/30/25 1441     BCID, PCR Staph spp, not aureus or lugdunensis. Identification by BCID2 PCR.     BOTTLE TYPE Aerobic Bottle            No radiology results from the last 24 hrs    Results for orders placed during the hospital encounter of 08/20/23    Adult Transthoracic Echo Complete W/ Cont if Necessary Per Protocol    Interpretation Summary    Left ventricular systolic function is mildly decreased. Calculated left ventricular EF = 47.9% Normal left ventricular cavity size and wall thickness noted. There is left ventricular global hypokinesis noted. Left ventricular diastolic function is consistent with (grade I) impaired relaxation.    : Normal right ventricular cavity size and systolic function noted. Electronic lead present in the ventricle.    Normal left atrial size and volume noted. Saline test results are negative.    There is moderate calcification of the aortic valve. The aortic valve was poorly visualized but appears trileaflet.  Moderate aortic valve regurgitation is present. No aortic valve stenosis is present.    Mild mitral annular calcification is present. Mild mitral valve regurgitation is present. No significant mitral valve stenosis is present.    The tricuspid valve is grossly normal in structure. Mild tricuspid valve regurgitation is present. Estimated right ventricular systolic pressure from tricuspid regurgitation is normal, 33 mmHg. No tricuspid valve stenosis is present.    Mild dilation of the ascending aorta is present. Ascending aorta = 3.7 cm    There is a small (<1cm) pericardial effusion adjacent to the right atrium and right ventricle.      Current medications:  Scheduled Meds:amiodarone, 200 mg, Oral, Daily  apixaban, 2.5 mg, Oral, BID  docusate sodium, 100 mg, Oral, Daily  levETIRAcetam, 250 mg, Oral, BID  levothyroxine, 100 mcg, Oral, Q AM  lisinopril, 10 mg, Oral, Q24H  melatonin, 5 mg, Oral, Nightly  metoprolol tartrate, 12.5 mg, Oral, BID  pantoprazole, 40 mg, Oral, Daily  rosuvastatin, 10 mg, Oral, Nightly  sodium chloride, 10 mL, Intravenous, Q12H  traZODone, 100 mg, Oral, Nightly  venlafaxine XR, 75 mg, Oral, Daily      Continuous Infusions:   PRN Meds:.  acetaminophen **OR** acetaminophen **OR** acetaminophen    albuterol    artificial tears    benzonatate    senna-docusate sodium **AND** polyethylene glycol **AND** bisacodyl **AND** bisacodyl    fluticasone    ipratropium-albuterol    meclizine    nitroglycerin    sodium chloride    sodium chloride    sodium chloride    Assessment & Plan   Assessment & Plan     Active Hospital Problems    Diagnosis  POA    **Multifocal pneumonia [J18.9]  Yes    Hyperlipidemia [E78.5]  Unknown    Atrial fibrillation [I48.91]  Yes    Acute respiratory failure with hypoxia [J96.01]  Yes    Hypertension [I10]  Yes      Resolved Hospital Problems   No resolved problems to display.        Brief Hospital Course to date:  Bibiana Hodges is a 89 y.o. female w h/o pacemaker placement,  recurrent pneumonia with known aspiration who presents from [place] with shortness of breath, congestive cough. Found to have chronic appearing pulm infiltrates, clinical concern for acute PNA.  Much improved within first 24 hours  Now awaiting SNF, declined more labs    Acute hypoxic resp failure 2/2 aspiration PNA - improved   -completed 5 days of abx  -on comfort diet    Chronic oropharyngeal dysphagia  -d/w patient who declines modified diet, currently on comfort based diet (intolerant in the past of modifications), Daughter in agreement w mother's decision    Afib s/p PPM - home eliquis, metoprolol, amiodarone  Depression - home effexor, buspar  Severe malnutrition  Tremor - home keppra  Blood culture x1 c/w contaminant    GOC:  - to remain DNR/DNI    Expected Discharge Location and Transportation: SNF pending  Expected Discharge medically ready 1/31  Expected Discharge Date: 2/3/2025; Expected Discharge Time:      VTE Prophylaxis:  Pharmacologic VTE prophylaxis orders are present.         AM-PAC 6 Clicks Score (PT): 18 (02/02/25 1225)    CODE STATUS:   Code Status and Medical Interventions: No CPR (Do Not Attempt to Resuscitate); Limited Support; No intubation (DNI)   Ordered at: 01/31/25 0952     Medical Intervention Limits:    No intubation (DNI)     Level Of Support Discussed With:    Patient     Code Status (Patient has no pulse and is not breathing):    No CPR (Do Not Attempt to Resuscitate)     Medical Interventions (Patient has pulse or is breathing):    Limited Support       Telma Meade,   02/04/25

## 2025-02-05 PROCEDURE — 97530 THERAPEUTIC ACTIVITIES: CPT

## 2025-02-05 PROCEDURE — 99232 SBSQ HOSP IP/OBS MODERATE 35: CPT | Performed by: INTERNAL MEDICINE

## 2025-02-05 RX ADMIN — APIXABAN 2.5 MG: 2.5 TABLET, FILM COATED ORAL at 09:14

## 2025-02-05 RX ADMIN — LEVETIRACETAM 250 MG: 500 TABLET, FILM COATED ORAL at 09:12

## 2025-02-05 RX ADMIN — Medication 5 MG: at 21:57

## 2025-02-05 RX ADMIN — TRAZODONE HYDROCHLORIDE 100 MG: 50 TABLET ORAL at 21:58

## 2025-02-05 RX ADMIN — ACETAMINOPHEN 650 MG: 325 TABLET, FILM COATED ORAL at 21:58

## 2025-02-05 RX ADMIN — VENLAFAXINE HYDROCHLORIDE 75 MG: 75 CAPSULE, EXTENDED RELEASE ORAL at 09:15

## 2025-02-05 RX ADMIN — DOCUSATE SODIUM 100 MG: 100 CAPSULE, LIQUID FILLED ORAL at 09:14

## 2025-02-05 RX ADMIN — LEVOTHYROXINE SODIUM 100 MCG: 100 TABLET ORAL at 06:00

## 2025-02-05 RX ADMIN — METOPROLOL TARTRATE 12.5 MG: 25 TABLET, FILM COATED ORAL at 09:11

## 2025-02-05 RX ADMIN — PANTOPRAZOLE SODIUM 40 MG: 40 TABLET, DELAYED RELEASE ORAL at 09:11

## 2025-02-05 RX ADMIN — METOPROLOL TARTRATE 12.5 MG: 25 TABLET, FILM COATED ORAL at 21:57

## 2025-02-05 RX ADMIN — BENZONATATE 100 MG: 100 CAPSULE ORAL at 21:57

## 2025-02-05 RX ADMIN — LISINOPRIL 10 MG: 10 TABLET ORAL at 09:14

## 2025-02-05 RX ADMIN — APIXABAN 2.5 MG: 2.5 TABLET, FILM COATED ORAL at 21:57

## 2025-02-05 RX ADMIN — Medication 10 ML: at 21:59

## 2025-02-05 RX ADMIN — ROSUVASTATIN 10 MG: 10 TABLET, FILM COATED ORAL at 21:57

## 2025-02-05 RX ADMIN — Medication 10 ML: at 09:10

## 2025-02-05 RX ADMIN — LEVETIRACETAM 250 MG: 500 TABLET, FILM COATED ORAL at 21:57

## 2025-02-05 RX ADMIN — AMIODARONE HYDROCHLORIDE 200 MG: 200 TABLET ORAL at 09:15

## 2025-02-05 NOTE — PROGRESS NOTES
[facility] Medicine Services  PROGRESS NOTE    Patient Name: Bibiana Hodges  : 1935  MRN: [id number]    Date of Admission: 2025  Primary Care Physician: Nivia Madrigal MD    Subjective   Subjective     CC: shortness of breath    HPI:  Patient sleeping in bed. No acute events overnight.    Objective   Objective     Vital Signs:   Temp:  [96.3 °F (35.7 °C)-97 °F (36.1 °C)] 96.5 °F (35.8 °C)  Heart Rate:  [] 118  Resp:  [18] 18  BP: (123-187)/(67-85) 141/85     Physical Exam:  Constitutional: No acute distress, sleeping in bed  HENT: NCAT  Respiratory: Clear to auscultation bilaterally, respiratory effort normal   Cardiovascular: RRR, no murmurs, rubs, or gallops  Gastrointestinal: soft, nontender, nondistended  Musculoskeletal: No bilateral ankle edema  Neurologic: UTO, asleep  Skin: No rashes      Results Reviewed:  LAB RESULTS:      Lab 25  0551 25  1723 25  1543 25  1349   WBC  --   --   --  10.00   HEMOGLOBIN  --   --   --  14.0   HEMATOCRIT  --   --   --  43.3   PLATELETS  --   --   --  167   NEUTROS ABS  --   --   --  9.06*   IMMATURE GRANS (ABS)  --   --   --  0.02   LYMPHS ABS  --   --   --  0.46*   MONOS ABS  --   --   --  0.38   EOS ABS  --   --   --  0.05   MCV  --   --   --  105.4*   PROCALCITONIN 1.38*  --   --   --    LACTATE  --  1.4  --  2.3*   HSTROP T 18*  --  16* 13         Lab 25  0551 25  1349   SODIUM 135* 137   POTASSIUM 4.8 4.8   CHLORIDE 102 101   CO2 23.0 26.0   ANION GAP 10.0 10.0   BUN 28* 23   CREATININE 1.25* 1.16*   EGFR 41.3* 45.2*   GLUCOSE 74 138*   CALCIUM 8.8 9.2         Lab 25  1349   TOTAL PROTEIN 5.8*   ALBUMIN 3.4*   GLOBULIN 2.4   ALT (SGPT) 27   AST (SGOT) 31   BILIRUBIN 0.5   ALK PHOS 55         Lab 25  0551 25  1543 25  1349   PROBNP  --   --  1,024.0   HSTROP T 18* 16* 13                 Brief Urine Lab Results  (Last result in the past 365 days)        Color    Clarity   Blood   Leuk Est   Nitrite   Protein   CREAT   Urine HCG        01/30/25 0859 Yellow   Cloudy   Negative   Large (3+)   Negative   30 mg/dL (1+)                   Microbiology Results Abnormal       Procedure Component Value - Date/Time    Blood Culture - Blood, Arm, Right [[id number]]  (Abnormal) Collected: 01/29/25 1545    Lab Status: Final result Specimen: Blood from Arm, Right Updated: 01/31/25 0712     Blood Culture Staphylococcus, coagulase negative     Isolated from Aerobic Bottle     Gram Stain Aerobic Bottle Gram positive cocci in clusters    Narrative:      Probable contaminant requires clinical correlation, susceptibility not performed unless requested by physician.      Blood Culture ID, PCR - Blood, Arm, Right [[id number]]  (Abnormal) Collected: 01/29/25 1545    Lab Status: Final result Specimen: Blood from Arm, Right Updated: 01/30/25 1441     BCID, PCR Staph spp, not aureus or lugdunensis. Identification by BCID2 PCR.     BOTTLE TYPE Aerobic Bottle            No radiology results from the last 24 hrs    Results for orders placed during the hospital encounter of 08/20/23    Adult Transthoracic Echo Complete W/ Cont if Necessary Per Protocol    Interpretation Summary    Left ventricular systolic function is mildly decreased. Calculated left ventricular EF = 47.9% Normal left ventricular cavity size and wall thickness noted. There is left ventricular global hypokinesis noted. Left ventricular diastolic function is consistent with (grade I) impaired relaxation.    : Normal right ventricular cavity size and systolic function noted. Electronic lead present in the ventricle.    Normal left atrial size and volume noted. Saline test results are negative.    There is moderate calcification of the aortic valve. The aortic valve was poorly visualized but appears trileaflet. Moderate aortic valve regurgitation is present. No aortic valve stenosis is present.    Mild mitral annular calcification is present.  Mild mitral valve regurgitation is present. No significant mitral valve stenosis is present.    The tricuspid valve is grossly normal in structure. Mild tricuspid valve regurgitation is present. Estimated right ventricular systolic pressure from tricuspid regurgitation is normal, 33 mmHg. No tricuspid valve stenosis is present.    Mild dilation of the ascending aorta is present. Ascending aorta = 3.7 cm    There is a small (<1cm) pericardial effusion adjacent to the right atrium and right ventricle.      Current medications:  Scheduled Meds:amiodarone, 200 mg, Oral, Daily  apixaban, 2.5 mg, Oral, BID  docusate sodium, 100 mg, Oral, Daily  levETIRAcetam, 250 mg, Oral, BID  levothyroxine, 100 mcg, Oral, Q AM  lisinopril, 10 mg, Oral, Q24H  melatonin, 5 mg, Oral, Nightly  metoprolol tartrate, 12.5 mg, Oral, BID  pantoprazole, 40 mg, Oral, Daily  rosuvastatin, 10 mg, Oral, Nightly  sodium chloride, 10 mL, Intravenous, Q12H  traZODone, 100 mg, Oral, Nightly  venlafaxine XR, 75 mg, Oral, Daily      Continuous Infusions:   PRN Meds:.  acetaminophen **OR** acetaminophen **OR** acetaminophen    albuterol    artificial tears    benzonatate    senna-docusate sodium **AND** polyethylene glycol **AND** bisacodyl **AND** bisacodyl    fluticasone    ipratropium-albuterol    meclizine    nitroglycerin    sodium chloride    sodium chloride    sodium chloride    Assessment & Plan   Assessment & Plan     Active Hospital Problems    Diagnosis  POA    **Multifocal pneumonia [J18.9]  Yes    Hyperlipidemia [E78.5]  Unknown    Atrial fibrillation [I48.91]  Yes    Acute respiratory failure with hypoxia [J96.01]  Yes    Hypertension [I10]  Yes      Resolved Hospital Problems   No resolved problems to display.        Brief Hospital Course to date:  Bibiana Hodges is a 89 y.o. female w h/o pacemaker placement, recurrent pneumonia with known aspiration who presents from [place] with shortness of breath, congestive cough. Found to have chronic  appearing pulm infiltrates, clinical concern for acute PNA.  Much improved within first 24 hours  Now awaiting SNF, declined more labs    Acute hypoxic resp failure 2/2 aspiration PNA - improved   -completed 5 days of abx  -on comfort diet    Chronic oropharyngeal dysphagia  -patient declined modified diet, currently on comfort based diet (intolerant in the past of modifications), Daughter in agreement w mother's decision    Afib s/p PPM - home eliquis, metoprolol, amiodarone  Depression - home effexor, buspar  Severe malnutrition  Tremor - home keppra  Blood culture x1 c/w contaminant    GOC:  - to remain DNR/DNI    Expected Discharge Location and Transportation: SNF pending  Expected Discharge medically ready 1/31  Expected Discharge Date: 2/3/2025; Expected Discharge Time:      VTE Prophylaxis:  Pharmacologic VTE prophylaxis orders are present.         AM-PAC 6 Clicks Score (PT): 16 (02/05/25 0903)    CODE STATUS:   Code Status and Medical Interventions: No CPR (Do Not Attempt to Resuscitate); Limited Support; No intubation (DNI)   Ordered at: 01/31/25 0952     Medical Intervention Limits:    No intubation (DNI)     Level Of Support Discussed With:    Patient     Code Status (Patient has no pulse and is not breathing):    No CPR (Do Not Attempt to Resuscitate)     Medical Interventions (Patient has pulse or is breathing):    Limited Support       Telma Meade DO  02/05/25

## 2025-02-05 NOTE — PLAN OF CARE
Goal Outcome Evaluation:  Plan of Care Reviewed With: patient        Progress: no change  Outcome Evaluation: Pt continues to present with decreased activity tolerance, generalized weakness, and decreased functional independence compared to baseline. Pt required mod A to get to EOB this session and min A to improve sequencing of AD while ambulating. Continue to progress per pt tolerance. Pt would benefit from rehab to address deficits.    Anticipated Discharge Disposition (PT): inpatient rehabilitation facility

## 2025-02-05 NOTE — THERAPY TREATMENT NOTE
Patient Name: Bibiana Hodges  : 1935    MRN: [id number]                              Today's Date: 2025       Admit Date: 2025    Visit Dx:     ICD-10-CM ICD-9-CM   1. Community acquired pneumonia of left lung, unspecified part of lung  J18.9 486   2. Hypoxia  R09.02 799.02   3. Dysphagia, unspecified type  R13.10 787.20   4. Impaired functional mobility, balance, gait, and endurance  Z74.09 V49.89     Patient Active Problem List   Diagnosis    Dysautonomia orthostatic hypotension syndrome    Hypertension    Flu vaccine need    Streptococcus pneumoniae vaccination indicated    Acute respiratory failure with hypoxia    Severe malnutrition    Atrial fibrillation    Autonomic dysfunction    Multifocal pneumonia    Hyperlipidemia     Past Medical History:   Diagnosis Date    Constipation     Depression     Dysautonomia orthostatic hypotension syndrome     Generalized osteoarthritis     GERD (gastroesophageal reflux disease)     s/p Nissen    Hypertension     Insomnia     Osteoarthrosis, shoulder region     Skin ulcer of scalp     Thrombophlebitis of arm     Tremor     Vitamin B12 deficiency      Past Surgical History:   Procedure Laterality Date    DILATATION AND CURETTAGE      HERNIA REPAIR      HIP SURGERY      SHOULDER SURGERY      Right      General Information       Row Name 25 0845          Physical Therapy Time and Intention    Document Type therapy note (daily note)  -AE     Mode of Treatment physical therapy  -AE       Row Name 25 0845          General Information    Patient Profile Reviewed yes  -AE     Existing Precautions/Restrictions fall;orthostatic hypotension;other (see comments)  hx dysautonomic orthostasis; hand tremors  -AE     Barriers to Rehab medically complex;previous functional deficit  -AE       Row Name 25 0845          Cognition    Orientation Status (Cognition) oriented x 3  -AE       Row Name 25 0893          Safety Issues/Impairments Affecting  Functional Mobility    Safety Issues Affecting Function (Mobility) awareness of need for assistance;insight into deficits/self-awareness;safety precaution awareness;sequencing abilities;positioning of assistive device  -AE     Impairments Affecting Function (Mobility) balance;endurance/activity tolerance;motor planning;postural/trunk control;shortness of breath;strength;motor control;coordination  -AE     Comment, Safety Issues/Impairments (Mobility) orthostatic hypotension  -AE               User Key  (r) = Recorded By, (t) = Taken By, (c) = Cosigned By      Initials Name Provider Type    AE Guerrero Azevedo, PT Physical Therapist                   Mobility       Row Name 02/05/25 0846          Bed Mobility    Bed Mobility supine-sit;sit-supine  -AE     Supine-Sit [place] (Bed Mobility) moderate assist (50% patient effort);1 person assist;verbal cues  -AE     Sit-Supine [place] (Bed Mobility) contact guard;verbal cues  -AE     Assistive Device (Bed Mobility) bed rails;head of bed elevated  -AE     Comment, (Bed Mobility) VCs for hand placement and sequencing. Pt required increased assist to come to upright sitting at EOB. Pt demo decreased sequencing and strength this session.  -AE       Row Name 02/05/25 0846          Transfers    Comment, (Transfers) VCs for hand palcement and sequencing. Pt required increased cues for sequencing of AD and to improve safety awareness.  -AE       Row Name 02/05/25 0846          Sit-Stand Transfer    Sit-Stand [place] (Transfers) contact guard;verbal cues  -AE     Assistive Device (Sit-Stand Transfers) walker, front-wheeled  -AE       Row Name 02/05/25 0846          Gait/Stairs (Locomotion)    [place] Level (Gait) minimum assist (75% patient effort);verbal cues  -AE     Assistive Device (Gait) walker, front-wheeled  -AE     Distance in Feet (Gait) 8  +8  -AE     Deviations/Abnormal Patterns (Gait) bilateral deviations;perri decreased;gait speed decreased;base  of support, narrow  -AE     Bilateral Gait Deviations forward flexed posture;heel strike decreased  -AE     Comment, (Gait/Stairs) Pt demo step through gait pattern with slowed perri and forward flexed posture. Pt required one seated rest break d/t increased dizziness while up. Pt required manual assist to assist with RW positioning and to improve positioning within RW to reduce risk of falling. Further distance limited by fatigue and dizziness.  -AE               User Key  (r) = Recorded By, (t) = Taken By, (c) = Cosigned By      Initials Name Provider Type    AE Guerrero Azevedo PT Physical Therapist                   Obj/Interventions       Row Name 02/05/25 0900          Balance    Balance Assessment sitting static balance;sitting dynamic balance;standing static balance;standing dynamic balance  -AE     Static Sitting Balance standby assist  -AE     Dynamic Sitting Balance contact guard  -AE     Position, Sitting Balance unsupported;sitting edge of bed  -AE     Static Standing Balance contact guard  -AE     Dynamic Standing Balance minimal assist  -AE     Position/Device Used, Standing Balance supported;walker, front-wheeled  -AE               User Key  (r) = Recorded By, (t) = Taken By, (c) = Cosigned By      Initials Name Provider Type    Guerrero Metz PT Physical Therapist                   Goals/Plan    No documentation.                  Clinical Impression       Row Name 02/05/25 0901          Pain    Pretreatment Pain Rating 0/10 - no pain  -AE     Posttreatment Pain Rating 0/10 - no pain  -AE       Row Name 02/05/25 0901          Plan of Care Review    Plan of Care Reviewed With patient  -AE     Progress no change  -AE     Outcome Evaluation Pt continues to present with decreased activity tolerance, generalized weakness, and decreased functional independence compared to baseline. Pt required mod A to get to EOB this session and min A to improve sequencing of AD while ambulating. Continue to  progress per pt tolerance. Pt would benefit from rehab to address deficits.  -AE       Row Name 02/05/25 0901          Vital Signs    Pre Systolic BP Rehab 153  -AE     Pre Treatment Diastolic BP 69  -AE     Post Systolic BP Rehab 117  -AE     Post Treatment Diastolic BP 80  -AE     Pre SpO2 (%) 93  -AE     O2 Delivery Pre Treatment room air  -AE     O2 Delivery Intra Treatment room air  -AE     Post SpO2 (%) 94  -AE     O2 Delivery Post Treatment room air  -AE     Pre Patient Position Supine  -AE     Intra Patient Position Standing  -AE     Post Patient Position Sitting  -AE       Row Name 02/05/25 0901          Positioning and Restraints    Pre-Treatment Position in bed  -AE     Post Treatment Position bed  -AE     In Bed notified nsg;fowlers;call light within reach;encouraged to call for assist;exit alarm on;side rails up x2;legs elevated  -AE               User Key  (r) = Recorded By, (t) = Taken By, (c) = Cosigned By      Initials Name Provider Type    AE Guerrero Azevedo PT Physical Therapist                   Outcome Measures       Row Name 02/05/25 0903          How much help from another person do you currently need...    Turning from your back to your side while in flat bed without using bedrails? 3  -AE     Moving from lying on back to sitting on the side of a flat bed without bedrails? 2  -AE     Moving to and from a bed to a chair (including a wheelchair)? 3  -AE     Standing up from a chair using your arms (e.g., wheelchair, bedside chair)? 3  -AE     Climbing 3-5 steps with a railing? 2  -AE     To walk in hospital room? 3  -AE     AM-PAC 6 Clicks Score (PT) 16  -AE     Highest Level of Mobility Goal 5 --> Static standing  -AE       Row Name 02/05/25 0903          Functional Assessment    Outcome Measure Options AM-PAC 6 Clicks Basic Mobility (PT)  -AE               User Key  (r) = Recorded By, (t) = Taken By, (c) = Cosigned By      Initials Name Provider Type    Guerrero Metz PT Physical  Therapist                                 Physical Therapy Education       Title: PT OT SLP Therapies (In Progress)       Topic: Physical Therapy (Done)       Point: Mobility training (Done)       Learning Progress Summary            Patient Acceptance, E, VU by AE at 2/5/2025 0819    Eager, E, VU,NR by SD at 2/2/2025 1225    Eager, E, VU,NR by SD at 1/30/2025 1452                      Point: Home exercise program (Done)       Learning Progress Summary            Patient Acceptance, E, VU by AE at 2/5/2025 0819    Eager, E, VU,NR by SD at 2/2/2025 1225    Eager, E, VU,NR by SD at 1/30/2025 1452                      Point: Body mechanics (Done)       Learning Progress Summary            Patient Acceptance, E, VU by AE at 2/5/2025 0819    Eager, E, VU,NR by SD at 2/2/2025 1225    Eager, E, VU,NR by SD at 1/30/2025 1452                      Point: Precautions (Done)       Learning Progress Summary            Patient Acceptance, E, VU by AE at 2/5/2025 0819    Eager, E, VU,NR by SD at 2/2/2025 1225    Eager, E, VU,NR by SD at 1/30/2025 1452                                      User Key       Initials Effective Dates Name Provider Type Discipline    SD 03/13/23 -  Marie Tao, PT Physical Therapist PT    AE 09/21/21 -  Guerrero Azevedo PT Physical Therapist PT                  PT Recommendation and Plan     Progress: no change  Outcome Evaluation: Pt continues to present with decreased activity tolerance, generalized weakness, and decreased functional independence compared to baseline. Pt required mod A to get to EOB this session and min A to improve sequencing of AD while ambulating. Continue to progress per pt tolerance. Pt would benefit from rehab to address deficits.     Time Calculation:         PT Charges       Row Name 02/05/25 0903             Time Calculation    Start Time 0819  -AE      PT Received On 02/05/25  -AE      PT Goal Re-Cert Due Date 02/09/25  -AE         Timed Charges    29630 - PT  Therapeutic Activity Minutes 23  -AE         Total Minutes    Timed Charges Total Minutes 23  -AE       Total Minutes 23  -AE                User Key  (r) = Recorded By, (t) = Taken By, (c) = Cosigned By      Initials Name Provider Type    AE Guerrero Azevedo, GAIL Physical Therapist                  Therapy Charges for Today       Code Description Service Date Service Provider Modifiers Qty    [id number]  PT THERAPEUTIC ACT EA 15 MIN 2/5/2025 Guerrero Azevedo PT GP 2            PT G-Codes  Outcome Measure Options: AM-PAC 6 Clicks Basic Mobility (PT)  AM-PAC 6 Clicks Score (PT): 16  AM-PAC 6 Clicks Score (OT): 14  PT Discharge Summary  Anticipated Discharge Disposition (PT): inpatient rehabilitation facility    Guerrero Azevedo PT  2/5/2025

## 2025-02-05 NOTE — CASE MANAGEMENT/SOCIAL WORK
Continued Stay Note  [facility]     Patient Name: Bibiana Hodges  MRN: [id number]  Today's Date: 2/5/2025    Admit Date: 1/29/2025    Plan: Skilled rehab at [facility]   Discharge Plan       Row Name 02/05/25 1454       Plan    Plan Skilled rehab at [facility]    Patient/Family in Agreement with Plan yes    Plan Comments Patient's plan is skilled rehab at [facility]. Peer to peer offered this morning by insurance company and physician advisor was able to get approval for skilled rehab. Cali with [facility] updated. Per Cali, there are no skilled rehab beds today. Precertification for insurance approval is approved through Monday. Danyelle, patient's POA, updated by phone. CM will continue to follow.    Final Discharge Disposition Code 03 - skilled nursing facility (SNF)                   Discharge Codes    No documentation.                 Expected Discharge Date and Time       Expected Discharge Date Expected Discharge Time    Feb 3, 2025               Soraya Campbell RN

## 2025-02-05 NOTE — PLAN OF CARE
Problem: Adult Inpatient Plan of Care  Goal: Absence of Hospital-Acquired Illness or Injury  Outcome: Progressing  Intervention: Identify and Manage Fall Risk  Recent Flowsheet Documentation  Taken 2/5/2025 1800 by Nichole Bolivar RN  Safety Promotion/Fall Prevention:   activity supervised   clutter free environment maintained   fall prevention program maintained   safety round/check completed  Taken 2/5/2025 1600 by Nichole Bolivar RN  Safety Promotion/Fall Prevention:   activity supervised   clutter free environment maintained   fall prevention program maintained   safety round/check completed  Taken 2/5/2025 1400 by Nichole Bolivar RN  Safety Promotion/Fall Prevention:   activity supervised   clutter free environment maintained   fall prevention program maintained   safety round/check completed  Taken 2/5/2025 1200 by Nichole Bolivar RN  Safety Promotion/Fall Prevention:   activity supervised   clutter free environment maintained   fall prevention program maintained   nonskid shoes/slippers when out of bed   safety round/check completed  Taken 2/5/2025 0800 by Nichole Bolivar RN  Safety Promotion/Fall Prevention:   activity supervised   clutter free environment maintained   fall prevention program maintained   safety round/check completed  Intervention: Prevent Skin Injury  Recent Flowsheet Documentation  Taken 2/5/2025 1800 by Nichole Bolivar RN  Body Position: 30 degrees lateral  Skin Protection: incontinence pads utilized  Taken 2/5/2025 1600 by Nichole Bolivar RN  Skin Protection: incontinence pads utilized  Taken 2/5/2025 1400 by Nichole Bolivar RN  Body Position: 30 degrees lateral  Skin Protection: incontinence pads utilized  Taken 2/5/2025 1200 by Nichole Bolivar RN  Skin Protection: incontinence pads utilized  Taken 2/5/2025 1000 by Nichole Bolivar RN  Body Position: supine  Taken 2/5/2025 0800 by Nichole Bolivar RN  Body Position: supine  Skin Protection: incontinence pads utilized  Intervention:  Prevent Infection  Recent Flowsheet Documentation  Taken 2/5/2025 1800 by Nichole Bolivar RN  Infection Prevention: environmental surveillance performed  Taken 2/5/2025 1600 by Nichole Bolivar RN  Infection Prevention: environmental surveillance performed  Taken 2/5/2025 1400 by Nichole Bolivar RN  Infection Prevention: environmental surveillance performed  Taken 2/5/2025 1200 by Nichole Bolivar RN  Infection Prevention: environmental surveillance performed  Taken 2/5/2025 1000 by Nichole Bolivar RN  Infection Prevention: environmental surveillance performed  Taken 2/5/2025 0800 by Nichole Bolivar RN  Infection Prevention: environmental surveillance performed  Goal: Optimal Comfort and Wellbeing  Outcome: Progressing  Intervention: Provide Person-Centered Care  Recent Flowsheet Documentation  Taken 2/5/2025 1800 by Nichole Bolivar RN  Trust Relationship/Rapport:   care explained   choices provided   emotional support provided   empathic listening provided   questions answered   questions encouraged   reassurance provided   thoughts/feelings acknowledged  Taken 2/5/2025 1600 by Nichole Bolivar RN  Trust Relationship/Rapport:   care explained   choices provided   emotional support provided   empathic listening provided   questions answered   questions encouraged   reassurance provided   thoughts/feelings acknowledged  Taken 2/5/2025 1400 by Nichole Bolivar RN  Trust Relationship/Rapport:   care explained   choices provided   emotional support provided   empathic listening provided   questions answered   questions encouraged   reassurance provided   thoughts/feelings acknowledged  Taken 2/5/2025 1000 by Nichole Bolivar RN  Trust Relationship/Rapport:   care explained   choices provided   emotional support provided   empathic listening provided   questions answered   questions encouraged   reassurance provided   thoughts/feelings acknowledged  Taken 2/5/2025 0800 by Nichole Bolivar RN  Trust Relationship/Rapport:   care  explained   choices provided   emotional support provided   empathic listening provided   questions answered   questions encouraged   reassurance provided   thoughts/feelings acknowledged   Goal Outcome Evaluation:  Client remained in bed this shift, no complaints noted.

## 2025-02-06 PROCEDURE — 99232 SBSQ HOSP IP/OBS MODERATE 35: CPT | Performed by: INTERNAL MEDICINE

## 2025-02-06 RX ADMIN — LEVETIRACETAM 250 MG: 500 TABLET, FILM COATED ORAL at 20:45

## 2025-02-06 RX ADMIN — PANTOPRAZOLE SODIUM 40 MG: 40 TABLET, DELAYED RELEASE ORAL at 09:13

## 2025-02-06 RX ADMIN — METOPROLOL TARTRATE 12.5 MG: 25 TABLET, FILM COATED ORAL at 09:13

## 2025-02-06 RX ADMIN — ROSUVASTATIN 10 MG: 10 TABLET, FILM COATED ORAL at 20:45

## 2025-02-06 RX ADMIN — AMIODARONE HYDROCHLORIDE 200 MG: 200 TABLET ORAL at 09:13

## 2025-02-06 RX ADMIN — LEVETIRACETAM 250 MG: 500 TABLET, FILM COATED ORAL at 09:13

## 2025-02-06 RX ADMIN — DOCUSATE SODIUM 100 MG: 100 CAPSULE, LIQUID FILLED ORAL at 09:13

## 2025-02-06 RX ADMIN — ACETAMINOPHEN 650 MG: 325 TABLET, FILM COATED ORAL at 23:28

## 2025-02-06 RX ADMIN — LISINOPRIL 10 MG: 10 TABLET ORAL at 09:13

## 2025-02-06 RX ADMIN — BENZONATATE 100 MG: 100 CAPSULE ORAL at 05:52

## 2025-02-06 RX ADMIN — Medication 5 MG: at 20:45

## 2025-02-06 RX ADMIN — LEVOTHYROXINE SODIUM 100 MCG: 100 TABLET ORAL at 05:27

## 2025-02-06 RX ADMIN — APIXABAN 2.5 MG: 2.5 TABLET, FILM COATED ORAL at 20:45

## 2025-02-06 RX ADMIN — VENLAFAXINE HYDROCHLORIDE 75 MG: 75 CAPSULE, EXTENDED RELEASE ORAL at 09:13

## 2025-02-06 RX ADMIN — Medication 10 ML: at 09:14

## 2025-02-06 RX ADMIN — METOPROLOL TARTRATE 12.5 MG: 25 TABLET, FILM COATED ORAL at 20:45

## 2025-02-06 RX ADMIN — APIXABAN 2.5 MG: 2.5 TABLET, FILM COATED ORAL at 09:13

## 2025-02-06 RX ADMIN — TRAZODONE HYDROCHLORIDE 100 MG: 50 TABLET ORAL at 20:45

## 2025-02-06 RX ADMIN — ACETAMINOPHEN 650 MG: 325 TABLET, FILM COATED ORAL at 05:41

## 2025-02-06 NOTE — CASE MANAGEMENT/SOCIAL WORK
Continued Stay Note  Psychiatric     Patient Name: Bibiana Hodges  MRN: [id number]  Today's Date: 2/6/2025    Admit Date: 1/29/2025    Plan: [facility]   Discharge Plan       Row Name 02/06/25 0900       Plan    Plan [facility]    Patient/Family in Agreement with Plan yes    Plan Comments Spoke with Cali at [facility] and she has put the patient on for a bed on Saturday, Plan is [facility]. CM will continue to follow.    Final Discharge Disposition Code 03 - skilled nursing facility (SNF)                   Discharge Codes    No documentation.                 Expected Discharge Date and Time       Expected Discharge Date Expected Discharge Time    Feb 3, 2025               Jose Maradiaga RN

## 2025-02-06 NOTE — PLAN OF CARE
Problem: Adult Inpatient Plan of Care  Goal: Plan of Care Review  Outcome: Progressing  Goal: Patient-Specific Goal (Individualized)  Outcome: Progressing  Goal: Absence of Hospital-Acquired Illness or Injury  Outcome: Progressing  Intervention: Identify and Manage Fall Risk  Recent Flowsheet Documentation  Taken 2/6/2025 0355 by Anastasia Marquis RN  Safety Promotion/Fall Prevention:   assistive device/personal items within reach   clutter free environment maintained   fall prevention program maintained   nonskid shoes/slippers when out of bed   room organization consistent   safety round/check completed  Taken 2/6/2025 0155 by Anastasia Marquis RN  Safety Promotion/Fall Prevention:   assistive device/personal items within reach   clutter free environment maintained   fall prevention program maintained   safety round/check completed   room organization consistent   nonskid shoes/slippers when out of bed  Taken 2/6/2025 0000 by Anastasia Marquis RN  Safety Promotion/Fall Prevention:   assistive device/personal items within reach   clutter free environment maintained   fall prevention program maintained   safety round/check completed   room organization consistent   nonskid shoes/slippers when out of bed  Taken 2/5/2025 2210 by Anastasia Marquis RN  Safety Promotion/Fall Prevention:   assistive device/personal items within reach   clutter free environment maintained   fall prevention program maintained   safety round/check completed   room organization consistent   nonskid shoes/slippers when out of bed  Taken 2/5/2025 2048 by Anastasia Marquis RN  Safety Promotion/Fall Prevention:   assistive device/personal items within reach   clutter free environment maintained   fall prevention program maintained   safety round/check completed   room organization consistent   nonskid shoes/slippers when out of bed  Intervention: Prevent Skin Injury  Recent Flowsheet Documentation  Taken 2/6/2025 0355 by Anastasia Marquis RN  Body Position: position changed  independently  Skin Protection:   incontinence pads utilized   silicone foam dressing in place   transparent dressing maintained  Taken 2/6/2025 0155 by Anastasia Marquis RN  Body Position: position changed independently  Skin Protection:   incontinence pads utilized   silicone foam dressing in place   transparent dressing maintained  Taken 2/6/2025 0000 by Anastasia Marquis RN  Body Position:   turned   supine  Skin Protection:   incontinence pads utilized   silicone foam dressing in place   transparent dressing maintained  Taken 2/5/2025 2210 by Anastasia Marquis RN  Body Position:   turned   left  Skin Protection:   incontinence pads utilized   silicone foam dressing in place   transparent dressing maintained  Taken 2/5/2025 2157 by Anastasia Marquis RN  Skin Protection:   incontinence pads utilized   pulse oximeter probe site changed   silicone foam dressing in place   transparent dressing maintained  Taken 2/5/2025 2048 by Anastasia Marquis RN  Body Position:   left   foot of bed elevated   heels elevated   supine   turned  Skin Protection:   incontinence pads utilized   pulse oximeter probe site changed   silicone foam dressing in place   transparent dressing maintained  Intervention: Prevent Infection  Recent Flowsheet Documentation  Taken 2/6/2025 0355 by Anastasia Marquis RN  Infection Prevention:   environmental surveillance performed   single patient room provided  Taken 2/6/2025 0155 by Anastasia Marquis RN  Infection Prevention:   environmental surveillance performed   rest/sleep promoted   single patient room provided  Taken 2/6/2025 0000 by Anastasia Marquis RN  Infection Prevention:   environmental surveillance performed   rest/sleep promoted   single patient room provided  Taken 2/5/2025 2210 by Anastasia Marquis RN  Infection Prevention:   environmental surveillance performed   rest/sleep promoted   single patient room provided  Taken 2/5/2025 2048 by Anastasia Marquis RN  Infection Prevention:   environmental surveillance performed   rest/sleep  promoted   single patient room provided  Goal: Optimal Comfort and Wellbeing  Outcome: Progressing  Intervention: Provide Person-Centered Care  Recent Flowsheet Documentation  Taken 2/5/2025 2048 by Anastasia Marquis RN  Trust Relationship/Rapport:   care explained   thoughts/feelings acknowledged  Goal: Readiness for Transition of Care  Outcome: Progressing     Problem: Comorbidity Management  Goal: Maintenance of Heart Failure Symptom Control  Outcome: Progressing  Intervention: Maintain Heart Failure Management  Recent Flowsheet Documentation  Taken 2/5/2025 2048 by Anastasia Marquis RN  Medication Review/Management: medications reviewed  Goal: Blood Pressure in Desired Range  Outcome: Progressing  Intervention: Maintain Blood Pressure Management  Recent Flowsheet Documentation  Taken 2/5/2025 2048 by Anastasia Marquis RN  Medication Review/Management: medications reviewed     Problem: Skin Injury Risk Increased  Goal: Skin Health and Integrity  Outcome: Progressing  Intervention: Optimize Skin Protection  Recent Flowsheet Documentation  Taken 2/6/2025 0355 by Anastasia Marquis RN  Activity Management: activity encouraged  Pressure Reduction Techniques:   pressure points protected   heels elevated off bed   frequent weight shift encouraged   weight shift assistance provided  Head of Bed (HOB) Positioning: HOB elevated  Pressure Reduction Devices:   pressure-redistributing mattress utilized   positioning supports utilized   foam padding utilized  Skin Protection:   incontinence pads utilized   silicone foam dressing in place   transparent dressing maintained  Taken 2/6/2025 0155 by Anastasia Marquis RN  Pressure Reduction Techniques:   pressure points protected   heels elevated off bed   frequent weight shift encouraged   weight shift assistance provided  Head of Bed (HOB) Positioning: HOB elevated  Pressure Reduction Devices:   pressure-redistributing mattress utilized   positioning supports utilized   foam padding utilized  Skin  Protection:   incontinence pads utilized   silicone foam dressing in place   transparent dressing maintained  Taken 2/6/2025 0000 by Anastasia Marquis RN  Activity Management: activity encouraged  Pressure Reduction Techniques:   pressure points protected   heels elevated off bed   frequent weight shift encouraged   weight shift assistance provided  Head of Bed (HOB) Positioning: HOB elevated  Pressure Reduction Devices:   pressure-redistributing mattress utilized   positioning supports utilized   foam padding utilized  Skin Protection:   incontinence pads utilized   silicone foam dressing in place   transparent dressing maintained  Taken 2/5/2025 2210 by Anastasia Marquis RN  Activity Management: activity minimized  Pressure Reduction Techniques:   pressure points protected   heels elevated off bed   frequent weight shift encouraged   weight shift assistance provided  Head of Bed (HOB) Positioning: HOB elevated  Pressure Reduction Devices:   pressure-redistributing mattress utilized   positioning supports utilized   foam padding utilized  Skin Protection:   incontinence pads utilized   silicone foam dressing in place   transparent dressing maintained  Taken 2/5/2025 2157 by Anastasia Marquis RN  Pressure Reduction Techniques:   pressure points protected   heels elevated off bed   frequent weight shift encouraged   weight shift assistance provided  Pressure Reduction Devices:   pressure-redistributing mattress utilized   foam padding utilized   positioning supports utilized  Skin Protection:   incontinence pads utilized   pulse oximeter probe site changed   silicone foam dressing in place   transparent dressing maintained  Taken 2/5/2025 2048 by Anastasia Marquis RN  Activity Management: activity encouraged  Pressure Reduction Techniques:   pressure points protected   heels elevated off bed   frequent weight shift encouraged   weight shift assistance provided  Head of Bed (HOB) Positioning: HOB elevated  Pressure Reduction Devices:    pressure-redistributing mattress utilized   foam padding utilized   positioning supports utilized  Skin Protection:   incontinence pads utilized   pulse oximeter probe site changed   silicone foam dressing in place   transparent dressing maintained     Problem: Fall Injury Risk  Goal: Absence of Fall and Fall-Related Injury  Outcome: Progressing  Intervention: Identify and Manage Contributors  Recent Flowsheet Documentation  Taken 2/5/2025 2048 by Anastasia Marquis RN  Medication Review/Management: medications reviewed  Self-Care Promotion:   independence encouraged   BADL personal objects within reach  Intervention: Promote Injury-Free Environment  Recent Flowsheet Documentation  Taken 2/6/2025 0355 by Anastasia Marquis RN  Safety Promotion/Fall Prevention:   assistive device/personal items within reach   clutter free environment maintained   fall prevention program maintained   nonskid shoes/slippers when out of bed   room organization consistent   safety round/check completed  Taken 2/6/2025 0155 by Anastasia Marquis RN  Safety Promotion/Fall Prevention:   assistive device/personal items within reach   clutter free environment maintained   fall prevention program maintained   safety round/check completed   room organization consistent   nonskid shoes/slippers when out of bed  Taken 2/6/2025 0000 by Anastasia Marquis RN  Safety Promotion/Fall Prevention:   assistive device/personal items within reach   clutter free environment maintained   fall prevention program maintained   safety round/check completed   room organization consistent   nonskid shoes/slippers when out of bed  Taken 2/5/2025 2210 by Anastasia Marquis RN  Safety Promotion/Fall Prevention:   assistive device/personal items within reach   clutter free environment maintained   fall prevention program maintained   safety round/check completed   room organization consistent   nonskid shoes/slippers when out of bed  Taken 2/5/2025 2048 by Anastasia Marquis RN  Safety Promotion/Fall  Prevention:   assistive device/personal items within reach   clutter free environment maintained   fall prevention program maintained   safety round/check completed   room organization consistent   nonskid shoes/slippers when out of bed   Goal Outcome Evaluation:

## 2025-02-06 NOTE — PROGRESS NOTES
[facility] Medicine Services  PROGRESS NOTE    Patient Name: Bibiana Hodges  : 1935  MRN: [id number]    Date of Admission: 2025  Primary Care Physician: Nivia Madrigal MD    Subjective   Subjective     CC: shortness of breath    HPI:  Patient complains of pain in her buttocks today. Otherwise continues to complain of chronic cough which we discussed is probably secondary to chronic aspiration.    Objective   Objective     Vital Signs:   Temp:  [97.2 °F (36.2 °C)-98.5 °F (36.9 °C)] 97.5 °F (36.4 °C)  Heart Rate:  [60-85] 61  Resp:  [16-20] 20  BP: (114-148)/(57-92) 122/92     Physical Exam:  Constitutional: No acute distress, awake, alert, elderly female  HENT: NCAT, mucous membranes moist  Respiratory: Clear to auscultation bilaterally, respiratory effort normal   Cardiovascular: RRR, no murmurs, rubs, or gallops  Gastrointestinal: soft, nontender, nondistended  Musculoskeletal: No bilateral ankle edema  Psychiatric: Appropriate affect, cooperative  Neurologic: Oriented x 3, speech clear, no focal deficits, generalized weakness  Skin: No rashes    Results Reviewed:  LAB RESULTS:                                      Brief Urine Lab Results  (Last result in the past 365 days)        Color   Clarity   Blood   Leuk Est   Nitrite   Protein   CREAT   Urine HCG        25 0859 Yellow   Cloudy   Negative   Large (3+)   Negative   30 mg/dL (1+)                   Microbiology Results Abnormal       Procedure Component Value - Date/Time    Blood Culture - Blood, Arm, Right [[id number]]  (Abnormal) Collected: 25 1545    Lab Status: Final result Specimen: Blood from Arm, Right Updated: 25 0712     Blood Culture Staphylococcus, coagulase negative     Isolated from Aerobic Bottle     Gram Stain Aerobic Bottle Gram positive cocci in clusters    Narrative:      Probable contaminant requires clinical correlation, susceptibility not performed unless requested by  physician.      Blood Culture ID, PCR - Blood, Arm, Right [[id number]]  (Abnormal) Collected: 01/29/25 1544    Lab Status: Final result Specimen: Blood from Arm, Right Updated: 01/30/25 1441     BCID, PCR Staph spp, not aureus or lugdunensis. Identification by BCID2 PCR.     BOTTLE TYPE Aerobic Bottle            No radiology results from the last 24 hrs    Results for orders placed during the hospital encounter of 08/20/23    Adult Transthoracic Echo Complete W/ Cont if Necessary Per Protocol    Interpretation Summary    Left ventricular systolic function is mildly decreased. Calculated left ventricular EF = 47.9% Normal left ventricular cavity size and wall thickness noted. There is left ventricular global hypokinesis noted. Left ventricular diastolic function is consistent with (grade I) impaired relaxation.    : Normal right ventricular cavity size and systolic function noted. Electronic lead present in the ventricle.    Normal left atrial size and volume noted. Saline test results are negative.    There is moderate calcification of the aortic valve. The aortic valve was poorly visualized but appears trileaflet. Moderate aortic valve regurgitation is present. No aortic valve stenosis is present.    Mild mitral annular calcification is present. Mild mitral valve regurgitation is present. No significant mitral valve stenosis is present.    The tricuspid valve is grossly normal in structure. Mild tricuspid valve regurgitation is present. Estimated right ventricular systolic pressure from tricuspid regurgitation is normal, 33 mmHg. No tricuspid valve stenosis is present.    Mild dilation of the ascending aorta is present. Ascending aorta = 3.7 cm    There is a small (<1cm) pericardial effusion adjacent to the right atrium and right ventricle.      Current medications:  Scheduled Meds:amiodarone, 200 mg, Oral, Daily  apixaban, 2.5 mg, Oral, BID  docusate sodium, 100 mg, Oral, Daily  levETIRAcetam, 250 mg, Oral,  BID  levothyroxine, 100 mcg, Oral, Q AM  lisinopril, 10 mg, Oral, Q24H  melatonin, 5 mg, Oral, Nightly  metoprolol tartrate, 12.5 mg, Oral, BID  pantoprazole, 40 mg, Oral, Daily  rosuvastatin, 10 mg, Oral, Nightly  sodium chloride, 10 mL, Intravenous, Q12H  traZODone, 100 mg, Oral, Nightly  venlafaxine XR, 75 mg, Oral, Daily      Continuous Infusions:   PRN Meds:.  acetaminophen **OR** acetaminophen **OR** acetaminophen    albuterol    artificial tears    benzonatate    senna-docusate sodium **AND** polyethylene glycol **AND** bisacodyl **AND** bisacodyl    fluticasone    ipratropium-albuterol    meclizine    nitroglycerin    sodium chloride    sodium chloride    sodium chloride    Assessment & Plan   Assessment & Plan     Active Hospital Problems    Diagnosis  POA    **Multifocal pneumonia [J18.9]  Yes    Hyperlipidemia [E78.5]  Unknown    Atrial fibrillation [I48.91]  Yes    Acute respiratory failure with hypoxia [J96.01]  Yes    Hypertension [I10]  Yes      Resolved Hospital Problems   No resolved problems to display.        Brief Hospital Course to date:  Bibiana Hodges is a 89 y.o. female w h/o pacemaker placement, recurrent pneumonia with known aspiration who presents from [facility] with shortness of breath, congestive cough. Found to have chronic appearing pulm infiltrates, clinical concern for acute PNA.  Much improved within first 24 hours  Now awaiting SNF, declined more labs    Acute hypoxic resp failure 2/2 aspiration PNA - improved   -completed 5 days of abx  -remains on room air  -on comfort diet    Chronic oropharyngeal dysphagia  -patient declined modified diet, currently on comfort based diet (intolerant in the past of modifications), Daughter in agreement w/mother's decision    Afib s/p PPM - home eliquis, metoprolol, amiodarone  Depression - home effexor, buspar  Severe malnutrition  Tremor - home keppra  Blood culture x1 c/w contaminant    GOC:  - to remain DNR/DNI    Expected Discharge Location  and Transportation:  on Saturday  Expected Discharge medically ready 1/31  Expected Discharge Date: 2/3/2025; Expected Discharge Time:      VTE Prophylaxis:  Pharmacologic VTE prophylaxis orders are present.         AM-PAC 6 Clicks Score (PT): 17 (02/05/25 2048)    CODE STATUS:   Code Status and Medical Interventions: No CPR (Do Not Attempt to Resuscitate); Limited Support; No intubation (DNI)   Ordered at: 01/31/25 0952     Medical Intervention Limits:    No intubation (DNI)     Level Of Support Discussed With:    Patient     Code Status (Patient has no pulse and is not breathing):    No CPR (Do Not Attempt to Resuscitate)     Medical Interventions (Patient has pulse or is breathing):    Limited Support       Telma Meade DO  02/06/25

## 2025-02-06 NOTE — PROGRESS NOTES
"          Clinical Nutrition Assessment     Patient Name: Bibiana Hodges  YOB: 1935  MRN: [id number]  Date of Encounter: 02/06/25 12:10 EST  Admission date: 1/29/2025  Reason for Visit: Follow-up protocol    Assessment   Nutrition Assessment   Admission Diagnosis:  Multifocal pneumonia [J18.9]    Problem List:    Multifocal pneumonia    Hypertension    Acute respiratory failure with hypoxia    Atrial fibrillation    Hyperlipidemia      PMH:   She  has a past medical history of Constipation, Depression, Dysautonomia orthostatic hypotension syndrome, Generalized osteoarthritis, GERD (gastroesophageal reflux disease), Hypertension, Insomnia, Osteoarthrosis, shoulder region, Skin ulcer of scalp, Thrombophlebitis of arm, Tremor, and Vitamin B12 deficiency.    PSH:  She  has a past surgical history that includes Hernia repair; Dilation and curettage of uterus; Hip surgery; and Shoulder surgery.    Applicable Nutrition History:   1/30-SLP Diet Recommendation: NPO     Anthropometrics     Height: Height: 170.2 cm (67\")  Last Filed Weight: Weight: 59 kg (130 lb 1.1 oz) (01/29/25 1350)  Method: Weight Method: Estimated  BMI: BMI (Calculated): 20.4    UBW:  140lbs per EMR  Weight change:  [place], need measured wt   Weight       Weight (kg) Weight (lbs) Weight Method Visit Report   11/29/2023 56.7 kg  125 lb      4/20/2024 65.772 kg  145 lb      5/6/2024 63.504 kg  140 lb  Bed scale     5/7/2024 63.5 kg  139 lb 15.9 oz  Stated     7/8/2024 58.968 kg  130 lb      1/29/2025 59 kg  130 lb 1.1 oz  Estimated        Stated       Nutrition Focused Physical Exam    Date: 1/30    Patient meets criteria for malnutrition diagnosis, see MSA note.     Subjective   Reported/Observed/Food/Nutrition Related History:     2/6  Pt finishing breakfast at time of visit, only ate a few bites of pancakes but drank entire boost. Pt would like to continue frequency of boost. Pt noted to be on comfort diet.     1/30  Pt able to provide " nutrition hx, states she has not been eating well 2/2 dislike of food at [place], pt also notes difficulty swallowing food if served w/o gravy. Pt states she usually drinks 2 Boost a day, sometimes peanut butter and jelly or cheese. Pt also notes family brings food occasionally. Pt would like to have ONS while here, requests TID. Pt to have SLP eval for dysphagia.     Current Nutrition Prescription   PO: Diet: Regular/House; Fluid Consistency: Thin (IDDSI 0)  Oral Nutrition Supplement:   Intake: 50/0/50/25/0 x 5 meals    Assessment & Plan   Nutrition Diagnosis   Date:  1/30            Updated:    Problem Malnutrition chronic severe   Etiology Food preferences at living facility, dysphagia   Signs/Symptoms Severe muscle wasting and subcutaneous fat loss, po intake </=75% EEN x >/=1 month   Status: New    Goal / Objectives:   Nutrition to support treatment and Tolerate PO, Increase intake      Nutrition Intervention      Follow treatment progress, Care plan reviewed, Encourage intake, Supplement provided    Boost (junito) TID    Monitoring/Evaluation:   Per protocol, PO intake, Supplement intake, Weight, Swallow function    Judy Hilton, MS,RD,LD  Time Spent:15

## 2025-02-07 PROCEDURE — 97535 SELF CARE MNGMENT TRAINING: CPT

## 2025-02-07 PROCEDURE — 99232 SBSQ HOSP IP/OBS MODERATE 35: CPT | Performed by: NURSE PRACTITIONER

## 2025-02-07 PROCEDURE — 97530 THERAPEUTIC ACTIVITIES: CPT

## 2025-02-07 RX ADMIN — APIXABAN 2.5 MG: 2.5 TABLET, FILM COATED ORAL at 20:53

## 2025-02-07 RX ADMIN — METOPROLOL TARTRATE 12.5 MG: 25 TABLET, FILM COATED ORAL at 11:22

## 2025-02-07 RX ADMIN — AMIODARONE HYDROCHLORIDE 200 MG: 200 TABLET ORAL at 11:21

## 2025-02-07 RX ADMIN — VENLAFAXINE HYDROCHLORIDE 75 MG: 75 CAPSULE, EXTENDED RELEASE ORAL at 11:21

## 2025-02-07 RX ADMIN — PANTOPRAZOLE SODIUM 40 MG: 40 TABLET, DELAYED RELEASE ORAL at 11:22

## 2025-02-07 RX ADMIN — DOCUSATE SODIUM 100 MG: 100 CAPSULE, LIQUID FILLED ORAL at 11:22

## 2025-02-07 RX ADMIN — LEVETIRACETAM 250 MG: 500 TABLET, FILM COATED ORAL at 20:53

## 2025-02-07 RX ADMIN — ROSUVASTATIN 10 MG: 10 TABLET, FILM COATED ORAL at 20:53

## 2025-02-07 RX ADMIN — ACETAMINOPHEN 650 MG: 325 TABLET, FILM COATED ORAL at 23:55

## 2025-02-07 RX ADMIN — ACETAMINOPHEN 650 MG: 325 TABLET, FILM COATED ORAL at 16:58

## 2025-02-07 RX ADMIN — TRAZODONE HYDROCHLORIDE 100 MG: 50 TABLET ORAL at 20:53

## 2025-02-07 RX ADMIN — METOPROLOL TARTRATE 12.5 MG: 25 TABLET, FILM COATED ORAL at 20:53

## 2025-02-07 RX ADMIN — LEVETIRACETAM 250 MG: 500 TABLET, FILM COATED ORAL at 11:22

## 2025-02-07 RX ADMIN — APIXABAN 2.5 MG: 2.5 TABLET, FILM COATED ORAL at 11:21

## 2025-02-07 RX ADMIN — LEVOTHYROXINE SODIUM 100 MCG: 100 TABLET ORAL at 05:58

## 2025-02-07 RX ADMIN — Medication 5 MG: at 20:53

## 2025-02-07 NOTE — PLAN OF CARE
Problem: Adult Inpatient Plan of Care  Goal: Plan of Care Review  Outcome: Progressing  Goal: Patient-Specific Goal (Individualized)  Outcome: Progressing  Goal: Absence of Hospital-Acquired Illness or Injury  Outcome: Progressing  Intervention: Identify and Manage Fall Risk  Recent Flowsheet Documentation  Taken 2/7/2025 0421 by Anastasia Marquis RN  Safety Promotion/Fall Prevention:   assistive device/personal items within reach   clutter free environment maintained   fall prevention program maintained   safety round/check completed   room organization consistent   nonskid shoes/slippers when out of bed  Taken 2/7/2025 0211 by Anastasia Marquis RN  Safety Promotion/Fall Prevention:   assistive device/personal items within reach   clutter free environment maintained   fall prevention program maintained   safety round/check completed   room organization consistent   nonskid shoes/slippers when out of bed  Taken 2/6/2025 2358 by Anastasia Marquis RN  Safety Promotion/Fall Prevention:   assistive device/personal items within reach   clutter free environment maintained   fall prevention program maintained   safety round/check completed   room organization consistent   nonskid shoes/slippers when out of bed  Taken 2/6/2025 2205 by Anastasia Marquis RN  Safety Promotion/Fall Prevention:   assistive device/personal items within reach   clutter free environment maintained   fall prevention program maintained   safety round/check completed   room organization consistent   nonskid shoes/slippers when out of bed  Taken 2/6/2025 1957 by Anastasia Marquis, RN  Safety Promotion/Fall Prevention:   activity supervised   assistive device/personal items within reach   clutter free environment maintained   fall prevention program maintained   nonskid shoes/slippers when out of bed   safety round/check completed  Intervention: Prevent Skin Injury  Recent Flowsheet Documentation  Taken 2/7/2025 0421 by Anastasia Marquis, RN  Body Position:   turned   right    side-lying   foot of bed elevated  Skin Protection:   incontinence pads utilized   silicone foam dressing in place   transparent dressing maintained  Taken 2/7/2025 0211 by Anastasia Marquis RN  Body Position:   position maintained   left  Skin Protection:   incontinence pads utilized   silicone foam dressing in place   pulse oximeter probe site changed   transparent dressing maintained  Taken 2/6/2025 2358 by Anastasia Marquis RN  Body Position: position maintained  Taken 2/6/2025 2205 by Anastasia Marquis RN  Body Position:   turned   supine  Skin Protection:   incontinence pads utilized   transparent dressing maintained   silicone foam dressing in place  Taken 2/6/2025 1957 by Anastasia Marquis RN  Body Position:   position maintained   right  Skin Protection:   incontinence pads utilized   skin sealant/moisture barrier applied   transparent dressing maintained   pulse oximeter probe site changed  Intervention: Prevent Infection  Recent Flowsheet Documentation  Taken 2/7/2025 0421 by Anastasia Marquis RN  Infection Prevention:   environmental surveillance performed   rest/sleep promoted   single patient room provided  Taken 2/7/2025 0211 by Anastasia Marquis RN  Infection Prevention:   environmental surveillance performed   rest/sleep promoted   single patient room provided  Taken 2/6/2025 2358 by Aanstasia Marquis RN  Infection Prevention:   environmental surveillance performed   rest/sleep promoted   single patient room provided  Taken 2/6/2025 2205 by Anastasia Marquis RN  Infection Prevention:   environmental surveillance performed   rest/sleep promoted   single patient room provided  Taken 2/6/2025 1957 by Anastasia Marquis RN  Infection Prevention:   hand hygiene promoted   rest/sleep promoted  Goal: Optimal Comfort and Wellbeing  Outcome: Progressing  Intervention: Provide Person-Centered Care  Recent Flowsheet Documentation  Taken 2/6/2025 1957 by Anastasia Marquis RN  Trust Relationship/Rapport:   care explained   thoughts/feelings acknowledged  Goal:  Readiness for Transition of Care  Outcome: Progressing     Problem: Comorbidity Management  Goal: Maintenance of Heart Failure Symptom Control  Outcome: Progressing  Intervention: Maintain Heart Failure Management  Recent Flowsheet Documentation  Taken 2/6/2025 1957 by Anastasia Marquis RN  Medication Review/Management: medications reviewed  Goal: Blood Pressure in Desired Range  Outcome: Progressing  Intervention: Maintain Blood Pressure Management  Recent Flowsheet Documentation  Taken 2/6/2025 1957 by Anastasia Marquis RN  Medication Review/Management: medications reviewed     Problem: Skin Injury Risk Increased  Goal: Skin Health and Integrity  Outcome: Progressing  Intervention: Optimize Skin Protection  Recent Flowsheet Documentation  Taken 2/7/2025 0421 by Anastasia Marquis RN  Activity Management: activity encouraged  Pressure Reduction Techniques:   pressure points protected   heels elevated off bed   frequent weight shift encouraged   weight shift assistance provided  Head of Bed (HOB) Positioning: HOB elevated  Pressure Reduction Devices: pressure-redistributing mattress utilized  Skin Protection:   incontinence pads utilized   silicone foam dressing in place   transparent dressing maintained  Taken 2/7/2025 0211 by Anastasia Marquis RN  Activity Management: activity encouraged  Pressure Reduction Techniques:   pressure points protected   heels elevated off bed   frequent weight shift encouraged   weight shift assistance provided  Head of Bed (HOB) Positioning: HOB elevated  Pressure Reduction Devices: pressure-redistributing mattress utilized  Skin Protection:   incontinence pads utilized   silicone foam dressing in place   pulse oximeter probe site changed   transparent dressing maintained  Taken 2/6/2025 2358 by Anastasia Marquis RN  Activity Management: activity minimized  Pressure Reduction Techniques:   pressure points protected   heels elevated off bed   frequent weight shift encouraged   weight shift assistance  provided  Head of Bed ([facility] [place]) Positioning: [facility] [place] elevated  Pressure Reduction Devices: pressure-redistributing mattress utilized  Taken 2/6/2025 2205 by Anastasia Marquis RN  Activity Management: activity encouraged  Pressure Reduction Techniques:   pressure points protected   heels elevated off bed   frequent weight shift encouraged   weight shift assistance provided  Head of Bed ([facility] [place]) Positioning: [facility] [place] elevated  Pressure Reduction Devices:   pressure-redistributing mattress utilized   foam padding utilized   positioning supports utilized  Skin Protection:   incontinence pads utilized   transparent dressing maintained   silicone foam dressing in place  Taken 2/6/2025 1957 by Anastasia Marquis RN  Activity Management: activity encouraged  Pressure Reduction Techniques:   pressure points protected   heels elevated off bed   frequent weight shift encouraged   weight shift assistance provided  Head of Bed ([facility] [place]) Positioning: [facility] [place] elevated  Pressure Reduction Devices:   pressure-redistributing mattress utilized   foam padding utilized   positioning supports utilized  Skin Protection:   incontinence pads utilized   skin sealant/moisture barrier applied   transparent dressing maintained   pulse oximeter probe site changed     Problem: Fall Injury Risk  Goal: Absence of Fall and Fall-Related Injury  Outcome: Progressing  Intervention: Identify and Manage Contributors  Recent Flowsheet Documentation  Taken 2/6/2025 1957 by Anastasia Marquis RN  Medication Review/Management: medications reviewed  Self-Care Promotion:   independence encouraged   BADL personal objects within reach  Intervention: Promote Injury-Free Environment  Recent Flowsheet Documentation  Taken 2/7/2025 0421 by Anastasia Marquis RN  Safety Promotion/Fall Prevention:   assistive device/personal items within reach   clutter free environment maintained   fall prevention program maintained   safety round/check completed   room organization consistent   nonskid shoes/slippers when out of  bed  Taken 2/7/2025 0211 by Anastasia Marquis, CHRISTIAN  Safety Promotion/Fall Prevention:   assistive device/personal items within reach   clutter free environment maintained   fall prevention program maintained   safety round/check completed   room organization consistent   nonskid shoes/slippers when out of bed  Taken 2/6/2025 2358 by Anastasia Marquis, CHRISTIAN  Safety Promotion/Fall Prevention:   assistive device/personal items within reach   clutter free environment maintained   fall prevention program maintained   safety round/check completed   room organization consistent   nonskid shoes/slippers when out of bed  Taken 2/6/2025 2205 by Anastasia Marquis, CHRISTIAN  Safety Promotion/Fall Prevention:   assistive device/personal items within reach   clutter free environment maintained   fall prevention program maintained   safety round/check completed   room organization consistent   nonskid shoes/slippers when out of bed  Taken 2/6/2025 1957 by Anastasia Marquis, RN  Safety Promotion/Fall Prevention:   activity supervised   assistive device/personal items within reach   clutter free environment maintained   fall prevention program maintained   nonskid shoes/slippers when out of bed   safety round/check completed   Goal Outcome Evaluation:

## 2025-02-07 NOTE — PROGRESS NOTES
[facility] Medicine Services  PROGRESS NOTE    Patient Name: Bibiana Hodges  : 1935  MRN: [id number]    Date of Admission: 2025  Primary Care Physician: Nivia Madrigal MD    Subjective   Subjective     CC: shortness of breath    HPI:  Patient resting in bed. States she feels much better. Would prefer going home    Objective   Objective     Vital Signs:   Temp:  [97 °F (36.1 °C)-98 °F (36.7 °C)] 98 °F (36.7 °C)  Heart Rate:  [60-71] 62  Resp:  [18-20] 20  BP: (106-147)/(49-63) 106/49     Physical Exam:  Constitutional: No acute distress, awake, alert, frail, elderly  HENT: NCAT, mucous membranes moist  Respiratory: Clear to auscultation bilaterally, respiratory effort normal   Cardiovascular: RRR  Gastrointestinal: Positive bowel sounds, soft, nontender, nondistended  Musculoskeletal: No bilateral ankle edema  Psychiatric: Appropriate affect, cooperative  Neurologic: Oriented x 3, HERNÁNDEZ, speech clear  Skin: No rashes      Results Reviewed:  LAB RESULTS:                                      Brief Urine Lab Results  (Last result in the past 365 days)        Color   Clarity   Blood   Leuk Est   Nitrite   Protein   CREAT   Urine HCG        25 0859 Yellow   Cloudy   Negative   Large (3+)   Negative   30 mg/dL (1+)                   Microbiology Results Abnormal       Procedure Component Value - Date/Time    Blood Culture - Blood, Arm, Right [[id number]]  (Abnormal) Collected: 25    Lab Status: Final result Specimen: Blood from Arm, Right Updated: 25 0712     Blood Culture Staphylococcus, coagulase negative     Isolated from Aerobic Bottle     Gram Stain Aerobic Bottle Gram positive cocci in clusters    Narrative:      Probable contaminant requires clinical correlation, susceptibility not performed unless requested by physician.      Blood Culture ID, PCR - Blood, Arm, Right [[id number]]  (Abnormal) Collected: 25    Lab Status: Final result  Specimen: Blood from Arm, Right Updated: 01/30/25 1441     BCID, PCR Staph spp, not aureus or lugdunensis. Identification by BCID2 PCR.     BOTTLE TYPE Aerobic Bottle            No radiology results from the last 24 hrs    Results for orders placed during the hospital encounter of 08/20/23    Adult Transthoracic Echo Complete W/ Cont if Necessary Per Protocol    Interpretation Summary    Left ventricular systolic function is mildly decreased. Calculated left ventricular EF = 47.9% Normal left ventricular cavity size and wall thickness noted. There is left ventricular global hypokinesis noted. Left ventricular diastolic function is consistent with (grade I) impaired relaxation.    : Normal right ventricular cavity size and systolic function noted. Electronic lead present in the ventricle.    Normal left atrial size and volume noted. Saline test results are negative.    There is moderate calcification of the aortic valve. The aortic valve was poorly visualized but appears trileaflet. Moderate aortic valve regurgitation is present. No aortic valve stenosis is present.    Mild mitral annular calcification is present. Mild mitral valve regurgitation is present. No significant mitral valve stenosis is present.    The tricuspid valve is grossly normal in structure. Mild tricuspid valve regurgitation is present. Estimated right ventricular systolic pressure from tricuspid regurgitation is normal, 33 mmHg. No tricuspid valve stenosis is present.    Mild dilation of the ascending aorta is present. Ascending aorta = 3.7 cm    There is a small (<1cm) pericardial effusion adjacent to the right atrium and right ventricle.      Current medications:  Scheduled Meds:amiodarone, 200 mg, Oral, Daily  apixaban, 2.5 mg, Oral, BID  docusate sodium, 100 mg, Oral, Daily  levETIRAcetam, 250 mg, Oral, BID  levothyroxine, 100 mcg, Oral, Q AM  lisinopril, 10 mg, Oral, Q24H  melatonin, 5 mg, Oral, Nightly  metoprolol tartrate, 12.5 mg, Oral,  BID  pantoprazole, 40 mg, Oral, Daily  rosuvastatin, 10 mg, Oral, Nightly  sodium chloride, 10 mL, Intravenous, Q12H  traZODone, 100 mg, Oral, Nightly  venlafaxine XR, 75 mg, Oral, Daily      Continuous Infusions:   PRN Meds:.  acetaminophen **OR** acetaminophen **OR** acetaminophen    albuterol    artificial tears    benzonatate    senna-docusate sodium **AND** polyethylene glycol **AND** bisacodyl **AND** bisacodyl    fluticasone    ipratropium-albuterol    meclizine    nitroglycerin    sodium chloride    sodium chloride    sodium chloride    Assessment & Plan   Assessment & Plan     Active Hospital Problems    Diagnosis  POA    **Multifocal pneumonia [J18.9]  Yes    Hyperlipidemia [E78.5]  Unknown    Atrial fibrillation [I48.91]  Yes    Acute respiratory failure with hypoxia [J96.01]  Yes    Hypertension [I10]  Yes      Resolved Hospital Problems   No resolved problems to display.        Brief Hospital Course to date:  Bibiana Hodges is a 89 y.o. female w h/o pacemaker placement, recurrent pneumonia with known aspiration who presents from [place] with shortness of breath, congestive cough. Found to have chronic appearing pulm infiltrates, clinical concern for acute PNA.  Much improved within first 24 hours. On room. Planning rehab at [facility] Saturday    Acute hypoxic resp failure 2/2 aspiration PNA - improved   -completed 5 days of abx  -remains on room air  -on comfort diet    Chronic oropharyngeal dysphagia  -patient declined modified diet, currently on comfort based diet (intolerant in the past of modifications), Daughter in agreement w/mother's decision    Afib s/p PPM - home eliquis, metoprolol, amiodarone  Depression - home effexor, buspar  Severe malnutrition  Tremor - home keppra  Blood culture x1 c/w contaminant    GOC:  - to remain DNR/DNI    Expected Discharge Location and Transportation:  on Saturday; medically ready  Expected Discharge   Expected Discharge Date: 2/8/2025; Expected Discharge Time:       VTE Prophylaxis:  Pharmacologic VTE prophylaxis orders are present.         AM-PAC 6 Clicks Score (PT): 17 (02/06/25 1957)    CODE STATUS:   Code Status and Medical Interventions: No CPR (Do Not Attempt to Resuscitate); Limited Support; No intubation (DNI)   Ordered at: 01/31/25 0952     Medical Intervention Limits:    No intubation (DNI)     Level Of Support Discussed With:    Patient     Code Status (Patient has no pulse and is not breathing):    No CPR (Do Not Attempt to Resuscitate)     Medical Interventions (Patient has pulse or is breathing):    Limited Support       Marine Koch, APRN  02/07/25

## 2025-02-07 NOTE — CASE MANAGEMENT/SOCIAL WORK
Continued Stay Note  [facility]     Patient Name: Bibiana Hodges  MRN: [id number]  Today's Date: 2/7/2025    Admit Date: 1/29/2025    Plan: SNF   Discharge Plan       Row Name 02/07/25 1451       Plan    Plan SNF    Plan Comments Skilled rehab approved for [facility] through Monday. Per sonali Leiva for [facility], a rehab bed may become available this weekend. Patient will need transportation arranged. CM will continue to follow.    Final Discharge Disposition Code 03 - skilled nursing facility (SNF)                   Discharge Codes    No documentation.                 Expected Discharge Date and Time       Expected Discharge Date Expected Discharge Time    Feb 8, 2025               Soraya Campbell RN

## 2025-02-07 NOTE — PLAN OF CARE
Goal Outcome Evaluation:  Plan of Care Reviewed With: patient        Progress: improving  Outcome Evaluation: OT promoted oob act with pt demo cga for supine to sit and sts. She completed grooming with setup. Pt amb in room x 20 ft with cga and able to stand x 1 minute before rest. Making improvement with act arcadio.    Anticipated Discharge Disposition (OT): inpatient rehabilitation facility

## 2025-02-07 NOTE — PAYOR COMM NOTE
"Ref# [id number]   Clinical Update    JACQUELIN Condon, RN  Utilization Review  Phone [phone]  Fax [phone]    [facility]  [address] [place] [address]         Bibiana Hodges (89 y.o. Female)       Date of Birth   1935    Social Security Number       Address   [address] [id number]    Home Phone   [phone]    MRN   [id number]       [employer]   Ad[employer]ist    Marital Status                               Admission Date   1/29/25    Admission Type   Emergency    Admitting Provider   Vanna Dykes MD    Attending Provider   Vanna Dykes MD    Department, Room/Bed   [address], S587/1       Discharge Date       Discharge Disposition       Discharge Destination                                 Attending Provider: Vanna Dykes MD    Allergies: Gabapentin, Sulfa Antibiotics    Isolation: None   Infection: None   Code Status: No CPR    Ht: 170.2 cm (67\")   Wt: 59 kg (130 lb 1.1 oz)    Admission Cmt: None   Principal Problem: Multifocal pneumonia [J18.9]                   Active Insurance as of 1/29/2025       Primary Coverage       Payor Plan Insurance Group Employer/Plan Group    ANTHEM MEDICARE REPLACEMENT ANTHEM MED ADV HMO [id number]       Payor Plan Address Payor Plan Phone Number Payor Plan Fax Number Effective Dates    [address] [phone]  1/1/2025 - None Entered    [facility] [address]         Subscriber Name Subscriber Birth Date Member ID       BIBIANA HODGES 1935 [id number]                     Emergency Contacts        (Rel.) Home Phone Work Phone Mobile Phone    Danyelle Keyes (POA) (Daughter) [phone] -- [phone]    HODGESELODIA (Son) -- -- [phone]    HODGESNIA (Son) [phone] -- [phone]              Current Facility-Administered Medications   Medication Dose Route Frequency Provider Last Rate Last Admin    acetaminophen (TYLENOL) tablet 650 mg  650 mg Oral Q4H " PRN Zarina Crawford APRN   650 mg at 02/06/25 2328    Or    acetaminophen (TYLENOL) 160 MG/5ML oral solution 650 mg  650 mg Oral Q4H PRN Zarina Crawford APRN   650 mg at 02/01/25 2057    Or    acetaminophen (TYLENOL) suppository 650 mg  650 mg Rectal Q4H PRN Zarina Crawford APRN        albuterol (PROVENTIL) nebulizer solution 0.083% 2.5 mg/3mL  2.5 mg Nebulization Q4H PRN Zarina Crawford APRN        amiodarone (PACERONE) tablet 200 mg  200 mg Oral Daily Zarina Crawford APRN   200 mg at 02/07/25 1121    apixaban (ELIQUIS) tablet 2.5 mg  2.5 mg Oral BID Zarina Crawford APRN   2.5 mg at 02/07/25 1121    artificial tears ophthalmic ointment   Both Eyes Q1H PRN Kelly Villafuerte MD   Given at 02/01/25 1536    benzonatate (TESSALON) capsule 100 mg  100 mg Oral TID PRN Zarina Crawford APRN   100 mg at 02/06/25 0552    sennosides-docusate (PERICOLACE) 8.6-50 MG per tablet 2 tablet  2 tablet Oral BID PRN Zarina Crawford APRN   2 tablet at 02/01/25 2138    And    polyethylene glycol (MIRALAX) packet 17 g  17 g Oral Daily PRN Zarina Crawford APRN   17 g at 02/02/25 0850    And    bisacodyl (DULCOLAX) EC tablet 5 mg  5 mg Oral Daily PRN Zarina Crawford APRN        And    bisacodyl (DULCOLAX) suppository 10 mg  10 mg Rectal Daily PRN Zarina Crawford APRN        docusate sodium (COLACE) capsule 100 mg  100 mg Oral Daily Zarina Crawford, APRN   100 mg at 02/07/25 1122    fluticasone (FLONASE) 50 MCG/ACT nasal spray 2 spray  2 spray Nasal Daily PRN Zarina Crawford APRN   2 spray at 02/01/25 1944    ipratropium-albuterol (DUO-NEB) nebulizer solution 3 mL  3 mL Nebulization Q4H PRN Zarina Crawford APRN        levETIRAcetam (KEPPRA) tablet 250 mg  250 mg Oral BID Zarina Crawford APRN   250 mg at 02/07/25 1122    levothyroxine (SYNTHROID, LEVOTHROID) tablet 100 mcg  100 mcg Oral Q AM Zarina Crawford APRN   100 mcg at 02/07/25 0558    lisinopril  (PRINIVIL,ZESTRIL) tablet 10 mg  10 mg Oral Q24H Yossi Crawforda W, APRN   10 mg at 25 0913    meclizine (ANTIVERT) tablet 25 mg  25 mg Oral Q8H PRN Zarina Crawford, APRN        melatonin tablet 5 mg  5 mg Oral Nightly Ty, Zarina W, APRN   5 mg at 25    metoprolol tartrate (LOPRESSOR) tablet 12.5 mg  12.5 mg Oral BID Ty, Zarina MC, APRN   12.5 mg at 25 112    nitroglycerin (NITROSTAT) SL tablet 0.4 mg  0.4 mg Sublingual Q5 Min PRN Zarina Crawford, APRN        pantoprazole (PROTONIX) EC tablet 40 mg  40 mg Oral Daily Ty, Zarina MC, APRN   40 mg at 25 112    rosuvastatin (CRESTOR) tablet 10 mg  10 mg Oral Nightly Ty, Zarina MC, APRN   10 mg at 25    sodium chloride 0.9 % flush 10 mL  10 mL Intravenous PRN Ty, Zarina W, APRN        sodium chloride 0.9 % flush 10 mL  10 mL Intravenous Q12H Zarina Crawford, APRN   10 mL at 25 0914    sodium chloride 0.9 % flush 10 mL  10 mL Intravenous PRN Zarina Crawford, APRN        sodium chloride 0.9 % infusion 40 mL  40 mL Intravenous PRN Zarina Crawford, APRN        traZODone (DESYREL) tablet 100 mg  100 mg Oral Nightly Ty, Zarina W, APRN   100 mg at 25    venlafaxine XR (EFFEXOR-XR) 24 hr capsule 75 mg  75 mg Oral Daily Zarina Crawford, APRN   75 mg at 25 1121     Lab Results (last 48 hours)       ** No results found for the last 48 hours. **          Imaging Results (Last 48 Hours)       ** No results found for the last 48 hours. **             Physician Progress Notes (last 72 hours)        Marine Koch APRN at 25 1303              [facility] Medicine Services  PROGRESS NOTE    Patient Name: Bibiana Hodges  : 1935  MRN: [id number]    Date of Admission: 2025  Primary Care Physician: Nivia Madrigal MD    Subjective   Subjective     CC: shortness of breath    HPI:  Patient resting in  bed. States she feels much better. Would prefer going home    Objective   Objective     Vital Signs:   Temp:  [97 °F (36.1 °C)-98 °F (36.7 °C)] 98 °F (36.7 °C)  Heart Rate:  [60-71] 62  Resp:  [18-20] 20  BP: (106-147)/(49-63) 106/49     Physical Exam:  Constitutional: No acute distress, awake, alert, frail, elderly  HENT: NCAT, mucous membranes moist  Respiratory: Clear to auscultation bilaterally, respiratory effort normal   Cardiovascular: RRR  Gastrointestinal: Positive bowel sounds, soft, nontender, nondistended  Musculoskeletal: No bilateral ankle edema  Psychiatric: Appropriate affect, cooperative  Neurologic: Oriented x 3, HERNÁNDEZ, speech clear  Skin: No rashes      Results Reviewed:  LAB RESULTS:                                      Brief Urine Lab Results  (Last result in the past 365 days)        Color   Clarity   Blood   Leuk Est   Nitrite   Protein   CREAT   Urine HCG        01/30/25 0859 Yellow   Cloudy   Negative   Large (3+)   Negative   30 mg/dL (1+)                   Microbiology Results Abnormal       Procedure Component Value - Date/Time    Blood Culture - Blood, Arm, Right [[id number]]  (Abnormal) Collected: 01/29/25 1545    Lab Status: Final result Specimen: Blood from Arm, Right Updated: 01/31/25 0712     Blood Culture Staphylococcus, coagulase negative     Isolated from Aerobic Bottle     Gram Stain Aerobic Bottle Gram positive cocci in clusters    Narrative:      Probable contaminant requires clinical correlation, susceptibility not performed unless requested by physician.      Blood Culture ID, PCR - Blood, Arm, Right [[id number]]  (Abnormal) Collected: 01/29/25 1545    Lab Status: Final result Specimen: Blood from Arm, Right Updated: 01/30/25 1441     BCID, PCR Staph spp, not aureus or lugdunensis. Identification by BCID2 PCR.     BOTTLE TYPE Aerobic Bottle            No radiology results from the last 24 hrs    Results for orders placed during the hospital encounter of 08/20/23    Adult  Transthoracic Echo Complete W/ Cont if Necessary Per Protocol    Interpretation Summary    Left ventricular systolic function is mildly decreased. Calculated left ventricular EF = 47.9% Normal left ventricular cavity size and wall thickness noted. There is left ventricular global hypokinesis noted. Left ventricular diastolic function is consistent with (grade I) impaired relaxation.    : Normal right ventricular cavity size and systolic function noted. Electronic lead present in the ventricle.    Normal left atrial size and volume noted. Saline test results are negative.    There is moderate calcification of the aortic valve. The aortic valve was poorly visualized but appears trileaflet. Moderate aortic valve regurgitation is present. No aortic valve stenosis is present.    Mild mitral annular calcification is present. Mild mitral valve regurgitation is present. No significant mitral valve stenosis is present.    The tricuspid valve is grossly normal in structure. Mild tricuspid valve regurgitation is present. Estimated right ventricular systolic pressure from tricuspid regurgitation is normal, 33 mmHg. No tricuspid valve stenosis is present.    Mild dilation of the ascending aorta is present. Ascending aorta = 3.7 cm    There is a small (<1cm) pericardial effusion adjacent to the right atrium and right ventricle.      Current medications:  Scheduled Meds:amiodarone, 200 mg, Oral, Daily  apixaban, 2.5 mg, Oral, BID  docusate sodium, 100 mg, Oral, Daily  levETIRAcetam, 250 mg, Oral, BID  levothyroxine, 100 mcg, Oral, Q AM  lisinopril, 10 mg, Oral, Q24H  melatonin, 5 mg, Oral, Nightly  metoprolol tartrate, 12.5 mg, Oral, BID  pantoprazole, 40 mg, Oral, Daily  rosuvastatin, 10 mg, Oral, Nightly  sodium chloride, 10 mL, Intravenous, Q12H  traZODone, 100 mg, Oral, Nightly  venlafaxine XR, 75 mg, Oral, Daily      Continuous Infusions:   PRN Meds:.  acetaminophen **OR** acetaminophen **OR** acetaminophen    albuterol     artificial tears    benzonatate    senna-docusate sodium **AND** polyethylene glycol **AND** bisacodyl **AND** bisacodyl    fluticasone    ipratropium-albuterol    meclizine    nitroglycerin    sodium chloride    sodium chloride    sodium chloride    Assessment & Plan   Assessment & Plan     Active Hospital Problems    Diagnosis  POA    **Multifocal pneumonia [J18.9]  Yes    Hyperlipidemia [E78.5]  Unknown    Atrial fibrillation [I48.91]  Yes    Acute respiratory failure with hypoxia [J96.01]  Yes    Hypertension [I10]  Yes      Resolved Hospital Problems   No resolved problems to display.        Brief Hospital Course to date:  Bibiana Hodges is a 89 y.o. female w h/o pacemaker placement, recurrent pneumonia with known aspiration who presents from [place] with shortness of breath, congestive cough. Found to have chronic appearing pulm infiltrates, clinical concern for acute PNA.  Much improved within first 24 hours. On room. Planning rehab at [facility] Saturday    Acute hypoxic resp failure 2/2 aspiration PNA - improved   -completed 5 days of abx  -remains on room air  -on comfort diet    Chronic oropharyngeal dysphagia  -patient declined modified diet, currently on comfort based diet (intolerant in the past of modifications), Daughter in agreement w/mother's decision    Afib s/p PPM - home eliquis, metoprolol, amiodarone  Depression - home effexor, buspar  Severe malnutrition  Tremor - home keppra  Blood culture x1 c/w contaminant    GOC:  - to remain DNR/DNI    Expected Discharge Location and Transportation:  on Saturday; medically ready  Expected Discharge   Expected Discharge Date: 2/8/2025; Expected Discharge Time:      VTE Prophylaxis:  Pharmacologic VTE prophylaxis orders are present.         AM-PAC 6 Clicks Score (PT): 17 (02/06/25 1957)    CODE STATUS:   Code Status and Medical Interventions: No CPR (Do Not Attempt to Resuscitate); Limited Support; No intubation (DNI)   Ordered at: 01/31/25 0998     Medical  Intervention Limits:    No intubation (DNI)     Level Of Support Discussed With:    Patient     Code Status (Patient has no pulse and is not breathing):    No CPR (Do Not Attempt to Resuscitate)     Medical Interventions (Patient has pulse or is breathing):    Limited Support       ANASTASIA Casas  25        Electronically signed by Marine Koch APRN at 25 1306       Telma Meade DO at 25 1051              [facility] Medicine Services  PROGRESS NOTE    Patient Name: Bibiana Hodges  : 1935  MRN: [id number]    Date of Admission: 2025  Primary Care Physician: Nivia Madrigal MD    Subjective   Subjective     CC: shortness of breath    HPI:  Patient complains of pain in her buttocks today. Otherwise continues to complain of chronic cough which we discussed is probably secondary to chronic aspiration.    Objective   Objective     Vital Signs:   Temp:  [97.2 °F (36.2 °C)-98.5 °F (36.9 °C)] 97.5 °F (36.4 °C)  Heart Rate:  [60-85] 61  Resp:  [16-20] 20  BP: (114-148)/(57-92) 122/92     Physical Exam:  Constitutional: No acute distress, awake, alert, elderly female  HENT: NCAT, mucous membranes moist  Respiratory: Clear to auscultation bilaterally, respiratory effort normal   Cardiovascular: RRR, no murmurs, rubs, or gallops  Gastrointestinal: soft, nontender, nondistended  Musculoskeletal: No bilateral ankle edema  Psychiatric: Appropriate affect, cooperative  Neurologic: Oriented x 3, speech clear, no focal deficits, generalized weakness  Skin: No rashes    Results Reviewed:  LAB RESULTS:                                      Brief Urine Lab Results  (Last result in the past 365 days)        Color   Clarity   Blood   Leuk Est   Nitrite   Protein   CREAT   Urine HCG        25 0859 Yellow   Cloudy   Negative   Large (3+)   Negative   30 mg/dL (1+)                   Microbiology Results Abnormal       Procedure Component Value - Date/Time     Blood Culture - Blood, Arm, Right [[id number]]  (Abnormal) Collected: 01/29/25 1545    Lab Status: Final result Specimen: Blood from Arm, Right Updated: 01/31/25 0712     Blood Culture Staphylococcus, coagulase negative     Isolated from Aerobic Bottle     Gram Stain Aerobic Bottle Gram positive cocci in clusters    Narrative:      Probable contaminant requires clinical correlation, susceptibility not performed unless requested by physician.      Blood Culture ID, PCR - Blood, Arm, Right [[id number]]  (Abnormal) Collected: 01/29/25 1545    Lab Status: Final result Specimen: Blood from Arm, Right Updated: 01/30/25 1441     BCID, PCR Staph spp, not aureus or lugdunensis. Identification by BCID2 PCR.     BOTTLE TYPE Aerobic Bottle            No radiology results from the last 24 hrs    Results for orders placed during the hospital encounter of 08/20/23    Adult Transthoracic Echo Complete W/ Cont if Necessary Per Protocol    Interpretation Summary    Left ventricular systolic function is mildly decreased. Calculated left ventricular EF = 47.9% Normal left ventricular cavity size and wall thickness noted. There is left ventricular global hypokinesis noted. Left ventricular diastolic function is consistent with (grade I) impaired relaxation.    : Normal right ventricular cavity size and systolic function noted. Electronic lead present in the ventricle.    Normal left atrial size and volume noted. Saline test results are negative.    There is moderate calcification of the aortic valve. The aortic valve was poorly visualized but appears trileaflet. Moderate aortic valve regurgitation is present. No aortic valve stenosis is present.    Mild mitral annular calcification is present. Mild mitral valve regurgitation is present. No significant mitral valve stenosis is present.    The tricuspid valve is grossly normal in structure. Mild tricuspid valve regurgitation is present. Estimated right ventricular systolic pressure from  tricuspid regurgitation is normal, 33 mmHg. No tricuspid valve stenosis is present.    Mild dilation of the ascending aorta is present. Ascending aorta = 3.7 cm    There is a small (<1cm) pericardial effusion adjacent to the right atrium and right ventricle.      Current medications:  Scheduled Meds:amiodarone, 200 mg, Oral, Daily  apixaban, 2.5 mg, Oral, BID  docusate sodium, 100 mg, Oral, Daily  levETIRAcetam, 250 mg, Oral, BID  levothyroxine, 100 mcg, Oral, Q AM  lisinopril, 10 mg, Oral, Q24H  melatonin, 5 mg, Oral, Nightly  metoprolol tartrate, 12.5 mg, Oral, BID  pantoprazole, 40 mg, Oral, Daily  rosuvastatin, 10 mg, Oral, Nightly  sodium chloride, 10 mL, Intravenous, Q12H  traZODone, 100 mg, Oral, Nightly  venlafaxine XR, 75 mg, Oral, Daily      Continuous Infusions:   PRN Meds:.  acetaminophen **OR** acetaminophen **OR** acetaminophen    albuterol    artificial tears    benzonatate    senna-docusate sodium **AND** polyethylene glycol **AND** bisacodyl **AND** bisacodyl    fluticasone    ipratropium-albuterol    meclizine    nitroglycerin    sodium chloride    sodium chloride    sodium chloride    Assessment & Plan   Assessment & Plan     Active Hospital Problems    Diagnosis  POA    **Multifocal pneumonia [J18.9]  Yes    Hyperlipidemia [E78.5]  Unknown    Atrial fibrillation [I48.91]  Yes    Acute respiratory failure with hypoxia [J96.01]  Yes    Hypertension [I10]  Yes      Resolved Hospital Problems   No resolved problems to display.        Brief Hospital Course to date:  Bibiana Hodges is a 89 y.o. female w h/o pacemaker placement, recurrent pneumonia with known aspiration who presents from [place] with shortness of breath, congestive cough. Found to have chronic appearing pulm infiltrates, clinical concern for acute PNA.  Much improved within first 24 hours  Now awaiting SNF, declined more labs    Acute hypoxic resp failure 2/2 aspiration PNA - improved   -completed 5 days of abx  -remains on room  air  -on comfort diet    Chronic oropharyngeal dysphagia  -patient declined modified diet, currently on comfort based diet (intolerant in the past of modifications), Daughter in agreement w/mother's decision    Afib s/p PPM - home eliquis, metoprolol, amiodarone  Depression - home effexor, buspar  Severe malnutrition  Tremor - home keppra  Blood culture x1 c/w contaminant    GOC:  - to remain DNR/DNI    Expected Discharge Location and Transportation:  on Saturday  Expected Discharge medically ready   Expected Discharge Date: 2/3/2025; Expected Discharge Time:      VTE Prophylaxis:  Pharmacologic VTE prophylaxis orders are present.         AM-PAC 6 Clicks Score (PT): 17 (25)    CODE STATUS:   Code Status and Medical Interventions: No CPR (Do Not Attempt to Resuscitate); Limited Support; No intubation (DNI)   Ordered at: 25 0952     Medical Intervention Limits:    No intubation (DNI)     Level Of Support Discussed With:    Patient     Code Status (Patient has no pulse and is not breathing):    No CPR (Do Not Attempt to Resuscitate)     Medical Interventions (Patient has pulse or is breathing):    Limited Support       Telma Meade DO  25        Electronically signed by Telma Meade DO at 25 1058       Telma Meade DO at 25 1016              [facility] Medicine Services  PROGRESS NOTE    Patient Name: Bibiana Hodges  : 1935  MRN: [id number]    Date of Admission: 2025  Primary Care Physician: Nivia Madrigal MD    Subjective   Subjective     CC: shortness of breath    HPI:  Patient sleeping in bed. No acute events overnight.    Objective   Objective     Vital Signs:   Temp:  [96.3 °F (35.7 °C)-97 °F (36.1 °C)] 96.5 °F (35.8 °C)  Heart Rate:  [] 118  Resp:  [18] 18  BP: (123-187)/(67-85) 141/85     Physical Exam:  Constitutional: No acute distress, sleeping in bed  HENT: NCAT  Respiratory: Clear to auscultation  bilaterally, respiratory effort normal   Cardiovascular: RRR, no murmurs, rubs, or gallops  Gastrointestinal: soft, nontender, nondistended  Musculoskeletal: No bilateral ankle edema  Neurologic: UTO, asleep  Skin: No rashes      Results Reviewed:  LAB RESULTS:      Lab 01/30/25  0551 01/29/25  1723 01/29/25  1543 01/29/25  1349   WBC  --   --   --  10.00   HEMOGLOBIN  --   --   --  14.0   HEMATOCRIT  --   --   --  43.3   PLATELETS  --   --   --  167   NEUTROS ABS  --   --   --  9.06*   IMMATURE GRANS (ABS)  --   --   --  0.02   LYMPHS ABS  --   --   --  0.46*   MONOS ABS  --   --   --  0.38   EOS ABS  --   --   --  0.05   MCV  --   --   --  105.4*   PROCALCITONIN 1.38*  --   --   --    LACTATE  --  1.4  --  2.3*   HSTROP T 18*  --  16* 13         Lab 01/30/25  0551 01/29/25  1349   SODIUM 135* 137   POTASSIUM 4.8 4.8   CHLORIDE 102 101   CO2 23.0 26.0   ANION GAP 10.0 10.0   BUN 28* 23   CREATININE 1.25* 1.16*   EGFR 41.3* 45.2*   GLUCOSE 74 138*   CALCIUM 8.8 9.2         Lab 01/29/25  1349   TOTAL PROTEIN 5.8*   ALBUMIN 3.4*   GLOBULIN 2.4   ALT (SGPT) 27   AST (SGOT) 31   BILIRUBIN 0.5   ALK PHOS 55         Lab 01/30/25  0551 01/29/25  1543 01/29/25  1349   PROBNP  --   --  1,024.0   HSTROP T 18* 16* 13                 Brief Urine Lab Results  (Last result in the past 365 days)        Color   Clarity   Blood   Leuk Est   Nitrite   Protein   CREAT   Urine HCG        01/30/25 0859 Yellow   Cloudy   Negative   Large (3+)   Negative   30 mg/dL (1+)                   Microbiology Results Abnormal       Procedure Component Value - Date/Time    Blood Culture - Blood, Arm, Right [[id number]]  (Abnormal) Collected: 01/29/25 1545    Lab Status: Final result Specimen: Blood from Arm, Right Updated: 01/31/25 0712     Blood Culture Staphylococcus, coagulase negative     Isolated from Aerobic Bottle     Gram Stain Aerobic Bottle Gram positive cocci in clusters    Narrative:      Probable contaminant requires clinical  correlation, susceptibility not performed unless requested by physician.      Blood Culture ID, PCR - Blood, Arm, Right [[id number]]  (Abnormal) Collected: 01/29/25 1545    Lab Status: Final result Specimen: Blood from Arm, Right Updated: 01/30/25 1441     BCID, PCR Staph spp, not aureus or lugdunensis. Identification by BCID2 PCR.     BOTTLE TYPE Aerobic Bottle            No radiology results from the last 24 hrs    Results for orders placed during the hospital encounter of 08/20/23    Adult Transthoracic Echo Complete W/ Cont if Necessary Per Protocol    Interpretation Summary    Left ventricular systolic function is mildly decreased. Calculated left ventricular EF = 47.9% Normal left ventricular cavity size and wall thickness noted. There is left ventricular global hypokinesis noted. Left ventricular diastolic function is consistent with (grade I) impaired relaxation.    : Normal right ventricular cavity size and systolic function noted. Electronic lead present in the ventricle.    Normal left atrial size and volume noted. Saline test results are negative.    There is moderate calcification of the aortic valve. The aortic valve was poorly visualized but appears trileaflet. Moderate aortic valve regurgitation is present. No aortic valve stenosis is present.    Mild mitral annular calcification is present. Mild mitral valve regurgitation is present. No significant mitral valve stenosis is present.    The tricuspid valve is grossly normal in structure. Mild tricuspid valve regurgitation is present. Estimated right ventricular systolic pressure from tricuspid regurgitation is normal, 33 mmHg. No tricuspid valve stenosis is present.    Mild dilation of the ascending aorta is present. Ascending aorta = 3.7 cm    There is a small (<1cm) pericardial effusion adjacent to the right atrium and right ventricle.      Current medications:  Scheduled Meds:amiodarone, 200 mg, Oral, Daily  apixaban, 2.5 mg, Oral, BID  docusate  sodium, 100 mg, Oral, Daily  levETIRAcetam, 250 mg, Oral, BID  levothyroxine, 100 mcg, Oral, Q AM  lisinopril, 10 mg, Oral, Q24H  melatonin, 5 mg, Oral, Nightly  metoprolol tartrate, 12.5 mg, Oral, BID  pantoprazole, 40 mg, Oral, Daily  rosuvastatin, 10 mg, Oral, Nightly  sodium chloride, 10 mL, Intravenous, Q12H  traZODone, 100 mg, Oral, Nightly  venlafaxine XR, 75 mg, Oral, Daily      Continuous Infusions:   PRN Meds:.  acetaminophen **OR** acetaminophen **OR** acetaminophen    albuterol    artificial tears    benzonatate    senna-docusate sodium **AND** polyethylene glycol **AND** bisacodyl **AND** bisacodyl    fluticasone    ipratropium-albuterol    meclizine    nitroglycerin    sodium chloride    sodium chloride    sodium chloride    Assessment & Plan   Assessment & Plan     Active Hospital Problems    Diagnosis  POA    **Multifocal pneumonia [J18.9]  Yes    Hyperlipidemia [E78.5]  Unknown    Atrial fibrillation [I48.91]  Yes    Acute respiratory failure with hypoxia [J96.01]  Yes    Hypertension [I10]  Yes      Resolved Hospital Problems   No resolved problems to display.        Brief Hospital Course to date:  Bibiana Hodges is a 89 y.o. female w h/o pacemaker placement, recurrent pneumonia with known aspiration who presents from [facility] with shortness of breath, congestive cough. Found to have chronic appearing pulm infiltrates, clinical concern for acute PNA.  Much improved within first 24 hours  Now awaiting SNF, declined more labs    Acute hypoxic resp failure 2/2 aspiration PNA - improved   -completed 5 days of abx  -on comfort diet    Chronic oropharyngeal dysphagia  -patient declined modified diet, currently on comfort based diet (intolerant in the past of modifications), Daughter in agreement w mother's decision    Afib s/p PPM - home eliquis, metoprolol, amiodarone  Depression - home effexor, buspar  Severe malnutrition  Tremor - home keppra  Blood culture x1 c/w contaminant    GOC:  - to remain  DNR/DNI    Expected Discharge Location and Transportation: SNF pending  Expected Discharge medically ready 1/31  Expected Discharge Date: 2/3/2025; Expected Discharge Time:      VTE Prophylaxis:  Pharmacologic VTE prophylaxis orders are present.         AM-PAC 6 Clicks Score (PT): 16 (02/05/25 0903)    CODE STATUS:   Code Status and Medical Interventions: No CPR (Do Not Attempt to Resuscitate); Limited Support; No intubation (DNI)   Ordered at: 01/31/25 0952     Medical Intervention Limits:    No intubation (DNI)     Level Of Support Discussed With:    Patient     Code Status (Patient has no pulse and is not breathing):    No CPR (Do Not Attempt to Resuscitate)     Medical Interventions (Patient has pulse or is breathing):    Limited Support       Telma Meade DO  02/05/25        Electronically signed by Telma Meade DO at 02/05/25 1017       Consult Notes (last 72 hours)  Notes from 02/04/25 1409 through 02/07/25 1409   No notes of this type exist for this encounter.

## 2025-02-07 NOTE — THERAPY TREATMENT NOTE
Patient Name: Bibiana Hodges  : 1935    MRN: [id number]                              Today's Date: 2025       Admit Date: 2025    Visit Dx:     ICD-10-CM ICD-9-CM   1. Community acquired pneumonia of left lung, unspecified part of lung  J18.9 486   2. Hypoxia  R09.02 799.02   3. Dysphagia, unspecified type  R13.10 787.20   4. Impaired functional mobility, balance, gait, and endurance  Z74.09 V49.89     Patient Active Problem List   Diagnosis    Dysautonomia orthostatic hypotension syndrome    Hypertension    Flu vaccine need    Streptococcus pneumoniae vaccination indicated    Acute respiratory failure with hypoxia    Severe malnutrition    Atrial fibrillation    Autonomic dysfunction    Multifocal pneumonia    Hyperlipidemia     Past Medical History:   Diagnosis Date    Constipation     Depression     Dysautonomia orthostatic hypotension syndrome     Generalized osteoarthritis     GERD (gastroesophageal reflux disease)     s/p Nissen    Hypertension     Insomnia     Osteoarthrosis, shoulder region     Skin ulcer of scalp     Thrombophlebitis of arm     Tremor     Vitamin B12 deficiency      Past Surgical History:   Procedure Laterality Date    DILATATION AND CURETTAGE      HERNIA REPAIR      HIP SURGERY      SHOULDER SURGERY      Right      General Information       Row Name 25 1426          OT Time and Intention    Document Type therapy note (daily note)  -     Mode of Treatment occupational therapy  -       Row Name 25 1426          General Information    Patient Profile Reviewed yes  -     Existing Precautions/Restrictions fall;orthostatic hypotension;other (see comments)  hx dysautonomic orthostasis; hand tremors  -       Row Name 25 1426          Cognition    Orientation Status (Cognition) oriented x 3  -       Row Name 25 1426          Safety Issues/Impairments Affecting Functional Mobility    Impairments Affecting Function (Mobility)  balance;endurance/activity tolerance;motor planning;postural/trunk control;shortness of breath;strength;motor control;coordination  -               User Key  (r) = Recorded By, (t) = Taken By, (c) = Cosigned By      Initials Name Provider Type    Shelby Luis OT Occupational Therapist                     Mobility/ADL's       Row Name 02/07/25 1426          Bed Mobility    Bed Mobility supine-sit  -SW     Sit-Supine [place] (Bed Mobility) contact guard;verbal cues  -     Assistive Device (Bed Mobility) bed rails;head of bed elevated  -       Row Name 02/07/25 1426          Transfers    Transfers sit-stand transfer;bed-chair transfer  -       Row Name 02/07/25 1426          Bed-Chair Transfer    Bed-Chair [place] (Transfers) contact guard;verbal cues  -SW     Assistive Device (Bed-Chair Transfers) walker, front-wheeled  -       Row Name 02/07/25 1426          Sit-Stand Transfer    Sit-Stand [place] (Transfers) contact guard;verbal cues  -     Assistive Device (Sit-Stand Transfers) walker, front-wheeled  -       Row Name 02/07/25 1426          Functional Mobility    Functional Mobility- Ind. Level contact guard assist  -     Functional Mobility- Device walker, front-wheeled  -SW     Functional Mobility-Distance (Feet) 20  -       Row Name 02/07/25 1426          Activities of Daily Living    BADL Assessment/Intervention grooming  -       Row Name 02/07/25 1426          Grooming Assessment/Training    [place] Level (Grooming) grooming skills;wash face, hands;set up  -SW     Position (Grooming) edge of bed sitting  -               User Key  (r) = Recorded By, (t) = Taken By, (c) = Cosigned By      Initials Name Provider Type    Shelby Luis OT Occupational Therapist                   Obj/Interventions       Row Name 02/07/25 1426          Motor Skills    Motor Skills functional endurance  -     Functional Endurance continues to have impaired act arcadio - stood times 1 minutes  prior to requiring a rest break.  -[facility] Name 02/07/25 1426          Balance    Balance Assessment sitting static balance;sitting dynamic balance;sit to stand dynamic balance;standing static balance;standing dynamic balance  -SW     Static Sitting Balance standby assist  -SW     Dynamic Sitting Balance contact guard  -SW     Position, Sitting Balance unsupported  -SW     Sit to Stand Dynamic Balance contact guard  -SW     Static Standing Balance contact guard  -SW     Dynamic Standing Balance minimal assist  -SW     Position/Device Used, Standing Balance supported  -SW     Balance Interventions sitting;standing;sit to stand;supported;static;dynamic;minimal challenge;occupation based/functional task  -               User Key  (r) = Recorded By, (t) = Taken By, (c) = Cosigned By      Initials Name Provider Type    SW Shelby Arechiga OT Occupational Therapist                   Goals/Plan    No documentation.                  Clinical Impression       [facility] Name 02/07/25 1426          Pain Assessment    Pretreatment Pain Rating 0/10 - no pain  -     Posttreatment Pain Rating 0/10 - no pain  -[facility] Name 02/07/25 1426          Plan of Care Review    Plan of Care Reviewed With patient  -     Progress improving  -     Outcome Evaluation OT promoted oob act with pt demo cga for supine to sit and sts. She completed grooming with setup. Pt amb in room x 20 ft with cga and able to stand x 1 minute before rest. Making improvement with act arcadio.  -[facility] Name 02/07/25 1426          Therapy Plan Review/Discharge Plan (OT)    Anticipated Discharge Disposition (OT) inpatient rehabilitation facility  -[facility] Name 02/07/25 1426          Vital Signs    Pre Systolic BP Rehab 106  -SW     Pre Treatment Diastolic BP 49  -SW     Pre SpO2 (%) 95  -SW     O2 Delivery Pre Treatment room air  -SW     O2 Delivery Intra Treatment room air  -SW     O2 Delivery Post Treatment room air  -SW     Pre Patient Position  Supine  -SW     Intra Patient Position Standing  -SW     Post Patient Position Sitting  -SW       Row Name 02/07/25 1426          Positioning and Restraints    Pre-Treatment Position in bed  -SW     Post Treatment Position chair  -SW     In Chair notified nsg;reclined;sitting;encouraged to call for assist;call light within reach;exit alarm on;waffle cushion;legs elevated  -SW               User Key  (r) = Recorded By, (t) = Taken By, (c) = Cosigned By      Initials Name Provider Type    hSelby Luis OT Occupational Therapist                   Outcome Measures       Row Name 02/07/25 1521          How much help from another is currently needed...    Putting on and taking off regular lower body clothing? 2  -SW     Bathing (including washing, rinsing, and drying) 2  -SW     Toileting (which includes using toilet bed pan or urinal) 2  -SW     Putting on and taking off regular upper body clothing 3  -SW     Taking care of personal grooming (such as brushing teeth) 3  -SW     Eating meals 4  -SW     AM-PAC 6 Clicks Score (OT) 16  -SW       Row Name 02/07/25 1521          Functional Assessment    Outcome Measure Options AM-PAC 6 Clicks Daily Activity (OT)  -SW               User Key  (r) = Recorded By, (t) = Taken By, (c) = Cosigned By      Initials Name Provider Type    Shelby Luis OT Occupational Therapist                    Occupational Therapy Education       Title: PT OT SLP Therapies (In Progress)       Topic: Occupational Therapy (In Progress)       Point: ADL training (Done)       Description:   Instruct learner(s) on proper safety adaptation and remediation techniques during self care or transfers.   Instruct in proper use of assistive devices.                  Learning Progress Summary            Patient Acceptance, E, VU by CRIS at 2/7/2025 1522    Acceptance, E, VU,NR by AURE at 2/2/2025 5668    Comment: OT POC; energy conservation/pacing techniques; discharge planning    Acceptance, E, NR by JAVIER at  1/30/2025 0758                      Point: Home exercise program (Not Started)       Description:   Instruct learner(s) on appropriate technique for monitoring, assisting and/or progressing therapeutic exercises/activities.                  Learner Progress:  Not documented in this visit.              Point: Precautions (Done)       Description:   Instruct learner(s) on prescribed precautions during self-care and functional transfers.                  Learning Progress Summary            Patient Acceptance, E, VU by  at 2/7/2025 1522    Acceptance, E, VU,NR by  at 2/2/2025 1458    Comment: OT POC; energy conservation/pacing techniques; discharge planning    Acceptance, E, NR by  at 1/30/2025 0758                      Point: Body mechanics (Done)       Description:   Instruct learner(s) on proper positioning and spine alignment during self-care, functional mobility activities and/or exercises.                  Learning Progress Summary            Patient Acceptance, E, VU by  at 2/7/2025 1522    Acceptance, E, NR by  at 1/30/2025 0758                                      User Key       Initials Effective Dates Name Provider Type Discipline     06/16/21 -  Jeannie Baker, OT Occupational Therapist OT     06/16/21 -  Shelby Arechiga, OT Occupational Therapist OT    AURE 06/16/21 -  Yolette Mckay, OT Occupational Therapist OT                  OT Recommendation and Plan     Plan of Care Review  Plan of Care Reviewed With: patient  Progress: improving  Outcome Evaluation: OT promoted oob act with pt demo cga for supine to sit and sts. She completed grooming with setup. Pt amb in room x 20 ft with cga and able to stand x 1 minute before rest. Making improvement with act arcadio.     Time Calculation:         Time Calculation- OT       Row Name 02/07/25 1428             Time Calculation- OT    OT Start Time 1426  -      OT Received On 02/07/25  -         Timed Charges    21757 - OT Therapeutic Activity Minutes 24   -      18996 - OT Self Care/Mgmt Minutes 15  -SW         Total Minutes    Timed Charges Total Minutes 39  -SW       Total Minutes 39  -SW                User Key  (r) = Recorded By, (t) = Taken By, (c) = Cosigned By      Initials Name Provider Type    Shelby Luis OT Occupational Therapist                  Therapy Charges for Today       Code Description Service Date Service Provider Modifiers Qty    [id number]  OT THERAPEUTIC ACT EA 15 MIN 2/7/2025 Shelby Arechiga OT GO 2    [id number]  OT SELF CARE/MGMT/TRAIN EA 15 MIN 2/7/2025 Shelby Arechiga OT GO 1                 Shelby Arechiga OT  2/7/2025

## 2025-02-08 VITALS
OXYGEN SATURATION: 94 % | WEIGHT: 130.07 LBS | HEART RATE: 60 BPM | BODY MASS INDEX: 20.42 KG/M2 | RESPIRATION RATE: 18 BRPM | SYSTOLIC BLOOD PRESSURE: 120 MMHG | TEMPERATURE: 97.9 F | DIASTOLIC BLOOD PRESSURE: 89 MMHG | HEIGHT: 67 IN

## 2025-02-08 PROBLEM — J96.01 ACUTE RESPIRATORY FAILURE WITH HYPOXIA: Status: RESOLVED | Noted: 2023-06-29 | Resolved: 2025-02-08

## 2025-02-08 PROBLEM — J18.9 MULTIFOCAL PNEUMONIA: Status: RESOLVED | Noted: 2025-01-29 | Resolved: 2025-02-08

## 2025-02-08 PROCEDURE — 99239 HOSP IP/OBS DSCHRG MGMT >30: CPT | Performed by: NURSE PRACTITIONER

## 2025-02-08 RX ORDER — BISACODYL 10 MG
10 SUPPOSITORY, RECTAL RECTAL DAILY PRN
Start: 2025-02-08

## 2025-02-08 RX ORDER — SENNOSIDES A AND B 8.6 MG/1
2 TABLET, FILM COATED ORAL DAILY
Start: 2025-02-08

## 2025-02-08 RX ADMIN — DOCUSATE SODIUM 100 MG: 100 CAPSULE, LIQUID FILLED ORAL at 11:35

## 2025-02-08 RX ADMIN — AMIODARONE HYDROCHLORIDE 200 MG: 200 TABLET ORAL at 11:35

## 2025-02-08 RX ADMIN — LEVOTHYROXINE SODIUM 100 MCG: 100 TABLET ORAL at 05:17

## 2025-02-08 RX ADMIN — VENLAFAXINE HYDROCHLORIDE 75 MG: 75 CAPSULE, EXTENDED RELEASE ORAL at 11:35

## 2025-02-08 RX ADMIN — LEVETIRACETAM 250 MG: 500 TABLET, FILM COATED ORAL at 11:35

## 2025-02-08 RX ADMIN — APIXABAN 2.5 MG: 2.5 TABLET, FILM COATED ORAL at 11:35

## 2025-02-08 RX ADMIN — POLYETHYLENE GLYCOL 3350 17 G: 17 POWDER, FOR SOLUTION ORAL at 11:47

## 2025-02-08 RX ADMIN — LISINOPRIL 10 MG: 10 TABLET ORAL at 11:35

## 2025-02-08 RX ADMIN — BISACODYL 10 MG: 10 SUPPOSITORY RECTAL at 12:07

## 2025-02-08 RX ADMIN — METOPROLOL TARTRATE 12.5 MG: 25 TABLET, FILM COATED ORAL at 11:35

## 2025-02-08 RX ADMIN — PANTOPRAZOLE SODIUM 40 MG: 40 TABLET, DELAYED RELEASE ORAL at 11:35

## 2025-02-08 NOTE — DISCHARGE SUMMARY
[facility] Medicine Services  DISCHARGE SUMMARY    Patient Name: Bibiana Hodges  : 1935  MRN: [id number]    Date of Admission: 2025  1:04 PM  Date of Discharge:  2025  Primary Care Physician: Nivia Madrigal MD    Consults       No orders found from 2024 to 2025.            Hospital Course     Presenting Problem: soa    Active Hospital Problems    Diagnosis  POA   • Hyperlipidemia [E78.5]  Unknown   • Atrial fibrillation [I48.91]  Yes   • Hypertension [I10]  Yes      Resolved Hospital Problems    Diagnosis Date Resolved POA   • **Multifocal pneumonia [J18.9] 2025 Yes   • Acute respiratory failure with hypoxia [J96.01] 2025 Yes          Hospital Course:  Bibiana Hodges is a 89 y.o. female  w h/o pacemaker placement, recurrent pneumonia with known aspiration who presents from [facility] with shortness of breath, congestive cough. Found to have chronic appearing pulm infiltrates, clinical concern for acute PNA.  Much improved within first 24 hours. Now on room. Planning rehab at [facility]      Acute hypoxic resp failure 2/2 aspiration PNA - improved   -completed 5 days of abx  -remains on room air  -on comfort diet, known dysphagia     Chronic oropharyngeal dysphagia  -patient declined modified diet, currently on comfort based diet (intolerant in the past of modifications), Daughter in agreement w/mother's decision     Afib s/p PPM - home eliquis, metoprolol, amiodarone  Depression - home effexor, buspar  Severe malnutrition  Tremor - home keppra  Hypothyroid- levothyroxine  HTN- lisinopril/ metoprolol  HLD- statin  Blood culture x1 c/w contaminant, remains afebrile     GOC:  - to remain DNR/DNI         Discharge Follow Up Recommendations for outpatient labs/diagnostics:   Pcp follow up one week after dc rehab  Repeat bmp in a few days    Day of Discharge     HPI:   Feeling well. No dyspnea or resp sx.     Review of Systems  Gen- No fevers,  chills  CV- No chest pain, palpitations  Resp- No cough, dyspnea  GI- No N/V/D, abd pain      Vital Signs:   Temp:  [97.3 °F (36.3 °C)-98.2 °F (36.8 °C)] 97.9 °F (36.6 °C)  Heart Rate:  [60-66] 60  Resp:  [16-20] 18  BP: (106-173)/(67-89) 120/89      Physical Exam:  Constitutional: No acute distress, awake, alert, frail, elderly female  HENT: NCAT, mucous membranes moist  Respiratory: Clear to auscultation bilaterally, respiratory effort normal   Cardiovascular: RRR, no murmurs, rubs, or gallops  Gastrointestinal: Positive bowel sounds, soft, nontender, nondistended  Musculoskeletal: No bilateral ankle edema, thin extremities  Psychiatric: Appropriate affect, cooperative  Neurologic: Oriented x 3, strength symmetric in all extremities, Cranial Nerves grossly intact to confrontation, speech clear  Skin: No rashes      Pertinent  and/or Most Recent Results     LAB RESULTS:                              Brief Urine Lab Results  (Last result in the past 365 days)        Color   Clarity   Blood   Leuk Est   Nitrite   Protein   CREAT   Urine HCG        01/30/25 0859 Yellow   Cloudy   Negative   Large (3+)   Negative   30 mg/dL (1+)                 Microbiology Results (last 10 days)       Procedure Component Value - Date/Time    Respiratory Culture - Sputum, Cough [[id number]] Collected: 01/31/25 1701    Lab Status: Final result Specimen: Sputum from Cough Updated: 02/02/25 1054     Respiratory Culture Light growth (2+) Normal respiratory vicki. No S. aureus or Pseudomonas aeruginosa detected. Final report.     Gram Stain Many (4+) WBCs per low power field      Few (2+) Epithelial cells per low power field      Many (4+) Gram positive cocci in chains      Rare (1+) Gram negative bacilli    S. Pneumo Ag Urine or CSF - Urine, Urine, Clean Catch [[id number]]  (Normal) Collected: 01/30/25 0859    Lab Status: Final result Specimen: Urine, Clean Catch Updated: 01/30/25 1255     Strep Pneumo Ag Negative    Legionella Antigen, Urine  - Urine, Urine, Clean Catch [[id number]]  (Normal) Collected: 01/30/25 0859    Lab Status: Final result Specimen: Urine, Clean Catch Updated: 01/30/25 1255     LEGIONELLA ANTIGEN, URINE Negative    Blood Culture - Blood, Arm, Right [[id number]]  (Abnormal) Collected: 01/29/25 1545    Lab Status: Final result Specimen: Blood from Arm, Right Updated: 01/31/25 0712     Blood Culture Staphylococcus, coagulase negative     Isolated from Aerobic Bottle     Gram Stain Aerobic Bottle Gram positive cocci in clusters    Narrative:      Probable contaminant requires clinical correlation, susceptibility not performed unless requested by physician.      Blood Culture ID, PCR - Blood, Arm, Right [[id number]]  (Abnormal) Collected: 01/29/25 1545    Lab Status: Final result Specimen: Blood from Arm, Right Updated: 01/30/25 1441     BCID, PCR Staph spp, not aureus or lugdunensis. Identification by BCID2 PCR.     BOTTLE TYPE Aerobic Bottle    Blood Culture - Blood, Wrist, Left [[id number]]  (Normal) Collected: 01/29/25 1450    Lab Status: Final result Specimen: Blood from Wrist, Left Updated: 02/03/25 1515     Blood Culture No growth at 5 days    COVID PRE-OP / PRE-PROCEDURE SCREENING ORDER (NO ISOLATION) - Swab, Nasopharynx [[id number]]  (Normal) Collected: 01/29/25 1440    Lab Status: Final result Specimen: Swab from Nasopharynx Updated: 01/29/25 1557    Narrative:      The following orders were created for panel order COVID PRE-OP / PRE-PROCEDURE SCREENING ORDER (NO ISOLATION) - Swab, Nasopharynx.  Procedure                               Abnormality         Status                     ---------                               -----------         ------                     Respiratory Panel PCR w/...[[id number]]  Normal              Final result                 Please view results for these tests on the individual orders.    Respiratory Panel PCR w/COVID-19(SARS-CoV-2) ERICK/MARTA/ELPIDIO/PAD/COR/ALMA In-House, NP Swab in UTM/VTM, 2 HR TAT - Swab,  Nasopharynx [[id number]]  (Normal) Collected: 01/29/25 1440    Lab Status: Final result Specimen: Swab from Nasopharynx Updated: 01/29/25 3698     ADENOVIRUS, PCR Not Detected     Coronavirus 229E Not Detected     Coronavirus HKU1 Not Detected     Coronavirus NL63 Not Detected     Coronavirus OC43 Not Detected     COVID19 Not Detected     Human Metapneumovirus Not Detected     Human Rhinovirus/Enterovirus Not Detected     Influenza A PCR Not Detected     Influenza B PCR Not Detected     Parainfluenza Virus 1 Not Detected     Parainfluenza Virus 2 Not Detected     Parainfluenza Virus 3 Not Detected     Parainfluenza Virus 4 Not Detected     RSV, PCR Not Detected     Bordetella pertussis pcr Not Detected     Bordetella parapertussis PCR Not Detected     Chlamydophila pneumoniae PCR Not Detected     Mycoplasma pneumo by PCR Not Detected    Narrative:      In the setting of a positive respiratory panel with a viral infection PLUS a negative procalcitonin without other underlying concern for bacterial infection, consider observing off antibiotics or discontinuation of antibiotics and continue supportive care. If the respiratory panel is positive for atypical bacterial infection (Bordetella pertussis, Chlamydophila pneumoniae, or Mycoplasma pneumoniae), consider antibiotic de-escalation to target atypical bacterial infection.            XR Chest 1 View    Result Date: 1/29/2025  XR CHEST 1 VW Date of Exam: 1/29/2025 1:31 PM EST Indication: SOA triage protocol Comparison: None available. Findings: There is a dual-chamber pacemaker. Heart size and pulmonary vasculature are within normal limits. There are multiple airspace opacities throughout the left lung that are grossly similar in appearance to the prior study. The right lung is clear     Impression: Grossly stable chronic multifocal opacities throughout the left lung, likely representing a chronic infectious or inflammatory process Electronically Signed: Kenny Staton   1/29/2025 2:06 PM EST  Workstation ID: [id number]             Results for orders placed during the hospital encounter of 08/20/23    Adult Transthoracic Echo Complete W/ Cont if Necessary Per Protocol    Interpretation Summary  •  Left ventricular systolic function is mildly decreased. Calculated left ventricular EF = 47.9% Normal left ventricular cavity size and wall thickness noted. There is left ventricular global hypokinesis noted. Left ventricular diastolic function is consistent with (grade I) impaired relaxation.  •  : Normal right ventricular cavity size and systolic function noted. Electronic lead present in the ventricle.  •  Normal left atrial size and volume noted. Saline test results are negative.  •  There is moderate calcification of the aortic valve. The aortic valve was poorly visualized but appears trileaflet. Moderate aortic valve regurgitation is present. No aortic valve stenosis is present.  •  Mild mitral annular calcification is present. Mild mitral valve regurgitation is present. No significant mitral valve stenosis is present.  •  The tricuspid valve is grossly normal in structure. Mild tricuspid valve regurgitation is present. Estimated right ventricular systolic pressure from tricuspid regurgitation is normal, 33 mmHg. No tricuspid valve stenosis is present.  •  Mild dilation of the ascending aorta is present. Ascending aorta = 3.7 cm  •  There is a small (<1cm) pericardial effusion adjacent to the right atrium and right ventricle.      Plan for Follow-up of Pending Labs/Results:     Discharge Details        Discharge Medications        New Medications        Instructions Start Date   artificial tears ophthalmic ointment   1 Application, Both Eyes, Every 1 Hour PRN             Changes to Medications        Instructions Start Date   bisacodyl 10 MG suppository  Commonly known as: DULCOLAX  What changed:   when to take this  reasons to take this  additional instructions   10 mg, Rectal, Daily  PRN      polyethylene glycol 17 GM/SCOOP powder  Commonly known as: MIRALAX  What changed: See the new instructions.   MIX 17 GM IN 8 OUNCES OF LIQUID AND DRINK 1 TO 2 TIMES DAILY FOR CONSTIPATION.      senna 8.6 MG tablet  Commonly known as: SENOKOT  What changed:   when to take this  reasons to take this   2 tablets, Oral, Daily             Continue These Medications        Instructions Start Date   acetaminophen 325 MG tablet  Commonly known as: TYLENOL   650 mg, Every 8 Hours PRN      albuterol (2.5 MG/3ML) 0.083% nebulizer solution  Commonly known as: PROVENTIL   2.5 mg, Nebulization, Every 4 Hours PRN      amiodarone 200 MG tablet  Commonly known as: PACERONE   200 mg, Daily      apixaban 2.5 MG tablet tablet  Commonly known as: ELIQUIS   2.5 mg, 2 Times Daily      benzonatate 100 MG capsule  Commonly known as: TESSALON   100 mg, Oral, 3 Times Daily PRN      castor oil-balsam peru ointment   1 Application, Topical, Every 12 Hours Scheduled, Apply to sacral area      ferrous sulfate 325 (65 FE) MG tablet   325 mg, Daily With Breakfast      fluticasone 50 MCG/ACT nasal spray  Commonly known as: FLONASE   2 sprays, Nasal, Daily PRN      levETIRAcetam  MG tablet  Commonly known as: KEPPRA XR   500 mg, Oral, Daily      levothyroxine 100 MCG tablet  Commonly known as: SYNTHROID, LEVOTHROID   100 mcg, Daily      lisinopril 10 MG tablet  Commonly known as: PRINIVIL,ZESTRIL   10 mg, Oral, Every 24 Hours Scheduled      meclizine 25 MG tablet  Commonly known as: ANTIVERT   25 mg, Oral, Every 8 Hours PRN      melatonin 5 MG tablet tablet   5 mg, Oral, Nightly      metoprolol tartrate 25 MG tablet  Commonly known as: LOPRESSOR   12.5 mg, Oral, 2 Times Daily      ondansetron ODT 4 MG disintegrating tablet  Commonly known as: ZOFRAN-ODT   4 mg, Translingual, Every 6 Hours PRN      pantoprazole 40 MG EC tablet  Commonly known as: PROTONIX   40 mg, Daily      rosuvastatin 10 MG tablet  Commonly known as: CRESTOR   10 mg,  Oral, Nightly      traZODone 100 MG tablet  Commonly known as: DESYREL   100 mg, Nightly      venlafaxine XR 75 MG 24 hr capsule  Commonly known as: EFFEXOR-XR   75 mg, Daily      Vitamin C 500 MG capsule   1 capsule, Daily             Stop These Medications      docusate sodium 100 MG capsule  Commonly known as: COLACE              Allergies   Allergen Reactions   • Gabapentin    • Sulfa Antibiotics          Discharge Disposition:  Rehab Facility or Unit (DC - External)    Diet:  Hospital:  Diet Order   Procedures   • Diet: Regular/House; Fluid Consistency: Thin (IDDSI 0)       Diet Instructions       Diet: Regular/House Diet; Thin (IDDSI 0)      Discharge Diet: Regular/House Diet    Fluid Consistency: Thin (IDDSI 0)             Activity:  Activity Instructions       Activity as Tolerated              Restrictions or Other Recommendations:         CODE STATUS:    Code Status and Medical Interventions: No CPR (Do Not Attempt to Resuscitate); Limited Support; No intubation (DNI)   Ordered at: 01/31/25 0952     Medical Intervention Limits:    No intubation (DNI)     Level Of Support Discussed With:    Patient     Code Status (Patient has no pulse and is not breathing):    No CPR (Do Not Attempt to Resuscitate)     Medical Interventions (Patient has pulse or is breathing):    Limited Support       No future appointments.    Additional Instructions for the Follow-ups that You Need to Schedule       Discharge Follow-up with PCP   As directed       Currently Documented PCP:    Nivia Madrigal MD    PCP Phone Number:    [phone]     Follow Up Details: 1 week post dc rehab                      Marine Koch, APRN  02/08/25      Time Spent on Discharge:  I spent  35  minutes on this discharge activity which included: face-to-face encounter with the patient, reviewing the data in the system, coordination of the care with the nursing staff as well as consultants, documentation, and entering orders.         Electronically signed by ANASTASIA Casas, 02/08/25, 12:16 PM EST.

## 2025-02-08 NOTE — CASE MANAGEMENT/SOCIAL WORK
Case Management Discharge Note      Final Note: Ms. Hodges will be discharging to [facility] for skilled rehab today. She has [employer] wheelchair transport at 1430 today. They will  patient at the bedside. Nursing, please call report to  [phone]. If no answer, please call the house supervisor at [phone].  called POA to update. No other discharge needs were identified.         Selected Continued Care - Admitted Since 1/29/2025       Destination Coordination complete.      Service Provider Services Address Phone Fax Patient Preferred    [facility] [address] [address] [phone] --              Durable Medical Equipment    No services have been selected for the patient.                Dialysis/Infusion    No services have been selected for the patient.                Home Medical Care    No services have been selected for the patient.                Therapy    No services have been selected for the patient.                Community Resources    No services have been selected for the patient.                Community & DME    No services have been selected for the patient.                    Transportation Services  W/C Van: Other ([employer])    Final Discharge Disposition Code: 03 - skilled nursing facility (SNF)

## 2025-02-08 NOTE — PLAN OF CARE
Problem: Adult Inpatient Plan of Care  Goal: Plan of Care Review  Outcome: Progressing  Goal: Patient-Specific Goal (Individualized)  Outcome: Progressing  Goal: Absence of Hospital-Acquired Illness or Injury  Outcome: Progressing  Intervention: Identify and Manage Fall Risk  Recent Flowsheet Documentation  Taken 2/8/2025 0426 by Anastasia Marquis RN  Safety Promotion/Fall Prevention:   assistive device/personal items within reach   clutter free environment maintained   fall prevention program maintained   safety round/check completed   room organization consistent   nonskid shoes/slippers when out of bed  Taken 2/8/2025 0004 by Anastasia Marquis RN  Safety Promotion/Fall Prevention:   assistive device/personal items within reach   clutter free environment maintained   fall prevention program maintained   safety round/check completed   room organization consistent   nonskid shoes/slippers when out of bed  Taken 2/7/2025 2211 by Anastasia Marquis RN  Safety Promotion/Fall Prevention:   assistive device/personal items within reach   clutter free environment maintained   fall prevention program maintained   safety round/check completed   room organization consistent   nonskid shoes/slippers when out of bed  Taken 2/7/2025 2010 by Anastasia Marquis RN  Safety Promotion/Fall Prevention:   activity supervised   assistive device/personal items within reach   clutter free environment maintained   fall prevention program maintained   nonskid shoes/slippers when out of bed   safety round/check completed  Intervention: Prevent Skin Injury  Recent Flowsheet Documentation  Taken 2/8/2025 0426 by Anastasia Marquis RN  Body Position: (elevated with pillows both sides)   turned   supine  Skin Protection:   incontinence pads utilized   silicone foam dressing in place   transparent dressing maintained  Taken 2/8/2025 0156 by Anastasia Marquis RN  Body Position:   turned   left   side-lying   heels elevated  Taken 2/8/2025 0004 by Anastasia Marquis RN  Body  Position:   turned   supine   foot of bed elevated  Skin Protection:   transparent dressing maintained   silicone foam dressing in place   incontinence pads utilized  Taken 2/7/2025 2211 by Anastasia Marquis RN  Body Position:   turned   left   side-lying   foot of bed elevated   heels elevated  Skin Protection:   incontinence pads utilized   silicone foam dressing in place   transparent dressing maintained  Taken 2/7/2025 2010 by Anastasia Marquis RN  Skin Protection:   transparent dressing maintained   silicone foam dressing in place   incontinence pads utilized  Intervention: Prevent Infection  Recent Flowsheet Documentation  Taken 2/8/2025 0426 by Anastasia Marquis RN  Infection Prevention:   environmental surveillance performed   rest/sleep promoted   single patient room provided  Taken 2/8/2025 0004 by Anastasia Marquis RN  Infection Prevention:   environmental surveillance performed   rest/sleep promoted   single patient room provided  Taken 2/7/2025 2211 by Anastasia Marquis RN  Infection Prevention:   environmental surveillance performed   rest/sleep promoted   single patient room provided  Taken 2/7/2025 2010 by Anastasia Marquis RN  Infection Prevention:   hand hygiene promoted   rest/sleep promoted  Goal: Optimal Comfort and Wellbeing  Outcome: Progressing  Intervention: Provide Person-Centered Care  Recent Flowsheet Documentation  Taken 2/7/2025 2010 by Anastasia Marquis RN  Trust Relationship/Rapport:   care explained   thoughts/feelings acknowledged  Goal: Readiness for Transition of Care  Outcome: Progressing     Problem: Comorbidity Management  Goal: Maintenance of Heart Failure Symptom Control  Outcome: Progressing  Intervention: Maintain Heart Failure Management  Recent Flowsheet Documentation  Taken 2/7/2025 2010 by Anastasia Marquis RN  Medication Review/Management: medications reviewed  Goal: Blood Pressure in Desired Range  Outcome: Progressing  Intervention: Maintain Blood Pressure Management  Recent Flowsheet Documentation  Taken  2/7/2025 2010 by Anastasia Marquis RN  Medication Review/Management: medications reviewed     Problem: Skin Injury Risk Increased  Goal: Skin Health and Integrity  Outcome: Progressing  Intervention: Optimize Skin Protection  Recent Flowsheet Documentation  Taken 2/8/2025 0426 by Anastasia Marquis RN  Activity Management: activity encouraged  Pressure Reduction Techniques:   pressure points protected   heels elevated off bed   frequent weight shift encouraged   weight shift assistance provided  Head of Bed (HOB) Positioning: HOB elevated  Pressure Reduction Devices: pressure-redistributing mattress utilized  Skin Protection:   incontinence pads utilized   silicone foam dressing in place   transparent dressing maintained  Taken 2/8/2025 0156 by Anastasia Marquis RN  Pressure Reduction Techniques:   pressure points protected   heels elevated off bed   frequent weight shift encouraged   weight shift assistance provided  Head of Bed (HOB) Positioning: HOB elevated  Pressure Reduction Devices: pressure-redistributing mattress utilized  Taken 2/8/2025 0004 by Anastasia Marquis RN  Activity Management: activity encouraged  Pressure Reduction Techniques:   pressure points protected   heels elevated off bed   frequent weight shift encouraged   weight shift assistance provided  Head of Bed (HOB) Positioning: HOB elevated  Pressure Reduction Devices: pressure-redistributing mattress utilized  Skin Protection:   transparent dressing maintained   silicone foam dressing in place   incontinence pads utilized  Taken 2/7/2025 2211 by Anastasia Marquis RN  Activity Management: activity encouraged  Pressure Reduction Techniques:   pressure points protected   heels elevated off bed   frequent weight shift encouraged   weight shift assistance provided  Head of Bed (HOB) Positioning: HOB elevated  Pressure Reduction Devices: pressure-redistributing mattress utilized  Skin Protection:   incontinence pads utilized   silicone foam dressing in place   transparent  dressing maintained  Taken 2/7/2025 2010 by Anastasia Marquis RN  Activity Management: activity encouraged  Pressure Reduction Techniques:   pressure points protected   heels elevated off bed   frequent weight shift encouraged   weight shift assistance provided  Pressure Reduction Devices: pressure-redistributing mattress utilized  Skin Protection:   transparent dressing maintained   silicone foam dressing in place   incontinence pads utilized     Problem: Fall Injury Risk  Goal: Absence of Fall and Fall-Related Injury  Outcome: Progressing  Intervention: Identify and Manage Contributors  Recent Flowsheet Documentation  Taken 2/7/2025 2010 by Anastasia Marquis RN  Medication Review/Management: medications reviewed  Self-Care Promotion:   independence encouraged   BADL personal objects within reach  Intervention: Promote Injury-Free Environment  Recent Flowsheet Documentation  Taken 2/8/2025 0426 by Anastasia Marquis RN  Safety Promotion/Fall Prevention:   assistive device/personal items within reach   clutter free environment maintained   fall prevention program maintained   safety round/check completed   room organization consistent   nonskid shoes/slippers when out of bed  Taken 2/8/2025 0004 by Anastasia Marquis RN  Safety Promotion/Fall Prevention:   assistive device/personal items within reach   clutter free environment maintained   fall prevention program maintained   safety round/check completed   room organization consistent   nonskid shoes/slippers when out of bed  Taken 2/7/2025 2211 by Anastasia Marquis RN  Safety Promotion/Fall Prevention:   assistive device/personal items within reach   clutter free environment maintained   fall prevention program maintained   safety round/check completed   room organization consistent   nonskid shoes/slippers when out of bed  Taken 2/7/2025 2010 by Anastasia Marquis, RN  Safety Promotion/Fall Prevention:   activity supervised   assistive device/personal items within reach   clutter free environment  maintained   fall prevention program maintained   nonskid shoes/slippers when out of bed   safety round/check completed   Goal Outcome Evaluation:

## 2025-02-08 NOTE — DISCHARGE PLACEMENT REQUEST
"To: Cardinal Anaya SRU   From: Erinn Pablo,  [phone]      Bibiana Hodges (89 y.o. Female)       Date of Birth   1935    Social Security Number       Address   [address] [place] [address]    Home Phone   [phone]    MRN   [id number]       [employer]   [employer]    Marital Status                               Admission Date   25    Admission Type   Emergency    Admitting Provider   Vanna Dykes MD    Attending Provider   Vanna Dykes MD    Department, Room/Bed   [address], S587/1       Discharge Date       Discharge Disposition   Rehab Facility or Unit (DC - External)    Discharge Destination                                 Attending Provider: Vanna Dykes MD    Allergies: Gabapentin, Sulfa Antibiotics    Isolation: None   Infection: None   Code Status: No CPR    Ht: 170.2 cm (67\")   Wt: 59 kg (130 lb 1.1 oz)    Admission Cmt: None   Principal Problem: Multifocal pneumonia [J18.9]                   Active Insurance as of 2025       Primary Coverage       Payor Plan Insurance Group Employer/Plan Group    ANTHEM MEDICARE REPLACEMENT ANTHEM MED ADV HMO [id number]       Payor Plan Address Payor Plan Phone Number Payor Plan Fax Number Effective Dates    [address] [phone]  2025 - None Entered    [facility] [address]         Subscriber Name Subscriber Birth Date Member ID       BIBIANA HODGES 1935 [id number]                     Emergency Contacts        (Rel.) Home Phone Work Phone Mobile Phone    Danyelle Keyes (POA) (Daughter) [phone] -- [phone]    HODGESELODIA (Son) -- -- [phone]    HODGESNIA SCHNEIDER (Son) [phone] -- [phone]                 Discharge Summary        Marine Koch APRN at [facility]              [facility] Medicine Services  DISCHARGE SUMMARY    Patient Name: Bibiana Hodges  : 1935  MRN: [id number]    Date of Admission: " 1/29/2025  1:04 PM  Date of Discharge:  2/8/2025  Primary Care Physician: Nivia Madrigal MD    Consults       No orders found from 12/31/2024 to 1/30/2025.            Hospital Course     Presenting Problem: soa    Active Hospital Problems    Diagnosis  POA    Hyperlipidemia [E78.5]  Unknown    Atrial fibrillation [I48.91]  Yes    Hypertension [I10]  Yes      Resolved Hospital Problems    Diagnosis Date Resolved POA    **Multifocal pneumonia [J18.9] 02/08/2025 Yes    Acute respiratory failure with hypoxia [J96.01] 02/08/2025 Yes          Hospital Course:  Bibiana Hodges is a 89 y.o. female  w h/o pacemaker placement, recurrent pneumonia with known aspiration who presents from [facility] with shortness of breath, congestive cough. Found to have chronic appearing pulm infiltrates, clinical concern for acute PNA.  Much improved within first 24 hours. Now on room. Planning rehab at [facility]      Acute hypoxic resp failure 2/2 aspiration PNA - improved   -completed 5 days of abx  -remains on room air  -on comfort diet, known dysphagia     Chronic oropharyngeal dysphagia  -patient declined modified diet, currently on comfort based diet (intolerant in the past of modifications), Daughter in agreement w/mother's decision     Afib s/p PPM - home eliquis, metoprolol, amiodarone  Depression - home effexor, buspar  Severe malnutrition  Tremor - home keppra  Hypothyroid- levothyroxine  HTN- lisinopril/ metoprolol  HLD- statin  Blood culture x1 c/w contaminant, remains afebrile     GOC:  - to remain DNR/DNI         Discharge Follow Up Recommendations for outpatient labs/diagnostics:   Pcp follow up one week after dc rehab  Repeat bmp in a few days    Day of Discharge     HPI:   Feeling well. No dyspnea or resp sx.     Review of Systems  Gen- No fevers, chills  CV- No chest pain, palpitations  Resp- No cough, dyspnea  GI- No N/V/D, abd pain      Vital Signs:   Temp:  [97.3 °F (36.3 °C)-98.2 °F (36.8 °C)] 97.9 °F (36.6 °C)  Heart  Rate:  [60-66] 60  Resp:  [16-20] 18  BP: (106-173)/(67-89) 120/89      Physical Exam:  Constitutional: No acute distress, awake, alert, frail, elderly female  HENT: NCAT, mucous membranes moist  Respiratory: Clear to auscultation bilaterally, respiratory effort normal   Cardiovascular: RRR, no murmurs, rubs, or gallops  Gastrointestinal: Positive bowel sounds, soft, nontender, nondistended  Musculoskeletal: No bilateral ankle edema, thin extremities  Psychiatric: Appropriate affect, cooperative  Neurologic: Oriented x 3, strength symmetric in all extremities, Cranial Nerves grossly intact to confrontation, speech clear  Skin: No rashes      Pertinent  and/or Most Recent Results     LAB RESULTS:                              Brief Urine Lab Results  (Last result in the past 365 days)        Color   Clarity   Blood   Leuk Est   Nitrite   Protein   CREAT   Urine HCG        01/30/25 0859 Yellow   Cloudy   Negative   Large (3+)   Negative   30 mg/dL (1+)                 Microbiology Results (last 10 days)       Procedure Component Value - Date/Time    Respiratory Culture - Sputum, Cough [[id number]] Collected: 01/31/25 1701    Lab Status: Final result Specimen: Sputum from Cough Updated: 02/02/25 1054     Respiratory Culture Light growth (2+) Normal respiratory vicki. No S. aureus or Pseudomonas aeruginosa detected. Final report.     Gram Stain Many (4+) WBCs per low power field      Few (2+) Epithelial cells per low power field      Many (4+) Gram positive cocci in chains      Rare (1+) Gram negative bacilli    S. Pneumo Ag Urine or CSF - Urine, Urine, Clean Catch [[id number]]  (Normal) Collected: 01/30/25 0859    Lab Status: Final result Specimen: Urine, Clean Catch Updated: 01/30/25 1255     Strep Pneumo Ag Negative    Legionella Antigen, Urine - Urine, Urine, Clean Catch [[id number]]  (Normal) Collected: 01/30/25 0859    Lab Status: Final result Specimen: Urine, Clean Catch Updated: 01/30/25 1255     LEGIONELLA ANTIGEN,  URINE Negative    Blood Culture - Blood, Arm, Right [[id number]]  (Abnormal) Collected: 01/29/25 1545    Lab Status: Final result Specimen: Blood from Arm, Right Updated: 01/31/25 0712     Blood Culture Staphylococcus, coagulase negative     Isolated from Aerobic Bottle     Gram Stain Aerobic Bottle Gram positive cocci in clusters    Narrative:      Probable contaminant requires clinical correlation, susceptibility not performed unless requested by physician.      Blood Culture ID, PCR - Blood, Arm, Right [[id number]]  (Abnormal) Collected: 01/29/25 1545    Lab Status: Final result Specimen: Blood from Arm, Right Updated: 01/30/25 1441     BCID, PCR Staph spp, not aureus or lugdunensis. Identification by BCID2 PCR.     BOTTLE TYPE Aerobic Bottle    Blood Culture - Blood, Wrist, Left [[id number]]  (Normal) Collected: 01/29/25 1450    Lab Status: Final result Specimen: Blood from Wrist, Left Updated: 02/03/25 1515     Blood Culture No growth at 5 days    COVID PRE-OP / PRE-PROCEDURE SCREENING ORDER (NO ISOLATION) - Swab, Nasopharynx [[id number]]  (Normal) Collected: 01/29/25 1440    Lab Status: Final result Specimen: Swab from Nasopharynx Updated: 01/29/25 1557    Narrative:      The following orders were created for panel order COVID PRE-OP / PRE-PROCEDURE SCREENING ORDER (NO ISOLATION) - Swab, Nasopharynx.  Procedure                               Abnormality         Status                     ---------                               -----------         ------                     Respiratory Panel PCR w/...[[id number]]  Normal              Final result                 Please view results for these tests on the individual orders.    Respiratory Panel PCR w/COVID-19(SARS-CoV-2) ERICK/MARTA/ELPIDIO/PAD/COR/ALMA In-House, NP Swab in UTM/VTM, 2 HR TAT - Swab, Nasopharynx [[id number]]  (Normal) Collected: 01/29/25 1440    Lab Status: Final result Specimen: Swab from Nasopharynx Updated: 01/29/25 1557     ADENOVIRUS, PCR Not Detected      Coronavirus 229E Not Detected     Coronavirus HKU1 Not Detected     Coronavirus NL63 Not Detected     Coronavirus OC43 Not Detected     COVID19 Not Detected     Human Metapneumovirus Not Detected     Human Rhinovirus/Enterovirus Not Detected     Influenza A PCR Not Detected     Influenza B PCR Not Detected     Parainfluenza Virus 1 Not Detected     Parainfluenza Virus 2 Not Detected     Parainfluenza Virus 3 Not Detected     Parainfluenza Virus 4 Not Detected     RSV, PCR Not Detected     Bordetella pertussis pcr Not Detected     Bordetella parapertussis PCR Not Detected     Chlamydophila pneumoniae PCR Not Detected     Mycoplasma pneumo by PCR Not Detected    Narrative:      In the setting of a positive respiratory panel with a viral infection PLUS a negative procalcitonin without other underlying concern for bacterial infection, consider observing off antibiotics or discontinuation of antibiotics and continue supportive care. If the respiratory panel is positive for atypical bacterial infection (Bordetella pertussis, Chlamydophila pneumoniae, or Mycoplasma pneumoniae), consider antibiotic de-escalation to target atypical bacterial infection.            XR Chest 1 View    Result Date: 1/29/2025  XR CHEST 1 VW Date of Exam: 1/29/2025 1:31 PM EST Indication: SOA triage protocol Comparison: None available. Findings: There is a dual-chamber pacemaker. Heart size and pulmonary vasculature are within normal limits. There are multiple airspace opacities throughout the left lung that are grossly similar in appearance to the prior study. The right lung is clear     Impression: Grossly stable chronic multifocal opacities throughout the left lung, likely representing a chronic infectious or inflammatory process Electronically Signed: Kenny Staton  1/29/2025 2:06 PM EST  Workstation ID: [id number]             Results for orders placed during the hospital encounter of 08/20/23    Adult Transthoracic Echo Complete W/ Cont if  Necessary Per Protocol    Interpretation Summary    Left ventricular systolic function is mildly decreased. Calculated left ventricular EF = 47.9% Normal left ventricular cavity size and wall thickness noted. There is left ventricular global hypokinesis noted. Left ventricular diastolic function is consistent with (grade I) impaired relaxation.    : Normal right ventricular cavity size and systolic function noted. Electronic lead present in the ventricle.    Normal left atrial size and volume noted. Saline test results are negative.    There is moderate calcification of the aortic valve. The aortic valve was poorly visualized but appears trileaflet. Moderate aortic valve regurgitation is present. No aortic valve stenosis is present.    Mild mitral annular calcification is present. Mild mitral valve regurgitation is present. No significant mitral valve stenosis is present.    The tricuspid valve is grossly normal in structure. Mild tricuspid valve regurgitation is present. Estimated right ventricular systolic pressure from tricuspid regurgitation is normal, 33 mmHg. No tricuspid valve stenosis is present.    Mild dilation of the ascending aorta is present. Ascending aorta = 3.7 cm    There is a small (<1cm) pericardial effusion adjacent to the right atrium and right ventricle.      Plan for Follow-up of Pending Labs/Results:     Discharge Details        Discharge Medications        New Medications        Instructions Start Date   artificial tears ophthalmic ointment   1 Application, Both Eyes, Every 1 Hour PRN             Changes to Medications        Instructions Start Date   bisacodyl 10 MG suppository  Commonly known as: DULCOLAX  What changed:   when to take this  reasons to take this  additional instructions   10 mg, Rectal, Daily PRN      polyethylene glycol 17 GM/SCOOP powder  Commonly known as: MIRALAX  What changed: See the new instructions.   MIX 17 GM IN 8 OUNCES OF LIQUID AND DRINK 1 TO 2 TIMES DAILY FOR  CONSTIPATION.      senna 8.6 MG tablet  Commonly known as: SENOKOT  What changed:   when to take this  reasons to take this   2 tablets, Oral, Daily             Continue These Medications        Instructions Start Date   acetaminophen 325 MG tablet  Commonly known as: TYLENOL   650 mg, Every 8 Hours PRN      albuterol (2.5 MG/3ML) 0.083% nebulizer solution  Commonly known as: PROVENTIL   2.5 mg, Nebulization, Every 4 Hours PRN      amiodarone 200 MG tablet  Commonly known as: PACERONE   200 mg, Daily      apixaban 2.5 MG tablet tablet  Commonly known as: ELIQUIS   2.5 mg, 2 Times Daily      benzonatate 100 MG capsule  Commonly known as: TESSALON   100 mg, Oral, 3 Times Daily PRN      castor oil-balsam peru ointment   1 Application, Topical, Every 12 Hours Scheduled, Apply to sacral area      ferrous sulfate 325 (65 FE) MG tablet   325 mg, Daily With Breakfast      fluticasone 50 MCG/ACT nasal spray  Commonly known as: FLONASE   2 sprays, Nasal, Daily PRN      levETIRAcetam  MG tablet  Commonly known as: KEPPRA XR   500 mg, Oral, Daily      levothyroxine 100 MCG tablet  Commonly known as: SYNTHROID, LEVOTHROID   100 mcg, Daily      lisinopril 10 MG tablet  Commonly known as: PRINIVIL,ZESTRIL   10 mg, Oral, Every 24 Hours Scheduled      meclizine 25 MG tablet  Commonly known as: ANTIVERT   25 mg, Oral, Every 8 Hours PRN      melatonin 5 MG tablet tablet   5 mg, Oral, Nightly      metoprolol tartrate 25 MG tablet  Commonly known as: LOPRESSOR   12.5 mg, Oral, 2 Times Daily      ondansetron ODT 4 MG disintegrating tablet  Commonly known as: ZOFRAN-ODT   4 mg, Translingual, Every 6 Hours PRN      pantoprazole 40 MG EC tablet  Commonly known as: PROTONIX   40 mg, Daily      rosuvastatin 10 MG tablet  Commonly known as: CRESTOR   10 mg, Oral, Nightly      traZODone 100 MG tablet  Commonly known as: DESYREL   100 mg, Nightly      venlafaxine XR 75 MG 24 hr capsule  Commonly known as: EFFEXOR-XR   75 mg, Daily       Vitamin C 500 MG capsule   1 capsule, Daily             Stop These Medications      docusate sodium 100 MG capsule  Commonly known as: COLACE              Allergies   Allergen Reactions    Gabapentin     Sulfa Antibiotics          Discharge Disposition:  Rehab Facility or Unit (DC - External)    Diet:  Hospital:  Diet Order   Procedures    Diet: Regular/House; Fluid Consistency: Thin (IDDSI 0)       Diet Instructions       Diet: Regular/House Diet; Thin (IDDSI 0)      Discharge Diet: Regular/House Diet    Fluid Consistency: Thin (IDDSI 0)             Activity:  Activity Instructions       Activity as Tolerated              Restrictions or Other Recommendations:         CODE STATUS:    Code Status and Medical Interventions: No CPR (Do Not Attempt to Resuscitate); Limited Support; No intubation (DNI)   Ordered at: 01/31/25 0952     Medical Intervention Limits:    No intubation (DNI)     Level Of Support Discussed With:    Patient     Code Status (Patient has no pulse and is not breathing):    No CPR (Do Not Attempt to Resuscitate)     Medical Interventions (Patient has pulse or is breathing):    Limited Support       No future appointments.    Additional Instructions for the Follow-ups that You Need to Schedule       Discharge Follow-up with PCP   As directed       Currently Documented PCP:    Nivia Madrigal MD    PCP Phone Number:    [phone]     Follow Up Details: 1 week post dc rehab                      ANASTASIA Casas  02/08/25      Time Spent on Discharge:  I spent  35  minutes on this discharge activity which included: face-to-face encounter with the patient, reviewing the data in the system, coordination of the care with the nursing staff as well as consultants, documentation, and entering orders.        Electronically signed by ANASTASIA Casas, 02/08/25, 12:16 PM EST.      Electronically signed by Marine Koch APRN at 02/08/25 8542

## 2025-02-10 NOTE — PROGRESS NOTES
Enter Query Response Below      Query Response: -Unable to determine              If applicable, please update the problem list.

## 2025-05-19 ENCOUNTER — APPOINTMENT (OUTPATIENT)
Dept: CT IMAGING | Facility: HOSPITAL | Age: OVER 89
End: 2025-05-19
Payer: MEDICARE

## 2025-05-19 ENCOUNTER — HOSPITAL ENCOUNTER (INPATIENT)
Facility: HOSPITAL | Age: OVER 89
LOS: 10 days | Discharge: HOME | End: 2025-05-29
Attending: EMERGENCY MEDICINE | Admitting: HOSPITALIST
Payer: MEDICARE

## 2025-05-19 DIAGNOSIS — E87.20 LACTIC ACIDOSIS: ICD-10-CM

## 2025-05-19 DIAGNOSIS — I10 PRIMARY HYPERTENSION: ICD-10-CM

## 2025-05-19 DIAGNOSIS — I48.11 LONGSTANDING PERSISTENT ATRIAL FIBRILLATION: ICD-10-CM

## 2025-05-19 DIAGNOSIS — R13.12 OROPHARYNGEAL DYSPHAGIA: ICD-10-CM

## 2025-05-19 DIAGNOSIS — D72.829 LEUKOCYTOSIS, UNSPECIFIED TYPE: ICD-10-CM

## 2025-05-19 DIAGNOSIS — R79.89 ELEVATED TROPONIN: ICD-10-CM

## 2025-05-19 DIAGNOSIS — H53.2 DOUBLE VISION: ICD-10-CM

## 2025-05-19 DIAGNOSIS — J96.01 ACUTE RESPIRATORY FAILURE WITH HYPOXIA: Primary | ICD-10-CM

## 2025-05-19 DIAGNOSIS — I95.1 DYSAUTONOMIA ORTHOSTATIC HYPOTENSION SYNDROME: ICD-10-CM

## 2025-05-19 DIAGNOSIS — J18.9 PNEUMONIA OF RIGHT LOWER LOBE DUE TO INFECTIOUS ORGANISM: ICD-10-CM

## 2025-05-19 DIAGNOSIS — R25.1 TREMOR: ICD-10-CM

## 2025-05-19 DIAGNOSIS — E43 SEVERE MALNUTRITION: ICD-10-CM

## 2025-05-19 DIAGNOSIS — J18.9 COMMUNITY ACQUIRED PNEUMONIA, UNSPECIFIED LATERALITY: ICD-10-CM

## 2025-05-19 DIAGNOSIS — N39.0 ACUTE UTI: ICD-10-CM

## 2025-05-19 LAB
ALBUMIN SERPL-MCNC: 3.2 G/DL (ref 3.5–5.2)
ALBUMIN/GLOB SERPL: 1.1 G/DL
ALP SERPL-CCNC: 74 U/L (ref 39–117)
ALT SERPL W P-5'-P-CCNC: 28 U/L (ref 1–33)
ANION GAP SERPL CALCULATED.3IONS-SCNC: 16 MMOL/L (ref 5–15)
AST SERPL-CCNC: 43 U/L (ref 1–32)
B PARAPERT DNA SPEC QL NAA+PROBE: NOT DETECTED
B PERT DNA SPEC QL NAA+PROBE: NOT DETECTED
BACTERIA UR QL AUTO: ABNORMAL /HPF
BASOPHILS # BLD AUTO: 0.02 10*3/MM3 (ref 0–0.2)
BASOPHILS NFR BLD AUTO: 0.1 % (ref 0–1.5)
BILIRUB SERPL-MCNC: 0.3 MG/DL (ref 0–1.2)
BILIRUB UR QL STRIP: NEGATIVE
BUN SERPL-MCNC: 18 MG/DL (ref 8–23)
BUN/CREAT SERPL: 13.8 (ref 7–25)
C PNEUM DNA NPH QL NAA+NON-PROBE: NOT DETECTED
CALCIUM SPEC-SCNC: 8.9 MG/DL (ref 8.6–10.5)
CHLORIDE SERPL-SCNC: 95 MMOL/L (ref 98–107)
CLARITY UR: ABNORMAL
CO2 SERPL-SCNC: 23 MMOL/L (ref 22–29)
COLOR UR: YELLOW
CREAT SERPL-MCNC: 1.3 MG/DL (ref 0.57–1)
D-LACTATE SERPL-SCNC: 2.3 MMOL/L (ref 0.5–2)
D-LACTATE SERPL-SCNC: 3.1 MMOL/L (ref 0.5–2)
D-LACTATE SERPL-SCNC: 5.5 MMOL/L (ref 0.5–2)
DEPRECATED RDW RBC AUTO: 49.1 FL (ref 37–54)
EGFRCR SERPLBLD CKD-EPI 2021: 39.4 ML/MIN/1.73
EOSINOPHIL # BLD AUTO: 0 10*3/MM3 (ref 0–0.4)
EOSINOPHIL NFR BLD AUTO: 0 % (ref 0.3–6.2)
ERYTHROCYTE [DISTWIDTH] IN BLOOD BY AUTOMATED COUNT: 12.9 % (ref 12.3–15.4)
FLUAV SUBTYP SPEC NAA+PROBE: NOT DETECTED
FLUBV RNA ISLT QL NAA+PROBE: NOT DETECTED
GEN 5 1HR TROPONIN T REFLEX: 98 NG/L
GLOBULIN UR ELPH-MCNC: 2.8 GM/DL
GLUCOSE SERPL-MCNC: 151 MG/DL (ref 65–99)
GLUCOSE UR STRIP-MCNC: NEGATIVE MG/DL
HADV DNA SPEC NAA+PROBE: NOT DETECTED
HCOV 229E RNA SPEC QL NAA+PROBE: NOT DETECTED
HCOV HKU1 RNA SPEC QL NAA+PROBE: NOT DETECTED
HCOV NL63 RNA SPEC QL NAA+PROBE: NOT DETECTED
HCOV OC43 RNA SPEC QL NAA+PROBE: NOT DETECTED
HCT VFR BLD AUTO: 42.2 % (ref 34–46.6)
HGB BLD-MCNC: 13.4 G/DL (ref 12–15.9)
HGB UR QL STRIP.AUTO: ABNORMAL
HMPV RNA NPH QL NAA+NON-PROBE: NOT DETECTED
HPIV1 RNA ISLT QL NAA+PROBE: NOT DETECTED
HPIV2 RNA SPEC QL NAA+PROBE: NOT DETECTED
HPIV3 RNA NPH QL NAA+PROBE: NOT DETECTED
HPIV4 P GENE NPH QL NAA+PROBE: NOT DETECTED
HYALINE CASTS UR QL AUTO: ABNORMAL /LPF
IMM GRANULOCYTES # BLD AUTO: 0.06 10*3/MM3 (ref 0–0.05)
IMM GRANULOCYTES NFR BLD AUTO: 0.4 % (ref 0–0.5)
KETONES UR QL STRIP: ABNORMAL
LEUKOCYTE ESTERASE UR QL STRIP.AUTO: ABNORMAL
LYMPHOCYTES # BLD AUTO: 0.36 10*3/MM3 (ref 0.7–3.1)
LYMPHOCYTES NFR BLD AUTO: 2.7 % (ref 19.6–45.3)
M PNEUMO IGG SER IA-ACNC: NOT DETECTED
MCH RBC QN AUTO: 33 PG (ref 26.6–33)
MCHC RBC AUTO-ENTMCNC: 31.8 G/DL (ref 31.5–35.7)
MCV RBC AUTO: 103.9 FL (ref 79–97)
MONOCYTES # BLD AUTO: 0.62 10*3/MM3 (ref 0.1–0.9)
MONOCYTES NFR BLD AUTO: 4.6 % (ref 5–12)
NEUTROPHILS NFR BLD AUTO: 12.37 10*3/MM3 (ref 1.7–7)
NEUTROPHILS NFR BLD AUTO: 92.2 % (ref 42.7–76)
NITRITE UR QL STRIP: NEGATIVE
NRBC BLD AUTO-RTO: 0 /100 WBC (ref 0–0.2)
NT-PROBNP SERPL-MCNC: 1247 PG/ML (ref 0–1800)
PH UR STRIP.AUTO: 5.5 [PH] (ref 5–8)
PLATELET # BLD AUTO: 215 10*3/MM3 (ref 140–450)
PMV BLD AUTO: 10.1 FL (ref 6–12)
POTASSIUM SERPL-SCNC: 4.3 MMOL/L (ref 3.5–5.2)
PROCALCITONIN SERPL-MCNC: 0.09 NG/ML (ref 0–0.25)
PROT SERPL-MCNC: 6 G/DL (ref 6–8.5)
PROT UR QL STRIP: ABNORMAL
QT INTERVAL: 406 MS
QTC INTERVAL: 477 MS
RBC # BLD AUTO: 4.06 10*6/MM3 (ref 3.77–5.28)
RBC # UR STRIP: ABNORMAL /HPF
REF LAB TEST METHOD: ABNORMAL
RHINOVIRUS RNA SPEC NAA+PROBE: NOT DETECTED
RSV RNA NPH QL NAA+NON-PROBE: NOT DETECTED
SARS-COV-2 RNA RESP QL NAA+PROBE: NOT DETECTED
SODIUM SERPL-SCNC: 134 MMOL/L (ref 136–145)
SP GR UR STRIP: 1.01 (ref 1–1.03)
SQUAMOUS #/AREA URNS HPF: ABNORMAL /HPF
TROPONIN T % DELTA: 40
TROPONIN T NUMERIC DELTA: 28 NG/L
TROPONIN T SERPL HS-MCNC: 70 NG/L
UROBILINOGEN UR QL STRIP: ABNORMAL
WBC # UR STRIP: ABNORMAL /HPF
WBC NRBC COR # BLD AUTO: 13.43 10*3/MM3 (ref 3.4–10.8)

## 2025-05-19 PROCEDURE — 83605 ASSAY OF LACTIC ACID: CPT | Performed by: EMERGENCY MEDICINE

## 2025-05-19 PROCEDURE — 81001 URINALYSIS AUTO W/SCOPE: CPT | Performed by: EMERGENCY MEDICINE

## 2025-05-19 PROCEDURE — 87040 BLOOD CULTURE FOR BACTERIA: CPT | Performed by: EMERGENCY MEDICINE

## 2025-05-19 PROCEDURE — 87086 URINE CULTURE/COLONY COUNT: CPT | Performed by: EMERGENCY MEDICINE

## 2025-05-19 PROCEDURE — 99223 1ST HOSP IP/OBS HIGH 75: CPT | Performed by: INTERNAL MEDICINE

## 2025-05-19 PROCEDURE — 71275 CT ANGIOGRAPHY CHEST: CPT

## 2025-05-19 PROCEDURE — 25010000002 AZITHROMYCIN PER 500 MG: Performed by: EMERGENCY MEDICINE

## 2025-05-19 PROCEDURE — 87449 NOS EACH ORGANISM AG IA: CPT | Performed by: INTERNAL MEDICINE

## 2025-05-19 PROCEDURE — 25510000001 IOPAMIDOL 61 % SOLUTION: Performed by: EMERGENCY MEDICINE

## 2025-05-19 PROCEDURE — 80053 COMPREHEN METABOLIC PANEL: CPT | Performed by: EMERGENCY MEDICINE

## 2025-05-19 PROCEDURE — 84484 ASSAY OF TROPONIN QUANT: CPT | Performed by: EMERGENCY MEDICINE

## 2025-05-19 PROCEDURE — 87186 SC STD MICRODIL/AGAR DIL: CPT | Performed by: EMERGENCY MEDICINE

## 2025-05-19 PROCEDURE — 84145 PROCALCITONIN (PCT): CPT | Performed by: EMERGENCY MEDICINE

## 2025-05-19 PROCEDURE — 25810000003 LACTATED RINGERS SOLUTION: Performed by: EMERGENCY MEDICINE

## 2025-05-19 PROCEDURE — 85025 COMPLETE CBC W/AUTO DIFF WBC: CPT | Performed by: EMERGENCY MEDICINE

## 2025-05-19 PROCEDURE — 99291 CRITICAL CARE FIRST HOUR: CPT

## 2025-05-19 PROCEDURE — 25810000003 SODIUM CHLORIDE 0.9 % SOLUTION 250 ML FLEX CONT: Performed by: EMERGENCY MEDICINE

## 2025-05-19 PROCEDURE — 25010000002 CEFTRIAXONE PER 250 MG: Performed by: EMERGENCY MEDICINE

## 2025-05-19 PROCEDURE — 93005 ELECTROCARDIOGRAM TRACING: CPT | Performed by: EMERGENCY MEDICINE

## 2025-05-19 PROCEDURE — P9612 CATHETERIZE FOR URINE SPEC: HCPCS

## 2025-05-19 PROCEDURE — 83880 ASSAY OF NATRIURETIC PEPTIDE: CPT | Performed by: EMERGENCY MEDICINE

## 2025-05-19 PROCEDURE — 25010000002 METHYLPREDNISOLONE PER 125 MG: Performed by: EMERGENCY MEDICINE

## 2025-05-19 PROCEDURE — 94640 AIRWAY INHALATION TREATMENT: CPT

## 2025-05-19 PROCEDURE — 0202U NFCT DS 22 TRGT SARS-COV-2: CPT | Performed by: EMERGENCY MEDICINE

## 2025-05-19 PROCEDURE — 87077 CULTURE AEROBIC IDENTIFY: CPT | Performed by: EMERGENCY MEDICINE

## 2025-05-19 PROCEDURE — 36415 COLL VENOUS BLD VENIPUNCTURE: CPT

## 2025-05-19 RX ORDER — AMIODARONE HYDROCHLORIDE 200 MG/1
200 TABLET ORAL DAILY
Status: DISCONTINUED | OUTPATIENT
Start: 2025-05-20 | End: 2025-05-29 | Stop reason: HOSPADM

## 2025-05-19 RX ORDER — WATER 10 ML/10ML
INJECTION INTRAMUSCULAR; INTRAVENOUS; SUBCUTANEOUS
Status: COMPLETED
Start: 2025-05-19 | End: 2025-05-19

## 2025-05-19 RX ORDER — BISACODYL 10 MG
10 SUPPOSITORY, RECTAL RECTAL ONCE
Status: DISCONTINUED | OUTPATIENT
Start: 2025-05-20 | End: 2025-05-25

## 2025-05-19 RX ORDER — METHYLPREDNISOLONE SODIUM SUCCINATE 125 MG/2ML
125 INJECTION, POWDER, LYOPHILIZED, FOR SOLUTION INTRAMUSCULAR; INTRAVENOUS ONCE
Status: COMPLETED | OUTPATIENT
Start: 2025-05-19 | End: 2025-05-19

## 2025-05-19 RX ORDER — POLYETHYLENE GLYCOL 3350 17 G/17G
17 POWDER, FOR SOLUTION ORAL DAILY
Status: DISCONTINUED | OUTPATIENT
Start: 2025-05-20 | End: 2025-05-29 | Stop reason: HOSPADM

## 2025-05-19 RX ORDER — SENNOSIDES 8.6 MG/1
2 TABLET ORAL DAILY
Status: DISCONTINUED | OUTPATIENT
Start: 2025-05-20 | End: 2025-05-26

## 2025-05-19 RX ORDER — VENLAFAXINE HYDROCHLORIDE 75 MG/1
75 CAPSULE, EXTENDED RELEASE ORAL DAILY
Status: DISCONTINUED | OUTPATIENT
Start: 2025-05-20 | End: 2025-05-29 | Stop reason: HOSPADM

## 2025-05-19 RX ORDER — IOPAMIDOL 612 MG/ML
100 INJECTION, SOLUTION INTRAVASCULAR
Status: COMPLETED | OUTPATIENT
Start: 2025-05-19 | End: 2025-05-19

## 2025-05-19 RX ORDER — PREDNISONE 20 MG/1
40 TABLET ORAL
Status: DISCONTINUED | OUTPATIENT
Start: 2025-05-20 | End: 2025-05-24

## 2025-05-19 RX ORDER — DOXYCYCLINE 100 MG/1
100 CAPSULE ORAL EVERY 12 HOURS SCHEDULED
Status: DISCONTINUED | OUTPATIENT
Start: 2025-05-20 | End: 2025-05-24

## 2025-05-19 RX ORDER — IPRATROPIUM BROMIDE AND ALBUTEROL SULFATE 2.5; .5 MG/3ML; MG/3ML
3 SOLUTION RESPIRATORY (INHALATION)
Status: DISCONTINUED | OUTPATIENT
Start: 2025-05-20 | End: 2025-05-29 | Stop reason: HOSPADM

## 2025-05-19 RX ORDER — LEVOTHYROXINE SODIUM 100 UG/1
100 TABLET ORAL DAILY
Status: DISCONTINUED | OUTPATIENT
Start: 2025-05-20 | End: 2025-05-29 | Stop reason: HOSPADM

## 2025-05-19 RX ORDER — LEVETIRACETAM 250 MG/1
250 TABLET ORAL 2 TIMES DAILY
Status: DISCONTINUED | OUTPATIENT
Start: 2025-05-20 | End: 2025-05-29 | Stop reason: HOSPADM

## 2025-05-19 RX ORDER — ACETAMINOPHEN 325 MG/1
650 TABLET ORAL EVERY 8 HOURS PRN
Status: DISCONTINUED | OUTPATIENT
Start: 2025-05-19 | End: 2025-05-29 | Stop reason: HOSPADM

## 2025-05-19 RX ORDER — TRAZODONE HYDROCHLORIDE 50 MG/1
50 TABLET ORAL NIGHTLY
Status: DISCONTINUED | OUTPATIENT
Start: 2025-05-19 | End: 2025-05-19

## 2025-05-19 RX ORDER — ASCORBIC ACID 500 MG
500 TABLET ORAL DAILY
Status: DISCONTINUED | OUTPATIENT
Start: 2025-05-20 | End: 2025-05-29 | Stop reason: HOSPADM

## 2025-05-19 RX ORDER — ROSUVASTATIN CALCIUM 10 MG/1
10 TABLET, COATED ORAL NIGHTLY
Status: DISCONTINUED | OUTPATIENT
Start: 2025-05-20 | End: 2025-05-29 | Stop reason: HOSPADM

## 2025-05-19 RX ORDER — TRAZODONE HYDROCHLORIDE 100 MG/1
100 TABLET ORAL NIGHTLY
Status: DISCONTINUED | OUTPATIENT
Start: 2025-05-20 | End: 2025-05-29 | Stop reason: HOSPADM

## 2025-05-19 RX ORDER — IPRATROPIUM BROMIDE AND ALBUTEROL SULFATE 2.5; .5 MG/3ML; MG/3ML
3 SOLUTION RESPIRATORY (INHALATION) ONCE
Status: COMPLETED | OUTPATIENT
Start: 2025-05-19 | End: 2025-05-19

## 2025-05-19 RX ORDER — LISINOPRIL 10 MG/1
10 TABLET ORAL
Status: DISCONTINUED | OUTPATIENT
Start: 2025-05-20 | End: 2025-05-29 | Stop reason: HOSPADM

## 2025-05-19 RX ADMIN — IOPAMIDOL 85 ML: 612 INJECTION, SOLUTION INTRAVENOUS at 14:45

## 2025-05-19 RX ADMIN — SODIUM CHLORIDE, POTASSIUM CHLORIDE, SODIUM LACTATE AND CALCIUM CHLORIDE 1000 ML: 600; 310; 30; 20 INJECTION, SOLUTION INTRAVENOUS at 15:46

## 2025-05-19 RX ADMIN — WATER 10 ML: 1 INJECTION INTRAMUSCULAR; INTRAVENOUS; SUBCUTANEOUS at 14:02

## 2025-05-19 RX ADMIN — AZITHROMYCIN DIHYDRATE 500 MG: 500 INJECTION, POWDER, LYOPHILIZED, FOR SOLUTION INTRAVENOUS at 15:47

## 2025-05-19 RX ADMIN — SODIUM CHLORIDE 2 G: 900 INJECTION INTRAVENOUS at 17:37

## 2025-05-19 RX ADMIN — IPRATROPIUM BROMIDE AND ALBUTEROL SULFATE 3 ML: 2.5; .5 SOLUTION RESPIRATORY (INHALATION) at 12:42

## 2025-05-19 RX ADMIN — TRAZODONE HYDROCHLORIDE 50 MG: 50 TABLET ORAL at 21:45

## 2025-05-19 RX ADMIN — METHYLPREDNISOLONE SODIUM SUCCINATE 125 MG: 125 INJECTION, POWDER, FOR SOLUTION INTRAMUSCULAR; INTRAVENOUS at 14:02

## 2025-05-19 NOTE — H&P
[facility] Medicine Services  HISTORY AND PHYSICAL    Patient Name: Bibiana Hodges  : 1935  MRN: [id number]  Primary Care Physician: Nivia Madrigal MD    Subjective   Subjective     Chief Complaint:respiratory distress    HPI:     The patient is an 89-year-old woman with a history of atrial fibrillation, hypertension, hyperlipidemia, gastroesophageal reflux disease, and B12 deficiency. She was brought to the emergency room by EMS from [facility] at [facility] after experiencing respiratory distress.    EMS found her to have a room air saturation of 83%. She was placed on a nonrebreather and transported to the emergency room. In the emergency room, she received Rocephin, Zithromax, and 125 mg of IV Solu-Medrol. Her blood pressure was 114/79, and her pulse was in the 60s. She required 6 L of oxygen. She reports feeling unwell overnight, experiencing shortness of breath that worsened this morning, necessitating a breathing treatment and oxygen. Since then, her condition has slightly improved, but she continues to have a cough.    She also reports vomiting and constipation, with no bowel movement in the past 2 weeks. She does not have any fever, chills, chest pain, or heaviness.    Additionally, she has been experiencing double vision for the past 2 years and has not undergone an MRI.    She exhibits significant tremors, particularly in her right hand, more than her left hand and left foot, describing a lack of control over these movements.    Her appetite has been poor due to dissatisfaction with the food at her facility. She reports that she is full code.    SOCIAL HISTORY  She has 7 children. 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    MEDICATIONS  Current: Rocephin, Zithromax, Solu-Medrol      Personal History         PMH: She  has a past medical history of Constipation, Depression, Dysautonomia orthostatic hypotension syndrome, Generalized osteoarthritis,  GERD (gastroesophageal reflux disease), Hypertension, Insomnia, Osteoarthrosis, shoulder region, Skin ulcer of scalp, Thrombophlebitis of arm, Tremor, and Vitamin B12 deficiency.   PSxH: She  has a past surgical history that includes Hernia repair; Dilation and curettage of uterus; Hip surgery; and Shoulder surgery.         FH: Her family history includes Anorexia nervosa in her daughter; Cancer in her mother; Stroke in her brother.   SH: She  reports that she has never smoked. She has never used smokeless tobacco. She reports that she does not drink alcohol and does not use drugs.     Medications:  Vitamin C, acetaminophen, albuterol, amiodarone, apixaban, artificial tears, benzonatate, bisacodyl, castor oil-balsam peru, ferrous sulfate, fluticasone, levETIRAcetam XR, levothyroxine, lisinopril, meclizine, melatonin, metoprolol tartrate, ondansetron ODT, pantoprazole, polyethylene glycol, rosuvastatin, senna, traZODone, and venlafaxine XR    Allergies   Allergen Reactions    Sulfa Antibiotics Hives    Gabapentin        Objective   Objective     Vital Signs:   Temp:  [98.7 °F (37.1 °C)-98.9 °F (37.2 °C)] 98.7 °F (37.1 °C)  Heart Rate:  [60-83] 60  Resp:  [20] 20  BP: ()/() 133/81  Flow (L/min) (Oxygen Therapy):  [4-15] 4    Constitutional: Awake, alert, interactive and pleasant, thin and frail but bright  Eyes: clear sclerae, no conjunctival injection  HENT: NCAT, mucous membranes moist  Neck: no masses or lymphadenopathy, trachea midline  Respiratory: coarse breath sounds in the bases  Cardiovascular: RRR, no murmurs appreciated, palpable peripheral pulses  Abdomen:  soft, no HSM or masses palpable, not tender or distended  Musculoskeletal: No peripheral edema, clubbing or cyanosis  Neurologic: Oriented x 3,                       Strength symmetric in all extremities- generally weak, coarse tremor in her right>left arm                     Cranial Nerves grossly intact, speech clear  Skin: No rashes or  jaundice  Psychiatric: Appropriate mood, insight           Result Review:  I have personally reviewed the results from the time of this admission   to 5/19/2025 23:31 EDT and agree with these findings:    [x]  Laboratory  [x]  Microbiology  [x]  Radiology  [x]  EKG/Telemetry   []  Cardiology/Vascular   []  Pathology  [x]  Old records  []  Other:  Most notable findings include:     Laboratory Studies  Creatinine 1.30. Glucose 151. Lactic acid 5.5. White count 13.4 with a left shift. UA had too numerous scatt, white cells, no bacteria seen, but and nitrate negative.    Imaging  CT angiogram did not show any pulmonary embolism, but evidence of pneumonia.    Testing  EKG did not show any ischemia. EKG showed very poor tracing with atrial fibrillation evidence in the anterolateral infarct sided prior to May 2023.    LAB RESULTS:      Lab 05/19/25 2039 05/19/25  1734 05/19/25  1348   WBC  --   --  13.43*   HEMOGLOBIN  --   --  13.4   HEMATOCRIT  --   --  42.2   PLATELETS  --   --  215   NEUTROS ABS  --   --  12.37*   IMMATURE GRANS (ABS)  --   --  0.06*   LYMPHS ABS  --   --  0.36*   MONOS ABS  --   --  0.62   EOS ABS  --   --  0.00   MCV  --   --  103.9*   PROCALCITONIN  --   --  0.09   LACTATE 3.1* 2.3* 5.5*         Lab 05/19/25  1348   SODIUM 134*   POTASSIUM 4.3   CHLORIDE 95*   CO2 23.0   ANION GAP 16.0*   BUN 18   CREATININE 1.30*   EGFR 39.4*   GLUCOSE 151*   CALCIUM 8.9         Lab 05/19/25  1348   TOTAL PROTEIN 6.0   ALBUMIN 3.2*   GLOBULIN 2.8   ALT (SGPT) 28   AST (SGOT) 43*   BILIRUBIN 0.3   ALK PHOS 74         Lab 05/19/25  1547 05/19/25  1348   PROBNP  --  1,247.0   HSTROP T 98* 70*                 Brief Urine Lab Results  (Last result in the past 365 days)        Color   Clarity   Blood   Leuk Est   Nitrite   Protein   CREAT   Urine HCG        05/19/25 1428 Yellow   Turbid   Moderate (2+)   Large (3+)   Negative   100 mg/dL (2+)                 COVID19   Date Value Ref Range Status   05/19/2025 Not  Detected Not Detected - Ref. Range Final       CT Angiogram Chest  Result Date: 5/19/2025  CT ANGIOGRAM CHEST Date of Exam: 5/19/2025 2:38 PM EDT Indication: sob, hypoxia. Comparison: 4/20/2024 Technique: CTA of the chest was performed after the uneventful intravenous administration of 85 mL Isovue-300. Reconstructed coronal and sagittal images were also obtained. In addition, a 3-D volume rendered image was created for interpretation. Automated exposure control and iterative reconstruction methods were used. FINDINGS: Thoracic inlet: Unremarkable. Pulmonary arteries: No filling defects are identified within the pulmonary arteries to suggest acute pulmonary embolism. Great vessels: Atherosclerotic plaque is seen within the thoracic aorta and proximal arch vessels Mediastinum/Merari: No pathologically enlarged mediastinal lymph nodes are seen. Moderate hiatal hernia. Esophagus appears otherwise unremarkable Lung parenchyma/pleura: Small left and trace right pleural effusions with bibasilar atelectasis. Scattered lung parenchymal scarring noted bilaterally. Suggestion of centrilobular/tree-in-bud nodularity within the right lower lobe which may indicate atypical infectious process. Right lung granuloma. No pneumothorax is seen. Trachea and airways: The trachea and central airways appear unremarkable. Pleural space: No significant pleural effusion or pneumothorax is seen. Heart and pericardium: Cardiac pacemaker leads noted. Coronary artery calcifications. Otherwise, the heart and pericardium appear unremarkable Chest wall: No acute osseous lesion is identified. Bones are demineralized. There are degenerative changes within the skeletal structures. Bilateral shoulder arthroplasties are noted. Left chest wall pacemaker noted. Upper abdomen: No acute abnormality is identified within the visualized upper abdomen. Probable bilateral renal cysts.     Impression: 1.No evidence of acute pulmonary embolism. 2.Suggestion of  centrilobular/tree-in-bud nodularity within the right lower lobe which may indicate atypical infectious process. 3.Small left and trace right pleural effusions with bibasilar atelectasis. 4.Scattered bilateral lung parenchymal scarring. Electronically Signed: Slim Mauro MD  5/19/2025 3:04 PM EDT  Workstation ID: [id number]      Results for orders placed during the hospital encounter of 08/20/23    Adult Transthoracic Echo Complete W/ Cont if Necessary Per Protocol    Interpretation Summary    Left ventricular systolic function is mildly decreased. Calculated left ventricular EF = 47.9% Normal left ventricular cavity size and wall thickness noted. There is left ventricular global hypokinesis noted. Left ventricular diastolic function is consistent with (grade I) impaired relaxation.    : Normal right ventricular cavity size and systolic function noted. Electronic lead present in the ventricle.    Normal left atrial size and volume noted. Saline test results are negative.    There is moderate calcification of the aortic valve. The aortic valve was poorly visualized but appears trileaflet. Moderate aortic valve regurgitation is present. No aortic valve stenosis is present.    Mild mitral annular calcification is present. Mild mitral valve regurgitation is present. No significant mitral valve stenosis is present.    The tricuspid valve is grossly normal in structure. Mild tricuspid valve regurgitation is present. Estimated right ventricular systolic pressure from tricuspid regurgitation is normal, 33 mmHg. No tricuspid valve stenosis is present.    Mild dilation of the ascending aorta is present. Ascending aorta = 3.7 cm    There is a small (<1cm) pericardial effusion adjacent to the right atrium and right ventricle.      The patient has started, but not completed, their COVID-19 vaccination series.    Assessment & Plan   Assessment / Plan     Pneumonia    Hypertension    Atrial fibrillation    Autonomic  dysfunction    Hyperlipidemia      Assessment & Plan:  88 yo with HO HTN, Afib, HLP who presented from assisted living with respiratory distress and pneumonia      Pneumonia:-- possibly aspiration  - CT angiogram showed evidence of pneumonia  - Continue on Rocephin and Zithromax, check antigens and sputum culture  - Maintain intravenous steroids and frequent nebulizer treatments  - Wean oxygen as tolerated    Abnormal troponin levels:  - Troponin levels were abnormal- probably related to hypoxia and distress no chest pain  - Administer aspirin  - Trend troponin levels    Tremor:  - Reports significant tremors, especially in right hand  - Consult neurology for further evaluation    Double vision:  - Reports having double vision for 2 years  - Consult neurology for further evaluation  - Consider MRI ( she has a pacemaker)    Constipation:  - Reports not having a bowel movement in 2 weeks  - Initiate bowel regimen, likely requiring stimulation from below    COPD with acute hypoxic respiratory failure:  - Presented with room air saturation of 83% and required 6 L of oxygen  - Continue monitoring and adjust oxygen as needed    Full code status:  - Expressed desire to remain full code      VTE Prophylaxis:  No VTE prophylaxis order currently exists.        CODE STATUS:FULL CODE     Expected Discharge  Expected Discharge Date: 5/22/2025; Expected Discharge Time:     Electronically signed by Aileen Doherty MD 05/19/25 23:31 EDT  Patient or patient representative verbalized consent for the use of Ambient Listening during the visit for chart documentation.

## 2025-05-19 NOTE — ED NOTES
Bibiana Hodges    Nursing Report ED to Floor:  Mental status: alert and oriented x 4  Ambulatory status: assist x 2  Oxygen Therapy:  5LNC  Cardiac Rhythm: a fib  Admitted from: [facility]  Safety Concerns:  fall risk  Precautions: none  Social Issues: none  ED Room #:  3    ED Nurse Phone Extension - [phone] or may call [phone].      HPI:   Chief Complaint   Patient presents with    Shortness of Breath       Past Medical History:  Past Medical History:   Diagnosis Date    Constipation     Depression     Dysautonomia orthostatic hypotension syndrome     Generalized osteoarthritis     GERD (gastroesophageal reflux disease)     s/p Nissen    Hypertension     Insomnia     Osteoarthrosis, shoulder region     Skin ulcer of scalp     Thrombophlebitis of arm     Tremor     Vitamin B12 deficiency         Past Surgical History:  Past Surgical History:   Procedure Laterality Date    DILATATION AND CURETTAGE      HERNIA REPAIR      HIP SURGERY      SHOULDER SURGERY      Right        Admitting Doctor:   Aileen Doherty MD    Consulting Provider(s):  Consults       No orders found from 4/20/2025 to 5/20/2025.             Admitting Diagnosis:   There were no encounter diagnoses.    Most Recent Vitals:   Vitals:    05/19/25 1600 05/19/25 1630 05/19/25 1700 05/19/25 1730   BP:   (!) 143/107 160/82   BP Location:       Patient Position:       Pulse: 60 60 63 76   Resp:       Temp:       TempSrc:       SpO2: 95% 97% 100% 97%   Weight:       Height:           Active LDAs/IV Access:   Lines, Drains & Airways       Active LDAs       Name Placement date Placement time Site Days    Peripheral IV 05/19/25 1359 20 G Anterior;Left Forearm 05/19/25  1359  Forearm  less than 1    External Urinary Catheter 01/29/25  2300  --  109                    Labs (abnormal labs have a star):   Labs Reviewed   COMPREHENSIVE METABOLIC PANEL - Abnormal; Notable for the following components:       Result Value    Glucose 151 (*)     Creatinine 1.30 (*)      Sodium 134 (*)     Chloride 95 (*)     Albumin 3.2 (*)     AST (SGOT) 43 (*)     Anion Gap 16.0 (*)     eGFR 39.4 (*)     All other components within normal limits    Narrative:     GFR Categories in Chronic Kidney Disease (CKD)              GFR Category          GFR (mL/min/1.73)    Interpretation  G1                    90 or greater        Normal or high (1)  G2                    60-89                Mild decrease (1)  G3a                   45-59                Mild to moderate decrease  G3b                   30-44                Moderate to severe decrease  G4                    15-29                Severe decrease  G5                    14 or less           Kidney failure    (1)In the absence of evidence of kidney disease, neither GFR category G1 or G2 fulfill the criteria for CKD.    eGFR calculation 2021 CKD-EPI creatinine equation, which does not include race as a factor   URINALYSIS W/ CULTURE IF INDICATED - Abnormal; Notable for the following components:    Appearance, UA Turbid (*)     Ketones, UA Trace (*)     Blood, UA Moderate (2+) (*)     Protein,  mg/dL (2+) (*)     Leuk Esterase, UA Large (3+) (*)     All other components within normal limits    Narrative:     In absence of clinical symptoms, the presence of pyuria, bacteria, and/or nitrites on the urinalysis result does not correlate with infection.   TROPONIN - Abnormal; Notable for the following components:    HS Troponin T 70 (*)     All other components within normal limits    Narrative:     High Sensitive Troponin T Reference Range:  <14.0 ng/L- Negative Female for AMI  <22.0 ng/L- Negative Male for AMI  >=14 - Abnormal Female indicating possible myocardial injury.  >=22 - Abnormal Male indicating possible myocardial injury.   Clinicians would have to utilize clinical acumen, EKG, Troponin, and serial changes to determine if it is an Acute Myocardial Infarction or myocardial injury due to an underlying chronic condition.        LACTIC  ACID, PLASMA - Abnormal; Notable for the following components:    Lactate 5.5 (*)     All other components within normal limits   CBC WITH AUTO DIFFERENTIAL - Abnormal; Notable for the following components:    WBC 13.43 (*)     .9 (*)     Neutrophil % 92.2 (*)     Lymphocyte % 2.7 (*)     Monocyte % 4.6 (*)     Eosinophil % 0.0 (*)     Neutrophils, Absolute 12.37 (*)     Lymphocytes, Absolute 0.36 (*)     Immature Grans, Absolute 0.06 (*)     All other components within normal limits   URINALYSIS, MICROSCOPIC ONLY - Abnormal; Notable for the following components:    RBC, UA 6-10 (*)     WBC, UA Too Numerous to Count (*)     Squamous Epithelial Cells, UA 3-6 (*)     All other components within normal limits   HIGH SENSITIVITIY TROPONIN T 1HR - Abnormal; Notable for the following components:    HS Troponin T 98 (*)     Troponin T Numeric Delta 28 (*)     Troponin T % Delta 40 (*)     All other components within normal limits    Narrative:     High Sensitive Troponin T Reference Range:  <14.0 ng/L- Negative Female for AMI  <22.0 ng/L- Negative Male for AMI  >=14 - Abnormal Female indicating possible myocardial injury.  >=22 - Abnormal Male indicating possible myocardial injury.   Clinicians would have to utilize clinical acumen, EKG, Troponin, and serial changes to determine if it is an Acute Myocardial Infarction or myocardial injury due to an underlying chronic condition.        RESPIRATORY PANEL PCR W/ COVID-19 (SARS-COV-2), NP SWAB IN UTM/VTP, 2 HR TAT - Normal    Narrative:     In the setting of a positive respiratory panel with a viral infection PLUS a negative procalcitonin without other underlying concern for bacterial infection, consider observing off antibiotics or discontinuation of antibiotics and continue supportive care. If the respiratory panel is positive for atypical bacterial infection (Bordetella pertussis, Chlamydophila pneumoniae, or Mycoplasma pneumoniae), consider antibiotic de-escalation  "to target atypical bacterial infection.   BNP (IN-HOUSE) - Normal    Narrative:     This assay is used as an aid in the diagnosis of individuals suspected of having heart failure. It can be used as an aid in the diagnosis of acute decompensated heart failure (ADHF) in patients presenting with signs and symptoms of ADHF to the emergency department (ED). In addition, NT-proBNP of <300 pg/mL indicates ADHF is not likely.    Age Range Result Interpretation  NT-proBNP Concentration (pg/mL:      <50             Positive            >450                   Gray                 300-450                    Negative             <300    50-75           Positive            >900                  Gray                300-900                  Negative            <300      >75             Positive            >1800                  Gray                300-1800                  Negative            <300   PROCALCITONIN - Normal    Narrative:     As a Marker for Sepsis (Non-Neonates):    1. <0.5 ng/mL represents a low risk of severe sepsis and/or septic shock.  2. >2 ng/mL represents a high risk of severe sepsis and/or septic shock.    As a Marker for Lower Respiratory Tract Infections that require antibiotic therapy:    PCT on Admission    Antibiotic Therapy       6-12 Hrs later    >0.5                Strongly Recommended  >0.25 - <0.5        Recommended   0.1 - 0.25          Discouraged              Remeasure/reassess PCT  <0.1                Strongly Discouraged     Remeasure/reassess PCT    As 28 day mortality risk marker: \"Change in Procalcitonin Result\" (>80% or <=80%) if Day 0 (or Day 1) and Day 4 values are available. Refer to http://www.[employer]-pct-calculator.com    Change in PCT <=80%  A decrease of PCT levels below or equal to 80% defines a positive change in PCT test result representing a higher risk for 28-day all-cause mortality of patients diagnosed with severe sepsis for septic shock.    Change in PCT >80%  A decrease of PCT " levels of more than 80% defines a negative change in PCT result representing a lower risk for 28-day all-cause mortality of patients diagnosed with severe sepsis or septic shock.      BLOOD CULTURE   BLOOD CULTURE   URINE CULTURE   LACTIC ACID, REFLEX   CBC AND DIFFERENTIAL    Narrative:     The following orders were created for panel order CBC & Differential.  Procedure                               Abnormality         Status                     ---------                               -----------         ------                     CBC Auto Differential[[id number]]        Abnormal            Final result                 Please view results for these tests on the individual orders.       Meds Given in ED:   Medications   cefTRIAXone (ROCEPHIN) 2 g in sodium chloride 0.9 % 100 mL MBP (has no administration in time range)   methylPREDNISolone sodium succinate (SOLU-Medrol) injection 125 mg (125 mg Intravenous Given 5/19/25 1402)   ipratropium-albuterol (DUO-NEB) nebulizer solution 3 mL (3 mL Nebulization Given 5/19/25 1242)   sterile water (preservative free) injection (10 mL  Given 5/19/25 1402)   iopamidol (ISOVUE-300) 61 % injection 100 mL (85 mL Intravenous Given 5/19/25 1445)   lactated ringers bolus 1,000 mL (0 mL Intravenous Stopped 5/19/25 1653)   azithromycin (ZITHROMAX) 500 mg in sodium chloride 0.9 % 250 mL IVPB-VTB (0 mg Intravenous Stopped 5/19/25 1654)           Last NIH score:                                                          Dysphagia screening results:  Patient Factors Component (Dysphagia:Stroke or Rule-out)  Best Eye Response: 4-->(E4) spontaneous (05/19/25 1250)  Best Motor Response: 6-->(M6) obeys commands (05/19/25 1250)  Best Verbal Response: 5-->(V5) oriented (05/19/25 1250)  Chanel Coma Scale Score: 15 (05/19/25 1250)     Chanel Coma Scale:  No data recorded     CIWA:        Restraint Type:            Isolation Status:  No active isolations

## 2025-05-20 ENCOUNTER — APPOINTMENT (OUTPATIENT)
Dept: GENERAL RADIOLOGY | Facility: HOSPITAL | Age: OVER 89
End: 2025-05-20
Payer: MEDICARE

## 2025-05-20 LAB
CK SERPL-CCNC: 53 U/L (ref 20–180)
D-LACTATE SERPL-SCNC: 1.3 MMOL/L (ref 0.5–2)
L PNEUMO1 AG UR QL IA: NEGATIVE
S PNEUM AG SPEC QL LA: NEGATIVE

## 2025-05-20 PROCEDURE — 97530 THERAPEUTIC ACTIVITIES: CPT

## 2025-05-20 PROCEDURE — 87205 SMEAR GRAM STAIN: CPT | Performed by: INTERNAL MEDICINE

## 2025-05-20 PROCEDURE — 99222 1ST HOSP IP/OBS MODERATE 55: CPT

## 2025-05-20 PROCEDURE — 25010000002 CEFTRIAXONE PER 250 MG: Performed by: INTERNAL MEDICINE

## 2025-05-20 PROCEDURE — 82550 ASSAY OF CK (CPK): CPT

## 2025-05-20 PROCEDURE — 97162 PT EVAL MOD COMPLEX 30 MIN: CPT

## 2025-05-20 PROCEDURE — 94799 UNLISTED PULMONARY SVC/PX: CPT

## 2025-05-20 PROCEDURE — 87070 CULTURE OTHR SPECIMN AEROBIC: CPT | Performed by: INTERNAL MEDICINE

## 2025-05-20 PROCEDURE — 99232 SBSQ HOSP IP/OBS MODERATE 35: CPT | Performed by: INTERNAL MEDICINE

## 2025-05-20 PROCEDURE — 83605 ASSAY OF LACTIC ACID: CPT | Performed by: EMERGENCY MEDICINE

## 2025-05-20 PROCEDURE — 63710000001 PREDNISONE PER 1 MG: Performed by: INTERNAL MEDICINE

## 2025-05-20 PROCEDURE — 94664 DEMO&/EVAL PT USE INHALER: CPT

## 2025-05-20 PROCEDURE — 97165 OT EVAL LOW COMPLEX 30 MIN: CPT

## 2025-05-20 RX ORDER — METOPROLOL SUCCINATE 25 MG/1
25 TABLET, EXTENDED RELEASE ORAL DAILY
COMMUNITY

## 2025-05-20 RX ADMIN — ACETAMINOPHEN 650 MG: 325 TABLET, FILM COATED ORAL at 22:08

## 2025-05-20 RX ADMIN — APIXABAN 2.5 MG: 2.5 TABLET, FILM COATED ORAL at 22:01

## 2025-05-20 RX ADMIN — LEVETIRACETAM 250 MG: 250 TABLET, FILM COATED ORAL at 22:01

## 2025-05-20 RX ADMIN — IPRATROPIUM BROMIDE AND ALBUTEROL SULFATE 3 ML: 2.5; .5 SOLUTION RESPIRATORY (INHALATION) at 11:26

## 2025-05-20 RX ADMIN — POLYETHYLENE GLYCOL 3350 17 G: 17 POWDER, FOR SOLUTION ORAL at 08:34

## 2025-05-20 RX ADMIN — SODIUM CHLORIDE 1000 MG: 900 INJECTION INTRAVENOUS at 08:34

## 2025-05-20 RX ADMIN — Medication 5 MG: at 00:20

## 2025-05-20 RX ADMIN — Medication 12.5 MG: at 00:19

## 2025-05-20 RX ADMIN — ROSUVASTATIN 10 MG: 10 TABLET, FILM COATED ORAL at 00:19

## 2025-05-20 RX ADMIN — TRAZODONE HYDROCHLORIDE 100 MG: 100 TABLET ORAL at 22:01

## 2025-05-20 RX ADMIN — LEVETIRACETAM 250 MG: 250 TABLET, FILM COATED ORAL at 00:20

## 2025-05-20 RX ADMIN — LEVOTHYROXINE SODIUM 100 MCG: 0.1 TABLET ORAL at 06:11

## 2025-05-20 RX ADMIN — ACETAMINOPHEN 650 MG: 325 TABLET, FILM COATED ORAL at 13:35

## 2025-05-20 RX ADMIN — LEVETIRACETAM 250 MG: 250 TABLET, FILM COATED ORAL at 12:26

## 2025-05-20 RX ADMIN — AMIODARONE HYDROCHLORIDE 200 MG: 200 TABLET ORAL at 08:33

## 2025-05-20 RX ADMIN — Medication 12.5 MG: at 12:26

## 2025-05-20 RX ADMIN — DOXYCYCLINE 100 MG: 100 CAPSULE ORAL at 22:00

## 2025-05-20 RX ADMIN — VENLAFAXINE HYDROCHLORIDE 75 MG: 75 CAPSULE, EXTENDED RELEASE ORAL at 08:34

## 2025-05-20 RX ADMIN — OXYCODONE HYDROCHLORIDE AND ACETAMINOPHEN 500 MG: 500 TABLET ORAL at 08:34

## 2025-05-20 RX ADMIN — ACETAMINOPHEN 650 MG: 325 TABLET, FILM COATED ORAL at 00:20

## 2025-05-20 RX ADMIN — Medication 5 MG: at 22:07

## 2025-05-20 RX ADMIN — IPRATROPIUM BROMIDE AND ALBUTEROL SULFATE 3 ML: 2.5; .5 SOLUTION RESPIRATORY (INHALATION) at 16:02

## 2025-05-20 RX ADMIN — ROSUVASTATIN 10 MG: 10 TABLET, FILM COATED ORAL at 22:08

## 2025-05-20 RX ADMIN — DOXYCYCLINE 100 MG: 100 CAPSULE ORAL at 08:33

## 2025-05-20 RX ADMIN — APIXABAN 2.5 MG: 2.5 TABLET, FILM COATED ORAL at 00:20

## 2025-05-20 RX ADMIN — Medication 12.5 MG: at 22:00

## 2025-05-20 RX ADMIN — PREDNISONE 40 MG: 20 TABLET ORAL at 08:34

## 2025-05-20 RX ADMIN — IPRATROPIUM BROMIDE AND ALBUTEROL SULFATE 3 ML: 2.5; .5 SOLUTION RESPIRATORY (INHALATION) at 07:31

## 2025-05-20 RX ADMIN — STANDARDIZED SENNA CONCENTRATE 2 TABLET: 8.6 TABLET ORAL at 08:34

## 2025-05-20 RX ADMIN — LISINOPRIL 10 MG: 10 TABLET ORAL at 08:33

## 2025-05-20 RX ADMIN — APIXABAN 2.5 MG: 2.5 TABLET, FILM COATED ORAL at 12:26

## 2025-05-20 NOTE — NURSING NOTE
Attempted to call [facility] [employer] @ [phone] for Medication list. Unable to reach someone. Left voicemail for a call back at [phone].

## 2025-05-20 NOTE — PROGRESS NOTES
[facility] Medicine Services  PROGRESS NOTE    Patient Name: Bibiana Hodges  : 1935  MRN: [id number]    Date of Admission: 2025  Primary Care Physician: Nivia Madrigal MD    Subjective   Subjective     CC:  SOB    HPI:  Resting in bed in no acute distress and tells me that her breathing has improved significantly.  However, complains of severe weakness and also coarse tremor of her right arm.  She also has severe constipation and tells me that she has not had a bowel movement for at least about a week.  No fever or chills.  No chest pain or palpitation.  No nausea vomiting or diarrhea.      Objective   Objective     Vital Signs:   Temp:  [98.3 °F (36.8 °C)-98.7 °F (37.1 °C)] 98.3 °F (36.8 °C)  Heart Rate:  [60-76] 63  Resp:  [18-20] 18  BP: ()/() 118/77  Flow (L/min) (Oxygen Therapy):  [3-4] 3     Physical Exam:  Constitutional: No acute distress, awake, alert  HENT: NCAT, mucous membranes moist  Respiratory: Clear to auscultation bilaterally, respiratory effort normal, currently on 4 L of nasal cannula and doing above 98%.  Cardiovascular: RRR, no murmurs, rubs, or gallops  Gastrointestinal: Positive bowel sounds, soft, nontender, nondistended  Musculoskeletal: No bilateral ankle edema, low muscle mass  Psychiatric: Appropriate affect, cooperative  Neurologic: Oriented x 3, speech clear, coarse tremors of right upper limb  Skin: No rashes     Results Reviewed:  LAB RESULTS:      Lab 25  0004 25  2039 25  1734 25  1547 25  1348   WBC  --   --   --   --  13.43*   HEMOGLOBIN  --   --   --   --  13.4   HEMATOCRIT  --   --   --   --  42.2   PLATELETS  --   --   --   --  215   NEUTROS ABS  --   --   --   --  12.37*   IMMATURE GRANS (ABS)  --   --   --   --  0.06*   LYMPHS ABS  --   --   --   --  0.36*   MONOS ABS  --   --   --   --  0.62   EOS ABS  --   --   --   --  0.00   MCV  --   --   --   --  103.9*   PROCALCITONIN  --   --    --   --  0.09   LACTATE 1.3 3.1* 2.3*  --  5.5*   HSTROP T  --   --   --  98* 70*         Lab 05/19/25  1348   SODIUM 134*   POTASSIUM 4.3   CHLORIDE 95*   CO2 23.0   ANION GAP 16.0*   BUN 18   CREATININE 1.30*   EGFR 39.4*   GLUCOSE 151*   CALCIUM 8.9         Lab 05/19/25  1348   TOTAL PROTEIN 6.0   ALBUMIN 3.2*   GLOBULIN 2.8   ALT (SGPT) 28   AST (SGOT) 43*   BILIRUBIN 0.3   ALK PHOS 74         Lab 05/19/25  1547 05/19/25  1348   PROBNP  --  1,247.0   HSTROP T 98* 70*                 Brief Urine Lab Results  (Last result in the past 365 days)        Color   Clarity   Blood   Leuk Est   Nitrite   Protein   CREAT   Urine HCG        05/19/25 1428 Yellow   Turbid   Moderate (2+)   Large (3+)   Negative   100 mg/dL (2+)                   Microbiology Results Abnormal       Procedure Component Value - Date/Time    Urine Culture - Urine, Urine, Catheter In/Out [[id number]]  (Abnormal) Collected: 05/19/25 1428    Lab Status: Preliminary result Specimen: Urine, Catheter In/Out Updated: 05/20/25 1043     Urine Culture 50,000 CFU/mL Gram Negative Bacilli    Narrative:      Colonization of the urinary tract without infection is common. Treatment is discouraged unless the patient is symptomatic, pregnant, or undergoing an invasive urologic procedure.            CT Angiogram Chest  Result Date: 5/19/2025  CT ANGIOGRAM CHEST Date of Exam: 5/19/2025 2:38 PM EDT Indication: sob, hypoxia. Comparison: 4/20/2024 Technique: CTA of the chest was performed after the uneventful intravenous administration of 85 mL Isovue-300. Reconstructed coronal and sagittal images were also obtained. In addition, a 3-D volume rendered image was created for interpretation. Automated exposure control and iterative reconstruction methods were used. FINDINGS: Thoracic inlet: Unremarkable. Pulmonary arteries: No filling defects are identified within the pulmonary arteries to suggest acute pulmonary embolism. Great vessels: Atherosclerotic plaque is seen  within the thoracic aorta and proximal arch vessels Mediastinum/Merari: No pathologically enlarged mediastinal lymph nodes are seen. Moderate hiatal hernia. Esophagus appears otherwise unremarkable Lung parenchyma/pleura: Small left and trace right pleural effusions with bibasilar atelectasis. Scattered lung parenchymal scarring noted bilaterally. Suggestion of centrilobular/tree-in-bud nodularity within the right lower lobe which may indicate atypical infectious process. Right lung granuloma. No pneumothorax is seen. Trachea and airways: The trachea and central airways appear unremarkable. Pleural space: No significant pleural effusion or pneumothorax is seen. Heart and pericardium: Cardiac pacemaker leads noted. Coronary artery calcifications. Otherwise, the heart and pericardium appear unremarkable Chest wall: No acute osseous lesion is identified. Bones are demineralized. There are degenerative changes within the skeletal structures. Bilateral shoulder arthroplasties are noted. Left chest wall pacemaker noted. Upper abdomen: No acute abnormality is identified within the visualized upper abdomen. Probable bilateral renal cysts.     Impression: 1.No evidence of acute pulmonary embolism. 2.Suggestion of centrilobular/tree-in-bud nodularity within the right lower lobe which may indicate atypical infectious process. 3.Small left and trace right pleural effusions with bibasilar atelectasis. 4.Scattered bilateral lung parenchymal scarring. Electronically Signed: Slim Mauro MD  5/19/2025 3:04 PM EDT  Workstation ID: [id number]      Results for orders placed during the hospital encounter of 08/20/23    Adult Transthoracic Echo Complete W/ Cont if Necessary Per Protocol    Interpretation Summary    Left ventricular systolic function is mildly decreased. Calculated left ventricular EF = 47.9% Normal left ventricular cavity size and wall thickness noted. There is left ventricular global hypokinesis noted. Left ventricular  diastolic function is consistent with (grade I) impaired relaxation.    : Normal right ventricular cavity size and systolic function noted. Electronic lead present in the ventricle.    Normal left atrial size and volume noted. Saline test results are negative.    There is moderate calcification of the aortic valve. The aortic valve was poorly visualized but appears trileaflet. Moderate aortic valve regurgitation is present. No aortic valve stenosis is present.    Mild mitral annular calcification is present. Mild mitral valve regurgitation is present. No significant mitral valve stenosis is present.    The tricuspid valve is grossly normal in structure. Mild tricuspid valve regurgitation is present. Estimated right ventricular systolic pressure from tricuspid regurgitation is normal, 33 mmHg. No tricuspid valve stenosis is present.    Mild dilation of the ascending aorta is present. Ascending aorta = 3.7 cm    There is a small (<1cm) pericardial effusion adjacent to the right atrium and right ventricle.      Current medications:  Scheduled Meds:amiodarone, 200 mg, Oral, Daily  apixaban, 2.5 mg, Oral, BID  ascorbic acid, 500 mg, Oral, Daily  bisacodyl, 10 mg, Rectal, Once  cefTRIAXone, 1,000 mg, Intravenous, Q24H  doxycycline, 100 mg, Oral, Q12H  ipratropium-albuterol, 3 mL, Nebulization, 4x Daily - RT  levETIRAcetam, 250 mg, Oral, BID  levothyroxine, 100 mcg, Oral, Daily  lisinopril, 10 mg, Oral, Q24H  melatonin, 5 mg, Oral, Nightly  metoprolol tartrate, 12.5 mg, Oral, BID  polyethylene glycol, 17 g, Oral, Daily  predniSONE, 40 mg, Oral, Daily With Breakfast  rosuvastatin, 10 mg, Oral, Nightly  senna, 2 tablet, Oral, Daily  traZODone, 100 mg, Oral, Nightly  venlafaxine XR, 75 mg, Oral, Daily      Continuous Infusions:   PRN Meds:.  acetaminophen    Assessment & Plan   Assessment & Plan     Active Hospital Problems    Diagnosis  POA    **Pneumonia [J18.9]  Yes    Hyperlipidemia [E78.5]  Yes    Autonomic dysfunction  [G90.9]  Yes    Atrial fibrillation [I48.91]  Yes    Hypertension [I10]  Yes      Resolved Hospital Problems   No resolved problems to display.        Brief Hospital Course to date:  Bibiana Hodges is a 89 y.o. female with past medical history significant for atrial fibrillation, hypertension, hyperlipidemia, GERD, B12 deficiency.  She is living at an assisted living facility.  Patient was brought by ambulance for respiratory distress.    Pneumonia:-- possibly aspiration  - CT angiogram showed evidence of pneumonia  - Respiration has improved significantly.  Will continue current treatment.    Abnormal troponin levels:  - Troponin levels were abnormal but no chest pain    Tremor:  - Reports significant tremors, especially in right hand  - Consult neurology for further evaluation    Double vision:  - Reports having double vision for 2 years  - Neurology has seen and evaluated patient      Constipation:  - Reports not having a bowel movement in 2 weeks  - Bowel regimen was initiated.  Will also get a KUB and if no sign of obstruction we will be a little more aggressive with laxatives    COPD with acute hypoxic respiratory failure:  - Presented with room air saturation of 83% and required 6 L of oxygen  - Continue monitoring and adjust oxygen as needed    Full code status:  - Expressed desire to remain full code         Expected Discharge Location and Transportation: To be determined  Expected Discharge to be determined  Expected Discharge Date: 5/22/2025; Expected Discharge Time:      VTE Prophylaxis:  Pharmacologic VTE prophylaxis orders are present.         AM-PAC 6 Clicks Score (PT): 17 (05/20/25 1378)    CODE STATUS:   Code Status and Medical Interventions: CPR (Attempt to Resuscitate); Full Support   Ordered at: 05/19/25 2112     Code Status (Patient has no pulse and is not breathing):    CPR (Attempt to Resuscitate)     Medical Interventions (Patient has pulse or is breathing):    Full Support       Sarwat  MD Luis E  05/20/25

## 2025-05-20 NOTE — PAYOR COMM NOTE
"Bibiana Hodges (89 y.o. Female)       Date of Birth   1935    Social Security Number       Address   [address] [address]    Home Phone   [phone]    MRN   [id number]       Confucianism   Confucianist    Marital Status                               Admission Date   5/19/2025    Admission Type   Emergency    Admitting Provider   Sarwat Kimbrough MD    Attending Provider   Sarwat Kimbrough MD    Department, Room/Bed   [address], S450/1       Discharge Date       Discharge Disposition       Discharge Destination                                 Attending Provider: Sarwat Kimbrough MD    Allergies: Sulfa Antibiotics, Gabapentin    Isolation: None   Infection: None   Code Status: CPR    Ht: 170.2 cm (67\")   Wt: 60 kg (132 lb 4.4 oz)    Admission Cmt: None   Principal Problem: Pneumonia [J18.9]                   Active Insurance as of 5/19/2025       Primary Coverage       Payor Plan Insurance Group Employer/Plan Group    ANTHEM MEDICARE REPLACEMENT [facility] MEDICARE ADVANTAGE O [id number]       Payor Plan Address Payor Plan Phone Number Payor Plan Fax Number Effective Dates    [address] [phone]  1/1/2025 - None Entered    [facility] [address]         Subscriber Name Subscriber Birth Date Member ID       BIBIANA HODGES 1935 [id number]                     Emergency Contacts        (Rel.) Home Phone Work Phone Mobile Phone    Danyelle Keyes (POA) (Daughter) [phone] -- [phone]    HODGESELODIA SCHNEIDER (Son) -- -- [phone]    HODGESNIA SCHNEIDER (Son) [phone] -- [phone]              [place]: NPI [id number] Tax ID [id number]  Insurance Information                  ANTH MEDICARE REPLACEMENT/[employer] MEDICARE ADVANTAGE [employer] Phone: [phone]    Subscriber: Bibiana Hodges Subscriber#: [id number]    Group#: [id number] Precert#: --    Authorization#: [id number] Effective Date: --             History & Physical        Woody, " Aileen SCHNEIDER MD at 25              [facility] Medicine Services  HISTORY AND PHYSICAL    Patient Name: Bibiana Hodges  : 1935  MRN: [id number]  Primary Care Physician: Nivia Madrigal MD    Subjective  Subjective     Chief Complaint:respiratory distress    HPI:     The patient is an 89-year-old woman with a history of atrial fibrillation, hypertension, hyperlipidemia, gastroesophageal reflux disease, and B12 deficiency. She was brought to the emergency room by EMS from [facility] after experiencing respiratory distress.    EMS found her to have a room air saturation of 83%. She was placed on a nonrebreather and transported to the emergency room. In the emergency room, she received Rocephin, Zithromax, and 125 mg of IV Solu-Medrol. Her blood pressure was 114/79, and her pulse was in the 60s. She required 6 L of oxygen. She reports feeling unwell overnight, experiencing shortness of breath that worsened this morning, necessitating a breathing treatment and oxygen. Since then, her condition has slightly improved, but she continues to have a cough.    She also reports vomiting and constipation, with no bowel movement in the past 2 weeks. She does not have any fever, chills, chest pain, or heaviness.    Additionally, she has been experiencing double vision for the past 2 years and has not undergone an MRI.    She exhibits significant tremors, particularly in her right hand, more than her left hand and left foot, describing a lack of control over these movements.    Her appetite has been poor due to dissatisfaction with the food at her facility. She reports that she is full code.    SOCIAL HISTORY  She has 7 children. 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    MEDICATIONS  Current: Rocephin, Zithromax, Solu-Medrol      Personal History         PMH: She  has a past medical history of Constipation, Depression, Dysautonomia orthostatic hypotension  syndrome, Generalized osteoarthritis, GERD (gastroesophageal reflux disease), Hypertension, Insomnia, Osteoarthrosis, shoulder region, Skin ulcer of scalp, Thrombophlebitis of arm, Tremor, and Vitamin B12 deficiency.   PSxH: She  has a past surgical history that includes Hernia repair; Dilation and curettage of uterus; Hip surgery; and Shoulder surgery.         FH: Her family history includes Anorexia nervosa in her daughter; Cancer in her mother; Stroke in her brother.   SH: She  reports that she has never smoked. She has never used smokeless tobacco. She reports that she does not drink alcohol and does not use drugs.     Medications:  Vitamin C, acetaminophen, albuterol, amiodarone, apixaban, artificial tears, benzonatate, bisacodyl, castor oil-balsam peru, ferrous sulfate, fluticasone, levETIRAcetam XR, levothyroxine, lisinopril, meclizine, melatonin, metoprolol tartrate, ondansetron ODT, pantoprazole, polyethylene glycol, rosuvastatin, senna, traZODone, and venlafaxine XR    Allergies   Allergen Reactions    Sulfa Antibiotics Hives    Gabapentin        Objective  Objective     Vital Signs:   Temp:  [98.7 °F (37.1 °C)-98.9 °F (37.2 °C)] 98.7 °F (37.1 °C)  Heart Rate:  [60-83] 60  Resp:  [20] 20  BP: ()/() 133/81  Flow (L/min) (Oxygen Therapy):  [4-15] 4    Constitutional: Awake, alert, interactive and pleasant, thin and frail but bright  Eyes: clear sclerae, no conjunctival injection  HENT: NCAT, mucous membranes moist  Neck: no masses or lymphadenopathy, trachea midline  Respiratory: coarse breath sounds in the bases  Cardiovascular: RRR, no murmurs appreciated, palpable peripheral pulses  Abdomen:  soft, no HSM or masses palpable, not tender or distended  Musculoskeletal: No peripheral edema, clubbing or cyanosis  Neurologic: Oriented x 3,                       Strength symmetric in all extremities- generally weak, coarse tremor in her right>left arm                     Cranial Nerves grossly  intact, speech clear  Skin: No rashes or jaundice  Psychiatric: Appropriate mood, insight           Result Review:  I have personally reviewed the results from the time of this admission   to 5/19/2025 23:31 EDT and agree with these findings:    [x]  Laboratory  [x]  Microbiology  [x]  Radiology  [x]  EKG/Telemetry   []  Cardiology/Vascular   []  Pathology  [x]  Old records  []  Other:  Most notable findings include:     Laboratory Studies  Creatinine 1.30. Glucose 151. Lactic acid 5.5. White count 13.4 with a left shift. UA had too numerous scatt, white cells, no bacteria seen, but and nitrate negative.    Imaging  CT angiogram did not show any pulmonary embolism, but evidence of pneumonia.    Testing  EKG did not show any ischemia. EKG showed very poor tracing with atrial fibrillation evidence in the anterolateral infarct sided prior to May 2023.    LAB RESULTS:      Lab 05/19/25 2039 05/19/25  1734 05/19/25  1348   WBC  --   --  13.43*   HEMOGLOBIN  --   --  13.4   HEMATOCRIT  --   --  42.2   PLATELETS  --   --  215   NEUTROS ABS  --   --  12.37*   IMMATURE GRANS (ABS)  --   --  0.06*   LYMPHS ABS  --   --  0.36*   MONOS ABS  --   --  0.62   EOS ABS  --   --  0.00   MCV  --   --  103.9*   PROCALCITONIN  --   --  0.09   LACTATE 3.1* 2.3* 5.5*         Lab 05/19/25  1348   SODIUM 134*   POTASSIUM 4.3   CHLORIDE 95*   CO2 23.0   ANION GAP 16.0*   BUN 18   CREATININE 1.30*   EGFR 39.4*   GLUCOSE 151*   CALCIUM 8.9         Lab 05/19/25  1348   TOTAL PROTEIN 6.0   ALBUMIN 3.2*   GLOBULIN 2.8   ALT (SGPT) 28   AST (SGOT) 43*   BILIRUBIN 0.3   ALK PHOS 74         Lab 05/19/25  1547 05/19/25  1348   PROBNP  --  1,247.0   HSTROP T 98* 70*                 Brief Urine Lab Results  (Last result in the past 365 days)        Color   Clarity   Blood   Leuk Est   Nitrite   Protein   CREAT   Urine HCG        05/19/25 1428 Yellow   Turbid   Moderate (2+)   Large (3+)   Negative   100 mg/dL (2+)                 COVID19   Date Value  Ref Range Status   05/19/2025 Not Detected Not Detected - Ref. Range Final       CT Angiogram Chest  Result Date: 5/19/2025  CT ANGIOGRAM CHEST Date of Exam: 5/19/2025 2:38 PM EDT Indication: sob, hypoxia. Comparison: 4/20/2024 Technique: CTA of the chest was performed after the uneventful intravenous administration of 85 mL Isovue-300. Reconstructed coronal and sagittal images were also obtained. In addition, a 3-D volume rendered image was created for interpretation. Automated exposure control and iterative reconstruction methods were used. FINDINGS: Thoracic inlet: Unremarkable. Pulmonary arteries: No filling defects are identified within the pulmonary arteries to suggest acute pulmonary embolism. Great vessels: Atherosclerotic plaque is seen within the thoracic aorta and proximal arch vessels Mediastinum/Merari: No pathologically enlarged mediastinal lymph nodes are seen. Moderate hiatal hernia. Esophagus appears otherwise unremarkable Lung parenchyma/pleura: Small left and trace right pleural effusions with bibasilar atelectasis. Scattered lung parenchymal scarring noted bilaterally. Suggestion of centrilobular/tree-in-bud nodularity within the right lower lobe which may indicate atypical infectious process. Right lung granuloma. No pneumothorax is seen. Trachea and airways: The trachea and central airways appear unremarkable. Pleural space: No significant pleural effusion or pneumothorax is seen. Heart and pericardium: Cardiac pacemaker leads noted. Coronary artery calcifications. Otherwise, the heart and pericardium appear unremarkable Chest wall: No acute osseous lesion is identified. Bones are demineralized. There are degenerative changes within the skeletal structures. Bilateral shoulder arthroplasties are noted. Left chest wall pacemaker noted. Upper abdomen: No acute abnormality is identified within the visualized upper abdomen. Probable bilateral renal cysts.     Impression: 1.No evidence of acute  pulmonary embolism. 2.Suggestion of centrilobular/tree-in-bud nodularity within the right lower lobe which may indicate atypical infectious process. 3.Small left and trace right pleural effusions with bibasilar atelectasis. 4.Scattered bilateral lung parenchymal scarring. Electronically Signed: Slim Mauro MD  5/19/2025 3:04 PM EDT  Workstation ID: [id number]      Results for orders placed during the hospital encounter of 08/20/23    Adult Transthoracic Echo Complete W/ Cont if Necessary Per Protocol    Interpretation Summary    Left ventricular systolic function is mildly decreased. Calculated left ventricular EF = 47.9% Normal left ventricular cavity size and wall thickness noted. There is left ventricular global hypokinesis noted. Left ventricular diastolic function is consistent with (grade I) impaired relaxation.    : Normal right ventricular cavity size and systolic function noted. Electronic lead present in the ventricle.    Normal left atrial size and volume noted. Saline test results are negative.    There is moderate calcification of the aortic valve. The aortic valve was poorly visualized but appears trileaflet. Moderate aortic valve regurgitation is present. No aortic valve stenosis is present.    Mild mitral annular calcification is present. Mild mitral valve regurgitation is present. No significant mitral valve stenosis is present.    The tricuspid valve is grossly normal in structure. Mild tricuspid valve regurgitation is present. Estimated right ventricular systolic pressure from tricuspid regurgitation is normal, 33 mmHg. No tricuspid valve stenosis is present.    Mild dilation of the ascending aorta is present. Ascending aorta = 3.7 cm    There is a small (<1cm) pericardial effusion adjacent to the right atrium and right ventricle.      The patient has started, but not completed, their COVID-19 vaccination series.    Assessment & Plan  Assessment / Plan     Pneumonia    Hypertension    Atrial  fibrillation    Autonomic dysfunction    Hyperlipidemia      Assessment & Plan:  88 yo with HO HTN, Afib, HLP who presented from assisted living with respiratory distress and pneumonia      Pneumonia:-- possibly aspiration  - CT angiogram showed evidence of pneumonia  - Continue on Rocephin and Zithromax, check antigens and sputum culture  - Maintain intravenous steroids and frequent nebulizer treatments  - Wean oxygen as tolerated    Abnormal troponin levels:  - Troponin levels were abnormal- probably related to hypoxia and distress no chest pain  - Administer aspirin  - Trend troponin levels    Tremor:  - Reports significant tremors, especially in right hand  - Consult neurology for further evaluation    Double vision:  - Reports having double vision for 2 years  - Consult neurology for further evaluation  - Consider MRI ( she has a pacemaker)    Constipation:  - Reports not having a bowel movement in 2 weeks  - Initiate bowel regimen, likely requiring stimulation from below    COPD with acute hypoxic respiratory failure:  - Presented with room air saturation of 83% and required 6 L of oxygen  - Continue monitoring and adjust oxygen as needed    Full code status:  - Expressed desire to remain full code      VTE Prophylaxis:  No VTE prophylaxis order currently exists.        CODE STATUS:FULL CODE     Expected Discharge  Expected Discharge Date: 5/22/2025; Expected Discharge Time:     Electronically signed by Aileen Doherty MD 05/19/25 23:31 EDT  Patient or patient representative verbalized consent for the use of Ambient Listening during the visit for chart documentation.           Contains text generated by [employer]  Electronically signed by Aileen Doherty MD at 05/19/25 2334       Current Facility-Administered Medications   Medication Dose Route Frequency Provider Last Rate Last Admin    acetaminophen (TYLENOL) tablet 650 mg  650 mg Oral Q8H PRN Aileen Doherty MD   650 mg at 05/20/25 0020    amiodarone  (PACERONE) tablet 200 mg  200 mg Oral Daily Aileen Doherty MD   200 mg at 05/20/25 0833    apixaban (ELIQUIS) tablet 2.5 mg  2.5 mg Oral BID Aileen Doherty MD   2.5 mg at 05/20/25 0020    ascorbic acid (VITAMIN C) tablet 500 mg  500 mg Oral Daily Aileen Doherty MD   500 mg at 05/20/25 0834    bisacodyl (DULCOLAX) suppository 10 mg  10 mg Rectal Once Aileen Doherty MD        cefTRIAXone (ROCEPHIN) 1,000 mg in sodium chloride 0.9 % 100 mL MBP  1,000 mg Intravenous Q24H Aileen Doherty  mL/hr at 05/20/25 0834 1,000 mg at 05/20/25 0834    doxycycline (MONODOX) capsule 100 mg  100 mg Oral Q12H Aileen Doherty MD   100 mg at 05/20/25 0833    ipratropium-albuterol (DUO-NEB) nebulizer solution 3 mL  3 mL Nebulization 4x Daily - RT Aileen Doherty MD   3 mL at 05/20/25 0731    levETIRAcetam (KEPPRA) tablet 250 mg  250 mg Oral BID Aileen Doherty MD   250 mg at 05/20/25 0020    levothyroxine (SYNTHROID, LEVOTHROID) tablet 100 mcg  100 mcg Oral Daily Aileen Doherty MD   100 mcg at 05/20/25 0611    lisinopril (PRINIVIL,ZESTRIL) tablet 10 mg  10 mg Oral Q24H Aileen Doherty MD   10 mg at 05/20/25 0833    melatonin tablet 5 mg  5 mg Oral Nightly Aileen Doherty MD   5 mg at 05/20/25 0020    metoprolol tartrate (LOPRESSOR) half tablet 12.5 mg  12.5 mg Oral BID Aileen Doherty MD   12.5 mg at 05/20/25 0019    polyethylene glycol (MIRALAX) packet 17 g  17 g Oral Daily Aileen Doherty MD   17 g at 05/20/25 0834    predniSONE (DELTASONE) tablet 40 mg  40 mg Oral Daily With Breakfast Aileen Doherty MD   40 mg at 05/20/25 0834    rosuvastatin (CRESTOR) tablet 10 mg  10 mg Oral Nightly Aileen Doherty MD   10 mg at 05/20/25 0019    senna (SENOKOT) tablet 2 tablet  2 tablet Oral Daily Aileen Doherty MD   2 tablet at 05/20/25 0834    traZODone (DESYREL) tablet 100 mg  100 mg Oral Nightly Aileen Doherty MD        venlafaxine XR (EFFEXOR-XR) 24 hr capsule 75 mg  75 mg Oral Daily Aileen Doherty  MD WYATT   75 mg at 05/20/25 0834     Lab Results (last 24 hours)       Procedure Component Value Units Date/Time    STAT Lactic Acid, Reflex [[id number]]  (Normal) Collected: 05/20/25 0004    Specimen: Blood Updated: 05/20/25 0029     Lactate 1.3 mmol/L      Comment: Falsely depressed results may occur on samples drawn from patients receiving N-Acetylcysteine (NAC) or Metamizole.       S. Pneumo Ag Urine or CSF - Urine, Urine, Catheter In/Out [[id number]] Collected: 05/19/25 1428    Specimen: Urine, Catheter In/Out Updated: 05/19/25 2353    Legionella Antigen, Urine - Urine, Urine, Catheter In/Out [[id number]] Collected: 05/19/25 1428    Specimen: Urine, Catheter In/Out Updated: 05/19/25 2353    STAT Lactic Acid, Reflex [[id number]]  (Abnormal) Collected: 05/19/25 2039    Specimen: Blood Updated: 05/19/25 2214     Lactate 3.1 mmol/L      Comment: Falsely depressed results may occur on samples drawn from patients receiving N-Acetylcysteine (NAC) or Metamizole.       STAT Lactic Acid, Reflex [[id number]]  (Abnormal) Collected: 05/19/25 1734    Specimen: Blood Updated: 05/19/25 1826     Lactate 2.3 mmol/L      Comment: Falsely depressed results may occur on samples drawn from patients receiving N-Acetylcysteine (NAC) or Metamizole.       High Sensitivity Troponin T 1Hr [[id number]]  (Abnormal) Collected: 05/19/25 1547    Specimen: Blood Updated: 05/19/25 1640     HS Troponin T 98 ng/L      Comment: Specimen hemolyzed.  Results may be falsely decreased.        Troponin T Numeric Delta 28 ng/L      Troponin T % Delta 40    Narrative:      High Sensitive Troponin T Reference Range:  <14.0 ng/L- Negative Female for AMI  <22.0 ng/L- Negative Male for AMI  >=14 - Abnormal Female indicating possible myocardial injury.  >=22 - Abnormal Male indicating possible myocardial injury.   Clinicians would have to utilize clinical acumen, EKG, Troponin, and serial changes to determine if it is an Acute Myocardial Infarction or myocardial  injury due to an underlying chronic condition.         Urinalysis, Microscopic Only - Urine, Catheter In/Out [[id number]]  (Abnormal) Collected: 05/19/25 1428    Specimen: Urine, Catheter In/Out Updated: 05/19/25 1515     RBC, UA 6-10 /HPF      WBC, UA Too Numerous to Count /HPF      Bacteria, UA None Seen /HPF      Squamous Epithelial Cells, UA 3-6 /HPF      Hyaline Casts, UA 0-6 /LPF      Methodology Manual Light Microscopy    Urine Culture - Urine, Urine, Catheter In/Out [[id number]] Collected: 05/19/25 1428    Specimen: Urine, Catheter In/Out Updated: 05/19/25 1515    High Sensitivity Troponin T [[id number]]  (Abnormal) Collected: 05/19/25 1348    Specimen: Blood Updated: 05/19/25 1459     HS Troponin T 70 ng/L     Narrative:      High Sensitive Troponin T Reference Range:  <14.0 ng/L- Negative Female for AMI  <22.0 ng/L- Negative Male for AMI  >=14 - Abnormal Female indicating possible myocardial injury.  >=22 - Abnormal Male indicating possible myocardial injury.   Clinicians would have to utilize clinical acumen, EKG, Troponin, and serial changes to determine if it is an Acute Myocardial Infarction or myocardial injury due to an underlying chronic condition.         Urinalysis With Culture If Indicated - Urine, Catheter In/Out [[id number]]  (Abnormal) Collected: 05/19/25 1428    Specimen: Urine, Catheter In/Out Updated: 05/19/25 1456     Color, UA Yellow     Appearance, UA Turbid     pH, UA 5.5     Specific Gravity, UA 1.014     Glucose, UA Negative     Ketones, UA Trace     Bilirubin, UA Negative     Blood, UA Moderate (2+)     Protein,  mg/dL (2+)     Leuk Esterase, UA Large (3+)     Nitrite, UA Negative     Urobilinogen, UA 1.0 E.U./dL    Narrative:      In absence of clinical symptoms, the presence of pyuria, bacteria, and/or nitrites on the urinalysis result does not correlate with infection.    Procalcitonin [[id number]]  (Normal) Collected: 05/19/25 1348    Specimen: Blood Updated: 05/19/25 1455      "Procalcitonin 0.09 ng/mL     Narrative:      As a Marker for Sepsis (Non-Neonates):    1. <0.5 ng/mL represents a low risk of severe sepsis and/or septic shock.  2. >2 ng/mL represents a high risk of severe sepsis and/or septic shock.    As a Marker for Lower Respiratory Tract Infections that require antibiotic therapy:    PCT on Admission    Antibiotic Therapy       6-12 Hrs later    >0.5                Strongly Recommended  >0.25 - <0.5        Recommended   0.1 - 0.25          Discouraged              Remeasure/reassess PCT  <0.1                Strongly Discouraged     Remeasure/reassess PCT    As 28 day mortality risk marker: \"Change in Procalcitonin Result\" (>80% or <=80%) if Day 0 (or Day 1) and Day 4 values are available. Refer to [email or web address] [email or web address]pct[place]cal[place].[email or web address]    Change in PCT <=80%  A decrease of PCT levels below or equal to 80% defines a positive change in PCT test result representing a higher risk for 28-day all-cause mortality of patients diagnosed with severe sepsis for septic shock.    Change in PCT >80%  A decrease of PCT levels of more than 80% defines a negative change in PCT result representing a lower risk for 28-day all-cause mortality of patients diagnosed with severe sepsis or septic shock.       Comprehensive Metabolic Panel [[id number]]  (Abnormal) Collected: 05/19/25 1348    Specimen: Blood Updated: 05/19/25 1454     Glucose 151 mg/dL      BUN 18 mg/dL      Creatinine 1.30 mg/dL      Sodium 134 mmol/L      Potassium 4.3 mmol/L      Comment: Slight hemolysis detected by analyzer. Result may be falsely elevated.        Chloride 95 mmol/L      CO2 23.0 mmol/L      Calcium 8.9 mg/dL      Total Protein 6.0 g/dL      Albumin 3.2 g/dL      ALT (SGPT) 28 U/L      AST (SGOT) 43 U/L      Alkaline Phosphatase 74 U/L      Total Bilirubin 0.3 mg/dL      Globulin 2.8 gm/dL      Comment: Calculated Result        A/G Ratio 1.1 g/dL      BUN/Creatinine Ratio 13.8     Anion Gap 16.0 mmol/L      eGFR 39.4 " mL/min/1.73     Narrative:      GFR Categories in Chronic Kidney Disease (CKD)              GFR Category          GFR (mL/min/1.73)    Interpretation  G1                    90 or greater        Normal or high (1)  G2                    60-89                Mild decrease (1)  G3a                   45-59                Mild to moderate decrease  G3b                   30-44                Moderate to severe decrease  G4                    15-29                Severe decrease  G5                    14 or less           Kidney failure    (1)In the absence of evidence of kidney disease, neither GFR category G1 or G2 fulfill the criteria for CKD.    eGFR calculation 2021 CKD-EPI creatinine equation, which does not include race as a factor    BNP [[id number]]  (Normal) Collected: 05/19/25 1348    Specimen: Blood Updated: 05/19/25 1454     proBNP 1,247.0 pg/mL     Narrative:      This assay is used as an aid in the diagnosis of individuals suspected of having heart failure. It can be used as an aid in the diagnosis of acute decompensated heart failure (ADHF) in patients presenting with signs and symptoms of ADHF to the emergency department (ED). In addition, NT-proBNP of <300 pg/mL indicates ADHF is not likely.    Age Range Result Interpretation  NT-proBNP Concentration (pg/mL:      <50             Positive            >450                   Gray                 300-450                    Negative             <300    50-75           Positive            >900                  Gray                300-900                  Negative            <300      >75             Positive            >1800                  Gray                300-1800                  Negative            <300    Lactic Acid, Plasma [[id number]]  (Abnormal) Collected: 05/19/25 1348    Specimen: Blood Updated: 05/19/25 1448     Lactate 5.5 mmol/L      Comment: Falsely depressed results may occur on samples drawn from patients receiving N-Acetylcysteine (NAC) or  Metamizole.       Respiratory Panel PCR w/COVID-19(SARS-CoV-2) ERICK/MARTA/ELPIDIO/PAD/COR/ALMA In-House, NP Swab in UTM/VTM, 2 HR TAT - Swab, Nasopharynx [[id number]]  (Normal) Collected: 05/19/25 1310    Specimen: Swab from Nasopharynx Updated: 05/19/25 1439     ADENOVIRUS, PCR Not Detected     Coronavirus 229E Not Detected     Coronavirus HKU1 Not Detected     Coronavirus NL63 Not Detected     Coronavirus OC43 Not Detected     COVID19 Not Detected     Human Metapneumovirus Not Detected     Human Rhinovirus/Enterovirus Not Detected     Influenza A PCR Not Detected     Influenza B PCR Not Detected     Parainfluenza Virus 1 Not Detected     Parainfluenza Virus 2 Not Detected     Parainfluenza Virus 3 Not Detected     Parainfluenza Virus 4 Not Detected     RSV, PCR Not Detected     Bordetella pertussis pcr Not Detected     Bordetella parapertussis PCR Not Detected     Chlamydophila pneumoniae PCR Not Detected     Mycoplasma pneumo by PCR Not Detected    Narrative:      In the setting of a positive respiratory panel with a viral infection PLUS a negative procalcitonin without other underlying concern for bacterial infection, consider observing off antibiotics or discontinuation of antibiotics and continue supportive care. If the respiratory panel is positive for atypical bacterial infection (Bordetella pertussis, Chlamydophila pneumoniae, or Mycoplasma pneumoniae), consider antibiotic de-escalation to target atypical bacterial infection.    Blood Culture - Blood, Arm, Left [[id number]] Collected: 05/19/25 1345    Specimen: Blood from Arm, Left Updated: 05/19/25 1431    Blood Culture - Blood, Wrist, Right [[id number]] Collected: 05/19/25 1400    Specimen: Blood from Wrist, Right Updated: 05/19/25 1431    CBC & Differential [[id number]]  (Abnormal) Collected: 05/19/25 1348    Specimen: Blood Updated: 05/19/25 1429    Narrative:      The following orders were created for panel order CBC & Differential.  Procedure                                Abnormality         Status                     ---------                               -----------         ------                     CBC Auto Differential[[id number]]        Abnormal            Final result                 Please view results for these tests on the individual orders.    CBC Auto Differential [[id number]]  (Abnormal) Collected: 05/19/25 1348    Specimen: Blood Updated: 05/19/25 1429     WBC 13.43 10*3/mm3      RBC 4.06 10*6/mm3      Hemoglobin 13.4 g/dL      Hematocrit 42.2 %      .9 fL      MCH 33.0 pg      MCHC 31.8 g/dL      RDW 12.9 %      RDW-SD 49.1 fl      MPV 10.1 fL      Platelets 215 10*3/mm3      Neutrophil % 92.2 %      Lymphocyte % 2.7 %      Monocyte % 4.6 %      Eosinophil % 0.0 %      Basophil % 0.1 %      Immature Grans % 0.4 %      Neutrophils, Absolute 12.37 10*3/mm3      Lymphocytes, Absolute 0.36 10*3/mm3      Monocytes, Absolute 0.62 10*3/mm3      Eosinophils, Absolute 0.00 10*3/mm3      Basophils, Absolute 0.02 10*3/mm3      Immature Grans, Absolute 0.06 10*3/mm3      nRBC 0.0 /100 WBC           Imaging Results (Last 24 Hours)       Procedure Component Value Units Date/Time    CT Angiogram Chest [[id number]] Collected: 05/19/25 1451     Updated: 05/19/25 1507    Narrative:      CT ANGIOGRAM CHEST    Date of Exam: 5/19/2025 2:38 PM EDT    Indication: sob, hypoxia.    Comparison: 4/20/2024    Technique: CTA of the chest was performed after the uneventful intravenous administration of 85 mL Isovue-300. Reconstructed coronal and sagittal images were also obtained. In addition, a 3-D volume rendered image was created for interpretation.   Automated exposure control and iterative reconstruction methods were used.    FINDINGS:    Thoracic inlet: Unremarkable.    Pulmonary arteries: No filling defects are identified within the pulmonary arteries to suggest acute pulmonary embolism.    Great vessels: Atherosclerotic plaque is seen within the thoracic aorta and proximal  arch vessels    Mediastinum/Merari: No pathologically enlarged mediastinal lymph nodes are seen. Moderate hiatal hernia. Esophagus appears otherwise unremarkable    Lung parenchyma/pleura: Small left and trace right pleural effusions with bibasilar atelectasis. Scattered lung parenchymal scarring noted bilaterally. Suggestion of centrilobular/tree-in-bud nodularity within the right lower lobe which may indicate   atypical infectious process. Right lung granuloma. No pneumothorax is seen.    Trachea and airways: The trachea and central airways appear unremarkable.    Pleural space: No significant pleural effusion or pneumothorax is seen.    Heart and pericardium: Cardiac pacemaker leads noted. Coronary artery calcifications. Otherwise, the heart and pericardium appear unremarkable    Chest wall: No acute osseous lesion is identified. Bones are demineralized. There are degenerative changes within the skeletal structures. Bilateral shoulder arthroplasties are noted. Left chest wall pacemaker noted.    Upper abdomen: No acute abnormality is identified within the visualized upper abdomen. Probable bilateral renal cysts.      Impression:      1.No evidence of acute pulmonary embolism.  2.Suggestion of centrilobular/tree-in-bud nodularity within the right lower lobe which may indicate atypical infectious process.  3.Small left and trace right pleural effusions with bibasilar atelectasis.  4.Scattered bilateral lung parenchymal scarring.        Electronically Signed: Slim Mauro MD    5/19/2025 3:04 PM EDT    Workstation ID: [id number]          Orders (last 24 hrs)        Start     Ordered    05/20/25 2100  traZODone (DESYREL) tablet 100 mg  Nightly         05/19/25 2337 05/20/25 0900  amiodarone (PACERONE) tablet 200 mg  Daily         05/19/25 2337 05/20/25 0900  ascorbic acid (VITAMIN C) tablet 500 mg  Daily         05/19/25 2337 05/20/25 0900  lisinopril (PRINIVIL,ZESTRIL) tablet 10 mg  Every 24 Hours Scheduled          05/19/25 2337 05/20/25 0900  polyethylene glycol (MIRALAX) packet 17 g  Daily         05/19/25 2337 05/20/25 0900  senna (SENOKOT) tablet 2 tablet  Daily         05/19/25 2337 05/20/25 0900  venlafaxine XR (EFFEXOR-XR) 24 hr capsule 75 mg  Daily         05/19/25 2337 05/20/25 0900  cefTRIAXone (ROCEPHIN) 1,000 mg in sodium chloride 0.9 % 100 mL MBP  Every 24 Hours         05/19/25 2338 05/20/25 0900  doxycycline (MONODOX) capsule 100 mg  Every 12 Hours Scheduled         05/19/25 2338 05/20/25 0800  predniSONE (DELTASONE) tablet 40 mg  Daily With Breakfast         05/19/25 2338 05/20/25 0702  Inpatient Neurology Consult General  IN AM        Specialty:  Neurology  Provider:  (Not yet assigned)    05/19/25 2338 05/20/25 0600  levothyroxine (SYNTHROID, LEVOTHROID) tablet 100 mcg  Daily         05/19/25 2337 05/20/25 0030  ipratropium-albuterol (DUO-NEB) nebulizer solution 3 mL  4 Times Daily - RT         05/19/25 2337 05/20/25 0030  apixaban (ELIQUIS) tablet 2.5 mg  2 Times Daily         05/19/25 2337 05/20/25 0030  bisacodyl (DULCOLAX) suppository 10 mg  Once         05/19/25 2337 05/20/25 0030  levETIRAcetam (KEPPRA) tablet 250 mg  2 Times Daily        Note to Pharmacy: For tube route administration disperse crushed tablets in 10 mL of water, shake for 5 minutes to dissolve, and administer immediately via enteral feeding tube.    05/19/25 2337 05/20/25 0030  melatonin tablet 5 mg  Nightly         05/19/25 2337 05/20/25 0030  metoprolol tartrate (LOPRESSOR) half tablet 12.5 mg  2 Times Daily         05/19/25 2337 05/20/25 0030  rosuvastatin (CRESTOR) tablet 10 mg  Nightly         05/19/25 2337 05/19/25 2340  Code Status and Medical Interventions: CPR (Attempt to Resuscitate); Full Support  Continuous         05/19/25 2339    05/19/25 2339  STAT Lactic Acid, Reflex  PROCEDURE ONCE         05/19/25 2214    05/19/25 2338  PT Consult: Eval & Treat Functional Mobility  Below Baseline  Once         05/19/25 2337    05/19/25 2338  Respiratory Culture - Sputum, Cough  Once         05/19/25 2338    05/19/25 2338  S. Pneumo Ag Urine or CSF - Urine, Urine, Catheter In/Out  Once         05/19/25 2338    05/19/25 2338  Legionella Antigen, Urine - Urine, Urine, Catheter In/Out  Once         05/19/25 2338    05/19/25 2337  Check For Impaction  Once         05/19/25 2337    05/19/25 2335  acetaminophen (TYLENOL) tablet 650 mg  Every 8 Hours PRN         05/19/25 2337    05/19/25 2230  traZODone (DESYREL) tablet 50 mg  Nightly,   Status:  Discontinued         05/19/25 2139 05/19/25 2102  Critical Care  Once        Comments: This order was created via procedure documentation    05/19/25 2101 05/19/25 2034  STAT Lactic Acid, Reflex  PROCEDURE ONCE         05/19/25 1826    05/19/25 1648  STAT Lactic Acid, Reflex  PROCEDURE ONCE         05/19/25 1448    05/19/25 1619  ED Bed Request  Once         05/19/25 1619    05/19/25 1619  Inpatient Admission  Once         05/19/25 1619    05/19/25 1526  azithromycin (ZITHROMAX) 500 mg in sodium chloride 0.9 % 250 mL IVPB-VTB  Once         05/19/25 1510    05/19/25 1526  cefTRIAXone (ROCEPHIN) 2 g in sodium chloride 0.9 % 100 mL MBP  Once         05/19/25 1510    05/19/25 1516  Urine Culture - Urine, Urine, Catheter In/Out  Once         05/19/25 1515    05/19/25 1507  lactated ringers bolus 1,000 mL  Once         05/19/25 1451    05/19/25 1501  iopamidol (ISOVUE-300) 61 % injection 100 mL  Once in Imaging         05/19/25 1445    05/19/25 1448  High Sensitivity Troponin T 1Hr  PROCEDURE ONCE         05/19/25 1459    05/19/25 1443  Urinalysis, Microscopic Only - Urine, Catheter In/Out  Once         05/19/25 1442    05/19/25 1400  sterile water (preservative free) injection        Note to Pharmacy: Created by cabinet override    05/19/25 1400    05/19/25 1226  methylPREDNISolone sodium succinate (SOLU-Medrol) injection 125 mg  Once         05/19/25 1210     05/19/25 1226  ipratropium-albuterol (DUO-NEB) nebulizer solution 3 mL  Once         05/19/25 1210    05/19/25 1211  Respiratory Panel PCR w/COVID-19(SARS-CoV-2) ERICK/MARTA/ELPIDIO/PAD/COR/ALMA In-House, NP Swab in UTM/VTM, 2 HR TAT - Swab, Nasopharynx  STAT         05/19/25 1210    05/19/25 1211  CBC & Differential  Once         05/19/25 1210    05/19/25 1211  Comprehensive Metabolic Panel  Once         05/19/25 1210    05/19/25 1211  Urinalysis With Culture If Indicated - Urine, Catheter In/Out  Once         05/19/25 1210    05/19/25 1211  High Sensitivity Troponin T  Once         05/19/25 1210    05/19/25 1211  BNP  Once         05/19/25 1210    05/19/25 1211  Lactic Acid, Plasma  Once         05/19/25 1210    05/19/25 1211  Procalcitonin  Once         05/19/25 1210    05/19/25 1211  POC Creatinine  Once,   Status:  Canceled         05/19/25 1210    05/19/25 1211  Blood Culture - Blood, Wrist, Right  STAT         05/19/25 1210    05/19/25 1211  Blood Culture - Blood, Arm, Left  STAT         05/19/25 1210    05/19/25 1211  CBC Auto Differential  PROCEDURE ONCE         05/19/25 1210    05/19/25 1211  ECG 12 Lead Dyspnea  Once         05/19/25 1210    05/19/25 1211  CT Angiogram Chest  1 Time Imaging         05/19/25 1210    Pending  Inpatient Case Management  Consult  Once        Provider:  (Not yet assigned)    Pending                  Physician Progress Notes (last 24 hours)  Notes from 05/19/25 0858 through 05/20/25 0858   No notes of this type exist for this encounter.       Consult Notes (last 24 hours)  Notes from 05/19/25 0858 through 05/20/25 0858   No notes of this type exist for this encounter.

## 2025-05-20 NOTE — PLAN OF CARE
Goal Outcome Evaluation:  Plan of Care Reviewed With: patient        Progress: no change  Outcome Evaluation: Patient t/f to BSC and then to chair with min assist and B UE support, limited by weakness and fatigue. Patient currently below functional baseline, demonstrating decreased functional mobility status, impaired balance, decreased endurance, and decreased strength. Will address these deficits to promote return to PLOF. Recommend return to facility with HHPT.    Anticipated Discharge Disposition (PT): assisted living, home with home health (return to facility with HHPT)

## 2025-05-20 NOTE — CASE MANAGEMENT/SOCIAL WORK
Discharge Planning Assessment  Psychiatric     Patient Name: Bibiana Hodges  MRN: [id number]  Today's Date: 5/20/2025    Admit Date: 5/19/2025    Plan: [facility]   Discharge Needs Assessment       Row Name 05/20/25 1124       Living Environment    People in Home facility resident    Current Living Arrangements assisted living facility    Potentially Unsafe Housing Conditions none    In the past 12 months has the electric, gas, oil, or water company threatened to shut off services in your home? No    Primary Care Provided by self    Provides Primary Care For no one    Family Caregiver if Needed none    Quality of Family Relationships unable to assess    Able to Return to Prior Arrangements yes       Resource/Environmental Concerns    Resource/Environmental Concerns none    Transportation Concerns none       Transportation Needs    In the past 12 months, has lack of transportation kept you from medical appointments or from getting medications? no    In the past 12 months, has lack of transportation kept you from meetings, work, or from getting things needed for daily living? No       Food Insecurity    Within the past 12 months, you worried that your food would run out before you got the money to buy more. Never true    Within the past 12 months, the food you bought just didn't last and you didn't have money to get more. Never true       Transition Planning    Patient/Family Anticipates Transition to home    Patient/Family Anticipated Services at Transition none    Transportation Anticipated health plan transportation       Discharge Needs Assessment    Readmission Within the Last 30 Days no previous admission in last 30 days    Equipment Currently Used at Home cane, straight;wheelchair;walker, rolling    Concerns to be Addressed discharge planning    Do you want help finding or keeping work or a job? I do not need or want help    Do you want help with school or training? For example,  starting or completing job training or getting a high school diploma, GED or equivalent No    Equipment Needed After Discharge none                   Discharge Plan       Row Name 05/20/25 1125       Plan    Plan [facility]    Patient/Family in Agreement with Plan yes    Plan Comments Met with Ms. Coello at the bedside to initiate discharge planning. She lives in assisted living at [facility]. She needs assistance with ADLs at baseline. She has a wheelchair and a walker, but she reported she mainly uses the wheelchair for mobility. She is wearing 3L NC currently and does not have home O2 at this time. She also denies the receipt of home health or outpatient services. She sees a primary care physician through the facility. At this time her plan is to return to assisted living.  will monitor for potential need for home O2.  will continue to follow plan of care and assist with discharge planning needs as indicated.    Final Discharge Disposition Code 01 - home or self-care                    Expected Discharge Date and Time       Expected Discharge Date Expected Discharge Time    May 22, 2025            Demographic Summary       Row Name 05/20/25 1115       General Information    Admission Type inpatient    Arrived From emergency department    Referral Source admission list    Reason for Consult discharge planning    Preferred Language English       Contact Information    Permission Granted to Share Info With family/designee    Contact Information Obtained for     Contact Information Comments StephyDanyelle (POA) Daughter [phone]                     ELODIA COELLO Son   [phone]                   NIA COELLO Son [phone]                   Functional Status       Row Name 05/20/25 1121       Functional Status    Usual Activity Tolerance moderate    Current Activity Tolerance other (see comments)  PT and OT are signed in. See notes when  available.       Physical Activity    On average, how many days per week do you engage in moderate to strenuous exercise (like a brisk walk)? 0 days    On average, how many minutes do you engage in exercise at this level? 0 min    Number of minutes of exercise per week 0       Assessment of Health Literacy    How often do you have someone help you read hospital materials? Sometimes    How often do you have problems learning about your medical condition because of difficulty understanding written information? Occasionally    How often do you have a problem understanding what is told to you about your medical condition? Occasionally    How confident are you filling out medical forms by yourself? Somewhat    Health Literacy Good       Functional Status, IADL    Medications assistive person    Meal Preparation assistive person    Housekeeping assistive person    Laundry assistive person    Shopping assistive person    If for any reason you need help with day-to-day activities such as bathing, preparing meals, shopping, managing finances, etc., do you get the help you need? I get all the help I need       Mental Status    General Appearance WDL WDL       Mental Status Summary    Recent Changes in Mental Status/Cognitive Functioning no changes                   Psychosocial    No documentation.                  Abuse/Neglect    No documentation.                  Legal    No documentation.                  Substance Abuse    No documentation.                  Patient Forms    No documentation.                     Erinn Pablo RN

## 2025-05-20 NOTE — THERAPY EVALUATION
Patient Name: Bibiana Hodges  : 1935    MRN: [id number]                              Today's Date: 2025       Admit Date: 2025    Visit Dx:     ICD-10-CM ICD-9-CM   1. Acute respiratory failure with hypoxia  J96.01 518.81   2. Community acquired pneumonia, unspecified laterality  J18.9 486   3. Acute UTI  N39.0 599.0   4. Elevated troponin  R79.89 790.6   5. Lactic acidosis  E87.20 276.2   6. Leukocytosis, unspecified type  D72.829 288.60     Patient Active Problem List   Diagnosis    Dysautonomia orthostatic hypotension syndrome    Hypertension    Severe malnutrition    Atrial fibrillation    Autonomic dysfunction    Hyperlipidemia    Pneumonia     Past Medical History:   Diagnosis Date    Constipation     Depression     Dysautonomia orthostatic hypotension syndrome     Generalized osteoarthritis     GERD (gastroesophageal reflux disease)     s/p Nissen    Hypertension     Insomnia     Osteoarthrosis, shoulder region     Skin ulcer of scalp     Thrombophlebitis of arm     Tremor     Vitamin B12 deficiency      Past Surgical History:   Procedure Laterality Date    DILATATION AND CURETTAGE      HERNIA REPAIR      HIP SURGERY      SHOULDER SURGERY      Right      General Information       Row Name 25 0940          Physical Therapy Time and Intention    Document Type evaluation  -LR     Mode of Treatment physical therapy;individual therapy  -LR       Row Name 25 0940          General Information    Patient Profile Reviewed yes  -LR     Prior Level of Function min assist:;transfer  patient reports she was receiving PT a month ago, only gets up to and from BS with staff rarely  -LR     Existing Precautions/Restrictions fall;oxygen therapy device and L/min;orthostatic hypotension;other (see comments)  tremors, double vision  -LR     Barriers to Rehab medically complex;previous functional deficit  -LR       Row Name 25 0940          Living Environment    Current Living Arrangements  assisted living facility  -LR     People in Home facility resident  -LR       Row Name 05/20/25 0940          Home Main Entrance    Number of Stairs, Main Entrance none  -LR       Row Name 05/20/25 0940          Stairs Within Home, Primary    Number of Stairs, Within Home, Primary none  -LR       Row Name 05/20/25 0940          Cognition    Orientation Status (Cognition) oriented x 4  -LR       Row Name 05/20/25 0940          Safety Issues/Impairments Affecting Functional Mobility    Safety Issues Affecting Function (Mobility) safety precautions follow-through/compliance;safety precaution awareness;insight into deficits/self-awareness;sequencing abilities;problem-solving;awareness of need for assistance  -LR     Impairments Affecting Function (Mobility) balance;endurance/activity tolerance;strength;postural/trunk control;coordination  -LR               User Key  (r) = Recorded By, (t) = Taken By, (c) = Cosigned By      Initials Name Provider Type    LR Jeannie Reyes, PT Physical Therapist                   Mobility       Row Name 05/20/25 0940          Bed Mobility    Bed Mobility supine-sit;scooting/bridging  -LR     Scooting/Bridging Interior (Bed Mobility) verbal cues;minimum assist (75% patient effort)  -LR     Supine-Sit Interior (Bed Mobility) verbal cues;standby assist  -LR     Assistive Device (Bed Mobility) head of bed elevated;bed rails;other (see comments);repositioning sheet  -LR     Comment, (Bed Mobility) Verbal cues to move LEs towards EOB and to push from bed to scoot hips out to get feet on floor. Required increased time to perform. Min assist required to scoot hips forward at EOB. Mild dizziness upon sitting up. BP elevated with sitting up to EOB.  -LR       Row Name 05/20/25 0940          Transfers    Comment, (Transfers) Verbal cues to push up from bed to stand and for weight shifting and sequencing of steps to turn and sit on BSC. Verbal cues to reach back for armrests of BSC to  lower into sitting. Performed BSC to chair t/f, cues for hand placement and sequencing of steps. Patient attempted to sit prior to being close enought to chair to do so.  -LR       Row Name 05/20/25 0940          Bed-Chair Transfer    Bed-Chair [place] (Transfers) verbal cues;minimum assist (75% patient effort);1 person to manage equipment  -LR     Assistive Device (Bed-Chair Transfers) other (see comments)  B UE support  -LR       Row Name 05/20/25 0940          Sit-Stand Transfer    Sit-Stand [place] (Transfers) verbal cues;minimum assist (75% patient effort)  -LR     Assistive Device (Sit-Stand Transfers) other (see comments)  B UE support  -LR       Row Name 05/20/25 0940          Gait/Stairs (Locomotion)    [place] Level (Gait) unable to assess  -LR     Patient was able to Ambulate no, other medical factors prevent ambulation  -LR     Reason Patient was unable to Ambulate Non-Ambulatory at Baseline;Excessive Weakness  -LR     [place] Level (Stairs) not tested  -LR     Comment, (Gait/Stairs) See above.  -LR               User Key  (r) = Recorded By, (t) = Taken By, (c) = Cosigned By      Initials Name Provider Type    Jeannie Kennedy, PT Physical Therapist                   Obj/Interventions       Row Name 05/20/25 0940          Range of Motion Comprehensive    General Range of Motion bilateral lower extremity ROM WFL  -LR       Row Name 05/20/25 0940          Strength Comprehensive (MMT)    General Manual Muscle Testing (MMT) Assessment lower extremity strength deficits identified  -LR       Row Name 05/20/25 0940          Balance    Balance Assessment sitting static balance;sitting dynamic balance;standing static balance;standing dynamic balance  -LR     Static Sitting Balance contact guard  -LR     Dynamic Sitting Balance contact guard  -LR     Position, Sitting Balance unsupported;sitting edge of bed;sitting in chair  -LR     Static Standing Balance minimal assist;1 person to  manage equipment  -LR     Dynamic Standing Balance minimal assist;1 person to manage equipment  -LR     Position/Device Used, Standing Balance supported;other (see comments)  B UE support  -LR     Comment, Balance patient stood at [facility] for prolonged period of time for toileting hygiene.  -LR       Row Name 05/20/25 0940          Sensory Assessment (Somatosensory)    Sensory Assessment (Somatosensory) LE sensation intact;other (see comments)  denies numbness/tingling; reports light touch equal and intact upon assessment  -LR       Row Name 05/20/25 0940          Lower Extremity (Manual Muscle Testing)    Lower Extremity: Manual Muscle Testing (MMT) other (see comments)  -LR     Comment, MMT: Lower Extremity B hip flexors: 3+/5, B knee flexors 4-/5, B knee extensors: 4/5, B ankle DFs: 5/5  -LR               User Key  (r) = Recorded By, (t) = Taken By, (c) = Cosigned By      Initials Name Provider Type    LR Jeannie Reyes, PT Physical Therapist                   Goals/Plan       Row Name 05/20/25 0940          Bed Mobility Goal 1 (PT)    Activity/Assistive Device (Bed Mobility Goal 1, PT) sit to supine/supine to sit  -LR     [place] Level/Cues Needed (Bed Mobility Goal 1, PT) modified independence  -LR     Time Frame (Bed Mobility Goal 1, PT) short term goal (STG);3 days  -LR     Progress/Outcomes (Bed Mobility Goal 1, PT) goal ongoing  -LR       Row Name 05/20/25 0940          Transfer Goal 1 (PT)    Activity/Assistive Device (Transfer Goal 1, PT) sit-to-stand/stand-to-sit;walker, rolling;bed-to-chair/chair-to-bed  -LR     [place] Level/Cues Needed (Transfer Goal 1, PT) contact guard required  -LR     Time Frame (Transfer Goal 1, PT) long term goal (LTG);5 days  -LR     Progress/Outcome (Transfer Goal 1, PT) goal ongoing  -LR       Row Name 05/20/25 0940          Gait Training Goal 1 (PT)    Activity/Assistive Device (Gait Training Goal 1, PT) gait (walking locomotion);walker, rolling  -LR      [place] Level (Gait Training Goal 1, PT) contact guard required  -LR     Distance (Gait Training Goal 1, PT) 10 feet  -LR     Time Frame (Gait Training Goal 1, PT) long term goal (LTG);5 days  -LR     Progress/Outcome (Gait Training Goal 1, PT) goal ongoing  -LR       Row Name 05/20/25 0940          Therapy Assessment/Plan (PT)    Planned Therapy Interventions (PT) balance training;bed mobility training;gait training;home exercise program;patient/family education;transfer training;strengthening  -LR               User Key  (r) = Recorded By, (t) = Taken By, (c) = Cosigned By      Initials Name Provider Type    LR Jeannie Reyes, PT Physical Therapist                   Clinical Impression       Row Name 05/20/25 0940          Pain    Pain Side/Orientation generalized  -LR     Pain Management Interventions exercise or physical activity utilized;movement retraining implemented  -LR     Response to Pain Interventions activity level improved;mobility function improved;functional ability improved;activity participation with tolerable pain  -LR     Additional Documentation Pain Scale: FACES Pre/Post-Treatment (Group)  -LR       Row Name 05/20/25 0940          Pain Scale: FACES Pre/Post-Treatment    Pain: FACES Scale, Pretreatment 2-->hurts little bit  -LR     Posttreatment Pain Rating 2-->hurts little bit  -LR       Row Name 05/20/25 0940          Plan of Care Review    Plan of Care Reviewed With patient  -LR     Progress no change  -LR     Outcome Evaluation Patient t/f to BSC and then to chair with min assist and B UE support, limited by weakness and fatigue. Patient currently below functional baseline, demonstrating decreased functional mobility status, impaired balance, decreased endurance, and decreased strength. Will address these deficits to promote return to PLOF. Recommend return to facility with HHPT.  -LR       Row Name 05/20/25 0916          Therapy Assessment/Plan (PT)    Patient/Family Therapy  Goals Statement (PT) get better  -LR     Rehab Potential (PT) fair  -LR     Criteria for Skilled Interventions Met (PT) yes;meets criteria;skilled treatment is necessary  -LR     Therapy Frequency (PT) daily  -LR     Predicted Duration of Therapy Intervention (PT) 5 days  -LR       Row Name 05/20/25 0940          Vital Signs    Pre Systolic BP Rehab 147  -LR     Pre Treatment Diastolic BP 93  -LR     Intra Systolic BP Rehab 146  -LR     Intra Treatment Diastolic   upon sitting up; 122/95 upon standing  -LR     Pre Patient Position Supine  -LR     Intra Patient Position Sitting  -LR       Row Name 05/20/25 0940          Positioning and Restraints    Pre-Treatment Position in bed  -LR     Post Treatment Position chair  -LR     In Chair notified nsg;reclined;call light within reach;sitting;encouraged to call for assist;exit alarm on;legs elevated;waffle cushion;waffle boot/both  -LR               User Key  (r) = Recorded By, (t) = Taken By, (c) = Cosigned By      Initials Name Provider Type    LR Jeannie Reyes, PT Physical Therapist                   Outcome Measures       Row Name 05/20/25 0940          How much help from another person do you currently need...    Turning from your back to your side while in flat bed without using bedrails? 4  -LR     Moving from lying on back to sitting on the side of a flat bed without bedrails? 3  -LR     Moving to and from a bed to a chair (including a wheelchair)? 3  -LR     Standing up from a chair using your arms (e.g., wheelchair, bedside chair)? 3  -LR     Climbing 3-5 steps with a railing? 1  -LR     To walk in hospital room? 3  -LR     AM-PAC 6 Clicks Score (PT) 17  -LR     Highest Level of Mobility Goal Stand (1 or More Minutes)-5  -LR       Row Name 05/20/25 0940          Functional Assessment    Outcome Measure Options AM-PAC 6 Clicks Basic Mobility (PT)  -LR               User Key  (r) = Recorded By, (t) = Taken By, (c) = Cosigned By      Initials Name  Provider Type    LR Jeannie Reyes, PT Physical Therapist                                 Physical Therapy Education       Title: PT OT SLP Therapies (In Progress)       Topic: Physical Therapy (Done)       Point: Mobility training (Done)       Learning Progress Summary            Patient Acceptance, E,D, VU,NR by LR at 5/20/2025 0940    Comment: Educated on benefits of mobility and being OOB, safety with mobility, correct supine to sit t/f technique, correct sit<->stand t/f technique, correct bed to chair t/f technique, and progression of POC.                      Point: Home exercise program (Done)       Learning Progress Summary            Patient Acceptance, E,D, VU,NR by LR at 5/20/2025 0940    Comment: Educated on benefits of mobility and being OOB, safety with mobility, correct supine to sit t/f technique, correct sit<->stand t/f technique, correct bed to chair t/f technique, and progression of POC.                      Point: Body mechanics (Done)       Learning Progress Summary            Patient Acceptance, E,D, VU,NR by LR at 5/20/2025 0940    Comment: Educated on benefits of mobility and being OOB, safety with mobility, correct supine to sit t/f technique, correct sit<->stand t/f technique, correct bed to chair t/f technique, and progression of POC.                      Point: Precautions (Done)       Learning Progress Summary            Patient Acceptance, E,D, VU,NR by LR at 5/20/2025 0940    Comment: Educated on benefits of mobility and being OOB, safety with mobility, correct supine to sit t/f technique, correct sit<->stand t/f technique, correct bed to chair t/f technique, and progression of POC.                                      User Key       Initials Effective Dates Name Provider Type Discipline     02/03/23 -  Jeannie Reyes, PT Physical Therapist PT                  PT Recommendation and Plan  Planned Therapy Interventions (PT): balance training, bed mobility training, gait  training, home exercise program, patient/family education, transfer training, strengthening  Progress: no change  Outcome Evaluation: Patient t/f to BSC and then to chair with min assist and B UE support, limited by weakness and fatigue. Patient currently below functional baseline, demonstrating decreased functional mobility status, impaired balance, decreased endurance, and decreased strength. Will address these deficits to promote return to PLOF. Recommend return to facility with HHPT.     Time Calculation:   PT Evaluation Complexity  History, PT Evaluation Complexity: 3 or more personal factors and/or comorbidities  Examination of Body Systems (PT Eval Complexity): total of 3 or more elements  Clinical Presentation (PT Evaluation Complexity): evolving  Clinical Decision Making (PT Evaluation Complexity): moderate complexity  Overall Complexity (PT Evaluation Complexity): moderate complexity     PT Charges       Row Name 05/20/25 0940             Time Calculation    Start Time 0940  -LR      PT Received On 05/20/25  -LR      PT Goal Re-Cert Due Date 05/30/25  -LR         Timed Charges    72871 - PT Therapeutic Activity Minutes 8  -LR         Untimed Charges    PT Eval/Re-eval Minutes 50  -LR         Total Minutes    Timed Charges Total Minutes 8  -LR      Untimed Charges Total Minutes 50  -LR       Total Minutes 58  -LR                User Key  (r) = Recorded By, (t) = Taken By, (c) = Cosigned By      Initials Name Provider Type    LR Jeannie Reyes, PT Physical Therapist                  Therapy Charges for Today       Code Description Service Date Service Provider Modifiers Qty    68596458759 HC PT THERAPEUTIC ACT EA 15 MIN 5/20/2025 Jeannie Reyes, PT GP 1    81954802203 HC PT EVAL MOD COMPLEXITY 4 5/20/2025 Jeannie Reyes, PT GP 1    90685506256 HC PT THER SUPP EA 15 MIN 5/20/2025 Jeanine Reyes, PT GP 4            PT G-Codes  Outcome Measure Options: AM-PAC 6 Clicks Basic  Mobility (PT)  AM-PAC 6 Clicks Score (PT): 17  PT Discharge Summary  Anticipated Discharge Disposition (PT): assisted living, home with home health (return to facility with HHPT)    Jeannie Reyes, PT  5/20/2025

## 2025-05-20 NOTE — PLAN OF CARE
Goal Outcome Evaluation:  Plan of Care Reviewed With: patient        Progress: no change  Outcome Evaluation: Pt. presents below baseline with ADLs and functional mobility. Limited by decreased activity tolerance, generalized weakness, and balance. Skilled OT services warranted to promote return to PLOF. Recommend return to [facility] with  services.    Anticipated Discharge Disposition (OT): assisted living, home with home health

## 2025-05-20 NOTE — ED PROVIDER NOTES
"Subjective   History of Present Illness  89-year-old female presents for evaluation of shortness of breath.  The patient presents via EMS from the [facility] at [facility].  She tells me that she began to feel ill earlier this morning and has been experiencing shortness of breath, cough, and fatigue when compared to her baseline.  She denies any prior history of \"lung issues.  Per EMS, on their arrival the patient was noted to be hypoxic with room air oxygen saturations of 83%.  A nonrebreather was placed and she was then brought here to our facility to be evaluated.  The patient denies any known exposures to anyone with COVID-19.      Review of Systems   Constitutional:  Positive for fatigue.   Respiratory:  Positive for cough and shortness of breath.    All other systems reviewed and are negative.      Past Medical History:   Diagnosis Date    Constipation     Depression     Dysautonomia orthostatic hypotension syndrome     Generalized osteoarthritis     GERD (gastroesophageal reflux disease)     s/p Nissen    Hypertension     Insomnia     Osteoarthrosis, shoulder region     Skin ulcer of scalp     Thrombophlebitis of arm     Tremor     Vitamin B12 deficiency        Allergies   Allergen Reactions    Gabapentin     Sulfa Antibiotics        Past Surgical History:   Procedure Laterality Date    DILATATION AND CURETTAGE      HERNIA REPAIR      HIP SURGERY      SHOULDER SURGERY      Right       Family History   Problem Relation Age of Onset    Cancer Mother         Pancreatic    Stroke Brother     Anorexia nervosa Daughter        Social History     Socioeconomic History    Marital status:    Tobacco Use    Smoking status: Never    Smokeless tobacco: Never   Vaping Use    Vaping status: Never Used   Substance and Sexual Activity    Alcohol use: No     Comment: stopped drinking alcohol    Drug use: No    Sexual activity: Defer           Objective   Physical Exam  Vitals and nursing note reviewed.   Constitutional:     "   Appearance: She is well-developed. She is not diaphoretic.   HENT:      Head: Normocephalic and atraumatic.   Eyes:      Pupils: Pupils are equal, round, and reactive to light.   Cardiovascular:      Rate and Rhythm: Normal rate and regular rhythm.      Heart sounds: Normal heart sounds. No murmur heard.     No friction rub. No gallop.   Pulmonary:      Breath sounds: Wheezing present. No rales.      Comments: Tachypneic, breathing is mildly labored, no accessory muscle use or retractions noted, audible wheezes present  Abdominal:      General: Bowel sounds are normal. There is no distension.      Palpations: Abdomen is soft. There is no mass.      Tenderness: There is no abdominal tenderness. There is no guarding or rebound.   Musculoskeletal:         General: Normal range of motion.      Cervical back: Neck supple.   Skin:     General: Skin is warm and dry.      Findings: No erythema or rash.   Neurological:      Mental Status: She is oriented to person, place, and time.   Psychiatric:         Mood and Affect: Mood normal.         Thought Content: Thought content normal.         Judgment: Judgment normal.         Critical Care    Performed by: Efrain Fraga MD  Authorized by: Efrain Fraga MD    Critical care provider statement:     Critical care time (minutes):  36    Critical care was necessary to treat or prevent imminent or life-threatening deterioration of the following conditions:  Respiratory failure    Critical care was time spent personally by me on the following activities:  Development of treatment plan with patient or surrogate, evaluation of patient's response to treatment, examination of patient, obtaining history from patient or surrogate, ordering and performing treatments and interventions, ordering and review of laboratory studies, ordering and review of radiographic studies, pulse oximetry, re-evaluation of patient's condition and review of old charts             ED Course  ED  "Course as of 05/19/25 2100   Mon May 19, 2025   1223 89-year-old female presents for evaluation of shortness of breath.  The patient presents via EMS from the [facility].  She tells me that she began to feel ill earlier this morning and has been experiencing shortness of breath, cough, and fatigue when compared to her baseline.  No prior history of \"lung issues\" per EMS.  On EMS arrival the patient was noted to be hypoxic with room air oxygen saturations of 83%.  A nonrebreather was placed and she was brought here to our facility to be evaluated.  On arrival, the patient is mildly tachypneic.  Audible wheezes noted throughout lung fields.  No accessory muscle use or retractions noted.  Differential diagnosis is quite broad.  We will obtain labs and imaging, and we will reassess following initial interventions. [DD]   1436 Labs interpreted independently by me remarkable for white blood cell count of 13,000. [DD]   1451 Initial lactate is elevated at 5.5.  I reviewed the patient's most recent echocardiogram from 2023 which revealed a mildly depressed ejection fraction.  IV crystalloid given; however, we will avoid a 30 cc/kg crystalloid bolus at this time as I feel like this would only exacerbate the patient's current dyspnea. [DD]   1451 Respiratory viral panel is negative.  Initial high-sensitivity troponin is elevated at 70. [DD]   1520 Urinalysis is suggestive of infection. [DD]   1520 Antibiotics initiated.  Blood and urine cultures obtained.    After reviewing the patient's labs and imaging, I discussed the patient's case with our hospitalist, Dr. Doherty, and the patient will be admitted under her care for further evaluation and treatment.  The patient is hemodynamically stable at this time and is aware/agreeable with the plan. [DD]      ED Course User Index  [DD] Efrain Fraga MD                                             Recent Results (from the past 24 hours)   ECG 12 Lead Dyspnea    " Collection Time: 05/19/25 12:25 PM   Result Value Ref Range    QT Interval 406 ms    QTC Interval 477 ms   Respiratory Panel PCR w/COVID-19(SARS-CoV-2) ERICK/MARTA/ELPIDIO/PAD/COR/ALMA In-House, NP Swab in UTM/VTM, 2 HR TAT - Swab, Nasopharynx    Collection Time: 05/19/25  1:10 PM    Specimen: Nasopharynx; Swab   Result Value Ref Range    ADENOVIRUS, PCR Not Detected Not Detected    Coronavirus 229E Not Detected Not Detected    Coronavirus HKU1 Not Detected Not Detected    Coronavirus NL63 Not Detected Not Detected    Coronavirus OC43 Not Detected Not Detected    COVID19 Not Detected Not Detected - Ref. Range    Human Metapneumovirus Not Detected Not Detected    Human Rhinovirus/Enterovirus Not Detected Not Detected    Influenza A PCR Not Detected Not Detected    Influenza B PCR Not Detected Not Detected    Parainfluenza Virus 1 Not Detected Not Detected    Parainfluenza Virus 2 Not Detected Not Detected    Parainfluenza Virus 3 Not Detected Not Detected    Parainfluenza Virus 4 Not Detected Not Detected    RSV, PCR Not Detected Not Detected    Bordetella pertussis pcr Not Detected Not Detected    Bordetella parapertussis PCR Not Detected Not Detected    Chlamydophila pneumoniae PCR Not Detected Not Detected    Mycoplasma pneumo by PCR Not Detected Not Detected   Comprehensive Metabolic Panel    Collection Time: 05/19/25  1:48 PM    Specimen: Blood   Result Value Ref Range    Glucose 151 (H) 65 - 99 mg/dL    BUN 18 8 - 23 mg/dL    Creatinine 1.30 (H) 0.57 - 1.00 mg/dL    Sodium 134 (L) 136 - 145 mmol/L    Potassium 4.3 3.5 - 5.2 mmol/L    Chloride 95 (L) 98 - 107 mmol/L    CO2 23.0 22.0 - 29.0 mmol/L    Calcium 8.9 8.6 - 10.5 mg/dL    Total Protein 6.0 6.0 - 8.5 g/dL    Albumin 3.2 (L) 3.5 - 5.2 g/dL    ALT (SGPT) 28 1 - 33 U/L    AST (SGOT) 43 (H) 1 - 32 U/L    Alkaline Phosphatase 74 39 - 117 U/L    Total Bilirubin 0.3 0.0 - 1.2 mg/dL    Globulin 2.8 gm/dL    A/G Ratio 1.1 g/dL    BUN/Creatinine Ratio 13.8 7.0 - 25.0     Anion Gap 16.0 (H) 5.0 - 15.0 mmol/L    eGFR 39.4 (L) >60.0 mL/min/1.73   High Sensitivity Troponin T    Collection Time: 05/19/25  1:48 PM    Specimen: Blood   Result Value Ref Range    HS Troponin T 70 (C) <14 ng/L   BNP    Collection Time: 05/19/25  1:48 PM    Specimen: Blood   Result Value Ref Range    proBNP 1,247.0 0.0 - 1,800.0 pg/mL   Lactic Acid, Plasma    Collection Time: 05/19/25  1:48 PM    Specimen: Blood   Result Value Ref Range    Lactate 5.5 (C) 0.5 - 2.0 mmol/L   Procalcitonin    Collection Time: 05/19/25  1:48 PM    Specimen: Blood   Result Value Ref Range    Procalcitonin 0.09 0.00 - 0.25 ng/mL   CBC Auto Differential    Collection Time: 05/19/25  1:48 PM    Specimen: Blood   Result Value Ref Range    WBC 13.43 (H) 3.40 - 10.80 10*3/mm3    RBC 4.06 3.77 - 5.28 10*6/mm3    Hemoglobin 13.4 12.0 - 15.9 g/dL    Hematocrit 42.2 34.0 - 46.6 %    .9 (H) 79.0 - 97.0 fL    MCH 33.0 26.6 - 33.0 pg    MCHC 31.8 31.5 - 35.7 g/dL    RDW 12.9 12.3 - 15.4 %    RDW-SD 49.1 37.0 - 54.0 fl    MPV 10.1 6.0 - 12.0 fL    Platelets 215 140 - 450 10*3/mm3    Neutrophil % 92.2 (H) 42.7 - 76.0 %    Lymphocyte % 2.7 (L) 19.6 - 45.3 %    Monocyte % 4.6 (L) 5.0 - 12.0 %    Eosinophil % 0.0 (L) 0.3 - 6.2 %    Basophil % 0.1 0.0 - 1.5 %    Immature Grans % 0.4 0.0 - 0.5 %    Neutrophils, Absolute 12.37 (H) 1.70 - 7.00 10*3/mm3    Lymphocytes, Absolute 0.36 (L) 0.70 - 3.10 10*3/mm3    Monocytes, Absolute 0.62 0.10 - 0.90 10*3/mm3    Eosinophils, Absolute 0.00 0.00 - 0.40 10*3/mm3    Basophils, Absolute 0.02 0.00 - 0.20 10*3/mm3    Immature Grans, Absolute 0.06 (H) 0.00 - 0.05 10*3/mm3    nRBC 0.0 0.0 - 0.2 /100 WBC   Urinalysis With Culture If Indicated - Urine, Catheter In/Out    Collection Time: 05/19/25  2:28 PM    Specimen: Urine, Catheter In/Out   Result Value Ref Range    Color, UA Yellow Yellow, Straw    Appearance, UA Turbid (A) Clear    pH, UA 5.5 5.0 - 8.0    Specific Gravity, UA 1.014 1.001 - 1.030    Glucose,  UA Negative Negative    Ketones, UA Trace (A) Negative    Bilirubin, UA Negative Negative    Blood, UA Moderate (2+) (A) Negative    Protein,  mg/dL (2+) (A) Negative    Leuk Esterase, UA Large (3+) (A) Negative    Nitrite, UA Negative Negative    Urobilinogen, UA 1.0 E.U./dL 0.2 - 1.0 E.U./dL   Urinalysis, Microscopic Only - Urine, Catheter In/Out    Collection Time: 05/19/25  2:28 PM    Specimen: Urine, Catheter In/Out   Result Value Ref Range    RBC, UA 6-10 (A) None Seen, 0-2 /HPF    WBC, UA Too Numerous to Count (A) None Seen, 0-2 /HPF    Bacteria, UA None Seen None Seen, Trace /HPF    Squamous Epithelial Cells, UA 3-6 (A) None Seen, 0-2 /HPF    Hyaline Casts, UA 0-6 0 - 6 /LPF    Methodology Manual Light Microscopy    High Sensitivity Troponin T 1Hr    Collection Time: 05/19/25  3:47 PM    Specimen: Blood   Result Value Ref Range    HS Troponin T 98 (C) <14 ng/L    Troponin T Numeric Delta 28 (C) Abnormal if >/=3 ng/L    Troponin T % Delta 40 (C) Abnormal if >/= 20%   STAT Lactic Acid, Reflex    Collection Time: 05/19/25  5:34 PM    Specimen: Blood   Result Value Ref Range    Lactate 2.3 (C) 0.5 - 2.0 mmol/L     Note: In addition to lab results from this visit, the labs listed above may include labs taken at another facility or during a different encounter within the last 24 hours. Please correlate lab times with ED admission and discharge times for further clarification of the services performed during this visit.    CT Angiogram Chest   Final Result   1.No evidence of acute pulmonary embolism.   2.Suggestion of centrilobular/tree-in-bud nodularity within the right lower lobe which may indicate atypical infectious process.   3.Small left and trace right pleural effusions with bibasilar atelectasis.   4.Scattered bilateral lung parenchymal scarring.            Electronically Signed: Slim Mauro MD     5/19/2025 3:04 PM EDT     Workstation ID: [id number]        Vitals:    05/19/25 1744 05/19/25 1746  05/19/25 1800 05/19/25 1942   BP:   (!) 66/50 133/81   BP Location:    Right leg   Patient Position:    Lying   Pulse: 63 61 61    Resp:    20   Temp:    98.7 °F (37.1 °C)   TempSrc:    Oral   SpO2: 97% 98% 96% 97%   Weight:       Height:         Medications   methylPREDNISolone sodium succinate (SOLU-Medrol) injection 125 mg (125 mg Intravenous Given 5/19/25 1402)   ipratropium-albuterol (DUO-NEB) nebulizer solution 3 mL (3 mL Nebulization Given 5/19/25 1242)   sterile water (preservative free) injection (10 mL  Given 5/19/25 1402)   iopamidol (ISOVUE-300) 61 % injection 100 mL (85 mL Intravenous Given 5/19/25 1445)   lactated ringers bolus 1,000 mL (0 mL Intravenous Stopped 5/19/25 1653)   azithromycin (ZITHROMAX) 500 mg in sodium chloride 0.9 % 250 mL IVPB-VTB (0 mg Intravenous Stopped 5/19/25 1654)   cefTRIAXone (ROCEPHIN) 2 g in sodium chloride 0.9 % 100 mL MBP (0 g Intravenous Stopped 5/19/25 1807)     ECG/EMG Results (last 24 hours)       Procedure Component Value Units Date/Time    ECG 12 Lead Dyspnea [[id number]] Collected: 05/19/25 1225     Updated: 05/19/25 1952     QT Interval 406 ms      QTC Interval 477 ms     Narrative:      Test Reason : Dyspnea  Blood Pressure :   */*   mmHG  Vent. Rate :  83 BPM     Atrial Rate : 241 BPM     P-R Int :   * ms          QRS Dur : 116 ms      QT Int : 406 ms       P-R-T Axes :   * -58  91 degrees    QTcB Int : 477 ms    Poor data quality  Atrial fibrillation  Left axis deviation  Anterolateral infarct (cited on or before 26-May-2023)  Abnormal ECG  Confirmed by MD Philly, Zan ([id number]) on 5/19/2025 7:52:22 PM    Referred By: PEGGY PHILLIPS           Confirmed By: Zan Fraga MD          ECG 12 Lead Dyspnea   Final Result   Test Reason : Dyspnea   Blood Pressure :   */*   mmHG   Vent. Rate :  83 BPM     Atrial Rate : 241 BPM      P-R Int :   * ms          QRS Dur : 116 ms       QT Int : 406 ms       P-R-T Axes :   * -58  91 degrees     QTcB Int : 477 ms      Poor data quality    Atrial fibrillation   Left axis deviation   Anterolateral infarct (cited on or before 26-May-2023)   Abnormal ECG   Confirmed by MD Fraga Michael ([id number] on 5/19/2025 7:52:22 PM      Referred By: ED MD           Confirmed By: Zan Fraga MD                    Medical Decision Making  Amount and/or Complexity of Data Reviewed  Labs: ordered.  Radiology: ordered.  ECG/medicine tests: ordered.    Risk  Prescription drug management.  Decision regarding hospitalization.        Final diagnoses:   Acute respiratory failure with hypoxia   Community acquired pneumonia, unspecified laterality   Acute UTI   Elevated troponin   Lactic acidosis   Leukocytosis, unspecified type       ED Disposition  ED Disposition       ED Disposition   Decision to Admit    Condition   --    Comment   Level of Care: Telemetry [5]   Diagnosis: Pneumonia [754590]   Certification: I Certify That Inpatient Hospital Services Are Medically Necessary For Greater Than 2 Midnights                 No follow-up provider specified.       Medication List      No changes were made to your prescriptions during this visit.            Efrain Fraga MD  05/19/25 6040

## 2025-05-20 NOTE — CONSULTS
"[facility] Neurology  Consult Note    Patient Name: Bibiana Hodges  : 1935  MRN: [id number]  Primary Care Physician:  Nivia Madrigal MD  Referring Physician: No ref. provider found  Date of admission: 2025    Subjective     Reason for Consult/ Chief Complaint: Tremor    Bibiana Hodges is a 89 y.o. female with known A-fib, s/p PPM HTN, HLD, autonomic dysfunction, GERD and B12 deficiency who presented to [facility] ED via EMS from [facility] after experiencing respiratory distress.    EMS found patient in her room with oxygen saturations of approximately 83%.  She was placed on a nonrebreather and transferred to emergency room.  In the ED she was given Rocephin, Zithromax and Solu-Medrol.  BP at that time 114/79 with a pulse of 60.  Patient endorsed that she had been feeling unwell overnight was experiencing some shortness of breath that she felt was exacerbated yesterday morning.    Patient endorses some vomiting and constipation endorses she has had no bowel movement in 2 weeks.  She denies any recent fever, cough, chills or chest pain.    She also has complaint of experiencing double vision for approximately 2 to 5 years.  She also has a significant tremor, greater in the right hand than the left.  There is also tremor noted in her left foot.  She describes this as not being able to control the movements.    Per my chart review patient has been seen at [facility] in 2013 for essential tremor.  It appears patient was trialed on propranolol and primidone.  Unfortunately is unclear if patient responded to treatment as it appears she was lost to follow-up. At that time her PCP also had her on Xanax and Restoril.  Patient is currently on Keppra 250 mg twice a day for her essential tremor.  It appears she has been on this for quite some time.    Patient states that she has been to the ophthalmologist in the past year and has \"great vision\".  She states that she has had " instances where she believes things like lint on the ceiling is a bug.  She also states that on occasion she sees 2 images when watching television.  She does endorse wearing corrective lenses however does not have them in the hospital.    .  HS Troponin 70.  Sodium 134.  Creatinine 1.30.  Alk phos 74, AST 43, ALT 28.  Lactate 5.5 down trended to 1.3.  WBC 13.43, hemoglobin 13.4.  UA with no bacteria or nitrites.  Blood and urine cultures pending.  Respiratory panel negative.    On my assessment patient is oriented x 4.  She does exhibit some issues with timing.  She states that her double vision has been going on for 5 years.  She also states that she has never been worked up for essential tremor and does not recall the 2013 visit to [facility].  She states that all of her symptoms were exacerbated when she got COVID in 2020.  Essential tremors noted on exam greater on right than left.  Patient is able to complete finger-to-nose.  Was also able to witness patient move food from plate to mouth with success.  Reflexes +2/+1 with unequivocal Babinski's.     Review Of Systems   Constitutional: Chronically ill female  Cardiovascular: Negative for chest pain or palpitations.  Respiratory: Negative for shortness of breath or cough.  Gastrointestinal: Negative for nausea and vomiting.  Genitourinary: Negative for bladder incontinence.  Musculoskeletal: Essential tremor noted in bilateral upper extremities  Dermatological: Negative for skin breakdown.   Neurological: Negative for headache, pain, weakness or vision changes.     Personal History     Past Medical History:   Diagnosis Date    Constipation     Depression     Dysautonomia orthostatic hypotension syndrome     Generalized osteoarthritis     GERD (gastroesophageal reflux disease)     s/p Nissen    Hypertension     Insomnia     Osteoarthrosis, shoulder region     Skin ulcer of scalp     Thrombophlebitis of arm     Tremor     Vitamin B12  deficiency        Past Surgical History:   Procedure Laterality Date    DILATATION AND CURETTAGE      HERNIA REPAIR      HIP SURGERY      SHOULDER SURGERY      Right       Family History: family history includes Anorexia nervosa in her daughter; Cancer in her mother; Stroke in her brother. Otherwise pertinent FHx was reviewed and not pertinent to current issue.    Social History:  reports that she has never smoked. She has never used smokeless tobacco. She reports that she does not drink alcohol and does not use drugs.    Home Medications:   Vitamin C, acetaminophen, albuterol, amiodarone, apixaban, artificial tears, benzonatate, bisacodyl, castor oil-balsam peru, ferrous sulfate, fluticasone, levETIRAcetam XR, levothyroxine, lisinopril, meclizine, melatonin, metoprolol tartrate, ondansetron ODT, pantoprazole, polyethylene glycol, rosuvastatin, senna, traZODone, and venlafaxine XR    Current Medications:     Current Facility-Administered Medications:     acetaminophen (TYLENOL) tablet 650 mg, 650 mg, Oral, Q8H PRN, Aileen Doherty MD, 650 mg at 05/20/25 0020    amiodarone (PACERONE) tablet 200 mg, 200 mg, Oral, Daily, Aileen Doherty MD, 200 mg at 05/20/25 0833    apixaban (ELIQUIS) tablet 2.5 mg, 2.5 mg, Oral, BID, Aileen Doherty MD, 2.5 mg at 05/20/25 0020    ascorbic acid (VITAMIN C) tablet 500 mg, 500 mg, Oral, Daily, Aileen Doherty MD, 500 mg at 05/20/25 0834    bisacodyl (DULCOLAX) suppository 10 mg, 10 mg, Rectal, Once, Aileen Doherty MD    cefTRIAXone (ROCEPHIN) 1,000 mg in sodium chloride 0.9 % 100 mL MBP, 1,000 mg, Intravenous, Q24H, Aileen Doherty MD, Last Rate: 200 mL/hr at 05/20/25 0834, 1,000 mg at 05/20/25 0834    doxycycline (MONODOX) capsule 100 mg, 100 mg, Oral, Q12H, Aileen Doherty MD, 100 mg at 05/20/25 0833    ipratropium-albuterol (DUO-NEB) nebulizer solution 3 mL, 3 mL, Nebulization, 4x Daily - RT, Aileen Doherty MD, 3 mL at 05/20/25 0731    levETIRAcetam (KEPPRA) tablet  250 mg, 250 mg, Oral, BID, Aileen Doherty MD, 250 mg at 05/20/25 0020    levothyroxine (SYNTHROID, LEVOTHROID) tablet 100 mcg, 100 mcg, Oral, Daily, Aileen Doherty MD, 100 mcg at 05/20/25 0611    lisinopril (PRINIVIL,ZESTRIL) tablet 10 mg, 10 mg, Oral, Q24H, Aileen oDherty MD, 10 mg at 05/20/25 0833    melatonin tablet 5 mg, 5 mg, Oral, Nightly, Aileen Doherty MD, 5 mg at 05/20/25 0020    metoprolol tartrate (LOPRESSOR) half tablet 12.5 mg, 12.5 mg, Oral, BID, Aileen Doherty MD, 12.5 mg at 05/20/25 0019    polyethylene glycol (MIRALAX) packet 17 g, 17 g, Oral, Daily, Aileen Doherty MD, 17 g at 05/20/25 0834    predniSONE (DELTASONE) tablet 40 mg, 40 mg, Oral, Daily With Breakfast, Aileen Doherty MD, 40 mg at 05/20/25 0834    rosuvastatin (CRESTOR) tablet 10 mg, 10 mg, Oral, Nightly, Aileen Doherty MD, 10 mg at 05/20/25 0019    senna (SENOKOT) tablet 2 tablet, 2 tablet, Oral, Daily, Aileen Doherty MD, 2 tablet at 05/20/25 0834    traZODone (DESYREL) tablet 100 mg, 100 mg, Oral, Nightly, Aileen Doherty MD    venlafaxine XR (EFFEXOR-XR) 24 hr capsule 75 mg, 75 mg, Oral, Daily, Aileen Doherty MD, 75 mg at 05/20/25 0834     Allergies:  Allergies   Allergen Reactions    Sulfa Antibiotics Hives    Gabapentin        Objective     Physical Exam  Vitals and nursing note reviewed.   Constitutional:       General: She is not in acute distress.     Appearance: She is not ill-appearing.   Eyes:      General: No visual field deficit.     Extraocular Movements: Extraocular movements intact.      Pupils: Pupils are equal, round, and reactive to light.      Comments: There was no nystagmus or deviated gaze noted   Neurological:      Mental Status: She is alert. Mental status is at baseline.      Cranial Nerves: No cranial nerve deficit, dysarthria or facial asymmetry.      Sensory: No sensory deficit.      Motor: Weakness and tremor present. No seizure activity or pronator drift.      Coordination:  "Finger-Nose-Finger Test abnormal.      Deep Tendon Reflexes:      Reflex Scores:       Bicep reflexes are 2+ on the right side and 2+ on the left side.       Patellar reflexes are 0 on the right side and 0 on the left side.       Achilles reflexes are 0 on the right side and 0 on the left side.     Comments:     Cranial Nerves   CN II: Pupils are equal, round, and reactive to light. Normal visual acuity and visual fields.    CN III IV VI: Extraocular movements are full without nystagmus.  CN V: Normal facial sensation and strength of muscles of mastication.  CN VII: Facial movements are symmetric. No weakness.  CN VIII:  Auditory acuity is normal.  CN IX & X:  Symmetric palatal movement.  CN XI: Sternocleidomastoid and trapezius are normal.  No weakness.  CN XII: The tongue is midline.  No atrophy or fasciculations.    Generalized weakness    Essential tremor noted in bilateral upper extremities worse in right than left         Vitals:  Temp:  [98.6 °F (37 °C)-98.9 °F (37.2 °C)] 98.6 °F (37 °C)  Heart Rate:  [60-83] 65  Resp:  [18-20] 18  BP: ()/() 138/79  Flow (L/min) (Oxygen Therapy):  [4-15] 4    Laboratory Results:   Lab Results   Component Value Date    GLUCOSE 151 (H) 05/19/2025    CALCIUM 8.9 05/19/2025     (L) 05/19/2025    K 4.3 05/19/2025    CO2 23.0 05/19/2025    CL 95 (L) 05/19/2025    BUN 18 05/19/2025    CREATININE 1.30 (H) 05/19/2025    EGFRIFAFRI >60.0 01/13/2022    EGFRIFNONA 50.2 (L) 01/13/2022    BCR 13.8 05/19/2025    ANIONGAP 16.0 (H) 05/19/2025     Lab Results   Component Value Date    WBC 13.43 (H) 05/19/2025    HGB 13.4 05/19/2025    HCT 42.2 05/19/2025    .9 (H) 05/19/2025     05/19/2025     No results found for: \"CHOL\"  No results found for: \"HDL\"  No results found for: \"LDL\"  No results found for: \"TRIG\"  No results found for: \"HGBA1C\"  Lab Results   Component Value Date    INR 1.36 (H) 06/29/2023    INR 1.34 (H) 01/12/2022    INR 1.33 (H) 01/10/2022    " "PROTIME 16.9 (H) 06/29/2023    PROTIME 13.8 (H) 01/12/2022    PROTIME 14.2 (H) 01/10/2022     Lab Results   Component Value Date    [id number] 05/28/2015     No results found for: \"FOLATE\"          Assessment / Plan   Brief Patient Summary:  Bibiana Hodges is a 89 y.o. female with known A-fib, s/p PPM HTN, HLD, autonomic dysfunction, GERD and B12 deficiency who presented to [facility] ED via EMS from [facility] after experiencing respiratory distress.  Oxygen saturation tenons 83% per EMS.     She also has complaint of experiencing double vision for approximately 2 to 5 years.  She also has a significant tremor, greater in the right hand than the left.     Plan:   Chronic essential tremor  Patient previously seen at [facility] in 2013.  Has been trialed on propranolol, primidone, Xanax and Restoril without resolution of tremor.  Patient currently on Keppra 250 mg twice daily.  Per my chart review she has been on this medication for over a year for her essential tremor.  She states has not been successful in therapy.    Unfortunately patient's tremor has been refractory to previous and current regimens.  To the best my chart review patient has not been trialed on gabapentin.  Would recommend gabapentin 100 mg twice a day with close monitoring of creatinine.  Patient is on medications that are likely exacerbating her essential tremor including Effexor and possibly trazodone. Effexor has been seen to cause tremor in approximately 5 to 8% of the population.  Trazodone has not been seen to exacerbate tremor in 3% 5% of the population.  Unfortunately all antidepressants come with a certain risk of increasing for tremor.  Patient may warrant a transition to Remeron as it has been seen to have less effect on tremors.  CK  Continue to work with PT/OT    Chronic double vision  MRI brain  Fall precautions  Outpatient follow-up with ophthalmology    Orthostatic hypotension  Orthostatic blood " pressures daily  Fall precautions  Patient educated on nonmedical ways to negate orthostatic hypotension including oral hydration, p.o. intake, and slow position changes, stockings/abdominal binders.    Vitamin B12 deficiency  Vitamin B12 and folate    I have discussed the above with the patient, bedside RN Dr. Kitchen and Dr. Kimbrough.       Time spent with patient: 80 minutes in face-to-face evaluation and management of the patient.       June Jiang, APRN

## 2025-05-20 NOTE — THERAPY EVALUATION
Patient Name: Bibiana Hodges  : 1935    MRN: [id number]                              Today's Date: 2025       Admit Date: 2025    Visit Dx:     ICD-10-CM ICD-9-CM   1. Acute respiratory failure with hypoxia  J96.01 518.81   2. Community acquired pneumonia, unspecified laterality  J18.9 486   3. Acute UTI  N39.0 599.0   4. Elevated troponin  R79.89 790.6   5. Lactic acidosis  E87.20 276.2   6. Leukocytosis, unspecified type  D72.829 288.60     Patient Active Problem List   Diagnosis    Dysautonomia orthostatic hypotension syndrome    Hypertension    Severe malnutrition    Atrial fibrillation    Autonomic dysfunction    Hyperlipidemia    Pneumonia     Past Medical History:   Diagnosis Date    Constipation     Depression     Dysautonomia orthostatic hypotension syndrome     Generalized osteoarthritis     GERD (gastroesophageal reflux disease)     s/p Nissen    Hypertension     Insomnia     Osteoarthrosis, shoulder region     Skin ulcer of scalp     Thrombophlebitis of arm     Tremor     Vitamin B12 deficiency      Past Surgical History:   Procedure Laterality Date    DILATATION AND CURETTAGE      HERNIA REPAIR      HIP SURGERY      SHOULDER SURGERY      Right      General Information       Row Name 25 1413          OT Time and Intention    Document Type evaluation  -LC     Mode of Treatment occupational therapy  -       Row Name 25 1413          General Information    Patient Profile Reviewed yes  -LC     Prior Level of Function min assist:;all household mobility;transfer;w/c or scooter;ADL's  W/C at baseline, RW for T/Fs  -LC     Existing Precautions/Restrictions fall;oxygen therapy device and L/min;other (see comments)  tremors, double vision  -LC     Barriers to Rehab medically complex;previous functional deficit  -       Row Name 25 1413          Living Environment    Current Living Arrangements assisted living facility  -     People in Home facility resident  -LC        Row Name 05/20/25 1413          Home Main Entrance    Number of Stairs, Main Entrance none  -       Row Name 05/20/25 1413          Stairs Within Home, Primary    Number of Stairs, Within Home, Primary none  -       Row Name 05/20/25 1413          Cognition    Orientation Status (Cognition) oriented x 4  -       Row Name 05/20/25 1413          Safety Issues/Impairments Affecting Functional Mobility    Safety Issues Affecting Function (Mobility) awareness of need for assistance;insight into deficits/self-awareness;safety precaution awareness;safety precautions follow-through/compliance;sequencing abilities  -     Impairments Affecting Function (Mobility) balance;endurance/activity tolerance;strength;postural/trunk control;coordination  -               User Key  (r) = Recorded By, (t) = Taken By, (c) = Cosigned By      Initials Name Provider Type     Jeannie Baker OT Occupational Therapist                     Mobility/ADL's       Row Name 05/20/25 1419          Bed Mobility    Bed Mobility sit-supine  -     Sit-Supine [place] (Bed Mobility) minimum assist (75% patient effort);verbal cues  -     Assistive Device (Bed Mobility) bed rails  -     Comment, (Bed Mobility) VC's to sequence transitioning from sit to supine.  -       Row Name 05/20/25 1419          Transfers    Transfers sit-stand transfer;bed-chair transfer  -     Comment, (Transfers) VC's for hand placement and sequencing.  -       Row Name 05/20/25 1419          Bed-Chair Transfer    Bed-Chair [place] (Transfers) minimum assist (75% patient effort);verbal cues  -     Assistive Device (Bed-Chair Transfers) walker, front-wheeled  -       Row Name 05/20/25 1419          Sit-Stand Transfer    Sit-Stand [place] (Transfers) minimum assist (75% patient effort);verbal cues  -     Assistive Device (Sit-Stand Transfers) walker, front-wheeled  -LC       Row Name 05/20/25 1419          Activities of Daily Living    BADL  Assessment/Intervention upper body dressing;lower body dressing;grooming;feeding  -[facility] Name 05/20/25 1419          Upper Body Dressing Assessment/Training    [place] Level (Upper Body Dressing) don;front opening garment;doff;minimum assist (75% patient effort);verbal cues  -     Position (Upper Body Dressing) edge of bed sitting  -[facility] Name 05/20/25 1419          Lower Body Dressing Assessment/Training    [place] Level (Lower Body Dressing) lower body dressing skills;dependent (less than 25% patient effort)  -     Position (Lower Body Dressing) supported sitting  -[facility] Name 05/20/25 1419          Grooming Assessment/Training    [place] Level (Grooming) wash face, hands;set up  -[facility] Name 05/20/25 1419          Self-Feeding Assessment/Training    Assistive Devices (Feeding) weighted utensils  -     Comment, (Feeding) Issued weighted utensils to improve independence with self feeding.  -               User Key  (r) = Recorded By, (t) = Taken By, (c) = Cosigned By      Initials Name Provider Type     Jeannie Baker OT Occupational Therapist                   Obj/Interventions       [facility] Name 05/20/25 [employer]          Sensory Assessment (Somatosensory)    Sensory Assessment (Somatosensory) UE sensation intact  -LC       Row Name 05/20/25 1433          Vision Assessment/Intervention    Visual Impairment/Limitations corrective lenses full-time  Does not have her glasses with her.  -[facility] Name 05/20/25 1433          Range of Motion Comprehensive    General Range of Motion bilateral upper extremity ROM WFL  -[facility] Name 05/20/25 1433          Strength Comprehensive (MMT)    General Manual Muscle Testing (MMT) Assessment upper extremity strength deficits identified  -[facility] Name 05/20/25 1433          Upper Extremity (Manual Muscle Testing)    Comment, MMT: Upper Extremity B UE grossly 4/5  -[facility] Name 05/20/25 1433          Motor Skills     Motor Skills functional endurance  -     Functional Endurance impaired activity tolerance  -       Row Name 05/20/25 1433          Balance    Balance Assessment sitting static balance;sitting dynamic balance;standing static balance;standing dynamic balance  -     Static Sitting Balance contact guard;verbal cues  -     Dynamic Sitting Balance contact guard;verbal cues  -     Position, Sitting Balance unsupported;sitting edge of bed  -     Static Standing Balance minimal assist;verbal cues  -     Dynamic Standing Balance minimal assist;verbal cues  -     Position/Device Used, Standing Balance supported;walker, front-wheeled  -     Balance Interventions sitting;sit to stand;supported;standing;occupation based/functional task;weight shifting activity  -     Comment, Balance Min A to steady  -               User Key  (r) = Recorded By, (t) = Taken By, (c) = Cosigned By      Initials Name Provider Type     Jeannie Baker OT Occupational Therapist                   Goals/Plan       Row Name 05/20/25 1437          Transfer Goal 1 (OT)    Activity/Assistive Device (Transfer Goal 1, OT) sit-to-stand/stand-to-sit;bed-to-chair/chair-to-bed;toilet;walker, rolling  -     [place] Level/Cues Needed (Transfer Goal 1, OT) verbal cues required;contact guard required  -     Time Frame (Transfer Goal 1, OT) long term goal (LTG);10 days  -     Progress/Outcome (Transfer Goal 1, OT) goal ongoing  -       Row Name 05/20/25 1437          Dressing Goal 1 (OT)    Activity/Device (Dressing Goal 1, OT) upper body dressing  -     [place]/Cues Needed (Dressing Goal 1, OT) moderate assist (50-74% patient effort)  -     Time Frame (Dressing Goal 1, OT) short term goal (STG);5 days  -     Progress/Outcome (Dressing Goal 1, OT) goal ongoing  -       Row Name 05/20/25 1437          Toileting Goal 1 (OT)    Activity/Device (Toileting Goal 1, OT) adjust/manage clothing;perform perineal  hygiene;commode;grab bar/safety frame  -     [place] Level/Cues Needed (Toileting Goal 1, OT) minimum assist (75% or more patient effort)  -     Time Frame (Toileting Goal 1, OT) long term goal (LTG);10 days  -     Progress/Outcome (Toileting Goal 1, OT) goal ongoing  -       Row Name 05/20/25 7732          Therapy Assessment/Plan (OT)    Planned Therapy Interventions (OT) activity tolerance training;adaptive equipment training;BADL retraining;functional balance retraining;occupation/activity based interventions;patient/caregiver education/training;strengthening exercise;transfer/mobility retraining  -               User Key  (r) = Recorded By, (t) = Taken By, (c) = Cosigned By      Initials Name Provider Type     Jeannie Baker, CHONG Occupational Therapist                   Clinical Impression       Row Name 05/20/25 1433          Pain Assessment    Pretreatment Pain Rating 0/10 - no pain  -     Posttreatment Pain Rating 0/10 - no pain  -       Row Name 05/20/25 1430          Plan of Care Review    Plan of Care Reviewed With patient  -     Progress no change  -     Outcome Evaluation Pt. presents below baseline with ADLs and functional mobility. Limited by decreased activity tolerance, generalized weakness, and balance. Skilled OT services warranted to promote return to PLOF. Recommend return to [facility] with  services.  -       Row Name 05/20/25 1130          Therapy Assessment/Plan (OT)    Patient/Family Therapy Goal Statement (OT) To return to PLOF  -     Therapy Frequency (OT) daily  -     Predicted Duration of Therapy Intervention (OT) 3 days  -       Row Name 05/20/25 1437          Therapy Plan Review/Discharge Plan (OT)    Anticipated Discharge Disposition (OT) assisted living;home with home health  -       Row Name 05/20/25 1430          Positioning and Restraints    Pre-Treatment Position sitting in chair/recliner  -     Post Treatment Position bed  -LC     In Bed notified  nsg;fowlers;call light within reach;encouraged to call for assist;exit alarm on  -LC               User Key  (r) = Recorded By, (t) = Taken By, (c) = Cosigned By      Initials Name Provider Type    LC Jeannie Baker, CHONG Occupational Therapist                   Outcome Measures       Row Name 05/20/25 1444          How much help from another is currently needed...    Putting on and taking off regular lower body clothing? 1  -LC     Bathing (including washing, rinsing, and drying) 2  -LC     Toileting (which includes using toilet bed pan or urinal) 1  -LC     Putting on and taking off regular upper body clothing 3  -LC     Taking care of personal grooming (such as brushing teeth) 3  -LC     Eating meals 3  -LC     AM-PAC 6 Clicks Score (OT) 13  -LC       Row Name 05/20/25 0940 05/20/25 0800       How much help from another person do you currently need...    Turning from your back to your side while in flat bed without using bedrails? 4  -LR 2  -CO    Moving from lying on back to sitting on the side of a flat bed without bedrails? 3  -LR 2  -CO    Moving to and from a bed to a chair (including a wheelchair)? 3  -LR 1  -CO    Standing up from a chair using your arms (e.g., wheelchair, bedside chair)? 3  -LR 1  -CO    Climbing 3-5 steps with a railing? 1  -LR 1  -CO    To walk in hospital room? 3  -LR 1  -CO    AM-PAC 6 Clicks Score (PT) 17  -LR 8  -CO    Highest Level of Mobility Goal Stand (1 or More Minutes)-5  -LR Sit at Edge of Bed-3  -CO      Row Name 05/20/25 1444 05/20/25 0940       Functional Assessment    Outcome Measure Options AM-PAC 6 Clicks Daily Activity (OT)  -LC AM-PAC 6 Clicks Basic Mobility (PT)  -LR              User Key  (r) = Recorded By, (t) = Taken By, (c) = Cosigned By      Initials Name Provider Type    Jeannie Kennedy, PT Physical Therapist    Jeannie Garcia, CHONG Occupational Therapist    CO Octaviano Sousa, RN Registered Nurse                    Occupational Therapy Education        Title: PT OT SLP Therapies (In Progress)       Topic: Occupational Therapy (In Progress)       Point: ADL training (In Progress)       Learning Progress Summary            Patient Acceptance, E, NR by  at 5/20/2025 1327                      Point: Precautions (In Progress)       Learning Progress Summary            Patient Acceptance, E, NR by  at 5/20/2025 1327                      Point: Body mechanics (In Progress)       Learning Progress Summary            Patient Acceptance, E, NR by  at 5/20/2025 1327                                      User Key       Initials Effective Dates Name Provider Type Discipline     06/16/21 -  Jeannie Baker OT Occupational Therapist OT                  OT Recommendation and Plan  Planned Therapy Interventions (OT): activity tolerance training, adaptive equipment training, BADL retraining, functional balance retraining, occupation/activity based interventions, patient/caregiver education/training, strengthening exercise, transfer/mobility retraining  Therapy Frequency (OT): daily  Plan of Care Review  Plan of Care Reviewed With: patient  Progress: no change  Outcome Evaluation: Pt. presents below baseline with ADLs and functional mobility. Limited by decreased activity tolerance, generalized weakness, and balance. Skilled OT services warranted to promote return to PLOF. Recommend return to [facility] with HH services.     Time Calculation:   Evaluation Complexity (OT)  Review Occupational Profile/Medical/Therapy History Complexity: brief/low complexity  Assessment, Occupational Performance/Identification of Deficit Complexity: 1-3 performance deficits  Clinical Decision Making Complexity (OT): problem focused assessment/low complexity  Overall Complexity of Evaluation (OT): low complexity     Time Calculation- OT       Row Name 05/20/25 1445             Time Calculation- OT    OT Start Time 1327  -      OT Received On 05/20/25  -      OT Goal Re-Cert Due Date 05/30/25  -          Untimed Charges    OT Eval/Re-eval Minutes 62  -LC         Total Minutes    Untimed Charges Total Minutes 62  -LC       Total Minutes 62  -LC                User Key  (r) = Recorded By, (t) = Taken By, (c) = Cosigned By      Initials Name Provider Type    Jeannie Garcia OT Occupational Therapist                  Therapy Charges for Today       Code Description Service Date Service Provider Modifiers Qty    80239081318 HC-OT EVAL LOW COMPLEXITY 5 5/20/2025 Jeannie Baker OT  1    [id number]  OT THER SUPP EA 15 MIN 5/20/2025 Jeannie Baker OT GO 3                 Jeannie Baker OT  5/20/2025

## 2025-05-21 ENCOUNTER — APPOINTMENT (OUTPATIENT)
Dept: MRI IMAGING | Facility: HOSPITAL | Age: OVER 89
End: 2025-05-21
Payer: MEDICARE

## 2025-05-21 ENCOUNTER — APPOINTMENT (OUTPATIENT)
Dept: GENERAL RADIOLOGY | Facility: HOSPITAL | Age: OVER 89
End: 2025-05-21
Payer: MEDICARE

## 2025-05-21 LAB — BACTERIA SPEC AEROBE CULT: ABNORMAL

## 2025-05-21 PROCEDURE — 74018 RADEX ABDOMEN 1 VIEW: CPT

## 2025-05-21 PROCEDURE — 25010000002 CEFTRIAXONE PER 250 MG: Performed by: INTERNAL MEDICINE

## 2025-05-21 PROCEDURE — 99232 SBSQ HOSP IP/OBS MODERATE 35: CPT

## 2025-05-21 PROCEDURE — 94761 N-INVAS EAR/PLS OXIMETRY MLT: CPT

## 2025-05-21 PROCEDURE — 97535 SELF CARE MNGMENT TRAINING: CPT

## 2025-05-21 PROCEDURE — 63710000001 PREDNISONE PER 1 MG: Performed by: INTERNAL MEDICINE

## 2025-05-21 PROCEDURE — 97530 THERAPEUTIC ACTIVITIES: CPT

## 2025-05-21 PROCEDURE — 70551 MRI BRAIN STEM W/O DYE: CPT

## 2025-05-21 PROCEDURE — 99232 SBSQ HOSP IP/OBS MODERATE 35: CPT | Performed by: INTERNAL MEDICINE

## 2025-05-21 PROCEDURE — 94799 UNLISTED PULMONARY SVC/PX: CPT

## 2025-05-21 PROCEDURE — 97168 OT RE-EVAL EST PLAN CARE: CPT

## 2025-05-21 PROCEDURE — 94664 DEMO&/EVAL PT USE INHALER: CPT

## 2025-05-21 RX ORDER — LACTULOSE 10 G/15ML
20 SOLUTION ORAL 2 TIMES DAILY
Status: DISCONTINUED | OUTPATIENT
Start: 2025-05-21 | End: 2025-05-26

## 2025-05-21 RX ADMIN — LEVETIRACETAM 250 MG: 250 TABLET, FILM COATED ORAL at 20:20

## 2025-05-21 RX ADMIN — DOXYCYCLINE 100 MG: 100 CAPSULE ORAL at 08:56

## 2025-05-21 RX ADMIN — IPRATROPIUM BROMIDE AND ALBUTEROL SULFATE 3 ML: 2.5; .5 SOLUTION RESPIRATORY (INHALATION) at 20:29

## 2025-05-21 RX ADMIN — STANDARDIZED SENNA CONCENTRATE 2 TABLET: 8.6 TABLET ORAL at 08:56

## 2025-05-21 RX ADMIN — LISINOPRIL 10 MG: 10 TABLET ORAL at 08:56

## 2025-05-21 RX ADMIN — ACETAMINOPHEN 650 MG: 325 TABLET, FILM COATED ORAL at 20:32

## 2025-05-21 RX ADMIN — ROSUVASTATIN 10 MG: 10 TABLET, FILM COATED ORAL at 20:20

## 2025-05-21 RX ADMIN — LEVETIRACETAM 250 MG: 250 TABLET, FILM COATED ORAL at 08:56

## 2025-05-21 RX ADMIN — POLYETHYLENE GLYCOL 3350 17 G: 17 POWDER, FOR SOLUTION ORAL at 08:56

## 2025-05-21 RX ADMIN — IPRATROPIUM BROMIDE AND ALBUTEROL SULFATE 3 ML: 2.5; .5 SOLUTION RESPIRATORY (INHALATION) at 08:02

## 2025-05-21 RX ADMIN — APIXABAN 2.5 MG: 2.5 TABLET, FILM COATED ORAL at 20:20

## 2025-05-21 RX ADMIN — LEVOTHYROXINE SODIUM 100 MCG: 0.1 TABLET ORAL at 05:54

## 2025-05-21 RX ADMIN — AMIODARONE HYDROCHLORIDE 200 MG: 200 TABLET ORAL at 08:56

## 2025-05-21 RX ADMIN — APIXABAN 2.5 MG: 2.5 TABLET, FILM COATED ORAL at 08:56

## 2025-05-21 RX ADMIN — PREDNISONE 40 MG: 20 TABLET ORAL at 08:56

## 2025-05-21 RX ADMIN — Medication 12.5 MG: at 20:20

## 2025-05-21 RX ADMIN — DOXYCYCLINE 100 MG: 100 CAPSULE ORAL at 20:20

## 2025-05-21 RX ADMIN — VENLAFAXINE HYDROCHLORIDE 75 MG: 75 CAPSULE, EXTENDED RELEASE ORAL at 08:56

## 2025-05-21 RX ADMIN — Medication 12.5 MG: at 08:56

## 2025-05-21 RX ADMIN — TRAZODONE HYDROCHLORIDE 100 MG: 100 TABLET ORAL at 20:20

## 2025-05-21 RX ADMIN — Medication 5 MG: at 20:20

## 2025-05-21 RX ADMIN — IPRATROPIUM BROMIDE AND ALBUTEROL SULFATE 3 ML: 2.5; .5 SOLUTION RESPIRATORY (INHALATION) at 15:47

## 2025-05-21 RX ADMIN — OXYCODONE HYDROCHLORIDE AND ACETAMINOPHEN 500 MG: 500 TABLET ORAL at 08:56

## 2025-05-21 RX ADMIN — SODIUM CHLORIDE 1000 MG: 900 INJECTION INTRAVENOUS at 08:53

## 2025-05-21 RX ADMIN — IPRATROPIUM BROMIDE AND ALBUTEROL SULFATE 3 ML: 2.5; .5 SOLUTION RESPIRATORY (INHALATION) at 13:01

## 2025-05-21 NOTE — CONSULTS
"          Clinical Nutrition Assessment     Patient Name: Bibiana Hodges  YOB: 1935  MRN: [id number]  Date of Encounter: 05/21/25 17:18 EDT  Admission date: 5/19/2025  Reason for Visit: Consult, Nonhealing wound or pressure ulcer    Assessment   Nutrition Assessment   Admission Diagnosis:  Pneumonia [J18.9]    Problem List:    Pneumonia    Hypertension    Atrial fibrillation    Autonomic dysfunction    Hyperlipidemia      PMH:   She  has a past medical history of Constipation, Depression, Dysautonomia orthostatic hypotension syndrome, Generalized osteoarthritis, GERD (gastroesophageal reflux disease), Hypertension, Insomnia, Osteoarthrosis, shoulder region, Skin ulcer of scalp, Thrombophlebitis of arm, Tremor, and Vitamin B12 deficiency.    PSH:  She  has a past surgical history that includes Hernia repair; Dilation and curettage of uterus; Hip surgery; and Shoulder surgery.    Applicable Nutrition History:   7/14/24 Acute severe malnutrition  8/24/23, 1/30/25 Chronic severe malnutrition    Skin: bilateral medial heels, stage 1 PI  Anthropometrics     Height: Height: 170.2 cm (67\")  Last Filed Weight: Weight: 60 kg (132 lb 4.4 oz) (05/19/25 1250)  Method: Weight Method: Estimated  BMI: BMI (Calculated): 20.7    UBW:  110-115lb per patient  Weight change: No significant changes     Weight       Weight (kg) Weight (lbs) Weight Method Visit Report   4/20/2024 65.772 kg  145 lb      5/6/2024 63.504 kg  140 lb  Bed scale     5/7/2024 63.5 kg  139 lb 15.9 oz  Stated     7/8/2024 58.968 kg  130 lb      1/29/2025 59 kg  130 lb 1.1 oz  Stated        Estimated     5/19/2025 60 kg  132 lb 4.4 oz  Estimated     5/22/2025 59.875 kg  132 lb       59.9 kg  132 lb 0.9 oz          Nutrition Focused Physical Exam    Date:  5/21       Patient meets criteria for malnutrition diagnosis, see MSA note.     Subjective   Reported/Observed/Food/Nutrition Related History:     Patient screened per nutrition protocol for " "consult received for \"patient requesting Boost\" and possible pressure injury of stage 2 or greater and/or non healing wound. Presented for respiratory distress. Patient sitting up in chair, nurse getting patient situated at time of visit.  Patient reports okay appetite. Denied any recent weight loss.  No chewing or swallowing difficulties noted.  Patient would like Boost TID. NKFA. Pt noted that she would like extra gravy with all meals, would prefer soft boiled eggs, and likes fresh fruit with breakfast.    Current Nutrition Prescription   PO: Diet: Regular/House; Fluid Consistency: Thin (IDDSI 0)  Oral Nutrition Supplement: N/A  Intake: 3 Days: 25% x 3 meals, 63% x 2 supplements, 100% x 1 snack    Assessment & Plan   Nutrition Diagnosis   Date:  5/21 Updated:  Problem Malnutrition, chronic severe   Etiology Decreased ability to consume sufficient energy 2/2 advanced age   Signs/Symptoms severe muscle wasting and severe subcutaneous fat loss   Status: New    Goal:   Nutrition to support treatment and Increase intake      Nutrition Intervention      Follow treatment progress, Care plan reviewed, Advise alternate selection, Advised available snacks, Interview for preferences, Encourage intake, Supplement provided    Patient meets malnutrition criteria, see MSA for full assessment.  Boost Plus TID  Encourage PO intakes  Preferences communicated to kitchen    Monitoring/Evaluation:   Per protocol, I&O, PO intake, Supplement intake, Pertinent labs, Symptoms, POC/GOC    Brenda Mancilla RD  Time Spent: 35m  "

## 2025-05-21 NOTE — PROGRESS NOTES
[facility] Neurology    Progress Note    Patient Name: Bibiana Hodges  : 1935  MRN: [id number]  Primary Care Physician:  Nivia Madrigal MD  Date of admission: 2025    Subjective     Chief Complaint: Tremor    History of Present Illness   Chronic essential tremor noted.  Patient was attempting to feed herself successfully.  Said she was able to rest in between medical care throughout night.  She had no complaints.  On room air.      Review of Systems   General: Negative for fever, nausea, or vomiting.   Neurological: Negative for headache, pain, or weakness.     Objective     Physical Exam  Vitals and nursing note reviewed.   Constitutional:       General: She is not in acute distress.     Appearance: She is not ill-appearing.   Eyes:      General: No visual field deficit.     Extraocular Movements: Extraocular movements intact.      Pupils: Pupils are equal, round, and reactive to light.      Comments: No nystagmus or deviated gaze noted   Neurological:      Mental Status: She is alert. Mental status is at baseline.      Cranial Nerves: No cranial nerve deficit or facial asymmetry.      Sensory: No sensory deficit.      Motor: Tremor present. No weakness or seizure activity.      Coordination: Finger-Nose-Finger Test normal.      Comments:     Cranial Nerves   CN II: Pupils are equal, round, and reactive to light. Normal visual acuity and visual fields.    CN III IV VI: Extraocular movements are full without nystagmus.  CN V: Normal facial sensation and strength of muscles of mastication.  CN VII: Facial movements are symmetric. No weakness.  CN VIII:  Auditory acuity is normal.  CN IX & X:  Symmetric palatal movement.  CN XI: Sternocleidomastoid and trapezius are normal.  No weakness.  CN XII: The tongue is midline.  No atrophy or fasciculations.            Vitals:   Temp:  [98 °F (36.7 °C)-98.3 °F (36.8 °C)] 98 °F (36.7 °C)  Heart Rate:  [60-65] 64  Resp:  [18] 18  BP: (118-164)/(63-77)  164/72  Flow (L/min) (Oxygen Therapy):  [3-4] 3    Current Medications    Current Facility-Administered Medications:     acetaminophen (TYLENOL) tablet 650 mg, 650 mg, Oral, Q8H PRN, Aileen Doherty MD, 650 mg at 05/20/25 2208    amiodarone (PACERONE) tablet 200 mg, 200 mg, Oral, Daily, Aileen Doherty MD, 200 mg at 05/20/25 0833    apixaban (ELIQUIS) tablet 2.5 mg, 2.5 mg, Oral, BID, Aileen Doherty MD, 2.5 mg at 05/20/25 2201    ascorbic acid (VITAMIN C) tablet 500 mg, 500 mg, Oral, Daily, Aileen Doherty MD, 500 mg at 05/20/25 0834    bisacodyl (DULCOLAX) suppository 10 mg, 10 mg, Rectal, Once, Aileen Doherty MD    cefTRIAXone (ROCEPHIN) 1,000 mg in sodium chloride 0.9 % 100 mL MBP, 1,000 mg, Intravenous, Q24H, Aileen Doherty MD, Last Rate: 200 mL/hr at 05/20/25 0834, 1,000 mg at 05/20/25 0834    doxycycline (MONODOX) capsule 100 mg, 100 mg, Oral, Q12H, Aileen Doherty MD, 100 mg at 05/20/25 2200    ipratropium-albuterol (DUO-NEB) nebulizer solution 3 mL, 3 mL, Nebulization, 4x Daily - RT, Aileen Doherty MD, 3 mL at 05/21/25 0802    levETIRAcetam (KEPPRA) tablet 250 mg, 250 mg, Oral, BID, Aileen Doherty MD, 250 mg at 05/20/25 2201    levothyroxine (SYNTHROID, LEVOTHROID) tablet 100 mcg, 100 mcg, Oral, Daily, Aileen Doherty MD, 100 mcg at 05/21/25 0554    lisinopril (PRINIVIL,ZESTRIL) tablet 10 mg, 10 mg, Oral, Q24H, Aileen Doherty MD, 10 mg at 05/20/25 0833    melatonin tablet 5 mg, 5 mg, Oral, Nightly, Aileen Doherty MD, 5 mg at 05/20/25 2207    metoprolol tartrate (LOPRESSOR) half tablet 12.5 mg, 12.5 mg, Oral, BID, Aileen Doherty MD, 12.5 mg at 05/20/25 2200    polyethylene glycol (MIRALAX) packet 17 g, 17 g, Oral, Daily, Aileen Doherty MD, 17 g at 05/20/25 0834    predniSONE (DELTASONE) tablet 40 mg, 40 mg, Oral, Daily With Breakfast, Aileen Doherty MD, 40 mg at 05/20/25 0834    rosuvastatin (CRESTOR) tablet 10 mg, 10 mg, Oral, Nightly, Aileen Doherty MD, 10 mg at  "05/20/25 2208    senna (SENOKOT) tablet 2 tablet, 2 tablet, Oral, Daily, Aileen Doherty MD, 2 tablet at 05/20/25 0834    traZODone (DESYREL) tablet 100 mg, 100 mg, Oral, Nightly, Aileen Doherty MD, 100 mg at 05/20/25 2201    venlafaxine XR (EFFEXOR-XR) 24 hr capsule 75 mg, 75 mg, Oral, Daily, Aileen Doherty MD, 75 mg at 05/20/25 0834    Laboratory Results:   Lab Results   Component Value Date    GLUCOSE 151 (H) 05/19/2025    CALCIUM 8.9 05/19/2025     (L) 05/19/2025    K 4.3 05/19/2025    CO2 23.0 05/19/2025    CL 95 (L) 05/19/2025    BUN 18 05/19/2025    CREATININE 1.30 (H) 05/19/2025    EGFRIFAFRI >60.0 01/13/2022    EGFRIFNONA 50.2 (L) 01/13/2022    BCR 13.8 05/19/2025    ANIONGAP 16.0 (H) 05/19/2025     Lab Results   Component Value Date    WBC 13.43 (H) 05/19/2025    HGB 13.4 05/19/2025    HCT 42.2 05/19/2025    .9 (H) 05/19/2025     05/19/2025     No results found for: \"CHOL\"  No results found for: \"HDL\"  No results found for: \"LDL\"  No results found for: \"TRIG\"  No results found for: \"HGBA1C\"  Lab Results   Component Value Date    INR 1.36 (H) 06/29/2023    INR 1.34 (H) 01/12/2022    INR 1.33 (H) 01/10/2022    PROTIME 16.9 (H) 06/29/2023    PROTIME 13.8 (H) 01/12/2022    PROTIME 14.2 (H) 01/10/2022     No results found for: \"FOLATE\"  Lab Results   Component Value Date    [id number] 793 05/28/2015             Assessment / Plan   Brief Patient Summary:  Bibiana Hodges is a 89 y.o. female with known A-fib, s/p PPM HTN, HLD, autonomic dysfunction, GERD and B12 deficiency who presented to [facility] ED via EMS from [facility] after experiencing respiratory distress.  Oxygen saturation tenons 83% per EMS.      She also has complaint of experiencing double vision for approximately 2 to 5 years.  She also has a significant tremor, greater in the right hand than the left.      Plan:   Chronic essential tremor  Patient previously seen at [facility] in 2013.  Has " been trialed on propranolol, primidone, Xanax and Restoril without resolution of tremor.    Patient currently on Keppra 250 mg twice daily.  Per my chart review she has been on this medication for over a year for her essential tremor.  She states has not been successful in therapy.    Unfortunately patient's tremor has been refractory to previous and current regimens.  To the best my chart review patient has not been trialed on gabapentin.  Would recommend gabapentin 100 mg twice a day with close monitoring of creatinine.  Patient has returned to her baseline at time of assessment, she is able to eat and drink appropriately.  Given chronicity of patient's tremor, will plan for outpatient follow-up with goal of small medication adjustments in hopes of negating side effects.  Patient is on medications that are likely exacerbating her essential tremor including Effexor and possibly trazodone. Effexor has been seen to cause tremor in approximately 5 to 8% of the population.  Trazodone has also been seen to exacerbate tremor in 3% 5% of the population.  Unfortunately all antidepressants come with a certain risk of increasing for tremor.  Patient may warrant a transition to Remeron as it has been seen to have less effect on tremors.   CK 53  Continue to work with PT/OT     Chronic double vision  MRI brain  Fall precautions  Outpatient follow-up with ophthalmology     Orthostatic hypotension  Orthostatic blood pressures daily  Fall precautions  Patient educated on nonmedical ways to negate orthostatic hypotension including oral hydration, p.o. intake, and slow position changes, stockings/abdominal binders.     Vitamin B12 deficiency  Vitamin B12    folate    General Neurology will follow up with MRI brain.  Will see patient on request please feel free to reach out if needed.    I have discussed the above with the patient, bedside RN and    Time spent with patient: 35 minutes in face-to-face evaluation and  management of the patient.  Copied text in this note has been reviewed and is accurate as of 05/21/25.       ANASTASIA Avila

## 2025-05-21 NOTE — PROGRESS NOTES
[facility] Medicine Services  PROGRESS NOTE    Patient Name: Bibiana Hodges  : 1935  MRN: [id number]    Date of Admission: 2025  Primary Care Physician: Nivia Madrigal MD    Subjective   Subjective     CC:  SOB    HPI:  Resting in bed in no acute distress and tells me that her breathing has improved significantly.  However, complains of severe weakness. She also has severe constipation.  No fever or chills.  No chest pain or palpitation.  No nausea vomiting or diarrhea.      Objective   Objective     Vital Signs:   Temp:  [97.6 °F (36.4 °C)-98.1 °F (36.7 °C)] 97.6 °F (36.4 °C)  Heart Rate:  [60-65] 60  Resp:  [18] 18  BP: (121-164)/(63-89) 142/78  Flow (L/min) (Oxygen Therapy):  [2-4] 2     Physical Exam:  Constitutional: No acute distress, awake, alert  HENT: NCAT, mucous membranes moist  Respiratory: Clear to auscultation bilaterally, respiratory effort normal, currently on 4 L of nasal cannula and doing above 98%.  Cardiovascular: RRR, no murmurs, rubs, or gallops  Gastrointestinal: Positive bowel sounds, soft, nontender, nondistended  Musculoskeletal: Edema of left foot up to the ankle, low muscle mass  Psychiatric: Appropriate affect, cooperative  Neurologic: Oriented x 3, speech clear, coarse tremors of right upper limb  Skin: No rashes     Results Reviewed:  LAB RESULTS:      Lab 25  0004 25  2039 25  1734 25  1547 25  1348   WBC  --   --   --   --  13.43*   HEMOGLOBIN  --   --   --   --  13.4   HEMATOCRIT  --   --   --   --  42.2   PLATELETS  --   --   --   --  215   NEUTROS ABS  --   --   --   --  12.37*   IMMATURE GRANS (ABS)  --   --   --   --  0.06*   LYMPHS ABS  --   --   --   --  0.36*   MONOS ABS  --   --   --   --  0.62   EOS ABS  --   --   --   --  0.00   MCV  --   --   --   --  103.9*   PROCALCITONIN  --   --   --   --  0.09   LACTATE 1.3 3.1* 2.3*  --  5.5*   HSTROP T  --   --   --  98* 70*         Lab 25  1347    SODIUM 134*   POTASSIUM 4.3   CHLORIDE 95*   CO2 23.0   ANION GAP 16.0*   BUN 18   CREATININE 1.30*   EGFR 39.4*   GLUCOSE 151*   CALCIUM 8.9         Lab 05/19/25  1348   TOTAL PROTEIN 6.0   ALBUMIN 3.2*   GLOBULIN 2.8   ALT (SGPT) 28   AST (SGOT) 43*   BILIRUBIN 0.3   ALK PHOS 74         Lab 05/19/25  1547 05/19/25  1348   PROBNP  --  1,247.0   HSTROP T 98* 70*                 Brief Urine Lab Results  (Last result in the past 365 days)        Color   Clarity   Blood   Leuk Est   Nitrite   Protein   CREAT   Urine HCG        05/19/25 1428 Yellow   Turbid   Moderate (2+)   Large (3+)   Negative   100 mg/dL (2+)                   Microbiology Results Abnormal       Procedure Component Value - Date/Time    Urine Culture - Urine, Urine, Catheter In/Out [[id number]]  (Abnormal)  (Susceptibility) Collected: 05/19/25 1428    Lab Status: Final result Specimen: Urine, Catheter In/Out Updated: 05/21/25 1124     Urine Culture 50,000 CFU/mL Pseudomonas aeruginosa    Narrative:      Colonization of the urinary tract without infection is common. Treatment is discouraged unless the patient is symptomatic, pregnant, or undergoing an invasive urologic procedure.    Susceptibility        Pseudomonas aeruginosa      BECK      Cefepime Susceptible      Ceftazidime Susceptible      Ciprofloxacin Susceptible      Levofloxacin Susceptible      Piperacillin + Tazobactam Susceptible      Tobramycin Susceptible                                   XR Abdomen KUB  Result Date: 5/21/2025  XR ABDOMEN KUB Date of Exam: 5/21/2025 7:24 AM EDT Indication: constipation Comparison: No recent KUB. 8/23/2023 abdomen pelvis CT scan Findings: Extensive stool shadow is seen from the level of the ascending colon to the distal descending colon, but with practically no visible stool shadow in the sigmoid or rectum. No abnormally dilated small bowel loops are seen. Transverse colon is considered in the upper range of normal diameter, between 6 and 7 cm. There was  similar degree of fecal stasis on the 8/23/2023 abdominal CT scan. No bowel wall edema or pneumatosis is seen. Incidental note is made of the patient's dextroconvex lumbar scoliosis, and anatomically aligned bilateral hip prostheses. .     Impression: Impression: Moderate fecal stasis to the level of the distal descending colon. No obvious impaction. Electronically Signed: Deon Vera MD  5/21/2025 9:37 AM EDT  Workstation ID: [id number]      Results for orders placed during the hospital encounter of 08/20/23    Adult Transthoracic Echo Complete W/ Cont if Necessary Per Protocol    Interpretation Summary    Left ventricular systolic function is mildly decreased. Calculated left ventricular EF = 47.9% Normal left ventricular cavity size and wall thickness noted. There is left ventricular global hypokinesis noted. Left ventricular diastolic function is consistent with (grade I) impaired relaxation.    : Normal right ventricular cavity size and systolic function noted. Electronic lead present in the ventricle.    Normal left atrial size and volume noted. Saline test results are negative.    There is moderate calcification of the aortic valve. The aortic valve was poorly visualized but appears trileaflet. Moderate aortic valve regurgitation is present. No aortic valve stenosis is present.    Mild mitral annular calcification is present. Mild mitral valve regurgitation is present. No significant mitral valve stenosis is present.    The tricuspid valve is grossly normal in structure. Mild tricuspid valve regurgitation is present. Estimated right ventricular systolic pressure from tricuspid regurgitation is normal, 33 mmHg. No tricuspid valve stenosis is present.    Mild dilation of the ascending aorta is present. Ascending aorta = 3.7 cm    There is a small (<1cm) pericardial effusion adjacent to the right atrium and right ventricle.      Current medications:  Scheduled Meds:amiodarone, 200 mg, Oral, Daily  apixaban, 2.5 mg,  Oral, BID  ascorbic acid, 500 mg, Oral, Daily  bisacodyl, 10 mg, Rectal, Once  cefTRIAXone, 1,000 mg, Intravenous, Q24H  doxycycline, 100 mg, Oral, Q12H  ipratropium-albuterol, 3 mL, Nebulization, 4x Daily - RT  levETIRAcetam, 250 mg, Oral, BID  levothyroxine, 100 mcg, Oral, Daily  lisinopril, 10 mg, Oral, Q24H  melatonin, 5 mg, Oral, Nightly  metoprolol tartrate, 12.5 mg, Oral, BID  polyethylene glycol, 17 g, Oral, Daily  predniSONE, 40 mg, Oral, Daily With Breakfast  rosuvastatin, 10 mg, Oral, Nightly  senna, 2 tablet, Oral, Daily  traZODone, 100 mg, Oral, Nightly  venlafaxine XR, 75 mg, Oral, Daily      Continuous Infusions:   PRN Meds:.  acetaminophen    Assessment & Plan   Assessment & Plan     Active Hospital Problems    Diagnosis  POA    **Pneumonia [J18.9]  Yes    Hyperlipidemia [E78.5]  Yes    Autonomic dysfunction [G90.9]  Yes    Atrial fibrillation [I48.91]  Yes    Hypertension [I10]  Yes      Resolved Hospital Problems   No resolved problems to display.        Brief Hospital Course to date:  Bibiana Hodges is a 89 y.o. female with past medical history significant for atrial fibrillation, hypertension, hyperlipidemia, GERD, B12 deficiency.  She is living at an assisted living facility.  Patient was brought by ambulance for respiratory distress.    Pneumonia:-- possibly aspiration  - CT angiogram showed evidence of right lower lobe pneumonia  - Respiration has improved significantly.  Will continue current treatment.    Abnormal troponin levels:  - Troponin levels were abnormal but no chest pain    Tremor:  - Reports significant tremors, especially in right hand, which is chronic since many years ago  - neurology has seen and evaluated the patient    Double vision:  - Reports having double vision for 2 years  - Neurology has seen and evaluated patient      Constipation:  - Reports not having a bowel movement in 2 weeks  - Bowel regimen was initiated.  Will also get a KUB and if no sign of obstruction we  will be a little more aggressive with laxatives    COPD with acute hypoxic respiratory failure:  - Presented with room air saturation of 83% and required 6 L of oxygen  - Continue monitoring and adjust oxygen as needed    Full code status:  - Expressed desire to remain full code         Expected Discharge Location and Transportation: To be determined  Expected Discharge to be determined  Expected Discharge Date: 5/22/2025; Expected Discharge Time:      VTE Prophylaxis:  Pharmacologic VTE prophylaxis orders are present.         AM-PAC 6 Clicks Score (PT): 11 (05/21/25 0900)    CODE STATUS:   Code Status and Medical Interventions: CPR (Attempt to Resuscitate); Full Support   Ordered at: 05/19/25 7805     Code Status (Patient has no pulse and is not breathing):    CPR (Attempt to Resuscitate)     Medical Interventions (Patient has pulse or is breathing):    Full Support       Sarwat Kimbrough MD  05/21/25

## 2025-05-21 NOTE — PLAN OF CARE
Goal Outcome Evaluation:  Plan of Care Reviewed With: patient        Progress: no change  Outcome Evaluation: Pt presents near fxl baseline with ADLs and mobility, however is limited by poor activity tolerance, weakness, and balance deficits. Pt would benefit from ongoing skilled OT services to progress to PLOF and prevent hospital associated weakness. Rec d/c back to [facility] w/HH OT/PT.    Anticipated Discharge Disposition (OT): assisted living, home with home health

## 2025-05-21 NOTE — THERAPY RE-EVALUATION
Patient Name: Bibiana Hodges  : 1935    MRN: [id number]                              Today's Date: 2025       Admit Date: 2025    Visit Dx:     ICD-10-CM ICD-9-CM   1. Acute respiratory failure with hypoxia  J96.01 518.81   2. Community acquired pneumonia, unspecified laterality  J18.9 486   3. Acute UTI  N39.0 599.0   4. Elevated troponin  R79.89 790.6   5. Lactic acidosis  E87.20 276.2   6. Leukocytosis, unspecified type  D72.829 288.60     Patient Active Problem List   Diagnosis    Dysautonomia orthostatic hypotension syndrome    Hypertension    Severe malnutrition    Atrial fibrillation    Autonomic dysfunction    Hyperlipidemia    Pneumonia     Past Medical History:   Diagnosis Date    Constipation     Depression     Dysautonomia orthostatic hypotension syndrome     Generalized osteoarthritis     GERD (gastroesophageal reflux disease)     s/p Nissen    Hypertension     Insomnia     Osteoarthrosis, shoulder region     Skin ulcer of scalp     Thrombophlebitis of arm     Tremor     Vitamin B12 deficiency      Past Surgical History:   Procedure Laterality Date    DILATATION AND CURETTAGE      HERNIA REPAIR      HIP SURGERY      SHOULDER SURGERY      Right      General Information       Row Name 25 1127          OT Time and Intention    Document Type therapy note (daily note)  -AJ     Mode of Treatment occupational therapy  -AJ       Row Name 25 1127          General Information    Patient Profile Reviewed yes  -AJ     Prior Level of Function min assist:;all household mobility;transfer;w/c or scooter;bed mobility;ADL's  -AJ     Existing Precautions/Restrictions fall;other (see comments)  BUE tremor, double vision  -AJ     Barriers to Rehab medically complex;previous functional deficit  -AJ       Row Name 25 1127          Living Environment    Current Living Arrangements assisted living facility  -AJ     People in Home facility resident  -AJ       Row Name 25 1127           Home Main Entrance    Number of Stairs, Main Entrance none  -       Row Name 05/21/25 1127          Stairs Within Home, Primary    Number of Stairs, Within Home, Primary none  -       Row Name 05/21/25 1127          Cognition    Orientation Status (Cognition) oriented x 4  -       Row Name 05/21/25 1127          Safety Issues/Impairments Affecting Functional Mobility    Safety Issues Affecting Function (Mobility) awareness of need for assistance;insight into deficits/self-awareness;positioning of assistive device;safety precaution awareness;safety precautions follow-through/compliance  -     Impairments Affecting Function (Mobility) balance;endurance/activity tolerance;pain;postural/trunk control;strength;coordination  -               User Key  (r) = Recorded By, (t) = Taken By, (c) = Cosigned By      Initials Name Provider Type    Johanna Xaiver OT Occupational Therapist                     Mobility/ADL's       Row Name 05/21/25 1129          Bed Mobility    Bed Mobility supine-sit;scooting/bridging  -     Scooting/Bridging [place] (Bed Mobility) contact guard;1 person assist  -     Supine-Sit [place] (Bed Mobility) standby assist;1 person assist  -     Bed Mobility, Safety Issues impaired trunk control for bed mobility;decreased use of legs for bridging/pushing;decreased use of arms for pushing/pulling  -     Assistive Device (Bed Mobility) bed rails  -     Comment, (Bed Mobility) Increased time required w/scooting.  -       Row Name 05/21/25 1129          Transfers    Transfers sit-stand transfer;toilet transfer  -       Row Name 05/21/25 1129          Sit-Stand Transfer    Sit-Stand [place] (Transfers) contact guard;1 person assist  -     Assistive Device (Sit-Stand Transfers) walker, front-wheeled  -     Comment, (Sit-Stand Transfer) Cues for HP w/walker use  -       Row Name 05/21/25 1129          Toilet Transfer    Type (Toilet Transfer) stand pivot/stand step   -     [place] Level (Toilet Transfer) contact guard;1 person assist  -     Assistive Device (Toilet Transfer) commode chair;walker, front-wheeled  -     Comment, (Toilet Transfer) Cuse for alignment and eccentric lowering. Flexed posturing w/stand and transfer.  -       Row Name 05/21/25 [facility]9          Functional Mobility    Functional Mobility- Ind. Level minimum assist (75% patient effort);1 person  -     Functional Mobility- Device walker, front-wheeled  -     Functional Mobility-Distance (Feet) --  5  -     Functional Mobility- Comment [facility] ~5 feet w/FWW.  -       Row Name 05/21/25 [facility]9          Activities of Daily Living    BADL Assessment/Intervention upper body dressing;lower body dressing;grooming;toileting;feeding  -       Row Name 05/21/25 [facility]9          Hygiene Care    Oral Care other (see comments)  Pt declined  -       Row Name 05/21/25 [facility]9          Upper Body Dressing Assessment/Training    [place] Level (Upper Body Dressing) don;doff;front opening garment;minimum assist (75% patient effort)  -     Position (Upper Body Dressing) supported sitting  -       Row Name 05/21/25 [facility]9          Lower Body Dressing Assessment/Training    [place] Level (Lower Body Dressing) don;socks;dependent (less than 25% patient effort)  -     Position (Lower Body Dressing) supine  -       Row Name 05/21/25 1129          Grooming Assessment/Training    [place] Level (Grooming) wash face, hands;contact guard assist  -     Position (Grooming) sink side;supported standing  -       Row Name 05/21/25 [facility]9          Self-Feeding Assessment/Training    [place] Level (Feeding) liquids to mouth;set up  -     Assistive Devices (Feeding) adapted cup  -     Position (Feeding) supported sitting  -     Comment, (Feeding) Issued adaptive cup to improve independence w/drinking-currently difficult d/t tremor.  -       Row Name 05/21/25 1129          Toileting  Assessment/Training    [place] Level (Toileting) adjust/manage clothing;perform perineal hygiene;minimum assist (75% patient effort);verbal cues  -     Assistive Devices (Toileting) commode chair  -     Position (Toileting) supported sitting;supported standing  -     Comment, (Toileting) Last for thoroughness. Pt able to perform pericare in sitting.  -               User Key  (r) = Recorded By, (t) = Taken By, (c) = Cosigned By      Initials Name Provider Type    Johanna Xavier OT Occupational Therapist                   Obj/Interventions       Row Name 05/21/25 1134          Sensory Assessment (Somatosensory)    Sensory Assessment (Somatosensory) UE sensation intact  -       Row Name 05/21/25 1134          Vision Assessment/Intervention    Visual Impairment/Limitations corrective lenses full-time;WFL  -       Row Name 05/21/25 1134          Range of Motion Comprehensive    General Range of Motion bilateral upper extremity ROM WFL  -       Row Name 05/21/25 1134          Strength Comprehensive (MMT)    General Manual Muscle Testing (MMT) Assessment upper extremity strength deficits identified  -     Comment, General Manual Muscle Testing (MMT) Assessment BUE grossly 4/5 baseline on bed mobility and transfers  -       Row Name 05/21/25 1134          Balance    Balance Assessment sitting static balance;sitting dynamic balance;sit to stand dynamic balance;standing static balance;standing dynamic balance  -     Static Sitting Balance supervision  -     Dynamic Sitting Balance standby assist  -     Position, Sitting Balance unsupported;sitting edge of bed  -     Static Standing Balance contact guard  -     Dynamic Standing Balance minimal assist  -     Position/Device Used, Standing Balance supported;walker, front-wheeled  -     Balance Interventions sitting;standing;sit to stand;supported;static;dynamic;occupation based/functional task  -               User Key  (r) = Recorded  By, (t) = Taken By, (c) = Cosigned By      Initials Name Provider Type    Johanna Xavier OT Occupational Therapist                   Goals/Plan       Row Name 05/21/25 1141          Transfer Goal 1 (OT)    Activity/Assistive Device (Transfer Goal 1, OT) sit-to-stand/stand-to-sit;bed-to-chair/chair-to-bed;toilet;walker, rolling  -AJ     [place] Level/Cues Needed (Transfer Goal 1, OT) verbal cues required;contact guard required  -AJ     Time Frame (Transfer Goal 1, OT) long term goal (LTG);10 days  -AJ     Progress/Outcome (Transfer Goal 1, OT) goal ongoing  -       Row Name 05/21/25 1141          Dressing Goal 1 (OT)    Activity/Device (Dressing Goal 1, OT) upper body dressing  -AJ     [place]/Cues Needed (Dressing Goal 1, OT) contact guard required  -AJ     Time Frame (Dressing Goal 1, OT) short term goal (STG);5 days  -AJ     Progress/Outcome (Dressing Goal 1, OT) goal revised this date  -       Row Name 05/21/25 1141          Toileting Goal 1 (OT)    Activity/Device (Toileting Goal 1, OT) adjust/manage clothing;perform perineal hygiene;commode;grab bar/safety frame  -AJ     [place] Level/Cues Needed (Toileting Goal 1, OT) contact guard required  -AJ     Time Frame (Toileting Goal 1, OT) long term goal (LTG);10 days  -AJ     Progress/Outcome (Toileting Goal 1, OT) goal revised this date  -       Row Name 05/21/25 1141          Therapy Assessment/Plan (OT)    Planned Therapy Interventions (OT) activity tolerance training;adaptive equipment training;BADL retraining;functional balance retraining;IADL retraining;occupation/activity based interventions;patient/caregiver education/training;ROM/therapeutic exercise;strengthening exercise;transfer/mobility retraining  -               User Key  (r) = Recorded By, (t) = Taken By, (c) = Cosigned By      Initials Name Provider Type    Johanna Xavier OT Occupational Therapist                   Clinical Impression       Row Name 05/21/25 1133           Pain Assessment    Pretreatment Pain Rating 0/10 - no pain  -     Posttreatment Pain Rating 0/10 - no pain  -       Row Name 05/21/25 1135          Plan of Care Review    Plan of Care Reviewed With patient  -     Progress no change  -     Outcome Evaluation Pt presents near fxl baseline with ADLs and mobility, however is limited by poor activity tolerance, weakness, and balance deficits. Pt would benefit from ongoing skilled OT services to progress to PLOF and prevent hospital associated weakness. Rec d/c back to [facility] w/HH OT/PT.  -       Row Name 05/21/25 1135          Therapy Assessment/Plan (OT)    Patient/Family Therapy Goal Statement (OT) Return to PLOF  -     Rehab Potential (OT) good  -     Criteria for Skilled Therapeutic Interventions Met (OT) yes;meets criteria;skilled treatment is necessary  -     Therapy Frequency (OT) daily  -     Predicted Duration of Therapy Intervention (OT) 5 days  -       Row Name 05/21/25 1135          Therapy Plan Review/Discharge Plan (OT)    Anticipated Discharge Disposition (OT) assisted living;home with home health  -       Row Name 05/21/25 1135          Vital Signs    Pre Systolic BP Rehab 121  -AJ     Pre Treatment Diastolic BP 89  -AJ     Post Systolic BP Rehab 146  -AJ     Post Treatment Diastolic    nursing notified  -     Pretreatment Heart Rate (beats/min) 60  -AJ     Posttreatment Heart Rate (beats/min) 61  -AJ     Pre SpO2 (%) 96  -AJ     O2 Delivery Pre Treatment room air  -AJ     Post SpO2 (%) 97  -AJ     O2 Delivery Post Treatment room air  -AJ     Pre Patient Position Supine  -AJ     Intra Patient Position Standing  -AJ     Post Patient Position Sitting  -       Row Name 05/21/25 1135          Positioning and Restraints    Pre-Treatment Position in bed  -     Post Treatment Position chair  -AJ     In Bed side lying left  -AJ     In Chair notified nsg;reclined;sitting;call light within reach;encouraged to call for  assist;exit alarm on;legs elevated;waffle cushion;waffle boot/both;heels elevated  -               User Key  (r) = Recorded By, (t) = Taken By, (c) = Cosigned By      Initials Name Provider Type    Johanna Xavier OT Occupational Therapist                   Outcome Measures       Row Name 05/21/25 1142          How much help from another is currently needed...    Putting on and taking off regular lower body clothing? 1  -AJ     Bathing (including washing, rinsing, and drying) 2  -AJ     Toileting (which includes using toilet bed pan or urinal) 2  -AJ     Putting on and taking off regular upper body clothing 3  -AJ     Taking care of personal grooming (such as brushing teeth) 3  -AJ     Eating meals 3  -AJ     AM-PAC 6 Clicks Score (OT) 14  -       Row Name 05/21/25 0900          How much help from another person do you currently need...    Turning from your back to your side while in flat bed without using bedrails? 2  -CH     Moving from lying on back to sitting on the side of a flat bed without bedrails? 2  -CH     Moving to and from a bed to a chair (including a wheelchair)? 2  -CH     Standing up from a chair using your arms (e.g., wheelchair, bedside chair)? 2  -CH     Climbing 3-5 steps with a railing? 1  -CH     To walk in hospital room? 2  -CH     AM-PAC 6 Clicks Score (PT) 11  -CH     Highest Level of Mobility Goal Move to Chair/Commode-4  -CH       Row Name 05/21/25 1142          Functional Assessment    Outcome Measure Options AM-PAC 6 Clicks Daily Activity (OT)  -               User Key  (r) = Recorded By, (t) = Taken By, (c) = Cosigned By      Initials Name Provider Type    Celine Billingsley RN Registered Nurse    Johanna Xavier OT Occupational Therapist                    Occupational Therapy Education       Title: PT OT SLP Therapies (Done)       Topic: Occupational Therapy (Done)       Point: ADL training (Done)       Learning Progress Summary            Patient Acceptance, E,  VU,NR by KIM at 5/21/2025 1143    Acceptance, E, NR by JAVIER at 5/20/2025 1327                      Point: Precautions (Done)       Learning Progress Summary            Patient Acceptance, E, VU,NR by KIM at 5/21/2025 1143    Acceptance, E, NR by JAVIER at 5/20/2025 1327                      Point: Body mechanics (Done)       Learning Progress Summary            Patient Acceptance, E, VU,NR by KIM at 5/21/2025 1143    Acceptance, E, NR by JAVIER at 5/20/2025 1327                                      User Key       Initials Effective Dates Name Provider Type Discipline     06/16/21 -  Jeannie Baker OT Occupational Therapist OT     08/26/24 -  Johanna Kline OT Occupational Therapist OT                  OT Recommendation and Plan  Planned Therapy Interventions (OT): activity tolerance training, adaptive equipment training, BADL retraining, functional balance retraining, IADL retraining, occupation/activity based interventions, patient/caregiver education/training, ROM/therapeutic exercise, strengthening exercise, transfer/mobility retraining  Therapy Frequency (OT): daily  Plan of Care Review  Plan of Care Reviewed With: patient  Progress: no change  Outcome Evaluation: Pt presents near fxl baseline with ADLs and mobility, however is limited by poor activity tolerance, weakness, and balance deficits. Pt would benefit from ongoing skilled OT services to progress to PLOF and prevent hospital associated weakness. Rec d/c back to [facility] w/HH OT/PT.     Time Calculation:   Evaluation Complexity (OT)  Review Occupational Profile/Medical/Therapy History Complexity: brief/low complexity  Assessment, Occupational Performance/Identification of Deficit Complexity: 1-3 performance deficits  Clinical Decision Making Complexity (OT): problem focused assessment/low complexity  Overall Complexity of Evaluation (OT): low complexity     Time Calculation- OT       Row Name 05/21/25 1143             Time Calculation- OT    OT Start Time 1045  -KIM       OT Received On 05/21/25  -KIM      OT Goal Re-Cert Due Date 05/31/25  -         Timed Charges    95787 - OT Therapeutic Activity Minutes 15  -AJ      56431 - OT Self Care/Mgmt Minutes 25  -AJ         Untimed Charges    OT Eval/Re-eval Minutes --  -         Total Minutes    Timed Charges Total Minutes 40  -AJ      Untimed Charges Total Minutes --  -       Total Minutes 40  -AJ                User Key  (r) = Recorded By, (t) = Taken By, (c) = Cosigned By      Initials Name Provider Type    Johanna Xavier OT Occupational Therapist                  Therapy Charges for Today       Code Description Service Date Service Provider Modifiers Qty    24813088910 HC OT THERAPEUTIC ACT EA 15 MIN 5/21/2025 Johanna Kline OT GO 1    49797707960 HC OT SELF CARE/MGMT/TRAIN EA 15 MIN 5/21/2025 Johanna Kline OT GO 2                 Johanna Kline OT  5/21/2025

## 2025-05-21 NOTE — CASE MANAGEMENT/SOCIAL WORK
Continued Stay Note  [facility]     Patient Name: Bibiana Hodges  MRN: [id number]  Today's Date: 5/21/2025    Admit Date: 5/19/2025    Plan: [facility]   Discharge Plan       Row Name 05/21/25 0838       Plan    Plan [facility]    Patient/Family in Agreement with Plan yes    Plan Comments  called Kar at [facility] to discuss patient's discharge plan. Ms. Hodges plans to return to her home in assisted living at [facility].  called [facility], and initially they said patient can come back without an evaluation, but shortly after, they called and said she will need an evaluation. When patient is close to medically ready,  will call Dara to set up time for evaluation ([phone]).  will continue to follow plan of care and assist with discharge planning needs as indicated.    Final Discharge Disposition Code 01 - home or self-care                   Discharge Codes    No documentation.                 Expected Discharge Date and Time       Expected Discharge Date Expected Discharge Time    May 22, 2025               Erinn Pablo RN

## 2025-05-21 NOTE — THERAPY TREATMENT NOTE
Patient Name: Bibiana Hodges  : 1935    MRN: [id number]                              Today's Date: 2025       Admit Date: 2025    Visit Dx:     ICD-10-CM ICD-9-CM   1. Acute respiratory failure with hypoxia  J96.01 518.81   2. Community acquired pneumonia, unspecified laterality  J18.9 486   3. Acute UTI  N39.0 599.0   4. Elevated troponin  R79.89 790.6   5. Lactic acidosis  E87.20 276.2   6. Leukocytosis, unspecified type  D72.829 288.60     Patient Active Problem List   Diagnosis    Dysautonomia orthostatic hypotension syndrome    Hypertension    Severe malnutrition    Atrial fibrillation    Autonomic dysfunction    Hyperlipidemia    Pneumonia     Past Medical History:   Diagnosis Date    Constipation     Depression     Dysautonomia orthostatic hypotension syndrome     Generalized osteoarthritis     GERD (gastroesophageal reflux disease)     s/p Nissen    Hypertension     Insomnia     Osteoarthrosis, shoulder region     Skin ulcer of scalp     Thrombophlebitis of arm     Tremor     Vitamin B12 deficiency      Past Surgical History:   Procedure Laterality Date    DILATATION AND CURETTAGE      HERNIA REPAIR      HIP SURGERY      SHOULDER SURGERY      Right      General Information       Row Name 25 1127          OT Time and Intention    Document Type therapy note (daily note)  -AJ     Mode of Treatment occupational therapy  -AJ       Row Name 25 1127          General Information    Patient Profile Reviewed yes  -AJ     Prior Level of Function min assist:;all household mobility;transfer;w/c or scooter;bed mobility;ADL's  -AJ     Existing Precautions/Restrictions fall;other (see comments)  BUE tremor, double vision  -AJ     Barriers to Rehab medically complex;previous functional deficit  -AJ       Row Name 25 1127          Living Environment    Current Living Arrangements assisted living facility  -AJ     People in Home facility resident  -AJ       Row Name 25 1127           Home Main Entrance    Number of Stairs, Main Entrance none  -       Row Name 05/21/25 1127          Stairs Within Home, Primary    Number of Stairs, Within Home, Primary none  -       Row Name 05/21/25 1127          Cognition    Orientation Status (Cognition) oriented x 4  -       Row Name 05/21/25 1127          Safety Issues/Impairments Affecting Functional Mobility    Safety Issues Affecting Function (Mobility) awareness of need for assistance;insight into deficits/self-awareness;positioning of assistive device;safety precaution awareness;safety precautions follow-through/compliance  -     Impairments Affecting Function (Mobility) balance;endurance/activity tolerance;pain;postural/trunk control;strength;coordination  -               User Key  (r) = Recorded By, (t) = Taken By, (c) = Cosigned By      Initials Name Provider Type    Johanna Xavier OT Occupational Therapist                     Mobility/ADL's       Row Name 05/21/25 1129          Bed Mobility    Bed Mobility supine-sit;scooting/bridging  -     Scooting/Bridging [place] (Bed Mobility) contact guard;1 person assist  -     Supine-Sit [place] (Bed Mobility) standby assist;1 person assist  -     Bed Mobility, Safety Issues impaired trunk control for bed mobility;decreased use of legs for bridging/pushing;decreased use of arms for pushing/pulling  -     Assistive Device (Bed Mobility) bed rails  -     Comment, (Bed Mobility) Increased time required w/scooting.  -       Row Name 05/21/25 1129          Transfers    Transfers sit-stand transfer;toilet transfer  -       Row Name 05/21/25 1129          Sit-Stand Transfer    Sit-Stand [place] (Transfers) contact guard;1 person assist  -     Assistive Device (Sit-Stand Transfers) walker, front-wheeled  -     Comment, (Sit-Stand Transfer) Cues for HP w/walker use  -       Row Name 05/21/25 1129          Toilet Transfer    Type (Toilet Transfer) stand pivot/stand step   -     [place] Level (Toilet Transfer) contact guard;1 person assist  -     Assistive Device (Toilet Transfer) commode chair;walker, front-wheeled  -     Comment, (Toilet Transfer) Cuse for alignment and eccentric lowering. Flexed posturing w/stand and transfer.  -       Row Name 05/21/25 [facility]9          Functional Mobility    Functional Mobility- Ind. Level minimum assist (75% patient effort);1 person  -     Functional Mobility- Device walker, front-wheeled  -     Functional Mobility-Distance (Feet) --  5  -     Functional Mobility- Comment [place] [facility] ~5 feet w/FWW.  -       Row Name 05/21/25 [facility]9          Activities of Daily Living    BADL Assessment/Intervention upper body dressing;lower body dressing;grooming;toileting;feeding  -       Row Name 05/21/25 [facility]9          Hygiene Care    Oral Care other (see comments)  Pt declined  -       Row Name 05/21/25 [facility]9          Upper Body Dressing Assessment/Training    [place] Level (Upper Body Dressing) don;doff;front opening garment;minimum assist (75% patient effort)  -     Position (Upper Body Dressing) supported sitting  -       Row Name 05/21/25 [facility]9          Lower Body Dressing Assessment/Training    [place] Level (Lower Body Dressing) don;socks;dependent (less than 25% patient effort)  -     Position (Lower Body Dressing) supine  -       Row Name 05/21/25 1129          Grooming Assessment/Training    [place] Level (Grooming) wash face, hands;contact guard assist  -     Position (Grooming) sink side;supported standing  -       Row Name 05/21/25 [facility]9          Self-Feeding Assessment/Training    [place] Level (Feeding) liquids to mouth;set up  -     Assistive Devices (Feeding) adapted cup  -     Position (Feeding) supported sitting  -     Comment, (Feeding) Issued adaptive cup to improve independence w/drinking-currently difficult d/t tremor.  -       Row Name 05/21/25 1129          Toileting  Assessment/Training    [place] Level (Toileting) adjust/manage clothing;perform perineal hygiene;minimum assist (75% patient effort);verbal cues  -     Assistive Devices (Toileting) commode chair  -     Position (Toileting) supported sitting;supported standing  -     Comment, (Toileting) Last for thoroughness. Pt able to perform pericare in sitting.  -               User Key  (r) = Recorded By, (t) = Taken By, (c) = Cosigned By      Initials Name Provider Type    Johanna Xavier OT Occupational Therapist                   Obj/Interventions       Row Name 05/21/25 1134          Sensory Assessment (Somatosensory)    Sensory Assessment (Somatosensory) UE sensation intact  -       Row Name 05/21/25 1134          Vision Assessment/Intervention    Visual Impairment/Limitations corrective lenses full-time;WFL  -       Row Name 05/21/25 1134          Range of Motion Comprehensive    General Range of Motion bilateral upper extremity ROM WFL  -       Row Name 05/21/25 1134          Strength Comprehensive (MMT)    General Manual Muscle Testing (MMT) Assessment upper extremity strength deficits identified  -     Comment, General Manual Muscle Testing (MMT) Assessment BUE grossly 4/5 baseline on bed mobility and transfers  -       Row Name 05/21/25 1134          Balance    Balance Assessment sitting static balance;sitting dynamic balance;sit to stand dynamic balance;standing static balance;standing dynamic balance  -     Static Sitting Balance supervision  -     Dynamic Sitting Balance standby assist  -     Position, Sitting Balance unsupported;sitting edge of bed  -     Static Standing Balance contact guard  -     Dynamic Standing Balance minimal assist  -     Position/Device Used, Standing Balance supported;walker, front-wheeled  -     Balance Interventions sitting;standing;sit to stand;supported;static;dynamic;occupation based/functional task  -               User Key  (r) = Recorded  By, (t) = Taken By, (c) = Cosigned By      Initials Name Provider Type    Johanna Xavier OT Occupational Therapist                   Goals/Plan       Row Name 05/21/25 1141          Transfer Goal 1 (OT)    Activity/Assistive Device (Transfer Goal 1, OT) sit-to-stand/stand-to-sit;bed-to-chair/chair-to-bed;toilet;walker, rolling  -AJ     [place] Level/Cues Needed (Transfer Goal 1, OT) verbal cues required;contact guard required  -AJ     Time Frame (Transfer Goal 1, OT) long term goal (LTG);10 days  -AJ     Progress/Outcome (Transfer Goal 1, OT) goal ongoing  -       Row Name 05/21/25 1141          Dressing Goal 1 (OT)    Activity/Device (Dressing Goal 1, OT) upper body dressing  -AJ     [place]/Cues Needed (Dressing Goal 1, OT) contact guard required  -AJ     Time Frame (Dressing Goal 1, OT) short term goal (STG);5 days  -AJ     Progress/Outcome (Dressing Goal 1, OT) goal revised this date  -       Row Name 05/21/25 1141          Toileting Goal 1 (OT)    Activity/Device (Toileting Goal 1, OT) adjust/manage clothing;perform perineal hygiene;commode;grab bar/safety frame  -AJ     [place] Level/Cues Needed (Toileting Goal 1, OT) contact guard required  -AJ     Time Frame (Toileting Goal 1, OT) long term goal (LTG);10 days  -AJ     Progress/Outcome (Toileting Goal 1, OT) goal revised this date  -       Row Name 05/21/25 1141          Therapy Assessment/Plan (OT)    Planned Therapy Interventions (OT) activity tolerance training;adaptive equipment training;BADL retraining;functional balance retraining;IADL retraining;occupation/activity based interventions;patient/caregiver education/training;ROM/therapeutic exercise;strengthening exercise;transfer/mobility retraining  -               User Key  (r) = Recorded By, (t) = Taken By, (c) = Cosigned By      Initials Name Provider Type    Johanna Xavier OT Occupational Therapist                   Clinical Impression       Row Name 05/21/25 1130           Pain Assessment    Pretreatment Pain Rating 0/10 - no pain  -     Posttreatment Pain Rating 0/10 - no pain  -       Row Name 05/21/25 1135          Plan of Care Review    Plan of Care Reviewed With patient  -     Progress no change  -     Outcome Evaluation Pt presents near fxl baseline with ADLs and mobility, however is limited by poor activity tolerance, weakness, and balance deficits. Pt would benefit from ongoing skilled OT services to progress to PLOF and prevent hospital associated weakness. Rec d/c back to [facility] w/HH OT/PT.  -       Row Name 05/21/25 1135          Therapy Assessment/Plan (OT)    Patient/Family Therapy Goal Statement (OT) Return to PLOF  -     Rehab Potential (OT) good  -     Criteria for Skilled Therapeutic Interventions Met (OT) yes;meets criteria;skilled treatment is necessary  -     Therapy Frequency (OT) daily  -     Predicted Duration of Therapy Intervention (OT) 5 days  -       Row Name 05/21/25 1135          Therapy Plan Review/Discharge Plan (OT)    Anticipated Discharge Disposition (OT) assisted living;home with home health  -       Row Name 05/21/25 1135          Vital Signs    Pre Systolic BP Rehab 121  -AJ     Pre Treatment Diastolic BP 89  -AJ     Post Systolic BP Rehab 146  -AJ     Post Treatment Diastolic    nursing notified  -     Pretreatment Heart Rate (beats/min) 60  -AJ     Posttreatment Heart Rate (beats/min) 61  -AJ     Pre SpO2 (%) 96  -AJ     O2 Delivery Pre Treatment room air  -AJ     Post SpO2 (%) 97  -AJ     O2 Delivery Post Treatment room air  -AJ     Pre Patient Position Supine  -AJ     Intra Patient Position Standing  -AJ     Post Patient Position Sitting  -       Row Name 05/21/25 1135          Positioning and Restraints    Pre-Treatment Position in bed  -     Post Treatment Position chair  -AJ     In Bed side lying left  -AJ     In Chair notified nsg;reclined;sitting;call light within reach;encouraged to call for  assist;exit alarm on;legs elevated;waffle cushion;waffle boot/both;heels elevated  -               User Key  (r) = Recorded By, (t) = Taken By, (c) = Cosigned By      Initials Name Provider Type    Johanna Xavier OT Occupational Therapist                   Outcome Measures       Row Name 05/21/25 1142          How much help from another is currently needed...    Putting on and taking off regular lower body clothing? 1  -AJ     Bathing (including washing, rinsing, and drying) 2  -AJ     Toileting (which includes using toilet bed pan or urinal) 2  -AJ     Putting on and taking off regular upper body clothing 3  -AJ     Taking care of personal grooming (such as brushing teeth) 3  -AJ     Eating meals 3  -AJ     AM-PAC 6 Clicks Score (OT) 14  -       Row Name 05/21/25 0900          How much help from another person do you currently need...    Turning from your back to your side while in flat bed without using bedrails? 2  -CH     Moving from lying on back to sitting on the side of a flat bed without bedrails? 2  -CH     Moving to and from a bed to a chair (including a wheelchair)? 2  -CH     Standing up from a chair using your arms (e.g., wheelchair, bedside chair)? 2  -CH     Climbing 3-5 steps with a railing? 1  -CH     To walk in hospital room? 2  -CH     AM-PAC 6 Clicks Score (PT) 11  -CH     Highest Level of Mobility Goal Move to Chair/Commode-4  -CH       Row Name 05/21/25 1142          Functional Assessment    Outcome Measure Options AM-PAC 6 Clicks Daily Activity (OT)  -               User Key  (r) = Recorded By, (t) = Taken By, (c) = Cosigned By      Initials Name Provider Type    Celine Billingsley RN Registered Nurse    Johanna Xavier OT Occupational Therapist                    Occupational Therapy Education       Title: PT OT SLP Therapies (Done)       Topic: Occupational Therapy (Done)       Point: ADL training (Done)       Learning Progress Summary            Patient Acceptance, E,  VU,NR by KIM at 5/21/2025 1143    Acceptance, E, NR by JAVIER at 5/20/2025 1327                      Point: Precautions (Done)       Learning Progress Summary            Patient Acceptance, E, VU,NR by KIM at 5/21/2025 1143    Acceptance, E, NR by JAVIER at 5/20/2025 1327                      Point: Body mechanics (Done)       Learning Progress Summary            Patient Acceptance, E, VU,NR by KIM at 5/21/2025 1143    Acceptance, E, NR by JAVIER at 5/20/2025 1327                                      User Key       Initials Effective Dates Name Provider Type Discipline     06/16/21 -  Jeannie Baker OT Occupational Therapist OT     08/26/24 -  Johanna Kline OT Occupational Therapist OT                  OT Recommendation and Plan  Planned Therapy Interventions (OT): activity tolerance training, adaptive equipment training, BADL retraining, functional balance retraining, IADL retraining, occupation/activity based interventions, patient/caregiver education/training, ROM/therapeutic exercise, strengthening exercise, transfer/mobility retraining  Therapy Frequency (OT): daily  Plan of Care Review  Plan of Care Reviewed With: patient  Progress: no change  Outcome Evaluation: Pt presents near fxl baseline with ADLs and mobility, however is limited by poor activity tolerance, weakness, and balance deficits. Pt would benefit from ongoing skilled OT services to progress to PLOF and prevent hospital associated weakness. Rec d/c back to [facility] w/HH OT/PT.     Time Calculation:   Evaluation Complexity (OT)  Review Occupational Profile/Medical/Therapy History Complexity: brief/low complexity  Assessment, Occupational Performance/Identification of Deficit Complexity: 1-3 performance deficits  Clinical Decision Making Complexity (OT): problem focused assessment/low complexity  Overall Complexity of Evaluation (OT): low complexity     Time Calculation- OT       Row Name 05/21/25 1143             Time Calculation- OT    OT Start Time 1045  -KIM       OT Received On 05/21/25  -KIM      OT Goal Re-Cert Due Date 05/31/25  -         Timed Charges    19815 - OT Therapeutic Activity Minutes 15  -AJ      53924 - OT Self Care/Mgmt Minutes 25  -AJ         Untimed Charges    OT Eval/Re-eval Minutes --  -         Total Minutes    Timed Charges Total Minutes 40  -AJ      Untimed Charges Total Minutes --  -       Total Minutes 40  -AJ                User Key  (r) = Recorded By, (t) = Taken By, (c) = Cosigned By      Initials Name Provider Type    Johanna Xavier OT Occupational Therapist                  Therapy Charges for Today       Code Description Service Date Service Provider Modifiers Qty    41017697747 HC OT THERAPEUTIC ACT EA 15 MIN 5/21/2025 Johanna Kline OT GO 1    69016228309 HC OT SELF CARE/MGMT/TRAIN EA 15 MIN 5/21/2025 Johanna Kline OT GO 2                 Johanna Kline OT  5/21/2025

## 2025-05-21 NOTE — CASE MANAGEMENT/SOCIAL WORK
Continued Stay Note  [facility]     Patient Name: Bibiana Hodges  MRN: [id number]  Today's Date: 5/21/2025    Admit Date: 5/19/2025    Plan: [facility]   Discharge Plan       Row Name 05/21/25 0838       Plan    Plan [facility]    Patient/Family in Agreement with Plan yes    Plan Comments  [facility] to discuss patient's discharge plan. Ms. Hodges plans to return to her home in assisted living at [facility] as long as she is still mobile. They will not need to evaluate patient before she returns. At discharge, we will need to fax dc summary to [phone]. Patient will likely need transportation to facility when medically ready.  will continue to follow plan of care and assist with discharge planning needs as indicated.    Final Discharge Disposition Code 01 - home or self-care                   Discharge Codes    No documentation.                 Expected Discharge Date and Time       Expected Discharge Date Expected Discharge Time    May 22, 2025               Erinn Pablo RN

## 2025-05-22 ENCOUNTER — APPOINTMENT (OUTPATIENT)
Dept: CARDIOLOGY | Facility: HOSPITAL | Age: OVER 89
End: 2025-05-22
Payer: MEDICARE

## 2025-05-22 LAB
BACTERIA SPEC RESP CULT: NORMAL
BH CV GRAFT BRACHIAL PRESSURE LEFT: 139 MMHG
BH CV LEA LEFT DPA PRESSURE: 100 MMHG
BH CV LEA LEFT PTA PRESSURE: 102 MMHG
BH CV LEA RIGHT ANT TIBIAL A DISTAL PSV: 66.7 CM/S
BH CV LEA RIGHT ANT TIBIAL A MID PSV: 59.3 CM/S
BH CV LEA RIGHT ANT TIBIAL A PROX PSV: 58 CM/S
BH CV LEA RIGHT CFA DISTAL PSV: 82.1 CM/S
BH CV LEA RIGHT CFA PROX PSV: 70.8 CM/S
BH CV LEA RIGHT DFA PROX PSV: 69.8 CM/S
BH CV LEA RIGHT DPA PRESSURE: 122 MMHG
BH CV LEA RIGHT PERONEAL  MID PSV: 56.1 CM/S
BH CV LEA RIGHT POPITEAL A  DISTAL PSV: 292 CM/S
BH CV LEA RIGHT POPITEAL A  MID PSV: 138 CM/S
BH CV LEA RIGHT POPITEAL A  PROX PSV: 265 CM/S
BH CV LEA RIGHT PTA DISTAL PSV: 31 CM/S
BH CV LEA RIGHT PTA MID PSV: 60 CM/S
BH CV LEA RIGHT PTA PRESSURE: NORMAL MMHG
BH CV LEA RIGHT PTA PROX PSV: 47.4 CM/S
BH CV LEA RIGHT SFA DISTAL PSV: 244 CM/S
BH CV LEA RIGHT SFA MID PSV: 204 CM/S
BH CV LEA RIGHT SFA PROX PSV: 73.8 CM/S
BH CV LEA RIGHT TIBEOPERONEAL PSV: 83.5 CM/S
BH CV LOWER ARTERIAL LEFT ABI RATIO: 0.73
BH CV LOWER ARTERIAL RIGHT ABI RATIO: 0.88
BH CV LOWER ARTERIAL RIGHT HIGHEST VELOCITY RATIO: 5.59
BH CV LOWER ARTERIAL RIGHT VELOCITY RATIO LOCATION: NORMAL
BH CV LOWER VASCULAR LEFT COMMON FEMORAL AUGMENT: NORMAL
BH CV LOWER VASCULAR LEFT COMMON FEMORAL COMPRESS: NORMAL
BH CV LOWER VASCULAR LEFT COMMON FEMORAL PHASIC: NORMAL
BH CV LOWER VASCULAR LEFT COMMON FEMORAL SPONT: NORMAL
BH CV LOWER VASCULAR LEFT DISTAL FEMORAL AUGMENT: NORMAL
BH CV LOWER VASCULAR LEFT DISTAL FEMORAL COMPRESS: NORMAL
BH CV LOWER VASCULAR LEFT DISTAL FEMORAL PHASIC: NORMAL
BH CV LOWER VASCULAR LEFT DISTAL FEMORAL SPONT: NORMAL
BH CV LOWER VASCULAR LEFT GASTRONEMIUS COMPRESS: NORMAL
BH CV LOWER VASCULAR LEFT GREATER SAPH AK COMPRESS: NORMAL
BH CV LOWER VASCULAR LEFT GREATER SAPH BK COMPRESS: NORMAL
BH CV LOWER VASCULAR LEFT LESSER SAPH COMPRESS: NORMAL
BH CV LOWER VASCULAR LEFT MID FEMORAL AUGMENT: NORMAL
BH CV LOWER VASCULAR LEFT MID FEMORAL COMPRESS: NORMAL
BH CV LOWER VASCULAR LEFT MID FEMORAL PHASIC: NORMAL
BH CV LOWER VASCULAR LEFT MID FEMORAL SPONT: NORMAL
BH CV LOWER VASCULAR LEFT PERONEAL COMPRESS: NORMAL
BH CV LOWER VASCULAR LEFT POPLITEAL AUGMENT: NORMAL
BH CV LOWER VASCULAR LEFT POPLITEAL COMPRESS: NORMAL
BH CV LOWER VASCULAR LEFT POPLITEAL PHASIC: NORMAL
BH CV LOWER VASCULAR LEFT POPLITEAL SPONT: NORMAL
BH CV LOWER VASCULAR LEFT POSTERIOR TIBIAL COMPRESS: NORMAL
BH CV LOWER VASCULAR LEFT PROFUNDA FEMORAL AUGMENT: NORMAL
BH CV LOWER VASCULAR LEFT PROFUNDA FEMORAL PHASIC: NORMAL
BH CV LOWER VASCULAR LEFT PROFUNDA FEMORAL SPONT: NORMAL
BH CV LOWER VASCULAR LEFT PROXIMAL FEMORAL AUGMENT: NORMAL
BH CV LOWER VASCULAR LEFT PROXIMAL FEMORAL COMPRESS: NORMAL
BH CV LOWER VASCULAR LEFT PROXIMAL FEMORAL PHASIC: NORMAL
BH CV LOWER VASCULAR LEFT PROXIMAL FEMORAL SPONT: NORMAL
BH CV LOWER VASCULAR LEFT SAPHENOFEMORAL JUNCTION AUGMENT: NORMAL
BH CV LOWER VASCULAR LEFT SAPHENOFEMORAL JUNCTION COMPRESS: NORMAL
BH CV LOWER VASCULAR LEFT SAPHENOFEMORAL JUNCTION PHASIC: NORMAL
BH CV LOWER VASCULAR LEFT SAPHENOFEMORAL JUNCTION SPONT: NORMAL
BH CV LOWER VASCULAR RIGHT COMMON FEMORAL AUGMENT: NORMAL
BH CV LOWER VASCULAR RIGHT COMMON FEMORAL PHASIC: NORMAL
BH CV LOWER VASCULAR RIGHT COMMON FEMORAL SPONT: NORMAL
BH CV VAS PRELIMINARY FINDINGS SCRIPTING: 1
BH CV VAS PRELIMINARY FINDINGS SCRIPTING: 1
GRAM STN SPEC: NORMAL
RIGHT GROIN CFA SYS: 82.1 CM/SEC

## 2025-05-22 PROCEDURE — 93926 LOWER EXTREMITY STUDY: CPT

## 2025-05-22 PROCEDURE — 94664 DEMO&/EVAL PT USE INHALER: CPT

## 2025-05-22 PROCEDURE — 93971 EXTREMITY STUDY: CPT | Performed by: INTERNAL MEDICINE

## 2025-05-22 PROCEDURE — 63710000001 PREDNISONE PER 1 MG: Performed by: INTERNAL MEDICINE

## 2025-05-22 PROCEDURE — 94799 UNLISTED PULMONARY SVC/PX: CPT

## 2025-05-22 PROCEDURE — 93926 LOWER EXTREMITY STUDY: CPT | Performed by: INTERNAL MEDICINE

## 2025-05-22 PROCEDURE — 25010000002 PIPERACILLIN SOD-TAZOBACTAM PER 1 G: Performed by: INTERNAL MEDICINE

## 2025-05-22 PROCEDURE — 99232 SBSQ HOSP IP/OBS MODERATE 35: CPT | Performed by: INTERNAL MEDICINE

## 2025-05-22 PROCEDURE — 25010000002 CEFTRIAXONE PER 250 MG: Performed by: INTERNAL MEDICINE

## 2025-05-22 PROCEDURE — 93971 EXTREMITY STUDY: CPT

## 2025-05-22 RX ADMIN — APIXABAN 2.5 MG: 2.5 TABLET, FILM COATED ORAL at 10:31

## 2025-05-22 RX ADMIN — SODIUM CHLORIDE 1000 MG: 900 INJECTION INTRAVENOUS at 15:54

## 2025-05-22 RX ADMIN — IPRATROPIUM BROMIDE AND ALBUTEROL SULFATE 3 ML: 2.5; .5 SOLUTION RESPIRATORY (INHALATION) at 19:41

## 2025-05-22 RX ADMIN — IPRATROPIUM BROMIDE AND ALBUTEROL SULFATE 3 ML: 2.5; .5 SOLUTION RESPIRATORY (INHALATION) at 16:16

## 2025-05-22 RX ADMIN — ACETAMINOPHEN 650 MG: 325 TABLET, FILM COATED ORAL at 10:32

## 2025-05-22 RX ADMIN — DOXYCYCLINE 100 MG: 100 CAPSULE ORAL at 20:17

## 2025-05-22 RX ADMIN — Medication 5 MG: at 20:17

## 2025-05-22 RX ADMIN — PIPERACILLIN AND TAZOBACTAM 3.38 G: 3; .375 INJECTION, POWDER, LYOPHILIZED, FOR SOLUTION INTRAVENOUS at 10:33

## 2025-05-22 RX ADMIN — LEVETIRACETAM 250 MG: 250 TABLET, FILM COATED ORAL at 20:17

## 2025-05-22 RX ADMIN — ACETAMINOPHEN 650 MG: 325 TABLET, FILM COATED ORAL at 20:17

## 2025-05-22 RX ADMIN — TRAZODONE HYDROCHLORIDE 100 MG: 100 TABLET ORAL at 20:17

## 2025-05-22 RX ADMIN — STANDARDIZED SENNA CONCENTRATE 2 TABLET: 8.6 TABLET ORAL at 10:31

## 2025-05-22 RX ADMIN — PREDNISONE 40 MG: 20 TABLET ORAL at 10:31

## 2025-05-22 RX ADMIN — APIXABAN 2.5 MG: 2.5 TABLET, FILM COATED ORAL at 20:17

## 2025-05-22 RX ADMIN — LISINOPRIL 10 MG: 10 TABLET ORAL at 05:48

## 2025-05-22 RX ADMIN — LEVOTHYROXINE SODIUM 100 MCG: 0.1 TABLET ORAL at 05:10

## 2025-05-22 RX ADMIN — Medication 12.5 MG: at 20:17

## 2025-05-22 RX ADMIN — LEVETIRACETAM 250 MG: 250 TABLET, FILM COATED ORAL at 10:32

## 2025-05-22 RX ADMIN — IPRATROPIUM BROMIDE AND ALBUTEROL SULFATE 3 ML: 2.5; .5 SOLUTION RESPIRATORY (INHALATION) at 08:07

## 2025-05-22 RX ADMIN — OXYCODONE HYDROCHLORIDE AND ACETAMINOPHEN 500 MG: 500 TABLET ORAL at 10:30

## 2025-05-22 RX ADMIN — POLYETHYLENE GLYCOL 3350 17 G: 17 POWDER, FOR SOLUTION ORAL at 10:31

## 2025-05-22 RX ADMIN — DOXYCYCLINE 100 MG: 100 CAPSULE ORAL at 10:32

## 2025-05-22 RX ADMIN — ROSUVASTATIN 10 MG: 10 TABLET, FILM COATED ORAL at 20:17

## 2025-05-22 RX ADMIN — Medication 12.5 MG: at 05:48

## 2025-05-22 RX ADMIN — AMIODARONE HYDROCHLORIDE 200 MG: 200 TABLET ORAL at 05:48

## 2025-05-22 RX ADMIN — VENLAFAXINE HYDROCHLORIDE 75 MG: 75 CAPSULE, EXTENDED RELEASE ORAL at 10:31

## 2025-05-22 NOTE — CONSULTS
INFECTIOUS DISEASE CONSULT/INITIAL HOSPITAL VISIT    Bibiana Hodges  1935  [id number]    Date of Consult: 5/22/2025    Admission Date: 5/19/2025      Requesting Provider: ANASTASIA Shen  Evaluating Physician: Xavi Ivey MD    Reason for Consultation: pseudomonas UTI    History of present illness:    Patient is a 89 y.o. female  with atrial fibrillation, hypertension, hyperlipidemia, GERD, and B12 deficiency resided at [facility] has been evaluated in [facility] ED for  respiratory distress.    Had hypoxia of 83%, aspiration suspected, patient given antibiotics and steroids.  Patient's hypoxia has resolved.    Despite not having burning on urination the [facility] ED check urinalysis which had 6-0 rbc, tntc wbc, and 3-6 squamous cells.    Pseudmonas was reporte don urine culture at 50,000 CFU.      Patient denies dysuria, currently is using a pure wick.    WE are consulted regarding an incidental finding on a urine culture that did not contribute to patient's reason for admission to hospital.      PAtient reports chronic leg weakness.    Past Medical History:   Diagnosis Date    Constipation     Depression     Dysautonomia orthostatic hypotension syndrome     Generalized osteoarthritis     GERD (gastroesophageal reflux disease)     s/p Nissen    Hypertension     Insomnia     Osteoarthrosis, shoulder region     Skin ulcer of scalp     Thrombophlebitis of arm     Tremor     Vitamin B12 deficiency        Past Surgical History:   Procedure Laterality Date    DILATATION AND CURETTAGE      HERNIA REPAIR      HIP SURGERY      SHOULDER SURGERY      Right       Family History   Problem Relation Age of Onset    Cancer Mother         Pancreatic    Stroke Brother     Anorexia nervosa Daughter        Social History     Socioeconomic History    Marital status:    Tobacco Use    Smoking status: Never    Smokeless tobacco: Never   Vaping Use    Vaping status: Never Used   Substance and Sexual  Activity    Alcohol use: No     Comment: stopped drinking alcohol    Drug use: No    Sexual activity: Defer       Allergies   Allergen Reactions    Sulfa Antibiotics Hives    Gabapentin          Medication:    Current Facility-Administered Medications:     acetaminophen (TYLENOL) tablet 650 mg, 650 mg, Oral, Q8H PRN, Aileen Doherty MD, 650 mg at 05/22/25 1032    amiodarone (PACERONE) tablet 200 mg, 200 mg, Oral, Daily, Aileen Doherty MD, 200 mg at 05/22/25 0548    apixaban (ELIQUIS) tablet 2.5 mg, 2.5 mg, Oral, BID, Aileen Doherty MD, 2.5 mg at 05/22/25 1031    ascorbic acid (VITAMIN C) tablet 500 mg, 500 mg, Oral, Daily, Aileen Doherty MD, 500 mg at 05/22/25 1030    bisacodyl (DULCOLAX) suppository 10 mg, 10 mg, Rectal, Once, Aileen Doherty MD    cefTRIAXone (ROCEPHIN) 1,000 mg in sodium chloride 0.9 % 100 mL MBP, 1,000 mg, Intravenous, Q24H, Sarwat Kimbrough MD, Last Rate: 200 mL/hr at 05/22/25 1554, 1,000 mg at 05/22/25 1554    doxycycline (MONODOX) capsule 100 mg, 100 mg, Oral, Q12H, Aileen Doherty MD, 100 mg at 05/22/25 1032    ipratropium-albuterol (DUO-NEB) nebulizer solution 3 mL, 3 mL, Nebulization, 4x Daily - RT, Aileen Doherty MD, 3 mL at 05/22/25 1616    lactulose (CHRONULAC) 10 GM/15ML solution 20 g, 20 g, Oral, BID, Sarwat Kimbrough MD    levETIRAcetam (KEPPRA) tablet 250 mg, 250 mg, Oral, BID, Aileen Doherty MD, 250 mg at 05/22/25 1032    levothyroxine (SYNTHROID, LEVOTHROID) tablet 100 mcg, 100 mcg, Oral, Daily, Aileen Doherty MD, 100 mcg at 05/22/25 0510    lisinopril (PRINIVIL,ZESTRIL) tablet 10 mg, 10 mg, Oral, Q24H, Aileen Doherty MD, 10 mg at 05/22/25 0548    melatonin tablet 5 mg, 5 mg, Oral, Nightly, Aileen Doherty MD, 5 mg at 05/21/25 2020    metoprolol tartrate (LOPRESSOR) half tablet 12.5 mg, 12.5 mg, Oral, BID, Aileen Doherty MD, 12.5 mg at 05/22/25 0548    polyethylene glycol (MIRALAX) packet 17 g, 17 g, Oral, Daily, Aileen Doherty MD, 17 g at  05/22/25 1031    predniSONE (DELTASONE) tablet 40 mg, 40 mg, Oral, Daily With Breakfast, Aileen Doherty MD, 40 mg at 05/22/25 1031    rosuvastatin (CRESTOR) tablet 10 mg, 10 mg, Oral, Nightly, Aileen Doherty MD, 10 mg at 05/21/25 2020    senna (SENOKOT) tablet 2 tablet, 2 tablet, Oral, Daily, Aileen Doherty MD, 2 tablet at 05/22/25 1031    traZODone (DESYREL) tablet 100 mg, 100 mg, Oral, Nightly, Aileen Doherty MD, 100 mg at 05/21/25 2020    venlafaxine XR (EFFEXOR-XR) 24 hr capsule 75 mg, 75 mg, Oral, Daily, Aileen Doherty MD, 75 mg at 05/22/25 1031    Antibiotics:  Anti-Infectives (From admission, onward)      Ordered     Dose/Rate Route Frequency Start Stop    05/22/25 0908  piperacillin-tazobactam (ZOSYN) 3.375 g IVPB in 100 mL NS MBP (CD)  Status:  Discontinued        Ordering Provider: Sarwat Kimbrough MD    3.375 g  over 4 Hours Intravenous Every 8 Hours 05/22/25 1700 05/22/25 1357    05/22/25 1357  cefTRIAXone (ROCEPHIN) 1,000 mg in sodium chloride 0.9 % 100 mL MBP        Ordering Provider: Sarwat Kimbrough MD    1,000 mg  200 mL/hr over 30 Minutes Intravenous Every 24 Hours 05/22/25 1500 05/27/25 1459    05/22/25 0908  piperacillin-tazobactam (ZOSYN) 3.375 g IVPB in 100 mL NS MBP (CD)        Ordering Provider: Sarwat Kimbrough MD    3.375 g  over 30 Minutes Intravenous Once 05/22/25 1000 05/22/25 1103    05/19/25 2338  cefTRIAXone (ROCEPHIN) 1,000 mg in sodium chloride 0.9 % 100 mL MBP  Status:  Discontinued        Ordering Provider: Sarwat Kimbrough MD    1,000 mg  200 mL/hr over 30 Minutes Intravenous Every 24 Hours 05/20/25 0900 05/22/25 0906    05/19/25 2338  doxycycline (MONODOX) capsule 100 mg        Ordering Provider: Aileen Doherty MD    100 mg Oral Every 12 Hours Scheduled 05/20/25 0900 05/25/25 0859    05/19/25 1510  azithromycin (ZITHROMAX) 500 mg in sodium chloride 0.9 % 250 mL IVPB-VTB        Ordering Provider: Efrain Fraga MD    500 mg  over 60 Minutes Intravenous  Once 25 1526 25 1654    25 1510  cefTRIAXone (ROCEPHIN) 2 g in sodium chloride 0.9 % 100 mL MBP        Ordering Provider: Efrain Fraga MD    2 g  200 mL/hr over 30 Minutes Intravenous Once 25 1526 25 1807              Review of Systems:  Reports leg weakness, denies fevers, denies dysuria      Physical Exam:   Vital Signs  Temp (24hrs), Av.8 °F (36.6 °C), Min:97.5 °F (36.4 °C), Max:98 °F (36.7 °C)    Temp  Min: 97.5 °F (36.4 °C)  Max: 98 °F (36.7 °C)  BP  Min: 100/84  Max: 196/84  Pulse  Min: 60  Max: 61  Resp  Min: 18  Max: 20  SpO2  Min: 94 %  Max: 96 %    GENERAL: Awake and alert, in no acute distress.   HEENT: Normocephalic, atraumatic.  PERRL. EOMI. No conjunctival injection. No icterus.no ext oral lesions    HEART: RRR; No murmur, rubs, gallops.   LUNGS: Clear to auscultation bilaterally   ABDOMEN: Soft, nontender, nondistended.   EXT:  No edema  :  Without George catheter.  MSK: No joint effusions or erythema  SKIN: Warm and dry without cutaneous eruptions on Inspection/palpation.    NEURO: nonfocal deficit    Laboratory Data    Results from last 7 days   Lab Units 25  1348   WBC 10*3/mm3 13.43*   HEMOGLOBIN g/dL 13.4   HEMATOCRIT % 42.2   PLATELETS 10*3/mm3 215     Results from last 7 days   Lab Units 25  1348   SODIUM mmol/L 134*   POTASSIUM mmol/L 4.3   CHLORIDE mmol/L 95*   CO2 mmol/L 23.0   BUN mg/dL 18   CREATININE mg/dL 1.30*   GLUCOSE mg/dL 151*   CALCIUM mg/dL 8.9     Results from last 7 days   Lab Units 25  1348   ALK PHOS U/L 74   BILIRUBIN mg/dL 0.3   ALT (SGPT) U/L 28   AST (SGOT) U/L 43*             Results from last 7 days   Lab Units 25  0004   LACTATE mmol/L 1.3     Results from last 7 days   Lab Units 25  1701   CK TOTAL U/L 53         Estimated Creatinine Clearance: 27.7 mL/min (A) (by C-G formula based on SCr of 1.3 mg/dL (H)).      Microbiology:  Blood Culture   Date Value Ref Range Status   2025 No growth at 3  "days  Preliminary     No results found for: \"BCIDPCR\", \"CXREFLEX\", \"CSFCX\", \"CULTURETIS\"  No results found for: \"CULTURES\", \"HSVCX\", \"URCX\"  No results found for: \"EYECULTURE\", \"GCCX\", \"HSVCULTURE\", \"LABHSV\"  No results found for: \"LEGIONELLA\", \"MRSACX\", \"MUMPSCX\", \"MYCOPLASCX\"  No results found for: \"NOCARDIACX\", \"STOOLCX\"  Urine Culture   Date Value Ref Range Status   05/19/2025 50,000 CFU/mL Pseudomonas aeruginosa (A)  Final     No results found for: \"VIRALCULTU\", \"WOUNDCX\"        Radiology:  Imaging Results (Last 72 Hours)       Procedure Component Value Units Date/Time    MRI Brain Without Contrast [[id number]] Collected: 05/21/25 1523     Updated: 05/21/25 1529    Narrative:      MRI BRAIN WO CONTRAST    Date of Exam: 5/21/2025 2:45 PM EDT    Indication: Double vision.     Comparison: None available.    Technique:  Routine multiplanar/multisequence sequence images of the brain were obtained without contrast administration.    FINDINGS:  Foci of T2/FLAIR signal hyperintensity are seen within the bilateral hemispheric white matter and within the sara. There is cortical atrophy with prominent sulcation and ventriculomegaly. Midline structures appear unremarkable. No significant mass   effect, intracranial hemorrhage, or hydrocephalus is identified. Diffusion-weighted sequences demonstrate no acute infarct.The visualized intracranial flow-voids appear unremarkable. Suggestion of mild right mastoid effusion. Bilateral lens prostheses   noted. The visualized superficial soft tissues and cervical spine demonstrate no significant abnormality.      Impression:      1.Findings compatible with chronic microvascular ischemic change and diffuse cortical atrophy.  2.No diffusion restriction is identified to suggest acute infarct.  3.Possible mild right mastoid effusion.      Electronically Signed: Slim Mauro MD    5/21/2025 3:26 PM EDT    Workstation ID: [id number]    XR Abdomen KUB [[id number]] Collected: 05/21/25 0933    "  Updated: 05/21/25 0940    Narrative:      XR ABDOMEN KUB    Date of Exam: 5/21/2025 7:24 AM EDT    Indication: constipation    Comparison: No recent KUB. 8/23/2023 abdomen pelvis CT scan    Findings:  Extensive stool shadow is seen from the level of the ascending colon to the distal descending colon, but with practically no visible stool shadow in the sigmoid or rectum. No abnormally dilated small bowel loops are seen. Transverse colon is considered   in the upper range of normal diameter, between 6 and 7 cm. There was similar degree of fecal stasis on the 8/23/2023 abdominal CT scan. No bowel wall edema or pneumatosis is seen. Incidental note is made of the patient's dextroconvex lumbar scoliosis,   and anatomically aligned bilateral hip prostheses. .      Impression:      Impression:  Moderate fecal stasis to the level of the distal descending colon. No obvious impaction.      Electronically Signed: Deon Vera MD    5/21/2025 9:37 AM EDT    Workstation ID: [id number]              Impression:   Lactic acidosis  Pyuria  Probable asymptomatic bacturia with pseudomonas colonization  Resolving respiratory failure with hypoxia  COPD    PLAN/RECOMMENDATIONS:   Thank you for asking us to see Bibiana Hodges, I recommend the following:  I have independently reviewed the ct chest and do not see any substantial infiltrate       Patient has been improving without any antibiotics targeting pseudomonas.  (First dose Zosyn was started today)    The urine culture is highly likely colonization and I would not treat this currently.    Continue treatment for COPD exacerbation.    Cont ctx/doxy x 3-5 days         Xavi Ivey MD  5/22/2025  17:35 EDT

## 2025-05-22 NOTE — PROGRESS NOTES
[facility] Medicine Services  PROGRESS NOTE    Patient Name: Bibiana Hodges  : 1935  MRN: [id number]    Date of Admission: 2025  Primary Care Physician: Nivia Madrigal MD    Subjective   Subjective     CC:  SOB    HPI:  Resting in bed in no acute distress and tells me that her breathing has improved significantly.  However, complains of severe weakness.  Also complains of occasional pain of lower extremity and numbness of her toes.  No fever or chills.  No chest pain or palpitation.  No nausea vomiting or diarrhea.      Objective   Objective     Vital Signs:   Temp:  [97.5 °F (36.4 °C)-98 °F (36.7 °C)] 97.5 °F (36.4 °C)  Heart Rate:  [60-61] 61  Resp:  [18-20] 18  BP: (134-196)/() 139/70     Physical Exam:  Constitutional: No acute distress, awake, alert  HENT: NCAT, mucous membranes moist  Respiratory: Clear to auscultation bilaterally, respiratory effort normal, currently on 4 L of nasal cannula and doing above 98%.  Cardiovascular: RRR, no murmurs, rubs, or gallops  Gastrointestinal: Positive bowel sounds, soft, nontender, nondistended  Musculoskeletal: Edema of left foot improved compared to yesterday, low muscle mass  Psychiatric: Appropriate affect, cooperative  Neurologic: Oriented x 3, speech clear, coarse tremors of right upper limb  Skin: No rashes     Results Reviewed:  LAB RESULTS:      Lab 25  0004 259 25  1734 25  1547 25  1348   WBC  --   --   --   --  13.43*   HEMOGLOBIN  --   --   --   --  13.4   HEMATOCRIT  --   --   --   --  42.2   PLATELETS  --   --   --   --  215   NEUTROS ABS  --   --   --   --  12.37*   IMMATURE GRANS (ABS)  --   --   --   --  0.06*   LYMPHS ABS  --   --   --   --  0.36*   MONOS ABS  --   --   --   --  0.62   EOS ABS  --   --   --   --  0.00   MCV  --   --   --   --  103.9*   PROCALCITONIN  --   --   --   --  0.09   LACTATE 1.3 3.1* 2.3*  --  5.5*   HSTROP T  --   --   --  98* 70*          Lab 05/19/25  1348   SODIUM 134*   POTASSIUM 4.3   CHLORIDE 95*   CO2 23.0   ANION GAP 16.0*   BUN 18   CREATININE 1.30*   EGFR 39.4*   GLUCOSE 151*   CALCIUM 8.9         Lab 05/19/25  1348   TOTAL PROTEIN 6.0   ALBUMIN 3.2*   GLOBULIN 2.8   ALT (SGPT) 28   AST (SGOT) 43*   BILIRUBIN 0.3   ALK PHOS 74         Lab 05/19/25  1547 05/19/25  1348   PROBNP  --  1,247.0   HSTROP T 98* 70*                 Brief Urine Lab Results  (Last result in the past 365 days)        Color   Clarity   Blood   Leuk Est   Nitrite   Protein   CREAT   Urine HCG        05/19/25 1428 Yellow   Turbid   Moderate (2+)   Large (3+)   Negative   100 mg/dL (2+)                   Microbiology Results Abnormal       Procedure Component Value - Date/Time    Urine Culture - Urine, Urine, Catheter In/Out [[id number]]  (Abnormal)  (Susceptibility) Collected: 05/19/25 1428    Lab Status: Final result Specimen: Urine, Catheter In/Out Updated: 05/21/25 1124     Urine Culture 50,000 CFU/mL Pseudomonas aeruginosa    Narrative:      Colonization of the urinary tract without infection is common. Treatment is discouraged unless the patient is symptomatic, pregnant, or undergoing an invasive urologic procedure.    Susceptibility        Pseudomonas aeruginosa      BECK      Cefepime Susceptible      Ceftazidime Susceptible      Ciprofloxacin Susceptible      Levofloxacin Susceptible      Piperacillin + Tazobactam Susceptible      Tobramycin Susceptible                                   Duplex Venous Lower Extremity - Left CAR  Result Date: 5/22/2025    The left lower extremity venous duplex scan is negative for evidence of a DVT and SVT.   Incidentally noted occluded mid SFA with reconstitution of flow by collaterals in the distal segment.  Monophasic segments distal to the occlusion.     MRI Brain Without Contrast  Result Date: 5/21/2025  MRI BRAIN WO CONTRAST Date of Exam: 5/21/2025 2:45 PM EDT Indication: Double vision.  Comparison: None available. Technique:   Routine multiplanar/multisequence sequence images of the brain were obtained without contrast administration. FINDINGS: Foci of T2/FLAIR signal hyperintensity are seen within the bilateral hemispheric white matter and within the sara. There is cortical atrophy with prominent sulcation and ventriculomegaly. Midline structures appear unremarkable. No significant mass effect, intracranial hemorrhage, or hydrocephalus is identified. Diffusion-weighted sequences demonstrate no acute infarct.The visualized intracranial flow-voids appear unremarkable. Suggestion of mild right mastoid effusion. Bilateral lens prostheses noted. The visualized superficial soft tissues and cervical spine demonstrate no significant abnormality.     Impression: 1.Findings compatible with chronic microvascular ischemic change and diffuse cortical atrophy. 2.No diffusion restriction is identified to suggest acute infarct. 3.Possible mild right mastoid effusion. Electronically Signed: Slim Mauro MD  5/21/2025 3:26 PM EDT  Workstation ID: [id number]    XR Abdomen KUB  Result Date: 5/21/2025  XR ABDOMEN KUB Date of Exam: 5/21/2025 7:24 AM EDT Indication: constipation Comparison: No recent KUB. 8/23/2023 abdomen pelvis CT scan Findings: Extensive stool shadow is seen from the level of the ascending colon to the distal descending colon, but with practically no visible stool shadow in the sigmoid or rectum. No abnormally dilated small bowel loops are seen. Transverse colon is considered in the upper range of normal diameter, between 6 and 7 cm. There was similar degree of fecal stasis on the 8/23/2023 abdominal CT scan. No bowel wall edema or pneumatosis is seen. Incidental note is made of the patient's dextroconvex lumbar scoliosis, and anatomically aligned bilateral hip prostheses. .     Impression: Impression: Moderate fecal stasis to the level of the distal descending colon. No obvious impaction. Electronically Signed: Deon Vera MD  5/21/2025  9:37 AM EDT  Workstation ID: [id number]      Results for orders placed during the hospital encounter of 08/20/23    Adult Transthoracic Echo Complete W/ Cont if Necessary Per Protocol    Interpretation Summary    Left ventricular systolic function is mildly decreased. Calculated left ventricular EF = 47.9% Normal left ventricular cavity size and wall thickness noted. There is left ventricular global hypokinesis noted. Left ventricular diastolic function is consistent with (grade I) impaired relaxation.    : Normal right ventricular cavity size and systolic function noted. Electronic lead present in the ventricle.    Normal left atrial size and volume noted. Saline test results are negative.    There is moderate calcification of the aortic valve. The aortic valve was poorly visualized but appears trileaflet. Moderate aortic valve regurgitation is present. No aortic valve stenosis is present.    Mild mitral annular calcification is present. Mild mitral valve regurgitation is present. No significant mitral valve stenosis is present.    The tricuspid valve is grossly normal in structure. Mild tricuspid valve regurgitation is present. Estimated right ventricular systolic pressure from tricuspid regurgitation is normal, 33 mmHg. No tricuspid valve stenosis is present.    Mild dilation of the ascending aorta is present. Ascending aorta = 3.7 cm    There is a small (<1cm) pericardial effusion adjacent to the right atrium and right ventricle.      Current medications:  Scheduled Meds:amiodarone, 200 mg, Oral, Daily  apixaban, 2.5 mg, Oral, BID  ascorbic acid, 500 mg, Oral, Daily  bisacodyl, 10 mg, Rectal, Once  doxycycline, 100 mg, Oral, Q12H  ipratropium-albuterol, 3 mL, Nebulization, 4x Daily - RT  lactulose, 20 g, Oral, BID  levETIRAcetam, 250 mg, Oral, BID  levothyroxine, 100 mcg, Oral, Daily  lisinopril, 10 mg, Oral, Q24H  melatonin, 5 mg, Oral, Nightly  metoprolol tartrate, 12.5 mg, Oral, BID  piperacillin-tazobactam,  3.375 g, Intravenous, Q8H  polyethylene glycol, 17 g, Oral, Daily  predniSONE, 40 mg, Oral, Daily With Breakfast  rosuvastatin, 10 mg, Oral, Nightly  senna, 2 tablet, Oral, Daily  traZODone, 100 mg, Oral, Nightly  venlafaxine XR, 75 mg, Oral, Daily      Continuous Infusions:   PRN Meds:.  acetaminophen    Assessment & Plan   Assessment & Plan     Active Hospital Problems    Diagnosis  POA    **Pneumonia [J18.9]  Yes    Hyperlipidemia [E78.5]  Yes    Autonomic dysfunction [G90.9]  Yes    Atrial fibrillation [I48.91]  Yes    Hypertension [I10]  Yes      Resolved Hospital Problems   No resolved problems to display.        Brief Hospital Course to date:  Bibiana Hodges is a 89 y.o. female with past medical history significant for atrial fibrillation, hypertension, hyperlipidemia, GERD, B12 deficiency.  She is living at an assisted living facility.  Patient was brought by ambulance for respiratory distress.    Pneumonia:-- possibly aspiration  - CT angiogram showed evidence of right lower lobe pneumonia  - Respiration has improved significantly.  Will discontinue Zosyn and start Rocephin.    Lower extremity pain occasionally  Occasional numbness of toes bilaterally  - Will get a 30 duplex of lower extremities.  If necessary will ask vascular surgery to see the patient.    Abnormal troponin levels:  - Troponin levels were abnormal but no chest pain    Tremor:  - Reports significant tremors, especially in right hand, which is chronic since many years ago  - neurology has seen and evaluated the patient    Double vision:  - Reports having double vision for 2 years  - Neurology has seen and evaluated patient  - MRI of the brain does not show any acute process.  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      Constipation:  - Reports not having a bowel movement in 2 weeks  - Bowel regimen was initiated.  Will also get a KUB and if no sign of obstruction we will be a little more aggressive with laxatives    COPD  with acute hypoxic respiratory failure:  - Presented with room air saturation of 83% and required 6 L of oxygen  - Continue monitoring and adjust oxygen as needed    Full code status:  - Expressed desire to remain full code         Expected Discharge Location and Transportation: To be determined  Expected Discharge to be determined  Expected Discharge Date: 5/22/2025; Expected Discharge Time:      VTE Prophylaxis:  Pharmacologic VTE prophylaxis orders are present.         AM-PAC 6 Clicks Score (PT): 11 (05/21/25 2032)    CODE STATUS:   Code Status and Medical Interventions: CPR (Attempt to Resuscitate); Full Support   Ordered at: 05/19/25 4315     Code Status (Patient has no pulse and is not breathing):    CPR (Attempt to Resuscitate)     Medical Interventions (Patient has pulse or is breathing):    Full Support       Sarwat Kimbrough MD  05/22/25

## 2025-05-22 NOTE — CASE MANAGEMENT/SOCIAL WORK
Continued Stay Note  [facility]     Patient Name: Bibiana Hodges  MRN: [id number]  Today's Date: 5/22/2025    Admit Date: 5/19/2025    Plan: home   Discharge Plan       Row Name 05/22/25 1545       Plan    Plan home    Patient/Family in Agreement with Plan yes    Plan Comments Ms. Hodges was discussed in [facility] today. Patient currently lives in assisted living at [facility]. Patient will need to be evaluated before she can return.  called and left 2 voicemails for the , Dara, requesting to schedule evaluation.  will continue to follow plan of care and assist with discharge planning needs as indicated.    Final Discharge Disposition Code 01 - home or self-care                   Discharge Codes    No documentation.                 Expected Discharge Date and Time       Expected Discharge Date Expected Discharge Time    May 22, 2025               Erinn Pablo RN

## 2025-05-22 NOTE — NURSING NOTE
This am while patient lying in bed, asleep not exhibiting any symptoms blood pressure noted to be elevated (see vitals) upon routine assessment. Patient remains asymptomatic. ANASTASIA Casillas, was notified of the elevated BP. Administered scheduled 0900 antihypertensive medications per MAR as ordered. Will continue to monitor patient's blood pressure. No acute distress noted at this time.

## 2025-05-22 NOTE — PLAN OF CARE
Problem: Adult Inpatient Plan of Care  Goal: Plan of Care Review  Outcome: Progressing  Goal: Patient-Specific Goal (Individualized)  Outcome: Progressing  Goal: Absence of Hospital-Acquired Illness or Injury  Outcome: Progressing  Intervention: Identify and Manage Fall Risk  Recent Flowsheet Documentation  Taken 5/22/2025 0244 by Cony Gipson RN  Safety Promotion/Fall Prevention:   safety round/check completed   room organization consistent   nonskid shoes/slippers when out of bed   fall prevention program maintained   clutter free environment maintained   assistive device/personal items within reach   activity supervised  Taken 5/22/2025 0045 by Cony Gipson RN  Safety Promotion/Fall Prevention:   safety round/check completed   room organization consistent   nonskid shoes/slippers when out of bed   gait belt   fall prevention program maintained   clutter free environment maintained   assistive device/personal items within reach   activity supervised  Taken 5/21/2025 2230 by Cony Gipson RN  Safety Promotion/Fall Prevention:   safety round/check completed   room organization consistent   nonskid shoes/slippers when out of bed   fall prevention program maintained   clutter free environment maintained   assistive device/personal items within reach   activity supervised  Taken 5/21/2025 2032 by Cony Gipson RN  Safety Promotion/Fall Prevention:   safety round/check completed   room organization consistent   nonskid shoes/slippers when out of bed   fall prevention program maintained   clutter free environment maintained   assistive device/personal items within reach   activity supervised  Intervention: Prevent Skin Injury  Recent Flowsheet Documentation  Taken 5/22/2025 0244 by Cony Gipson RN  Body Position:   turned   right   heels elevated   legs elevated  Skin Protection:   incontinence pads utilized   silicone foam dressing in place   transparent dressing maintained  Taken 5/22/2025 0045 by Cony Gipson  CHRISTIAN WHALEN  Body Position:   turned   left   heels elevated   legs elevated  Skin Protection:   incontinence pads utilized   silicone foam dressing in place   transparent dressing maintained  Taken 5/21/2025 2230 by Cony Gipson RN  Skin Protection:   incontinence pads utilized   silicone foam dressing in place   transparent dressing maintained  Taken 5/21/2025 2032 by Cony Gipson RN  Body Position:   legs elevated   tilted  Skin Protection:   incontinence pads utilized   silicone foam dressing in place   transparent dressing maintained  Intervention: Prevent and Manage VTE (Venous Thromboembolism) Risk  Recent Flowsheet Documentation  Taken 5/21/2025 2032 by Cony Gipson RN  VTE Prevention/Management:   bilateral   SCDs (sequential compression devices) off   other (see comments)  Intervention: Prevent Infection  Recent Flowsheet Documentation  Taken 5/22/2025 0244 by Cony Gipson RN  Infection Prevention:   rest/sleep promoted   single patient room provided   hand hygiene promoted   environmental surveillance performed  Taken 5/22/2025 0045 by Cony Gipson RN  Infection Prevention:   rest/sleep promoted   single patient room provided   hand hygiene promoted   environmental surveillance performed  Taken 5/21/2025 2230 by Cony Gipson RN  Infection Prevention:   rest/sleep promoted   single patient room provided   hand hygiene promoted   environmental surveillance performed  Taken 5/21/2025 2032 by Cony Gipson RN  Infection Prevention:   rest/sleep promoted   single patient room provided   hand hygiene promoted   environmental surveillance performed  Goal: Optimal Comfort and Wellbeing  Outcome: Progressing  Intervention: Monitor Pain and Promote Comfort  Recent Flowsheet Documentation  Taken 5/21/2025 2032 by Cony Gipson RN  Pain Management Interventions: pain medication given  Intervention: Provide Person-Centered Care  Recent Flowsheet Documentation  Taken 5/21/2025 2032 by Cony Gipson RN  Trust  Relationship/Rapport:   care explained   thoughts/feelings acknowledged  Goal: Readiness for Transition of Care  Outcome: Progressing     Problem: Comorbidity Management  Goal: Maintenance of Asthma Control  Outcome: Progressing  Goal: Blood Pressure in Desired Range  Outcome: Progressing  Goal: Maintenance of Osteoarthritis Symptom Control  Outcome: Progressing  Intervention: Maintain Osteoarthritis Symptom Control  Recent Flowsheet Documentation  Taken 5/22/2025 0244 by Cony Gipson, RN  Activity Management: activity minimized  Taken 5/22/2025 0045 by Cony Gipson, RN  Activity Management: back to bed  Assistive Device Utilized:   gait belt   walker  Taken 5/21/2025 2032 by Cony Gipson, RN  Activity Management: up in chair     Problem: Skin Injury Risk Increased  Goal: Skin Health and Integrity  Outcome: Progressing  Intervention: Optimize Skin Protection  Recent Flowsheet Documentation  Taken 5/22/2025 0244 by Cony Gipson, RN  Activity Management: activity minimized  Pressure Reduction Techniques:   weight shift assistance provided   frequent weight shift encouraged   heels elevated off bed   positioned off wounds   pressure points protected  Head of Bed (HOB) Positioning: HOB at 30-45 degrees  Pressure Reduction Devices:   pressure-redistributing mattress utilized   positioning supports utilized   heel offloading device utilized   foam padding utilized  Skin Protection:   incontinence pads utilized   silicone foam dressing in place   transparent dressing maintained  Taken 5/22/2025 0045 by Cony Gipson, RN  Activity Management: back to bed  Pressure Reduction Techniques:   weight shift assistance provided   frequent weight shift encouraged   heels elevated off bed   positioned off wounds   pressure points protected  Head of Bed (HOB) Positioning: HOB at 30-45 degrees  Pressure Reduction Devices:   pressure-redistributing mattress utilized   positioning supports utilized   heel offloading device utilized    foam padding utilized   chair cushion utilized  Skin Protection:   incontinence pads utilized   silicone foam dressing in place   transparent dressing maintained  Taken 5/21/2025 2230 by Cony Gipson RN  Pressure Reduction Techniques:   weight shift assistance provided   frequent weight shift encouraged   heels elevated off bed   positioned off wounds   pressure points protected  Pressure Reduction Devices:   specialty bed utilized   pressure-redistributing mattress utilized   positioning supports utilized   heel offloading device utilized   foam padding utilized  Skin Protection:   incontinence pads utilized   silicone foam dressing in place   transparent dressing maintained  Taken 5/21/2025 2032 by Cony Gipson, RN  Activity Management: up in chair  Pressure Reduction Techniques:   weight shift assistance provided   frequent weight shift encouraged   positioned off wounds   heels elevated off bed   pressure points protected  Pressure Reduction Devices:   chair cushion utilized   pressure-redistributing mattress utilized   positioning supports utilized   heel offloading device utilized   foam padding utilized  Skin Protection:   incontinence pads utilized   silicone foam dressing in place   transparent dressing maintained     Problem: Fall Injury Risk  Goal: Absence of Fall and Fall-Related Injury  Outcome: Progressing  Intervention: Promote Injury-Free Environment  Recent Flowsheet Documentation  Taken 5/22/2025 0244 by Cony Gipson, RN  Safety Promotion/Fall Prevention:   safety round/check completed   room organization consistent   nonskid shoes/slippers when out of bed   fall prevention program maintained   clutter free environment maintained   assistive device/personal items within reach   activity supervised  Taken 5/22/2025 0045 by Cony Gipson RN  Safety Promotion/Fall Prevention:   safety round/check completed   room organization consistent   nonskid shoes/slippers when out of bed   gait belt   fall  prevention program maintained   clutter free environment maintained   assistive device/personal items within reach   activity supervised  Taken 5/21/2025 2230 by Cony Gipson, RN  Safety Promotion/Fall Prevention:   safety round/check completed   room organization consistent   nonskid shoes/slippers when out of bed   fall prevention program maintained   clutter free environment maintained   assistive device/personal items within reach   activity supervised  Taken 5/21/2025 2032 by Cony Gipson, RN  Safety Promotion/Fall Prevention:   safety round/check completed   room organization consistent   nonskid shoes/slippers when out of bed   fall prevention program maintained   clutter free environment maintained   assistive device/personal items within reach   activity supervised   Goal Outcome Evaluation:

## 2025-05-22 NOTE — PROGRESS NOTES
Malnutrition Severity Assessment    Patient Name:  Bibiana Hodges  YOB: 1935  MRN: [id number]  Admit Date:  5/19/2025    Patient meets criteria for : Severe Malnutrition (Chronic severe malnutrition: severe muscle wasting and severe fat loss)    Malnutrition Severity Assessment  Malnutrition Type: Chronic Disease - Related Malnutrition  Malnutrition Type (Last 8 Hours)       Malnutrition Severity Assessment       Row Name 05/22/25 1504       Malnutrition Severity Assessment    Malnutrition Type Chronic Disease - Related Malnutrition      Row Name 05/22/25 1504       Muscle Loss    Loss of Muscle Mass Findings Severe    [place] Region Severe - deep hollowing/scooping, lack of muscle to touch, facial bones well defined    Clavicle Bone Region Severe - protruding prominent bone    Acromion Bone Region Severe - squared shoulders, bones, and acromion process protrusion prominent    Dorsal Hand Region Severe - prominent depression    Patellar Region Severe - prominent bone, square looking, very little muscle definition    Anterior Thigh Region Severe - line/depression along thigh, obviously thin    Posterior Calf Region Severe - thin with very little definition/firmness      Row Name 05/22/25 1504       Fat Loss    Subcutaneous Fat Loss Findings Severe    Orbital Region  Severe - pronounced hollowness/depression, dark circles, loose saggy skin    Upper Arm Region Severe - mostly skin, very little space between folds, fingers touch      Row Name 05/22/25 1505       Criteria Met (Must meet criteria for severity in at least 2 of these categories: M Wasting, Fat Loss, Fluid, Secondary Signs, Wt. Status, Intake)    Patient meets criteria for  Severe Malnutrition  Chronic severe malnutrition: severe muscle wasting and severe fat loss                    Electronically signed by:  Brenda Mancilla RD  05/22/25 15:06 EDT

## 2025-05-23 ENCOUNTER — APPOINTMENT (OUTPATIENT)
Dept: GENERAL RADIOLOGY | Facility: HOSPITAL | Age: OVER 89
End: 2025-05-23
Payer: MEDICARE

## 2025-05-23 PROCEDURE — 94799 UNLISTED PULMONARY SVC/PX: CPT

## 2025-05-23 PROCEDURE — 99232 SBSQ HOSP IP/OBS MODERATE 35: CPT | Performed by: INTERNAL MEDICINE

## 2025-05-23 PROCEDURE — 92610 EVALUATE SWALLOWING FUNCTION: CPT

## 2025-05-23 PROCEDURE — 94664 DEMO&/EVAL PT USE INHALER: CPT

## 2025-05-23 PROCEDURE — 25010000002 CEFTRIAXONE PER 250 MG: Performed by: INTERNAL MEDICINE

## 2025-05-23 PROCEDURE — 92611 MOTION FLUOROSCOPY/SWALLOW: CPT

## 2025-05-23 PROCEDURE — 74230 X-RAY XM SWLNG FUNCJ C+: CPT

## 2025-05-23 PROCEDURE — 63710000001 PREDNISONE PER 1 MG: Performed by: INTERNAL MEDICINE

## 2025-05-23 RX ADMIN — IPRATROPIUM BROMIDE AND ALBUTEROL SULFATE 3 ML: 2.5; .5 SOLUTION RESPIRATORY (INHALATION) at 16:17

## 2025-05-23 RX ADMIN — Medication 12.5 MG: at 22:29

## 2025-05-23 RX ADMIN — LEVETIRACETAM 250 MG: 250 TABLET, FILM COATED ORAL at 22:30

## 2025-05-23 RX ADMIN — TRAZODONE HYDROCHLORIDE 100 MG: 100 TABLET ORAL at 22:29

## 2025-05-23 RX ADMIN — LISINOPRIL 10 MG: 10 TABLET ORAL at 08:45

## 2025-05-23 RX ADMIN — AMIODARONE HYDROCHLORIDE 200 MG: 200 TABLET ORAL at 08:45

## 2025-05-23 RX ADMIN — ACETAMINOPHEN 650 MG: 325 TABLET, FILM COATED ORAL at 08:45

## 2025-05-23 RX ADMIN — IPRATROPIUM BROMIDE AND ALBUTEROL SULFATE 3 ML: 2.5; .5 SOLUTION RESPIRATORY (INHALATION) at 09:17

## 2025-05-23 RX ADMIN — VENLAFAXINE HYDROCHLORIDE 75 MG: 75 CAPSULE, EXTENDED RELEASE ORAL at 08:45

## 2025-05-23 RX ADMIN — SODIUM CHLORIDE 1000 MG: 900 INJECTION INTRAVENOUS at 16:46

## 2025-05-23 RX ADMIN — IPRATROPIUM BROMIDE AND ALBUTEROL SULFATE 3 ML: 2.5; .5 SOLUTION RESPIRATORY (INHALATION) at 13:30

## 2025-05-23 RX ADMIN — LEVOTHYROXINE SODIUM 100 MCG: 0.1 TABLET ORAL at 05:39

## 2025-05-23 RX ADMIN — Medication 5 MG: at 22:30

## 2025-05-23 RX ADMIN — APIXABAN 2.5 MG: 2.5 TABLET, FILM COATED ORAL at 08:46

## 2025-05-23 RX ADMIN — IPRATROPIUM BROMIDE AND ALBUTEROL SULFATE 3 ML: 2.5; .5 SOLUTION RESPIRATORY (INHALATION) at 19:10

## 2025-05-23 RX ADMIN — LEVETIRACETAM 250 MG: 250 TABLET, FILM COATED ORAL at 08:45

## 2025-05-23 RX ADMIN — Medication 12.5 MG: at 08:45

## 2025-05-23 RX ADMIN — DOXYCYCLINE 100 MG: 100 CAPSULE ORAL at 08:46

## 2025-05-23 RX ADMIN — BARIUM SULFATE 50 ML: 400 SUSPENSION ORAL at 12:26

## 2025-05-23 RX ADMIN — OXYCODONE HYDROCHLORIDE AND ACETAMINOPHEN 500 MG: 500 TABLET ORAL at 08:46

## 2025-05-23 RX ADMIN — PREDNISONE 40 MG: 20 TABLET ORAL at 08:45

## 2025-05-23 RX ADMIN — ROSUVASTATIN 10 MG: 10 TABLET, FILM COATED ORAL at 22:30

## 2025-05-23 RX ADMIN — DOXYCYCLINE 100 MG: 100 CAPSULE ORAL at 22:29

## 2025-05-23 RX ADMIN — STANDARDIZED SENNA CONCENTRATE 2 TABLET: 8.6 TABLET ORAL at 08:45

## 2025-05-23 RX ADMIN — APIXABAN 2.5 MG: 2.5 TABLET, FILM COATED ORAL at 22:30

## 2025-05-23 RX ADMIN — BARIUM SULFATE 20 ML: 400 PASTE ORAL at 12:26

## 2025-05-23 RX ADMIN — BARIUM SULFATE 100 ML: 0.81 POWDER, FOR SUSPENSION ORAL at 12:26

## 2025-05-23 RX ADMIN — BARIUM SULFATE 10 ML: 400 SUSPENSION ORAL at 12:26

## 2025-05-23 RX ADMIN — ACETAMINOPHEN 650 MG: 325 TABLET, FILM COATED ORAL at 22:49

## 2025-05-23 NOTE — PROGRESS NOTES
INFECTIOUS DISEASE CONSULT/INITIAL HOSPITAL VISIT    Bibiana Hodges  1935  [id number]    Date of Consult: 5/23/2025    Admission Date: 5/19/2025      Requesting Provider: ANASTASIA Shen  Evaluating Physician: Xavi Ivey MD    Reason for Consultation: pseudomonas UTI    History of present illness:    Patient is a 89 y.o. female  with atrial fibrillation, hypertension, hyperlipidemia, GERD, and B12 deficiency resided at [facility] has been evaluated in [facility] ED for  respiratory distress.    Had hypoxia of 83%, aspiration suspected, patient given antibiotics and steroids.  Patient's hypoxia has resolved.    Despite not having burning on urination the [facility] ED check urinalysis which had 6-0 rbc, tntc wbc, and 3-6 squamous cells.    Pseudmonas was reporte don urine culture at 50,000 CFU.      Patient denies dysuria, currently is using a pure wick.    WE are consulted regarding an incidental finding on a urine culture that did not contribute to patient's reason for admission to hospital.      PAtient reports chronic leg weakness.    5/23/25; doing well; no events overnight    Past Medical History:   Diagnosis Date    Constipation     Depression     Dysautonomia orthostatic hypotension syndrome     Generalized osteoarthritis     GERD (gastroesophageal reflux disease)     s/p Nissen    Hypertension     Insomnia     Osteoarthrosis, shoulder region     Skin ulcer of scalp     Thrombophlebitis of arm     Tremor     Vitamin B12 deficiency        Past Surgical History:   Procedure Laterality Date    DILATATION AND CURETTAGE      HERNIA REPAIR      HIP SURGERY      SHOULDER SURGERY      Right       Family History   Problem Relation Age of Onset    Cancer Mother         Pancreatic    Stroke Brother     Anorexia nervosa Daughter        Social History     Socioeconomic History    Marital status:    Tobacco Use    Smoking status: Never    Smokeless tobacco: Never   Vaping Use    Vaping  status: Never Used   Substance and Sexual Activity    Alcohol use: No     Comment: stopped drinking alcohol    Drug use: No    Sexual activity: Defer       Allergies   Allergen Reactions    Sulfa Antibiotics Hives    Gabapentin          Medication:    Current Facility-Administered Medications:     acetaminophen (TYLENOL) tablet 650 mg, 650 mg, Oral, Q8H PRN, Aileen Doherty MD, 650 mg at 05/23/25 0845    amiodarone (PACERONE) tablet 200 mg, 200 mg, Oral, Daily, Aileen Doherty MD, 200 mg at 05/23/25 0845    apixaban (ELIQUIS) tablet 2.5 mg, 2.5 mg, Oral, BID, Aileen Doherty MD, 2.5 mg at 05/23/25 0846    ascorbic acid (VITAMIN C) tablet 500 mg, 500 mg, Oral, Daily, Aileen Doherty MD, 500 mg at 05/23/25 0846    bisacodyl (DULCOLAX) suppository 10 mg, 10 mg, Rectal, Once, Aileen Doherty MD    cefTRIAXone (ROCEPHIN) 1,000 mg in sodium chloride 0.9 % 100 mL MBP, 1,000 mg, Intravenous, Q24H, Sarwat Kimbrough MD, Last Rate: 200 mL/hr at 05/22/25 1554, 1,000 mg at 05/22/25 1554    doxycycline (MONODOX) capsule 100 mg, 100 mg, Oral, Q12H, Aileen Doherty MD, 100 mg at 05/23/25 0846    ipratropium-albuterol (DUO-NEB) nebulizer solution 3 mL, 3 mL, Nebulization, 4x Daily - RT, Aileen Doherty MD, 3 mL at 05/23/25 1617    lactulose (CHRONULAC) 10 GM/15ML solution 20 g, 20 g, Oral, BID, Sarwat Kimbrough MD    levETIRAcetam (KEPPRA) tablet 250 mg, 250 mg, Oral, BID, Aileen Doherty MD, 250 mg at 05/23/25 0845    levothyroxine (SYNTHROID, LEVOTHROID) tablet 100 mcg, 100 mcg, Oral, Daily, Aileen Doherty MD, 100 mcg at 05/23/25 0539    lisinopril (PRINIVIL,ZESTRIL) tablet 10 mg, 10 mg, Oral, Q24H, Aileen Doherty MD, 10 mg at 05/23/25 0845    melatonin tablet 5 mg, 5 mg, Oral, Nightly, Aileen Doherty MD, 5 mg at 05/22/25 2017    metoprolol tartrate (LOPRESSOR) half tablet 12.5 mg, 12.5 mg, Oral, BID, Aileen Doherty MD, 12.5 mg at 05/23/25 0845    polyethylene glycol (MIRALAX) packet 17 g, 17 g, Oral,  Daily, Aileen Doherty MD, 17 g at 05/22/25 1031    predniSONE (DELTASONE) tablet 40 mg, 40 mg, Oral, Daily With Breakfast, Aileen Doherty MD, 40 mg at 05/23/25 0845    rosuvastatin (CRESTOR) tablet 10 mg, 10 mg, Oral, Nightly, Aileen Doherty MD, 10 mg at 05/22/25 2017    senna (SENOKOT) tablet 2 tablet, 2 tablet, Oral, Daily, Aileen Doherty MD, 2 tablet at 05/23/25 0845    traZODone (DESYREL) tablet 100 mg, 100 mg, Oral, Nightly, Aileen Doherty MD, 100 mg at 05/22/25 2017    venlafaxine XR (EFFEXOR-XR) 24 hr capsule 75 mg, 75 mg, Oral, Daily, Aileen Doherty MD, 75 mg at 05/23/25 0845    Antibiotics:  Anti-Infectives (From admission, onward)      Ordered     Dose/Rate Route Frequency Start Stop    05/22/25 0908  piperacillin-tazobactam (ZOSYN) 3.375 g IVPB in 100 mL NS MBP (CD)  Status:  Discontinued        Ordering Provider: Sarwat Kimbrough MD    3.375 g  over 4 Hours Intravenous Every 8 Hours 05/22/25 1700 05/22/25 1357    05/22/25 1357  cefTRIAXone (ROCEPHIN) 1,000 mg in sodium chloride 0.9 % 100 mL MBP        Ordering Provider: Sarwat Kimbrough MD    1,000 mg  200 mL/hr over 30 Minutes Intravenous Every 24 Hours 05/22/25 1500 05/27/25 1459    05/22/25 0908  piperacillin-tazobactam (ZOSYN) 3.375 g IVPB in 100 mL NS MBP (CD)        Ordering Provider: Sarwat Kimbrough MD    3.375 g  over 30 Minutes Intravenous Once 05/22/25 1000 05/22/25 1103    05/19/25 2338  cefTRIAXone (ROCEPHIN) 1,000 mg in sodium chloride 0.9 % 100 mL MBP  Status:  Discontinued        Ordering Provider: Sarwat Kimbrough MD    1,000 mg  200 mL/hr over 30 Minutes Intravenous Every 24 Hours 05/20/25 0900 05/22/25 0906    05/19/25 2338  doxycycline (MONODOX) capsule 100 mg        Ordering Provider: Aileen Doherty MD    100 mg Oral Every 12 Hours Scheduled 05/20/25 0900 05/25/25 0859    05/19/25 1510  azithromycin (ZITHROMAX) 500 mg in sodium chloride 0.9 % 250 mL IVPB-VTB        Ordering Provider: Efrain Fraga MD     500 mg  over 60 Minutes Intravenous Once 25 1526 25 1654    25 1510  cefTRIAXone (ROCEPHIN) 2 g in sodium chloride 0.9 % 100 mL MBP        Ordering Provider: Efrain Fraga MD    2 g  200 mL/hr over 30 Minutes Intravenous Once 25 1526 25 1807              Review of Systems:  See hpi    Physical Exam:   Vital Signs  Temp (24hrs), Av.8 °F (36.6 °C), Min:97.7 °F (36.5 °C), Max:97.8 °F (36.6 °C)    Temp  Min: 97.7 °F (36.5 °C)  Max: 97.8 °F (36.6 °C)  BP  Min: 110/86  Max: 144/97  Pulse  Min: 60  Max: 62  Resp  Min: 16  Max: 18  SpO2  Min: 95 %  Max: 95 %    GENERAL: Awake and alert, in no acute distress.   HEENT: Normocephalic, atraumatic.  PERRL. EOMI. No conjunctival injection. No icterus.no ext oral lesions    HEART: RRR; No murmur,  LUNGS: Clear to auscultation bilaterally   ABDOMEN: Soft, nontender, nondistended.   EXT:  No edema  :  Without George catheter.  MSK: No joint effusions or erythema  SKIN: Warm and dry without cutaneous eruptions on Inspection/palpation.    NEURO: nonfocal deficit    Laboratory Data    Results from last 7 days   Lab Units 25  1348   WBC 10*3/mm3 13.43*   HEMOGLOBIN g/dL 13.4   HEMATOCRIT % 42.2   PLATELETS 10*3/mm3 215     Results from last 7 days   Lab Units 25  1348   SODIUM mmol/L 134*   POTASSIUM mmol/L 4.3   CHLORIDE mmol/L 95*   CO2 mmol/L 23.0   BUN mg/dL 18   CREATININE mg/dL 1.30*   GLUCOSE mg/dL 151*   CALCIUM mg/dL 8.9     Results from last 7 days   Lab Units 25  1348   ALK PHOS U/L 74   BILIRUBIN mg/dL 0.3   ALT (SGPT) U/L 28   AST (SGOT) U/L 43*             Results from last 7 days   Lab Units 25  0004   LACTATE mmol/L 1.3     Results from last 7 days   Lab Units 25  1701   CK TOTAL U/L 53         Estimated Creatinine Clearance: 27.7 mL/min (A) (by C-G formula based on SCr of 1.3 mg/dL (H)).      Microbiology:  Blood Culture   Date Value Ref Range Status   2025 No growth at 3 days  Preliminary  "    No results found for: \"BCIDPCR\", \"CXREFLEX\", \"CSFCX\", \"CULTURETIS\"  No results found for: \"CULTURES\", \"HSVCX\", \"URCX\"  No results found for: \"EYECULTURE\", \"GCCX\", \"HSVCULTURE\", \"LABHSV\"  No results found for: \"LEGIONELLA\", \"MRSACX\", \"MUMPSCX\", \"MYCOPLASCX\"  No results found for: \"NOCARDIACX\", \"STOOLCX\"  Urine Culture   Date Value Ref Range Status   05/19/2025 50,000 CFU/mL Pseudomonas aeruginosa (A)  Final     No results found for: \"VIRALCULTU\", \"WOUNDCX\"        Radiology:  Imaging Results (Last 72 Hours)       Procedure Component Value Units Date/Time    FL Video Swallow With Speech Single Contrast [[id number]] Resulted: 05/23/25 1213     Updated: 05/23/25 1226    MRI Brain Without Contrast [[id number]] Collected: 05/21/25 1523     Updated: 05/21/25 1529    Narrative:      MRI BRAIN WO CONTRAST    Date of Exam: 5/21/2025 2:45 PM EDT    Indication: Double vision.     Comparison: None available.    Technique:  Routine multiplanar/multisequence sequence images of the brain were obtained without contrast administration.    FINDINGS:  Foci of T2/FLAIR signal hyperintensity are seen within the bilateral hemispheric white matter and within the sara. There is cortical atrophy with prominent sulcation and ventriculomegaly. Midline structures appear unremarkable. No significant mass   effect, intracranial hemorrhage, or hydrocephalus is identified. Diffusion-weighted sequences demonstrate no acute infarct.The visualized intracranial flow-voids appear unremarkable. Suggestion of mild right mastoid effusion. Bilateral lens prostheses   noted. The visualized superficial soft tissues and cervical spine demonstrate no significant abnormality.      Impression:      1.Findings compatible with chronic microvascular ischemic change and diffuse cortical atrophy.  2.No diffusion restriction is identified to suggest acute infarct.  3.Possible mild right mastoid effusion.      Electronically Signed: Slim Mauro MD    5/21/2025 3:26 " PM EDT    Workstation ID: [id number]    XR Abdomen KUB [[id number]] Collected: 05/21/25 0933     Updated: 05/21/25 0940    Narrative:      XR ABDOMEN KUB    Date of Exam: 5/21/2025 7:24 AM EDT    Indication: constipation    Comparison: No recent KUB. 8/23/2023 abdomen pelvis CT scan    Findings:  Extensive stool shadow is seen from the level of the ascending colon to the distal descending colon, but with practically no visible stool shadow in the sigmoid or rectum. No abnormally dilated small bowel loops are seen. Transverse colon is considered   in the upper range of normal diameter, between 6 and 7 cm. There was similar degree of fecal stasis on the 8/23/2023 abdominal CT scan. No bowel wall edema or pneumatosis is seen. Incidental note is made of the patient's dextroconvex lumbar scoliosis,   and anatomically aligned bilateral hip prostheses. .      Impression:      Impression:  Moderate fecal stasis to the level of the distal descending colon. No obvious impaction.      Electronically Signed: Deon Vera MD    5/21/2025 9:37 AM EDT    Workstation ID: [id number]              Impression:   Lactic acidosis  Pyuria  Probable asymptomatic bacturia with pseudomonas colonization  Resolving respiratory failure with hypoxia  COPD    PLAN/RECOMMENDATIONS:   Thank you for asking us to see Bibiana Hodges, I recommend the following:  I have independently reviewed the ct chest and do not see any substantial infiltrate       Patient has been improving without any antibiotics targeting pseudomonas.  (First dose Zosyn was started today)    The urine culture is highly likely colonization and I would not treat this currently.    Continue treatment for COPD exacerbation.    Cont ctx/doxy x 3-5 days     I spent time discussing that urinary colonization is different than a bacterial infection and that the Pseudomonas in the urine is likely not causing active infection and I would postpone treatment at this time    Patient mostly  concerned about weakness of her legs.    I suspect the patient may have spinal stenosis but will defer to the hospitalist if they want to do workup with radiography or other diagnostic workup such as EMG or nerve conduction studies    I would not repeat urine cultures unless patient had overt symptoms of urinary tract infection    No need for isolation    This visit included the following complex service elements:  Complex medical decision-making associated with antimicrobial prescribing.  Managed infection treatment protocol associated with transitions of care for this complex patient.        Xavi Ivey MD  5/23/2025  16:41 EDT

## 2025-05-23 NOTE — PLAN OF CARE
Goal Outcome Evaluation:                   Anticipated Discharge Disposition (SLP): assisted living facility ([facility]), home with home health          SLP Swallowing Diagnosis: moderate, oral dysphagia, pharyngeal dysphagia (05/23/25 1750)

## 2025-05-23 NOTE — PROGRESS NOTES
[facility] Medicine Services  PROGRESS NOTE    Patient Name: Bibiana Hodges  : 1935  MRN: [id number]    Date of Admission: 2025  Primary Care Physician: Nivia Madrigal MD    Subjective   Subjective     CC:  SOB    HPI:  Resting in bed in no acute distress and overall feels okay.  Breathing much improved and I think she is at baseline.  However, complains of severe weakness.  Also complains of occasional pain of lower extremity and numbness of her toes.  No fever or chills.  No chest pain or palpitation.  No nausea vomiting or diarrhea.      Objective   Objective     Vital Signs:   Temp:  [97.7 °F (36.5 °C)-97.8 °F (36.6 °C)] 97.7 °F (36.5 °C)  Heart Rate:  [60-62] 61  Resp:  [16-18] 16  BP: (110-144)/(85-97) 112/85     Physical Exam:  Constitutional: No acute distress, awake, alert  HENT: NCAT, mucous membranes moist  Respiratory: Clear to auscultation bilaterally, respiratory effort normal, currently on 4 L of nasal cannula and doing above 98%.  Cardiovascular: RRR, no murmurs, rubs, or gallops  Gastrointestinal: Positive bowel sounds, soft, nontender, nondistended  Musculoskeletal: Trace edema of feet bilaterally, low muscle mass  Psychiatric: Appropriate affect, cooperative  Neurologic: Oriented x 3, speech clear, coarse tremors of right upper limb  Skin: No rashes     Results Reviewed:  LAB RESULTS:      Lab 25  0004 25  2039 25  1734 25  1547 25  1348   WBC  --   --   --   --  13.43*   HEMOGLOBIN  --   --   --   --  13.4   HEMATOCRIT  --   --   --   --  42.2   PLATELETS  --   --   --   --  215   NEUTROS ABS  --   --   --   --  12.37*   IMMATURE GRANS (ABS)  --   --   --   --  0.06*   LYMPHS ABS  --   --   --   --  0.36*   MONOS ABS  --   --   --   --  0.62   EOS ABS  --   --   --   --  0.00   MCV  --   --   --   --  103.9*   PROCALCITONIN  --   --   --   --  0.09   LACTATE 1.3 3.1* 2.3*  --  5.5*   HSTROP T  --   --   --  98* 70*          Lab 05/19/25  1348   SODIUM 134*   POTASSIUM 4.3   CHLORIDE 95*   CO2 23.0   ANION GAP 16.0*   BUN 18   CREATININE 1.30*   EGFR 39.4*   GLUCOSE 151*   CALCIUM 8.9         Lab 05/19/25  1348   TOTAL PROTEIN 6.0   ALBUMIN 3.2*   GLOBULIN 2.8   ALT (SGPT) 28   AST (SGOT) 43*   BILIRUBIN 0.3   ALK PHOS 74         Lab 05/19/25  1547 05/19/25  1348   PROBNP  --  1,247.0   HSTROP T 98* 70*                 Brief Urine Lab Results  (Last result in the past 365 days)        Color   Clarity   Blood   Leuk Est   Nitrite   Protein   CREAT   Urine HCG        05/19/25 1428 Yellow   Turbid   Moderate (2+)   Large (3+)   Negative   100 mg/dL (2+)                   Microbiology Results Abnormal       Procedure Component Value - Date/Time    Urine Culture - Urine, Urine, Catheter In/Out [[id number]]  (Abnormal)  (Susceptibility) Collected: 05/19/25 1428    Lab Status: Final result Specimen: Urine, Catheter In/Out Updated: 05/21/25 1124     Urine Culture 50,000 CFU/mL Pseudomonas aeruginosa    Narrative:      Colonization of the urinary tract without infection is common. Treatment is discouraged unless the patient is symptomatic, pregnant, or undergoing an invasive urologic procedure.    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    Incidentally noted occluded mid SFA with reconstitution of flow by collaterals in the distal segment.  Monophasic segments distal to the occlusion.       Results for orders placed during the hospital encounter of 08/20/23    Adult Transthoracic Echo Complete W/ Cont if Necessary Per Protocol    Interpretation Summary    Left ventricular systolic function is mildly decreased. Calculated left ventricular EF = 47.9% Normal left ventricular cavity size and wall thickness noted. There is left ventricular global hypokinesis noted. Left ventricular diastolic function is consistent with (grade I) impaired relaxation.    : Normal right ventricular cavity size and systolic function noted. Electronic lead present in the ventricle.    Normal left atrial size and volume noted. Saline test results are negative.    There is moderate calcification of the aortic valve. The aortic valve was poorly visualized but appears trileaflet. Moderate aortic valve regurgitation is present. No aortic valve stenosis is present.    Mild mitral annular calcification is present. Mild mitral valve regurgitation is present. No significant mitral valve stenosis is present.    The tricuspid valve is grossly normal in structure. Mild tricuspid valve regurgitation is present. Estimated right ventricular systolic pressure from tricuspid regurgitation is normal, 33 mmHg. No tricuspid valve stenosis is present.    Mild dilation of the ascending aorta is present. Ascending aorta = 3.7 cm    There is a small (<1cm) pericardial effusion adjacent to the right atrium and right ventricle.      Current medications:  Scheduled Meds:amiodarone, 200 mg, Oral, Daily  apixaban, 2.5 mg, Oral, BID  ascorbic acid, 500 mg, Oral, Daily  bisacodyl, 10 mg, Rectal, Once  cefTRIAXone, 1,000 mg, Intravenous, Q24H  doxycycline, 100 mg, Oral, Q12H  ipratropium-albuterol, 3 mL, Nebulization, 4x Daily - RT  lactulose, 20 g, Oral, BID  levETIRAcetam, 250 mg, Oral, BID  levothyroxine,  100 mcg, Oral, Daily  lisinopril, 10 mg, Oral, Q24H  melatonin, 5 mg, Oral, Nightly  metoprolol tartrate, 12.5 mg, Oral, BID  polyethylene glycol, 17 g, Oral, Daily  predniSONE, 40 mg, Oral, Daily With Breakfast  rosuvastatin, 10 mg, Oral, Nightly  senna, 2 tablet, Oral, Daily  traZODone, 100 mg, Oral, Nightly  venlafaxine XR, 75 mg, Oral, Daily      Continuous Infusions:   PRN Meds:.  acetaminophen    Assessment & Plan   Assessment & Plan     Active Hospital Problems    Diagnosis  POA    **Pneumonia [J18.9]  Yes    Hyperlipidemia [E78.5]  Yes    Autonomic dysfunction [G90.9]  Yes    Atrial fibrillation [I48.91]  Yes    Hypertension [I10]  Yes      Resolved Hospital Problems   No resolved problems to display.        Brief Hospital Course to date:  Bibiana Hodges is a 89 y.o. female with past medical history significant for atrial fibrillation, hypertension, hyperlipidemia, GERD, B12 deficiency.  She is living at an assisted living facility.  Patient was brought by ambulance for respiratory distress.    Pneumonia:-- possibly aspiration  - CT angiogram showed evidence of right lower lobe pneumonia  - Respiration has improved significantly.  Will discontinue Zosyn and start Rocephin.  - Patient has history of dysphagia and dysphagia diet was recommended.  However, patient is not compliant with it and cannot take thickened liquids.    Dysphagia  - Patient had swallow evaluation again today and demonstrated moderate oral and pharyngeal dysphagia.  Recommended diet is mechanical ground texture, honey thick liquid, no mixed consistencies.  - Had a long conversation with the patient and his younger son was also present at the bedside.  Patient likes the safety of dysphagia diet but she cannot tolerate it.  We will see, most likely she will choose comfort diet.    Lower extremity pain occasionally  Occasional numbness of toes bilaterally  - Arterial duplex of lower extremities was done.  On the right lower extremity this  study suggest greater than 60% stenosis in the popliteal arteries.  Also right PTA is occluded distally.  On the left side mid SFA appears occlusive.  Left distal SFA appears to have reconstitution.  Also left popliteal artery, distal PTA, and peroneal artery appear monophasic.  - Patient has dopplerable dorsalis pedis and posterior tibialis bilaterally  - Consider consulting vascular surgery.  However, given patient's overall condition and the fact that still has dopplerable pulses  benefit of intervention is questionable.    Abnormal troponin levels:  - Troponin levels were abnormal but no chest pain    Tremor:  - Reports significant tremors, especially in right hand, which is chronic since many years ago  - neurology has seen and evaluated the patient    Double vision:  - Reports having double vision for 2 years  - Neurology has seen and evaluated patient  - MRI of the brain does not show any acute process.  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      Constipation:  - Reports not having a bowel movement in 2 weeks  - Bowel regimen was initiated.   - KUB did not show any sign of obstruction    COPD with acute hypoxic respiratory failure:  - Presented with room air saturation of 83% and required 6 L of oxygen  - Continue monitoring and adjust oxygen as needed    Full code status:  - Expressed desire to remain full code         Expected Discharge Location and Transportation: To be determined  Expected Discharge to be determined  Expected Discharge Date: 5/22/2025; Expected Discharge Time:      VTE Prophylaxis:  Pharmacologic VTE prophylaxis orders are present.         AM-PAC 6 Clicks Score (PT): 22 (05/23/25 4412)    CODE STATUS:   Code Status and Medical Interventions: CPR (Attempt to Resuscitate); Full Support   Ordered at: 05/19/25 4326     Code Status (Patient has no pulse and is not breathing):    CPR (Attempt to Resuscitate)     Medical Interventions (Patient has pulse or is breathing):    Full  Support       Sarwat Kimbrough MD  05/23/25

## 2025-05-23 NOTE — PLAN OF CARE
Goal Outcome Evaluation:                   Anticipated Discharge Disposition (SLP): skilled nursing facility          SLP Swallowing Diagnosis: suspected pharyngeal dysphagia (05/23/25 9060)

## 2025-05-23 NOTE — CASE MANAGEMENT/SOCIAL WORK
Continued Stay Note  [facility]     Patient Name: Bibiana Hodges  MRN: [id number]  Today's Date: 5/23/2025    Admit Date: 5/19/2025    Plan: home   Discharge Plan       Row Name 05/23/25 1112       Plan    Plan home    Patient/Family in Agreement with Plan yes    Plan Comments  spoke to /Admissions regarding Ms. Hodges. She reported that patient transfers from bed to chair but does not do much else at baseline. She reported that patient gets assistance from facility as needed. She reported patient can come back without an evaluation but reported they are short staffed because of the holiday and requested patient return Tuesday of next week. Per MDR, patient's daughter is planning to come to the hospital in the next day or two to check on her.  will continue to follow plan of care and assist with discharge planning needs as indicated.    Final Discharge Disposition Code 01 - home or self-care                   Discharge Codes    No documentation.                 Expected Discharge Date and Time       Expected Discharge Date Expected Discharge Time    May 22, 2025               Erinn Pablo RN

## 2025-05-23 NOTE — MBS/VFSS/FEES
Acute Care - Speech Language Pathology   Swallow Initial Evaluation [facility]  Modified Barium Swallow Study (MBS)       Patient Name: Bibiana Hodges  : 1935  MRN: [id number]  Today's Date: 2025               Admit Date: 2025    Visit Dx:     ICD-10-CM ICD-9-CM   1. Acute respiratory failure with hypoxia  J96.01 518.81   2. Community acquired pneumonia, unspecified laterality  J18.9 486   3. Acute UTI  N39.0 599.0   4. Elevated troponin  R79.89 790.6   5. Lactic acidosis  E87.20 276.2   6. Leukocytosis, unspecified type  D72.829 288.60   7. Oropharyngeal dysphagia  R13.12 787.22     Patient Active Problem List   Diagnosis    Dysautonomia orthostatic hypotension syndrome    Hypertension    Severe malnutrition    Atrial fibrillation    Autonomic dysfunction    Hyperlipidemia    Pneumonia     Past Medical History:   Diagnosis Date    Constipation     Depression     Dysautonomia orthostatic hypotension syndrome     Generalized osteoarthritis     GERD (gastroesophageal reflux disease)     s/p Nissen    Hypertension     Insomnia     Osteoarthrosis, shoulder region     Skin ulcer of scalp     Thrombophlebitis of arm     Tremor     Vitamin B12 deficiency      Past Surgical History:   Procedure Laterality Date    DILATATION AND CURETTAGE      HERNIA REPAIR      HIP SURGERY      SHOULDER SURGERY      Right       SLP Recommendation and Plan  SLP Swallowing Diagnosis: moderate, oral dysphagia, pharyngeal dysphagia (25 1205)  SLP Diet Recommendation: mechanical ground textures, honey thick liquids, no mixed consistencies (25 1205)  Recommended Precautions and Strategies: general aspiration precautions, small bites of food and sips of liquid, no straw, other (see comments) (small/single sips, intermittent throat clear/extra swallow) (25 1205)  SLP Rec. for Method of Medication Administration: meds whole, meds crushed, with puree, as tolerated (25 1205)     Monitor for Signs of  Aspiration: yes, notify SLP if any concerns (05/23/25 1205)  Recommended Diagnostics: reassess via VFSS ([facility] [place] [facility] (05/23/25 1040)  Swallow Criteria for Skilled Therapeutic Interventions Met: demonstrates skilled criteria (05/23/25 1205)  Anticipated Discharge Disposition (SLP): assisted living facility (jail), home with home health (05/23/25 1205)  Rehab Potential/Prognosis, Swallowing: good, to achieve stated therapy goals (05/23/25 1205)  Therapy Frequency (Swallow): 5 days per week (05/23/25 1205)  Predicted Duration Therapy Intervention (Days): 1 week (05/23/25 1205)  Oral Care Recommendations: Oral Care BID/PRN, Suction toothbrush (05/23/25 1205)                                               SWALLOW EVALUATION (Last 72 Hours)       SLP Adult Swallow Evaluation       Row Name 05/23/25 1205 05/23/25 1040                Rehab Evaluation    Document Type evaluation  - evaluation  -       Subjective Information no complaints  - no complaints  -       Patient Observations alert;cooperative  - alert;cooperative  -       Patient/Family/Caregiver Comments/Observations No family present  - No family present  -       Patient Effort good  - good  -       Symptoms Noted During/After Treatment none  -MH none  -          General Information    Patient Profile Reviewed yes  - yes  -       Pertinent History Of Current Problem See initial eval, [facility] [place] [facility]  - Adm w/ respiratory distress. Hx: a-fib, HTN, HLD, GERD. CTA w/ centrilobular/tree-in-bud nodularity within R lower lobe  -       Current Method of Nutrition regular textures;thin liquids  - NPO  -       Precautions/Limitations, Vision WFL;for purposes of eval  - WFL;for purposes of eval  -       Precautions/Limitations, Hearing WFL;for purposes of eval  - WFL;for purposes of eval  -       Prior Level of Function-Communication unknown  - unknown  -       Prior Level of Function-Swallowing other (see comments)  Pt w/ hx  pharyngeal dysphagia. Most recent MBS completed 7/16/24 w/ recs for soft/chopped solid diet w/ NTL, no mixed, no straws. During last admission (Jan '25) pt opted for comfort diet & was d/c'd on reg/thins 2' aversion to NTL.`  - other (see comments)  Pt w/ hx pharyngeal dysphagia. Most recent MBS completed 7/16/24 w/ recs for soft/chopped solid diet w/ NTL, no mixed, no straws. During last admission (Jan '25) pt opted for comfort diet & was d/c'd on reg/thins 2' aversion to NTL.  -       Plans/Goals Discussed with patient;agreed upon  - patient;agreed upon  -       Barriers to Rehab previous functional deficit  - previous functional deficit  -       Patient's Goals for Discharge patient did not state  - patient did not state  -          Pain    Additional Documentation Pain Scale: FACES Pre/Post-Treatment (Group)  - Pain Scale: FACES Pre/Post-Treatment (Group)  -          Pain Scale: FACES Pre/Post-Treatment    Pain: FACES Scale, Pretreatment 0-->no hurt  - 0-->no hurt  -       Posttreatment Pain Rating 0-->no hurt  - 0-->no hurt  -          Oral Motor Structure and Function    Secretion Management -- WNL/WF  -       Mucosal Quality -- moist, healthy  -          Oral Musculature and Cranial Nerve Assessment    Oral Motor General Assessment -- WF  -          General Eating/Swallowing Observations    Respiratory Support Currently in Use -- room air  -       Eating/Swallowing Skills -- self-fed;fed by SLP  -       Positioning During Eating -- upright 90 degree;upright in bed  -       Utensils Used -- spoon;cup;straw  -       Consistencies Trialed -- thin liquids;ice chips;pureed;regular textures  -          Clinical Swallow Eval    Pharyngeal Phase -- suspected pharyngeal impairment  -       Clinical Swallow Evaluation Summary -- Delayed throat clear x1 w/ thins via large, sequential sips. No additional glaring s/s of aspiration accross trials of thin liquid, pureed or  "regular solid consistenices; even when pushed for consecutive sips of thin. Discussed aspiration risk given hx/concern for PNA, instrumental assessment & diet modification if indicated. Pt previously on comfort diet, however is requesting to proceed w/ instrumental assessment. Recommend regular diet w/ thin liquids, general aspiration precautions, small/single sips. [facility] this PM  -          Pharyngeal Phase Concerns    Pharyngeal Phase Concerns -- cough  -       Cough -- thin;other (see comments)  x1 via large, sequential sips  -          MBS/VFSS    Utensils Used cup;spoon;straw  - --       Consistencies Trialed thin liquids;nectar/syrup-thick liquids;honey-thick liquids;pudding thick;regular textures  - --          MBS/VFSS Interpretation    Oral Prep Phase impaired oral phase of swallowing  - --       Oral Transit Phase impaired  - --       Oral Residue impaired  - --       VFSS Summary Moderate oropharyngeal dysphagia. Prespill w/ thins & NTL to the pyriforms. Deep penetration w/ thins before and during the swallow, eventual silent aspiration after the swallow. Cued cough weak and ineffective in clearing. Diffuse residue w/ all consistencies assessed, compensations ineffective in reducing amount of residue. Aspiration of NTL/thin residue after the swallow. No obvious penetration/aspiration w/ small/single sips of HTL; however difficult to indefinetly differeniate b/w consistenices given remaining residue. Although no penetration/aspiration w/ HTL during study, pt felt to be @ general risk over the course of a meal. Discussed results/recommendations w/ pt. Pt would like to trial \"safest\" PO diet; mechanical ground solid diet w/ HTL, no mixed, no straw, intermittent throat clear/extra swallow, small/single bites & sips. MD aware  - --          Oral Preparatory Phase    Oral Preparatory Phase anterior loss;prolonged manipulation  - --       Anterior Loss thin liquids;secondary to impaired " cognitive status;secondary to reduced lingual range of motion;secondary to reduced lingual strength;secondary to reduced labial seal  - --       Prolonged Manipulation pudding/puree;regular textures;secondary to reduced lingual range of motion;secondary to reduced lingual strength;secondary to reduced labial seal  - --          Oral Transit Phase    Impaired Oral Transit Phase premature spillage of liquids into pharynx  - --       Premature Spillage of Liquids into Pharynx thin liquids;nectar-thick liquids  - --          Oral Residue    Impaired Oral Residue diffuse residue throughout oral cavity  - --       Diffuse Residue throughout Oral Cavity all consistencies tested;secondary to reduced lingual strength;secondary to reduced lingual range of motion  - --       Response to Oral Residue partial residue clearance;with spontaneous subsequent swallow;with cued swallow  - --          Initiation of Pharyngeal Swallow    Initiation of Pharyngeal Swallow bolus in pyriform sinuses  - --       Pharyngeal Phase impaired pharyngeal phase of swallowing  - --       Anatomical abnormalities noted osteophyte/bone spur per radiology report  - --       Penetration Before the Swallow thin liquids;secondary to delayed swallow initiation or mistiming;secondary to reduced back of tongue control  - --       Penetration During the Swallow thin liquids;secondary to reduced vestibular closure;secondary to reduced laryngeal elevation;secondary to delayed swallow initiation or mistiming  - --       Aspiration After the Swallow thin liquids;nectar-thick liquids;secondary to residue;in laryngeal vestibule;in pyriform sinuses;in valleculae  - --       Depth of Penetration deep  - --       Response to Penetration No  - --       No spontaneous response to penetration and non-effective laryngeal clearance with cue (see comments);other (see comments)  cued cough  - --       Response to Aspiration No  - --       No  spontaneous response to aspiration and non-effective subglottic clearance with cue (see comments);other (see comments)  cued cough  - --       Rosenbek's Scale thin:;nectar:;8--->level 8;honey:;pudding/puree:;regular textures:;1--->level 1  - --       Pharyngeal Residue all consistencies tested;diffuse within pharynx;secondary to reduced hyolaryngeal excursion;secondary to reduced posterior pharyngeal wall stripping;secondary to reduced base of tongue retraction;secondary to reduced laryngeal elevation  - --       Response to Residue partial residue clearance;cleared residue with spontaneous subsequent swallow;cleared residue with cued swallow  - --       Attempted Compensatory Maneuvers bolus size;bolus presentation style;additional subsequent swallow;chin tuck;effortful (hard swallow)  - --       Response to Attempted Compensatory Maneuvers did not prevent penetration;did not prevent aspiration  - --       Successful Compensatory Maneuver Competency patient able to;demonstrate compensations;with cues  - --          SLP Evaluation Clinical Impression    SLP Swallowing Diagnosis moderate;oral dysphagia;pharyngeal dysphagia  - suspected pharyngeal dysphagia  -       Functional Impact risk of aspiration/pneumonia  - risk of aspiration/pneumonia  -       Rehab Potential/Prognosis, Swallowing good, to achieve stated therapy goals  - good, to achieve stated therapy goals  -       Swallow Criteria for Skilled Therapeutic Interventions Met demonstrates skilled criteria  - demonstrates skilled criteria  -          Recommendations    Therapy Frequency (Swallow) 5 days per week  - --       Predicted Duration Therapy Intervention (Days) 1 week  - 1 week  -       SLP Diet Recommendation mechanical ground textures;honey thick liquids;no mixed consistencies  - regular textures;thin liquids  -       Recommended Diagnostics -- reassess via VFSS ([facility])  -       Recommended Precautions and  Strategies general aspiration precautions;small bites of food and sips of liquid;no straw;other (see comments)  small/single sips, intermittent throat clear/extra swallow  - general aspiration precautions;small bites of food and sips of liquid;other (see comments)  small/single sips  -       Oral Care Recommendations Oral Care BID/PRN;Suction toothbrush  - Oral Care BID/PRN;Toothbrush  -       SLP Rec. for Method of Medication Administration meds whole;meds crushed;with puree;as tolerated  - meds whole;with thin liquids;meds crushed;with puree;as tolerated  -       Monitor for Signs of Aspiration yes;notify SLP if any concerns  - yes;notify SLP if any concerns  -       Anticipated Discharge Disposition (SLP) assisted living facility ([facility]);home with home health  - assisted living facility (longterm);home with home health  -                 User Key  (r) = Recorded By, (t) = Taken By, (c) = Cosigned By      Initials Name Effective Dates     Kaylyn Ibrahim, MS [facility]-SLP 05/12/23 -                     EDUCATION  The patient has been educated in the following areas:   Dysphagia (Swallowing Impairment) Oral Care/Hydration Modified Diet Instruction.        SLP GOALS       Row Name 05/23/25 1205             (LTG) Patient will demonstrate functional swallow for    Diet Texture (Demonstrate functional swallow) regular textures  -      Liquid viscosity (Demonstrate functional swallow) thin liquids  -      [place] (Demonstrate functional swallow) with minimal cues (75-90% accuracy)  -      Time Frame (Demonstrate functional swallow) 1 week  -      Progress/Outcomes (Demonstrate functional swallow) new goal  -         (STG) Patient will tolerate trials of    Consistencies Trialed (Tolerate trials) mechanical ground textures;honey/ moderately thick liquids  -      Desired Outcome (Tolerate trials) without signs/symptoms of aspiration;with adequate oral prep/transit/clearance;with use of  compensatory strategies (see comments)  small/single cup sips, throat clear/extra swallow  -      [place] (Tolerate trials) with minimal cues (75-90% accuracy)  -      Time Frame (Tolerate trials) 1 week  -      Progress/Outcomes (Tolerate trials) new goal  -         (STG) Lingual Strengthening Goal 1 (SLP)    Activity (Lingual Strengthening Goal 1, SLP) increase lingual tone/sensation/control/coordination/movement;increase tongue back strength  -      Increase Lingual Tone/Sensation/Control/Coordination/Movement lingual resistance exercises;swallow trials  -      Increase Tongue Back Strength lingual resistance exercises  -      [place]/Accuracy (Lingual Strengthening Goal 1, SLP) with minimal cues (75-90% accuracy)  -      Time Frame (Lingual Strengthening Goal 1, SLP) 1 week  -      Progress/Outcomes (Lingual Strengthening Goal 1, SLP) new goal  -         ([facility]) Pharyngeal Strengthening Exercise Goal 1 (SLP)    Activity (Pharyngeal Strengthening Goal 1, SLP) increase timing;increase superior movement of the hyolaryngeal complex;increase anterior movement of the hyolaryngeal complex;increase closure at entrance to airway/closure of airway at glottis;increase squeeze/positive pressure generation  -      Increase Timing prepping - 3 second prep or suck swallow or 3-step swallow  -      Increase Superior Movement of the Hyolaryngeal Complex effortful pitch glide (falsetto + pharyngeal squeeze)  -      Increase Anterior Movement of the Hyolaryngeal Complex chin tuck against resistance (CTAR)  -      Increase Closure at Entrance to Airway/Closure of Airway at Glottis supraglottic swallow  -      Increase Squeeze/Positive Pressure Generation hard effortful swallow  -      [place]/Accuracy (Pharyngeal Strengthening Goal 1, SLP) with minimal cues (75-90% accuracy)  -      Time Frame (Pharyngeal Strengthening Goal 1, SLP) 1 week  -      Progress/Outcomes (Pharyngeal  Strengthening Goal 1, SLP) new goal  -                User Key  (r) = Recorded By, (t) = Taken By, (c) = Cosigned By      Initials Name Provider Type    Kaylyn Baires MS CCC-SLP Speech and Language Pathologist                         Time Calculation:    Time Calculation- SLP       Row Name 05/23/25 1413 05/23/25 1136          Time Calculation- SLP    SLP Start Time 1205  -MH 1040  -     SLP Received On 05/23/25  - 05/23/25  -        Untimed Charges    94088-WX Eval Oral Pharyng Swallow Minutes -- 60  -MH     71605-AC Motion Fluoro Eval Swallow Minutes 65  -MH --        Total Minutes    Untimed Charges Total Minutes 65  - 60  -MH      Total Minutes 65  -MH 60  -MH               User Key  (r) = Recorded By, (t) = Taken By, (c) = Cosigned By      Initials Name Provider Type    Kaylyn Baires MS CCC-SLP Speech and Language Pathologist                    Therapy Charges for Today       Code Description Service Date Service Provider Modifiers Qty    [id number] HC ST EVAL ORAL PHARYNG SWALLOW 4 5/23/2025 Kaylyn Ibrahim MS CCC-SLP GN 1    [id number] HC ST MOTION FLUORO EVAL SWALLOW 4 5/23/2025 Kaylyn Ibrahim, MS CERON-SLP GN 1                 MS CODY Tomas  5/23/2025

## 2025-05-23 NOTE — THERAPY EVALUATION
Acute Care - Speech Language Pathology   Swallow Initial Evaluation [facility]  Clinical Swallow Evaluation      Patient Name: Bibiana Hodges  : 1935  MRN: [id number]  Today's Date: 2025               Admit Date: 2025    Visit Dx:     ICD-10-CM ICD-9-CM   1. Acute respiratory failure with hypoxia  J96.01 518.81   2. Community acquired pneumonia, unspecified laterality  J18.9 486   3. Acute UTI  N39.0 599.0   4. Elevated troponin  R79.89 790.6   5. Lactic acidosis  E87.20 276.2   6. Leukocytosis, unspecified type  D72.829 288.60   7. Dysphagia, unspecified type  R13.10 787.20     Patient Active Problem List   Diagnosis    Dysautonomia orthostatic hypotension syndrome    Hypertension    Severe malnutrition    Atrial fibrillation    Autonomic dysfunction    Hyperlipidemia    Pneumonia     Past Medical History:   Diagnosis Date    Constipation     Depression     Dysautonomia orthostatic hypotension syndrome     Generalized osteoarthritis     GERD (gastroesophageal reflux disease)     s/p Nissen    Hypertension     Insomnia     Osteoarthrosis, shoulder region     Skin ulcer of scalp     Thrombophlebitis of arm     Tremor     Vitamin B12 deficiency      Past Surgical History:   Procedure Laterality Date    DILATATION AND CURETTAGE      HERNIA REPAIR      HIP SURGERY      SHOULDER SURGERY      Right       SLP Recommendation and Plan  SLP Swallowing Diagnosis: suspected pharyngeal dysphagia (25 1040)  SLP Diet Recommendation: regular textures, thin liquids (25 1040)  Recommended Precautions and Strategies: general aspiration precautions, small bites of food and sips of liquid, other (see comments) (small/single sips) (25 104)  SLP Rec. for Method of Medication Administration: meds whole, with thin liquids, meds crushed, with puree, as tolerated (25 1040)     Monitor for Signs of Aspiration: yes, notify SLP if any concerns (25 1040)  Recommended Diagnostics: reassess via  VFSS ([facility] [place]) (05/23/25 1040)  Swallow Criteria for Skilled Therapeutic Interventions Met: demonstrates skilled criteria (05/23/25 1040)  Anticipated Discharge Disposition (SLP): assisted living facility (detention), home with home health (05/23/25 1040)  Rehab Potential/Prognosis, Swallowing: good, to achieve stated therapy goals (05/23/25 1040)     Predicted Duration Therapy Intervention (Days): 1 week (05/23/25 1040)  Oral Care Recommendations: Oral Care BID/PRN, Toothbrush (05/23/25 1040)                                               SWALLOW EVALUATION (Last 72 Hours)       SLP Adult Swallow Evaluation       Row Name 05/23/25 1040                   Rehab Evaluation    Document Type evaluation  -        Subjective Information no complaints  -        Patient Observations alert;cooperative  -        Patient/Family/Caregiver Comments/Observations No family present  -        Patient Effort good  -        Symptoms Noted During/After Treatment none  -           General Information    Patient Profile Reviewed yes  -        Pertinent History Of Current Problem Adm w/ respiratory distress. Hx: a-fib, HTN, HLD, GERD. CTA w/ centrilobular/tree-in-bud nodularity within R lower lobe  -        Current Method of Nutrition NPO  -        Precautions/Limitations, Vision WFL;for purposes of eval  -        Precautions/Limitations, Hearing WFL;for purposes of eval  -        Prior Level of Function-Communication unknown  -        Prior Level of Function-Swallowing other (see comments)  Pt w/ hx pharyngeal dysphagia. Most recent MBS completed 7/16/24 w/ recs for soft/chopped solid diet w/ NTL, no mixed, no straws. During last admission (Jan '25) pt opted for comfort diet & was d/c'd on reg/thins 2' aversion to NTL.  -        Plans/Goals Discussed with patient;agreed upon  -        Barriers to Rehab previous functional deficit  -        Patient's Goals for Discharge patient did not state  -           Pain     Additional Documentation Pain Scale: FACES Pre/Post-Treatment (Group)  -           Pain Scale: FACES Pre/Post-Treatment    Pain: FACES Scale, Pretreatment 0-->no hurt  -        Posttreatment Pain Rating 0-->no hurt  -           Oral Motor Structure and Function    Secretion Management WNL/WFL  -        Mucosal Quality moist, healthy  -           Oral Musculature and Cranial Nerve Assessment    Oral Motor General Assessment WFL  -           General Eating/Swallowing Observations    Respiratory Support Currently in Use room air  -        Eating/Swallowing Skills self-fed;fed by SLP  -        Positioning During Eating upright 90 degree;upright in bed  -        Utensils Used spoon;cup;straw  -        Consistencies Trialed thin liquids;ice chips;pureed;regular textures  -           Clinical Swallow Eval    Pharyngeal Phase suspected pharyngeal impairment  -        Clinical Swallow Evaluation Summary Delayed throat clear x1 w/ thins via large, sequential sips. No additional glaring s/s of aspiration accross trials of thin liquid, pureed or regular solid consistenices; even when pushed for consecutive sips of thin. Discussed aspiration risk given hx/concern for PNA, instrumental assessment & diet modification if indicated. Pt previously on comfort diet, however is requesting to proceed w/ instrumental assessment. Recommend regular diet w/ thin liquids, general aspiration precautions, small/single sips. MBS this PM  -           Pharyngeal Phase Concerns    Pharyngeal Phase Concerns cough  -        Cough thin;other (see comments)  x1 via large, sequential sips  -           SLP Evaluation Clinical Impression    SLP Swallowing Diagnosis suspected pharyngeal dysphagia  -        Functional Impact risk of aspiration/pneumonia  -        Rehab Potential/Prognosis, Swallowing good, to achieve stated therapy goals  -        Swallow Criteria for Skilled Therapeutic Interventions Met demonstrates  skilled criteria  -           Recommendations    Predicted Duration Therapy Intervention (Days) 1 week  -        SLP Diet Recommendation regular textures;thin liquids  -        Recommended Diagnostics reassess via VFSS ([facility])  -        Recommended Precautions and Strategies general aspiration precautions;small bites of food and sips of liquid;other (see comments)  small/single sips  -        Oral Care Recommendations Oral Care BID/PRN;Toothbrush  -        SLP Rec. for Method of Medication Administration meds whole;with thin liquids;meds crushed;with puree;as tolerated  -        Monitor for Signs of Aspiration yes;notify SLP if any concerns  -        Anticipated Discharge Disposition (SLP) assisted living facility ([facility]);home with home health  -                  User Key  (r) = Recorded By, (t) = Taken By, (c) = Cosigned By      Initials Name Effective Dates     Kaylyn Ibrahim MS CCC-SLP 05/12/23 -                     EDUCATION  The patient has been educated in the following areas:   Dysphagia (Swallowing Impairment) Oral Care/Hydration.                Time Calculation:    Time Calculation- SLP       Row Name 05/23/25 1136             Time Calculation- [facility]    SLP Start Time 1040  -      SLP Received On 05/23/25  -         Untimed Charges    07861-RL Eval Oral Pharyng Swallow Minutes 60  -         Total Minutes    Untimed Charges Total Minutes 60  -       Total Minutes 60  -                User Key  (r) = Recorded By, (t) = Taken By, (c) = Cosigned By      Initials Name Provider Type     Kaylyn Ibrahim MS CCC-SLP Speech and Language Pathologist                    Therapy Charges for Today       Code Description Service Date Service Provider Modifiers Qty    [id number] HC ST EVAL ORAL PHARYNG SWALLOW 4 5/23/2025 Kaylyn Ibrahim MS CCC-SLP GN 1                 MS CODY Tomas  5/23/2025

## 2025-05-23 NOTE — PLAN OF CARE
Goal Outcome Evaluation:                   Anticipated Discharge Disposition (SLP): assisted living facility ([facility]), home with home health          SLP Swallowing Diagnosis: suspected pharyngeal dysphagia (05/23/25 1040)

## 2025-05-24 LAB
BACTERIA SPEC AEROBE CULT: NORMAL
BACTERIA SPEC AEROBE CULT: NORMAL

## 2025-05-24 PROCEDURE — 94799 UNLISTED PULMONARY SVC/PX: CPT

## 2025-05-24 PROCEDURE — 97535 SELF CARE MNGMENT TRAINING: CPT

## 2025-05-24 PROCEDURE — 63710000001 PREDNISONE PER 1 MG: Performed by: INTERNAL MEDICINE

## 2025-05-24 PROCEDURE — 99232 SBSQ HOSP IP/OBS MODERATE 35: CPT | Performed by: HOSPITALIST

## 2025-05-24 PROCEDURE — 94664 DEMO&/EVAL PT USE INHALER: CPT

## 2025-05-24 PROCEDURE — 97116 GAIT TRAINING THERAPY: CPT

## 2025-05-24 RX ADMIN — IPRATROPIUM BROMIDE AND ALBUTEROL SULFATE 3 ML: 2.5; .5 SOLUTION RESPIRATORY (INHALATION) at 20:26

## 2025-05-24 RX ADMIN — APIXABAN 2.5 MG: 2.5 TABLET, FILM COATED ORAL at 22:15

## 2025-05-24 RX ADMIN — LACTULOSE 20 G: 20 SOLUTION ORAL at 09:05

## 2025-05-24 RX ADMIN — LEVETIRACETAM 250 MG: 250 TABLET, FILM COATED ORAL at 09:05

## 2025-05-24 RX ADMIN — IPRATROPIUM BROMIDE AND ALBUTEROL SULFATE 3 ML: 2.5; .5 SOLUTION RESPIRATORY (INHALATION) at 12:48

## 2025-05-24 RX ADMIN — LEVETIRACETAM 250 MG: 250 TABLET, FILM COATED ORAL at 22:15

## 2025-05-24 RX ADMIN — LISINOPRIL 10 MG: 10 TABLET ORAL at 09:07

## 2025-05-24 RX ADMIN — IPRATROPIUM BROMIDE AND ALBUTEROL SULFATE 3 ML: 2.5; .5 SOLUTION RESPIRATORY (INHALATION) at 07:02

## 2025-05-24 RX ADMIN — POLYETHYLENE GLYCOL 3350 17 G: 17 POWDER, FOR SOLUTION ORAL at 09:15

## 2025-05-24 RX ADMIN — STANDARDIZED SENNA CONCENTRATE 2 TABLET: 8.6 TABLET ORAL at 09:06

## 2025-05-24 RX ADMIN — Medication 5 MG: at 22:16

## 2025-05-24 RX ADMIN — AMIODARONE HYDROCHLORIDE 200 MG: 200 TABLET ORAL at 09:06

## 2025-05-24 RX ADMIN — PREDNISONE 40 MG: 20 TABLET ORAL at 09:06

## 2025-05-24 RX ADMIN — Medication 12.5 MG: at 22:15

## 2025-05-24 RX ADMIN — ACETAMINOPHEN 650 MG: 325 TABLET, FILM COATED ORAL at 16:06

## 2025-05-24 RX ADMIN — TRAZODONE HYDROCHLORIDE 100 MG: 100 TABLET ORAL at 22:15

## 2025-05-24 RX ADMIN — LEVOTHYROXINE SODIUM 100 MCG: 0.1 TABLET ORAL at 05:23

## 2025-05-24 RX ADMIN — VENLAFAXINE HYDROCHLORIDE 75 MG: 75 CAPSULE, EXTENDED RELEASE ORAL at 09:05

## 2025-05-24 RX ADMIN — DOXYCYCLINE 100 MG: 100 CAPSULE ORAL at 09:07

## 2025-05-24 RX ADMIN — Medication 12.5 MG: at 09:07

## 2025-05-24 RX ADMIN — OXYCODONE HYDROCHLORIDE AND ACETAMINOPHEN 500 MG: 500 TABLET ORAL at 09:06

## 2025-05-24 RX ADMIN — APIXABAN 2.5 MG: 2.5 TABLET, FILM COATED ORAL at 09:07

## 2025-05-24 RX ADMIN — ROSUVASTATIN 10 MG: 10 TABLET, FILM COATED ORAL at 22:15

## 2025-05-24 NOTE — PROGRESS NOTES
INFECTIOUS DISEASE CONSULT/INITIAL HOSPITAL VISIT    Bibiana Hodges  1935  [id number]    Date of Consult: 5/24/2025    Admission Date: 5/19/2025      Requesting Provider: ANASTASIA Shen  Evaluating Physician: Xavi Ivey MD    Reason for Consultation: pseudomonas UTI    History of present illness:    Patient is a 89 y.o. female  with atrial fibrillation, hypertension, hyperlipidemia, GERD, and B12 deficiency resided at [facility] [place] has been evaluated in [facility] ED for  respiratory distress.    Had hypoxia of 83%, aspiration suspected, patient given antibiotics and steroids.  Patient's hypoxia has resolved.    Despite not having burning on urination the [facility] ED check urinalysis which had 6-0 rbc, tntc wbc, and 3-6 squamous cells.    Pseudmonas was reporte don urine culture at 50,000 CFU.      Patient denies dysuria, currently is using a pure wick.    WE are consulted regarding an incidental finding on a urine culture that did not contribute to patient's reason for admission to hospital.      PAtient reports chronic leg weakness.    5/23/25; doing well; no events overnight    5/24/25; doing well; no events overnight; no fever, rash, sore throat asking about rehab    Past Medical History:   Diagnosis Date    Constipation     Depression     Dysautonomia orthostatic hypotension syndrome     Generalized osteoarthritis     GERD (gastroesophageal reflux disease)     s/p Nissen    Hypertension     Insomnia     Osteoarthrosis, shoulder region     Skin ulcer of scalp     Thrombophlebitis of arm     Tremor     Vitamin B12 deficiency        Past Surgical History:   Procedure Laterality Date    DILATATION AND CURETTAGE      HERNIA REPAIR      HIP SURGERY      SHOULDER SURGERY      Right       Family History   Problem Relation Age of Onset    Cancer Mother         Pancreatic    Stroke Brother     Anorexia nervosa Daughter        Social History     Socioeconomic History    Marital status:     Tobacco Use    Smoking status: Never    Smokeless tobacco: Never   Vaping Use    Vaping status: Never Used   Substance and Sexual Activity    Alcohol use: No     Comment: stopped drinking alcohol    Drug use: No    Sexual activity: Defer       Allergies   Allergen Reactions    Sulfa Antibiotics Hives    Gabapentin          Medication:    Current Facility-Administered Medications:     acetaminophen (TYLENOL) tablet 650 mg, 650 mg, Oral, Q8H PRN, Aileen Doherty MD, 650 mg at 05/23/25 2249    amiodarone (PACERONE) tablet 200 mg, 200 mg, Oral, Daily, Aileen Doherty MD, 200 mg at 05/24/25 0906    apixaban (ELIQUIS) tablet 2.5 mg, 2.5 mg, Oral, BID, Aileen Doherty MD, 2.5 mg at 05/24/25 0907    ascorbic acid (VITAMIN C) tablet 500 mg, 500 mg, Oral, Daily, Aileen Doherty MD, 500 mg at 05/24/25 0906    bisacodyl (DULCOLAX) suppository 10 mg, 10 mg, Rectal, Once, Aileen Doherty MD    cefTRIAXone (ROCEPHIN) 1,000 mg in sodium chloride 0.9 % 100 mL MBP, 1,000 mg, Intravenous, Q24H, Sarwat Kimbrough MD, Last Rate: 200 mL/hr at 05/23/25 1646, 1,000 mg at 05/23/25 1646    doxycycline (MONODOX) capsule 100 mg, 100 mg, Oral, Q12H, Aileen Doherty MD, 100 mg at 05/24/25 0907    ipratropium-albuterol (DUO-NEB) nebulizer solution 3 mL, 3 mL, Nebulization, 4x Daily - RT, Aileen Doherty MD, 3 mL at 05/24/25 0702    lactulose (CHRONULAC) 10 GM/15ML solution 20 g, 20 g, Oral, BID, Sarwat Kimbrough MD, 20 g at 05/24/25 0905    levETIRAcetam (KEPPRA) tablet 250 mg, 250 mg, Oral, BID, Aileen Doherty MD, 250 mg at 05/24/25 0905    levothyroxine (SYNTHROID, LEVOTHROID) tablet 100 mcg, 100 mcg, Oral, Daily, Aileen Doherty MD, 100 mcg at 05/24/25 0523    lisinopril (PRINIVIL,ZESTRIL) tablet 10 mg, 10 mg, Oral, Q24H, Aileen Doherty MD, 10 mg at 05/24/25 0907    melatonin tablet 5 mg, 5 mg, Oral, Nightly, Aileen Doherty MD, 5 mg at 05/23/25 2230    metoprolol tartrate (LOPRESSOR) half tablet 12.5 mg,  12.5 mg, Oral, BID, Aileen Doherty MD, 12.5 mg at 05/24/25 0907    polyethylene glycol (MIRALAX) packet 17 g, 17 g, Oral, Daily, Aileen Doherty MD, 17 g at 05/24/25 0915    predniSONE (DELTASONE) tablet 40 mg, 40 mg, Oral, Daily With Breakfast, Aileen Doherty MD, 40 mg at 05/24/25 0906    rosuvastatin (CRESTOR) tablet 10 mg, 10 mg, Oral, Nightly, Aileen Doherty MD, 10 mg at 05/23/25 2230    senna (SENOKOT) tablet 2 tablet, 2 tablet, Oral, Daily, Aileen Doherty MD, 2 tablet at 05/24/25 0906    traZODone (DESYREL) tablet 100 mg, 100 mg, Oral, Nightly, Aileen Doherty MD, 100 mg at 05/23/25 2229    venlafaxine XR (EFFEXOR-XR) 24 hr capsule 75 mg, 75 mg, Oral, Daily, Aileen Doherty MD, 75 mg at 05/24/25 0905    Antibiotics:  Anti-Infectives (From admission, onward)      Ordered     Dose/Rate Route Frequency Start Stop    05/22/25 0908  piperacillin-tazobactam (ZOSYN) 3.375 g IVPB in 100 mL NS MBP (CD)  Status:  Discontinued        Ordering Provider: Sarwat Kimbrough MD    3.375 g  over 4 Hours Intravenous Every 8 Hours 05/22/25 1700 05/22/25 1357    05/22/25 1357  cefTRIAXone (ROCEPHIN) 1,000 mg in sodium chloride 0.9 % 100 mL MBP        Ordering Provider: Sarwat Kimbrough MD    1,000 mg  200 mL/hr over 30 Minutes Intravenous Every 24 Hours 05/22/25 1500 05/27/25 1459    05/22/25 0908  piperacillin-tazobactam (ZOSYN) 3.375 g IVPB in 100 mL NS MBP (CD)        Ordering Provider: Sarwat Kimbrough MD    3.375 g  over 30 Minutes Intravenous Once 05/22/25 1000 05/22/25 1103    05/19/25 2338  cefTRIAXone (ROCEPHIN) 1,000 mg in sodium chloride 0.9 % 100 mL MBP  Status:  Discontinued        Ordering Provider: Sarwat Kimbrough MD    1,000 mg  200 mL/hr over 30 Minutes Intravenous Every 24 Hours 05/20/25 0900 05/22/25 0906    05/19/25 2338  doxycycline (MONODOX) capsule 100 mg        Ordering Provider: Aileen Doherty MD    100 mg Oral Every 12 Hours Scheduled 05/20/25 0900 05/25/25 0859    05/19/25 151   azithromycin (ZITHROMAX) 500 mg in sodium chloride 0.9 % 250 mL IVPB-VTB        Ordering Provider: Efrani Fraga MD    500 mg  over 60 Minutes Intravenous Once 25 1526 25 1654    25 1510  cefTRIAXone (ROCEPHIN) 2 g in sodium chloride 0.9 % 100 mL MBP        Ordering Provider: Efrain Fraga MD    2 g  200 mL/hr over 30 Minutes Intravenous Once 25 1526 25 1807              Review of Systems:  See hpi    Physical Exam:   Vital Signs  Temp (24hrs), Av.9 °F (36.6 °C), Min:97.8 °F (36.6 °C), Max:97.9 °F (36.6 °C)    Temp  Min: 97.8 °F (36.6 °C)  Max: 97.9 °F (36.6 °C)  BP  Min: 130/78  Max: 161/77  Pulse  Min: 60  Max: 69  Resp  Min: 16  Max: 18  SpO2  Min: 94 %  Max: 97 %    GENERAL: Awake and alert, in no acute distress.   HEENT: Normocephalic, atraumatic.  PERRL. EOMI. No conjunctival injection. No icterus.no ext oral lesions    HEART: RRR; No murmur,  LUNGS: Clear to auscultation bilaterally   ABDOMEN: Soft, nontender,   EXT:  No edema  :  Without George catheter.  MSK: No joint effusions or erythema  SKIN: no r valerio    Laboratory Data    Results from last 7 days   Lab Units 25  1348   WBC 10*3/mm3 13.43*   HEMOGLOBIN g/dL 13.4   HEMATOCRIT % 42.2   PLATELETS 10*3/mm3 215     Results from last 7 days   Lab Units 25  1348   SODIUM mmol/L 134*   POTASSIUM mmol/L 4.3   CHLORIDE mmol/L 95*   CO2 mmol/L 23.0   BUN mg/dL 18   CREATININE mg/dL 1.30*   GLUCOSE mg/dL 151*   CALCIUM mg/dL 8.9     Results from last 7 days   Lab Units 25  1348   ALK PHOS U/L 74   BILIRUBIN mg/dL 0.3   ALT (SGPT) U/L 28   AST (SGOT) U/L 43*             Results from last 7 days   Lab Units 05/20/25  0004   LACTATE mmol/L 1.3     Results from last 7 days   Lab Units 25  1701   CK TOTAL U/L 53         Estimated Creatinine Clearance: 27.7 mL/min (A) (by C-G formula based on SCr of 1.3 mg/dL (H)).      Microbiology:  Blood Culture   Date Value Ref Range Status   2025 No  "growth at 3 days  Preliminary     No results found for: \"BCIDPCR\", \"CXREFLEX\", \"CSFCX\", \"CULTURETIS\"  No results found for: \"CULTURES\", \"HSVCX\", \"URCX\"  No results found for: \"EYECULTURE\", \"GCCX\", \"HSVCULTURE\", \"LABHSV\"  No results found for: \"LEGIONELLA\", \"MRSACX\", \"MUMPSCX\", \"MYCOPLASCX\"  No results found for: \"NOCARDIACX\", \"STOOLCX\"  Urine Culture   Date Value Ref Range Status   05/19/2025 50,000 CFU/mL Pseudomonas aeruginosa (A)  Final     No results found for: \"VIRALCULTU\", \"WOUNDCX\"        Radiology:  Imaging Results (Last 72 Hours)       Procedure Component Value Units Date/Time    FL Video Swallow With Speech Single Contrast [[id number]] Resulted: 05/23/25 1213     Updated: 05/23/25 1226    MRI Brain Without Contrast [[id number]] Collected: 05/21/25 1523     Updated: 05/21/25 1529    Narrative:      MRI BRAIN WO CONTRAST    Date of Exam: 5/21/2025 2:45 PM EDT    Indication: Double vision.     Comparison: None available.    Technique:  Routine multiplanar/multisequence sequence images of the brain were obtained without contrast administration.    FINDINGS:  Foci of T2/FLAIR signal hyperintensity are seen within the bilateral hemispheric white matter and within the sara. There is cortical atrophy with prominent sulcation and ventriculomegaly. Midline structures appear unremarkable. No significant mass   effect, intracranial hemorrhage, or hydrocephalus is identified. Diffusion-weighted sequences demonstrate no acute infarct.The visualized intracranial flow-voids appear unremarkable. Suggestion of mild right mastoid effusion. Bilateral lens prostheses   noted. The visualized superficial soft tissues and cervical spine demonstrate no significant abnormality.      Impression:      1.Findings compatible with chronic microvascular ischemic change and diffuse cortical atrophy.  2.No diffusion restriction is identified to suggest acute infarct.  3.Possible mild right mastoid effusion.      Electronically Signed: Slim " MD Nj    5/21/2025 3:26 PM EDT    Workstation ID: [id number]              Impression:   Lactic acidosis  Pyuria  Probable asymptomatic bacturia with pseudomonas colonization  Resolving respiratory failure with hypoxia  COPD    PLAN/RECOMMENDATIONS:   Thank you for asking us to see Bibiana Hodges, I recommend the following:  I have independently reviewed the ct chest and do not see any substantial infiltrate       Patient has been improving without any antibiotics targeting pseudomonas.  (First dose Zosyn was started today)    The urine culture is highly likely colonization and I would not treat this currently.    Continue treatment for COPD exacerbation.    D/c ctx/doxy has been on abx 6 days    I spent time discussing that urinary colonization is different than a bacterial infection and that the Pseudomonas in the urine is likely not causing active infection and I would postpone treatment at this time    Patient mostly concerned about weakness of her legs.    I suspect the patient may have spinal stenosis but will defer to the hospitalist if they want to do workup with radiography or other diagnostic workup such as EMG or nerve conduction studies    I would not repeat urine cultures unless patient had overt symptoms of urinary tract infection    No need for isolation    This visit included the following complex service elements:  Complex medical decision-making associated with antimicrobial prescribing.  Managed infection treatment protocol associated with transitions of care for this complex patient.      May need to ask  if she is a candidate for further rehab  Xavi Ivey MD  5/24/2025  11:24 EDT

## 2025-05-24 NOTE — PLAN OF CARE
Goal Outcome Evaluation:  Plan of Care Reviewed With: patient        Progress: improving  Outcome Evaluation: Pt presents decline in ADL performance since initial OT eval; this date pt limited by generalized weakness, decreased standing tolerance, and dynamic balance deficits. Pt completed grooming with Last while seated in chair, Dep for toileting tasks in standing. Recommend IPR at discharge to support return to PLOF.    Anticipated Discharge Disposition (OT): inpatient rehabilitation facility

## 2025-05-24 NOTE — PLAN OF CARE
"Goal Outcome Evaluation:  Plan of Care Reviewed With: patient        Progress: improving  Outcome Evaluation: Physical therapy treatment complete. Patient very motivated this date and stated that she wants to work with therapy so that she can \"participate in life\".  Patient increased ambulation distance however requires cues for posture and foot placement. She required seated rest break however able to ambulate back to room. The patient continues to present below baseline for functional mobility. The patient would continue to benefit from skilled PT to address strength, balance and activity tolerance deficits. Continue to current PT POC                             "

## 2025-05-24 NOTE — PROGRESS NOTES
[facility] Medicine Services  PROGRESS NOTE    Patient Name: Bibiana Hodges  : 1935  MRN: [id number]    Date of Admission: 2025  Primary Care Physician: Nivia Madrigal MD    Subjective   Subjective     CC:  F/U SOA    HPI:  Seen this morning. Complains of weakness, says she is wondering why she is not going to rehab. She would like to walk again.       Objective   Objective     Vital Signs:   Temp:  [97.8 °F (36.6 °C)-97.9 °F (36.6 °C)] 97.9 °F (36.6 °C)  Heart Rate:  [60-69] (P) 60  Resp:  [16-18] 16  BP: (130-161)/(72-82) 136/82     Physical Exam:  Gen-no acute distress  HENT-NCAT, mucous membranes moist  CV-RRR, S1 S2 normal, no m/r/g  Resp-slightly diminished bilaterally, no wheezes or rales  Abd-soft, NT, ND, +BS  Ext-trace BLE edema  Neuro-A&Ox3, no focal deficits  Skin-no rashes  Psych-appropriate mood      Results Reviewed:  LAB RESULTS:      Lab 25  0004 259 25  1734 25  1547 25  1348   WBC  --   --   --   --  13.43*   HEMOGLOBIN  --   --   --   --  13.4   HEMATOCRIT  --   --   --   --  42.2   PLATELETS  --   --   --   --  215   NEUTROS ABS  --   --   --   --  12.37*   IMMATURE GRANS (ABS)  --   --   --   --  0.06*   LYMPHS ABS  --   --   --   --  0.36*   MONOS ABS  --   --   --   --  0.62   EOS ABS  --   --   --   --  0.00   MCV  --   --   --   --  103.9*   PROCALCITONIN  --   --   --   --  0.09   LACTATE 1.3 3.1* 2.3*  --  5.5*   HSTROP T  --   --   --  98* 70*         Lab 25  1348   SODIUM 134*   POTASSIUM 4.3   CHLORIDE 95*   CO2 23.0   ANION GAP 16.0*   BUN 18   CREATININE 1.30*   EGFR 39.4*   GLUCOSE 151*   CALCIUM 8.9         Lab 25  1348   TOTAL PROTEIN 6.0   ALBUMIN 3.2*   GLOBULIN 2.8   ALT (SGPT) 28   AST (SGOT) 43*   BILIRUBIN 0.3   ALK PHOS 74         Lab 25  1547 25  1348   PROBNP  --  1,247.0   HSTROP T 98* 70*                 Brief Urine Lab Results  (Last result in the past 365 days)         Color   Clarity   Blood   Leuk Est   Nitrite   Protein   CREAT   Urine HCG        05/19/25 1428 Yellow   Turbid   Moderate (2+)   Large (3+)   Negative   100 mg/dL (2+)                   Microbiology Results Abnormal       Procedure Component Value - Date/Time    Urine Culture - Urine, Urine, Catheter In/Out [[id number]]  (Abnormal)  (Susceptibility) Collected: 05/19/25 1428    Lab Status: Final result Specimen: Urine, Catheter In/Out Updated: 05/21/25 1124     Urine Culture 50,000 CFU/mL Pseudomonas aeruginosa    Narrative:      Colonization of the urinary tract without infection is common. Treatment is discouraged unless the patient is symptomatic, pregnant, or undergoing an invasive urologic procedure.    Susceptibility        Pseudomonas aeruginosa      BECK      Cefepime Susceptible      Ceftazidime Susceptible      Ciprofloxacin Susceptible      Levofloxacin Susceptible      Piperacillin + Tazobactam Susceptible      Tobramycin Susceptible                                   No radiology results from the last 24 hrs    Results for orders placed during the hospital encounter of 08/20/23    Adult Transthoracic Echo Complete W/ Cont if Necessary Per Protocol    Interpretation Summary    Left ventricular systolic function is mildly decreased. Calculated left ventricular EF = 47.9% Normal left ventricular cavity size and wall thickness noted. There is left ventricular global hypokinesis noted. Left ventricular diastolic function is consistent with (grade I) impaired relaxation.    : Normal right ventricular cavity size and systolic function noted. Electronic lead present in the ventricle.    Normal left atrial size and volume noted. Saline test results are negative.    There is moderate calcification of the aortic valve. The aortic valve was poorly visualized but appears trileaflet. Moderate aortic valve regurgitation is present. No aortic valve stenosis is present.    Mild mitral annular calcification is  present. Mild mitral valve regurgitation is present. No significant mitral valve stenosis is present.    The tricuspid valve is grossly normal in structure. Mild tricuspid valve regurgitation is present. Estimated right ventricular systolic pressure from tricuspid regurgitation is normal, 33 mmHg. No tricuspid valve stenosis is present.    Mild dilation of the ascending aorta is present. Ascending aorta = 3.7 cm    There is a small (<1cm) pericardial effusion adjacent to the right atrium and right ventricle.      Current medications:  Scheduled Meds:amiodarone, 200 mg, Oral, Daily  apixaban, 2.5 mg, Oral, BID  ascorbic acid, 500 mg, Oral, Daily  bisacodyl, 10 mg, Rectal, Once  ipratropium-albuterol, 3 mL, Nebulization, 4x Daily - RT  lactulose, 20 g, Oral, BID  levETIRAcetam, 250 mg, Oral, BID  levothyroxine, 100 mcg, Oral, Daily  lisinopril, 10 mg, Oral, Q24H  melatonin, 5 mg, Oral, Nightly  metoprolol tartrate, 12.5 mg, Oral, BID  polyethylene glycol, 17 g, Oral, Daily  predniSONE, 40 mg, Oral, Daily With Breakfast  rosuvastatin, 10 mg, Oral, Nightly  senna, 2 tablet, Oral, Daily  traZODone, 100 mg, Oral, Nightly  venlafaxine XR, 75 mg, Oral, Daily      Continuous Infusions:   PRN Meds:.  acetaminophen    Assessment & Plan   Assessment & Plan     Active Hospital Problems    Diagnosis  POA    **Pneumonia [J18.9]  Yes    Hyperlipidemia [E78.5]  Yes    Autonomic dysfunction [G90.9]  Yes    Atrial fibrillation [I48.91]  Yes    Hypertension [I10]  Yes      Resolved Hospital Problems   No resolved problems to display.        Brief Hospital Course to date:  Bibiana Hodges is a 89 y.o. female with past medical history significant for atrial fibrillation, hypertension, hyperlipidemia, GERD, B12 deficiency.  She is living at an assisted living facility.  Patient was brought by ambulance for respiratory distress.    This patient's problems and plans were partially entered by my partner and updated as appropriate by me  05/24/25. Copied text in this note has been reviewed and is accurate as of today's date.      Pneumonia of right lower lobe, suspected aspiration  COPD exacerbation  Acute hypoxia  - Room air saturation 83% and required 6 L on admission  - CTA chest showed evidence of right lower lobe pneumonia  - Negative full respiratory PCR panel  - Respiratory culture with normal vicki, negative blood cultures, negative urine Strep and Legionella  - Completed 5 days of Rocephin and Doxycycline  - ID following  - Patient has history of dysphagia and modified diet has been recommended - however, patient is not compliant with it  - Continue nebs  - s/p 5 days prednisone 40 mg daily  - Weaned to room air now    Asymptomatic bacteriuria  - Urine culture with 50K Pseudomonas  - ID following, feel this represents colonization     Dysphagia  - Patient had swallow evaluation. Recommended diet is mechanical ground texture, honey thick liquid, no mixed consistencies.  - My partner had a long conversation with the patient and son was also present at the bedside 5/23/25. Patient likes the safety of dysphagia diet but she cannot tolerate it, especially the thickened liquids. Will need to continue to discuss option of comfort diet with the patient, which will require code status discussion as well.     Occasional lower extremity pain   Occasional numbness of toes bilaterally  - Arterial duplex of lower extremities was done. 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 Also right PTA is occluded distally. On the left side mid SFA appears occlusive. Left distal SFA appears to have reconstitution. Also left popliteal artery, distal PTA, and peroneal artery appear monophasic.  - Patient has dopplerable dorsalis pedis and posterior tibialis pulses bilaterally  - Given patient's overall condition and the fact that she still has dopplerable pulses, benefit of intervention is questionable - will  defer inpatient Vascular Surgery consultation at this time, can consider as outpatient    Abnormal troponin levels  - Troponin levels were abnormal but no complaints of chest pain    Chronic essential tremor  - Reports significant tremors, especially in right hand, which is chronic   - Has previously been evaluated by [facility] in 2013, had been trialed on numerous medications (see Neurology note for further details) without resolution of tremor  - Neurology has seen and evaluated the patient here, given chronicity of tremors, will not make any medication adjustments at this time (however did recommend possible trial of gabapentin 100 mg BID and transitioning patient from Trazodone to Remeron) - F/U as outpatient for further management/medication adjustments    Chronic double vision  - Reports having double vision for 2 years  - Neurology has seen and evaluated patient  - MRI of the brain does not show any acute process. 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  - Outpatient follow up with Ophthalmology     Orthostatic hypotension  - Continue non-medical/supportive interventions    Constipation  - Reported not having a bowel movement in 2 weeks  - KUB did not show any sign of obstruction  - Bowel regimen     Generalized weakness  - PT/OT recommended back to assisted living facility with HHPT  - Per CM discussion with staff at the facility, patient transfers from bed to chair but does not do much else at baseline  - Today patient expressed desire to go to rehab to try to walk again, will have CM discuss further with her and family  - Current plan is back to assisted living facility on Tuesday per facility request given limited staffing over holiday weekend    Full code status  - Expressed desire to remain full code     Expected Discharge Location and Transportation: assisted living  Expected Discharge   Expected Discharge Date: 5/27/2025; Expected Discharge Time:      VTE  Prophylaxis:  Pharmacologic VTE prophylaxis orders are present.         AM-PAC 6 Clicks Score (PT): 13 (05/23/25 2564)    CODE STATUS:   Code Status and Medical Interventions: CPR (Attempt to Resuscitate); Full Support   Ordered at: 05/19/25 3495     Code Status (Patient has no pulse and is not breathing):    CPR (Attempt to Resuscitate)     Medical Interventions (Patient has pulse or is breathing):    Full Support       Wendi Gaspar MD  05/24/25

## 2025-05-24 NOTE — THERAPY TREATMENT NOTE
Patient Name: Bibiana Hodges  : 1935    MRN: [id number]                              Today's Date: 2025       Admit Date: 2025    Visit Dx:     ICD-10-CM ICD-9-CM   1. Acute respiratory failure with hypoxia  J96.01 518.81   2. Community acquired pneumonia, unspecified laterality  J18.9 486   3. Acute UTI  N39.0 599.0   4. Elevated troponin  R79.89 790.6   5. Lactic acidosis  E87.20 276.2   6. Leukocytosis, unspecified type  D72.829 288.60   7. Oropharyngeal dysphagia  R13.12 787.22     Patient Active Problem List   Diagnosis    Dysautonomia orthostatic hypotension syndrome    Hypertension    Severe malnutrition    Atrial fibrillation    Autonomic dysfunction    Hyperlipidemia    Pneumonia     Past Medical History:   Diagnosis Date    Constipation     Depression     Dysautonomia orthostatic hypotension syndrome     Generalized osteoarthritis     GERD (gastroesophageal reflux disease)     s/p Nissen    Hypertension     Insomnia     Osteoarthrosis, shoulder region     Skin ulcer of scalp     Thrombophlebitis of arm     Tremor     Vitamin B12 deficiency      Past Surgical History:   Procedure Laterality Date    DILATATION AND CURETTAGE      HERNIA REPAIR      HIP SURGERY      SHOULDER SURGERY      Right      General Information       Row Name 25 1554          OT Time and Intention    Document Type therapy note (daily note)  -AURE     Mode of Treatment occupational therapy  -       Row Name 25 6731          General Information    Patient Profile Reviewed yes  -AURE     Existing Precautions/Restrictions fall;other (see comments)  hand tremors, double vision  -AURE     Barriers to Rehab none identified  -       Row Name 25 1119          Occupational Profile    Environmental Supports and Barriers (Occupational Profile) Patient reports that she ambulates with therapy at her assisted living facility and that her goal is to walk again.  -       Row Name 25 7125          Cognition     Orientation Status (Cognition) oriented x 4  -       Row Name 05/24/25 1559          Safety Issues/Impairments Affecting Functional Mobility    Safety Issues Affecting Function (Mobility) insight into deficits/self-awareness;judgment;problem-solving;safety precaution awareness;safety precautions follow-through/compliance;sequencing abilities  -AURE     Impairments Affecting Function (Mobility) balance;coordination;endurance/activity tolerance;postural/trunk control;strength  -AURE               User Key  (r) = Recorded By, (t) = Taken By, (c) = Cosigned By      Initials Name Provider Type    Yolette Poon OT Occupational Therapist                     Mobility/ADL's       Row Name 05/24/25 1602          Bed Mobility    Bed Mobility supine-sit  -AURE     Supine-Sit [place] (Bed Mobility) contact guard  -     Bed Mobility, Safety Issues impaired trunk control for bed mobility  -AURE     Assistive Device (Bed Mobility) bed rails;head of bed elevated  -AURE     Comment, (Bed Mobility) CGA at trunk  -       Row Name 05/24/25 1602          Transfers    Transfers sit-stand transfer;bed-chair transfer  -       Row Name 05/24/25 1602          Bed-Chair Transfer    Bed-Chair [place] (Transfers) minimum assist (75% patient effort);verbal cues  -     Assistive Device (Bed-Chair Transfers) walker, front-wheeled  -       Row Name 05/24/25 1602          Sit-Stand Transfer    Sit-Stand [place] (Transfers) contact guard;verbal cues  -     Assistive Device (Sit-Stand Transfers) walker, front-wheeled  -AURE       Row Name 05/24/25 1602          Functional Mobility    Functional Mobility- Comment Defer to PT for specifics  -       Row Name 05/24/25 1602          Activities of Daily Living    BADL Assessment/Intervention grooming;toileting  -       Row Name 05/24/25 1602          Hygiene Care    Oral Care teeth brushed - regular toothbrush  -       Row Name 05/24/25 1602          Grooming Assessment/Training     [place] Level (Grooming) hair care, combing/brushing;oral care regimen;wash face, hands;minimum assist (75% patient effort)  -AURE     Position (Grooming) supported sitting  -AURE     Comment, (Grooming) assist to comb hair; not limited by tremors this date  -AURE       Row Name 05/24/25 1602          Toileting Assessment/Training    [place] (Toileting) adjust/manage clothing;change pad/brief;dependent (less than 25% patient effort)  -AURE     Position (Toileting) supported standing  -AURE               User Key  (r) = Recorded By, (t) = Taken By, (c) = Cosigned By      Initials Name Provider Type    Yolette Poon OT Occupational Therapist                   Obj/Interventions       Row Name 05/24/25 1606          Balance    Balance Assessment sitting static balance;sitting dynamic balance;standing static balance;standing dynamic balance  -AURE     Static Sitting Balance supervision  -AURE     Dynamic Sitting Balance contact guard  -AURE     Position, Sitting Balance unsupported;sitting edge of bed  -AURE     Static Standing Balance contact guard  -AURE     Dynamic Standing Balance minimal assist  -AURE     Position/Device Used, Standing Balance supported;walker, front-wheeled  -AURE               User Key  (r) = Recorded By, (t) = Taken By, (c) = Cosigned By      Initials Name Provider Type    Yolette Poon, CHONG Occupational Therapist                   Goals/Plan    No documentation.                  Clinical Impression       Row Name 05/24/25 1607          Pain Assessment    Pretreatment Pain Rating 0/10 - no pain  -AURE     Posttreatment Pain Rating 0/10 - no pain  -AURE       Row Name 05/24/25 1607          Plan of Care Review    Plan of Care Reviewed With patient  -AURE     Progress improving  -AURE     Outcome Evaluation Pt presents decline in ADL performance since initial OT eval; this date pt limited by generalized weakness, decreased standing tolerance, and dynamic balance deficits. Pt completed grooming with Last  while seated in chair, Dep for toileting tasks in standing. Recommend IPR at discharge to support return to PLOF.  -AURE       Row Name 05/24/25 1607          Therapy Plan Review/Discharge Plan (OT)    Anticipated Discharge Disposition (OT) inpatient rehabilitation facility  -AURE       Row Name 05/24/25 1607          Vital Signs    O2 Delivery Pre Treatment room air  -AURE     O2 Delivery Intra Treatment room air  -AURE     O2 Delivery Post Treatment room air  -AURE     Pre Patient Position Supine  -AURE     Intra Patient Position Standing  -AURE     Post Patient Position Sitting  -AURE       Row Name 05/24/25 1607          Positioning and Restraints    Pre-Treatment Position in bed  -AURE     Post Treatment Position chair  -AURE     In Chair notified nsg;reclined;call light within reach;encouraged to call for assist;exit alarm on;waffle cushion;legs elevated;heels elevated  -               User Key  (r) = Recorded By, (t) = Taken By, (c) = Cosigned By      Initials Name Provider Type    Yolette Poon OT Occupational Therapist                   Outcome Measures       Row Name 05/24/25 1615          How much help from another is currently needed...    Putting on and taking off regular lower body clothing? 1  -AURE     Bathing (including washing, rinsing, and drying) 2  -AURE     Toileting (which includes using toilet bed pan or urinal) 1  -AURE     Putting on and taking off regular upper body clothing 3  -AURE     Taking care of personal grooming (such as brushing teeth) 3  -AURE     Eating meals 3  -AURE     AM-PAC 6 Clicks Score (OT) 13  -AURE       Row Name 05/24/25 1615          Functional Assessment    Outcome Measure Options AM-PAC 6 Clicks Daily Activity (OT)  -               User Key  (r) = Recorded By, (t) = Taken By, (c) = Cosigned By      Initials Name Provider Type    Yolette Poon OT Occupational Therapist                    Occupational Therapy Education       Title: PT OT SLP Therapies (Done)       Topic: Occupational  Therapy (Done)       Point: ADL training (Done)       Learning Progress Summary            Patient Acceptance, E, VU by AURE at 5/24/2025 1616    Comment: ADL goal progression    Acceptance, E, VU,NR by KIM at 5/21/2025 1143                      Point: Precautions (Done)       Learning Progress Summary            Patient Acceptance, E, VU,NR by KIM at 5/21/2025 1143                      Point: Body mechanics (Done)       Learning Progress Summary            Patient Acceptance, E, VU,NR by KIM at 5/21/2025 1143                                      User Key       Initials Effective Dates Name Provider Type Discipline    AURE 06/16/21 -  Yolette Mckay OT Occupational Therapist OT    KIM 08/26/24 -  Johanna Kline OT Occupational Therapist OT                  OT Recommendation and Plan     Plan of Care Review  Plan of Care Reviewed With: patient  Progress: improving  Outcome Evaluation: Pt presents decline in ADL performance since initial OT eval; this date pt limited by generalized weakness, decreased standing tolerance, and dynamic balance deficits. Pt completed grooming with Last while seated in chair, Dep for toileting tasks in standing. Recommend IPR at discharge to support return to PLOF.     Time Calculation:         Time Calculation- OT       Row Name 05/24/25 1045             Time Calculation- OT    OT Start Time 1045  -AURE      OT Received On 05/24/25  -AURE         Timed Charges    68959 - OT Self Care/Mgmt Minutes 25  -AURE         Total Minutes    Timed Charges Total Minutes 25  -AURE       Total Minutes 25  -AURE                User Key  (r) = Recorded By, (t) = Taken By, (c) = Cosigned By      Initials Name Provider Type    Yolette Poon OT Occupational Therapist                  Therapy Charges for Today       Code Description Service Date Service Provider Modifiers Qty    [id number] HC OT SELF CARE/MGMT/TRAIN EA 15 MIN 5/24/2025 Yolette Mckay OT GO 2                 Yolette Mckay OT  5/24/2025

## 2025-05-24 NOTE — THERAPY TREATMENT NOTE
Patient Name: Bibiana Hodges  : 1935    MRN: [id number]                              Today's Date: 2025       Admit Date: 2025    Visit Dx:     ICD-10-CM ICD-9-CM   1. Acute respiratory failure with hypoxia  J96.01 518.81   2. Community acquired pneumonia, unspecified laterality  J18.9 486   3. Acute UTI  N39.0 599.0   4. Elevated troponin  R79.89 790.6   5. Lactic acidosis  E87.20 276.2   6. Leukocytosis, unspecified type  D72.829 288.60   7. Oropharyngeal dysphagia  R13.12 787.22     Patient Active Problem List   Diagnosis    Dysautonomia orthostatic hypotension syndrome    Hypertension    Severe malnutrition    Atrial fibrillation    Autonomic dysfunction    Hyperlipidemia    Pneumonia     Past Medical History:   Diagnosis Date    Constipation     Depression     Dysautonomia orthostatic hypotension syndrome     Generalized osteoarthritis     GERD (gastroesophageal reflux disease)     s/p Nissen    Hypertension     Insomnia     Osteoarthrosis, shoulder region     Skin ulcer of scalp     Thrombophlebitis of arm     Tremor     Vitamin B12 deficiency      Past Surgical History:   Procedure Laterality Date    DILATATION AND CURETTAGE      HERNIA REPAIR      HIP SURGERY      SHOULDER SURGERY      Right      General Information       Row Name 25 1038          Physical Therapy Time and Intention    Document Type therapy note (daily note)  -KW     Mode of Treatment physical therapy  -       Row Name 25 1038          General Information    Patient Profile Reviewed yes  -KW     Existing Precautions/Restrictions fall;other (see comments)  hand tremors, double vision  -KW               User Key  (r) = Recorded By, (t) = Taken By, (c) = Cosigned By      Initials Name Provider Type    KW Tere Seals PT Physical Therapist                   Mobility       Row Name 25 1038          Bed Mobility    Bed Mobility supine-sit  -KW     Scooting/Bridging [place] (Bed Mobility)  "contact guard;1 person assist  -KW     Supine-Sit [place] (Bed Mobility) contact guard  -KW     Sit-Supine [place] (Bed Mobility) minimum assist (75% patient effort);verbal cues  -KW     Assistive Device (Bed Mobility) bed rails;head of bed elevated  -KW       Row Name 05/24/25 1038          Bed-Chair Transfer    Bed-Chair [place] (Transfers) minimum assist (75% patient effort);verbal cues  -KW     Assistive Device (Bed-Chair Transfers) walker, front-wheeled  -KW       Row Name 05/24/25 1038          Sit-Stand Transfer    Sit-Stand [place] (Transfers) contact guard;verbal cues  -KW     Assistive Device (Sit-Stand Transfers) walker, front-wheeled  -KW       Row Name 05/24/25 1038          Gait/Stairs (Locomotion)    [place] Level (Gait) minimum assist (75% patient effort);verbal cues  -KW     Assistive Device (Gait) walker, front-wheeled  -KW     Distance in Feet (Gait) 60  +60  -KW     Deviations/Abnormal Patterns (Gait) stride length decreased;gait speed decreased;base of support, narrow;perri decreased  -KW               User Key  (r) = Recorded By, (t) = Taken By, (c) = Cosigned By      Initials Name Provider Type    Tere Elliott, PT Physical Therapist                   Obj/Interventions    No documentation.                  Goals/Plan    No documentation.                  Clinical Impression       Row Name 05/24/25 1038          Pain    Pretreatment Pain Rating 0/10 - no pain  -KW       Row Name 05/24/25 1038          Plan of Care Review    Plan of Care Reviewed With patient  -KW     Progress improving  -KW     Outcome Evaluation Physical therapy treatment complete. Patient very motivated this date and stated that she wants to work with therapy so that she can \"participate in life\".  Patient increased ambulation distance however requires cues for posture and foot placement. She required seated rest break however able to ambulate back to room. The patient continues to present " below baseline for functional mobility. The patient would continue to benefit from skilled PT to address strength, balance and activity tolerance deficits. Continue to current PT POC  -KW       Row Name 05/24/25 1038          Therapy Assessment/Plan (PT)    Patient/Family Therapy Goals Statement (PT) to return to baseline  -KW     Predicted Duration of Therapy Intervention (PT) 10 days  -KW       Row Name 05/24/25 1038          Vital Signs    Pre Systolic BP Rehab 158  -KW     Pre Treatment Diastolic BP 72  -KW     Pretreatment Heart Rate (beats/min) 60  -KW     Pre SpO2 (%) 97  -KW       Row Name 05/24/25 1038          Positioning and Restraints    Pre-Treatment Position in bed  -KW     Post Treatment Position chair  -KW     In Chair reclined;call light within reach;encouraged to call for assist;legs elevated;exit alarm on  -KW               User Key  (r) = Recorded By, (t) = Taken By, (c) = Cosigned By      Initials Name Provider Type    Tere Elliott, PT Physical Therapist                   Outcome Measures       Row Name 05/24/25 1038          How much help from another person do you currently need...    Turning from your back to your side while in flat bed without using bedrails? 3  -KW     Moving from lying on back to sitting on the side of a flat bed without bedrails? 3  -KW     Moving to and from a bed to a chair (including a wheelchair)? 3  -KW     Standing up from a chair using your arms (e.g., wheelchair, bedside chair)? 3  -KW     Climbing 3-5 steps with a railing? 2  -KW     To walk in hospital room? 3  -KW     AM-PAC 6 Clicks Score (PT) 17  -KW     Highest Level of Mobility Goal Stand (1 or More Minutes)-5  -KW       Row Name 05/24/25 1615 05/24/25 1038       Functional Assessment    Outcome Measure Options AM-PAC 6 Clicks Daily Activity (OT)  -AURE AM-PAC 6 Clicks Basic Mobility (PT)  -KW              User Key  (r) = Recorded By, (t) = Taken By, (c) = Cosigned By      Initials Name Provider  Type    AURE Yolette Mckay, OT Occupational Therapist    Tere Elliott, PT Physical Therapist                                 Physical Therapy Education       Title: PT OT SLP Therapies (Done)       Topic: Physical Therapy (Done)       Point: Mobility training (Done)       Learning Progress Summary            Patient Acceptance, E,TB, VU,DU by  at 5/21/2025 0947    Acceptance, E,D, VU,NR by LR at 5/20/2025 0940    Comment: Educated on benefits of mobility and being OOB, safety with mobility, correct supine to sit t/f technique, correct sit<->stand t/f technique, correct bed to chair t/f technique, and progression of POC.                      Point: Home exercise program (Done)       Learning Progress Summary            Patient Acceptance, E,TB, VU,DU by  at 5/21/2025 0947    Acceptance, E,D, VU,NR by LR at 5/20/2025 0940    Comment: Educated on benefits of mobility and being OOB, safety with mobility, correct supine to sit t/f technique, correct sit<->stand t/f technique, correct bed to chair t/f technique, and progression of POC.                      Point: Body mechanics (Done)       Learning Progress Summary            Patient Acceptance, E,TB, VU,DU by  at 5/21/2025 0947    Acceptance, E,D, VU,NR by LR at 5/20/2025 0940    Comment: Educated on benefits of mobility and being OOB, safety with mobility, correct supine to sit t/f technique, correct sit<->stand t/f technique, correct bed to chair t/f technique, and progression of POC.                      Point: Precautions (Done)       Learning Progress Summary            Patient Acceptance, E,TB, VU,DU by  at 5/21/2025 0947    Acceptance, E,D, VU,NR by LR at 5/20/2025 0940    Comment: Educated on benefits of mobility and being OOB, safety with mobility, correct supine to sit t/f technique, correct sit<->stand t/f technique, correct bed to chair t/f technique, and progression of POC.                                      User Key       Initials  "Effective Dates Name Provider Type Discipline    LR 02/03/23 -  Jeannie Reyes, PT Physical Therapist PT     06/16/21 -  Celine Graham RN Registered Nurse Nurse                  PT Recommendation and Plan     Progress: improving  Outcome Evaluation: Physical therapy treatment complete. Patient very motivated this date and stated that she wants to work with therapy so that she can \"participate in life\".  Patient increased ambulation distance however requires cues for posture and foot placement. She required seated rest break however able to ambulate back to room. The patient continues to present below baseline for functional mobility. The patient would continue to benefit from skilled PT to address strength, balance and activity tolerance deficits. Continue to current PT POC     Time Calculation:         PT Charges       Row Name 05/24/25 1038             Time Calculation    Start Time 1038  -KW      PT Received On 05/24/25  -KW         Timed Charges    27825 - Gait Training Minutes  31  -KW         Total Minutes    Timed Charges Total Minutes 31  -KW       Total Minutes 31  -KW                User Key  (r) = Recorded By, (t) = Taken By, (c) = Cosigned By      Initials Name Provider Type    KW Tere Seals, GAIL Physical Therapist                  Therapy Charges for Today       Code Description Service Date Service Provider Modifiers Qty    90657098492 HC GAIT TRAINING EA 15 MIN 5/24/2025 Tere Seals PT GP 2            PT G-Codes  Outcome Measure Options: AM-PAC 6 Clicks Daily Activity (OT)  AM-PAC 6 Clicks Score (PT): 17  AM-PAC 6 Clicks Score (OT): 13       Tere Seals PT  5/24/2025    "

## 2025-05-25 PROCEDURE — 99232 SBSQ HOSP IP/OBS MODERATE 35: CPT | Performed by: HOSPITALIST

## 2025-05-25 PROCEDURE — 94799 UNLISTED PULMONARY SVC/PX: CPT

## 2025-05-25 PROCEDURE — 94664 DEMO&/EVAL PT USE INHALER: CPT

## 2025-05-25 RX ORDER — BISACODYL 10 MG
10 SUPPOSITORY, RECTAL RECTAL DAILY PRN
Status: DISCONTINUED | OUTPATIENT
Start: 2025-05-25 | End: 2025-05-29 | Stop reason: HOSPADM

## 2025-05-25 RX ORDER — AMOXICILLIN 250 MG
2 CAPSULE ORAL 2 TIMES DAILY PRN
Status: DISCONTINUED | OUTPATIENT
Start: 2025-05-25 | End: 2025-05-29 | Stop reason: HOSPADM

## 2025-05-25 RX ORDER — POLYETHYLENE GLYCOL 3350 17 G/17G
17 POWDER, FOR SOLUTION ORAL DAILY PRN
Status: DISCONTINUED | OUTPATIENT
Start: 2025-05-25 | End: 2025-05-29 | Stop reason: HOSPADM

## 2025-05-25 RX ORDER — BISACODYL 5 MG/1
5 TABLET, DELAYED RELEASE ORAL DAILY PRN
Status: DISCONTINUED | OUTPATIENT
Start: 2025-05-25 | End: 2025-05-29 | Stop reason: HOSPADM

## 2025-05-25 RX ORDER — ENALAPRILAT 1.25 MG/ML
1.25 INJECTION INTRAVENOUS ONCE
Status: COMPLETED | OUTPATIENT
Start: 2025-05-25 | End: 2025-05-25

## 2025-05-25 RX ADMIN — LEVOTHYROXINE SODIUM 100 MCG: 0.1 TABLET ORAL at 05:04

## 2025-05-25 RX ADMIN — LACTULOSE 20 G: 20 SOLUTION ORAL at 20:34

## 2025-05-25 RX ADMIN — IPRATROPIUM BROMIDE AND ALBUTEROL SULFATE 3 ML: 2.5; .5 SOLUTION RESPIRATORY (INHALATION) at 08:18

## 2025-05-25 RX ADMIN — IPRATROPIUM BROMIDE AND ALBUTEROL SULFATE 3 ML: 2.5; .5 SOLUTION RESPIRATORY (INHALATION) at 19:17

## 2025-05-25 RX ADMIN — ROSUVASTATIN 10 MG: 10 TABLET, FILM COATED ORAL at 20:34

## 2025-05-25 RX ADMIN — LEVETIRACETAM 250 MG: 250 TABLET, FILM COATED ORAL at 09:45

## 2025-05-25 RX ADMIN — APIXABAN 2.5 MG: 2.5 TABLET, FILM COATED ORAL at 20:34

## 2025-05-25 RX ADMIN — LACTULOSE 20 G: 20 SOLUTION ORAL at 09:45

## 2025-05-25 RX ADMIN — Medication 12.5 MG: at 20:33

## 2025-05-25 RX ADMIN — VENLAFAXINE HYDROCHLORIDE 75 MG: 75 CAPSULE, EXTENDED RELEASE ORAL at 09:45

## 2025-05-25 RX ADMIN — STANDARDIZED SENNA CONCENTRATE 2 TABLET: 8.6 TABLET ORAL at 09:45

## 2025-05-25 RX ADMIN — APIXABAN 2.5 MG: 2.5 TABLET, FILM COATED ORAL at 09:45

## 2025-05-25 RX ADMIN — POLYETHYLENE GLYCOL 3350 17 G: 17 POWDER, FOR SOLUTION ORAL at 09:45

## 2025-05-25 RX ADMIN — LISINOPRIL 10 MG: 10 TABLET ORAL at 06:39

## 2025-05-25 RX ADMIN — ACETAMINOPHEN 650 MG: 325 TABLET, FILM COATED ORAL at 00:29

## 2025-05-25 RX ADMIN — OXYCODONE HYDROCHLORIDE AND ACETAMINOPHEN 500 MG: 500 TABLET ORAL at 09:45

## 2025-05-25 RX ADMIN — Medication 12.5 MG: at 09:45

## 2025-05-25 RX ADMIN — LEVETIRACETAM 250 MG: 250 TABLET, FILM COATED ORAL at 20:34

## 2025-05-25 RX ADMIN — AMIODARONE HYDROCHLORIDE 200 MG: 200 TABLET ORAL at 09:45

## 2025-05-25 RX ADMIN — DOCUSATE SODIUM 50MG AND SENNOSIDES 8.6MG 2 TABLET: 8.6; 5 TABLET, FILM COATED ORAL at 20:34

## 2025-05-25 RX ADMIN — ENALAPRILAT 1.25 MG: 1.25 INJECTION INTRAVENOUS at 05:04

## 2025-05-25 RX ADMIN — IPRATROPIUM BROMIDE AND ALBUTEROL SULFATE 3 ML: 2.5; .5 SOLUTION RESPIRATORY (INHALATION) at 17:08

## 2025-05-25 NOTE — PROGRESS NOTES
[facility] Medicine Services  PROGRESS NOTE    Patient Name: Bibiana Hodges  : 1935  MRN: [id number]    Date of Admission: 2025  Primary Care Physician: Nivia Madrigal MD    Subjective   Subjective     CC:  F/U SOA    HPI:  Seen this morning. Sleeping on my arrival, with sound machine on. She awakens easily. No major complaints other than she still does not like the modified diet. We discussed comfort diet option with DNR code status, but she states she is not ready for that and will just stick it out with the modified diet. Asking if she can have some [employer] milkshakes.      Objective   Objective     Vital Signs:   Temp:  [97.4 °F (36.3 °C)-99.3 °F (37.4 °C)] 99.3 °F (37.4 °C)  Heart Rate:  [59-73] 60  Resp:  [14-20] 16  BP: (154-200)/() 187/86     Physical Exam:  Gen-no acute distress, frail/chronically ill appearing  HENT-NCAT, mucous membranes moist  CV-RRR, S1 S2 normal, no m/r/g  Resp-slightly diminished bilaterally, no wheezes or rales  Abd-soft, NT, ND, +BS  Ext-trace BLE edema  Neuro-A&Ox3, no focal deficits  Skin-no rashes  Psych-appropriate mood      Results Reviewed:  LAB RESULTS:      Lab 25  0004 259 25  1734 25  1547 25  1348   WBC  --   --   --   --  13.43*   HEMOGLOBIN  --   --   --   --  13.4   HEMATOCRIT  --   --   --   --  42.2   PLATELETS  --   --   --   --  215   NEUTROS ABS  --   --   --   --  12.37*   IMMATURE GRANS (ABS)  --   --   --   --  0.06*   LYMPHS ABS  --   --   --   --  0.36*   MONOS ABS  --   --   --   --  0.62   EOS ABS  --   --   --   --  0.00   MCV  --   --   --   --  103.9*   PROCALCITONIN  --   --   --   --  0.09   LACTATE 1.3 3.1* 2.3*  --  5.5*   HSTROP T  --   --   --  98* 70*         Lab 25  1348   SODIUM 134*   POTASSIUM 4.3   CHLORIDE 95*   CO2 23.0   ANION GAP 16.0*   BUN 18   CREATININE 1.30*   EGFR 39.4*   GLUCOSE 151*   CALCIUM 8.9         Lab 25  1348   TOTAL PROTEIN  6.0   ALBUMIN 3.2*   GLOBULIN 2.8   ALT (SGPT) 28   AST (SGOT) 43*   BILIRUBIN 0.3   ALK PHOS 74         Lab 05/19/25  1547 05/19/25  1348   PROBNP  --  1,247.0   HSTROP T 98* 70*                 Brief Urine Lab Results  (Last result in the past 365 days)        Color   Clarity   Blood   Leuk Est   Nitrite   Protein   CREAT   Urine HCG        05/19/25 1428 Yellow   Turbid   Moderate (2+)   Large (3+)   Negative   100 mg/dL (2+)                   Microbiology Results Abnormal       Procedure Component Value - Date/Time    Urine Culture - Urine, Urine, Catheter In/Out [[id number]]  (Abnormal)  (Susceptibility) Collected: 05/19/25 1428    Lab Status: Final result Specimen: Urine, Catheter In/Out Updated: 05/21/25 1124     Urine Culture 50,000 CFU/mL Pseudomonas aeruginosa    Narrative:      Colonization of the urinary tract without infection is common. Treatment is discouraged unless the patient is symptomatic, pregnant, or undergoing an invasive urologic procedure.    Susceptibility        Pseudomonas aeruginosa      BECK      Cefepime Susceptible      Ceftazidime Susceptible      Ciprofloxacin Susceptible      Levofloxacin Susceptible      Piperacillin + Tazobactam Susceptible      Tobramycin Susceptible                                   No radiology results from the last 24 hrs    Results for orders placed during the hospital encounter of 08/20/23    Adult Transthoracic Echo Complete W/ Cont if Necessary Per Protocol    Interpretation Summary    Left ventricular systolic function is mildly decreased. Calculated left ventricular EF = 47.9% Normal left ventricular cavity size and wall thickness noted. There is left ventricular global hypokinesis noted. Left ventricular diastolic function is consistent with (grade I) impaired relaxation.    : Normal right ventricular cavity size and systolic function noted. Electronic lead present in the ventricle.    Normal left atrial size and volume noted. Saline test results are  negative.    There is moderate calcification of the aortic valve. The aortic valve was poorly visualized but appears trileaflet. Moderate aortic valve regurgitation is present. No aortic valve stenosis is present.    Mild mitral annular calcification is present. Mild mitral valve regurgitation is present. No significant mitral valve stenosis is present.    The tricuspid valve is grossly normal in structure. Mild tricuspid valve regurgitation is present. Estimated right ventricular systolic pressure from tricuspid regurgitation is normal, 33 mmHg. No tricuspid valve stenosis is present.    Mild dilation of the ascending aorta is present. Ascending aorta = 3.7 cm    There is a small (<1cm) pericardial effusion adjacent to the right atrium and right ventricle.      Current medications:  Scheduled Meds:amiodarone, 200 mg, Oral, Daily  apixaban, 2.5 mg, Oral, BID  ascorbic acid, 500 mg, Oral, Daily  bisacodyl, 10 mg, Rectal, Once  ipratropium-albuterol, 3 mL, Nebulization, 4x Daily - RT  lactulose, 20 g, Oral, BID  levETIRAcetam, 250 mg, Oral, BID  levothyroxine, 100 mcg, Oral, Daily  lisinopril, 10 mg, Oral, Q24H  melatonin, 5 mg, Oral, Nightly  metoprolol tartrate, 12.5 mg, Oral, BID  polyethylene glycol, 17 g, Oral, Daily  rosuvastatin, 10 mg, Oral, Nightly  senna, 2 tablet, Oral, Daily  traZODone, 100 mg, Oral, Nightly  venlafaxine XR, 75 mg, Oral, Daily      Continuous Infusions:   PRN Meds:.  acetaminophen    Assessment & Plan   Assessment & Plan     Active Hospital Problems    Diagnosis  POA    **Pneumonia [J18.9]  Yes    Hyperlipidemia [E78.5]  Yes    Autonomic dysfunction [G90.9]  Yes    Atrial fibrillation [I48.91]  Yes    Hypertension [I10]  Yes      Resolved Hospital Problems   No resolved problems to display.        Brief Hospital Course to date:  Bibiana Hodges is a 89 y.o. female with past medical history significant for atrial fibrillation, hypertension, hyperlipidemia, GERD, B12 deficiency.  She is  living at an assisted living facility.  Patient was brought by ambulance for respiratory distress.    This patient's problems and plans were partially entered by my partner and updated as appropriate by me 05/25/25. Copied text in this note has been reviewed and is accurate as of today's date.      Pneumonia of right lower lobe, suspected aspiration  COPD exacerbation  Acute hypoxia  - Room air saturation 83% and required 6 L on admission  - CTA chest showed evidence of right lower lobe pneumonia  - Negative full respiratory PCR panel  - Respiratory culture with normal vicki, negative blood cultures, negative urine Strep and Legionella  - Completed 5 days of Rocephin and Doxycycline  - ID following  - Patient has history of dysphagia and modified diet has been recommended (mechanical ground, honey thick) - however, patient does not like it  - Continue nebs  - s/p 5 days prednisone 40 mg daily  - Weaned to room air now    Asymptomatic bacteriuria  - Urine culture with 50K Pseudomonas  - ID following, feel this represents colonization     Dysphagia  - Patient had MBS 5/23/25. Recommended diet is mechanical ground texture, honey thick liquid, no mixed consistencies.  - Discussed again with patient 5/25/25 regarding option of comfort diet, however she does not want to get pneumonia over and over again, and wants to remain full code for now (says she has great grandbabies she has not even met yet and would like to do so) - she is willing to stick with the modified diet   - Reached out to [facility], they will see if we can get magic cups for patient (milkshakes will not be safe)     Occasional lower extremity pain   Occasional numbness of toes bilaterally  - Arterial duplex of lower extremities was done. 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 Also right PTA is occluded distally. On the left side mid SFA appears occlusive. Left distal SFA appears to have  reconstitution. Also left popliteal artery, distal PTA, and peroneal artery appear monophasic.  - Patient has dopplerable dorsalis pedis and posterior tibialis pulses bilaterally  - Given patient's overall condition and the fact that she still has dopplerable pulses, benefit of intervention is questionable - will defer inpatient Vascular Surgery consultation at this time, can consider as outpatient    Abnormal troponin levels  - Troponin levels were abnormal but no complaints of chest pain    Chronic essential tremor  - Reports significant tremors, especially in right hand, which is chronic   - Has previously been evaluated by [place] [facility] in 2013, had been trialed on numerous medications (see Neurology note for further details) without resolution of tremor  - Neurology has seen and evaluated the patient here, given chronicity of tremors, will not make any medication adjustments at this time (however did recommend possible trial of gabapentin 100 mg BID and transitioning patient from Trazodone to Remeron) - F/U as outpatient for further management/medication adjustments    Chronic double vision  - Reports having double vision for 2 years  - Neurology has seen and evaluated patient  - MRI of the brain does not show any acute process. 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  - Outpatient follow up with Ophthalmology     Orthostatic hypotension  - Continue non-medical/supportive interventions    Constipation  - Reported not having a bowel movement in 2 weeks  - KUB did not show any sign of obstruction  - Continue bowel regimen, add PRN meds today  - Do not see any BM charted in past several days    Generalized weakness  - PT/OT recommended back to assisted living facility with HHPT  - Per CM discussion with staff at the facility, patient transfers from bed to chair but does not do much else at baseline - however here with PT she was documented walking 60 feet   - On 5/24/25 patient  expressed desire to go to rehab to try to walk again, will have CM discuss further with her and family  - Current plan is back to assisted living facility on Tuesday per facility request given limited staffing over holiday weekend    Full code status  - Expressed desire to remain full code at this time    Expected Discharge Location and Transportation: assisted living vs rehab  Expected Discharge   Expected Discharge Date: 5/27/2025; Expected Discharge Time:      VTE Prophylaxis:  Pharmacologic VTE prophylaxis orders are present.         AM-PAC 6 Clicks Score (PT): 13 (05/24/25 2000)    CODE STATUS:   Code Status and Medical Interventions: CPR (Attempt to Resuscitate); Full Support   Ordered at: 05/19/25 3125     Code Status (Patient has no pulse and is not breathing):    CPR (Attempt to Resuscitate)     Medical Interventions (Patient has pulse or is breathing):    Full Support       Wendi Gaspar MD  05/25/25

## 2025-05-26 PROCEDURE — 99232 SBSQ HOSP IP/OBS MODERATE 35: CPT | Performed by: PHYSICIAN ASSISTANT

## 2025-05-26 PROCEDURE — 94799 UNLISTED PULMONARY SVC/PX: CPT

## 2025-05-26 PROCEDURE — 25010000002 PROCHLORPERAZINE 10 MG/2ML SOLUTION

## 2025-05-26 PROCEDURE — 94664 DEMO&/EVAL PT USE INHALER: CPT

## 2025-05-26 RX ORDER — LUBIPROSTONE 24 UG/1
24 CAPSULE ORAL 2 TIMES DAILY WITH MEALS
Status: DISCONTINUED | OUTPATIENT
Start: 2025-05-26 | End: 2025-05-29 | Stop reason: HOSPADM

## 2025-05-26 RX ORDER — PROCHLORPERAZINE EDISYLATE 5 MG/ML
2.5 INJECTION INTRAMUSCULAR; INTRAVENOUS ONCE
Status: COMPLETED | OUTPATIENT
Start: 2025-05-26 | End: 2025-05-26

## 2025-05-26 RX ADMIN — Medication 12.5 MG: at 20:18

## 2025-05-26 RX ADMIN — OXYCODONE HYDROCHLORIDE AND ACETAMINOPHEN 500 MG: 500 TABLET ORAL at 10:23

## 2025-05-26 RX ADMIN — LUBIPROSTONE 24 MCG: 24 CAPSULE ORAL at 18:38

## 2025-05-26 RX ADMIN — STANDARDIZED SENNA CONCENTRATE 2 TABLET: 8.6 TABLET ORAL at 10:23

## 2025-05-26 RX ADMIN — AMIODARONE HYDROCHLORIDE 200 MG: 200 TABLET ORAL at 10:23

## 2025-05-26 RX ADMIN — ROSUVASTATIN 10 MG: 10 TABLET, FILM COATED ORAL at 20:18

## 2025-05-26 RX ADMIN — LEVOTHYROXINE SODIUM 100 MCG: 0.1 TABLET ORAL at 05:38

## 2025-05-26 RX ADMIN — VENLAFAXINE HYDROCHLORIDE 75 MG: 75 CAPSULE, EXTENDED RELEASE ORAL at 10:23

## 2025-05-26 RX ADMIN — POLYETHYLENE GLYCOL 3350 17 G: 17 POWDER, FOR SOLUTION ORAL at 10:23

## 2025-05-26 RX ADMIN — LACTULOSE 20 G: 20 SOLUTION ORAL at 10:23

## 2025-05-26 RX ADMIN — IPRATROPIUM BROMIDE AND ALBUTEROL SULFATE 3 ML: 2.5; .5 SOLUTION RESPIRATORY (INHALATION) at 20:29

## 2025-05-26 RX ADMIN — APIXABAN 2.5 MG: 2.5 TABLET, FILM COATED ORAL at 20:18

## 2025-05-26 RX ADMIN — IPRATROPIUM BROMIDE AND ALBUTEROL SULFATE 3 ML: 2.5; .5 SOLUTION RESPIRATORY (INHALATION) at 08:28

## 2025-05-26 RX ADMIN — LEVETIRACETAM 250 MG: 250 TABLET, FILM COATED ORAL at 10:23

## 2025-05-26 RX ADMIN — ACETAMINOPHEN 650 MG: 325 TABLET, FILM COATED ORAL at 22:28

## 2025-05-26 RX ADMIN — PROCHLORPERAZINE EDISYLATE 2.5 MG: 5 INJECTION INTRAMUSCULAR; INTRAVENOUS at 22:29

## 2025-05-26 RX ADMIN — TRAZODONE HYDROCHLORIDE 100 MG: 100 TABLET ORAL at 22:28

## 2025-05-26 RX ADMIN — IPRATROPIUM BROMIDE AND ALBUTEROL SULFATE 3 ML: 2.5; .5 SOLUTION RESPIRATORY (INHALATION) at 14:30

## 2025-05-26 RX ADMIN — LEVETIRACETAM 250 MG: 250 TABLET, FILM COATED ORAL at 20:18

## 2025-05-26 RX ADMIN — APIXABAN 2.5 MG: 2.5 TABLET, FILM COATED ORAL at 10:23

## 2025-05-26 RX ADMIN — TRAZODONE HYDROCHLORIDE 100 MG: 100 TABLET ORAL at 00:13

## 2025-05-26 RX ADMIN — Medication 5 MG: at 22:28

## 2025-05-26 RX ADMIN — ACETAMINOPHEN 650 MG: 325 TABLET, FILM COATED ORAL at 00:13

## 2025-05-26 RX ADMIN — Medication 5 MG: at 00:14

## 2025-05-26 NOTE — PROGRESS NOTES
[facility]  PROGRESS NOTE    Patient Name: Bibiana Hodges  : 1935  MRN: [id number]    Date of Admission: 2025  Primary Care Physician: Nivia Madrigal MD    Subjective     CC: f/u aspiration pneumonia     HPI:  In bed. No new complaints. Says she did not sleep well because she is anxious to know if she will be going to rehab. Breathing is stable. Does not like modified diet but wishes to continue with it for now.     Objective     Vital Signs:   Temp:  [97.4 °F (36.3 °C)-97.6 °F (36.4 °C)] 97.6 °F (36.4 °C)  Heart Rate:  [] 60  Resp:  [16] 16  BP: ()/(69-99) 90/79  Flow (L/min) (Oxygen Therapy):  [4] 4     Physical Exam:  Constitutional: No acute distress, awake, alert and conversant. Sitting up in bed. Frail / chronically ill appearing   HENT: NCAT, mucous membranes moist  Respiratory: Clear to auscultation bilaterally, normal respiratory effort on room air    Cardiovascular: RRR  Gastrointestinal: Positive bowel sounds, soft, nontender, nondistended  Musculoskeletal: Trace bilateral ankle edema  Psychiatric: Appropriate affect, cooperative with exam  Neurologic: Oriented x 3, moves all extremities spontaneously without focal deficits, speech clear. BUE tremor     Results Reviewed:  LAB RESULTS:      Lab 25  0004 25  2039 25  1734 25  1547 25  1348   WBC  --   --   --   --  13.43*   HEMOGLOBIN  --   --   --   --  13.4   HEMATOCRIT  --   --   --   --  42.2   PLATELETS  --   --   --   --  215   NEUTROS ABS  --   --   --   --  12.37*   IMMATURE GRANS (ABS)  --   --   --   --  0.06*   LYMPHS ABS  --   --   --   --  0.36*   MONOS ABS  --   --   --   --  0.62   EOS ABS  --   --   --   --  0.00   MCV  --   --   --   --  103.9*   PROCALCITONIN  --   --   --   --  0.09   LACTATE 1.3 3.1* 2.3*  --  5.5*   HSTROP T  --   --   --  98* 70*         Lab 25  1348   SODIUM 134*   POTASSIUM 4.3   CHLORIDE 95*   CO2 23.0    ANION GAP 16.0*   BUN 18   CREATININE 1.30*   EGFR 39.4*   GLUCOSE 151*   CALCIUM 8.9         Lab 05/19/25  1348   TOTAL PROTEIN 6.0   ALBUMIN 3.2*   GLOBULIN 2.8   ALT (SGPT) 28   AST (SGOT) 43*   BILIRUBIN 0.3   ALK PHOS 74         Lab 05/19/25  1547 05/19/25  1348   PROBNP  --  1,247.0   HSTROP T 98* 70*     Brief Urine Lab Results  (Last result in the past 365 days)        Color   Clarity   Blood   Leuk Est   Nitrite   Protein   CREAT   Urine HCG        05/19/25 1428 Yellow   Turbid   Moderate (2+)   Large (3+)   Negative   100 mg/dL (2+)                 Microbiology Results Abnormal       Procedure Component Value - Date/Time    Urine Culture - Urine, Urine, Catheter In/Out [[id number]]  (Abnormal)  (Susceptibility) Collected: 05/19/25 1428    Lab Status: Final result Specimen: Urine, Catheter In/Out Updated: 05/21/25 1124     Urine Culture 50,000 CFU/mL Pseudomonas aeruginosa    Narrative:      Colonization of the urinary tract without infection is common. Treatment is discouraged unless the patient is symptomatic, pregnant, or undergoing an invasive urologic procedure.    Susceptibility        Pseudomonas aeruginosa      BECK      Cefepime Susceptible      Ceftazidime Susceptible      Ciprofloxacin Susceptible      Levofloxacin Susceptible      Piperacillin + Tazobactam Susceptible      Tobramycin Susceptible                                 No radiology results from the last 24 hrs    Results for orders placed during the hospital encounter of 08/20/23    Adult Transthoracic Echo Complete W/ Cont if Necessary Per Protocol    Interpretation Summary    Left ventricular systolic function is mildly decreased. Calculated left ventricular EF = 47.9% Normal left ventricular cavity size and wall thickness noted. There is left ventricular global hypokinesis noted. Left ventricular diastolic function is consistent with (grade I) impaired relaxation.    : Normal right ventricular cavity size and systolic function noted.  Electronic lead present in the ventricle.    Normal left atrial size and volume noted. Saline test results are negative.    There is moderate calcification of the aortic valve. The aortic valve was poorly visualized but appears trileaflet. Moderate aortic valve regurgitation is present. No aortic valve stenosis is present.    Mild mitral annular calcification is present. Mild mitral valve regurgitation is present. No significant mitral valve stenosis is present.    The tricuspid valve is grossly normal in structure. Mild tricuspid valve regurgitation is present. Estimated right ventricular systolic pressure from tricuspid regurgitation is normal, 33 mmHg. No tricuspid valve stenosis is present.    Mild dilation of the ascending aorta is present. Ascending aorta = 3.7 cm    There is a small (<1cm) pericardial effusion adjacent to the right atrium and right ventricle.    Current medications:  Scheduled Meds:amiodarone, 200 mg, Oral, Daily  apixaban, 2.5 mg, Oral, BID  ascorbic acid, 500 mg, Oral, Daily  ipratropium-albuterol, 3 mL, Nebulization, 4x Daily - RT  levETIRAcetam, 250 mg, Oral, BID  levothyroxine, 100 mcg, Oral, Daily  lisinopril, 10 mg, Oral, Q24H  lubiprostone, 24 mcg, Oral, BID With Meals  melatonin, 5 mg, Oral, Nightly  metoprolol tartrate, 12.5 mg, Oral, BID  polyethylene glycol, 17 g, Oral, Daily  rosuvastatin, 10 mg, Oral, Nightly  traZODone, 100 mg, Oral, Nightly  venlafaxine XR, 75 mg, Oral, Daily      Continuous Infusions:   PRN Meds:.  acetaminophen    senna-docusate sodium **AND** polyethylene glycol **AND** bisacodyl **AND** bisacodyl    Assessment & Plan   Assessment & Plan     Active Hospital Problems    Diagnosis  POA    **Pneumonia [J18.9]  Yes    Hyperlipidemia [E78.5]  Yes    Autonomic dysfunction [G90.9]  Yes    Atrial fibrillation [I48.91]  Yes    Hypertension [I10]  Yes      Resolved Hospital Problems   No resolved problems to display.        Brief Hospital Course to date:  Bibiana ALLEN  Tracie is a 89 y.o. female with past medical history significant for atrial fibrillation, hypertension, hyperlipidemia, GERD, B12 deficiency. She resides at [facility]. Brought to [facility] ED on 5/19/25 due to acute respiratory distress. She was hypoxic on arrival with O2 sat 83% on room air - she initially required 6L NC. Found to have RLL PNA.     Pneumonia of right lower lobe, suspected aspiration  COPD exacerbation  Acute hypoxia  - Blood gas not obtained in ED  - RA saturation 83% and required 6L   - CTA chest showed evidence of right lower lobe pneumonia  - Negative full respiratory PCR panel  - Respiratory culture with normal vicki  - Blood cultures negative  - Strep pneumonia / Legionella urine Ags negative  - ID has followed  - s/p IV Ceftriaxone / Doxycycline x 5 days  - s/p Prednisone 40mg daily x 5 days  - Has since weaned to room air  - SLP follow due to concerns for dysphagia     Oropharyngeal dysphagia  - Patient had MBS 5/23/25. Recommended diet is mechanical ground texture, honey thick liquid, no mixed consistencies.  - Partner has discussed comfort diet with patient. Ms. Hodges expressed that she does not want to get pneumonia over and over again, and wants to remain full code for now (says she has great grandbabies she has not even met yet and would like to do so) - she is willing to stick with the modified diet   - Partner reached out to SLP, they will see if we can get magic cups for patient (milkshakes will not be safe)    Asymptomatic bacteriuria  - Urine culture with 50K Pseudomonas  - ID following, feel this represents colonization    Occasional lower extremity pain   Occasional numbness of toes bilaterally  - Arterial duplex of lower extremities was done. 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 Also right PTA is occluded distally. On the left side mid SFA appears occlusive. Left distal SFA appears to have  reconstitution. Also left popliteal artery, distal PTA, and peroneal artery appear monophasic.  - Patient has dopplerable dorsalis pedis and posterior tibialis pulses bilaterally  - Given patient's overall condition and the fact that she still has dopplerable pulses, benefit of intervention is questionable - will defer inpatient Vascular Surgery consultation at this time, can consider as outpatient    Abnormal troponin levels  - Troponin levels were abnormal but no complaints of chest pain    Chronic essential tremor  - Reports significant tremors, especially in right hand, which is chronic   - Has previously been evaluated by [place] [facility] in 2013, had been trialed on numerous medications (see Neurology note for further details) without resolution of tremor  - Neurology has seen and evaluated the patient here, given chronicity of tremors, will not make any medication adjustments at this time (however did recommend possible trial of gabapentin 100 mg BID and transitioning patient from Trazodone to Remeron) - F/U as outpatient for further management/medication adjustments    Chronic double vision  - Reports having double vision for 2 years  - Neurology has seen and evaluated patient  - MRI of the brain does not show any acute process. 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  - Outpatient follow up with Ophthalmology     Orthostatic hypotension  - Continue non-medical/supportive interventions    Constipation  - Looks like PCP had recently prescribed Linzess - patient unsure if it was helpful  - On Miralax, Senna and Lactulose with continued abdominal discomfort and constipation  - Continue Miralax. Add Amitiza 24mg PO BID today  - Hold Senna / Lactulose for now     Generalized weakness  - PT/OT following - patient interested in rehab referral. Will update CM in AM     Expected Discharge Location and Transportation: [facility]   Expected Discharge Expected Discharge Date: 5/29/2025; Expected  Discharge Time:      VTE Prophylaxis:Pharmacologic VTE prophylaxis orders are present.    AM-PAC 6 Clicks Score (PT): 13 (05/26/25 0800)    CODE STATUS:   Code Status and Medical Interventions: CPR (Attempt to Resuscitate); Full Support   Ordered at: 05/19/25 1750     Code Status (Patient has no pulse and is not breathing):    CPR (Attempt to Resuscitate)     Medical Interventions (Patient has pulse or is breathing):    Full Support     Kendra Monahan PA-C  05/26/25

## 2025-05-27 LAB
ANION GAP SERPL CALCULATED.3IONS-SCNC: 7 MMOL/L (ref 5–15)
BUN SERPL-MCNC: 19 MG/DL (ref 8–23)
BUN/CREAT SERPL: 20.4 (ref 7–25)
CALCIUM SPEC-SCNC: 8.2 MG/DL (ref 8.6–10.5)
CHLORIDE SERPL-SCNC: 105 MMOL/L (ref 98–107)
CO2 SERPL-SCNC: 23 MMOL/L (ref 22–29)
CREAT SERPL-MCNC: 0.93 MG/DL (ref 0.57–1)
DEPRECATED RDW RBC AUTO: 50.4 FL (ref 37–54)
EGFRCR SERPLBLD CKD-EPI 2021: 58.9 ML/MIN/1.73
ERYTHROCYTE [DISTWIDTH] IN BLOOD BY AUTOMATED COUNT: 13.2 % (ref 12.3–15.4)
GLUCOSE SERPL-MCNC: 82 MG/DL (ref 65–99)
HCT VFR BLD AUTO: 36.4 % (ref 34–46.6)
HGB BLD-MCNC: 11.7 G/DL (ref 12–15.9)
MAGNESIUM SERPL-MCNC: 2 MG/DL (ref 1.6–2.4)
MCH RBC QN AUTO: 33.5 PG (ref 26.6–33)
MCHC RBC AUTO-ENTMCNC: 32.1 G/DL (ref 31.5–35.7)
MCV RBC AUTO: 104.3 FL (ref 79–97)
PLATELET # BLD AUTO: 155 10*3/MM3 (ref 140–450)
PMV BLD AUTO: 9.6 FL (ref 6–12)
POTASSIUM SERPL-SCNC: 3.9 MMOL/L (ref 3.5–5.2)
RBC # BLD AUTO: 3.49 10*6/MM3 (ref 3.77–5.28)
SODIUM SERPL-SCNC: 135 MMOL/L (ref 136–145)
WBC NRBC COR # BLD AUTO: 9.41 10*3/MM3 (ref 3.4–10.8)

## 2025-05-27 PROCEDURE — 94799 UNLISTED PULMONARY SVC/PX: CPT

## 2025-05-27 PROCEDURE — 99232 SBSQ HOSP IP/OBS MODERATE 35: CPT | Performed by: PHYSICIAN ASSISTANT

## 2025-05-27 PROCEDURE — 85027 COMPLETE CBC AUTOMATED: CPT | Performed by: PHYSICIAN ASSISTANT

## 2025-05-27 PROCEDURE — 97110 THERAPEUTIC EXERCISES: CPT | Performed by: OCCUPATIONAL THERAPIST

## 2025-05-27 PROCEDURE — 83735 ASSAY OF MAGNESIUM: CPT | Performed by: PHYSICIAN ASSISTANT

## 2025-05-27 PROCEDURE — 80048 BASIC METABOLIC PNL TOTAL CA: CPT | Performed by: PHYSICIAN ASSISTANT

## 2025-05-27 PROCEDURE — 97535 SELF CARE MNGMENT TRAINING: CPT | Performed by: OCCUPATIONAL THERAPIST

## 2025-05-27 RX ORDER — CASTOR OIL AND BALSAM, PERU 788; 87 MG/G; MG/G
1 OINTMENT TOPICAL EVERY 12 HOURS SCHEDULED
Status: DISCONTINUED | OUTPATIENT
Start: 2025-05-27 | End: 2025-05-29 | Stop reason: HOSPADM

## 2025-05-27 RX ADMIN — APIXABAN 2.5 MG: 2.5 TABLET, FILM COATED ORAL at 20:37

## 2025-05-27 RX ADMIN — POLYETHYLENE GLYCOL 3350 17 G: 17 POWDER, FOR SOLUTION ORAL at 09:49

## 2025-05-27 RX ADMIN — IPRATROPIUM BROMIDE AND ALBUTEROL SULFATE 3 ML: 2.5; .5 SOLUTION RESPIRATORY (INHALATION) at 16:14

## 2025-05-27 RX ADMIN — DOCUSATE SODIUM 50MG AND SENNOSIDES 8.6MG 2 TABLET: 8.6; 5 TABLET, FILM COATED ORAL at 20:36

## 2025-05-27 RX ADMIN — LEVETIRACETAM 250 MG: 250 TABLET, FILM COATED ORAL at 09:49

## 2025-05-27 RX ADMIN — LISINOPRIL 10 MG: 10 TABLET ORAL at 09:49

## 2025-05-27 RX ADMIN — ACETAMINOPHEN 650 MG: 325 TABLET, FILM COATED ORAL at 22:38

## 2025-05-27 RX ADMIN — BISACODYL 5 MG: 5 TABLET, COATED ORAL at 13:55

## 2025-05-27 RX ADMIN — VENLAFAXINE HYDROCHLORIDE 75 MG: 75 CAPSULE, EXTENDED RELEASE ORAL at 09:49

## 2025-05-27 RX ADMIN — BISACODYL 10 MG: 10 SUPPOSITORY RECTAL at 20:38

## 2025-05-27 RX ADMIN — TRAZODONE HYDROCHLORIDE 100 MG: 100 TABLET ORAL at 22:38

## 2025-05-27 RX ADMIN — Medication 12.5 MG: at 09:49

## 2025-05-27 RX ADMIN — Medication 1 APPLICATION: at 16:07

## 2025-05-27 RX ADMIN — LEVOTHYROXINE SODIUM 100 MCG: 0.1 TABLET ORAL at 10:19

## 2025-05-27 RX ADMIN — LUBIPROSTONE 24 MCG: 24 CAPSULE ORAL at 17:36

## 2025-05-27 RX ADMIN — IPRATROPIUM BROMIDE AND ALBUTEROL SULFATE 3 ML: 2.5; .5 SOLUTION RESPIRATORY (INHALATION) at 11:09

## 2025-05-27 RX ADMIN — LEVETIRACETAM 250 MG: 250 TABLET, FILM COATED ORAL at 20:37

## 2025-05-27 RX ADMIN — ACETAMINOPHEN 650 MG: 325 TABLET, FILM COATED ORAL at 13:54

## 2025-05-27 RX ADMIN — LUBIPROSTONE 24 MCG: 24 CAPSULE ORAL at 09:50

## 2025-05-27 RX ADMIN — OXYCODONE HYDROCHLORIDE AND ACETAMINOPHEN 500 MG: 500 TABLET ORAL at 09:49

## 2025-05-27 RX ADMIN — IPRATROPIUM BROMIDE AND ALBUTEROL SULFATE 3 ML: 2.5; .5 SOLUTION RESPIRATORY (INHALATION) at 19:46

## 2025-05-27 RX ADMIN — Medication 12.5 MG: at 20:36

## 2025-05-27 RX ADMIN — APIXABAN 2.5 MG: 2.5 TABLET, FILM COATED ORAL at 09:49

## 2025-05-27 RX ADMIN — AMIODARONE HYDROCHLORIDE 200 MG: 200 TABLET ORAL at 09:49

## 2025-05-27 RX ADMIN — ROSUVASTATIN 10 MG: 10 TABLET, FILM COATED ORAL at 20:37

## 2025-05-27 RX ADMIN — IPRATROPIUM BROMIDE AND ALBUTEROL SULFATE 3 ML: 2.5; .5 SOLUTION RESPIRATORY (INHALATION) at 06:54

## 2025-05-27 RX ADMIN — Medication 5 MG: at 22:38

## 2025-05-27 NOTE — PLAN OF CARE
Problem: Adult Inpatient Plan of Care  Goal: Plan of Care Review  Outcome: Progressing  Flowsheets (Taken 5/27/2025 1750)  Progress: improving  Outcome Evaluation: Patient AOX4, RA, VSS , patient c/o headache this afternoon relieved with tylenol. Patient worked with OT , updated notes in chart. Patient would like to DC to a [facility] for Rehab upon precert submission and approval. Patient up in chair this afternoon , I/O charted with good intake. PRN stool softner given for bowel regimen. Wound care consult complete with updated orders in chart for nursing.  Goal: Patient-Specific Goal (Individualized)  Outcome: Progressing  Goal: Absence of Hospital-Acquired Illness or Injury  Outcome: Progressing  Intervention: Identify and Manage Fall Risk  Recent Flowsheet Documentation  Taken 5/27/2025 1600 by Blanca Delacruz RN  Safety Promotion/Fall Prevention:   activity supervised   clutter free environment maintained   elopement precautions   fall prevention program maintained   nonskid shoes/slippers when out of bed   room organization consistent   safety round/check completed  Taken 5/27/2025 1400 by Blanca Delacruz RN  Safety Promotion/Fall Prevention:   activity supervised   assistive device/personal items within reach   elopement precautions   fall prevention program maintained   lighting adjusted   mobility aid in reach   nonskid shoes/slippers when out of bed   room organization consistent   safety round/check completed  Taken 5/27/2025 0800 by Blanca Delacruz RN  Safety Promotion/Fall Prevention:   activity supervised   lighting adjusted   mobility aid in reach   nonskid shoes/slippers when out of bed   room organization consistent  Intervention: Prevent Skin Injury  Recent Flowsheet Documentation  Taken 5/27/2025 1600 by Blanca Delacruz RN  Body Position: position changed independently  Skin Protection: silicone foam dressing in place  Taken 5/27/2025 1400 by Blanca Delacruz RN  Body Position:  position maintained  Skin Protection: silicone foam dressing in place  Taken 5/27/2025 1200 by Blanca Delacruz RN  Body Position: position maintained  Skin Protection: silicone foam dressing in place  Taken 5/27/2025 1000 by Blanca Delacruz RN  Body Position: position maintained  Skin Protection: silicone foam dressing in place  Taken 5/27/2025 0800 by Blanca Delacruz RN  Body Position: position maintained  Skin Protection: silicone foam dressing in place  Intervention: Prevent and Manage VTE (Venous Thromboembolism) Risk  Recent Flowsheet Documentation  Taken 5/27/2025 0800 by Blanca Delacruz RN  VTE Prevention/Management: (Patient on Eliquis)   bilateral   SCDs (sequential compression devices) off   other (see comments)  Intervention: Prevent Infection  Recent Flowsheet Documentation  Taken 5/27/2025 1600 by Blanca Delacruz RN  Infection Prevention:   cohorting utilized   hand hygiene promoted   rest/sleep promoted  Taken 5/27/2025 1400 by Blanca Delacruz RN  Infection Prevention:   cohorting utilized   hand hygiene promoted   rest/sleep promoted  Taken 5/27/2025 1200 by Blanca Delacruz RN  Infection Prevention:   cohorting utilized   hand hygiene promoted   rest/sleep promoted  Taken 5/27/2025 1000 by Blanca Delacruz RN  Infection Prevention:   cohorting utilized   equipment surfaces disinfected   hand hygiene promoted  Taken 5/27/2025 0800 by Blanca Delacruz RN  Infection Prevention:   cohorting utilized   hand hygiene promoted  Goal: Optimal Comfort and Wellbeing  Outcome: Progressing  Intervention: Monitor Pain and Promote Comfort  Recent Flowsheet Documentation  Taken 5/27/2025 1454 by Blanca Delacruz RN  Pain Management Interventions:   care clustered   pain medication given   position adjusted  Taken 5/27/2025 1424 by Blanca Delacruz RN  Pain Management Interventions:   care clustered   pain medication given   position adjusted  Taken 5/27/2025 1400 by Blanca Delacruz RN  Pain Management  Interventions: pain medication given  Taken 5/27/2025 1354 by Blanca Delacruz RN  Pain Management Interventions: pain medication given  Intervention: Provide Person-Centered Care  Recent Flowsheet Documentation  Taken 5/27/2025 1600 by Blanca Delacruz RN  Trust Relationship/Rapport:   care explained   choices provided   emotional support provided   questions answered   questions encouraged  Taken 5/27/2025 1400 by Blanca Delacruz RN  Trust Relationship/Rapport:   care explained   choices provided   emotional support provided   questions answered   questions encouraged  Taken 5/27/2025 1200 by Blanca Delacruz RN  Trust Relationship/Rapport:   care explained   choices provided   emotional support provided   questions answered   questions encouraged  Taken 5/27/2025 1000 by Blanca Delacruz RN  Trust Relationship/Rapport:   care explained   choices provided   emotional support provided   empathic listening provided   questions answered   questions encouraged  Taken 5/27/2025 0800 by Blanca Delacruz RN  Trust Relationship/Rapport:   care explained   questions answered   questions encouraged  Goal: Readiness for Transition of Care  Outcome: Progressing     Problem: Comorbidity Management  Goal: Maintenance of Asthma Control  Outcome: Progressing  Intervention: Maintain Asthma Symptom Control  Recent Flowsheet Documentation  Taken 5/27/2025 1600 by Blanca Delacruz RN  Medication Review/Management: medications reviewed  Taken 5/27/2025 0800 by Blanca Delacruz RN  Medication Review/Management: medications reviewed  Goal: Blood Pressure in Desired Range  Outcome: Progressing  Intervention: Maintain Blood Pressure Management  Recent Flowsheet Documentation  Taken 5/27/2025 1600 by Blanca Delacruz RN  Medication Review/Management: medications reviewed  Taken 5/27/2025 0800 by Blanca Delacruz RN  Medication Review/Management: medications reviewed  Goal: Maintenance of Osteoarthritis Symptom Control  Outcome:  Progressing  Intervention: Maintain Osteoarthritis Symptom Control  Recent Flowsheet Documentation  Taken 5/27/2025 1600 by Blanca Delacruz RN  Activity Management: up in chair  Medication Review/Management: medications reviewed  Taken 5/27/2025 1400 by Blanca Delacruz RN  Activity Management: activity encouraged  Taken 5/27/2025 1200 by Blanca Delacruz RN  Activity Management: activity encouraged  Taken 5/27/2025 1000 by Blanca Delacruz RN  Activity Management: activity encouraged  Taken 5/27/2025 0800 by Blanca Delacruz RN  Activity Management: activity encouraged  Medication Review/Management: medications reviewed     Problem: Skin Injury Risk Increased  Goal: Skin Health and Integrity  Outcome: Progressing  Intervention: Optimize Skin Protection  Recent Flowsheet Documentation  Taken 5/27/2025 1600 by Blanca Delacruz RN  Activity Management: up in chair  Pressure Reduction Techniques: frequent weight shift encouraged  Head of Bed (HOB) Positioning: HOB elevated  Pressure Reduction Devices:   heel offloading device utilized   positioning supports utilized   pressure-redistributing mattress utilized  Skin Protection: silicone foam dressing in place  Taken 5/27/2025 1400 by Blanca Delacruz RN  Activity Management: activity encouraged  Pressure Reduction Techniques:   frequent weight shift encouraged   heels elevated off bed  Head of Bed (HOB) Positioning: HOB elevated  Pressure Reduction Devices:   heel offloading device utilized   positioning supports utilized   pressure-redistributing mattress utilized  Skin Protection: silicone foam dressing in place  Taken 5/27/2025 1200 by Blanca Delacruz RN  Activity Management: activity encouraged  Pressure Reduction Techniques:   frequent weight shift encouraged   heels elevated off bed  Head of Bed (HOB) Positioning: HOB elevated  Pressure Reduction Devices:   heel offloading device utilized   positioning supports utilized   pressure-redistributing mattress  utilized  Skin Protection: silicone foam dressing in place  Taken 5/27/2025 1000 by Blanca Delacruz RN  Activity Management: activity encouraged  Pressure Reduction Techniques:   frequent weight shift encouraged   heels elevated off bed  Head of Bed (HOB) Positioning: HOB elevated  Pressure Reduction Devices:   heel offloading device utilized   pressure-redistributing mattress utilized   positioning supports utilized  Skin Protection: silicone foam dressing in place  Taken 5/27/2025 0800 by Blanca Delacruz RN  Activity Management: activity encouraged  Pressure Reduction Techniques: frequent weight shift encouraged  Head of Bed (HOB) Positioning: HOB elevated  Pressure Reduction Devices: positioning supports utilized  Skin Protection: silicone foam dressing in place  Intervention: Promote and Optimize Oral Intake  Recent Flowsheet Documentation  Taken 5/27/2025 1156 by Blanca Delacruz RN  Nutrition Interventions:   supplemental drinks provided   meal set-up provided  Taken 5/27/2025 0800 by Blanca Delacruz RN  Oral Nutrition Promotion: rest periods promoted     Problem: Fall Injury Risk  Goal: Absence of Fall and Fall-Related Injury  Outcome: Progressing  Intervention: Identify and Manage Contributors  Recent Flowsheet Documentation  Taken 5/27/2025 1600 by Blanca Delacruz RN  Medication Review/Management: medications reviewed  Self-Care Promotion: meal set-up provided  Taken 5/27/2025 1400 by Blanca Delacruz RN  Self-Care Promotion: meal set-up provided  Taken 5/27/2025 1200 by Blanca Delacruz RN  Self-Care Promotion: meal set-up provided  Taken 5/27/2025 1000 by Blanca Delacruz RN  Self-Care Promotion: independence encouraged  Taken 5/27/2025 0800 by Blanca Delacruz RN  Medication Review/Management: medications reviewed  Self-Care Promotion: meal set-up provided  Intervention: Promote Injury-Free Environment  Recent Flowsheet Documentation  Taken 5/27/2025 1600 by Blanca Delacruz RN  Safety Promotion/Fall  Prevention:   activity supervised   clutter free environment maintained   elopement precautions   fall prevention program maintained   nonskid shoes/slippers when out of bed   room organization consistent   safety round/check completed  Taken 5/27/2025 1400 by Blanca Delacruz RN  Safety Promotion/Fall Prevention:   activity supervised   assistive device/personal items within reach   elopement precautions   fall prevention program maintained   lighting adjusted   mobility aid in reach   nonskid shoes/slippers when out of bed   room organization consistent   safety round/check completed  Taken 5/27/2025 0800 by Blanca Delacruz RN  Safety Promotion/Fall Prevention:   activity supervised   lighting adjusted   mobility aid in reach   nonskid shoes/slippers when out of bed   room organization consistent   Goal Outcome Evaluation:           Progress: improving  Outcome Evaluation: Patient AOX4, RA, VSS , patient c/o headache this afternoon relieved with tylenol. Patient worked with OT , updated notes in chart. Patient would like to DC to a [facility] facility for Rehab upon precert submission and approval. Patient up in chair this afternoon , I/O charted with good intake. PRN stool softner given for bowel regimen. Wound care consult complete with updated orders in chart for nursing.

## 2025-05-27 NOTE — NURSING NOTE
WOC consulted for wound to right gluteal/coccyx    There area at the right gluteal/coccyx region appears friction related. There is a scabbed area to the right of the coccyx that is intact. The skin in this area is pink and blanchable.    Will order topical venelex to be applied BID. Please cover with an Allevyn dressing. Barrier cream should be applied ot the lower gluteal region for prevention of MASD.    HIGH risk for pressure injury development. Patient is on an ISOTOUR mattress with ARMIDA pump.     Please turn q 2 hrs and offload heels from bed.    WOC will sign off. Please reconsult if new issues arise.     Zafar Chowdary RN, BSN, CWOCN  Wound, Ostomy and Continence (WOC) Department  [facility]

## 2025-05-27 NOTE — PLAN OF CARE
Goal Outcome Evaluation:  Plan of Care Reviewed With: patient        Progress: declining  Outcome Evaluation: Pt alert, Ox4 and c/o abdominal pain. She completed bed mobility with mod assist, ambulated 8 feet to chair with min assist using RW and she required dependence post-toilet hygiene due to LOB instance requiring mod assist for recovery while attempting to complete task. She was unable to don sock without assist, issued necessary AE and educated on use. Pt limited with impaired balance, decreased safety awareness, diminished occupational endurance, decreased strength, generalized weakness and is performing significantly below her baseline. She requires assist with ADLs and mobility, recommend IPR- discussed with pt who is in agreement.    Anticipated Discharge Disposition (OT): inpatient rehabilitation facility

## 2025-05-27 NOTE — PROGRESS NOTES
[facility]  PROGRESS NOTE    Patient Name: Bibiana Hodges  : 1935  MRN: [id number]    Date of Admission: 2025  Primary Care Physician: Nivia Madrigal MD    Subjective     CC: f/u aspiration pneumonia     HPI:  In bed. No new complaints. Rested better last night. Breathing is stable on room air. Does not like modified diet but wishes to continue with it for now. Interested in rehab before return to [facility] as she is feeling increasingly weak.     Objective     Vital Signs:   Temp:  [97.5 °F (36.4 °C)-98.1 °F (36.7 °C)] 98.1 °F (36.7 °C)  Heart Rate:  [60-96] 67  Resp:  [17-18] 18  BP: (117-181)/(64-90) 117/89     Physical Exam:  Constitutional: No acute distress, awake, alert and conversant. Sitting up in bed. Frail / chronically ill appearing   HENT: NCAT, mucous membranes moist  Respiratory: Clear to auscultation bilaterally, normal respiratory effort on room air    Cardiovascular: RRR  Gastrointestinal: Positive bowel sounds, soft, nontender, nondistended  Musculoskeletal: Trace bilateral ankle edema  Psychiatric: Appropriate affect, cooperative with exam  Neurologic: Oriented x 3, moves all extremities spontaneously without focal deficits, speech clear. BUE tremor     Results Reviewed:  LAB RESULTS:      Lab 25  0429   WBC 9.41   HEMOGLOBIN 11.7*   HEMATOCRIT 36.4   PLATELETS 155   .3*         Lab 25  0430   SODIUM 135*   POTASSIUM 3.9   CHLORIDE 105   CO2 23.0   ANION GAP 7.0   BUN 19   CREATININE 0.93   EGFR 58.9*   GLUCOSE 82   CALCIUM 8.2*   MAGNESIUM 2.0     Brief Urine Lab Results  (Last result in the past 365 days)        Color   Clarity   Blood   Leuk Est   Nitrite   Protein   CREAT   Urine HCG        25 1428 Yellow   Turbid   Moderate (2+)   Large (3+)   Negative   100 mg/dL (2+)                 Microbiology Results Abnormal       Procedure Component Value - Date/Time    Urine Culture - Urine, Urine, Catheter In/Out  [[id number]]  (Abnormal)  (Susceptibility) Collected: 05/19/25 1428    Lab Status: Final result Specimen: Urine, Catheter In/Out Updated: 05/21/25 1124     Urine Culture 50,000 CFU/mL Pseudomonas aeruginosa    Narrative:      Colonization of the urinary tract without infection is common. Treatment is discouraged unless the patient is symptomatic, pregnant, or undergoing an invasive urologic procedure.    Susceptibility        Pseudomonas aeruginosa      BECK      Cefepime Susceptible      Ceftazidime Susceptible      Ciprofloxacin Susceptible      Levofloxacin Susceptible      Piperacillin + Tazobactam Susceptible      Tobramycin Susceptible                                 No radiology results from the last 24 hrs    Results for orders placed during the hospital encounter of 08/20/23    Adult Transthoracic Echo Complete W/ Cont if Necessary Per Protocol    Interpretation Summary    Left ventricular systolic function is mildly decreased. Calculated left ventricular EF = 47.9% Normal left ventricular cavity size and wall thickness noted. There is left ventricular global hypokinesis noted. Left ventricular diastolic function is consistent with (grade I) impaired relaxation.    : Normal right ventricular cavity size and systolic function noted. Electronic lead present in the ventricle.    Normal left atrial size and volume noted. Saline test results are negative.    There is moderate calcification of the aortic valve. The aortic valve was poorly visualized but appears trileaflet. Moderate aortic valve regurgitation is present. No aortic valve stenosis is present.    Mild mitral annular calcification is present. Mild mitral valve regurgitation is present. No significant mitral valve stenosis is present.    The tricuspid valve is grossly normal in structure. Mild tricuspid valve regurgitation is present. Estimated right ventricular systolic pressure from tricuspid regurgitation is normal, 33 mmHg. No tricuspid valve  stenosis is present.    Mild dilation of the ascending aorta is present. Ascending aorta = 3.7 cm    There is a small (<1cm) pericardial effusion adjacent to the right atrium and right ventricle.    Current medications:  Scheduled Meds:amiodarone, 200 mg, Oral, Daily  apixaban, 2.5 mg, Oral, BID  ascorbic acid, 500 mg, Oral, Daily  ipratropium-albuterol, 3 mL, Nebulization, 4x Daily - RT  levETIRAcetam, 250 mg, Oral, BID  levothyroxine, 100 mcg, Oral, Daily  lisinopril, 10 mg, Oral, Q24H  lubiprostone, 24 mcg, Oral, BID With Meals  melatonin, 5 mg, Oral, Nightly  metoprolol tartrate, 12.5 mg, Oral, BID  polyethylene glycol, 17 g, Oral, Daily  rosuvastatin, 10 mg, Oral, Nightly  traZODone, 100 mg, Oral, Nightly  venlafaxine XR, 75 mg, Oral, Daily      Continuous Infusions:   PRN Meds:.  acetaminophen    senna-docusate sodium **AND** polyethylene glycol **AND** bisacodyl **AND** bisacodyl    Assessment & Plan   Assessment & Plan     Active Hospital Problems    Diagnosis  POA    **Pneumonia [J18.9]  Yes    Hyperlipidemia [E78.5]  Yes    Autonomic dysfunction [G90.9]  Yes    Atrial fibrillation [I48.91]  Yes    Hypertension [I10]  Yes      Resolved Hospital Problems   No resolved problems to display.        Brief Hospital Course to date:  Bibiana Hodges is a 89 y.o. female with past medical history significant for atrial fibrillation, hypertension, hyperlipidemia, GERD, B12 deficiency. She resides at [facility]. Brought to [facility] ED on 5/19/25 due to acute respiratory distress. She was hypoxic on arrival with O2 sat 83% on room air - she initially required 6L NC. Found to have RLL PNA.     Pneumonia of right lower lobe, suspected aspiration  COPD exacerbation  Acute hypoxia  - Blood gas not obtained in ED  - RA saturation 83% and required 6L   - CTA chest showed evidence of right lower lobe pneumonia  - Negative full respiratory PCR panel  - Respiratory culture with normal vicki  - Blood cultures  negative  - Strep pneumonia / Legionella urine Ags negative  - ID has followed  - s/p IV Ceftriaxone / Doxycycline x 5 days  - s/p Prednisone 40mg daily x 5 days  - Has since weaned to room air  - SLP following due to concerns for dysphagia     Oropharyngeal dysphagia  - Patient had MBS 5/23/25. Recommended diet is mechanical ground texture, honey thick liquid, no mixed consistencies.  - Partner has discussed comfort diet with patient. Ms. Hodges expressed that she does not want to get pneumonia over and over again, and wants to remain full code for now (says she has great grandbabies she has not even met yet and would like to do so) - she is willing to stick with the modified diet   - Partner reached out to SLP, they will see if we can get magic cups for patient (milkshakes will not be safe)    Asymptomatic bacteriuria  - Urine culture with 50K Pseudomonas  - ID following, feel this represents colonization    Occasional lower extremity pain   Occasional numbness of toes bilaterally  - Arterial duplex of lower extremities was done. 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 Also right PTA is occluded distally. On the left side mid SFA appears occlusive. Left distal SFA appears to have reconstitution. Also left popliteal artery, distal PTA, and peroneal artery appear monophasic.  - Patient has dopplerable dorsalis pedis and posterior tibialis pulses bilaterally  - Given patient's overall condition and the fact that she still has dopplerable pulses, benefit of intervention is questionable - will defer inpatient Vascular Surgery consultation at this time, can consider as outpatient    Abnormal troponin levels  - Troponin levels were abnormal but no complaints of chest pain    Chronic essential tremor  - Reports significant tremors, especially in right hand, which is chronic   - Has previously been evaluated [facility], had been  trialed on numerous medications (see Neurology note for further details) without resolution of tremor  - Neurology has seen and evaluated the patient here, given chronicity of tremors, will not make any medication adjustments at this time (however did recommend possible trial of gabapentin 100 mg BID and transitioning patient from Trazodone to Remeron)   - F/U as outpatient for further management/medication adjustments    Chronic double vision  - Reports having double vision for 2 years  - Neurology has seen and evaluated patient  - MRI of the brain does not show any acute process. 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  - Outpatient follow up with Ophthalmology     Orthostatic hypotension  - Continue non-medical/supportive interventions    Constipation  - Looks like PCP had recently prescribed Linzess - patient unsure if it was helpful  - On Miralax, Senna and Lactulose with continued abdominal discomfort and constipation  - Continue Miralax. Added Amitiza 24mg PO BID on 5/26 - will escalate regimen if no results by 5/28  - Holding Senna / Lactulose for now     Generalized weakness  - PT/OT following - patient interested in rehab     Expected Discharge Location and Transportation: [facility]   Expected Discharge Expected Discharge Date: 5/29/2025; Expected Discharge Time:      VTE Prophylaxis:Pharmacologic VTE prophylaxis orders are present.    AM-PAC 6 Clicks Score (PT): 14 (05/27/25 0800)    CODE STATUS:   Code Status and Medical Interventions: CPR (Attempt to Resuscitate); Full Support   Ordered at: 05/19/25 2050     Code Status (Patient has no pulse and is not breathing):    CPR (Attempt to Resuscitate)     Medical Interventions (Patient has pulse or is breathing):    Full Support     Kendra Monahan PA-C  05/27/25

## 2025-05-27 NOTE — THERAPY TREATMENT NOTE
Patient Name: Bibiana Hodges  : 1935    MRN: [id number]                              Today's Date: 2025       Admit Date: 2025    Visit Dx:     ICD-10-CM ICD-9-CM   1. Acute respiratory failure with hypoxia  J96.01 518.81   2. Community acquired pneumonia, unspecified laterality  J18.9 486   3. Acute UTI  N39.0 599.0   4. Elevated troponin  R79.89 790.6   5. Lactic acidosis  E87.20 276.2   6. Leukocytosis, unspecified type  D72.829 288.60   7. Oropharyngeal dysphagia  R13.12 787.22     Patient Active Problem List   Diagnosis    Dysautonomia orthostatic hypotension syndrome    Hypertension    Severe malnutrition    Atrial fibrillation    Autonomic dysfunction    Hyperlipidemia    Pneumonia     Past Medical History:   Diagnosis Date    Constipation     Depression     Dysautonomia orthostatic hypotension syndrome     Generalized osteoarthritis     GERD (gastroesophageal reflux disease)     s/p Nissen    Hypertension     Insomnia     Osteoarthrosis, shoulder region     Skin ulcer of scalp     Thrombophlebitis of arm     Tremor     Vitamin B12 deficiency      Past Surgical History:   Procedure Laterality Date    DILATATION AND CURETTAGE      HERNIA REPAIR      HIP SURGERY      SHOULDER SURGERY      Right      General Information       Row Name 25 1429          OT Time and Intention    Document Type therapy note (daily note)  -AR     Mode of Treatment individual therapy;occupational therapy  -AR       Row Name 25 1429          General Information    Existing Precautions/Restrictions fall;other (see comments)  resting tremors, diplopia  -AR     Barriers to Rehab none identified  -AR       Row Name 25 1429          Cognition    Orientation Status (Cognition) oriented x 4  -AR       Row Name 25 1429          Safety Issues/Impairments Affecting Functional Mobility    Safety Issues Affecting Function (Mobility) positioning of assistive device;safety precaution awareness;safety  precautions follow-through/compliance;sequencing abilities  -AR     Impairments Affecting Function (Mobility) balance;coordination;endurance/activity tolerance;postural/trunk control;strength;motor control;pain  -AR               User Key  (r) = Recorded By, (t) = Taken By, (c) = Cosigned By      Initials Name Provider Type    Dara Jefferson OT Occupational Therapist                     Mobility/ADL's       Row Name 05/27/25 1431          Bed Mobility    Bed Mobility supine-sit;scooting/bridging  -AR     Scooting/Bridging [place] (Bed Mobility) moderate assist (50% patient effort);verbal cues  -AR     Supine-Sit [place] (Bed Mobility) moderate assist (50% patient effort);verbal cues  -AR     Bed Mobility, Safety Issues impaired trunk control for bed mobility  -AR     Assistive Device (Bed Mobility) bed rails;head of bed elevated  -AR     Comment, (Bed Mobility) Cues for sequencing, limited with weakness.  -AR       Row Name 05/27/25 1431          Transfers    Transfers sit-stand transfer;stand-sit transfer;bed-chair transfer  -AR     Comment, (Transfers) Initial posterior LOB instance noted with standing. Verbal cues for hand placement and chair approach as pt attempting to park walker at side of chair. Educated her on importance of staying inside walker at all times.  -AR       Row Name 05/27/25 1431          Bed-Chair Transfer    Bed-Chair [place] (Transfers) minimum assist (75% patient effort);verbal cues  -AR     Assistive Device (Bed-Chair Transfers) walker, front-wheeled  -AR       Row Name 05/27/25 1431          Sit-Stand Transfer    Sit-Stand [place] (Transfers) minimum assist (75% patient effort);verbal cues  -AR     Assistive Device (Sit-Stand Transfers) walker, front-wheeled  -AR       Row Name 05/27/25 1431          Stand-Sit Transfer    Stand-Sit [place] (Transfers) minimum assist (75% patient effort);verbal cues  -AR     Assistive Device (Stand-Sit Transfers) walker,  front-wheeled  -AR       Row Name 05/27/25 [facility]          Functional Mobility    Functional Mobility- Ind. Level verbal cues required;minimum assist (75% patient effort)  -AR     Functional Mobility- Device walker, front-wheeled  -AR     Functional Mobility-Distance (Feet) 8  -AR     Functional Mobility- Comment Narrow SENDY and unsteady gait noted.  -AR       Row Name 05/27/25 1431          Activities of Daily Living    BADL Assessment/Intervention lower body dressing;bathing;grooming;feeding;toileting  -AR       Row Name 05/27/25 [facility]          Lower Body Dressing Assessment/Training    [place] Level (Lower Body Dressing) don;socks;minimum assist (75% patient effort)  -AR     Assistive Devices (Lower Body Dressing) sock-aid  -AR     Position (Lower Body Dressing) long sitting  -AR     Comment, (Lower Body Dressing) Pt unable to don sock d/t weakness and ROM limitations. Issued AE including reacher, sock aide and shoe horn and educated on use.  -AR       Row Name 05/27/25 1431          Grooming Assessment/Training    [place] Level (Grooming) hair care, combing/brushing;supervision  -AR     Position (Grooming) supported sitting  -AR       Row Name 05/27/25 [facility]          Self-Feeding Assessment/Training    [place] Level (Feeding) contact guard assist;verbal cues  -AR     Position (Feeding) supported sitting;supine  -AR     Comment, (Feeding) Pt reports improved ability to self-feed using weighted utensils.  -AR       Row Name 05/27/25 1431          Toileting Assessment/Training    [place] Level (Toileting) dependent (less than 25% patient effort);perform perineal hygiene;adjust/manage clothing  -AR     Position (Toileting) supported standing  -AR     Comment, (Toileting) Pt attempted post-toilet hygiene in standing, however demo LOB instance and she needed BUE on walker for stabilization.  -AR       Row Name 05/27/25 [facility]          Bathing Assessment/Intervention    Comment, (Bathing) Issued [facility]  and educated on use.  -AR               User Key  (r) = Recorded By, (t) = Taken By, (c) = Cosigned By      Initials Name Provider Type    Dara Jefferson OT Occupational Therapist                   Obj/Interventions       Row Name 05/27/25 1441          Shoulder (Therapeutic Exercise)    Shoulder (Therapeutic Exercise) AAROM (active assistive range of motion)  -AR     Shoulder AAROM (Therapeutic Exercise) bilateral;flexion;extension;sitting;10 repetitions  -AR       Row Name 05/27/25 1441          Elbow/Forearm (Therapeutic Exercise)    Elbow/Forearm (Therapeutic Exercise) AROM (active range of motion)  -AR     Elbow/Forearm AROM (Therapeutic Exercise) bilateral;flexion;extension;sitting;10 repetitions  -AR       Row Name 05/27/25 1441          Motor Skills    Therapeutic Exercise shoulder;elbow/forearm  -AR       Row Name 05/27/25 1441          Balance    Balance Assessment sitting static balance;sitting dynamic balance;standing static balance;standing dynamic balance  -AR     Static Sitting Balance contact guard  -AR     Dynamic Sitting Balance minimal assist  -AR     Position, Sitting Balance unsupported;sitting edge of bed  -AR     Static Standing Balance minimal assist  -AR     Dynamic Standing Balance minimal assist  -AR     Position/Device Used, Standing Balance supported;walker, rolling  -AR               User Key  (r) = Recorded By, (t) = Taken By, (c) = Cosigned By      Initials Name Provider Type    Dara Jefferson OT Occupational Therapist                   Goals/Plan       Row Name 05/27/25 1453          Transfer Goal 1 (OT)    Progress/Outcome (Transfer Goal 1, OT) goal ongoing  -AR       Row Name 05/27/25 1453          Dressing Goal 1 (OT)    Progress/Outcome (Dressing Goal 1, OT) goal ongoing  -AR       Row Name 05/27/25 1453          Toileting Goal 1 (OT)    Progress/Outcome (Toileting Goal 1, OT) goal ongoing  -AR               User Key  (r) = Recorded By, (t) = Taken By, (c) =  Cosigned By      Initials Name Provider Type    AR Dara Leahy, OT Occupational Therapist                   Clinical Impression       Row Name 05/27/25 1443          Pain Assessment    Pretreatment Pain Rating 4/10  -AR     Posttreatment Pain Rating 4/10  -AR     Pain Location abdomen  -AR     Pain Management Interventions exercise or physical activity utilized;movement retraining implemented;nursing notified;positioning techniques utilized  -AR     Response to Pain Interventions activity and movement patterns unchanged  -AR     Additional Documentation Pain Scale: FACES Pre/Post-Treatment (Group)  -AR       Row Name 05/27/25 1443          Pain Scale: FACES Pre/Post-Treatment    Pain: FACES Scale, Pretreatment 4-->hurts little more  -AR     Posttreatment Pain Rating 4-->hurts little more  -AR     Pre/Posttreatment Pain Comment RN notified and medicated pt at start of session per pt request  -AR       Row Name 05/27/25 1447          Plan of Care Review    Plan of Care Reviewed With patient  -AR     Progress declining  -AR     Outcome Evaluation Pt alert, Ox4 and c/o abdominal pain. She completed bed mobility with mod assist, ambulated 8 feet to chair with min assist using RW and she required dependence post-toilet hygiene due to LOB instance requiring mod assist for recovery while attempting to complete task. She was unable to don sock without assist, issued necessary AE and educated on use. Pt limited with impaired balance, decreased safety awareness, diminished occupational endurance, decreased strength, generalized weakness and is performing significantly below her baseline. She requires assist with ADLs and mobility, recommend IPR- discussed with pt who is in agreement.  -AR       Row Name 05/27/25 1443          Therapy Plan Review/Discharge Plan (OT)    Anticipated Discharge Disposition (OT) inpatient rehabilitation facility  -AR       Row Name 05/27/25 1443          Vital Signs    Pre Patient Position  Supine  -AR     Intra Patient Position Standing  -AR     Post Patient Position Sitting  -AR       Row Name 05/27/25 1443          Positioning and Restraints    Pre-Treatment Position in bed  -AR     Post Treatment Position chair  -AR     In Chair reclined;call light within reach;encouraged to call for assist;exit alarm on;waffle cushion;legs elevated;RUE elevated;LUE elevated;heels elevated  -AR               User Key  (r) = Recorded By, (t) = Taken By, (c) = Cosigned By      Initials Name Provider Type    Dara Jefferson, OT Occupational Therapist                   Outcome Measures       Row Name 05/27/25 1453          How much help from another is currently needed...    Putting on and taking off regular lower body clothing? 2  -AR     Bathing (including washing, rinsing, and drying) 2  -AR     Toileting (which includes using toilet bed pan or urinal) 1  -AR     Putting on and taking off regular upper body clothing 2  -AR     Taking care of personal grooming (such as brushing teeth) 3  -AR     Eating meals 3  -AR     AM-PAC 6 Clicks Score (OT) 13  -AR       Row Name 05/27/25 0800          How much help from another person do you currently need...    Turning from your back to your side while in flat bed without using bedrails? 3  -AF     Moving from lying on back to sitting on the side of a flat bed without bedrails? 3  -AF     Moving to and from a bed to a chair (including a wheelchair)? 2  -AF     Standing up from a chair using your arms (e.g., wheelchair, bedside chair)? 2  -AF     Climbing 3-5 steps with a railing? 2  -AF     To walk in hospital room? 2  -AF     AM-PAC 6 Clicks Score (PT) 14  -AF     Highest Level of Mobility Goal Move to Chair/Commode-4  -AF       Row Name 05/27/25 1453          Functional Assessment    Outcome Measure Options AM-PAC 6 Clicks Daily Activity (OT)  -AR               User Key  (r) = Recorded By, (t) = Taken By, (c) = Cosigned By      Initials Name Provider Type    AR  Dara Leahy, OT Occupational Therapist    Blanca Hogue, RN Registered Nurse                    Occupational Therapy Education       Title: PT OT SLP Therapies (Done)       Topic: Occupational Therapy (Done)       Point: ADL training (Done)       Learning Progress Summary            Patient Eager, E,TB,D, VU,NR by AR at 5/27/2025 1454    Acceptance, E, VU by A[facility] at 5/24/2025 1616    Comment: ADL goal progression                      Point: Home exercise program (Done)       Learning Progress Summary            Patient Eager, E,TB,D, VU,NR by AR at 5/27/2025 1454                      Point: Precautions (Done)       Learning Progress Summary            Patient Eager, E,TB,D, VU,NR by AR at 5/27/2025 1454                      Point: Body mechanics (Done)       Learning Progress Summary            Patient Eager, E,TB,D, VU,NR by AR at 5/27/2025 1454                                      User Key       Initials Effective Dates Name Provider Type Discipline    AR 07/11/23 -  Dara Leahy, OT Occupational Therapist OT    A[facility] 06/16/21 -  Yolette Mckay OT Occupational Therapist OT                  OT Recommendation and Plan     Plan of Care Review  Plan of Care Reviewed With: patient  Progress: declining  Outcome Evaluation: Pt alert, Ox4 and c/o abdominal pain. She completed bed mobility with mod assist, ambulated 8 feet to chair with min assist using RW and she required dependence post-toilet hygiene due to LOB instance requiring mod assist for recovery while attempting to complete task. She was unable to don sock without assist, issued necessary AE and educated on use. Pt limited with impaired balance, decreased safety awareness, diminished occupational endurance, decreased strength, generalized weakness and is performing significantly below her baseline. She requires assist with ADLs and mobility, recommend IPR- discussed with pt who is in agreement.     Time Calculation:         Time Calculation- OT        Row Name 05/27/25 1454             Time Calculation- OT    OT Start Time 1340  -AR      OT Received On 05/27/25  -AR      OT Goal Re-Cert Due Date 05/31/25  -AR         Timed Charges    51787 - OT Therapeutic Exercise Minutes 8  -AR      74063 - OT Self Care/Mgmt Minutes 33  -AR         Total Minutes    Timed Charges Total Minutes 41  -AR       Total Minutes 41  -AR                User Key  (r) = Recorded By, (t) = Taken By, (c) = Cosigned By      Initials Name Provider Type    AR Dara Leahy OT Occupational Therapist                  Therapy Charges for Today       Code Description Service Date Service Provider Modifiers Qty    13732062382 HC OT SELF CARE/MGMT/TRAIN EA 15 MIN 5/27/2025 Dara Leahy OT GO 2    85297025651 HC OT THER PROC EA 15 MIN 5/27/2025 Dara Leahy OT GO 1                 Dara Leahy OT  5/27/2025

## 2025-05-27 NOTE — CASE MANAGEMENT/SOCIAL WORK
Continued Stay Note  [facility]     Patient Name: Bibiana Hodges  MRN: [id number]  Today's Date: 5/27/2025    Admit Date: 5/19/2025    Plan: SNF   Discharge Plan       Row Name 05/27/25 1129       Plan    Plan SNF    Patient/Family in Agreement with Plan yes    Plan Comments Met with Ms. Hodges at the bedside to discuss discharge plan. She has been planning on returning to her home at [facility], but now she is reporting that she feels much weaker than normal and is concerned that she will not be safe at home. She requested referrals to all 3 [place] locations ([facility] is first choice).  emailed rehab service managers and requested she be added to PT and OT schedules this afternoon or tomorrow morning.  will continue to follow plan of care and assist with discharge planning needs as indicated.    Final Discharge Disposition Code 03 - skilled nursing facility (SNF)                   Discharge Codes    No documentation.                 Expected Discharge Date and Time       Expected Discharge Date Expected Discharge Time    May 29, 2025               Erinn Pablo RN

## 2025-05-28 PROCEDURE — 99232 SBSQ HOSP IP/OBS MODERATE 35: CPT | Performed by: PHYSICIAN ASSISTANT

## 2025-05-28 PROCEDURE — 97530 THERAPEUTIC ACTIVITIES: CPT

## 2025-05-28 PROCEDURE — 97116 GAIT TRAINING THERAPY: CPT

## 2025-05-28 PROCEDURE — 94799 UNLISTED PULMONARY SVC/PX: CPT

## 2025-05-28 PROCEDURE — 97110 THERAPEUTIC EXERCISES: CPT

## 2025-05-28 PROCEDURE — 94664 DEMO&/EVAL PT USE INHALER: CPT

## 2025-05-28 RX ORDER — SODIUM CHLORIDE 9 MG/ML
75 INJECTION, SOLUTION INTRAVENOUS CONTINUOUS
Status: ACTIVE | OUTPATIENT
Start: 2025-05-28 | End: 2025-05-29

## 2025-05-28 RX ADMIN — LISINOPRIL 10 MG: 10 TABLET ORAL at 09:53

## 2025-05-28 RX ADMIN — OXYCODONE HYDROCHLORIDE AND ACETAMINOPHEN 500 MG: 500 TABLET ORAL at 09:53

## 2025-05-28 RX ADMIN — Medication 1 APPLICATION: at 21:47

## 2025-05-28 RX ADMIN — Medication 12.5 MG: at 09:53

## 2025-05-28 RX ADMIN — LEVOTHYROXINE SODIUM 100 MCG: 0.1 TABLET ORAL at 09:53

## 2025-05-28 RX ADMIN — POLYETHYLENE GLYCOL 3350 17 G: 17 POWDER, FOR SOLUTION ORAL at 09:52

## 2025-05-28 RX ADMIN — AMIODARONE HYDROCHLORIDE 200 MG: 200 TABLET ORAL at 09:53

## 2025-05-28 RX ADMIN — TRAZODONE HYDROCHLORIDE 100 MG: 100 TABLET ORAL at 21:46

## 2025-05-28 RX ADMIN — Medication 12.5 MG: at 21:45

## 2025-05-28 RX ADMIN — IPRATROPIUM BROMIDE AND ALBUTEROL SULFATE 3 ML: 2.5; .5 SOLUTION RESPIRATORY (INHALATION) at 07:54

## 2025-05-28 RX ADMIN — LEVETIRACETAM 250 MG: 250 TABLET, FILM COATED ORAL at 09:53

## 2025-05-28 RX ADMIN — IPRATROPIUM BROMIDE AND ALBUTEROL SULFATE 3 ML: 2.5; .5 SOLUTION RESPIRATORY (INHALATION) at 13:50

## 2025-05-28 RX ADMIN — LUBIPROSTONE 24 MCG: 24 CAPSULE ORAL at 09:53

## 2025-05-28 RX ADMIN — VENLAFAXINE HYDROCHLORIDE 75 MG: 75 CAPSULE, EXTENDED RELEASE ORAL at 09:53

## 2025-05-28 RX ADMIN — APIXABAN 2.5 MG: 2.5 TABLET, FILM COATED ORAL at 21:45

## 2025-05-28 RX ADMIN — Medication 1 APPLICATION: at 09:56

## 2025-05-28 RX ADMIN — ACETAMINOPHEN 650 MG: 325 TABLET, FILM COATED ORAL at 15:18

## 2025-05-28 RX ADMIN — APIXABAN 2.5 MG: 2.5 TABLET, FILM COATED ORAL at 09:53

## 2025-05-28 RX ADMIN — LEVETIRACETAM 250 MG: 250 TABLET, FILM COATED ORAL at 21:45

## 2025-05-28 RX ADMIN — IPRATROPIUM BROMIDE AND ALBUTEROL SULFATE 3 ML: 2.5; .5 SOLUTION RESPIRATORY (INHALATION) at 19:45

## 2025-05-28 RX ADMIN — ACETAMINOPHEN 650 MG: 325 TABLET, FILM COATED ORAL at 23:25

## 2025-05-28 RX ADMIN — IPRATROPIUM BROMIDE AND ALBUTEROL SULFATE 3 ML: 2.5; .5 SOLUTION RESPIRATORY (INHALATION) at 16:20

## 2025-05-28 RX ADMIN — ROSUVASTATIN 10 MG: 10 TABLET, FILM COATED ORAL at 21:45

## 2025-05-28 NOTE — PROGRESS NOTES
INFECTIOUS DISEASE CONSULT/INITIAL HOSPITAL VISIT    Bibiana Hodges  1935  [id number]    Date of Consult: 5/28/2025    Admission Date: 5/19/2025      Requesting Provider: ANASTASIA Shen  Evaluating Physician: Xavi Ivey MD    Reason for Consultation: pseudomonas UTI    History of present illness:    Patient is a 89 y.o. female  with atrial fibrillation, hypertension, hyperlipidemia, GERD, and B12 deficiency [facility] has been evaluated in [facility] ED for  respiratory distress.    Had hypoxia of 83%, aspiration suspected, patient given antibiotics and steroids.  Patient's hypoxia has resolved.    Despite not having burning on urination the [facility] ED check urinalysis which had 6-0 rbc, tntc wbc, and 3-6 squamous cells.    Pseudmonas was reporte don urine culture at 50,000 CFU.      Patient denies dysuria, currently is using a pure wick.    WE are consulted regarding an incidental finding on a urine culture that did not contribute to patient's reason for admission to hospital.      PAtient reports chronic leg weakness.    5/23/25; doing well; no events overnight    5/24/25; doing well; no events overnight; no fever, rash, sore throat asking about rehab    5/28/25; afebrile normotensive no events overnight  Past Medical History:   Diagnosis Date    Constipation     Depression     Dysautonomia orthostatic hypotension syndrome     Generalized osteoarthritis     GERD (gastroesophageal reflux disease)     s/p Nissen    Hypertension     Insomnia     Osteoarthrosis, shoulder region     Skin ulcer of scalp     Thrombophlebitis of arm     Tremor     Vitamin B12 deficiency        Past Surgical History:   Procedure Laterality Date    DILATATION AND CURETTAGE      HERNIA REPAIR      HIP SURGERY      SHOULDER SURGERY      Right       Family History   Problem Relation Age of Onset    Cancer Mother         Pancreatic    Stroke Brother     Anorexia nervosa Daughter        Social  History     Socioeconomic History    Marital status:    Tobacco Use    Smoking status: Never    Smokeless tobacco: Never   Vaping Use    Vaping status: Never Used   Substance and Sexual Activity    Alcohol use: No     Comment: stopped drinking alcohol    Drug use: No    Sexual activity: Defer       Allergies   Allergen Reactions    Sulfa Antibiotics Hives    Gabapentin          Medication:    Current Facility-Administered Medications:     acetaminophen (TYLENOL) tablet 650 mg, 650 mg, Oral, Q8H PRN, Aileen Doherty MD, 650 mg at 05/28/25 1518    amiodarone (PACERONE) tablet 200 mg, 200 mg, Oral, Daily, Aileen Doherty MD, 200 mg at 05/28/25 0953    apixaban (ELIQUIS) tablet 2.5 mg, 2.5 mg, Oral, BID, Aileen Doherty MD, 2.5 mg at 05/28/25 0953    ascorbic acid (VITAMIN C) tablet 500 mg, 500 mg, Oral, Daily, Aileen Doherty MD, 500 mg at 05/28/25 0953    sennosides-docusate (PERICOLACE) 8.6-50 MG per tablet 2 tablet, 2 tablet, Oral, BID PRN, 2 tablet at 05/27/25 2036 **AND** polyethylene glycol (MIRALAX) packet 17 g, 17 g, Oral, Daily PRN **AND** bisacodyl (DULCOLAX) EC tablet 5 mg, 5 mg, Oral, Daily PRN, 5 mg at 05/27/25 1355 **AND** bisacodyl (DULCOLAX) suppository 10 mg, 10 mg, Rectal, Daily PRN, Wendi Gaspar MD, 10 mg at 05/27/25 2038    castor oil-balsam peru (VENELEX) ointment 1 Application, 1 Application, Topical, Q12H, Wendi Gaspar MD, 1 Application at 05/28/25 0956    ipratropium-albuterol (DUO-NEB) nebulizer solution 3 mL, 3 mL, Nebulization, 4x Daily - RT, Aileen Doherty MD, 3 mL at 05/28/25 1350    levETIRAcetam (KEPPRA) tablet 250 mg, 250 mg, Oral, BID, Aileen Doherty MD, 250 mg at 05/28/25 0953    levothyroxine (SYNTHROID, LEVOTHROID) tablet 100 mcg, 100 mcg, Oral, Daily, Aileen Doherty MD, 100 mcg at 05/28/25 0953    lisinopril (PRINIVIL,ZESTRIL) tablet 10 mg, 10 mg, Oral, Q24H, Aileen Doherty MD, 10 mg at 05/28/25 0953    lubiprostone (AMITIZA) capsule 24 mcg, 24  mcg, Oral, BID With Meals, Kendra Monahan PA-C, 24 mcg at 05/28/25 0953    melatonin tablet 5 mg, 5 mg, Oral, Nightly, Aileen Doherty MD, 5 mg at 05/27/25 2238    metoprolol tartrate (LOPRESSOR) half tablet 12.5 mg, 12.5 mg, Oral, BID, Aileen Doherty MD, 12.5 mg at 05/28/25 0953    polyethylene glycol (MIRALAX) packet 17 g, 17 g, Oral, Daily, Aileen Doherty MD, 17 g at 05/28/25 0952    rosuvastatin (CRESTOR) tablet 10 mg, 10 mg, Oral, Nightly, Aileen Doherty MD, 10 mg at 05/27/25 2037    sodium chloride 0.9 % infusion, 75 mL/hr, Intravenous, Continuous, Kendra Monahan PA-C, Last Rate: 75 mL/hr at 05/28/25 1518, 75 mL/hr at 05/28/25 1518    traZODone (DESYREL) tablet 100 mg, 100 mg, Oral, Nightly, Aileen Doherty MD, 100 mg at 05/27/25 2238    venlafaxine XR (EFFEXOR-XR) 24 hr capsule 75 mg, 75 mg, Oral, Daily, Aileen Doherty MD, 75 mg at 05/28/25 0953    Antibiotics:  Anti-Infectives (From admission, onward)      Ordered     Dose/Rate Route Frequency Start Stop    05/22/25 0908  piperacillin-tazobactam (ZOSYN) 3.375 g IVPB in 100 mL NS MBP (CD)  Status:  Discontinued        Ordering Provider: Sarwat Kimbrough MD    3.375 g  over 4 Hours Intravenous Every 8 Hours 05/22/25 1700 05/22/25 1357    05/22/25 1357  cefTRIAXone (ROCEPHIN) 1,000 mg in sodium chloride 0.9 % 100 mL MBP  Status:  Discontinued        Ordering Provider: Sarwat Kimbrough MD    1,000 mg  200 mL/hr over 30 Minutes Intravenous Every 24 Hours 05/22/25 1500 05/24/25 1126    05/22/25 0908  piperacillin-tazobactam (ZOSYN) 3.375 g IVPB in 100 mL NS MBP (CD)        Ordering Provider: Sarwat Kimbrough MD    3.375 g  over 30 Minutes Intravenous Once 05/22/25 1000 05/22/25 1103    05/19/25 2338  cefTRIAXone (ROCEPHIN) 1,000 mg in sodium chloride 0.9 % 100 mL MBP  Status:  Discontinued        Ordering Provider: Sarwat Kimbrough MD    1,000 mg  200 mL/hr over 30 Minutes Intravenous Every 24 Hours 05/20/25 0900 05/22/25 0906  "   25 2338  doxycycline (MONODOX) capsule 100 mg  Status:  Discontinued        Ordering Provider: Aileen Doherty MD    100 mg Oral Every 12 Hours Scheduled 25 0900 25 1126    25 1510  azithromycin (ZITHROMAX) 500 mg in sodium chloride 0.9 % 250 mL IVPB-VTB        Ordering Provider: Efrain Fraga MD    500 mg  over 60 Minutes Intravenous Once 25 1526 25 1654    25 1510  cefTRIAXone (ROCEPHIN) 2 g in sodium chloride 0.9 % 100 mL MBP        Ordering Provider: Efrain Fraga MD    2 g  200 mL/hr over 30 Minutes Intravenous Once 25 1526 25 1807              Review of Systems:  See hpi    Physical Exam:   Vital Signs  Temp (24hrs), Av.6 °F (36.4 °C), Min:96.7 °F (35.9 °C), Max:98.5 °F (36.9 °C)    Temp  Min: 96.7 °F (35.9 °C)  Max: 98.5 °F (36.9 °C)  BP  Min: 125/87  Max: 168/80  Pulse  Min: 60  Max: 77  Resp  Min: 16  Max: 18  SpO2  Min: 96 %  Max: 100 %    GENERAL: Awake and alert, in no acute distress.   HEENT: Normocephalic, atraumatic. No ext oral lesions    HEART: RRR; No murmur,  LUNGS: Clear to auscultation bilaterally   ABDOMEN: Soft, nontender,   EXT:  No edema  :  Without George catheter.  MSK: No joint effusions or erythema  SKIN: no r valerio    Laboratory Data    Results from last 7 days   Lab Units 25  0429   WBC 10*3/mm3 9.41   HEMOGLOBIN g/dL 11.7*   HEMATOCRIT % 36.4   PLATELETS 10*3/mm3 155     Results from last 7 days   Lab Units 25  0430   SODIUM mmol/L 135*   POTASSIUM mmol/L 3.9   CHLORIDE mmol/L 105   CO2 mmol/L 23.0   BUN mg/dL 19   CREATININE mg/dL 0.93   GLUCOSE mg/dL 82   CALCIUM mg/dL 8.2*                                   Estimated Creatinine Clearance: 38.8 mL/min (by C-G formula based on SCr of 0.93 mg/dL).      Microbiology:  Blood Culture   Date Value Ref Range Status   2025 No growth at 3 days  Preliminary     No results found for: \"BCIDPCR\", \"CXREFLEX\", \"CSFCX\", \"CULTURETIS\"  No results found for: " "\"CULTURES\", \"HSVCX\", \"URCX\"  No results found for: \"EYECULTURE\", \"GCCX\", \"HSVCULTURE\", \"LABHSV\"  No results found for: \"LEGIONELLA\", \"MRSACX\", \"MUMPSCX\", \"MYCOPLASCX\"  No results found for: \"NOCARDIACX\", \"STOOLCX\"  Urine Culture   Date Value Ref Range Status   05/19/2025 50,000 CFU/mL Pseudomonas aeruginosa (A)  Final     No results found for: \"VIRALCULTU\", \"WOUNDCX\"        Radiology:  Imaging Results (Last 72 Hours)       ** No results found for the last 72 hours. **              Impression:   Lactic acidosis  Pyuria  Probable asymptomatic bacturia with pseudomonas colonization  Resolving respiratory failure with hypoxia  COPD    PLAN/RECOMMENDATIONS:   Thank you for asking us to see Bibiana Hodges, I recommend the following:  I have independently reviewed the ct chest and do not see any substantial infiltrate       Patient has been improving without any antibiotics targeting pseudomonas.  (First dose Zosyn was started today)    The urine culture is highly likely colonization and I would not treat this currently.    Continue treatment for COPD exacerbation.    Observe off abx      I spent time discussing that urinary colonization is different than a bacterial infection and that the Pseudomonas in the urine is likely not causing active infection and I would postpone treatment at this time    Patient mostly concerned about weakness of her legs.    I suspect the patient may have spinal stenosis but will defer to the hospitalist if they want to do workup with radiography or other diagnostic workup such as EMG or nerve conduction studies    I would not repeat urine cultures unless patient had overt symptoms of urinary tract infection    No need for isolation    This visit included the following complex service elements:  Complex medical decision-making associated with antimicrobial prescribing.  Managed infection treatment protocol associated with transitions of care for this complex patient.        We will sign " off  Xavi Ivey MD  5/28/2025  15:41 EDT

## 2025-05-28 NOTE — PLAN OF CARE
Goal Outcome Evaluation:      Patient had large bowel movement after supossitory. Patient removed iv accidentally. Patient refused to let RN place new iv unless it was ultrasound guided. Charge RN aware.

## 2025-05-28 NOTE — PLAN OF CARE
Goal Outcome Evaluation:  Plan of Care Reviewed With: patient        Progress: improving  Outcome Evaluation: Pt able to ambulate 3 bouts of 15', FWW, min-A and seated rest breaks in between. Slow, shuffling gait limited by strength and fatigue. Pt highly motivated to improve mobility.  Recommend SNF for best outcome

## 2025-05-28 NOTE — CASE MANAGEMENT/SOCIAL WORK
Continued Stay Note  [facility]     Patient Name: Bibiana Hodges  MRN: [id number]  Today's Date: 5/28/2025    Admit Date: 5/19/2025    Plan: home   Discharge Plan       Row Name 05/28/25 1343       Plan    Plan home    Patient/Family in Agreement with Plan yes    Plan Comments Met with Ms. Hodges to inform that [facility] cannot offer her a bed. She was not open to other referrals and reported she would like to return to her home at [facility]. She reported PT is available at the facility, and she plans to use it.  called and talked to Dara in admissions, and she agreed to come evaluate patient tomorrow. Dara reported [facility] PT comes to the facility.  will continue to follow plan of care and assist with discharge planning needs as indicated.    Final Discharge Disposition Code 01 - home or self-care                   Discharge Codes    No documentation.                 Expected Discharge Date and Time       Expected Discharge Date Expected Discharge Time    May 30, 2025               Erinn Pablo RN

## 2025-05-28 NOTE — PROGRESS NOTES
"          Clinical Nutrition Assessment     Patient Name: Bibiana Hodges  YOB: 1935  MRN: [id number]  Date of Encounter: 05/28/25 15:07 EDT  Admission date: 5/19/2025  Reason for Visit: Follow-up protocol    Assessment   Nutrition Assessment   Admission Diagnosis:  Pneumonia [J18.9]    Problem List:    Pneumonia    Hypertension    Atrial fibrillation    Autonomic dysfunction    Hyperlipidemia      PMH:   She  has a past medical history of Constipation, Depression, Dysautonomia orthostatic hypotension syndrome, Generalized osteoarthritis, GERD (gastroesophageal reflux disease), Hypertension, Insomnia, Osteoarthrosis, shoulder region, Skin ulcer of scalp, Thrombophlebitis of arm, Tremor, and Vitamin B12 deficiency.    PSH:  She  has a past surgical history that includes Hernia repair; Dilation and curettage of uterus; Hip surgery; and Shoulder surgery.    Applicable Nutrition History:   7/14/24 Acute severe malnutrition  8/24/23, 1/30/25 Chronic severe malnutrition    Skin: bilateral medial heels, stage 1 PI  Friction related area on R gluteal/coccyx    SLP Diet Recommendation:   5/23/25 MBS mechanical ground textures, honey thick liquids, no mixed consistencies  Anthropometrics     Height: Height: 170.2 cm (67.01\")  Last Filed Weight: Weight: 59.9 kg (132 lb 0.9 oz) (05/22/25 1303)  Method: Weight Method: Estimated  BMI: BMI (Calculated): 20.7    UBW:  110-115lb per patient  Weight change: No significant changes     Weight       Weight (kg) Weight (lbs) Weight Method Visit Report   4/20/2024 65.772 kg  145 lb      5/6/2024 63.504 kg  140 lb  Bed scale     5/7/2024 63.5 kg  139 lb 15.9 oz  Stated     7/8/2024 58.968 kg  130 lb      1/29/2025 59 kg  130 lb 1.1 oz  Stated        Estimated     5/19/2025 60 kg  132 lb 4.4 oz  Estimated     5/22/2025 59.875 kg  132 lb       59.9 kg  132 lb 0.9 oz          Nutrition Focused Physical Exam    Date:  5/21       Patient meets criteria for malnutrition " "diagnosis, see MSA note.     Subjective   Reported/Observed/Food/Nutrition Related History:     5/28  Patient reports that she is doing well with meals but cannot eat everything. States that she has been getting vanilla supplement but thinks it has no flavor, would like chocolate.    5/21  Patient screened per nutrition protocol for consult received for \"patient requesting Boost\" and possible pressure injury of stage 2 or greater and/or non healing wound. Presented for respiratory distress. Patient sitting up in chair, nurse getting patient situated at time of visit.  Patient reports okay appetite. Denied any recent weight loss.  No chewing or swallowing difficulties noted.  Patient would like Boost TID. NKFA. Pt noted that she would like extra gravy with all meals, would prefer soft boiled eggs, and likes fresh fruit with breakfast.    Current Nutrition Prescription   PO: Diet: Cardiac; Healthy Heart (2-3 Na+); No Straw, No Mixed Consistencies; Texture: Mechanical Ground (NDD 2); Fluid Consistency: Honey Thick  Oral Nutrition Supplement: Magic Cup with lunch/dinner  Intake:  7 days  56% x 7 meals    Assessment & Plan   Nutrition Diagnosis   Date:  5/21 Updated:  Problem Malnutrition, chronic severe   Etiology Decreased ability to consume sufficient energy 2/2 advanced age   Signs/Symptoms severe muscle wasting and severe subcutaneous fat loss   Status: New    Goal:   Nutrition to support treatment and Maintain intake       Nutrition Intervention      Follow treatment progress, Care plan reviewed, Encourage intake, Supplement provided    Magic Cup BID, chocolate  Encourage continued adequate PO intakes    Monitoring/Evaluation:   Per protocol, I&O, PO intake, Supplement intake, Pertinent labs, Skin status, Symptoms, POC/GOC    Bredna Mancilla RD  Time Spent: 25m  "

## 2025-05-28 NOTE — THERAPY TREATMENT NOTE
Patient Name: Bibiana Hodges  : 1935    MRN: [id number]                              Today's Date: 2025       Admit Date: 2025    Visit Dx:     ICD-10-CM ICD-9-CM   1. Acute respiratory failure with hypoxia  J96.01 518.81   2. Community acquired pneumonia, unspecified laterality  J18.9 486   3. Acute UTI  N39.0 599.0   4. Elevated troponin  R79.89 790.6   5. Lactic acidosis  E87.20 276.2   6. Leukocytosis, unspecified type  D72.829 288.60   7. Oropharyngeal dysphagia  R13.12 787.22     Patient Active Problem List   Diagnosis    Dysautonomia orthostatic hypotension syndrome    Hypertension    Severe malnutrition    Atrial fibrillation    Autonomic dysfunction    Hyperlipidemia    Pneumonia     Past Medical History:   Diagnosis Date    Constipation     Depression     Dysautonomia orthostatic hypotension syndrome     Generalized osteoarthritis     GERD (gastroesophageal reflux disease)     s/p Nissen    Hypertension     Insomnia     Osteoarthrosis, shoulder region     Skin ulcer of scalp     Thrombophlebitis of arm     Tremor     Vitamin B12 deficiency      Past Surgical History:   Procedure Laterality Date    DILATATION AND CURETTAGE      HERNIA REPAIR      HIP SURGERY      SHOULDER SURGERY      Right      General Information       Row Name 2527          Physical Therapy Time and Intention    Document Type therapy note (daily note)  -KG     Mode of Treatment physical therapy  -KG       Row Name 25          General Information    Patient Profile Reviewed yes  -KG     Existing Precautions/Restrictions fall;other (see comments)  resting tremors, diplopia  -KG       Row Name 25          Cognition    Orientation Status (Cognition) oriented x 4  -KG       Row Name 25          Safety Issues/Impairments Affecting Functional Mobility    Safety Issues Affecting Function (Mobility) insight into deficits/self-awareness;positioning of assistive device;safety precaution  awareness;safety precautions follow-through/compliance  -KG     Impairments Affecting Function (Mobility) balance;coordination;endurance/activity tolerance;postural/trunk control;strength;motor control;pain  -KG               User Key  (r) = Recorded By, (t) = Taken By, (c) = Cosigned By      Initials Name Provider Type    Karla Cornelius Physical Therapist                   Mobility       Row Name 05/28/25 0928          Bed Mobility    Bed Mobility supine-sit;scooting/bridging  -KG     Scooting/Bridging [place] (Bed Mobility) moderate assist (50% patient effort);verbal cues  -KG     Supine-Sit [place] (Bed Mobility) verbal cues;minimum assist (75% patient effort)  -KG     Sit-Supine [place] (Bed Mobility) minimum assist (75% patient effort);verbal cues  -KG     Assistive Device (Bed Mobility) bed rails;head of bed elevated  -KG       Row Name 05/28/25 0928          Bed-Chair Transfer    Assistive Device (Bed-Chair Transfers) walker, front-wheeled  -KG       Row Name 05/28/25 0928          Sit-Stand Transfer    Sit-Stand [place] (Transfers) minimum assist (75% patient effort);verbal cues  -KG     Assistive Device (Sit-Stand Transfers) walker, front-wheeled  -KG       Row Name 05/28/25 0928          Gait/Stairs (Locomotion)    [place] Level (Gait) minimum assist (75% patient effort);verbal cues  -KG     Assistive Device (Gait) walker, front-wheeled  -KG     Patient was able to Ambulate yes  -KG     Distance in Feet (Gait) 15  15'+15'+15'  -KG     Deviations/Abnormal Patterns (Gait) stride length decreased;gait speed decreased;base of support, narrow;perri decreased  -KG     Comment, (Gait/Stairs) Pt able to ambulate 3 bouts of 15', FWW, min-A and seated rest breaks in between.  Slow, shuffling gait limited by strength and fatigue.  Pt highly motivated to improve mobility  -KG               User Key  (r) = Recorded By, (t) = Taken By, (c) = Cosigned By      Initials Name Provider Type     Karla Cornelius Physical Therapist                   Obj/Interventions       Row Name 05/28/25 0933          Motor Skills    Therapeutic Exercise other (see comments)  LAQ, heel slides  -KG       Row Name 05/28/25 0933          Balance    Dynamic Standing Balance minimal assist;2-person assist  -KG     Position/Device Used, Standing Balance supported;walker, front-wheeled  -KG     Balance Interventions standing;sit to stand  -KG               User Key  (r) = Recorded By, (t) = Taken By, (c) = Cosigned By      Initials Name Provider Type    Karla Cornelius Physical Therapist                   Goals/Plan    No documentation.                  Clinical Impression       Row Name 05/28/25 0934          Pain    Pretreatment Pain Rating 0/10 - no pain  -KG     Posttreatment Pain Rating 0/10 - no pain  -KG       Row Name 05/28/25 0934          Plan of Care Review    Plan of Care Reviewed With patient  -KG     Progress improving  -KG     Outcome Evaluation Pt able to ambulate 3 bouts of 15', FWW, min-A and seated rest breaks in between. Slow, shuffling gait limited by strength and fatigue. Pt highly motivated to improve mobility.  Recommend SNF for best outcome  -KG       Row Name 05/28/25 0934          Positioning and Restraints    Pre-Treatment Position in bed  -KG     Post Treatment Position chair  -KG     In Chair notified nsg;call light within reach;encouraged to call for assist;exit alarm on;legs elevated  -KG               User Key  (r) = Recorded By, (t) = Taken By, (c) = Cosigned By      Initials Name Provider Type    Karla Cornelius Physical Therapist                   Outcome Measures       Row Name 05/28/25 0935          How much help from another person do you currently need...    Turning from your back to your side while in flat bed without using bedrails? 3  -KG     Moving from lying on back to sitting on the side of a flat bed without bedrails? 3  -KG     Moving to and from a bed to a chair  (including a wheelchair)? 3  -KG     Standing up from a chair using your arms (e.g., wheelchair, bedside chair)? 3  -KG     Climbing 3-5 steps with a railing? 2  -KG     To walk in hospital room? 3  -KG     AM-PAC 6 Clicks Score (PT) 17  -KG     Highest Level of Mobility Goal Stand (1 or More Minutes)-5  -KG       Row Name 05/28/25 0935          Functional Assessment    Outcome Measure Options AM-PAC 6 Clicks Basic Mobility (PT)  -KG               User Key  (r) = Recorded By, (t) = Taken By, (c) = Cosigned By      Initials Name Provider Type    RICO Karla Brito Physical Therapist                                 Physical Therapy Education       Title: PT OT SLP Therapies (Done)       Topic: Physical Therapy (Done)       Point: Mobility training (Done)       Learning Progress Summary            Patient Acceptance, E,TB, VU,DU by  at 5/21/2025 0947    Acceptance, E,D, VU,NR by LR at 5/20/2025 0940    Comment: Educated on benefits of mobility and being OOB, safety with mobility, correct supine to sit t/f technique, correct sit<->stand t/f technique, correct bed to chair t/f technique, and progression of POC.                      Point: Home exercise program (Done)       Learning Progress Summary            Patient Acceptance, E,TB, VU,DU by  at 5/21/2025 0947    Acceptance, E,D, VU,NR by LR at 5/20/2025 0940    Comment: Educated on benefits of mobility and being OOB, safety with mobility, correct supine to sit t/f technique, correct sit<->stand t/f technique, correct bed to chair t/f technique, and progression of POC.                      Point: Body mechanics (Done)       Learning Progress Summary            Patient Acceptance, E,TB, VU,DU by  at 5/21/2025 0947    Acceptance, E,D, VU,NR by LR at 5/20/2025 0940    Comment: Educated on benefits of mobility and being OOB, safety with mobility, correct supine to sit t/f technique, correct sit<->stand t/f technique, correct bed to chair t/f technique, and  progression of POC.                      Point: Precautions (Done)       Learning Progress Summary            Patient Acceptance, E,TB, VU,DU by  at 5/21/2025 0947    Acceptance, E,D, VU,NR by  at 5/20/2025 0940    Comment: Educated on benefits of mobility and being OOB, safety with mobility, correct supine to sit t/f technique, correct sit<->stand t/f technique, correct bed to chair t/f technique, and progression of POC.                                      User Key       Initials Effective Dates Name Provider Type Discipline     02/03/23 -  Jeannie Reyes, PT Physical Therapist PT     06/16/21 -  Celine Graham RN Registered Nurse Nurse                  PT Recommendation and Plan     Progress: improving  Outcome Evaluation: Pt able to ambulate 3 bouts of 15', FWW, min-A and seated rest breaks in between. Slow, shuffling gait limited by strength and fatigue. Pt highly motivated to improve mobility.  Recommend SNF for best outcome     Time Calculation:         PT Charges       Row Name 05/28/25 0936             Time Calculation    Start Time 0830  -KG      PT Received On 05/28/25  -KG         Timed Charges    80339 - PT Therapeutic Exercise Minutes 10  -KG      69659 - Gait Training Minutes  25  -KG      71930 - PT Therapeutic Activity Minutes 20  -KG         Total Minutes    Timed Charges Total Minutes 55  -KG       Total Minutes 55  -KG                User Key  (r) = Recorded By, (t) = Taken By, (c) = Cosigned By      Initials Name Provider Type    KG Karla Brito Physical Therapist                  Therapy Charges for Today       Code Description Service Date Service Provider Modifiers Qty    59533879937 HC PT THER PROC EA 15 MIN 5/28/2025 Karla Brito GP 1    21122861707 HC GAIT TRAINING EA 15 MIN 5/28/2025 Karla Brito GP 2    48563792133 HC PT THERAPEUTIC ACT EA 15 MIN 5/28/2025 Karla Brito GP 1            PT G-Codes  Outcome Measure Options: AM-PAC 6 Clicks Basic  Mobility (PT)  AM-PAC 6 Clicks Score (PT): 17  AM-PAC 6 Clicks Score (OT): 13       Karla Brito  5/28/2025

## 2025-05-28 NOTE — PROGRESS NOTES
[facility] Medicine Services  PROGRESS NOTE    Patient Name: Bibiana Hodges  : 1935  MRN: [id number]    Date of Admission: 2025  Primary Care Physician: Nivia Madrigal MD    Subjective     CC: f/u aspiration pneumonia     HPI:  In bed. No new complaints. Says she had a large BM and her abdomen feels much better afterwards. Breathing is stable. Doesn't like modified diet - patient voiced concerns she is getting dehydrated. We discussed need for PO fluid intake     Objective     Vital Signs:   Temp:  [96.7 °F (35.9 °C)-98.5 °F (36.9 °C)] 96.7 °F (35.9 °C)  Heart Rate:  [60-75] 60  Resp:  [16-18] 16  BP: (125-168)/(71-87) 168/80     Physical Exam:  Constitutional: No acute distress, awake, alert and conversant. Sitting up in bed. Frail / chronically ill appearing   HENT: NCAT, mucous membranes moist  Respiratory: Clear to auscultation bilaterally, normal respiratory effort on room air    Cardiovascular: RRR  Gastrointestinal: Positive bowel sounds, soft, nontender, nondistended  Musculoskeletal: Trace bilateral ankle edema  Psychiatric: Appropriate affect, cooperative with exam  Neurologic: Oriented x 3, moves all extremities spontaneously without focal deficits, speech clear. BUE tremor     Results Reviewed:  LAB RESULTS:      Lab 25  0429   WBC 9.41   HEMOGLOBIN 11.7*   HEMATOCRIT 36.4   PLATELETS 155   .3*         Lab 25  0430   SODIUM 135*   POTASSIUM 3.9   CHLORIDE 105   CO2 23.0   ANION GAP 7.0   BUN 19   CREATININE 0.93   EGFR 58.9*   GLUCOSE 82   CALCIUM 8.2*   MAGNESIUM 2.0     Brief Urine Lab Results  (Last result in the past 365 days)        Color   Clarity   Blood   Leuk Est   Nitrite   Protein   CREAT   Urine HCG        25 1428 Yellow   Turbid   Moderate (2+)   Large (3+)   Negative   100 mg/dL (2+)                 Microbiology Results Abnormal       Procedure Component Value - Date/Time    Urine Culture - Urine, Urine, Catheter  In/Out [[id number]]  (Abnormal)  (Susceptibility) Collected: 05/19/25 1428    Lab Status: Final result Specimen: Urine, Catheter In/Out Updated: 05/21/25 1124     Urine Culture 50,000 CFU/mL Pseudomonas aeruginosa    Narrative:      Colonization of the urinary tract without infection is common. Treatment is discouraged unless the patient is symptomatic, pregnant, or undergoing an invasive urologic procedure.    Susceptibility        Pseudomonas aeruginosa      EBCK      Cefepime Susceptible      Ceftazidime Susceptible      Ciprofloxacin Susceptible      Levofloxacin Susceptible      Piperacillin + Tazobactam Susceptible      Tobramycin Susceptible                                 No radiology results from the last 24 hrs    Results for orders placed during the hospital encounter of 08/20/23    Adult Transthoracic Echo Complete W/ Cont if Necessary Per Protocol    Interpretation Summary    Left ventricular systolic function is mildly decreased. Calculated left ventricular EF = 47.9% Normal left ventricular cavity size and wall thickness noted. There is left ventricular global hypokinesis noted. Left ventricular diastolic function is consistent with (grade I) impaired relaxation.    : Normal right ventricular cavity size and systolic function noted. Electronic lead present in the ventricle.    Normal left atrial size and volume noted. Saline test results are negative.    There is moderate calcification of the aortic valve. The aortic valve was poorly visualized but appears trileaflet. Moderate aortic valve regurgitation is present. No aortic valve stenosis is present.    Mild mitral annular calcification is present. Mild mitral valve regurgitation is present. No significant mitral valve stenosis is present.    The tricuspid valve is grossly normal in structure. Mild tricuspid valve regurgitation is present. Estimated right ventricular systolic pressure from tricuspid regurgitation is normal, 33 mmHg. No tricuspid valve  stenosis is present.    Mild dilation of the ascending aorta is present. Ascending aorta = 3.7 cm    There is a small (<1cm) pericardial effusion adjacent to the right atrium and right ventricle.    Current medications:  Scheduled Meds:amiodarone, 200 mg, Oral, Daily  apixaban, 2.5 mg, Oral, BID  ascorbic acid, 500 mg, Oral, Daily  castor oil-balsam peru, 1 Application, Topical, Q12H  ipratropium-albuterol, 3 mL, Nebulization, 4x Daily - RT  levETIRAcetam, 250 mg, Oral, BID  levothyroxine, 100 mcg, Oral, Daily  lisinopril, 10 mg, Oral, Q24H  lubiprostone, 24 mcg, Oral, BID With Meals  melatonin, 5 mg, Oral, Nightly  metoprolol tartrate, 12.5 mg, Oral, BID  polyethylene glycol, 17 g, Oral, Daily  rosuvastatin, 10 mg, Oral, Nightly  traZODone, 100 mg, Oral, Nightly  venlafaxine XR, 75 mg, Oral, Daily      Continuous Infusions:   PRN Meds:.  acetaminophen    senna-docusate sodium **AND** polyethylene glycol **AND** bisacodyl **AND** bisacodyl    Assessment & Plan   Assessment & Plan     Active Hospital Problems    Diagnosis  POA    **Pneumonia [J18.9]  Yes    Hyperlipidemia [E78.5]  Yes    Autonomic dysfunction [G90.9]  Yes    Atrial fibrillation [I48.91]  Yes    Hypertension [I10]  Yes      Resolved Hospital Problems   No resolved problems to display.        Brief Hospital Course to date:  Bibiana Hodges is a 89 y.o. female with past medical history significant for atrial fibrillation, hypertension, hyperlipidemia, GERD, B12 deficiency. She resides at [facility]. Brought to [facility] ED on 5/19/25 due to acute respiratory distress. She was hypoxic on arrival with O2 sat 83% on room air - she initially required 6L NC. Found to have RLL PNA.     Pneumonia of right lower lobe, suspected aspiration  COPD exacerbation  Acute hypoxia  - Blood gas not obtained in ED  - RA saturation 83% and required 6L   - CTA chest showed evidence of right lower lobe pneumonia  - Negative full respiratory PCR panel  -  Respiratory culture with normal vicki  - Blood cultures negative  - Strep pneumonia / Legionella urine Ags negative  - ID has followed  - s/p IV Ceftriaxone / Doxycycline x 5 days  - s/p Prednisone 40mg daily x 5 days  - Has since weaned to room air  - SLP following due to concerns for dysphagia     Oropharyngeal dysphagia  - Patient had MBS 5/23/25. Recommended diet is mechanical ground texture, honey thick liquid, no mixed consistencies.  - Partner has discussed comfort diet with patient. Ms. Hodges expressed that she does not want to get pneumonia over and over again, and wants to remain full code for now (says she has great grandbabies she has not even met yet and would like to do so) - she is willing to stick with the modified diet   - Partner reached out to SLP, they will see if we can get magic cups for patient (milkshakes will not be safe)    Asymptomatic bacteriuria  - Urine culture with 50K Pseudomonas  - ID following, feel this represents colonization    Occasional lower extremity pain   Occasional numbness of toes bilaterally  - Arterial duplex of lower extremities was done. 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 Also right PTA is occluded distally. On the left side mid SFA appears occlusive. Left distal SFA appears to have reconstitution. Also left popliteal artery, distal PTA, and peroneal artery appear monophasic.  - Patient has dopplerable dorsalis pedis and posterior tibialis pulses bilaterally  - Given patient's overall condition and the fact that she still has dopplerable pulses, benefit of intervention is questionable - will defer inpatient Vascular Surgery consultation at this time, can consider as outpatient    Abnormal troponin levels  - Troponin levels were abnormal but no complaints of chest pain    Chronic essential tremor  - Reports significant tremors, especially in right hand, which is chronic   - Has previously been evaluated  by [facility] in 2013, had been trialed on numerous medications (see Neurology note for further details) without resolution of tremor  - Neurology has seen and evaluated the patient here, given chronicity of tremors, will not make any medication adjustments at this time (however did recommend possible trial of gabapentin 100 mg BID and transitioning patient from Trazodone to Remeron)   - F/U as outpatient for further management/medication adjustments    Chronic double vision  - Reports having double vision for 2 years  - Neurology has seen and evaluated patient  - MRI of the brain does not show any acute process. 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  - Outpatient follow up with Ophthalmology     Orthostatic hypotension  - Continue non-medical/supportive interventions    Constipation  - Looks like PCP had recently prescribed Linzess - patient unsure if it was helpful  - On Miralax, Senna and Lactulose with continued abdominal discomfort and constipation  - Continue Miralax. Added Amitiza 24mg PO BID on 5/26 with results on 5/27, continue this regimen for now      Generalized weakness  - PT/OT following - patient interested in rehab     Expected Discharge Location and Transportation: [facility]   Expected Discharge Expected Discharge Date: 5/30/2025; Expected Discharge Time:      VTE Prophylaxis:Pharmacologic VTE prophylaxis orders are present.    AM-PAC 6 Clicks Score (PT): 17 (05/28/25 7377)    CODE STATUS:   Code Status and Medical Interventions: CPR (Attempt to Resuscitate); Full Support   Ordered at: 05/19/25 5443     Code Status (Patient has no pulse and is not breathing):    CPR (Attempt to Resuscitate)     Medical Interventions (Patient has pulse or is breathing):    Full Support     Kendra Monahan PA-C  05/28/25

## 2025-05-29 ENCOUNTER — ANCILLARY PROCEDURE (OUTPATIENT)
Dept: SPEECH THERAPY | Facility: HOSPITAL | Age: OVER 89
End: 2025-05-29
Payer: MEDICARE

## 2025-05-29 ENCOUNTER — READMISSION MANAGEMENT (OUTPATIENT)
Dept: CALL CENTER | Facility: HOSPITAL | Age: OVER 89
End: 2025-05-29
Payer: MEDICARE

## 2025-05-29 VITALS
OXYGEN SATURATION: 93 % | DIASTOLIC BLOOD PRESSURE: 62 MMHG | TEMPERATURE: 98.2 F | SYSTOLIC BLOOD PRESSURE: 120 MMHG | RESPIRATION RATE: 20 BRPM | BODY MASS INDEX: 20.73 KG/M2 | HEART RATE: 60 BPM | HEIGHT: 67 IN | WEIGHT: 132.06 LBS

## 2025-05-29 PROBLEM — J18.9 PNEUMONIA: Status: RESOLVED | Noted: 2025-05-19 | Resolved: 2025-05-29

## 2025-05-29 PROBLEM — R13.10 DYSPHAGIA: Status: ACTIVE | Noted: 2025-05-29

## 2025-05-29 PROCEDURE — 92612 ENDOSCOPY SWALLOW (FEES) VID: CPT

## 2025-05-29 PROCEDURE — 94799 UNLISTED PULMONARY SVC/PX: CPT

## 2025-05-29 PROCEDURE — 99239 HOSP IP/OBS DSCHRG MGMT >30: CPT | Performed by: HOSPITALIST

## 2025-05-29 PROCEDURE — 94664 DEMO&/EVAL PT USE INHALER: CPT

## 2025-05-29 RX ORDER — LUBIPROSTONE 24 UG/1
24 CAPSULE ORAL 2 TIMES DAILY WITH MEALS
Qty: 60 CAPSULE | Refills: 0 | Status: SHIPPED | OUTPATIENT
Start: 2025-05-29

## 2025-05-29 RX ADMIN — ACETAMINOPHEN 650 MG: 325 TABLET, FILM COATED ORAL at 11:03

## 2025-05-29 RX ADMIN — OXYCODONE HYDROCHLORIDE AND ACETAMINOPHEN 500 MG: 500 TABLET ORAL at 09:29

## 2025-05-29 RX ADMIN — VENLAFAXINE HYDROCHLORIDE 75 MG: 75 CAPSULE, EXTENDED RELEASE ORAL at 09:29

## 2025-05-29 RX ADMIN — Medication 12.5 MG: at 09:29

## 2025-05-29 RX ADMIN — IPRATROPIUM BROMIDE AND ALBUTEROL SULFATE 3 ML: 2.5; .5 SOLUTION RESPIRATORY (INHALATION) at 08:39

## 2025-05-29 RX ADMIN — IPRATROPIUM BROMIDE AND ALBUTEROL SULFATE 3 ML: 2.5; .5 SOLUTION RESPIRATORY (INHALATION) at 13:18

## 2025-05-29 RX ADMIN — LISINOPRIL 10 MG: 10 TABLET ORAL at 09:29

## 2025-05-29 RX ADMIN — Medication 1 APPLICATION: at 11:03

## 2025-05-29 RX ADMIN — AMIODARONE HYDROCHLORIDE 200 MG: 200 TABLET ORAL at 09:29

## 2025-05-29 RX ADMIN — LUBIPROSTONE 24 MCG: 24 CAPSULE ORAL at 09:29

## 2025-05-29 RX ADMIN — LEVOTHYROXINE SODIUM 100 MCG: 0.1 TABLET ORAL at 09:29

## 2025-05-29 RX ADMIN — APIXABAN 2.5 MG: 2.5 TABLET, FILM COATED ORAL at 09:29

## 2025-05-29 RX ADMIN — LEVETIRACETAM 250 MG: 250 TABLET, FILM COATED ORAL at 09:29

## 2025-05-29 NOTE — DISCHARGE PLACEMENT REQUEST
"To: [facility]  From: Erinn Pablo,       Bibiana Hodges (89 y.o. Female)       Date of Birth   1935    Social Security Number       Address   [address] [address]    Home Phone   [phone]    MRN   [id number]       [place]   Zoroastrian    Marital Status                               Admission Date   2025    Admission Type   Emergency    Admitting Provider   Wendi Gaspar MD    Attending Provider   Wendi Gaspar MD    Department, Room/Bed   [address], S450/1       Discharge Date       Discharge Disposition   Home or Self Care    Discharge Destination                                 Attending Provider: Wendi Gaspar MD    Allergies: Sulfa Antibiotics, Gabapentin    Isolation: None   Infection: None   Code Status: CPR    Ht: 170.2 cm (67.01\")   Wt: 59.9 kg (132 lb 0.9 oz)    Admission Cmt: None   Principal Problem: Pneumonia [J18.9]                   Active Insurance as of 2025       Primary Coverage       Payor Plan Insurance Group Employer/Plan Group    ANTHEM MEDICARE REPLACEMENT ANTH MEDICARE ADVANTAGE HMO [id number]       Payor Plan Address Payor Plan Phone Number Payor Plan Fax Number Effective Dates    [address] [phone]  2025 - None Entered    [facility] [id number]         Subscriber Name Subscriber Birth Date Member ID       BIBIANA HODGES 1935 [id number]                     Emergency Contacts        (Rel.) Home Phone Work Phone Mobile Phone    Danyelle Keyes (POA) (Daughter) [phone] -- [phone]    HODGES,ELODIA (Son) -- -- [phone]    HODGESNIA SCHNEIDER (Son) [phone] -- [phone]                 Discharge Summary        Wendi Gaspar MD at [facility]              [facility]  DISCHARGE SUMMARY    Patient Name: Bibiana Hodges  : 1935  MRN: [id number]    Date of Admission: 2025 12:12 " "PM  Date of Discharge:  05/29/25  Primary Care Physician: Nivia Madrigal MD    Consults       Date and Time Order Name Status Description    5/22/2025  9:09 AM Inpatient Infectious Diseases Consult Completed     5/19/2025 11:38 PM Inpatient Neurology Consult General Completed             Hospital Course     Presenting Problem: SOA    Active Hospital Problems    Diagnosis  POA    Dysphagia, on modified diet [R13.10]  Yes    Hyperlipidemia [E78.5]  Yes    Autonomic dysfunction [G90.9]  Yes    Atrial fibrillation [I48.91]  Yes    Hypertension [I10]  Yes      Resolved Hospital Problems    Diagnosis Date Resolved POA    **Pneumonia [J18.9] 05/29/2025 Yes          Hospital Course:  Bibiana Hodges is a 89 y.o. female with past medical history significant for atrial fibrillation, hypertension, hyperlipidemia, GERD, B12 deficiency, and chronic dysphagia.  She is living at an assisted living facility.  Patient was brought by ambulance for respiratory distress. History of recurrent pneumonia felt to be secondary to aspiration. She was last admitted in January 2025 and declined modified diet - was made DNR/DNI and discharged on a comfort diet.     Pneumonia of right lower lobe, suspected aspiration  COPD exacerbation  Acute hypoxia  - Room air saturation 83% and required 6 L on admission  - CTA chest showed evidence of right lower lobe pneumonia  - Negative full respiratory PCR panel  - Respiratory culture with normal vicki, negative blood cultures, negative urine Strep and Legionella  - Completed 5 days of Rocephin and Doxycycline  - s/p 5 days prednisone 40 mg daily  - ID has followed  - Patient has history of chronic dysphagia and modified diet has been recommended (soft to chew, honey thick) - however, patient does not like it - after further discussion (she had opted for comfort diet and DNR code status during January 2025 admission), she is now agreeable to \"stick with\" the modified diet as recommended as she " wishes to remain full code at this time   - Weaned to room air      Asymptomatic bacteriuria  - Urine culture with 50K Pseudomonas  - ID has seen, feel this represents colonization - no further treatment     Chronic oropharyngeal dysphagia  - Patient had MBS 5/23/25. Recommended diet was mechanical ground texture, honey thick liquid, no mixed consistencies. Repeat FEES performed on day of discharge 5/29/25 and changed to soft to chew, whole meat, continue honey thick liquids  - Discussed again with patient 5/25/25 regarding option of comfort diet, however she does not want to get pneumonia over and over again, and wants to remain full code for now (says she has great grandbabies she has not even met yet and would like to do so) - she is willing to stick with the modified diet at this time  - Magic cups ordered for patient during this admission (milkshakes will not be safe, per discussion with SLP)     Occasional lower extremity pain   Occasional numbness of toes bilaterally  - Arterial duplex of lower extremities was done. 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 Also right PTA is occluded distally. On the left side mid SFA appears occlusive. Left distal SFA appears to have reconstitution. Also left popliteal artery, distal PTA, and peroneal artery appear monophasic.  - Patient has dopplerable dorsalis pedis and posterior tibialis pulses bilaterally  - Given patient's overall condition and the fact that she still has dopplerable pulses, benefit of intervention is questionable - will defer inpatient Vascular Surgery consultation at this time, can consider as outpatient    Abnormal troponin levels  - Troponin levels were abnormal but no complaints of chest pain    Chronic essential tremor  - Reports significant tremors, especially in right hand, which is chronic   - Has previously been evaluated by [place] [facility] in 2013, had been trialed on  numerous medications (see Neurology note for further details) without resolution of tremor  - Neurology has seen and evaluated the patient here, given chronicity of tremors, will not make any medication adjustments at this time (however did recommend possible trial of gabapentin 100 mg BID and transitioning patient from Trazodone to Remeron) - F/U as outpatient for further management/medication adjustments, Ambulatory Referral to Neurology placed at discharge    Chronic double vision  - Reports having double vision for 2 years  - Neurology has seen and evaluated patient  - MRI of the brain does not show any acute process. 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  - Outpatient follow up with Ophthalmology - Ambulatory Referral placed at discharge     Orthostatic hypotension  - Continue non-medical/supportive interventions    Constipation  - Reported not having a bowel movement in 2 weeks  - KUB did not show any sign of obstruction  - Amitiza initiated 5/26/25 with results on 5/27/25 - will continue 30 day trial at discharge (previously had been prescribed Linzess per PCP but patient unsure if it was helpful)     Generalized weakness  - PT/OT have followed patient  - Attempted rehab placement but unable to find accepting facility  - Plan back to assisted living facility with HHPT    Full code status  - Expressed desire to remain full code at this time      Discharge Follow Up Recommendations for outpatient labs/diagnostics:  F/U with PCP 1 week  Ambulatory referrals made to Neurology and Ophthalmology    Day of Discharge     HPI:   Seen this morning. No complaints at this time.     Review of Systems  Gen-no fevers, no chills  CV-no chest pain, no palpitations  Resp-no cough, no dyspnea  GI-no N/V/D, no abd pain      Vital Signs:   Temp:  [97.6 °F (36.4 °C)-98.3 °F (36.8 °C)] 98.2 °F (36.8 °C)  Heart Rate:  [60-64] 60  Resp:  [16-20] 18  BP: (120-150)/(62-86) 120/62      Physical Exam:  Gen-no acute  distress, frail/chronically ill appearing  HENT-NCAT, mucous membranes moist  CV-RRR, S1 S2 normal, no m/r/g  Resp-CTAB, no wheezes or rales  Abd-soft, NT, ND, +BS  Ext-trace edema  Neuro-A&Ox3, no focal deficits, resting tremor  Skin-no rashes  Psych-appropriate mood      Pertinent  and/or Most Recent Results     LAB RESULTS:      Lab 05/27/25  0429   WBC 9.41   HEMOGLOBIN 11.7*   HEMATOCRIT 36.4   PLATELETS 155   .3*         Lab 05/27/25  0430   SODIUM 135*   POTASSIUM 3.9   CHLORIDE 105   CO2 23.0   ANION GAP 7.0   BUN 19   CREATININE 0.93   EGFR 58.9*   GLUCOSE 82   CALCIUM 8.2*   MAGNESIUM 2.0                         Brief Urine Lab Results  (Last result in the past 365 days)        Color   Clarity   Blood   Leuk Est   Nitrite   Protein   CREAT   Urine HCG        05/19/25 1428 Yellow   Turbid   Moderate (2+)   Large (3+)   Negative   100 mg/dL (2+)                 Microbiology Results (last 10 days)       Procedure Component Value - Date/Time    Respiratory Culture - Sputum, Cough [[id number]] Collected: 05/20/25 0615    Lab Status: Final result Specimen: Sputum from Cough Updated: 05/22/25 1002     Respiratory Culture Light growth (2+) Normal respiratory vicki. No S. aureus or Pseudomonas aeruginosa detected. Final report.     Gram Stain Many (4+) WBCs per low power field      Rare (1+) Epithelial cells per low power field      Few (2+) Gram positive cocci in pairs and chains    Urine Culture - Urine, Urine, Catheter In/Out [[id number]]  (Abnormal)  (Susceptibility) Collected: 05/19/25 1428    Lab Status: Final result Specimen: Urine, Catheter In/Out Updated: 05/21/25 1124     Urine Culture 50,000 CFU/mL Pseudomonas aeruginosa    Narrative:      Colonization of the urinary tract without infection is common. Treatment is discouraged unless the patient is symptomatic, pregnant, or undergoing an invasive urologic procedure.    Susceptibility        Pseudomonas aeruginosa      BECK      Cefepime Susceptible       Ceftazidime Susceptible      Ciprofloxacin Susceptible      Levofloxacin Susceptible      Piperacillin + Tazobactam Susceptible      Tobramycin Susceptible                           S. Pneumo Ag Urine or CSF - Urine, Urine, Catheter In/Out [[id number]]  (Normal) Collected: 05/19/25 1428    Lab Status: Final result Specimen: Urine, Catheter In/Out Updated: 05/20/25 1056     Strep Pneumo Ag Negative    Legionella Antigen, Urine - Urine, Urine, Catheter In/Out [[id number]]  (Normal) Collected: 05/19/25 1428    Lab Status: Final result Specimen: Urine, Catheter In/Out Updated: 05/20/25 1056     LEGIONELLA ANTIGEN, URINE Negative    Blood Culture - Blood, Wrist, Right [[id number]]  (Normal) Collected: 05/19/25 1400    Lab Status: Final result Specimen: Blood from Wrist, Right Updated: 05/24/25 1445     Blood Culture No growth at 5 days    Blood Culture - Blood, Arm, Left [[id number]]  (Normal) Collected: 05/19/25 1345    Lab Status: Final result Specimen: Blood from Arm, Left Updated: 05/24/25 1445     Blood Culture No growth at 5 days    Respiratory Panel PCR w/COVID-19(SARS-CoV-2) ERICK/MARTA/ELPIDIO/PAD/COR/ALMA In-House, NP Swab in UTM/VTM, 2 HR TAT - Swab, Nasopharynx [[id number]]  (Normal) Collected: 05/19/25 1310    Lab Status: Final result Specimen: Swab from Nasopharynx Updated: 05/19/25 1439     ADENOVIRUS, PCR Not Detected     Coronavirus 229E Not Detected     Coronavirus HKU1 Not Detected     Coronavirus NL63 Not Detected     Coronavirus OC43 Not Detected     COVID19 Not Detected     Human Metapneumovirus Not Detected     Human Rhinovirus/Enterovirus Not Detected     Influenza A PCR Not Detected     Influenza B PCR Not Detected     Parainfluenza Virus 1 Not Detected     Parainfluenza Virus 2 Not Detected     Parainfluenza Virus 3 Not Detected     Parainfluenza Virus 4 Not Detected     RSV, PCR Not Detected     Bordetella pertussis pcr Not Detected     Bordetella parapertussis PCR Not Detected     Chlamydophila  pneumoniae PCR Not Detected     Mycoplasma pneumo by PCR Not Detected    Narrative:      In the setting of a positive respiratory panel with a viral infection PLUS a negative procalcitonin without other underlying concern for bacterial infection, consider observing off antibiotics or discontinuation of antibiotics and continue supportive care. If the respiratory panel is positive for atypical bacterial infection (Bordetella pertussis, Chlamydophila pneumoniae, or Mycoplasma pneumoniae), consider antibiotic de-escalation to target atypical bacterial infection.            FL Video Swallow With Speech Single Contrast  Result Date: 5/25/2025  FL VIDEO SWALLOW W SPEECH SINGLE-CONTRAST Date of Exam: 5/23/2025 12:12 PM EDT Indication: dysphagia.   Comparison: None available. Technique:   The speech pathologist administered food and/or liquid mixed with barium to the patient with cine/video imaging.  Imaging assistance was provided to the speech pathologist and an image was saved. Fluoroscopic Time: 1 minute and 24 seconds Number of Images: 11 associated fluoroscopic loops were saved Findings: Aspiration was seen during fluoroscopic guided modified barium swallowing series. Please see speech therapy report for full details and recommendations.     Impression: Fluoroscopy provided for a modified barium swallow. Aspiration was seen during swallowing evaluation. Please see speech therapy report for full details and recommendations. Report dictated by: Shelley Walls PA-c  I have personally reviewed this case and agree with the findings above: Electronically Signed: Manuel Mora MD  5/25/2025 10:50 AM EDT  Workstation ID: [id number]    Duplex Lower Extremity Art / Grafts - Right CAR  Result Date: 5/22/2025    Mildly abnormal ABIs bilateral   Right lower extremity imaging suggest multiphasic flows throughout, there is evidence of a high-grade (greater than 60%) stenosis in the popliteal arteries on the right   Right PTA  distally is occluded     Duplex Venous Lower Extremity - Left CAR  Result Date: 5/22/2025    The left lower extremity venous duplex scan is negative for evidence of a DVT and SVT.   Incidentally noted occluded mid SFA with reconstitution of flow by collaterals in the distal segment.  Monophasic segments distal to the occlusion.     MRI Brain Without Contrast  Result Date: 5/21/2025  MRI BRAIN WO CONTRAST Date of Exam: 5/21/2025 2:45 PM EDT Indication: Double vision.  Comparison: None available. Technique:  Routine multiplanar/multisequence sequence images of the brain were obtained without contrast administration. FINDINGS: Foci of T2/FLAIR signal hyperintensity are seen within the bilateral hemispheric white matter and within the sara. There is cortical atrophy with prominent sulcation and ventriculomegaly. Midline structures appear unremarkable. No significant mass effect, intracranial hemorrhage, or hydrocephalus is identified. Diffusion-weighted sequences demonstrate no acute infarct.The visualized intracranial flow-voids appear unremarkable. Suggestion of mild right mastoid effusion. Bilateral lens prostheses noted. The visualized superficial soft tissues and cervical spine demonstrate no significant abnormality.     1.Findings compatible with chronic microvascular ischemic change and diffuse cortical atrophy. 2.No diffusion restriction is identified to suggest acute infarct. 3.Possible mild right mastoid effusion. Electronically Signed: Slim Mauro MD  5/21/2025 3:26 PM EDT  Workstation ID: [id number]    XR Abdomen KUB  Result Date: 5/21/2025  XR ABDOMEN KUB Date of Exam: 5/21/2025 7:24 AM EDT Indication: constipation Comparison: No recent KUB. 8/23/2023 abdomen pelvis CT scan Findings: Extensive stool shadow is seen from the level of the ascending colon to the distal descending colon, but with practically no visible stool shadow in the sigmoid or rectum. No abnormally dilated small bowel loops are seen.  Transverse colon is considered in the upper range of normal diameter, between 6 and 7 cm. There was similar degree of fecal stasis on the 8/23/2023 abdominal CT scan. No bowel wall edema or pneumatosis is seen. Incidental note is made of the patient's dextroconvex lumbar scoliosis, and anatomically aligned bilateral hip prostheses. .     Impression: Moderate fecal stasis to the level of the distal descending colon. No obvious impaction. Electronically Signed: Deon Vera MD  5/21/2025 9:37 AM EDT  Workstation ID: [id number]    CT Angiogram Chest  Result Date: 5/19/2025  CT ANGIOGRAM CHEST Date of Exam: 5/19/2025 2:38 PM EDT Indication: sob, hypoxia. Comparison: 4/20/2024 Technique: CTA of the chest was performed after the uneventful intravenous administration of 85 mL Isovue-300. Reconstructed coronal and sagittal images were also obtained. In addition, a 3-D volume rendered image was created for interpretation. Automated exposure control and iterative reconstruction methods were used. FINDINGS: Thoracic inlet: Unremarkable. Pulmonary arteries: No filling defects are identified within the pulmonary arteries to suggest acute pulmonary embolism. Great vessels: Atherosclerotic plaque is seen within the thoracic aorta and proximal arch vessels Mediastinum/Merari: No pathologically enlarged mediastinal lymph nodes are seen. Moderate hiatal hernia. Esophagus appears otherwise unremarkable Lung parenchyma/pleura: Small left and trace right pleural effusions with bibasilar atelectasis. Scattered lung parenchymal scarring noted bilaterally. Suggestion of centrilobular/tree-in-bud nodularity within the right lower lobe which may indicate atypical infectious process. Right lung granuloma. No pneumothorax is seen. Trachea and airways: The trachea and central airways appear unremarkable. Pleural space: No significant pleural effusion or pneumothorax is seen. Heart and pericardium: Cardiac pacemaker leads noted. Coronary artery  calcifications. Otherwise, the heart and pericardium appear unremarkable Chest wall: No acute osseous lesion is identified. Bones are demineralized. There are degenerative changes within the skeletal structures. Bilateral shoulder arthroplasties are noted. Left chest wall pacemaker noted. Upper abdomen: No acute abnormality is identified within the visualized upper abdomen. Probable bilateral renal cysts.     1.No evidence of acute pulmonary embolism. 2.Suggestion of centrilobular/tree-in-bud nodularity within the right lower lobe which may indicate atypical infectious process. 3.Small left and trace right pleural effusions with bibasilar atelectasis. 4.Scattered bilateral lung parenchymal scarring. Electronically Signed: Slim Mauro MD  5/19/2025 3:04 PM EDT  Workstation ID: [id number]      Results for orders placed during the hospital encounter of 05/19/25    Duplex Lower Extremity Art / Grafts - Right CAR    Interpretation Summary    Mildly abnormal ABIs bilateral    Right lower extremity imaging suggest multiphasic flows throughout, there is evidence of a high-grade (greater than 60%) stenosis in the popliteal arteries on the right    Right PTA distally is occluded      Results for orders placed during the hospital encounter of 05/19/25    Duplex Lower Extremity Art / Grafts - Right CAR    Interpretation Summary    Mildly abnormal ABIs bilateral    Right lower extremity imaging suggest multiphasic flows throughout, there is evidence of a high-grade (greater than 60%) stenosis in the popliteal arteries on the right    Right PTA distally is occluded      Results for orders placed during the hospital encounter of 08/20/23    Adult Transthoracic Echo Complete W/ Cont if Necessary Per Protocol    Interpretation Summary    Left ventricular systolic function is mildly decreased. Calculated left ventricular EF = 47.9% Normal left ventricular cavity size and wall thickness noted. There is left ventricular global  hypokinesis noted. Left ventricular diastolic function is consistent with (grade I) impaired relaxation.    : Normal right ventricular cavity size and systolic function noted. Electronic lead present in the ventricle.    Normal left atrial size and volume noted. Saline test results are negative.    There is moderate calcification of the aortic valve. The aortic valve was poorly visualized but appears trileaflet. Moderate aortic valve regurgitation is present. No aortic valve stenosis is present.    Mild mitral annular calcification is present. Mild mitral valve regurgitation is present. No significant mitral valve stenosis is present.    The tricuspid valve is grossly normal in structure. Mild tricuspid valve regurgitation is present. Estimated right ventricular systolic pressure from tricuspid regurgitation is normal, 33 mmHg. No tricuspid valve stenosis is present.    Mild dilation of the ascending aorta is present. Ascending aorta = 3.7 cm    There is a small (<1cm) pericardial effusion adjacent to the right atrium and right ventricle.        Discharge Details        Discharge Medications        New Medications        Instructions Start Date   lubiprostone 24 MCG capsule  Commonly known as: AMITIZA   24 mcg, Oral, 2 Times Daily With Meals             Changes to Medications        Instructions Start Date   polyethylene glycol 17 GM/SCOOP powder  Commonly known as: MIRALAX  What changed: See the new instructions.   MIX 17 GM IN 8 OUNCES OF LIQUID AND DRINK 1 TO 2 TIMES DAILY FOR CONSTIPATION.             Continue These Medications        Instructions Start Date   acetaminophen 325 MG tablet  Commonly known as: TYLENOL   650 mg, Every 8 Hours PRN      albuterol (2.5 MG/3ML) 0.083% nebulizer solution  Commonly known as: PROVENTIL   2.5 mg, Nebulization, Every 4 Hours PRN      amiodarone 200 MG tablet  Commonly known as: PACERONE   200 mg, Daily      apixaban 2.5 MG tablet tablet  Commonly known as: ELIQUIS   2.5 mg,  2 Times Daily      artificial tears ophthalmic ointment   1 Application, Both Eyes, Every 1 Hour PRN      benzonatate 100 MG capsule  Commonly known as: TESSALON   100 mg, Oral, 3 Times Daily PRN      bisacodyl 10 MG suppository  Commonly known as: DULCOLAX   10 mg, Rectal, Daily PRN      castor oil-balsam peru ointment   1 Application, Topical, Every 12 Hours Scheduled, Apply to sacral area      ferrous sulfate 325 (65 FE) MG tablet   325 mg, Daily With Breakfast      fluticasone 50 MCG/ACT nasal spray  Commonly known as: FLONASE   2 sprays, Nasal, Daily PRN      levETIRAcetam  MG tablet  Commonly known as: KEPPRA XR   500 mg, Oral, Daily      levothyroxine 100 MCG tablet  Commonly known as: SYNTHROID, LEVOTHROID   100 mcg, Daily      lisinopril 10 MG tablet  Commonly known as: PRINIVIL,ZESTRIL   10 mg, Oral, Every 24 Hours Scheduled      meclizine 25 MG tablet  Commonly known as: ANTIVERT   25 mg, Oral, Every 8 Hours PRN      melatonin 5 MG tablet tablet   5 mg, Oral, Nightly      metoprolol succinate XL 25 MG 24 hr tablet  Commonly known as: TOPROL-XL   25 mg, Oral, Daily      ondansetron ODT 4 MG disintegrating tablet  Commonly known as: ZOFRAN-ODT   4 mg, Translingual, Every 6 Hours PRN      pantoprazole 40 MG EC tablet  Commonly known as: PROTONIX   40 mg, Daily      rosuvastatin 10 MG tablet  Commonly known as: CRESTOR   10 mg, Oral, Nightly      senna 8.6 MG tablet  Commonly known as: SENOKOT   2 tablets, Oral, Daily      traZODone 100 MG tablet  Commonly known as: DESYREL   100 mg, Nightly      venlafaxine XR 75 MG 24 hr capsule  Commonly known as: EFFEXOR-XR   75 mg, Daily      Vitamin C 500 MG capsule   1 capsule, Daily               Allergies   Allergen Reactions    Sulfa Antibiotics Hives    Gabapentin          Discharge Disposition:  Home or Self Care    Diet:  Hospital:  Diet Order   Procedures    Diet: Cardiac; Healthy Heart (2-3 Na+); No Mixed Consistencies, Feeding Assistance - Nursing;  Texture: Soft to Chew (NDD 3); Soft to Chew: Whole Meat; Fluid Consistency: Honey Thick       Diet Instructions       Diet: Cardiac Diets; Healthy Heart (2-3 Na+); Soft to Chew (NDD 3); Whole Meat; Honey Thick; No Mixed Consistencies      Discharge Diet: Cardiac Diets    Cardiac Diet: Healthy Heart (2-3 Na+)    Texture: Soft to Chew (NDD 3)    Soft to Chew: Whole Meat    Fluid Consistency: Honey Thick    Diet Modifiers / Additional Diets: No Mixed Consistencies             Activity:  Activity Instructions       Activity as Tolerated                   CODE STATUS:    Code Status and Medical Interventions: CPR (Attempt to Resuscitate); Full Support   Ordered at: 05/19/25 3680     Code Status (Patient has no pulse and is not breathing):    CPR (Attempt to Resuscitate)     Medical Interventions (Patient has pulse or is breathing):    Full Support       No future appointments.    Additional Instructions for the Follow-ups that You Need to Schedule       Ambulatory Referral to Neurology   As directed      Discharge Follow-up with PCP   As directed       Currently Documented PCP:    Nivia Madrigal MD    PCP Phone Number:    [phone]     Follow Up Details: 1 week                      Wendi Gaspar MD  05/29/25      Time Spent on Discharge:  I spent  35  minutes on this discharge activity which included: face-to-face encounter with the patient, reviewing the data in the system, coordination of the care with the nursing staff as well as consultants, documentation, and entering orders.            Electronically signed by Wendi Gaspar MD at 05/29/25 3998

## 2025-05-29 NOTE — PAYOR COMM NOTE
"Bibiana Hodges (89 y.o. Female)       Date of Birth   1935    Social Security Number       Address   [address] [address]    Home Phone   [phone]    MRN   [id number]       Holiness   Gnosticism    Marital Status                               Admission Date   5/19/2025    Admission Type   Emergency    Admitting Provider   Wendi Gaspar MD    Attending Provider   Wendi Gaspar MD    Department, Room/Bed   [address], S450/1       Discharge Date       Discharge Disposition   Home or Self Care    Discharge Destination                                 Attending Provider: Wendi Gaspar MD    Allergies: Sulfa Antibiotics, Gabapentin    Isolation: None   Infection: None   Code Status: CPR    Ht: 170.2 cm (67.01\")   Wt: 59.9 kg (132 lb 0.9 oz)    Admission Cmt: None   Principal Problem: Pneumonia [J18.9]                   Active Insurance as of 5/19/2025       Primary Coverage       Payor Plan Insurance Group Employer/Plan Group    [employer] MEDICARE REPLACEMENT [facility] MEDICARE ADVANTAGE O [id number]       Payor Plan Address Payor Plan Phone Number Payor Plan Fax Number Effective Dates    [address] [phone]  1/1/2025 - None Entered    [facility] [id number]         Subscriber Name Subscriber Birth Date Member ID       BIBIANA HODGES 1935 [id number]                     Emergency Contacts        (Rel.) Home Phone Work Phone Mobile Phone    Danyelle Keyes (POA) (Daughter) [phone] -- [phone]    HODGESELODIA SCHNEIDER (Son) -- -- [phone]    NIA HODGES (Son) [phone] -- [phone]              [place]: NPI [id number] Tax ID [id number]  Insurance Information                  [employer] MEDICARE REPLACEMENT/[employer] MEDICARE ADVANTAGE [employer] Phone: [phone]    Subscriber: Bibiana Hodges Subscriber#: [id number]    Group#: [id number] Precert#: --    Authorization#: [id number] Effective Date: --          Current " Facility-Administered Medications   Medication Dose Route Frequency Provider Last Rate Last Admin    acetaminophen (TYLENOL) tablet 650 mg  650 mg Oral Q8H PRN Aileen Doherty MD   650 mg at 05/29/25 1103    amiodarone (PACERONE) tablet 200 mg  200 mg Oral Daily Aileen Doherty MD   200 mg at 05/29/25 0929    apixaban (ELIQUIS) tablet 2.5 mg  2.5 mg Oral BID Aileen Doherty MD   2.5 mg at 05/29/25 0929    ascorbic acid (VITAMIN C) tablet 500 mg  500 mg Oral Daily Aileen Doherty MD   500 mg at 05/29/25 0929    sennosides-docusate (PERICOLACE) 8.6-50 MG per tablet 2 tablet  2 tablet Oral BID PRN Wendi Gaspar MD   2 tablet at 05/27/25 2036    And    polyethylene glycol (MIRALAX) packet 17 g  17 g Oral Daily PRN Wendi Gaspar MD        And    bisacodyl (DULCOLAX) EC tablet 5 mg  5 mg Oral Daily PRN Wendi Gaspar MD   5 mg at 05/27/25 1355    And    bisacodyl (DULCOLAX) suppository 10 mg  10 mg Rectal Daily PRN Wendi Gaspar MD   10 mg at 05/27/25 2038    castor oil-balsam peru (VENELEX) ointment 1 Application  1 Application Topical Q12H Wendi Gaspar MD   1 Application at 05/29/25 1103    ipratropium-albuterol (DUO-NEB) nebulizer solution 3 mL  3 mL Nebulization 4x Daily - RT Aileen Doherty MD   3 mL at 05/29/25 0839    levETIRAcetam (KEPPRA) tablet 250 mg  250 mg Oral BID Aileen Doherty MD   250 mg at 05/29/25 0929    levothyroxine (SYNTHROID, LEVOTHROID) tablet 100 mcg  100 mcg Oral Daily Aileen Doherty MD   100 mcg at 05/29/25 0929    lisinopril (PRINIVIL,ZESTRIL) tablet 10 mg  10 mg Oral Q24H Aileen Doherty MD   10 mg at 05/29/25 0929    lubiprostone (AMITIZA) capsule 24 mcg  24 mcg Oral BID With Meals Kendra Monahan, HONG   24 mcg at 05/29/25 0929    melatonin tablet 5 mg  5 mg Oral Nightly Aileen Doherty MD   5 mg at 05/27/25 2237    metoprolol tartrate (LOPRESSOR) half tablet 12.5 mg  12.5 mg Oral BID Aileen Doherty MD   12.5 mg at 05/29/25 0929    polyethylene  glycol (MIRALAX) packet 17 g  17 g Oral Daily Aileen Doherty MD   17 g at 05/28/25 0952    rosuvastatin (CRESTOR) tablet 10 mg  10 mg Oral Nightly Aileen Doherty MD   10 mg at 05/28/25 2145    traZODone (DESYREL) tablet 100 mg  100 mg Oral Nightly Aileen Doherty MD   100 mg at 05/28/25 2146    venlafaxine XR (EFFEXOR-XR) 24 hr capsule 75 mg  75 mg Oral Daily Aileen Doherty MD   75 mg at 05/29/25 0929     Lab Results (last 24 hours)       ** No results found for the last 24 hours. **          Imaging Results (Last 24 Hours)       ** No results found for the last 24 hours. **          Orders (last 24 hrs)        Start     Ordered    05/29/25 1114  Discontinue IV  Once         05/29/25 1116    05/29/25 1106  Discharge patient  Once         05/29/25 1105    05/29/25 0000  Discharge Follow-up with PCP         05/29/25 1116    05/29/25 0000  Ambulatory Referral to Neurology         05/29/25 1116    05/29/25 0000  Ambulatory Referral to Ophthalmology         05/29/25 1116    05/29/25 0000  Diet: Cardiac Diets; Healthy Heart (2-3 Na+); Mechanical Ground (NDD 2); Honey Thick; No Mixed Consistencies         05/29/25 1116    05/29/25 0000  Activity as Tolerated         05/29/25 1116    05/28/25 1644  DIET MESSAGE Please send chicken noodle soup. Thank you  Once        Comments: Please send chicken noodle soup. Thank you    05/28/25 1643    05/28/25 1330  sodium chloride 0.9 % infusion  Continuous         05/28/25 1234    05/28/25 1235  Reason for Not Completing Sepsis Bundle (Not Currently Septic - Will Reevaluate as Needed)  Once         05/28/25 1234    05/28/25 1233  Reason for Not Completing Sepsis Bundle (Not Currently Septic - Will Reevaluate as Needed)  Once         05/28/25 1234    05/27/25 1430  castor oil-balsam peru (VENELEX) ointment 1 Application  Every 12 Hours Scheduled         05/27/25 1341    05/26/25 1130  lubiprostone (AMITIZA) capsule 24 mcg  2 Times Daily With Meals         05/26/25 1056     "05/25/25 1800  Dietary Nutrition Supplements Magic Cup  Daily With Lunch & Dinner       05/25/25 1251    05/25/25 1253  sennosides-docusate (PERICOLACE) 8.6-50 MG per tablet 2 tablet  2 Times Daily PRN        Placed in \"And\" Linked Group    05/25/25 1254    05/25/25 1253  polyethylene glycol (MIRALAX) packet 17 g  Daily PRN        Placed in \"And\" Linked Group    05/25/25 1254    05/25/25 1253  bisacodyl (DULCOLAX) EC tablet 5 mg  Daily PRN        Placed in \"And\" Linked Group    05/25/25 1254    05/25/25 1253  bisacodyl (DULCOLAX) suppository 10 mg  Daily PRN        Placed in \"And\" Linked Group    05/25/25 1254    05/20/25 2100  traZODone (DESYREL) tablet 100 mg  Nightly         05/19/25 2337 05/20/25 0900  amiodarone (PACERONE) tablet 200 mg  Daily         05/19/25 2337 05/20/25 0900  ascorbic acid (VITAMIN C) tablet 500 mg  Daily         05/19/25 2337 05/20/25 0900  lisinopril (PRINIVIL,ZESTRIL) tablet 10 mg  Every 24 Hours Scheduled         05/19/25 2337 05/20/25 0900  polyethylene glycol (MIRALAX) packet 17 g  Daily         05/19/25 2337 05/20/25 0900  venlafaxine XR (EFFEXOR-XR) 24 hr capsule 75 mg  Daily         05/19/25 2337 05/20/25 0600  levothyroxine (SYNTHROID, LEVOTHROID) tablet 100 mcg  Daily         05/19/25 2337 05/20/25 0030  ipratropium-albuterol (DUO-NEB) nebulizer solution 3 mL  4 Times Daily - RT         05/19/25 2337 05/20/25 0030  apixaban (ELIQUIS) tablet 2.5 mg  2 Times Daily         05/19/25 2337 05/20/25 0030  levETIRAcetam (KEPPRA) tablet 250 mg  2 Times Daily        Note to Pharmacy: For tube route administration disperse crushed tablets in 10 mL of water, shake for 5 minutes to dissolve, and administer immediately via enteral feeding tube.    05/19/25 2337 05/20/25 0030  melatonin tablet 5 mg  Nightly         05/19/25 2337 05/20/25 0030  metoprolol tartrate (LOPRESSOR) half tablet 12.5 mg  2 Times Daily         05/19/25 2337 05/20/25 0030  rosuvastatin " (CRESTOR) tablet 10 mg  Nightly         05/19/25 2337 05/19/25 2335  acetaminophen (TYLENOL) tablet 650 mg  Every 8 Hours PRN         05/19/25 2337    --  metoprolol succinate XL (TOPROL-XL) 25 MG 24 hr tablet  Daily         05/20/25 1302                     Physician Progress Notes (last 24 hours)        Xavi Ivey MD at 05/28/25 1541              INFECTIOUS DISEASE CONSULT/INITIAL HOSPITAL VISIT    Bibiana Hodges  1935  [id number]    Date of Consult: 5/28/2025    Admission Date: 5/19/2025      Requesting Provider: ANASTASIA Shen  Evaluating Physician: Xavi Ivey MD    Reason for Consultation: pseudomonas UTI    History of present illness:    Patient is a 89 y.o. female  with atrial fibrillation, hypertension, hyperlipidemia, GERD, and B12 deficiency resided at [facility] has been evaluated in [facility] ED for  respiratory distress.    Had hypoxia of 83%, aspiration suspected, patient given antibiotics and steroids.  Patient's hypoxia has resolved.    Despite not having burning on urination the [facility] ED check urinalysis which had 6-0 rbc, tntc wbc, and 3-6 squamous cells.    Pseudmonas was reporte don urine culture at 50,000 CFU.      Patient denies dysuria, currently is using a pure wick.    WE are consulted regarding an incidental finding on a urine culture that did not contribute to patient's reason for admission to hospital.      PAtient reports chronic leg weakness.    5/23/25; doing well; no events overnight    5/24/25; doing well; no events overnight; no fever, rash, sore throat asking about rehab    5/28/25; afebrile normotensive no events overnight  Past Medical History:   Diagnosis Date    Constipation     Depression     Dysautonomia orthostatic hypotension syndrome     Generalized osteoarthritis     GERD (gastroesophageal reflux disease)     s/p Nissen    Hypertension     Insomnia     Osteoarthrosis, shoulder region     Skin ulcer of scalp      Thrombophlebitis of arm     Tremor     Vitamin B12 deficiency        Past Surgical History:   Procedure Laterality Date    DILATATION AND CURETTAGE      HERNIA REPAIR      HIP SURGERY      SHOULDER SURGERY      Right       Family History   Problem Relation Age of Onset    Cancer Mother         Pancreatic    Stroke Brother     Anorexia nervosa Daughter        Social History     Socioeconomic History    Marital status:    Tobacco Use    Smoking status: Never    Smokeless tobacco: Never   Vaping Use    Vaping status: Never Used   Substance and Sexual Activity    Alcohol use: No     Comment: stopped drinking alcohol    Drug use: No    Sexual activity: Defer       Allergies   Allergen Reactions    Sulfa Antibiotics Hives    Gabapentin          Medication:    Current Facility-Administered Medications:     acetaminophen (TYLENOL) tablet 650 mg, 650 mg, Oral, Q8H PRN, Aileen Doherty MD, 650 mg at 05/28/25 1518    amiodarone (PACERONE) tablet 200 mg, 200 mg, Oral, Daily, Aileen Doherty MD, 200 mg at 05/28/25 0953    apixaban (ELIQUIS) tablet 2.5 mg, 2.5 mg, Oral, BID, Aileen Doherty MD, 2.5 mg at 05/28/25 0953    ascorbic acid (VITAMIN C) tablet 500 mg, 500 mg, Oral, Daily, Aileen Doherty MD, 500 mg at 05/28/25 0953    sennosides-docusate (PERICOLACE) 8.6-50 MG per tablet 2 tablet, 2 tablet, Oral, BID PRN, 2 tablet at 05/27/25 2036 **AND** polyethylene glycol (MIRALAX) packet 17 g, 17 g, Oral, Daily PRN **AND** bisacodyl (DULCOLAX) EC tablet 5 mg, 5 mg, Oral, Daily PRN, 5 mg at 05/27/25 1355 **AND** bisacodyl (DULCOLAX) suppository 10 mg, 10 mg, Rectal, Daily PRN, Wendi Gaspar MD, 10 mg at 05/27/25 2038    castor oil-balsam peru (VENELEX) ointment 1 Application, 1 Application, Topical, Q12H, Wendi Gaspar MD, 1 Application at 05/28/25 0956    ipratropium-albuterol (DUO-NEB) nebulizer solution 3 mL, 3 mL, Nebulization, 4x Daily - RT, Aileen Doherty MD, 3 mL at 05/28/25 1350    levETIRAcetam  (KEPPRA) tablet 250 mg, 250 mg, Oral, BID, Aileen Doherty MD, 250 mg at 05/28/25 0953    levothyroxine (SYNTHROID, LEVOTHROID) tablet 100 mcg, 100 mcg, Oral, Daily, Aileen Doherty MD, 100 mcg at 05/28/25 0953    lisinopril (PRINIVIL,ZESTRIL) tablet 10 mg, 10 mg, Oral, Q24H, Aileen Doherty MD, 10 mg at 05/28/25 0953    lubiprostone (AMITIZA) capsule 24 mcg, 24 mcg, Oral, BID With Meals, Kendra Monahan PA-C, 24 mcg at 05/28/25 0953    melatonin tablet 5 mg, 5 mg, Oral, Nightly, Aileen Doherty MD, 5 mg at 05/27/25 2238    metoprolol tartrate (LOPRESSOR) half tablet 12.5 mg, 12.5 mg, Oral, BID, Aileen Doherty MD, 12.5 mg at 05/28/25 0953    polyethylene glycol (MIRALAX) packet 17 g, 17 g, Oral, Daily, Aileen Doherty MD, 17 g at 05/28/25 0952    rosuvastatin (CRESTOR) tablet 10 mg, 10 mg, Oral, Nightly, Aileen Doherty MD, 10 mg at 05/27/25 2037    sodium chloride 0.9 % infusion, 75 mL/hr, Intravenous, Continuous, Kendra Monahan PA-C, Last Rate: 75 mL/hr at 05/28/25 1518, 75 mL/hr at 05/28/25 1518    traZODone (DESYREL) tablet 100 mg, 100 mg, Oral, Nightly, Aileen Doherty MD, 100 mg at 05/27/25 2238    venlafaxine XR (EFFEXOR-XR) 24 hr capsule 75 mg, 75 mg, Oral, Daily, Aileen Doherty MD, 75 mg at 05/28/25 0953    Antibiotics:  Anti-Infectives (From admission, onward)      Ordered     Dose/Rate Route Frequency Start Stop    05/22/25 0908  piperacillin-tazobactam (ZOSYN) 3.375 g IVPB in 100 mL NS MBP (CD)  Status:  Discontinued        Ordering Provider: Sarwat Kimbrough MD    3.375 g  over 4 Hours Intravenous Every 8 Hours 05/22/25 1700 05/22/25 1357    05/22/25 1357  cefTRIAXone (ROCEPHIN) 1,000 mg in sodium chloride 0.9 % 100 mL MBP  Status:  Discontinued        Ordering Provider: Sarwat Kimbrough MD    1,000 mg  200 mL/hr over 30 Minutes Intravenous Every 24 Hours 05/22/25 1500 05/24/25 1126    05/22/25 0908  piperacillin-tazobactam (ZOSYN) 3.375 g IVPB in 100 mL NS MBP (CD)         Ordering Provider: Sarwat Kimbrough MD    3.375 g  over 30 Minutes Intravenous Once 25 1000 25 1103    25 2338  cefTRIAXone (ROCEPHIN) 1,000 mg in sodium chloride 0.9 % 100 mL MBP  Status:  Discontinued        Ordering Provider: Sarwat Kimbrough MD    1,000 mg  200 mL/hr over 30 Minutes Intravenous Every 24 Hours 25 0900 25 0906    25 2338  doxycycline (MONODOX) capsule 100 mg  Status:  Discontinued        Ordering Provider: Aileen Doherty MD    100 mg Oral Every 12 Hours Scheduled 25 0900 25 1126    25 1510  azithromycin (ZITHROMAX) 500 mg in sodium chloride 0.9 % 250 mL IVPB-VTB        Ordering Provider: Efrain Fraga MD    500 mg  over 60 Minutes Intravenous Once 25 1526 25 1654    25 1510  cefTRIAXone (ROCEPHIN) 2 g in sodium chloride 0.9 % 100 mL MBP        Ordering Provider: Efrain Fraag MD    2 g  200 mL/hr over 30 Minutes Intravenous Once 25 1526 25 1807              Review of Systems:  See hpi    Physical Exam:   Vital Signs  Temp (24hrs), Av.6 °F (36.4 °C), Min:96.7 °F (35.9 °C), Max:98.5 °F (36.9 °C)    Temp  Min: 96.7 °F (35.9 °C)  Max: 98.5 °F (36.9 °C)  BP  Min: 125/87  Max: 168/80  Pulse  Min: 60  Max: 77  Resp  Min: 16  Max: 18  SpO2  Min: 96 %  Max: 100 %    GENERAL: Awake and alert, in no acute distress.   HEENT: Normocephalic, atraumatic. No ext oral lesions    HEART: RRR; No murmur,  LUNGS: Clear to auscultation bilaterally   ABDOMEN: Soft, nontender,   EXT:  No edema  :  Without George catheter.  MSK: No joint effusions or erythema  SKIN: no r valerio    Laboratory Data    Results from last 7 days   Lab Units 25  0429   WBC 10*3/mm3 9.41   HEMOGLOBIN g/dL 11.7*   HEMATOCRIT % 36.4   PLATELETS 10*3/mm3 155     Results from last 7 days   Lab Units 25  0430   SODIUM mmol/L 135*   POTASSIUM mmol/L 3.9   CHLORIDE mmol/L 105   CO2 mmol/L 23.0   BUN mg/dL 19   CREATININE mg/dL 0.93  "  GLUCOSE mg/dL 82   CALCIUM mg/dL 8.2*                                   Estimated Creatinine Clearance: 38.8 mL/min (by C-G formula based on SCr of 0.93 mg/dL).      Microbiology:  Blood Culture   Date Value Ref Range Status   05/19/2025 No growth at 3 days  Preliminary     No results found for: \"BCIDPCR\", \"CXREFLEX\", \"CSFCX\", \"CULTURETIS\"  No results found for: \"CULTURES\", \"HSVCX\", \"URCX\"  No results found for: \"EYECULTURE\", \"GCCX\", \"HSVCULTURE\", \"LABHSV\"  No results found for: \"LEGIONELLA\", \"MRSACX\", \"MUMPSCX\", \"MYCOPLASCX\"  No results found for: \"NOCARDIACX\", \"STOOLCX\"  Urine Culture   Date Value Ref Range Status   05/19/2025 50,000 CFU/mL Pseudomonas aeruginosa (A)  Final     No results found for: \"VIRALCULTU\", \"WOUNDCX\"        Radiology:  Imaging Results (Last 72 Hours)       ** No results found for the last 72 hours. **              Impression:   Lactic acidosis  Pyuria  Probable asymptomatic bacturia with pseudomonas colonization  Resolving respiratory failure with hypoxia  COPD    PLAN/RECOMMENDATIONS:   Thank you for asking us to see Bibiana Hodges, I recommend the following:  I have independently reviewed the ct chest and do not see any substantial infiltrate       Patient has been improving without any antibiotics targeting pseudomonas.  (First dose Zosyn was started today)    The urine culture is highly likely colonization and I would not treat this currently.    Continue treatment for COPD exacerbation.    Observe off abx      I spent time discussing that urinary colonization is different than a bacterial infection and that the Pseudomonas in the urine is likely not causing active infection and I would postpone treatment at this time    Patient mostly concerned about weakness of her legs.    I suspect the patient may have spinal stenosis but will defer to the hospitalist if they want to do workup with radiography or other diagnostic workup such as EMG or nerve conduction studies    I would not " repeat urine cultures unless patient had overt symptoms of urinary tract infection    No need for isolation    This visit included the following complex service elements:  Complex medical decision-making associated with antimicrobial prescribing.  Managed infection treatment protocol associated with transitions of care for this complex patient.        We will sign off  Xavi Ivey MD  2025  15:41 EDT                    Electronically signed by Xavi Ivey MD at 25 1542       Kendra Monahan PA-C at 25 1226              [facility] Medicine Services  PROGRESS NOTE    Patient Name: Bibiana Hodges  : 1935  MRN: [id number]    Date of Admission: 2025  Primary Care Physician: Nivia Madrigal MD    Subjective     CC: f/u aspiration pneumonia     HPI:  In bed. No new complaints. Says she had a large BM and her abdomen feels much better afterwards. Breathing is stable. Doesn't like modified diet - patient voiced concerns she is getting dehydrated. We discussed need for PO fluid intake     Objective     Vital Signs:   Temp:  [96.7 °F (35.9 °C)-98.5 °F (36.9 °C)] 96.7 °F (35.9 °C)  Heart Rate:  [60-75] 60  Resp:  [16-18] 16  BP: (125-168)/(71-87) 168/80     Physical Exam:  Constitutional: No acute distress, awake, alert and conversant. Sitting up in bed. Frail / chronically ill appearing   HENT: NCAT, mucous membranes moist  Respiratory: Clear to auscultation bilaterally, normal respiratory effort on room air    Cardiovascular: RRR  Gastrointestinal: Positive bowel sounds, soft, nontender, nondistended  Musculoskeletal: Trace bilateral ankle edema  Psychiatric: Appropriate affect, cooperative with exam  Neurologic: Oriented x 3, moves all extremities spontaneously without focal deficits, speech clear. BUE tremor     Results Reviewed:  LAB RESULTS:      Lab 25  0429   WBC 9.41   HEMOGLOBIN 11.7*   HEMATOCRIT 36.4   PLATELETS 155   MCV  104.3*         Lab 05/27/25  0430   SODIUM 135*   POTASSIUM 3.9   CHLORIDE 105   CO2 23.0   ANION GAP 7.0   BUN 19   CREATININE 0.93   EGFR 58.9*   GLUCOSE 82   CALCIUM 8.2*   MAGNESIUM 2.0     Brief Urine Lab Results  (Last result in the past 365 days)        Color   Clarity   Blood   Leuk Est   Nitrite   Protein   CREAT   Urine HCG        05/19/25 1428 Yellow   Turbid   Moderate (2+)   Large (3+)   Negative   100 mg/dL (2+)                 Microbiology Results Abnormal       Procedure Component Value - Date/Time    Urine Culture - Urine, Urine, Catheter In/Out [[id number]]  (Abnormal)  (Susceptibility) Collected: 05/19/25 1428    Lab Status: Final result Specimen: Urine, Catheter In/Out Updated: 05/21/25 1124     Urine Culture 50,000 CFU/mL Pseudomonas aeruginosa    Narrative:      Colonization of the urinary tract without infection is common. Treatment is discouraged unless the patient is symptomatic, pregnant, or undergoing an invasive urologic procedure.    Susceptibility        Pseudomonas aeruginosa      BECK      Cefepime Susceptible      Ceftazidime Susceptible      Ciprofloxacin Susceptible      Levofloxacin Susceptible      Piperacillin + Tazobactam Susceptible      Tobramycin Susceptible                                 No radiology results from the last 24 hrs    Results for orders placed during the hospital encounter of 08/20/23    Adult Transthoracic Echo Complete W/ Cont if Necessary Per Protocol    Interpretation Summary    Left ventricular systolic function is mildly decreased. Calculated left ventricular EF = 47.9% Normal left ventricular cavity size and wall thickness noted. There is left ventricular global hypokinesis noted. Left ventricular diastolic function is consistent with (grade I) impaired relaxation.    : Normal right ventricular cavity size and systolic function noted. Electronic lead present in the ventricle.    Normal left atrial size and volume noted. Saline test results are  negative.    There is moderate calcification of the aortic valve. The aortic valve was poorly visualized but appears trileaflet. Moderate aortic valve regurgitation is present. No aortic valve stenosis is present.    Mild mitral annular calcification is present. Mild mitral valve regurgitation is present. No significant mitral valve stenosis is present.    The tricuspid valve is grossly normal in structure. Mild tricuspid valve regurgitation is present. Estimated right ventricular systolic pressure from tricuspid regurgitation is normal, 33 mmHg. No tricuspid valve stenosis is present.    Mild dilation of the ascending aorta is present. Ascending aorta = 3.7 cm    There is a small (<1cm) pericardial effusion adjacent to the right atrium and right ventricle.    Current medications:  Scheduled Meds:amiodarone, 200 mg, Oral, Daily  apixaban, 2.5 mg, Oral, BID  ascorbic acid, 500 mg, Oral, Daily  castor oil-balsam peru, 1 Application, Topical, Q12H  ipratropium-albuterol, 3 mL, Nebulization, 4x Daily - RT  levETIRAcetam, 250 mg, Oral, BID  levothyroxine, 100 mcg, Oral, Daily  lisinopril, 10 mg, Oral, Q24H  lubiprostone, 24 mcg, Oral, BID With Meals  melatonin, 5 mg, Oral, Nightly  metoprolol tartrate, 12.5 mg, Oral, BID  polyethylene glycol, 17 g, Oral, Daily  rosuvastatin, 10 mg, Oral, Nightly  traZODone, 100 mg, Oral, Nightly  venlafaxine XR, 75 mg, Oral, Daily      Continuous Infusions:   PRN Meds:.  acetaminophen    senna-docusate sodium **AND** polyethylene glycol **AND** bisacodyl **AND** bisacodyl    Assessment & Plan   Assessment & Plan     Active Hospital Problems    Diagnosis  POA    **Pneumonia [J18.9]  Yes    Hyperlipidemia [E78.5]  Yes    Autonomic dysfunction [G90.9]  Yes    Atrial fibrillation [I48.91]  Yes    Hypertension [I10]  Yes      Resolved Hospital Problems   No resolved problems to display.        Brief Hospital Course to date:  Bibiana Hodges is a 89 y.o. female with past medical history  significant for atrial fibrillation, hypertension, hyperlipidemia, GERD, B12 deficiency. She resides at [facility]. Brought to [facility] ED on 5/19/25 due to acute respiratory distress. She was hypoxic on arrival with O2 sat 83% on room air - she initially required 6L NC. Found to have RLL PNA.     Pneumonia of right lower lobe, suspected aspiration  COPD exacerbation  Acute hypoxia  - Blood gas not obtained in ED  - RA saturation 83% and required 6L   - CTA chest showed evidence of right lower lobe pneumonia  - Negative full respiratory PCR panel  - Respiratory culture with normal vicki  - Blood cultures negative  - Strep pneumonia / Legionella urine Ags negative  - ID has followed  - s/p IV Ceftriaxone / Doxycycline x 5 days  - s/p Prednisone 40mg daily x 5 days  - Has since weaned to room air  - SLP following due to concerns for dysphagia     Oropharyngeal dysphagia  - Patient had MBS 5/23/25. Recommended diet is mechanical ground texture, honey thick liquid, no mixed consistencies.  - Partner has discussed comfort diet with patient. Ms. Hodges expressed that she does not want to get pneumonia over and over again, and wants to remain full code for now (says she has great grandbabies she has not even met yet and would like to do so) - she is willing to stick with the modified diet   - Partner reached out to [facility], they will see if we can get magic cups for patient (milkshakes will not be safe)    Asymptomatic bacteriuria  - Urine culture with 50K Pseudomonas  - ID following, feel this represents colonization    Occasional lower extremity pain   Occasional numbness of toes bilaterally  - Arterial duplex of lower extremities was done. 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 Also right PTA is occluded distally. On the left side mid SFA appears occlusive. Left distal SFA appears to have reconstitution. Also left popliteal artery, distal PTA,  and peroneal artery appear monophasic.  - Patient has dopplerable dorsalis pedis and posterior tibialis pulses bilaterally  - Given patient's overall condition and the fact that she still has dopplerable pulses, benefit of intervention is questionable - will defer inpatient Vascular Surgery consultation at this time, can consider as outpatient    Abnormal troponin levels  - Troponin levels were abnormal but no complaints of chest pain    Chronic essential tremor  - Reports significant tremors, especially in right hand, which is chronic   - Has previously been evaluated [facility] 2013, had been trialed on numerous medications (see Neurology note for further details) without resolution of tremor  - Neurology has seen and evaluated the patient here, given chronicity of tremors, will not make any medication adjustments at this time (however did recommend possible trial of gabapentin 100 mg BID and transitioning patient from Trazodone to Remeron)   - F/U as outpatient for further management/medication adjustments    Chronic double vision  - Reports having double vision for 2 years  - Neurology has seen and evaluated patient  - MRI of the brain does not show any acute process. 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  - Outpatient follow up with Ophthalmology     Orthostatic hypotension  - Continue non-medical/supportive interventions    Constipation  - Looks like PCP had recently prescribed Linzess - patient unsure if it was helpful  - On Miralax, Senna and Lactulose with continued abdominal discomfort and constipation  - Continue Miralax. Added Amitiza 24mg PO BID on 5/26 with results on 5/27, continue this regimen for now      Generalized weakness  - PT/OT following - patient interested in rehab     Expected Discharge Location and Transportation:    Expected Discharge Expected Discharge Date: 5/30/2025; Expected Discharge Time:      VTE Prophylaxis:Pharmacologic VTE  prophylaxis orders are present.    AM-PAC 6 Clicks Score (PT): 17 (05/28/25 5306)    CODE STATUS:   Code Status and Medical Interventions: CPR (Attempt to Resuscitate); Full Support   Ordered at: 05/19/25 2233     Code Status (Patient has no pulse and is not breathing):    CPR (Attempt to Resuscitate)     Medical Interventions (Patient has pulse or is breathing):    Full Support     Kendra Monahan PA-C  05/28/25        Electronically signed by Kendra Monahan PA-C at 05/28/25 1235       Consult Notes (last 24 hours)  Notes from 05/28/25 1116 through 05/29/25 1116   No notes of this type exist for this encounter.

## 2025-05-29 NOTE — PAYOR COMM NOTE
"Bibiana Hodges (89 y.o. Female)       Date of Birth   1935    Social Security Number       Address   [address] [address]    Home Phone   [phone]    MRN   [id number]       Adventism   Mandaeism    Marital Status                               Admission Date   5/19/2025    Admission Type   Emergency    Admitting Provider   Wendi Gaspar MD    Attending Provider   Wendi Gaspar MD    Department, Room/Bed   [address], S450/1       Discharge Date       Discharge Disposition   Home or Self Care    Discharge Destination                                 Attending Provider: Wendi Gaspar MD    Allergies: Sulfa Antibiotics, Gabapentin    Isolation: None   Infection: None   Code Status: CPR    Ht: 170.2 cm (67.01\")   Wt: 59.9 kg (132 lb 0.9 oz)    Admission Cmt: None   Principal Problem: Pneumonia [J18.9]                   Active Insurance as of 5/19/2025       Primary Coverage       Payor Plan Insurance Group Employer/Plan Group    [place] [facility] MEDICARE REPLACEMENT [facility] MEDICARE ADVANTAGE O [id number]       Payor Plan Address Payor Plan Phone Number Payor Plan Fax Number Effective Dates    [address] [phone]  1/1/2025 - None Entered    [facility] [id number]         Subscriber Name Subscriber Birth Date Member ID       BIBIANA HODGES 1935 [id number]                     Emergency Contacts        (Rel.) Home Phone Work Phone Mobile Phone    Danyelle Keyes (POA) (Daughter) [phone] -- [phone]    HODGESELODIA SCHNEIDER (Son) -- -- [phone]    NIA HODGES (Son) [phone] -- [phone]              [place]: NPI [id number] Tax ID [id number]  Insurance Information                  [employer] MEDICARE REPLACEMENT/[employer]HEM MEDICARE ADVANTAGE [employer] Phone: [phone]    Subscriber: Bibiana Hodges Subscriber#: [id number]    Group#: [id number] Precert#: --    Authorization#: [id number] Effective Date: --          Current " Facility-Administered Medications   Medication Dose Route Frequency Provider Last Rate Last Admin    acetaminophen (TYLENOL) tablet 650 mg  650 mg Oral Q8H PRN Aileen Doherty MD   650 mg at 05/29/25 1103    amiodarone (PACERONE) tablet 200 mg  200 mg Oral Daily Aileen Doherty MD   200 mg at 05/29/25 0929    apixaban (ELIQUIS) tablet 2.5 mg  2.5 mg Oral BID Aileen Doherty MD   2.5 mg at 05/29/25 0929    ascorbic acid (VITAMIN C) tablet 500 mg  500 mg Oral Daily Aileen Doherty MD   500 mg at 05/29/25 0929    sennosides-docusate (PERICOLACE) 8.6-50 MG per tablet 2 tablet  2 tablet Oral BID PRN Wendi Gaspar MD   2 tablet at 05/27/25 2036    And    polyethylene glycol (MIRALAX) packet 17 g  17 g Oral Daily PRN Wendi Gaspar MD        And    bisacodyl (DULCOLAX) EC tablet 5 mg  5 mg Oral Daily PRN Wendi Gaspar MD   5 mg at 05/27/25 1355    And    bisacodyl (DULCOLAX) suppository 10 mg  10 mg Rectal Daily PRN Wendi Gaspar MD   10 mg at 05/27/25 2038    castor oil-balsam peru (VENELEX) ointment 1 Application  1 Application Topical Q12H Wendi Gaspar MD   1 Application at 05/29/25 1103    ipratropium-albuterol (DUO-NEB) nebulizer solution 3 mL  3 mL Nebulization 4x Daily - RT Aileen Doherty MD   3 mL at 05/29/25 0839    levETIRAcetam (KEPPRA) tablet 250 mg  250 mg Oral BID Aileen Doherty MD   250 mg at 05/29/25 0929    levothyroxine (SYNTHROID, LEVOTHROID) tablet 100 mcg  100 mcg Oral Daily Aileen Doherty MD   100 mcg at 05/29/25 0929    lisinopril (PRINIVIL,ZESTRIL) tablet 10 mg  10 mg Oral Q24H Aileen Doherty MD   10 mg at 05/29/25 0929    lubiprostone (AMITIZA) capsule 24 mcg  24 mcg Oral BID With Meals Kendra Monahan, HONG   24 mcg at 05/29/25 0929    melatonin tablet 5 mg  5 mg Oral Nightly Aileen Doherty MD   5 mg at 05/27/25 2230    metoprolol tartrate (LOPRESSOR) half tablet 12.5 mg  12.5 mg Oral BID Aileen Doherty MD   12.5 mg at 05/29/25 0929    polyethylene  "glycol (MIRALAX) packet 17 g  17 g Oral Daily Aileen Doherty MD   17 g at 05/28/25 0952    rosuvastatin (CRESTOR) tablet 10 mg  10 mg Oral Nightly Aileen Doherty MD   10 mg at 05/28/25 2145    traZODone (DESYREL) tablet 100 mg  100 mg Oral Nightly Aileen Doherty MD   100 mg at 05/28/25 2146    venlafaxine XR (EFFEXOR-XR) 24 hr capsule 75 mg  75 mg Oral Daily Aileen Doherty MD   75 mg at 05/29/25 0929     Lab Results (last 24 hours)       ** No results found for the last 24 hours. **          Imaging Results (Last 24 Hours)       ** No results found for the last 24 hours. **          Orders (last 24 hrs)        Start     Ordered    05/29/25 1106  Discharge patient  Once         05/29/25 1105    05/28/25 1644  DIET MESSAGE Please send chicken noodle soup. Thank you  Once        Comments: Please send chicken noodle soup. Thank you    05/28/25 1643    05/28/25 1330  sodium chloride 0.9 % infusion  Continuous         05/28/25 1234    05/28/25 1235  Reason for Not Completing Sepsis Bundle (Not Currently Septic - Will Reevaluate as Needed)  Once         05/28/25 1234    05/28/25 1233  Reason for Not Completing Sepsis Bundle (Not Currently Septic - Will Reevaluate as Needed)  Once         05/28/25 1234    05/27/25 1430  castor oil-balsam peru (VENELEX) ointment 1 Application  Every 12 Hours Scheduled         05/27/25 1341    05/26/25 1130  lubiprostone (AMITIZA) capsule 24 mcg  2 Times Daily With Meals         05/26/25 1058    05/25/25 1800  Dietary Nutrition Supplements Magic Cup  Daily With Lunch & Dinner       05/25/25 1251    05/25/25 1253  sennosides-docusate (PERICOLACE) 8.6-50 MG per tablet 2 tablet  2 Times Daily PRN        Placed in \"And\" Linked Group    05/25/25 1254    05/25/25 1253  polyethylene glycol (MIRALAX) packet 17 g  Daily PRN        Placed in \"And\" Linked Group    05/25/25 1254    05/25/25 1253  bisacodyl (DULCOLAX) EC tablet 5 mg  Daily PRN        Placed in \"And\" Linked Group    05/25/25 " "1254    05/25/25 1253  bisacodyl (DULCOLAX) suppository 10 mg  Daily PRN        Placed in \"And\" Linked Group    05/25/25 1254    05/20/25 2100  traZODone (DESYREL) tablet 100 mg  Nightly         05/19/25 2337 05/20/25 0900  amiodarone (PACERONE) tablet 200 mg  Daily         05/19/25 2337 05/20/25 0900  ascorbic acid (VITAMIN C) tablet 500 mg  Daily         05/19/25 2337 05/20/25 0900  lisinopril (PRINIVIL,ZESTRIL) tablet 10 mg  Every 24 Hours Scheduled         05/19/25 2337 05/20/25 0900  polyethylene glycol (MIRALAX) packet 17 g  Daily         05/19/25 2337 05/20/25 0900  venlafaxine XR (EFFEXOR-XR) 24 hr capsule 75 mg  Daily         05/19/25 2337 05/20/25 0600  levothyroxine (SYNTHROID, LEVOTHROID) tablet 100 mcg  Daily         05/19/25 2337 05/20/25 0030  ipratropium-albuterol (DUO-NEB) nebulizer solution 3 mL  4 Times Daily - RT         05/19/25 2337 05/20/25 0030  apixaban (ELIQUIS) tablet 2.5 mg  2 Times Daily         05/19/25 2337 05/20/25 0030  levETIRAcetam (KEPPRA) tablet 250 mg  2 Times Daily        Note to Pharmacy: For tube route administration disperse crushed tablets in 10 mL of water, shake for 5 minutes to dissolve, and administer immediately via enteral feeding tube.    05/19/25 2337 05/20/25 0030  melatonin tablet 5 mg  Nightly         05/19/25 2337 05/20/25 0030  metoprolol tartrate (LOPRESSOR) half tablet 12.5 mg  2 Times Daily         05/19/25 2337 05/20/25 0030  rosuvastatin (CRESTOR) tablet 10 mg  Nightly         05/19/25 2337 05/19/25 2335  acetaminophen (TYLENOL) tablet 650 mg  Every 8 Hours PRN         05/19/25 2337    --  metoprolol succinate XL (TOPROL-XL) 25 MG 24 hr tablet  Daily         05/20/25 1302                     Physician Progress Notes (last 24 hours)        Xavi Ivey MD at 05/28/25 1541              INFECTIOUS DISEASE CONSULT/INITIAL HOSPITAL VISIT    Bibiana Hodges  1935  [id number]    Date of Consult: " 5/28/2025    Admission Date: 5/19/2025      Requesting Provider: ANASTASIA Shen  Evaluating Physician: Xavi Ivey MD    Reason for Consultation: pseudomonas UTI    History of present illness:    Patient is a 89 y.o. female  with atrial fibrillation, hypertension, hyperlipidemia, GERD, and B12 deficiency resided at [facility] has been evaluated in [facility] ED for  respiratory distress.    Had hypoxia of 83%, aspiration suspected, patient given antibiotics and steroids.  Patient's hypoxia has resolved.    Despite not having burning on urination the [facility] ED check urinalysis which had 6-0 rbc, tntc wbc, and 3-6 squamous cells.    Pseudmonas was reporte don urine culture at 50,000 CFU.      Patient denies dysuria, currently is using a pure wick.    WE are consulted regarding an incidental finding on a urine culture that did not contribute to patient's reason for admission to hospital.      PAtient reports chronic leg weakness.    5/23/25; doing well; no events overnight    5/24/25; doing well; no events overnight; no fever, rash, sore throat asking about rehab    5/28/25; afebrile normotensive no events overnight  Past Medical History:   Diagnosis Date    Constipation     Depression     Dysautonomia orthostatic hypotension syndrome     Generalized osteoarthritis     GERD (gastroesophageal reflux disease)     s/p Nissen    Hypertension     Insomnia     Osteoarthrosis, shoulder region     Skin ulcer of scalp     Thrombophlebitis of arm     Tremor     Vitamin B12 deficiency        Past Surgical History:   Procedure Laterality Date    DILATATION AND CURETTAGE      HERNIA REPAIR      HIP SURGERY      SHOULDER SURGERY      Right       Family History   Problem Relation Age of Onset    Cancer Mother         Pancreatic    Stroke Brother     Anorexia nervosa Daughter        Social History     Socioeconomic History    Marital status:    Tobacco Use    Smoking status: Never    Smokeless tobacco:  Never   Vaping Use    Vaping status: Never Used   Substance and Sexual Activity    Alcohol use: No     Comment: stopped drinking alcohol    Drug use: No    Sexual activity: Defer       Allergies   Allergen Reactions    Sulfa Antibiotics Hives    Gabapentin          Medication:    Current Facility-Administered Medications:     acetaminophen (TYLENOL) tablet 650 mg, 650 mg, Oral, Q8H PRN, Aileen Doherty MD, 650 mg at 05/28/25 1518    amiodarone (PACERONE) tablet 200 mg, 200 mg, Oral, Daily, Aileen Doherty MD, 200 mg at 05/28/25 0953    apixaban (ELIQUIS) tablet 2.5 mg, 2.5 mg, Oral, BID, Aileen Doherty MD, 2.5 mg at 05/28/25 0953    ascorbic acid (VITAMIN C) tablet 500 mg, 500 mg, Oral, Daily, Aileen Doherty MD, 500 mg at 05/28/25 0953    sennosides-docusate (PERICOLACE) 8.6-50 MG per tablet 2 tablet, 2 tablet, Oral, BID PRN, 2 tablet at 05/27/25 2036 **AND** polyethylene glycol (MIRALAX) packet 17 g, 17 g, Oral, Daily PRN **AND** bisacodyl (DULCOLAX) EC tablet 5 mg, 5 mg, Oral, Daily PRN, 5 mg at 05/27/25 1355 **AND** bisacodyl (DULCOLAX) suppository 10 mg, 10 mg, Rectal, Daily PRN, Wendi Gaspar MD, 10 mg at 05/27/25 2038    castor oil-balsam peru (VENELEX) ointment 1 Application, 1 Application, Topical, Q12H, Wendi Gaspar MD, 1 Application at 05/28/25 0956    ipratropium-albuterol (DUO-NEB) nebulizer solution 3 mL, 3 mL, Nebulization, 4x Daily - RT, Aileen Doherty MD, 3 mL at 05/28/25 1350    levETIRAcetam (KEPPRA) tablet 250 mg, 250 mg, Oral, BID, Aileen Doherty MD, 250 mg at 05/28/25 0953    levothyroxine (SYNTHROID, LEVOTHROID) tablet 100 mcg, 100 mcg, Oral, Daily, Aileen Doherty MD, 100 mcg at 05/28/25 0953    lisinopril (PRINIVIL,ZESTRIL) tablet 10 mg, 10 mg, Oral, Q24H, Aileen Doherty MD, 10 mg at 05/28/25 0953    lubiprostone (AMITIZA) capsule 24 mcg, 24 mcg, Oral, BID With Meals, Kendra Monahan PA-C, 24 mcg at 05/28/25 0953    melatonin tablet 5 mg, 5 mg, Oral,  Nightly, Aileen Doherty MD, 5 mg at 05/27/25 2238    metoprolol tartrate (LOPRESSOR) half tablet 12.5 mg, 12.5 mg, Oral, BID, Aileen Doherty MD, 12.5 mg at 05/28/25 0953    polyethylene glycol (MIRALAX) packet 17 g, 17 g, Oral, Daily, Aileen Doherty MD, 17 g at 05/28/25 0952    rosuvastatin (CRESTOR) tablet 10 mg, 10 mg, Oral, Nightly, Aileen Doherty MD, 10 mg at 05/27/25 2037    sodium chloride 0.9 % infusion, 75 mL/hr, Intravenous, Continuous, Kendra Monahan PA-C, Last Rate: 75 mL/hr at 05/28/25 1518, 75 mL/hr at 05/28/25 1518    traZODone (DESYREL) tablet 100 mg, 100 mg, Oral, Nightly, Aileen Doherty MD, 100 mg at 05/27/25 2238    venlafaxine XR (EFFEXOR-XR) 24 hr capsule 75 mg, 75 mg, Oral, Daily, Aileen Doherty MD, 75 mg at 05/28/25 0953    Antibiotics:  Anti-Infectives (From admission, onward)      Ordered     Dose/Rate Route Frequency Start Stop    05/22/25 0908  piperacillin-tazobactam (ZOSYN) 3.375 g IVPB in 100 mL NS MBP (CD)  Status:  Discontinued        Ordering Provider: Sarwat Kimbrough MD    3.375 g  over 4 Hours Intravenous Every 8 Hours 05/22/25 1700 05/22/25 1357    05/22/25 1357  cefTRIAXone (ROCEPHIN) 1,000 mg in sodium chloride 0.9 % 100 mL MBP  Status:  Discontinued        Ordering Provider: Sarwat Kimbrough MD    1,000 mg  200 mL/hr over 30 Minutes Intravenous Every 24 Hours 05/22/25 1500 05/24/25 1126    05/22/25 0908  piperacillin-tazobactam (ZOSYN) 3.375 g IVPB in 100 mL NS MBP (CD)        Ordering Provider: Sarwat Kimbrough MD    3.375 g  over 30 Minutes Intravenous Once 05/22/25 1000 05/22/25 1103    05/19/25 2338  cefTRIAXone (ROCEPHIN) 1,000 mg in sodium chloride 0.9 % 100 mL MBP  Status:  Discontinued        Ordering Provider: Sarwat Kimbrough MD    1,000 mg  200 mL/hr over 30 Minutes Intravenous Every 24 Hours 05/20/25 0900 05/22/25 0906    05/19/25 2338  doxycycline (MONODOX) capsule 100 mg  Status:  Discontinued        Ordering Provider: Aileen Doherty  "MD WYATT    100 mg Oral Every 12 Hours Scheduled 25 0900 25 1126    25 1510  azithromycin (ZITHROMAX) 500 mg in sodium chloride 0.9 % 250 mL IVPB-VTB        Ordering Provider: Efrain Fraga MD    500 mg  over 60 Minutes Intravenous Once 25 1526 25 1654    25 1510  cefTRIAXone (ROCEPHIN) 2 g in sodium chloride 0.9 % 100 mL MBP        Ordering Provider: Efrain Fraga MD    2 g  200 mL/hr over 30 Minutes Intravenous Once 25 1526 25 1807              Review of Systems:  See hpi    Physical Exam:   Vital Signs  Temp (24hrs), Av.6 °F (36.4 °C), Min:96.7 °F (35.9 °C), Max:98.5 °F (36.9 °C)    Temp  Min: 96.7 °F (35.9 °C)  Max: 98.5 °F (36.9 °C)  BP  Min: 125/87  Max: 168/80  Pulse  Min: 60  Max: 77  Resp  Min: 16  Max: 18  SpO2  Min: 96 %  Max: 100 %    GENERAL: Awake and alert, in no acute distress.   HEENT: Normocephalic, atraumatic. No ext oral lesions    HEART: RRR; No murmur,  LUNGS: Clear to auscultation bilaterally   ABDOMEN: Soft, nontender,   EXT:  No edema  :  Without George catheter.  MSK: No joint effusions or erythema  SKIN: no r valerio    Laboratory Data    Results from last 7 days   Lab Units 25  0429   WBC 10*3/mm3 9.41   HEMOGLOBIN g/dL 11.7*   HEMATOCRIT % 36.4   PLATELETS 10*3/mm3 155     Results from last 7 days   Lab Units 25  0430   SODIUM mmol/L 135*   POTASSIUM mmol/L 3.9   CHLORIDE mmol/L 105   CO2 mmol/L 23.0   BUN mg/dL 19   CREATININE mg/dL 0.93   GLUCOSE mg/dL 82   CALCIUM mg/dL 8.2*                                   Estimated Creatinine Clearance: 38.8 mL/min (by C-G formula based on SCr of 0.93 mg/dL).      Microbiology:  Blood Culture   Date Value Ref Range Status   2025 No growth at 3 days  Preliminary     No results found for: \"BCIDPCR\", \"CXREFLEX\", \"CSFCX\", \"CULTURETIS\"  No results found for: \"CULTURES\", \"HSVCX\", \"URCX\"  No results found for: \"EYECULTURE\", \"GCCX\", \"HSVCULTURE\", \"LABHSV\"  No results found for: " "\"LEGIONELLA\", \"MRSACX\", \"MUMPSCX\", \"MYCOPLASCX\"  No results found for: \"NOCARDIACX\", \"STOOLCX\"  Urine Culture   Date Value Ref Range Status   05/19/2025 50,000 CFU/mL Pseudomonas aeruginosa (A)  Final     No results found for: \"VIRALCULTU\", \"WOUNDCX\"        Radiology:  Imaging Results (Last 72 Hours)       ** No results found for the last 72 hours. **              Impression:   Lactic acidosis  Pyuria  Probable asymptomatic bacturia with pseudomonas colonization  Resolving respiratory failure with hypoxia  COPD    PLAN/RECOMMENDATIONS:   Thank you for asking us to see Bibiana Hodegs, I recommend the following:  I have independently reviewed the ct chest and do not see any substantial infiltrate       Patient has been improving without any antibiotics targeting pseudomonas.  (First dose Zosyn was started today)    The urine culture is highly likely colonization and I would not treat this currently.    Continue treatment for COPD exacerbation.    Observe off abx      I spent time discussing that urinary colonization is different than a bacterial infection and that the Pseudomonas in the urine is likely not causing active infection and I would postpone treatment at this time    Patient mostly concerned about weakness of her legs.    I suspect the patient may have spinal stenosis but will defer to the hospitalist if they want to do workup with radiography or other diagnostic workup such as EMG or nerve conduction studies    I would not repeat urine cultures unless patient had overt symptoms of urinary tract infection    No need for isolation    This visit included the following complex service elements:  Complex medical decision-making associated with antimicrobial prescribing.  Managed infection treatment protocol associated with transitions of care for this complex patient.        We will sign off  Xavi Ivey MD  5/28/2025  15:41 EDT                    Electronically signed by Xavi Ivey MD at " 25 1542       Kendra Monahan PA-C at 25 1226              [facility] Medicine Services  PROGRESS NOTE    Patient Name: Bibiana Hodges  : 1935  MRN: [id number]    Date of Admission: 2025  Primary Care Physician: Nivia Madrigal MD    Subjective     CC: f/u aspiration pneumonia     HPI:  In bed. No new complaints. Says she had a large BM and her abdomen feels much better afterwards. Breathing is stable. Doesn't like modified diet - patient voiced concerns she is getting dehydrated. We discussed need for PO fluid intake     Objective     Vital Signs:   Temp:  [96.7 °F (35.9 °C)-98.5 °F (36.9 °C)] 96.7 °F (35.9 °C)  Heart Rate:  [60-75] 60  Resp:  [16-18] 16  BP: (125-168)/(71-87) 168/80     Physical Exam:  Constitutional: No acute distress, awake, alert and conversant. Sitting up in bed. Frail / chronically ill appearing   HENT: NCAT, mucous membranes moist  Respiratory: Clear to auscultation bilaterally, normal respiratory effort on room air    Cardiovascular: RRR  Gastrointestinal: Positive bowel sounds, soft, nontender, nondistended  Musculoskeletal: Trace bilateral ankle edema  Psychiatric: Appropriate affect, cooperative with exam  Neurologic: Oriented x 3, moves all extremities spontaneously without focal deficits, speech clear. BUE tremor     Results Reviewed:  LAB RESULTS:      Lab 25  0429   WBC 9.41   HEMOGLOBIN 11.7*   HEMATOCRIT 36.4   PLATELETS 155   .3*         Lab 25  0430   SODIUM 135*   POTASSIUM 3.9   CHLORIDE 105   CO2 23.0   ANION GAP 7.0   BUN 19   CREATININE 0.93   EGFR 58.9*   GLUCOSE 82   CALCIUM 8.2*   MAGNESIUM 2.0     Brief Urine Lab Results  (Last result in the past 365 days)        Color   Clarity   Blood   Leuk Est   Nitrite   Protein   CREAT   Urine HCG        25 1428 Yellow   Turbid   Moderate (2+)   Large (3+)   Negative   100 mg/dL (2+)                 Microbiology Results Abnormal       Procedure  Component Value - Date/Time    Urine Culture - Urine, Urine, Catheter In/Out [[id number]]  (Abnormal)  (Susceptibility) Collected: 05/19/25 1428    Lab Status: Final result Specimen: Urine, Catheter In/Out Updated: 05/21/25 1124     Urine Culture 50,000 CFU/mL Pseudomonas aeruginosa    Narrative:      Colonization of the urinary tract without infection is common. Treatment is discouraged unless the patient is symptomatic, pregnant, or undergoing an invasive urologic procedure.    Susceptibility        Pseudomonas aeruginosa      BECK      Cefepime Susceptible      Ceftazidime Susceptible      Ciprofloxacin Susceptible      Levofloxacin Susceptible      Piperacillin + Tazobactam Susceptible      Tobramycin Susceptible                                 No radiology results from the last 24 hrs    Results for orders placed during the hospital encounter of 08/20/23    Adult Transthoracic Echo Complete W/ Cont if Necessary Per Protocol    Interpretation Summary    Left ventricular systolic function is mildly decreased. Calculated left ventricular EF = 47.9% Normal left ventricular cavity size and wall thickness noted. There is left ventricular global hypokinesis noted. Left ventricular diastolic function is consistent with (grade I) impaired relaxation.    : Normal right ventricular cavity size and systolic function noted. Electronic lead present in the ventricle.    Normal left atrial size and volume noted. Saline test results are negative.    There is moderate calcification of the aortic valve. The aortic valve was poorly visualized but appears trileaflet. Moderate aortic valve regurgitation is present. No aortic valve stenosis is present.    Mild mitral annular calcification is present. Mild mitral valve regurgitation is present. No significant mitral valve stenosis is present.    The tricuspid valve is grossly normal in structure. Mild tricuspid valve regurgitation is present. Estimated right ventricular systolic  pressure from tricuspid regurgitation is normal, 33 mmHg. No tricuspid valve stenosis is present.    Mild dilation of the ascending aorta is present. Ascending aorta = 3.7 cm    There is a small (<1cm) pericardial effusion adjacent to the right atrium and right ventricle.    Current medications:  Scheduled Meds:amiodarone, 200 mg, Oral, Daily  apixaban, 2.5 mg, Oral, BID  ascorbic acid, 500 mg, Oral, Daily  castor oil-balsam peru, 1 Application, Topical, Q12H  ipratropium-albuterol, 3 mL, Nebulization, 4x Daily - RT  levETIRAcetam, 250 mg, Oral, BID  levothyroxine, 100 mcg, Oral, Daily  lisinopril, 10 mg, Oral, Q24H  lubiprostone, 24 mcg, Oral, BID With Meals  melatonin, 5 mg, Oral, Nightly  metoprolol tartrate, 12.5 mg, Oral, BID  polyethylene glycol, 17 g, Oral, Daily  rosuvastatin, 10 mg, Oral, Nightly  traZODone, 100 mg, Oral, Nightly  venlafaxine XR, 75 mg, Oral, Daily      Continuous Infusions:   PRN Meds:.  acetaminophen    senna-docusate sodium **AND** polyethylene glycol **AND** bisacodyl **AND** bisacodyl    Assessment & Plan   Assessment & Plan     Active Hospital Problems    Diagnosis  POA    **Pneumonia [J18.9]  Yes    Hyperlipidemia [E78.5]  Yes    Autonomic dysfunction [G90.9]  Yes    Atrial fibrillation [I48.91]  Yes    Hypertension [I10]  Yes      Resolved Hospital Problems   No resolved problems to display.        Brief Hospital Course to date:  Bibiana Hodges is a 89 y.o. female with past medical history significant for atrial fibrillation, hypertension, hyperlipidemia, GERD, B12 deficiency. She resides at [facility]. Brought to [facility] ED on 5/19/25 due to acute respiratory distress. She was hypoxic on arrival with O2 sat 83% on room air - she initially required 6L NC. Found to have RLL PNA.     Pneumonia of right lower lobe, suspected aspiration  COPD exacerbation  Acute hypoxia  - Blood gas not obtained in ED  - RA saturation 83% and required 6L   - CTA chest showed  evidence of right lower lobe pneumonia  - Negative full respiratory PCR panel  - Respiratory culture with normal vicki  - Blood cultures negative  - Strep pneumonia / Legionella urine Ags negative  - ID has followed  - s/p IV Ceftriaxone / Doxycycline x 5 days  - s/p Prednisone 40mg daily x 5 days  - Has since weaned to room air  - SLP following due to concerns for dysphagia     Oropharyngeal dysphagia  - Patient had MBS 5/23/25. Recommended diet is mechanical ground texture, honey thick liquid, no mixed consistencies.  - Partner has discussed comfort diet with patient. Ms. Hodges expressed that she does not want to get pneumonia over and over again, and wants to remain full code for now (says she has great grandbabies she has not even met yet and would like to do so) - she is willing to stick with the modified diet   - Partner reached out to SLP, they will see if we can get magic cups for patient (milkshakes will not be safe)    Asymptomatic bacteriuria  - Urine culture with 50K Pseudomonas  - ID following, feel this represents colonization    Occasional lower extremity pain   Occasional numbness of toes bilaterally  - Arterial duplex of lower extremities was done. 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 Also right PTA is occluded distally. On the left side mid SFA appears occlusive. Left distal SFA appears to have reconstitution. Also left popliteal artery, distal PTA, and peroneal artery appear monophasic.  - Patient has dopplerable dorsalis pedis and posterior tibialis pulses bilaterally  - Given patient's overall condition and the fact that she still has dopplerable pulses, benefit of intervention is questionable - will defer inpatient Vascular Surgery consultation at this time, can consider as outpatient    Abnormal troponin levels  - Troponin levels were abnormal but no complaints of chest pain    Chronic essential tremor  - Reports significant  tremors, especially in right hand, which is chronic   - Has previously been evaluated by [place] [facility] in 2013, had been trialed on numerous medications (see Neurology note for further details) without resolution of tremor  - Neurology has seen and evaluated the patient here, given chronicity of tremors, will not make any medication adjustments at this time (however did recommend possible trial of gabapentin 100 mg BID and transitioning patient from Trazodone to Remeron)   - F/U as outpatient for further management/medication adjustments    Chronic double vision  - Reports having double vision for 2 years  - Neurology has seen and evaluated patient  - MRI of the brain does not show any acute process. 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  - Outpatient follow up with Ophthalmology     Orthostatic hypotension  - Continue non-medical/supportive interventions    Constipation  - Looks like PCP had recently prescribed Linzess - patient unsure if it was helpful  - On Miralax, Senna and Lactulose with continued abdominal discomfort and constipation  - Continue Miralax. Added Amitiza 24mg PO BID on 5/26 with results on 5/27, continue this regimen for now      Generalized weakness  - PT/OT following - patient interested in rehab     Expected Discharge Location and Transportation: [facility]   Expected Discharge Expected Discharge Date: 5/30/2025; Expected Discharge Time:      VTE Prophylaxis:Pharmacologic VTE prophylaxis orders are present.    AM-PAC 6 Clicks Score (PT): 17 (05/28/25 4458)    CODE STATUS:   Code Status and Medical Interventions: CPR (Attempt to Resuscitate); Full Support   Ordered at: 05/19/25 0934     Code Status (Patient has no pulse and is not breathing):    CPR (Attempt to Resuscitate)     Medical Interventions (Patient has pulse or is breathing):    Full Support     Kendra Monahan PA-C  05/28/25        Electronically signed by Kendra Monahan PA-C at 05/28/25  1235       Consult Notes (last 24 hours)  Notes from 05/28/25 1115 through 05/29/25 1115   No notes of this type exist for this encounter.

## 2025-05-29 NOTE — DISCHARGE SUMMARY
[facility] Medicine Services  DISCHARGE SUMMARY    Patient Name: Bibiana Hodges  : 1935  MRN: [id number]    Date of Admission: 2025 12:12 PM  Date of Discharge:  25  Primary Care Physician: Nivia Madrigal MD    Consults       Date and Time Order Name Status Description    2025  9:09 AM Inpatient Infectious Diseases Consult Completed     2025 11:38 PM Inpatient Neurology Consult General Completed             Hospital Course     Presenting Problem: SOA    Active Hospital Problems    Diagnosis  POA    Dysphagia, on modified diet [R13.10]  Yes    Hyperlipidemia [E78.5]  Yes    Autonomic dysfunction [G90.9]  Yes    Atrial fibrillation [I48.91]  Yes    Hypertension [I10]  Yes      Resolved Hospital Problems    Diagnosis Date Resolved POA    **Pneumonia [J18.9] 2025 Yes          Hospital Course:  Bibiana Hodges is a 89 y.o. female with past medical history significant for atrial fibrillation, hypertension, hyperlipidemia, GERD, B12 deficiency, and chronic dysphagia.  She is living at an assisted living facility.  Patient was brought by ambulance for respiratory distress. History of recurrent pneumonia felt to be secondary to aspiration. She was last admitted in 2025 and declined modified diet - was made DNR/DNI and discharged on a comfort diet.     Pneumonia of right lower lobe, suspected aspiration  COPD exacerbation  Acute hypoxia  - Room air saturation 83% and required 6 L on admission  - CTA chest showed evidence of right lower lobe pneumonia  - Negative full respiratory PCR panel  - Respiratory culture with normal vicki, negative blood cultures, negative urine Strep and Legionella  - Completed 5 days of Rocephin and Doxycycline  - s/p 5 days prednisone 40 mg daily  - ID has followed  - Patient has history of chronic dysphagia and modified diet has been recommended (soft to chew, honey thick) - however, patient does not like it - after  "further discussion (she had opted for comfort diet and DNR code status during January 2025 admission), she is now agreeable to \"stick with\" the modified diet as recommended as she wishes to remain full code at this time   - Weaned to room air      Asymptomatic bacteriuria  - Urine culture with 50K Pseudomonas  - ID has seen, feel this represents colonization - no further treatment     Chronic oropharyngeal dysphagia  - Patient had MBS 5/23/25. Recommended diet was mechanical ground texture, honey thick liquid, no mixed consistencies. Repeat FEES performed on day of discharge 5/29/25 and changed to soft to chew, whole meat, continue honey thick liquids  - Discussed again with patient 5/25/25 regarding option of comfort diet, however she does not want to get pneumonia over and over again, and wants to remain full code for now (says she has great grandbabies she has not even met yet and would like to do so) - she is willing to stick with the modified diet at this time  - Magic cups ordered for patient during this admission (milkshakes will not be safe, per discussion with SLP)     Occasional lower extremity pain   Occasional numbness of toes bilaterally  - Arterial duplex of lower extremities was done. 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 Also right PTA is occluded distally. On the left side mid SFA appears occlusive. Left distal SFA appears to have reconstitution. Also left popliteal artery, distal PTA, and peroneal artery appear monophasic.  - Patient has dopplerable dorsalis pedis and posterior tibialis pulses bilaterally  - Given patient's overall condition and the fact that she still has dopplerable pulses, benefit of intervention is questionable - will defer inpatient Vascular Surgery consultation at this time, can consider as outpatient    Abnormal troponin levels  - Troponin levels were abnormal but no complaints of chest pain    Chronic essential " tremor  - Reports significant tremors, especially in right hand, which is chronic   - Has previously been evaluated by [place] [facility] in 2013, had been trialed on numerous medications (see Neurology note for further details) without resolution of tremor  - Neurology has seen and evaluated the patient here, given chronicity of tremors, will not make any medication adjustments at this time (however did recommend possible trial of gabapentin 100 mg BID and transitioning patient from Trazodone to Remeron) - F/U as outpatient for further management/medication adjustments, Ambulatory Referral to Neurology placed at discharge    Chronic double vision  - Reports having double vision for 2 years  - Neurology has seen and evaluated patient  - MRI of the brain does not show any acute process. 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  - Outpatient follow up with Ophthalmology - Ambulatory Referral placed at discharge     Orthostatic hypotension  - Continue non-medical/supportive interventions    Constipation  - Reported not having a bowel movement in 2 weeks  - KUB did not show any sign of obstruction  - Amitiza initiated 5/26/25 with results on 5/27/25 - will continue 30 day trial at discharge (previously had been prescribed Linzess per PCP but patient unsure if it was helpful)     Generalized weakness  - PT/OT have followed patient  - Attempted rehab placement but unable to find accepting facility  - Plan back to assisted living facility with HHPT    Full code status  - Expressed desire to remain full code at this time      Discharge Follow Up Recommendations for outpatient labs/diagnostics:  F/U with PCP 1 week  Ambulatory referrals made to Neurology and Ophthalmology    Day of Discharge     HPI:   Seen this morning. No complaints at this time.     Review of Systems  Gen-no fevers, no chills  CV-no chest pain, no palpitations  Resp-no cough, no dyspnea  GI-no N/V/D, no abd pain      Vital  Signs:   Temp:  [97.6 °F (36.4 °C)-98.3 °F (36.8 °C)] 98.2 °F (36.8 °C)  Heart Rate:  [60-64] 60  Resp:  [16-20] 18  BP: (120-150)/(62-86) 120/62      Physical Exam:  Gen-no acute distress, frail/chronically ill appearing  HENT-NCAT, mucous membranes moist  CV-RRR, S1 S2 normal, no m/r/g  Resp-CTAB, no wheezes or rales  Abd-soft, NT, ND, +BS  Ext-trace edema  Neuro-A&Ox3, no focal deficits, resting tremor  Skin-no rashes  Psych-appropriate mood      Pertinent  and/or Most Recent Results     LAB RESULTS:      Lab 05/27/25  0429   WBC 9.41   HEMOGLOBIN 11.7*   HEMATOCRIT 36.4   PLATELETS 155   .3*         Lab 05/27/25  0430   SODIUM 135*   POTASSIUM 3.9   CHLORIDE 105   CO2 23.0   ANION GAP 7.0   BUN 19   CREATININE 0.93   EGFR 58.9*   GLUCOSE 82   CALCIUM 8.2*   MAGNESIUM 2.0                         Brief Urine Lab Results  (Last result in the past 365 days)        Color   Clarity   Blood   Leuk Est   Nitrite   Protein   CREAT   Urine HCG        05/19/25 1428 Yellow   Turbid   Moderate (2+)   Large (3+)   Negative   100 mg/dL (2+)                 Microbiology Results (last 10 days)       Procedure Component Value - Date/Time    Respiratory Culture - Sputum, Cough [[id number]] Collected: 05/20/25 0615    Lab Status: Final result Specimen: Sputum from Cough Updated: 05/22/25 1002     Respiratory Culture Light growth (2+) Normal respiratory vicki. No S. aureus or Pseudomonas aeruginosa detected. Final report.     Gram Stain Many (4+) WBCs per low power field      Rare (1+) Epithelial cells per low power field      Few (2+) Gram positive cocci in pairs and chains    Urine Culture - Urine, Urine, Catheter In/Out [[id number]]  (Abnormal)  (Susceptibility) Collected: 05/19/25 1428    Lab Status: Final result Specimen: Urine, Catheter In/Out Updated: 05/21/25 1124     Urine Culture 50,000 CFU/mL Pseudomonas aeruginosa    Narrative:      Colonization of the urinary tract without infection is common. Treatment is  discouraged unless the patient is symptomatic, pregnant, or undergoing an invasive urologic procedure.    Susceptibility        Pseudomonas aeruginosa      BECK      Cefepime Susceptible      Ceftazidime Susceptible      Ciprofloxacin Susceptible      Levofloxacin Susceptible      Piperacillin + Tazobactam Susceptible      Tobramycin Susceptible                           S. Pneumo Ag Urine or CSF - Urine, Urine, Catheter In/Out [[id number]]  (Normal) Collected: 05/19/25 1428    Lab Status: Final result Specimen: Urine, Catheter In/Out Updated: 05/20/25 1056     Strep Pneumo Ag Negative    Legionella Antigen, Urine - Urine, Urine, Catheter In/Out [[id number]]  (Normal) Collected: 05/19/25 1428    Lab Status: Final result Specimen: Urine, Catheter In/Out Updated: 05/20/25 1056     LEGIONELLA ANTIGEN, URINE Negative    Blood Culture - Blood, Wrist, Right [[id number]]  (Normal) Collected: 05/19/25 1400    Lab Status: Final result Specimen: Blood from Wrist, Right Updated: 05/24/25 1445     Blood Culture No growth at 5 days    Blood Culture - Blood, Arm, Left [[id number]]  (Normal) Collected: 05/19/25 1345    Lab Status: Final result Specimen: Blood from Arm, Left Updated: 05/24/25 1445     Blood Culture No growth at 5 days    Respiratory Panel PCR w/COVID-19(SARS-CoV-2) ERICK/MARTA/ELPIDIO/PAD/COR/ALMA In-House, NP Swab in UTM/VTM, 2 HR TAT - Swab, Nasopharynx [[id number]]  (Normal) Collected: 05/19/25 1310    Lab Status: Final result Specimen: Swab from Nasopharynx Updated: 05/19/25 1439     ADENOVIRUS, PCR Not Detected     Coronavirus 229E Not Detected     Coronavirus HKU1 Not Detected     Coronavirus NL63 Not Detected     Coronavirus OC43 Not Detected     COVID19 Not Detected     Human Metapneumovirus Not Detected     Human Rhinovirus/Enterovirus Not Detected     Influenza A PCR Not Detected     Influenza B PCR Not Detected     Parainfluenza Virus 1 Not Detected     Parainfluenza Virus 2 Not Detected     Parainfluenza Virus 3 Not  Detected     Parainfluenza Virus 4 Not Detected     RSV, PCR Not Detected     Bordetella pertussis pcr Not Detected     Bordetella parapertussis PCR Not Detected     Chlamydophila pneumoniae PCR Not Detected     Mycoplasma pneumo by PCR Not Detected    Narrative:      In the setting of a positive respiratory panel with a viral infection PLUS a negative procalcitonin without other underlying concern for bacterial infection, consider observing off antibiotics or discontinuation of antibiotics and continue supportive care. If the respiratory panel is positive for atypical bacterial infection (Bordetella pertussis, Chlamydophila pneumoniae, or Mycoplasma pneumoniae), consider antibiotic de-escalation to target atypical bacterial infection.            FL Video Swallow With Speech Single Contrast  Result Date: 5/25/2025  FL VIDEO SWALLOW W SPEECH SINGLE-CONTRAST Date of Exam: 5/23/2025 12:12 PM EDT Indication: dysphagia.   Comparison: None available. Technique:   The speech pathologist administered food and/or liquid mixed with barium to the patient with cine/video imaging.  Imaging assistance was provided to the speech pathologist and an image was saved. Fluoroscopic Time: 1 minute and 24 seconds Number of Images: 11 associated fluoroscopic loops were saved Findings: Aspiration was seen during fluoroscopic guided modified barium swallowing series. Please see speech therapy report for full details and recommendations.     Impression: Fluoroscopy provided for a modified barium swallow. Aspiration was seen during swallowing evaluation. Please see speech therapy report for full details and recommendations. Report dictated by: Shelley Walls PA-c  I have personally reviewed this case and agree with the findings above: Electronically Signed: Manuel Mora MD  5/25/2025 10:50 AM EDT  Workstation ID: [id number]    Duplex Lower Extremity Art / Grafts - Right CAR  Result Date: 5/22/2025    Mildly abnormal ABIs bilateral   Right  lower extremity imaging suggest multiphasic flows throughout, there is evidence of a high-grade (greater than 60%) stenosis in the popliteal arteries on the right   Right PTA distally is occluded     Duplex Venous Lower Extremity - Left CAR  Result Date: 5/22/2025    The left lower extremity venous duplex scan is negative for evidence of a DVT and SVT.   Incidentally noted occluded mid SFA with reconstitution of flow by collaterals in the distal segment.  Monophasic segments distal to the occlusion.     MRI Brain Without Contrast  Result Date: 5/21/2025  MRI BRAIN WO CONTRAST Date of Exam: 5/21/2025 2:45 PM EDT Indication: Double vision.  Comparison: None available. Technique:  Routine multiplanar/multisequence sequence images of the brain were obtained without contrast administration. FINDINGS: Foci of T2/FLAIR signal hyperintensity are seen within the bilateral hemispheric white matter and within the sara. There is cortical atrophy with prominent sulcation and ventriculomegaly. Midline structures appear unremarkable. No significant mass effect, intracranial hemorrhage, or hydrocephalus is identified. Diffusion-weighted sequences demonstrate no acute infarct.The visualized intracranial flow-voids appear unremarkable. Suggestion of mild right mastoid effusion. Bilateral lens prostheses noted. The visualized superficial soft tissues and cervical spine demonstrate no significant abnormality.     1.Findings compatible with chronic microvascular ischemic change and diffuse cortical atrophy. 2.No diffusion restriction is identified to suggest acute infarct. 3.Possible mild right mastoid effusion. Electronically Signed: Slim Mauro MD  5/21/2025 3:26 PM EDT  Workstation ID: [id number]    XR Abdomen KUB  Result Date: 5/21/2025  XR ABDOMEN KUB Date of Exam: 5/21/2025 7:24 AM EDT Indication: constipation Comparison: No recent KUB. 8/23/2023 abdomen pelvis CT scan Findings: Extensive stool shadow is seen from the level of  the ascending colon to the distal descending colon, but with practically no visible stool shadow in the sigmoid or rectum. No abnormally dilated small bowel loops are seen. Transverse colon is considered in the upper range of normal diameter, between 6 and 7 cm. There was similar degree of fecal stasis on the 8/23/2023 abdominal CT scan. No bowel wall edema or pneumatosis is seen. Incidental note is made of the patient's dextroconvex lumbar scoliosis, and anatomically aligned bilateral hip prostheses. .     Impression: Moderate fecal stasis to the level of the distal descending colon. No obvious impaction. Electronically Signed: Deon Vera MD  5/21/2025 9:37 AM EDT  Workstation ID: [id number]    CT Angiogram Chest  Result Date: 5/19/2025  CT ANGIOGRAM CHEST Date of Exam: 5/19/2025 2:38 PM EDT Indication: sob, hypoxia. Comparison: 4/20/2024 Technique: CTA of the chest was performed after the uneventful intravenous administration of 85 mL Isovue-300. Reconstructed coronal and sagittal images were also obtained. In addition, a 3-D volume rendered image was created for interpretation. Automated exposure control and iterative reconstruction methods were used. FINDINGS: Thoracic inlet: Unremarkable. Pulmonary arteries: No filling defects are identified within the pulmonary arteries to suggest acute pulmonary embolism. Great vessels: Atherosclerotic plaque is seen within the thoracic aorta and proximal arch vessels Mediastinum/Merari: No pathologically enlarged mediastinal lymph nodes are seen. Moderate hiatal hernia. Esophagus appears otherwise unremarkable Lung parenchyma/pleura: Small left and trace right pleural effusions with bibasilar atelectasis. Scattered lung parenchymal scarring noted bilaterally. Suggestion of centrilobular/tree-in-bud nodularity within the right lower lobe which may indicate atypical infectious process. Right lung granuloma. No pneumothorax is seen. Trachea and airways: The trachea and central  airways appear unremarkable. Pleural space: No significant pleural effusion or pneumothorax is seen. Heart and pericardium: Cardiac pacemaker leads noted. Coronary artery calcifications. Otherwise, the heart and pericardium appear unremarkable Chest wall: No acute osseous lesion is identified. Bones are demineralized. There are degenerative changes within the skeletal structures. Bilateral shoulder arthroplasties are noted. Left chest wall pacemaker noted. Upper abdomen: No acute abnormality is identified within the visualized upper abdomen. Probable bilateral renal cysts.     1.No evidence of acute pulmonary embolism. 2.Suggestion of centrilobular/tree-in-bud nodularity within the right lower lobe which may indicate atypical infectious process. 3.Small left and trace right pleural effusions with bibasilar atelectasis. 4.Scattered bilateral lung parenchymal scarring. Electronically Signed: Slim Mauro MD  5/19/2025 3:04 PM EDT  Workstation ID: [id number]      Results for orders placed during the hospital encounter of 05/19/25    Duplex Lower Extremity Art / Grafts - Right CAR    Interpretation Summary    Mildly abnormal ABIs bilateral    Right lower extremity imaging suggest multiphasic flows throughout, there is evidence of a high-grade (greater than 60%) stenosis in the popliteal arteries on the right    Right PTA distally is occluded      Results for orders placed during the hospital encounter of 05/19/25    Duplex Lower Extremity Art / Grafts - Right CAR    Interpretation Summary    Mildly abnormal ABIs bilateral    Right lower extremity imaging suggest multiphasic flows throughout, there is evidence of a high-grade (greater than 60%) stenosis in the popliteal arteries on the right    Right PTA distally is occluded      Results for orders placed during the hospital encounter of 08/20/23    Adult Transthoracic Echo Complete W/ Cont if Necessary Per Protocol    Interpretation Summary    Left ventricular systolic  function is mildly decreased. Calculated left ventricular EF = 47.9% Normal left ventricular cavity size and wall thickness noted. There is left ventricular global hypokinesis noted. Left ventricular diastolic function is consistent with (grade I) impaired relaxation.    : Normal right ventricular cavity size and systolic function noted. Electronic lead present in the ventricle.    Normal left atrial size and volume noted. Saline test results are negative.    There is moderate calcification of the aortic valve. The aortic valve was poorly visualized but appears trileaflet. Moderate aortic valve regurgitation is present. No aortic valve stenosis is present.    Mild mitral annular calcification is present. Mild mitral valve regurgitation is present. No significant mitral valve stenosis is present.    The tricuspid valve is grossly normal in structure. Mild tricuspid valve regurgitation is present. Estimated right ventricular systolic pressure from tricuspid regurgitation is normal, 33 mmHg. No tricuspid valve stenosis is present.    Mild dilation of the ascending aorta is present. Ascending aorta = 3.7 cm    There is a small (<1cm) pericardial effusion adjacent to the right atrium and right ventricle.        Discharge Details        Discharge Medications        New Medications        Instructions Start Date   lubiprostone 24 MCG capsule  Commonly known as: AMITIZA   24 mcg, Oral, 2 Times Daily With Meals             Changes to Medications        Instructions Start Date   polyethylene glycol 17 GM/SCOOP powder  Commonly known as: MIRALAX  What changed: See the new instructions.   MIX 17 GM IN 8 OUNCES OF LIQUID AND DRINK 1 TO 2 TIMES DAILY FOR CONSTIPATION.             Continue These Medications        Instructions Start Date   acetaminophen 325 MG tablet  Commonly known as: TYLENOL   650 mg, Every 8 Hours PRN      albuterol (2.5 MG/3ML) 0.083% nebulizer solution  Commonly known as: PROVENTIL   2.5 mg, Nebulization,  Every 4 Hours PRN      amiodarone 200 MG tablet  Commonly known as: PACERONE   200 mg, Daily      apixaban 2.5 MG tablet tablet  Commonly known as: ELIQUIS   2.5 mg, 2 Times Daily      artificial tears ophthalmic ointment   1 Application, Both Eyes, Every 1 Hour PRN      benzonatate 100 MG capsule  Commonly known as: TESSALON   100 mg, Oral, 3 Times Daily PRN      bisacodyl 10 MG suppository  Commonly known as: DULCOLAX   10 mg, Rectal, Daily PRN      castor oil-balsam peru ointment   1 Application, Topical, Every 12 Hours Scheduled, Apply to sacral area      ferrous sulfate 325 (65 FE) MG tablet   325 mg, Daily With Breakfast      fluticasone 50 MCG/ACT nasal spray  Commonly known as: FLONASE   2 sprays, Nasal, Daily PRN      levETIRAcetam  MG tablet  Commonly known as: KEPPRA XR   500 mg, Oral, Daily      levothyroxine 100 MCG tablet  Commonly known as: SYNTHROID, LEVOTHROID   100 mcg, Daily      lisinopril 10 MG tablet  Commonly known as: PRINIVIL,ZESTRIL   10 mg, Oral, Every 24 Hours Scheduled      meclizine 25 MG tablet  Commonly known as: ANTIVERT   25 mg, Oral, Every 8 Hours PRN      melatonin 5 MG tablet tablet   5 mg, Oral, Nightly      metoprolol succinate XL 25 MG 24 hr tablet  Commonly known as: TOPROL-XL   25 mg, Oral, Daily      ondansetron ODT 4 MG disintegrating tablet  Commonly known as: ZOFRAN-ODT   4 mg, Translingual, Every 6 Hours PRN      pantoprazole 40 MG EC tablet  Commonly known as: PROTONIX   40 mg, Daily      rosuvastatin 10 MG tablet  Commonly known as: CRESTOR   10 mg, Oral, Nightly      senna 8.6 MG tablet  Commonly known as: SENOKOT   2 tablets, Oral, Daily      traZODone 100 MG tablet  Commonly known as: DESYREL   100 mg, Nightly      venlafaxine XR 75 MG 24 hr capsule  Commonly known as: EFFEXOR-XR   75 mg, Daily      Vitamin C 500 MG capsule   1 capsule, Daily               Allergies   Allergen Reactions    Sulfa Antibiotics Hives    Gabapentin          Discharge  Disposition:  Home or Self Care    Diet:  Hospital:  Diet Order   Procedures    Diet: Cardiac; Healthy Heart (2-3 Na+); No Mixed Consistencies, Feeding Assistance - Nursing; Texture: Soft to Chew (NDD 3); Soft to Chew: Whole Meat; Fluid Consistency: Honey Thick       Diet Instructions       Diet: Cardiac Diets; Healthy Heart (2-3 Na+); Soft to Chew (NDD 3); Whole Meat; Honey Thick; No Mixed Consistencies      Discharge Diet: Cardiac Diets    Cardiac Diet: Healthy Heart (2-3 Na+)    Texture: Soft to Chew (NDD 3)    Soft to Chew: Whole Meat    Fluid Consistency: Honey Thick    Diet Modifiers / Additional Diets: No Mixed Consistencies             Activity:  Activity Instructions       Activity as Tolerated                   CODE STATUS:    Code Status and Medical Interventions: CPR (Attempt to Resuscitate); Full Support   Ordered at: 05/19/25 4337     Code Status (Patient has no pulse and is not breathing):    CPR (Attempt to Resuscitate)     Medical Interventions (Patient has pulse or is breathing):    Full Support       No future appointments.    Additional Instructions for the Follow-ups that You Need to Schedule       Ambulatory Referral to Neurology   As directed      Discharge Follow-up with PCP   As directed       Currently Documented PCP:    Nivia Madrigal MD    PCP Phone Number:    [phone]     Follow Up Details: 1 week                      Wendi Gaspar MD  05/29/25      Time Spent on Discharge:  I spent  35  minutes on this discharge activity which included: face-to-face encounter with the patient, reviewing the data in the system, coordination of the care with the nursing staff as well as consultants, documentation, and entering orders.

## 2025-05-29 NOTE — MBS/VFSS/FEES
Acute Care - Speech Language Pathology   Swallow Re-Evaluation [facility]  Fiberoptic Endoscopic Evaluation of Swallowing (FEES)     Patient Name: Bibiana Hodges  : 1935  MRN: [id number]  Today's Date: 2025               Admit Date: 2025    Visit Dx:     ICD-10-CM ICD-9-CM   1. Acute respiratory failure with hypoxia  J96.01 518.81   2. Community acquired pneumonia, unspecified laterality  J18.9 486   3. Acute UTI  N39.0 599.0   4. Elevated troponin  R79.89 790.6   5. Lactic acidosis  E87.20 276.2   6. Leukocytosis, unspecified type  D72.829 288.60   7. Oropharyngeal dysphagia  R13.12 787.22   8. Tremor  R25.1 781.0   9. Double vision  H53.2 368.2   10. Dysautonomia orthostatic hypotension syndrome  I95.1 458.0   11. Severe malnutrition  E43 261   12. Longstanding persistent atrial fibrillation  I48.11 427.31   13. Pneumonia of right lower lobe due to infectious organism  J18.9 486   14. Primary hypertension  I10 401.9     Patient Active Problem List   Diagnosis    Dysautonomia orthostatic hypotension syndrome    Hypertension    Severe malnutrition    Atrial fibrillation    Autonomic dysfunction    Hyperlipidemia    Dysphagia, on modified diet     Past Medical History:   Diagnosis Date    Constipation     Depression     Dysautonomia orthostatic hypotension syndrome     Generalized osteoarthritis     GERD (gastroesophageal reflux disease)     s/p Nissen    Hypertension     Insomnia     Osteoarthrosis, shoulder region     Skin ulcer of scalp     Thrombophlebitis of arm     Tremor     Vitamin B12 deficiency      Past Surgical History:   Procedure Laterality Date    DILATATION AND CURETTAGE      HERNIA REPAIR      HIP SURGERY      SHOULDER SURGERY      Right       SLP Recommendation and Plan  SLP Swallowing Diagnosis: mild-moderate, oral dysphagia, mod-severe, pharyngeal dysphagia, suspect chronic (25 1310)  SLP Diet Recommendation: soft to chew textures, whole, no mixed consistencies, honey  thick liquids, other (see comments) (may consider allowing Lindsay water protocol for improved quality of life (ice/thin water between meals/after oral care/with aspiration precautions and supervision, as tolerated)--per provider's discretion) (05/29/25 1310)  Recommended Precautions and Strategies: upright posture during/after eating, small bites of food and sips of liquid, general aspiration precautions, 1:1 supervision, assist with feeding (Cough at end of intake, eliminate distractions while eating/drinking (no talking)) (05/29/25 1310)  SLP Rec. for Method of Medication Administration: meds whole, with puree, as tolerated (05/29/25 1310)     Monitor for Signs of Aspiration: yes, notify SLP if any concerns (05/29/25 1310)     Swallow Criteria for Skilled Therapeutic Interventions Met: demonstrates skilled criteria (05/29/25 1310)  Anticipated Discharge Disposition (SLP): assisted living facility ([facility]), home with home health (05/29/25 1310)  Rehab Potential/Prognosis, Swallowing: re-evaluate goals as necessary (05/29/25 1310)  Therapy Frequency (Swallow): 5 days per week (05/29/25 1310)  Predicted Duration Therapy Intervention (Days): 1 week (05/29/25 1310)  Oral Care Recommendations: Oral Care BID/PRN, Suction toothbrush (05/29/25 1310)  Demonstrates Need for Referral to Another Service: clinical nutrition services/dietitian (05/29/25 1310)  Swallowing Considerations per Physician Discretion: other (see comments) (further GOC discussion, given likely chronic dysphagia) (05/29/25 1310)                   SWALLOW EVALUATION (Last 72 Hours)       SLP Adult Swallow Evaluation       Row Name 05/29/25 1310                   Rehab Evaluation    Document Type re-evaluation  -AC        Subjective Information no complaints  -AC        Patient Observations alert;cooperative  -AC        Patient/Family/Caregiver Comments/Observations No family present.  -AC        Patient Effort good  -AC        Comment Noted pt discharging  "soon. Spoke to pt re: diet tolerance. She reported aversion to diet/liquid modifications. Pt agreeable to proceeding w/ FEES prior to d/c to re-assess swallow and determine if safe to upgrade diet.  -AC           General Information    Patient Profile Reviewed yes  -AC        Pertinent History Of Current Problem See previous eval.  -AC        Current Method of Nutrition mechanical ground textures;no mixed consistencies;honey-thick liquids  -AC        Plans/Goals Discussed with patient;agreed upon  -        Barriers to Rehab previous functional deficit  -AC        Patient's Goals for Discharge eat/drink without coughing/choking;return to regular diet  \"I want to do what's safest so I can meet my great-grandchildren.\" Also, \"drinking thickened liquids for the rest of my life is no way to live.\"  -AC           Pain    Pretreatment Pain Rating 0/10 - no pain  -AC        Posttreatment Pain Rating 0/10 - no pain  -           General Eating/Swallowing Observations    Respiratory Support Currently in Use room air  -        Eating/Swallowing Skills fed by staff/caregiver  -AC        Positioning During Eating upright 90 degree;upright in bed  -AC           Fiberoptic Endoscopic Evaluation of Swallowing (FEES)    Risks/Benefits Reviewed risks/benefits explained;patient;agreed to eval  -AC        Nasal Entry left:  -AC        Scope serial number/identification [id number]  -AC           Anatomy and Physiology    Anatomic Considerations no anatomic structural deviation  -AC        Velopharyngeal WFL  -AC        Base of Tongue symmetrical;range reduced  -AC        Epiglottis WFL  -AC        Laryngeal Function Breathing symmetrical  -AC        Laryngeal Function Phonation symmetrical  -AC        Laryngeal Function to Breath Hold TVF contact  -AC        Secretion Rating Scale (Slim et al. 1996) 0- normal, no visible secretions  -AC        Sensory reduced sensation  -AC        Utensils Used Spoon;Cup;Straw  -AC        Consistencies " Trialed thin liquids;nectar-thick liquids;honey-thick liquids;pudding/puree;mixed consistency;regular textures  -AC           FEES Interpretation    Oral Phase prolonged manipulation;with solids only;reduced lingual control;prespill of liquids into pharynx;with liquids only;with mixed consistency only  tremor appeared to affected lingual control  -AC           Initiation of Pharyngeal Swallow    Initiation of Pharyngeal Swallow bolus in pyriform sinuses  -AC        Pharyngeal Phase impaired pharyngeal phase of swallowing  -AC        Aspiration Before the Swallow thin liquids;mixed consistency;secondary to reduced back of tongue control;secondary to delayed swallow initiation or mistiming  -AC        Penetration During the Swallow nectar-thick liquids;honey-thick liquids;secondary to delayed swallow initiation or mistiming;secondary to reduced laryngeal elevation;secondary to reduced vestibular closure  intermittent w/ honey-thick liquid  -AC        Aspiration During the Swallow nectar-thick liquids;honey-thick liquids;secondary to delayed swallow initiation or mistiming;secondary to reduced laryngeal elevation;secondary to reduced vestibular closure;other (see comments)  intermittent w/ nectar-thick; noted x1 w/ honey-thick liquid when pt talking w/ liquid in her mouth  -AC        Penetration After the Swallow thin liquids;nectar-thick liquids;secondary to residue;in pyriform sinuses  -AC        Aspiration After the Swallow thin liquids;mixed consistency;secondary to residue;in pyriform sinuses  -AC        Depth of Penetration deep  -AC        Response to Penetration No  -AC        No spontaneous response to penetration and non-effective laryngeal clearance with cue (see comments)  -AC        Response to Aspiration No  -AC        No spontaneous response to aspiration with non-effective subglottic clearance with cue (see comments)  cough  -AC        Rosenbek's Scale thin:;nectar:;honey:;mixed consistencies:;8-->Level  8  -AC        Residue all consistencies tested;secondary to reduced base of tongue retraction;secondary to reduced posterior pharyngeal wall stripping;secondary to reduced laryngeal elevation;secondary to reduced hyolaryngeal excursion;other (see comments)  mild-moderate  -AC        Response to Residue partial residue clearance;with spontaneous subsequent swallow  -AC        Attempted Compensatory Maneuvers bolus size;bolus presentation style  tremors/self-feeding limitations  -AC        Response to Attempted Compensatory Maneuvers other (see comments)  small/single straw sips of honey-thick liquid eliminated deep penetration  -AC        Successful Compensatory Maneuver Competency patient able to;demonstrate compensations;with cues  -AC           SLP Evaluation Clinical Impression    SLP Swallowing Diagnosis mild-moderate;oral dysphagia;mod-severe;pharyngeal dysphagia;suspect chronic  -AC        Functional Impact risk of aspiration/pneumonia;risk of malnutrition;risk of dehydration  -AC        Rehab Potential/Prognosis, Swallowing re-evaluate goals as necessary  -        Swallow Criteria for Skilled Therapeutic Interventions Met demonstrates skilled criteria  -           Recommendations    Therapy Frequency (Swallow) 5 days per week  -AC        Predicted Duration Therapy Intervention (Days) 1 week  -AC        SLP Diet Recommendation soft to chew textures;whole;no mixed consistencies;honey thick liquids;other (see comments)  may consider allowing Lindsay water protocol for improved quality of life (ice/thin water between meals/after oral care/with aspiration precautions and supervision, as tolerated)--per provider's discretion  -        Recommended Precautions and Strategies upright posture during/after eating;small bites of food and sips of liquid;general aspiration precautions;1:1 supervision;assist with feeding  Cough at end of intake, eliminate distractions while eating/drinking (no talking)  -AC         Oral Care Recommendations Oral Care BID/PRN;Suction toothbrush  -AC        SLP Rec. for Method of Medication Administration meds whole;with puree;as tolerated  -AC        Monitor for Signs of Aspiration yes;notify SLP if any concerns  -AC        Anticipated Discharge Disposition (SLP) assisted living facility (intermediate);home with home health  -AC        Demonstrates Need for Referral to Another Service clinical nutrition services/dietitian  -AC        Swallowing Considerations per Physician Discretion other (see comments)  further GOC discussion, given likely chronic dysphagia  -AC                  User Key  (r) = Recorded By, (t) = Taken By, (c) = Cosigned By      Initials Name Effective Dates    AC Desirae Murray MS CCC-SLP 02/03/23 -                     EDUCATION  The patient has been educated in the following areas:   Dysphagia (Swallowing Impairment) Modified Diet Instruction.                Time Calculation:    Time Calculation- SLP       Row Name 05/29/25 1507             Time Calculation- SLP    SLP Start Time 1310  -AC      SLP Received On 05/29/25  -         Untimed Charges    47857-NW Fiberoptic Endo Eval Swallow Minutes 100  -AC         Total Minutes    Untimed Charges Total Minutes 100  -AC       Total Minutes 100  -AC                User Key  (r) = Recorded By, (t) = Taken By, (c) = Cosigned By      Initials Name Provider Type    AC Desirae Murray MS CCC-SLP Speech and Language Pathologist                    Therapy Charges for Today       Code Description Service Date Service Provider Modifiers Qty    [id number] HC ST FIBEROPTIC ENDO EVAL SWALL 7 5/29/2025 Desirae Murray MS CCC-SLP GN 1                 Desirae Murray MS CCC-SLP  5/29/2025

## 2025-05-29 NOTE — CASE MANAGEMENT/SOCIAL WORK
Case Management Discharge Note      Final Note: Met with Ms. Hodges at the bedside to finalize discharge plan. She will be returning to her home at [facility] with [facility] (PT, OT, and speech). Her son will provide transportation.  will fax discharge summary to [phone]. No other discharge needs were identified.         Selected Continued Care - Admitted Since 5/19/2025       Destination    No services have been selected for the patient.                Durable Medical Equipment    No services have been selected for the patient.                Dialysis/Infusion    No services have been selected for the patient.                Home Medical Care Coordination complete.      Service Provider Services Address Phone Fax Patient Preferred    [facility] [facility], [address] [address] [phone] --              Therapy    No services have been selected for the patient.                Community Resources    No services have been selected for the patient.                Community & DME    No services have been selected for the patient.                    Transportation Services  Private: Car    Final Discharge Disposition Code: 01 - home or self-care

## 2025-05-30 NOTE — OUTREACH NOTE
Prep Survey      Flowsheet Row Responses   [facility] facility patient discharged from? [facility]   Is LACE score < 7 ? No   Eligibility Readm Mgmt   Discharge diagnosis Pneumonia   Does the patient have one of the following disease processes/diagnoses(primary or secondary)? Pneumonia   Does the patient have Home health ordered? Yes   What is the Home health agency?  [facility]   Is there a DME ordered? No   [facility]   Prep survey completed? Yes            Ludivina HOOPER - Registered Nurse

## 2025-06-04 ENCOUNTER — READMISSION MANAGEMENT (OUTPATIENT)
Dept: CALL CENTER | Facility: HOSPITAL | Age: OVER 89
End: 2025-06-04
Payer: MEDICARE

## 2025-06-04 NOTE — OUTREACH NOTE
COPD/PN Week 1 Survey      Flowsheet Row Responses   [facility] patient discharged from? [facility]   Does the patient have one of the following disease processes/diagnoses(primary or secondary)? Pneumonia   Week 1 attempt successful? Yes   Call start time 0818   Call end time 0822   Is patient permission given to speak with other caregiver? Yes   List who call center can speak with son   Meds reviewed with patient/caregiver? Yes   Is the patient having any side effects they believe may be caused by any medication additions or changes? No   Does the patient have all medications ordered at discharge? Yes   Is the patient taking all medications as directed (includes completed medication regime)? Yes   Comments regarding appointments Pcp visits at [facility].   Does the patient have a primary care provider?  Yes   Does the patient have an appointment with their PCP or specialist within 7 days of discharge? Yes   Has the patient kept scheduled appointments due by today? Yes   Psychosocial issues? No   What is the patient's perception of their health status since discharge? Improving   Is the patient/caregiver able to teach back the hierarchy of who to call/visit for symptoms/problems? PCP, Specialist, Home health nurse, Urgent Care, ED, 911 Yes   Week 1 call completed? Yes   Graduated Yes   Is the patient interested in additional calls from an ambulatory ? No   Would this patient benefit from a Referral to Amb Social Work? No   Graduated/Revoked comments Pt's son reports pt has returned to [facility]. Pcp visits there. Facility dispenses all medications.   Call end time 0822            Melanie GRANT - Registered Nurse